# Patient Record
Sex: FEMALE | Race: WHITE | NOT HISPANIC OR LATINO | Employment: OTHER | URBAN - METROPOLITAN AREA
[De-identification: names, ages, dates, MRNs, and addresses within clinical notes are randomized per-mention and may not be internally consistent; named-entity substitution may affect disease eponyms.]

---

## 2018-01-15 ENCOUNTER — CONVERSION ENCOUNTER (OUTPATIENT)
Dept: FAMILY MEDICINE CLINIC | Facility: CLINIC | Age: 73
End: 2018-01-15

## 2018-01-15 ENCOUNTER — OFFICE VISIT CONVERTED (OUTPATIENT)
Dept: FAMILY MEDICINE CLINIC | Facility: CLINIC | Age: 73
End: 2018-01-15
Attending: FAMILY MEDICINE

## 2018-02-01 ENCOUNTER — CONVERSION ENCOUNTER (OUTPATIENT)
Dept: MAMMOGRAPHY | Facility: HOSPITAL | Age: 73
End: 2018-02-01

## 2018-02-05 ENCOUNTER — OFFICE VISIT CONVERTED (OUTPATIENT)
Dept: PULMONOLOGY | Facility: CLINIC | Age: 73
End: 2018-02-05
Attending: INTERNAL MEDICINE

## 2018-03-09 ENCOUNTER — OFFICE VISIT CONVERTED (OUTPATIENT)
Dept: FAMILY MEDICINE CLINIC | Facility: CLINIC | Age: 73
End: 2018-03-09
Attending: FAMILY MEDICINE

## 2018-04-27 ENCOUNTER — OFFICE VISIT CONVERTED (OUTPATIENT)
Dept: FAMILY MEDICINE CLINIC | Facility: CLINIC | Age: 73
End: 2018-04-27
Attending: FAMILY MEDICINE

## 2018-04-27 ENCOUNTER — CONVERSION ENCOUNTER (OUTPATIENT)
Dept: FAMILY MEDICINE CLINIC | Facility: CLINIC | Age: 73
End: 2018-04-27

## 2018-05-18 ENCOUNTER — OFFICE VISIT CONVERTED (OUTPATIENT)
Dept: FAMILY MEDICINE CLINIC | Facility: CLINIC | Age: 73
End: 2018-05-18
Attending: FAMILY MEDICINE

## 2018-06-19 ENCOUNTER — OFFICE VISIT CONVERTED (OUTPATIENT)
Dept: FAMILY MEDICINE CLINIC | Facility: CLINIC | Age: 73
End: 2018-06-19
Attending: FAMILY MEDICINE

## 2018-06-20 ENCOUNTER — OFFICE VISIT CONVERTED (OUTPATIENT)
Dept: PULMONOLOGY | Facility: CLINIC | Age: 73
End: 2018-06-20
Attending: INTERNAL MEDICINE

## 2018-06-22 ENCOUNTER — OFFICE VISIT CONVERTED (OUTPATIENT)
Dept: ORTHOPEDIC SURGERY | Facility: CLINIC | Age: 73
End: 2018-06-22
Attending: PHYSICIAN ASSISTANT

## 2018-07-11 ENCOUNTER — OFFICE VISIT CONVERTED (OUTPATIENT)
Dept: ORTHOPEDIC SURGERY | Facility: CLINIC | Age: 73
End: 2018-07-11
Attending: PHYSICIAN ASSISTANT

## 2018-07-17 ENCOUNTER — OFFICE VISIT CONVERTED (OUTPATIENT)
Dept: FAMILY MEDICINE CLINIC | Facility: CLINIC | Age: 73
End: 2018-07-17
Attending: FAMILY MEDICINE

## 2018-07-17 ENCOUNTER — CONVERSION ENCOUNTER (OUTPATIENT)
Dept: FAMILY MEDICINE CLINIC | Facility: CLINIC | Age: 73
End: 2018-07-17

## 2018-08-01 ENCOUNTER — CONVERSION ENCOUNTER (OUTPATIENT)
Dept: ORTHOPEDIC SURGERY | Facility: CLINIC | Age: 73
End: 2018-08-01

## 2018-08-01 ENCOUNTER — OFFICE VISIT CONVERTED (OUTPATIENT)
Dept: ORTHOPEDIC SURGERY | Facility: CLINIC | Age: 73
End: 2018-08-01
Attending: PHYSICIAN ASSISTANT

## 2018-08-31 ENCOUNTER — OFFICE VISIT CONVERTED (OUTPATIENT)
Dept: ORTHOPEDIC SURGERY | Facility: CLINIC | Age: 73
End: 2018-08-31
Attending: PHYSICIAN ASSISTANT

## 2018-09-06 ENCOUNTER — OFFICE VISIT CONVERTED (OUTPATIENT)
Dept: FAMILY MEDICINE CLINIC | Facility: CLINIC | Age: 73
End: 2018-09-06
Attending: FAMILY MEDICINE

## 2018-09-14 ENCOUNTER — CONVERSION ENCOUNTER (OUTPATIENT)
Dept: MAMMOGRAPHY | Facility: HOSPITAL | Age: 73
End: 2018-09-14

## 2018-10-23 ENCOUNTER — OFFICE VISIT CONVERTED (OUTPATIENT)
Dept: FAMILY MEDICINE CLINIC | Facility: CLINIC | Age: 73
End: 2018-10-23
Attending: FAMILY MEDICINE

## 2018-10-29 ENCOUNTER — OFFICE VISIT CONVERTED (OUTPATIENT)
Dept: FAMILY MEDICINE CLINIC | Facility: CLINIC | Age: 73
End: 2018-10-29
Attending: FAMILY MEDICINE

## 2018-10-29 ENCOUNTER — CONVERSION ENCOUNTER (OUTPATIENT)
Dept: FAMILY MEDICINE CLINIC | Facility: CLINIC | Age: 73
End: 2018-10-29

## 2018-12-12 ENCOUNTER — OFFICE VISIT CONVERTED (OUTPATIENT)
Dept: PULMONOLOGY | Facility: CLINIC | Age: 73
End: 2018-12-12
Attending: INTERNAL MEDICINE

## 2019-01-13 ENCOUNTER — HOSPITAL ENCOUNTER (OUTPATIENT)
Dept: URGENT CARE | Facility: CLINIC | Age: 74
Discharge: HOME | End: 2019-01-13

## 2019-01-17 ENCOUNTER — OFFICE VISIT CONVERTED (OUTPATIENT)
Dept: FAMILY MEDICINE CLINIC | Facility: CLINIC | Age: 74
End: 2019-01-17
Attending: FAMILY MEDICINE

## 2019-01-17 ENCOUNTER — CONVERSION ENCOUNTER (OUTPATIENT)
Dept: FAMILY MEDICINE CLINIC | Facility: CLINIC | Age: 74
End: 2019-01-17

## 2019-02-08 ENCOUNTER — HOSPITAL ENCOUNTER (OUTPATIENT)
Dept: GENERAL RADIOLOGY | Facility: HOSPITAL | Age: 74
Discharge: HOME | End: 2019-02-08
Attending: FAMILY MEDICINE

## 2019-02-08 ENCOUNTER — OFFICE VISIT CONVERTED (OUTPATIENT)
Dept: FAMILY MEDICINE CLINIC | Facility: CLINIC | Age: 74
End: 2019-02-08
Attending: FAMILY MEDICINE

## 2019-02-15 ENCOUNTER — OFFICE VISIT CONVERTED (OUTPATIENT)
Dept: FAMILY MEDICINE CLINIC | Facility: CLINIC | Age: 74
End: 2019-02-15
Attending: FAMILY MEDICINE

## 2019-04-11 ENCOUNTER — OFFICE VISIT CONVERTED (OUTPATIENT)
Dept: FAMILY MEDICINE CLINIC | Facility: CLINIC | Age: 74
End: 2019-04-11
Attending: NURSE PRACTITIONER

## 2019-04-26 ENCOUNTER — OFFICE VISIT CONVERTED (OUTPATIENT)
Dept: PULMONOLOGY | Facility: CLINIC | Age: 74
End: 2019-04-26
Attending: INTERNAL MEDICINE

## 2019-04-29 ENCOUNTER — OFFICE VISIT CONVERTED (OUTPATIENT)
Dept: FAMILY MEDICINE CLINIC | Facility: CLINIC | Age: 74
End: 2019-04-29
Attending: FAMILY MEDICINE

## 2019-04-29 ENCOUNTER — HOSPITAL ENCOUNTER (OUTPATIENT)
Dept: OTHER | Facility: HOSPITAL | Age: 74
Discharge: HOME | End: 2019-04-29
Attending: INTERNAL MEDICINE

## 2019-05-02 ENCOUNTER — HOSPITAL ENCOUNTER (OUTPATIENT)
Dept: GENERAL RADIOLOGY | Facility: HOSPITAL | Age: 74
Discharge: HOME | End: 2019-05-02
Attending: INTERNAL MEDICINE

## 2019-06-17 ENCOUNTER — OFFICE VISIT CONVERTED (OUTPATIENT)
Dept: PULMONOLOGY | Facility: CLINIC | Age: 74
End: 2019-06-17
Attending: PHYSICIAN ASSISTANT

## 2019-06-27 ENCOUNTER — HOSPITAL ENCOUNTER (OUTPATIENT)
Dept: FAMILY MEDICINE CLINIC | Facility: CLINIC | Age: 74
Discharge: HOME | End: 2019-06-27
Attending: FAMILY MEDICINE

## 2019-06-27 ENCOUNTER — HOSPITAL ENCOUNTER (OUTPATIENT)
Dept: GENERAL RADIOLOGY | Facility: HOSPITAL | Age: 74
Discharge: HOME | End: 2019-06-27
Attending: FAMILY MEDICINE

## 2019-06-27 ENCOUNTER — OFFICE VISIT CONVERTED (OUTPATIENT)
Dept: FAMILY MEDICINE CLINIC | Facility: CLINIC | Age: 74
End: 2019-06-27
Attending: FAMILY MEDICINE

## 2019-06-27 LAB — URATE SERPL-MCNC: 6.1 MG/DL (ref 2.5–7.5)

## 2019-07-03 LAB
BACTERIA SPEC AEROBE CULT: ABNORMAL
BACTERIA SPEC AEROBE CULT: ABNORMAL

## 2019-07-26 ENCOUNTER — HOSPITAL ENCOUNTER (OUTPATIENT)
Dept: URGENT CARE | Facility: CLINIC | Age: 74
Discharge: HOME | End: 2019-07-26

## 2019-08-13 ENCOUNTER — HOSPITAL ENCOUNTER (OUTPATIENT)
Dept: OTHER | Facility: HOSPITAL | Age: 74
Discharge: HOME | End: 2019-08-13
Attending: PHYSICIAN ASSISTANT

## 2019-08-13 LAB
ALBUMIN SERPL-MCNC: 3.9 G/DL (ref 3.5–5)
ALBUMIN/GLOB SERPL: 1.3 {RATIO} (ref 1.4–2.6)
ALP SERPL-CCNC: 99 U/L (ref 43–160)
ALT SERPL-CCNC: 12 U/L (ref 10–40)
ANION GAP SERPL CALC-SCNC: 16 MMOL/L (ref 8–19)
AST SERPL-CCNC: 20 U/L (ref 15–50)
BASOPHILS # BLD AUTO: 0.08 10*3/UL (ref 0–0.2)
BASOPHILS NFR BLD AUTO: 0.7 % (ref 0–3)
BILIRUB SERPL-MCNC: 0.38 MG/DL (ref 0.2–1.3)
BUN SERPL-MCNC: 25 MG/DL (ref 5–25)
BUN/CREAT SERPL: 26 {RATIO} (ref 6–20)
CALCIUM SERPL-MCNC: 9.3 MG/DL (ref 8.7–10.4)
CHLORIDE SERPL-SCNC: 107 MMOL/L (ref 99–111)
CONV ABS IMM GRAN: 0.06 10*3/UL (ref 0–0.2)
CONV CO2: 22 MMOL/L (ref 22–32)
CONV IMMATURE GRAN: 0.5 % (ref 0–1.8)
CONV TOTAL PROTEIN: 6.8 G/DL (ref 6.3–8.2)
CREAT UR-MCNC: 0.96 MG/DL (ref 0.5–0.9)
DEPRECATED RDW RBC AUTO: 48.4 FL (ref 36.4–46.3)
EOSINOPHIL # BLD AUTO: 0.19 10*3/UL (ref 0–0.7)
EOSINOPHIL # BLD AUTO: 1.6 % (ref 0–7)
ERYTHROCYTE [DISTWIDTH] IN BLOOD BY AUTOMATED COUNT: 14.6 % (ref 11.7–14.4)
GFR SERPLBLD BASED ON 1.73 SQ M-ARVRAT: 58 ML/MIN/{1.73_M2}
GLOBULIN UR ELPH-MCNC: 2.9 G/DL (ref 2–3.5)
GLUCOSE SERPL-MCNC: 86 MG/DL (ref 65–99)
HCT VFR BLD AUTO: 38.1 % (ref 37–47)
HGB BLD-MCNC: 11.9 G/DL (ref 12–16)
LYMPHOCYTES # BLD AUTO: 4.73 10*3/UL (ref 1–5)
LYMPHOCYTES NFR BLD AUTO: 39.2 % (ref 20–45)
MCH RBC QN AUTO: 28.1 PG (ref 27–31)
MCHC RBC AUTO-ENTMCNC: 31.2 G/DL (ref 33–37)
MCV RBC AUTO: 89.9 FL (ref 81–99)
MONOCYTES # BLD AUTO: 0.98 10*3/UL (ref 0.2–1.2)
MONOCYTES NFR BLD AUTO: 8.1 % (ref 3–10)
NEUTROPHILS # BLD AUTO: 6.03 10*3/UL (ref 2–8)
NEUTROPHILS NFR BLD AUTO: 49.9 % (ref 30–85)
NRBC CBCN: 0 % (ref 0–0.7)
OSMOLALITY SERPL CALC.SUM OF ELEC: 296 MOSM/KG (ref 273–304)
PLATELET # BLD AUTO: 216 10*3/UL (ref 130–400)
PMV BLD AUTO: 12.9 FL (ref 9.4–12.3)
POTASSIUM SERPL-SCNC: 4 MMOL/L (ref 3.5–5.3)
RBC # BLD AUTO: 4.24 10*6/UL (ref 4.2–5.4)
SODIUM SERPL-SCNC: 141 MMOL/L (ref 135–147)
WBC # BLD AUTO: 12.07 10*3/UL (ref 4.8–10.8)

## 2019-08-16 ENCOUNTER — OFFICE VISIT CONVERTED (OUTPATIENT)
Dept: PULMONOLOGY | Facility: CLINIC | Age: 74
End: 2019-08-16
Attending: INTERNAL MEDICINE

## 2019-08-19 ENCOUNTER — HOSPITAL ENCOUNTER (OUTPATIENT)
Dept: OTHER | Facility: HOSPITAL | Age: 74
Discharge: HOME | End: 2019-08-19
Attending: INTERNAL MEDICINE

## 2019-08-19 LAB — BNP SERPL-MCNC: 259 PG/ML (ref 0–900)

## 2019-08-21 LAB — BACTERIA SPEC AEROBE CULT: NORMAL

## 2019-08-23 ENCOUNTER — OFFICE VISIT CONVERTED (OUTPATIENT)
Dept: FAMILY MEDICINE CLINIC | Facility: CLINIC | Age: 74
End: 2019-08-23
Attending: FAMILY MEDICINE

## 2019-08-30 ENCOUNTER — HOSPITAL ENCOUNTER (OUTPATIENT)
Dept: FAMILY MEDICINE CLINIC | Facility: CLINIC | Age: 74
Discharge: HOME | End: 2019-08-30
Attending: FAMILY MEDICINE

## 2019-08-30 LAB
ANION GAP SERPL CALC-SCNC: 16 MMOL/L (ref 8–19)
BUN SERPL-MCNC: 25 MG/DL (ref 5–25)
BUN/CREAT SERPL: 21 {RATIO} (ref 6–20)
CALCIUM SERPL-MCNC: 8.9 MG/DL (ref 8.7–10.4)
CHLORIDE SERPL-SCNC: 105 MMOL/L (ref 99–111)
CONV CO2: 25 MMOL/L (ref 22–32)
CREAT UR-MCNC: 1.2 MG/DL (ref 0.5–0.9)
GFR SERPLBLD BASED ON 1.73 SQ M-ARVRAT: 44 ML/MIN/{1.73_M2}
GLUCOSE SERPL-MCNC: 120 MG/DL (ref 65–99)
OSMOLALITY SERPL CALC.SUM OF ELEC: 300 MOSM/KG (ref 273–304)
POTASSIUM SERPL-SCNC: 4.1 MMOL/L (ref 3.5–5.3)
SODIUM SERPL-SCNC: 142 MMOL/L (ref 135–147)

## 2019-09-13 ENCOUNTER — HOSPITAL ENCOUNTER (OUTPATIENT)
Dept: FAMILY MEDICINE CLINIC | Facility: CLINIC | Age: 74
Discharge: HOME | End: 2019-09-13
Attending: FAMILY MEDICINE

## 2019-09-13 LAB
ANION GAP SERPL CALC-SCNC: 16 MMOL/L (ref 8–19)
BUN SERPL-MCNC: 21 MG/DL (ref 5–25)
BUN/CREAT SERPL: 18 {RATIO} (ref 6–20)
CALCIUM SERPL-MCNC: 9.1 MG/DL (ref 8.7–10.4)
CHLORIDE SERPL-SCNC: 105 MMOL/L (ref 99–111)
CONV CO2: 26 MMOL/L (ref 22–32)
CREAT UR-MCNC: 1.16 MG/DL (ref 0.5–0.9)
GFR SERPLBLD BASED ON 1.73 SQ M-ARVRAT: 46 ML/MIN/{1.73_M2}
GLUCOSE SERPL-MCNC: 99 MG/DL (ref 65–99)
OSMOLALITY SERPL CALC.SUM OF ELEC: 299 MOSM/KG (ref 273–304)
POTASSIUM SERPL-SCNC: 4.4 MMOL/L (ref 3.5–5.3)
SODIUM SERPL-SCNC: 143 MMOL/L (ref 135–147)

## 2019-10-29 ENCOUNTER — HOSPITAL ENCOUNTER (OUTPATIENT)
Dept: FAMILY MEDICINE CLINIC | Facility: CLINIC | Age: 74
Discharge: HOME | End: 2019-10-29
Attending: FAMILY MEDICINE

## 2019-10-29 ENCOUNTER — OFFICE VISIT CONVERTED (OUTPATIENT)
Dept: FAMILY MEDICINE CLINIC | Facility: CLINIC | Age: 74
End: 2019-10-29
Attending: FAMILY MEDICINE

## 2019-10-29 LAB
25(OH)D3 SERPL-MCNC: 26.5 NG/ML (ref 30–100)
ALBUMIN SERPL-MCNC: 4.2 G/DL (ref 3.5–5)
ALBUMIN/GLOB SERPL: 1.3 {RATIO} (ref 1.4–2.6)
ALP SERPL-CCNC: 116 U/L (ref 43–160)
ALT SERPL-CCNC: 8 U/L (ref 10–40)
ANION GAP SERPL CALC-SCNC: 22 MMOL/L (ref 8–19)
AST SERPL-CCNC: 15 U/L (ref 15–50)
BASOPHILS # BLD AUTO: 0.08 10*3/UL (ref 0–0.2)
BASOPHILS NFR BLD AUTO: 0.6 % (ref 0–3)
BILIRUB SERPL-MCNC: 0.49 MG/DL (ref 0.2–1.3)
BUN SERPL-MCNC: 26 MG/DL (ref 5–25)
BUN/CREAT SERPL: 24 {RATIO} (ref 6–20)
CALCIUM SERPL-MCNC: 9.8 MG/DL (ref 8.7–10.4)
CHLORIDE SERPL-SCNC: 104 MMOL/L (ref 99–111)
CHOLEST SERPL-MCNC: 176 MG/DL (ref 107–200)
CHOLEST/HDLC SERPL: 3 {RATIO} (ref 3–6)
CONV ABS IMM GRAN: 0.08 10*3/UL (ref 0–0.2)
CONV CO2: 21 MMOL/L (ref 22–32)
CONV IMMATURE GRAN: 0.6 % (ref 0–1.8)
CONV TOTAL PROTEIN: 7.5 G/DL (ref 6.3–8.2)
CREAT UR-MCNC: 1.08 MG/DL (ref 0.5–0.9)
DEPRECATED RDW RBC AUTO: 50.2 FL (ref 36.4–46.3)
EOSINOPHIL # BLD AUTO: 0.19 10*3/UL (ref 0–0.7)
EOSINOPHIL # BLD AUTO: 1.4 % (ref 0–7)
ERYTHROCYTE [DISTWIDTH] IN BLOOD BY AUTOMATED COUNT: 15.2 % (ref 11.7–14.4)
GFR SERPLBLD BASED ON 1.73 SQ M-ARVRAT: 50 ML/MIN/{1.73_M2}
GLOBULIN UR ELPH-MCNC: 3.3 G/DL (ref 2–3.5)
GLUCOSE SERPL-MCNC: 86 MG/DL (ref 65–99)
HCT VFR BLD AUTO: 42.6 % (ref 37–47)
HDLC SERPL-MCNC: 58 MG/DL (ref 40–60)
HGB BLD-MCNC: 13.2 G/DL (ref 12–16)
LDLC SERPL CALC-MCNC: 94 MG/DL (ref 70–100)
LYMPHOCYTES # BLD AUTO: 4.07 10*3/UL (ref 1–5)
LYMPHOCYTES NFR BLD AUTO: 29.2 % (ref 20–45)
MCH RBC QN AUTO: 27.8 PG (ref 27–31)
MCHC RBC AUTO-ENTMCNC: 31 G/DL (ref 33–37)
MCV RBC AUTO: 89.9 FL (ref 81–99)
MONOCYTES # BLD AUTO: 0.98 10*3/UL (ref 0.2–1.2)
MONOCYTES NFR BLD AUTO: 7 % (ref 3–10)
NEUTROPHILS # BLD AUTO: 8.52 10*3/UL (ref 2–8)
NEUTROPHILS NFR BLD AUTO: 61.2 % (ref 30–85)
NRBC CBCN: 0 % (ref 0–0.7)
OSMOLALITY SERPL CALC.SUM OF ELEC: 300 MOSM/KG (ref 273–304)
PLATELET # BLD AUTO: 237 10*3/UL (ref 130–400)
PMV BLD AUTO: 13.2 FL (ref 9.4–12.3)
POTASSIUM SERPL-SCNC: 3.9 MMOL/L (ref 3.5–5.3)
RBC # BLD AUTO: 4.74 10*6/UL (ref 4.2–5.4)
SODIUM SERPL-SCNC: 143 MMOL/L (ref 135–147)
T4 FREE SERPL-MCNC: 1.2 NG/DL (ref 0.9–1.8)
TRIGL SERPL-MCNC: 118 MG/DL (ref 40–150)
TSH SERPL-ACNC: 2.3 M[IU]/L (ref 0.27–4.2)
VLDLC SERPL-MCNC: 24 MG/DL (ref 5–37)
WBC # BLD AUTO: 13.92 10*3/UL (ref 4.8–10.8)

## 2019-11-11 ENCOUNTER — OFFICE VISIT CONVERTED (OUTPATIENT)
Dept: PULMONOLOGY | Facility: CLINIC | Age: 74
End: 2019-11-11
Attending: PHYSICIAN ASSISTANT

## 2020-03-30 ENCOUNTER — TELEMEDICINE CONVERTED (OUTPATIENT)
Dept: FAMILY MEDICINE CLINIC | Facility: CLINIC | Age: 75
End: 2020-03-30
Attending: FAMILY MEDICINE

## 2020-06-18 ENCOUNTER — HOSPITAL ENCOUNTER (OUTPATIENT)
Dept: LAB | Facility: HOSPITAL | Age: 75
Discharge: HOME | End: 2020-06-18
Attending: INTERNAL MEDICINE

## 2020-06-18 ENCOUNTER — OFFICE VISIT CONVERTED (OUTPATIENT)
Dept: PULMONOLOGY | Facility: CLINIC | Age: 75
End: 2020-06-18
Attending: INTERNAL MEDICINE

## 2020-06-18 LAB
ALBUMIN SERPL-MCNC: 4.1 G/DL (ref 3.5–5)
ALBUMIN/GLOB SERPL: 1.5 {RATIO} (ref 1.4–2.6)
ALP SERPL-CCNC: 97 U/L (ref 43–160)
ALT SERPL-CCNC: 8 U/L (ref 10–40)
ANION GAP SERPL CALC-SCNC: 18 MMOL/L (ref 8–19)
AST SERPL-CCNC: 16 U/L (ref 15–50)
BASOPHILS # BLD AUTO: 0.07 10*3/UL (ref 0–0.2)
BASOPHILS NFR BLD AUTO: 0.6 % (ref 0–3)
BILIRUB SERPL-MCNC: 0.45 MG/DL (ref 0.2–1.3)
BNP SERPL-MCNC: 224 PG/ML (ref 0–1800)
BUN SERPL-MCNC: 15 MG/DL (ref 5–25)
BUN/CREAT SERPL: 15 {RATIO} (ref 6–20)
CALCIUM SERPL-MCNC: 9.2 MG/DL (ref 8.7–10.4)
CHLORIDE SERPL-SCNC: 104 MMOL/L (ref 99–111)
CONV ABS IMM GRAN: 0.05 10*3/UL (ref 0–0.2)
CONV CO2: 22 MMOL/L (ref 22–32)
CONV IMMATURE GRAN: 0.4 % (ref 0–1.8)
CONV TOTAL PROTEIN: 6.9 G/DL (ref 6.3–8.2)
CREAT UR-MCNC: 1 MG/DL (ref 0.5–0.9)
DEPRECATED RDW RBC AUTO: 51.2 FL (ref 36.4–46.3)
EOSINOPHIL # BLD AUTO: 0.26 10*3/UL (ref 0–0.7)
EOSINOPHIL # BLD AUTO: 2.2 % (ref 0–7)
ERYTHROCYTE [DISTWIDTH] IN BLOOD BY AUTOMATED COUNT: 15.6 % (ref 11.7–14.4)
GFR SERPLBLD BASED ON 1.73 SQ M-ARVRAT: 55 ML/MIN/{1.73_M2}
GLOBULIN UR ELPH-MCNC: 2.8 G/DL (ref 2–3.5)
GLUCOSE SERPL-MCNC: 98 MG/DL (ref 65–99)
HCT VFR BLD AUTO: 40.4 % (ref 37–47)
HGB BLD-MCNC: 12.4 G/DL (ref 12–16)
LYMPHOCYTES # BLD AUTO: 3.29 10*3/UL (ref 1–5)
LYMPHOCYTES NFR BLD AUTO: 28 % (ref 20–45)
MCH RBC QN AUTO: 27.3 PG (ref 27–31)
MCHC RBC AUTO-ENTMCNC: 30.7 G/DL (ref 33–37)
MCV RBC AUTO: 89 FL (ref 81–99)
MONOCYTES # BLD AUTO: 0.9 10*3/UL (ref 0.2–1.2)
MONOCYTES NFR BLD AUTO: 7.7 % (ref 3–10)
NEUTROPHILS # BLD AUTO: 7.17 10*3/UL (ref 2–8)
NEUTROPHILS NFR BLD AUTO: 61.1 % (ref 30–85)
NRBC CBCN: 0 % (ref 0–0.7)
OSMOLALITY SERPL CALC.SUM OF ELEC: 291 MOSM/KG (ref 273–304)
PLATELET # BLD AUTO: 255 10*3/UL (ref 130–400)
PMV BLD AUTO: 13 FL (ref 9.4–12.3)
POTASSIUM SERPL-SCNC: 3.8 MMOL/L (ref 3.5–5.3)
RBC # BLD AUTO: 4.54 10*6/UL (ref 4.2–5.4)
SODIUM SERPL-SCNC: 140 MMOL/L (ref 135–147)
WBC # BLD AUTO: 11.74 10*3/UL (ref 4.8–10.8)

## 2020-09-18 ENCOUNTER — HOSPITAL ENCOUNTER (OUTPATIENT)
Dept: GENERAL RADIOLOGY | Facility: HOSPITAL | Age: 75
Discharge: HOME | End: 2020-09-18
Attending: INTERNAL MEDICINE

## 2020-10-01 ENCOUNTER — OFFICE VISIT CONVERTED (OUTPATIENT)
Dept: FAMILY MEDICINE CLINIC | Facility: CLINIC | Age: 75
End: 2020-10-01
Attending: FAMILY MEDICINE

## 2020-10-03 ENCOUNTER — HOSPITAL ENCOUNTER (OUTPATIENT)
Dept: URGENT CARE | Facility: CLINIC | Age: 75
Discharge: HOME | End: 2020-10-03
Attending: FAMILY MEDICINE

## 2020-10-06 ENCOUNTER — OFFICE VISIT CONVERTED (OUTPATIENT)
Dept: PULMONOLOGY | Facility: CLINIC | Age: 75
End: 2020-10-06
Attending: NURSE PRACTITIONER

## 2020-10-06 ENCOUNTER — HOSPITAL ENCOUNTER (OUTPATIENT)
Dept: URGENT CARE | Facility: CLINIC | Age: 75
Discharge: HOME | End: 2020-10-06
Attending: NURSE PRACTITIONER

## 2020-10-13 LAB — SARS-COV-2 RNA SPEC QL NAA+PROBE: NOT DETECTED

## 2020-10-15 ENCOUNTER — HOSPITAL ENCOUNTER (OUTPATIENT)
Dept: LAB | Facility: HOSPITAL | Age: 75
Discharge: HOME | End: 2020-10-15
Attending: FAMILY MEDICINE

## 2020-10-15 LAB
ALBUMIN SERPL-MCNC: 3.8 G/DL (ref 3.5–5)
ALBUMIN/GLOB SERPL: 1.5 {RATIO} (ref 1.4–2.6)
ALP SERPL-CCNC: 99 U/L (ref 43–160)
ALT SERPL-CCNC: 7 U/L (ref 10–40)
ANION GAP SERPL CALC-SCNC: 16 MMOL/L (ref 8–19)
AST SERPL-CCNC: 14 U/L (ref 15–50)
BILIRUB SERPL-MCNC: 0.48 MG/DL (ref 0.2–1.3)
BUN SERPL-MCNC: 19 MG/DL (ref 5–25)
BUN/CREAT SERPL: 18 {RATIO} (ref 6–20)
CALCIUM SERPL-MCNC: 9.1 MG/DL (ref 8.7–10.4)
CHLORIDE SERPL-SCNC: 104 MMOL/L (ref 99–111)
CHOLEST SERPL-MCNC: 165 MG/DL (ref 107–200)
CHOLEST/HDLC SERPL: 2.9 {RATIO} (ref 3–6)
CONV CO2: 25 MMOL/L (ref 22–32)
CONV TOTAL PROTEIN: 6.3 G/DL (ref 6.3–8.2)
CREAT UR-MCNC: 1.05 MG/DL (ref 0.5–0.9)
GFR SERPLBLD BASED ON 1.73 SQ M-ARVRAT: 52 ML/MIN/{1.73_M2}
GLOBULIN UR ELPH-MCNC: 2.5 G/DL (ref 2–3.5)
GLUCOSE SERPL-MCNC: 82 MG/DL (ref 65–99)
HDLC SERPL-MCNC: 57 MG/DL (ref 40–60)
LDLC SERPL CALC-MCNC: 78 MG/DL (ref 70–100)
OSMOLALITY SERPL CALC.SUM OF ELEC: 293 MOSM/KG (ref 273–304)
POTASSIUM SERPL-SCNC: 4 MMOL/L (ref 3.5–5.3)
SODIUM SERPL-SCNC: 141 MMOL/L (ref 135–147)
TRIGL SERPL-MCNC: 150 MG/DL (ref 40–150)
VLDLC SERPL-MCNC: 30 MG/DL (ref 5–37)

## 2020-11-16 ENCOUNTER — OFFICE VISIT CONVERTED (OUTPATIENT)
Dept: CARDIOLOGY | Facility: CLINIC | Age: 75
End: 2020-11-16
Attending: INTERNAL MEDICINE

## 2020-11-16 ENCOUNTER — CONVERSION ENCOUNTER (OUTPATIENT)
Dept: CARDIOLOGY | Facility: CLINIC | Age: 75
End: 2020-11-16
Attending: INTERNAL MEDICINE

## 2020-12-14 ENCOUNTER — OFFICE VISIT CONVERTED (OUTPATIENT)
Dept: PULMONOLOGY | Facility: CLINIC | Age: 75
End: 2020-12-14
Attending: INTERNAL MEDICINE

## 2020-12-16 ENCOUNTER — LAB REQUISITION (OUTPATIENT)
Dept: LAB | Facility: HOSPITAL | Age: 75
End: 2020-12-16

## 2020-12-16 DIAGNOSIS — Z00.00 ENCOUNTER FOR GENERAL ADULT MEDICAL EXAMINATION WITHOUT ABNORMAL FINDINGS: ICD-10-CM

## 2020-12-16 PROCEDURE — U0004 COV-19 TEST NON-CDC HGH THRU: HCPCS | Performed by: OPHTHALMOLOGY

## 2020-12-17 LAB — SARS-COV-2 RNA RESP QL NAA+PROBE: NOT DETECTED

## 2021-01-08 ENCOUNTER — OFFICE VISIT CONVERTED (OUTPATIENT)
Dept: FAMILY MEDICINE CLINIC | Facility: CLINIC | Age: 76
End: 2021-01-08
Attending: FAMILY MEDICINE

## 2021-01-08 ENCOUNTER — HOSPITAL ENCOUNTER (OUTPATIENT)
Dept: GENERAL RADIOLOGY | Facility: HOSPITAL | Age: 76
Discharge: HOME | End: 2021-01-08
Attending: FAMILY MEDICINE

## 2021-01-08 ENCOUNTER — CONVERSION ENCOUNTER (OUTPATIENT)
Dept: FAMILY MEDICINE CLINIC | Facility: CLINIC | Age: 76
End: 2021-01-08

## 2021-01-28 ENCOUNTER — HOSPITAL ENCOUNTER (OUTPATIENT)
Dept: OTHER | Facility: HOSPITAL | Age: 76
Discharge: HOME | End: 2021-01-28
Attending: INTERNAL MEDICINE

## 2021-02-05 ENCOUNTER — CONVERSION ENCOUNTER (OUTPATIENT)
Dept: FAMILY MEDICINE CLINIC | Facility: CLINIC | Age: 76
End: 2021-02-05

## 2021-02-05 ENCOUNTER — OFFICE VISIT CONVERTED (OUTPATIENT)
Dept: FAMILY MEDICINE CLINIC | Facility: CLINIC | Age: 76
End: 2021-02-05
Attending: FAMILY MEDICINE

## 2021-02-23 ENCOUNTER — HOSPITAL ENCOUNTER (OUTPATIENT)
Dept: VACCINE CLINIC | Facility: HOSPITAL | Age: 76
Discharge: HOME | End: 2021-02-23
Attending: INTERNAL MEDICINE

## 2021-04-01 ENCOUNTER — OFFICE VISIT CONVERTED (OUTPATIENT)
Dept: FAMILY MEDICINE CLINIC | Facility: CLINIC | Age: 76
End: 2021-04-01
Attending: FAMILY MEDICINE

## 2021-04-26 ENCOUNTER — HOSPITAL ENCOUNTER (OUTPATIENT)
Dept: FAMILY MEDICINE CLINIC | Facility: CLINIC | Age: 76
Discharge: HOME | End: 2021-04-26
Attending: FAMILY MEDICINE

## 2021-04-26 LAB
ALBUMIN SERPL-MCNC: 4.3 G/DL (ref 3.5–5)
ALBUMIN/GLOB SERPL: 1.4 {RATIO} (ref 1.4–2.6)
ALP SERPL-CCNC: 88 U/L (ref 43–160)
ALT SERPL-CCNC: 10 U/L (ref 10–40)
ANION GAP SERPL CALC-SCNC: 15 MMOL/L (ref 8–19)
AST SERPL-CCNC: 19 U/L (ref 15–50)
BILIRUB SERPL-MCNC: 0.52 MG/DL (ref 0.2–1.3)
BUN SERPL-MCNC: 16 MG/DL (ref 5–25)
BUN/CREAT SERPL: 16 {RATIO} (ref 6–20)
CALCIUM SERPL-MCNC: 9.5 MG/DL (ref 8.7–10.4)
CHLORIDE SERPL-SCNC: 103 MMOL/L (ref 99–111)
CHOLEST SERPL-MCNC: 197 MG/DL (ref 107–200)
CHOLEST/HDLC SERPL: 3.9 {RATIO} (ref 3–6)
CONV CO2: 25 MMOL/L (ref 22–32)
CONV TOTAL PROTEIN: 7.4 G/DL (ref 6.3–8.2)
CREAT UR-MCNC: 0.98 MG/DL (ref 0.5–0.9)
GFR SERPLBLD BASED ON 1.73 SQ M-ARVRAT: 56 ML/MIN/{1.73_M2}
GLOBULIN UR ELPH-MCNC: 3.1 G/DL (ref 2–3.5)
GLUCOSE SERPL-MCNC: 106 MG/DL (ref 65–99)
HDLC SERPL-MCNC: 51 MG/DL (ref 40–60)
LDLC SERPL CALC-MCNC: 124 MG/DL (ref 70–100)
OSMOLALITY SERPL CALC.SUM OF ELEC: 290 MOSM/KG (ref 273–304)
POTASSIUM SERPL-SCNC: 3.8 MMOL/L (ref 3.5–5.3)
SODIUM SERPL-SCNC: 139 MMOL/L (ref 135–147)
TRIGL SERPL-MCNC: 112 MG/DL (ref 40–150)
VLDLC SERPL-MCNC: 22 MG/DL (ref 5–37)

## 2021-04-30 ENCOUNTER — OFFICE VISIT CONVERTED (OUTPATIENT)
Dept: FAMILY MEDICINE CLINIC | Facility: CLINIC | Age: 76
End: 2021-04-30
Attending: FAMILY MEDICINE

## 2021-04-30 ENCOUNTER — CONVERSION ENCOUNTER (OUTPATIENT)
Dept: FAMILY MEDICINE CLINIC | Facility: CLINIC | Age: 76
End: 2021-04-30

## 2021-05-10 NOTE — PROCEDURES
"   Procedure Note      Patient Name: Julia Rios   Patient ID: [id number]   Sex: Female   YOB: 1945    Primary Care Provider: Jocelin MANUEL   Referring Provider: Jocelin MANUEL    Visit Date: November 16, 2020    Provider: Russ Cloud MD   Location: [facility]   Location Address: [address] [place] [address], [address]   [place]  [id number]   Location Phone: [phone]          FINAL REPORT   TRANSTHORACIC ECHOCARDIOGRAM REPORT    Diagnosis: Shortness of breath   Height: 5'1\" Weight: 222 B/P: 138/70 BSA: 2.0   Tech: BNS   MEASUREMENTS:  RVID (Diastole) : RVID. (NORMAL: 0.7 to 2.4 cm max)   LVID (Systole): 2.8 cm (Diastole): 4.5 cm. (NORMAL: 3.7 - 5.4 cm)   Posterior Wall Thickness (Diastole): 1.2 cm. (NORMAL: 0.8 - 1.1 cm)   Septal Thickness (Diastole): 1.0 cm. (NORMAL: 0.7 - 1.2 cm)   LAID (Systole): 3.8 cm. (NORMAL: 1.9 - 3.8 cm)   Aortic Root Diameter (Diastole): 2.8 cm. (NORMAL: 2.0 - 3.7 cm)   DOPPLER: Continuous-wave, pulse-wave, and color-flow examination of the mitral, aortic, and tricuspid valves was performed. No significant stenosis or regurgitation was identified. Doppler flow velocities were normal. E/A ratio is 0.8. DT= 254 msec. IVRT is 85 msec. E/E' is 12.   COMMENTS:  The patient underwent 2-D, M-Mode, and Doppler examination, including pulse-wave, continuous-wave, and color-flow analysis; the study is technically adequate.   FINDINGS:  AORTIC VALVE: Appeared to be normal. Trileaflet with central closure point. No evidence of any obstruction. No regurgitation.   MITRAL VALVE: Appeared to be normal. No evidence of any obstruction. No regurgitation.   TRICUSPID VALVE: Appeared to be normal.   PULMONIC VALVE: Not well seen.   LEFT ATRIUM: Appeared to be normal. No intracavity masses or clots seen. LA volume index is 24 mL/m2.   AORTIC ROOT: Appeared to be normal in size.   LEFT VENTRICLE: Appeared to have an overall normal left ventricular systolic function with " a normal EF of 55% with some mild left ventricular hypertrophy.   RIGHT ATRIUM: Appeared to be normal; no obvious evidence of intracavity masses or clots.   RIGHT VENTRICLE: Normal size and function.   PERICARDIUM: Unremarkable. No evidence of effusion.   INFERIOR VENA CAVA: Diameter is 1.5 cm.   Fax: 11/17/2020      CONCLUSION:  1.  Normal ejection fraction of 55%.   2.  Mild left ventricular hypertrophy.  3.  No significant valvular heart issues.       MD GLENN Bolton/nicole    This note was transcribed by Mandy Medina.  nicole/glenn  The above service was transcribed by Mandy Medina, and I attest to the accuracy of the note.  GLENN                 Electronically Signed by: Payton Medina-, Other -Author on November 17, 2020 11:05:56 AM  Electronically Co-signed by: Russ Cloud MD -Reviewer on November 18, 2020 09:49:01 AM

## 2021-05-10 NOTE — H&P
History and Physical      Patient Name: Julia Rios   Patient ID: [id number]   Sex: Female   YOB: 1945    Primary Care Provider: Jocelin MANUEL   Referring Provider: Jocelin MANUEL    Visit Date: November 16, 2020    Provider: Russ Cloud MD   Location: [facility]   Location Address: [address] [place] [address], [address]   [place]  [id number]   Location Phone: [phone]          Chief Complaint  · Cardiomegaly      History Of Present Illness  Consult requested by: Jocelin MANUEL and Santino Baez MD   Julia Rios is a 75-year-old female with known history of underlying COPD and recurrent pneumonia infections who recently on a CT scan was noted to have cardiomegaly. The patient also does have shortness of breath intermittently, she states somewhat seasonally and cyclical, usually occurring in December. She reports PND, two-pillow orthopnea, mild lower extremity edema which also varies in fluctuance, nonproductive cough, no fever, no chills. No ongoing chest discomfort.   PAST MEDICAL HISTORY: Chronic obstructive pulmonary disease; history of obstructive sleep apnea for which she is on CPAP; history of recurrent bronchitis; pulmonary nodule.   FAMILY MEDICAL HISTORY: Significant for premature coronary atherosclerotic disease on her dad's side.   PSYCHOSOCIAL HISTORY: The patient is . No history of mood change or depression. Previously smoked but quit 20 years ago. Rare alcohol use. Has caffeine daily.   CURRENT MEDICATIONS: Symbicort inhaler; Ellipta; Allegra 180 mg daily; Singulair 10 mg daily; Gabapentin 300 mg in the morning and 600 mg at night; Cozaar 100 mg daily; Amlodipine 5 mg daily; vitamin D3; Azelastine daily; Albuterol inhaler.   ALLERGIES: Penicillin, sulfa drugs, cephalosporins, Motrin.       Review of Systems  · Constitutional  o Admits  o : good general health lately  o Denies  o : fatigue, recent weight changes   · Eyes  o Denies  o :  "double vision  · HENT  o Denies  o : hearing loss or ringing, chronic sinus problem, swollen glands in neck  · Cardiovascular  o Denies  o : chest pain, palpitations (fast, fluttering, or skipping beats), swelling (feet, ankles, hands), shortness of breath while walking or lying flat  · Respiratory  o Denies  o : asthma or wheezing, COPD  · Gastrointestinal  o Denies  o : ulcers, nausea or vomiting  · Neurologic  o Denies  o : lightheaded or dizzy, stroke, headaches  · Musculoskeletal  o Denies  o : joint pain, back pain  · Endocrine  o Denies  o : thyroid disease, diabetes, heat or cold intolerance, excessive thirst or urination  · Heme-Lymph  o Denies  o : bleeding or bruising tendency, anemia      Vitals  Date Time BP Position Site L\R Cuff Size HR RR TEMP (F) WT  HT  BMI kg/m2 BSA m2 O2 Sat FR L/min FiO2        11/16/2020 11:38 /70 Sitting    56 - R   222lbs 0oz 5'  1\" 41.95 2.08             Physical Examination  · Constitutional  o Appearance  o : Awake, alert, in no acute distress.   · Head and Face  o HEENT  o : PERRLA.  · Eyes  o Conjunctivae  o : Normal.  · Ears, Nose, Mouth and Throat  o Oral Cavity  o :   § Oral Mucosa  § : Normal.  · Neck  o Inspection/Palpation  o : No JVD.  · Respiratory  o Respiratory  o : Chest is symmetrical. Faint basilar crackles on the right side.   · Cardiovascular  o Heart  o :   § Auscultation of Heart  § : S1, S2 are normal. Regular rate and rhythm without murmurs, gallops, or rubs.  o Peripheral Vascular System  o :   § Extremities  § : Nails normal. No clubbing or cyanosis. Femoral pulses adequate. Pedal pulses adequate. Trace lower extremity bilaterally.   · Gastrointestinal  o Abdominal Examination  o : Abdomen soft. No masses. No guarding or rigidity. No hepatosplenomegaly. Bowel sounds normal.  · Musculoskeletal  o General  o :   § General Musculoskeletal  § : Muscle tone and strength were normal.  · Skin and Subcutaneous Tissue  o General Inspection  o : " Unremarkable.  · Imaging  o Imaging  o : Chest CT pulmonary angiogram showed no pulmonary emboli, but did show a small left pleural effusion and mild to moderate cardiomegaly with a trace pericardial effusion.  · EKG  o Results  o : Sinus bradycardia with borderline low voltage.      Echocardiogram today showed a normal ejection fraction of 55%.  No significant cardiac enlargement. Mild left ventricular hypertrophy seen.           Assessment     ASSESSMENT AND PLAN:  Cardiomegaly.  Patient with ongoing symptoms of shortness of breath, however, her proBNP level as well physical exam today are within normal limits from a volume standpoint. Her echocardiogram shows normal LV function and size, just some mild underlying LVH. No significant convincing evidence of diastolic congestive heart failure objectively such as left atrial enlargement or increase in estimated LV filling pressures or impaired relaxation.  Feel overall that the patient does not have significant underlying diastolic CHF issues that would warrant any diuretic treatment and her blood pressure is optimized.        MD GLENN Bolton/nicole    This note was transcribed by Mandy Medina.  nicole/glenn  The above service was transcribed by Mandy Medina, and I attest to the accuracy of the note.  GLENN             Electronically Signed by: Payton Medina-, Other -Author on November 17, 2020 11:04:44 AM  Electronically Co-signed by: Russ Cloud MD -Reviewer on November 18, 2020 09:41:02 AM

## 2021-05-13 NOTE — PROGRESS NOTES
Progress Note      Patient Name: Julia Rios   Patient ID: [id number]   Sex: Female   YOB: 1945    Primary Care Provider: Nick Patel DO   Referring Provider: Nick Patel DO    Visit Date: October 1, 2020    Provider: Nick Patel DO   Location: [facility]   Location Address: [address] [place] [address]  [id number]   Location Phone: [phone]          Chief Complaint  · 6 month follow up- COPD, HTN, Fatigue, CKD (stage 3)  · medication refills      History Of Present Illness  Julia Rios is a 75 year old /White female who presents for evaluation and treatment of: f/u HTN, CKD, and COPD. She states that she checks her BP occasionally and it has been good. Her breathing has been stable. She has an appointment with Pulmonary next week.       Past Medical History  Disease Name Date Onset Notes   Acute pain of right knee 11/21/2016 --    Acute right-sided low back pain with right-sided sciatica 01/15/2018 --    Allergic rhinitis --  --    Asthma --  --    Back Pain  --  2 bulging disc L2 L3   Chronic Obstructive Pulmonary Disease --  --    CKD (chronic kidney disease) stage 3, GFR 30-59 ml/min 10/29/2019 --    Closed nondisplaced fracture of metatarsal bone of left foot with routine healing, unspecified metatarsal, subsequent encounter 06/19/2018 --    Diverticulitis --  --    Hemoptysis --  non cancerous   Hypertension, Benign Essential --  --    Idiopathic chronic gout of multiple sites without tophus 05/20/2016 --    Primary osteoarthritis of right knee 01/16/2017 --    Vitamin D deficiency 01/13/2016 --          Past Surgical History  Procedure Name Date Notes   Colonoscopy 2014 --    EGD 2015 --    I have had no surgeries --  --          Medication List  Name Date Started Instructions   amlodipine 5 mg oral tablet 07/14/2019 TAKE 1 TABLET ONE TIME DAILY   Arnuity Ellipta 100 mcg/actuation inhalation blister with  device 2020 inhale 1 puff (100 mcg) by inhalation route once daily at the same time each day for 90 days   azelastine 137 mcg (0.1 %) nasal aerosol,spray  spray 1 spray (137 mcg) in each nostril by intranasal route 2 times per day   cholecalciferol (vitamin D3) 1,000 unit oral capsule 2019 TAKE 1 CAPSULE BY MOUTH EVERY DAY   cholecalciferol (vitamin D3) 25 mcg (1,000 unit) oral capsule 2020 TAKE 1 CAPSULE BY MOUTH EVERY DAY   gabapentin 300 mg oral capsule 2020 1 capsule every AM and 2 at bedtime   losartan 100 mg oral tablet 2020 TAKE 1 TABLET EVERY DAY   metoprolol succinate 100 mg oral tablet extended release 24 hr 2020 TAKE 1 TABLET EVERY DAY   Singulair 10 mg oral tablet 2019 TAKE 1 TABLET ONE TIME DAILY IN THE EVENING   Symbicort 160-4.5 mcg/actuation inhalation HFA aerosol inhaler 2018 INHALE 2 PUFF BY MOUTH EVERY MORNING AND EVERY EVENING   Ventolin HFA 90 mcg/actuation inhalation HFA aerosol inhaler  inhale 1 - 2 puffs (90 - 180 mcg) by inhalation route every 4-6 hours as needed   WAL-FEX ALLERGY 180MG TABS 150'S 2019 TAKE 1 TABLET BY MOUTH EVERY DAY         Allergy List  Allergen Name Date Reaction Notes   CEPHALOSPORINS --  --  --    ibuprofen --  --  --    PENICILLINS --  --  --    SULFA (SULFONAMIDES) --  --  --          Family Medical History  Disease Name Relative/Age Notes   Colon Neoplasm, Malignant Mother/61      Breast Neoplasm, Malignant Grandmother (maternal)/   --    Stroke Grandfather (paternal)/   --    Heart Disease Mother/   Mother   Cancer, Unspecified Mother/   Mother   Diabetes, unspecified type Brother/   Brother   Heart failure  --          Social History  Finding Status Start/Stop Quantity Notes   Alcohol Never --/-- --  2019 - 2019 - rarely   Alcohol Use --  --/-- --  10/23/2018 - does not drink   lives with spouse --  --/-- --  --    . --  --/-- --  --    Recreational Drug Use Never --/-- --  no  "  Retired. --  --/-- --  --    Tobacco Former 18/50 .5 ppd former smoker         Immunizations  NameDate Admin Mfg Trade Name Lot Number Route Inj VIS Given VIS Publication   Ieomhoyiu75/01/2020 [facility] Fluzone Quadrivalent [id number] IM LD 10/01/2020 08/15/2019   Comments: [id number]. Pt tolerated well.         Review of Systems  · Constitutional  o Denies  o : fatigue  · Eyes  o Denies  o : blurred vision, changes in vision  · HENT  o Denies  o : headaches  · Cardiovascular  o Admits  o : orthopnea, lower extremity edema  o Denies  o : chest pain, irregular heart beats, rapid heart rate  · Respiratory  o Admits  o : dyspnea on exertion  o Denies  o : shortness of breath, wheezing, cough  · Gastrointestinal  o Denies  o : diarrhea, constipation  · Endocrine  o Admits  o : central obesity  o Denies  o : polyuria, polydipsia  · Psychiatric  o Denies  o : anxiety, depression      Vitals  Date Time BP Position Site L\R Cuff Size HR RR TEMP (F) WT  HT  BMI kg/m2 BSA m2 O2 Sat FR L/min FiO2 HC       08/23/2019 01:49 /63 Sitting    53 - R   230lbs 0oz 5'  1\" 43.46 2.12 95 %      10/29/2019 09:45 /69 Sitting    50 - R   224lbs 6oz 5'  1\" 42.39 2.09 98 %  21%    10/01/2020 10:50 /57 Sitting    57 - R  98.5 220lbs 6oz 5'  1\" 41.64 2.07 95 %  21%          Physical Examination  · Constitutional  o Appearance  o : well-nourished, well developed, alert, in no acute distress, well-tended appearance  · Head and Face  o Head  o :   § Inspection  § : atraumatic, normocephalic  o Face  o :   § Inspection  § : no facial lesions  o HEENT  o : Unremarkable  · Eyes  o Conjunctivae  o : conjunctivae normal  o Sclerae  o : sclerae white  o Pupils and Irises  o : pupils equal and round, pupils reactive to light bilaterally  o Eyelids/Ocular Adnexae  o : eyelid appearance normal  · Ears, Nose, Mouth and Throat  o Ears  o :   § External Ears  § : appearance within normal limits, no lesions present  § Otoscopic " Examination  § : tympanic membrane appearance within normal limits bilaterally without perforations, mobility normal  o Nose  o :   § External Nose  § : appearance normal  o Oral Cavity  o :   § Oral Mucosa  § : oral mucosa normal  § Lips  § : lip appearance normal  § Teeth  § : normal dentition for age  § Gums  § : gums pink, non-swollen, no bleeding present  § Tongue  § : tongue appearance normal  § Palate  § : hard palate normal, soft palate appearance normal  o Throat  o :   § Oropharynx  § : no inflammation or lesions present, tonsils within normal limits  · Neck  o Inspection/Palpation  o : normal appearance, no masses or tenderness, trachea midline  o Thyroid  o : gland size normal, nontender, no nodules or masses present on palpation  · Respiratory  o Respiratory Effort  o : breathing unlabored  o Auscultation of Lungs  o : normal breath sounds  · Cardiovascular  o Heart  o :   § Auscultation of Heart  § : regular rate, normal rhythm, no murmurs present  o Peripheral Vascular System  o :   § Extremities  § : no edema  · Lymphatic  o Neck  o : no lymphadenopathy           Assessment  · Screening for depression     V79.0/Z13.89  · Need for influenza vaccination     V04.81/Z23  · COPD (chronic obstructive pulmonary disease)     496/J44.9  stable. F/U with Pulmonary  · CKD (chronic kidney disease) stage 3, GFR 30-59 ml/min     585.3/N18.3  will update her labs  · Hypertension, Benign Essential     401.1/I10  god control. [place] update her labs      Plan  · Orders  o ACO-18: Negative screen for clinical depression using a standardized tool () - V79.0/Z13.89 - 10/01/2020  o Immunization Admin Fee (Single) ([facility]) ([id number]) - V04.81/Z23 - 10/01/2020  o Fluzone Quadrivalent Vaccine, age 6 months + ([id number]) - V04.81/Z23 - 10/01/2020   Vaccine - Influenza; Dose: 0.5; Site: Left Deltoid; Route: Intramuscular; Date: 10/01/2020 10:10:00; Exp: 06/30/2021; Lot: [id number]; Mfg: PopJax pasteur; TradeName: Fluzone Quadrivalent;  Administered By: Marcy German MA; Comment: [id number]. Pt tolerated well.  o CMP [facility] ([id number]) - 585.3/N18.3, 401.1/I10 - 10/01/2020  o Lipid Panel [facility] ([id number]) - 401.1/I10 - 10/01/2020  o ACO-39: Current medications updated and reviewed (, 1159F) - - 10/01/2020  · Medications  o Medications have been Reconciled  o Transition of Care or Provider Policy  · Instructions  o Depression Screen completed and scanned into the EMR under the designated folder within the patient's documents.  o Today's PHQ-9 result is 0  o Patient is taking medications as prescribed and doing well.   o Take all medications as prescribed/directed.  o Patient instructed/educated on their diet and exercise program.  o Patient was educated/instructed on their diagnosis, treatment and medications prior to discharge from the clinic today.  o Patient instructed to seek medical attention urgently for new or worsening symptoms.  o Call the office with any concerns or questions.  o Bring all medicines with their bottles to each office visit.  · Disposition  o Call or Return if symptoms worsen or persist.  o Return Visit Request in/on 6 months +/- 2 days ([id number]).            Electronically Signed by: Marcy German MA -Author on October 2, 2020 10:41:44 AM  Electronically Co-signed by: Nick Patel DO -Reviewer on October 2, 2020 10:42:42 AM

## 2021-05-14 VITALS
WEIGHT: 218 LBS | BODY MASS INDEX: 41.16 KG/M2 | TEMPERATURE: 97.1 F | SYSTOLIC BLOOD PRESSURE: 148 MMHG | DIASTOLIC BLOOD PRESSURE: 56 MMHG | HEART RATE: 58 BPM | OXYGEN SATURATION: 98 % | HEIGHT: 61 IN

## 2021-05-14 VITALS
DIASTOLIC BLOOD PRESSURE: 66 MMHG | OXYGEN SATURATION: 97 % | TEMPERATURE: 97.5 F | HEART RATE: 62 BPM | HEIGHT: 61 IN | SYSTOLIC BLOOD PRESSURE: 139 MMHG

## 2021-05-14 VITALS
BODY MASS INDEX: 41.58 KG/M2 | OXYGEN SATURATION: 97 % | DIASTOLIC BLOOD PRESSURE: 57 MMHG | TEMPERATURE: 97.4 F | SYSTOLIC BLOOD PRESSURE: 147 MMHG | HEIGHT: 61 IN | WEIGHT: 220.25 LBS | HEART RATE: 57 BPM

## 2021-05-14 VITALS
SYSTOLIC BLOOD PRESSURE: 143 MMHG | HEIGHT: 61 IN | TEMPERATURE: 97.3 F | BODY MASS INDEX: 42.76 KG/M2 | HEART RATE: 57 BPM | OXYGEN SATURATION: 97 % | DIASTOLIC BLOOD PRESSURE: 69 MMHG | WEIGHT: 226.5 LBS

## 2021-05-14 VITALS
HEIGHT: 61 IN | DIASTOLIC BLOOD PRESSURE: 57 MMHG | WEIGHT: 220.37 LBS | TEMPERATURE: 98.5 F | HEART RATE: 57 BPM | BODY MASS INDEX: 41.61 KG/M2 | OXYGEN SATURATION: 95 % | SYSTOLIC BLOOD PRESSURE: 119 MMHG

## 2021-05-14 VITALS
HEIGHT: 61 IN | WEIGHT: 222 LBS | BODY MASS INDEX: 41.91 KG/M2 | DIASTOLIC BLOOD PRESSURE: 70 MMHG | SYSTOLIC BLOOD PRESSURE: 138 MMHG | HEART RATE: 56 BPM

## 2021-05-14 NOTE — PROGRESS NOTES
Progress Note      Patient Name: Julia Rios   Patient ID: [id number]   Sex: Female   YOB: 1945    Primary Care Provider: Nick Patel DO   Referring Provider: Jocelin MANUEL    Visit Date: January 8, 2021    Provider: Nick Patel DO   Location: [facility]   Location Address: [address] [place] [address]  [id number]   Location Phone: [phone]          Chief Complaint  · pt c/o possible left broken foot pt states she thinks she stepped out of the car wrong       History Of Present Illness  Julia Rios is a 75 year old /White female who presents for evaluation and treatment of: foot pain. She stepped out of the car 12/22/2020 and had left foot pain. It is worse with prolonged standing and walking. It has been swollen.       Past Medical History  Disease Name Date Onset Notes   Acute pain of right knee 11/21/2016 --    Acute right-sided low back pain with right-sided sciatica 01/15/2018 --    Allergic rhinitis --  --    Asthma --  --    Back Pain  --  2 bulging disc L2 L3   Chronic Obstructive Pulmonary Disease --  --    CKD (chronic kidney disease) stage 3, GFR 30-59 ml/min 10/29/2019 --    Closed nondisplaced fracture of metatarsal bone of left foot with routine healing, unspecified metatarsal, subsequent encounter 06/19/2018 --    Diverticulitis --  --    Hemoptysis --  non cancerous   Hypertension, Benign Essential --  --    Idiopathic chronic gout of multiple sites without tophus 05/20/2016 --    Primary osteoarthritis of right knee 01/16/2017 --    Vitamin D deficiency 01/13/2016 --          Past Surgical History  Procedure Name Date Notes   Colonoscopy 2014 --    EGD 2015 --    I have had no surgeries --  --          Medication List  Name Date Started Instructions   amlodipine 5 mg oral tablet 10/12/2020 TAKE 1 TABLET EVERY DAY   Arnuity Ellipta 100 mcg/actuation inhalation blister with device 03/30/2020 inhale 1  puff (100 mcg) by inhalation route once daily at the same time each day for 90 days   azelastine 137 mcg (0.1 %) nasal aerosol,spray  spray 1 spray (137 mcg) in each nostril by intranasal route 2 times per day   cholecalciferol (vitamin D3) 1,000 unit oral capsule 2019 TAKE 1 CAPSULE BY MOUTH EVERY DAY   cholecalciferol (vitamin D3) 25 mcg (1,000 unit) oral capsule 2020 TAKE 1 CAPSULE BY MOUTH EVERY DAY   gabapentin 300 mg oral capsule 2020 1 capsule every AM and 2 at bedtime   losartan 100 mg oral tablet 2020 TAKE 1 TABLET EVERY DAY   metoprolol succinate 100 mg oral tablet extended release 24 hr 2020 TAKE 1 TABLET EVERY DAY   Symbicort 160-4.5 mcg/actuation inhalation HFA aerosol inhaler 2018 INHALE 2 PUFF BY MOUTH EVERY MORNING AND EVERY EVENING   Ventolin HFA 90 mcg/actuation inhalation HFA aerosol inhaler  inhale 1 - 2 puffs (90 - 180 mcg) by inhalation route every 4-6 hours as needed   WAL-FEX ALLERGY 180MG TABS 150'S 2020 TAKE 1 TABLET BY MOUTH EVERY DAY         Allergy List  Allergen Name Date Reaction Notes   CEPHALOSPORINS --  --  --    ibuprofen --  --  --    PENICILLINS --  --  --    SULFA (SULFONAMIDES) --  --  --          Family Medical History  Disease Name Relative/Age Notes   Colon Neoplasm, Malignant Mother/61      Breast Neoplasm, Malignant Grandmother (maternal)/   --    Stroke Grandfather (paternal)/   --    Heart Disease Mother/   Mother   Cancer, Unspecified Mother/   Mother   Diabetes, unspecified type Brother/   Brother   Heart failure  --          Social History  Finding Status Start/Stop Quantity Notes   Alcohol Never --/-- --  2019 - 2019 - rarely   Alcohol Use --  --/-- --  10/23/2018 - does not drink   lives with spouse --  --/-- --  --    . --  --/-- --  --    Recreational Drug Use Never --/-- --  no   Retired. --  --/-- --  --    Tobacco Former 1850 .5 ppd former smoker         Immunizations  NameDate Admin [facility]  "Trade Name Lot Number Route Inj VIS Given VIS Publication   Weczytnjp83/01/2020 [facility] Fluzone Quadrivalent [id number] IM LD 10/01/2020 08/15/2019   Comments: [id number]. Pt tolerated well.         Review of Systems  · Constitutional  o Denies  o : fatigue  · Musculoskeletal  o Admits  o : foot pain      Vitals  Date Time BP Position Site L\R Cuff Size HR RR TEMP (F) WT  HT  BMI kg/m2 BSA m2 O2 Sat FR L/min FiO2 HC       10/29/2019 09:45 /69 Sitting    50 - R   224lbs 6oz 5'  1\" 42.39 2.09 98 %  21%    10/01/2020 10:50 /57 Sitting    57 - R  98.5 220lbs 6oz 5'  1\" 41.64 2.07 95 %  21%    11/16/2020 11:38 /70 Sitting    56 - R   222lbs 0oz 5'  1\" 41.95 2.08       01/08/2021 03:02 /66 Sitting    62 - R  97.5  5'  1\"   97 %  21%          Physical Examination  · Constitutional  o Appearance  o : well-nourished, well developed, no obvious deformities present  · Left Ankle/Foot  o Inspection  o : swelling   o Palpation  o : tender distal 3rd and 4th metatarsals  o Neurovascular  o : DP +2/4          Assessment  · Foot pain, left     729.5/M79.672  will x-ray. She needs a cast boot. IF she does NOT have her previous one at home she will call      Plan  · Orders  o ACO-14: Influenza immunization administered or previously received [facility] () - - 01/08/2021  o ACO-39: Current medications updated and reviewed (, 1159F) - - 01/08/2021  o X-ray of foot left [facility] ([id number]) - 729.5/M79.672 - 01/08/2021  · Medications  o Medications have been Reconciled  o Transition of Care or Provider Policy  · Instructions  o Patient is taking medications as prescribed and doing well.   o Take all medications as prescribed/directed.  o Patient instructed/educated on their diet and exercise program.  o Patient was educated/instructed on their diagnosis, treatment and medications prior to discharge from the clinic today.  o Patient instructed to seek medical attention urgently for new or worsening " symptoms.  o Call the office with any concerns or questions.  o Bring all medicines with their bottles to each office visit.  · Disposition  o Call or Return if symptoms worsen or persist.  o Return Visit Request in/on 4 weeks +/- 2 days ([id number]).            Electronically Signed by: Nick Patel, DO -Author on January 8, 2021 03:14:11 PM

## 2021-05-14 NOTE — PROGRESS NOTES
Progress Note      Patient Name: Julia Rios   Patient ID: [id number]   Sex: Female   YOB: 1945    Primary Care Provider: Nick Patel DO   Referring Provider: Jocelin MANUEL    Visit Date: February 5, 2021    Provider: Nick Patel DO   Location: [facility]   Location Address: [address] [place] [address]  [id number]   Location Phone: [phone]          Chief Complaint  · 4 week follow up - Left Foot pain       History Of Present Illness  Julia Rios is a 76 year old /White female who presents for evaluation and treatment of: foot pain. SHe states that her left foot pain is improved. SHe has been wearing her boot routinely. No swelling. Her x-rays showed degenerative changes.       Past Medical History  Disease Name Date Onset Notes   Acute pain of right knee 11/21/2016 --    Acute right-sided low back pain with right-sided sciatica 01/15/2018 --    Allergic rhinitis --  --    Asthma --  --    Back Pain  --  2 bulging disc L2 L3   Chronic Obstructive Pulmonary Disease --  --    CKD (chronic kidney disease) stage 3, GFR 30-59 ml/min 10/29/2019 --    Closed nondisplaced fracture of metatarsal bone of left foot with routine healing, unspecified metatarsal, subsequent encounter 06/19/2018 --    Diverticulitis --  --    Hemoptysis --  non cancerous   Hypertension, Benign Essential --  --    Idiopathic chronic gout of multiple sites without tophus 05/20/2016 --    Primary osteoarthritis of right knee 01/16/2017 --    Vitamin D deficiency 01/13/2016 --          Past Surgical History  Procedure Name Date Notes   Colonoscopy 2014 --    EGD 2015 --    I have had no surgeries --  --          Medication List  Name Date Started Instructions   amlodipine 5 mg oral tablet 10/12/2020 TAKE 1 TABLET EVERY DAY   Arnuity Ellipta 100 mcg/actuation inhalation blister with device 03/30/2020 inhale 1 puff (100 mcg) by inhalation route once  daily at the same time each day for 90 days   azelastine 137 mcg (0.1 %) nasal aerosol,spray  spray 1 spray (137 mcg) in each nostril by intranasal route 2 times per day   cholecalciferol (vitamin D3) 1,000 unit oral capsule 2019 TAKE 1 CAPSULE BY MOUTH EVERY DAY   cholecalciferol (vitamin D3) 25 mcg (1,000 unit) oral capsule 2020 TAKE 1 CAPSULE BY MOUTH EVERY DAY   gabapentin 300 mg oral capsule 2021 1 capsule every AM and 2 at bedtime   losartan 100 mg oral tablet 2020 TAKE 1 TABLET EVERY DAY   metoprolol succinate 100 mg oral tablet extended release 24 hr 2020 TAKE 1 TABLET EVERY DAY   Symbicort 160-4.5 mcg/actuation inhalation HFA aerosol inhaler 2018 INHALE 2 PUFF BY MOUTH EVERY MORNING AND EVERY EVENING   Ventolin HFA 90 mcg/actuation inhalation HFA aerosol inhaler  inhale 1 - 2 puffs (90 - 180 mcg) by inhalation route every 4-6 hours as needed   WAL-FEX ALLERGY 180MG TABS 150'S 2020 TAKE 1 TABLET BY MOUTH EVERY DAY         Allergy List  Allergen Name Date Reaction Notes   CEPHALOSPORINS --  --  --    ibuprofen --  --  --    PENICILLINS --  --  --    SULFA (SULFONAMIDES) --  --  --          Family Medical History  Disease Name Relative/Age Notes   Colon Neoplasm, Malignant Mother/61      Breast Neoplasm, Malignant Grandmother (maternal)/   --    Stroke Grandfather (paternal)/   --    Heart Disease Mother/   Mother   Cancer, Unspecified Mother/   Mother   Diabetes, unspecified type Brother/   Brother   Heart failure  --          Social History  Finding Status Start/Stop Quantity Notes   Alcohol Never --/-- --  2019 - 2019 - rarely   Alcohol Use --  --/-- --  10/23/2018 - does not drink   lives with spouse --  --/-- --  --    . --  --/-- --  --    Recreational Drug Use Never --/-- --  no   Retired. --  --/-- --  --    Tobacco Former  .5 ppd former smoker         Immunizations  NameDate Admin Mfg Trade Name Lot Number Route Inj VIS Given  "VIS Publication   Zdlfohttj54/01/2020 [facility] Fluzone Quadrivalent [id number] IM LD 10/01/2020 08/15/2019   Comments: [id number]. Pt tolerated well.         Review of Systems  · Constitutional  o Denies  o : fatigue  · Musculoskeletal  o Admits  o : foot pain  o Denies  o : joint swelling      Vitals  Date Time BP Position Site L\R Cuff Size HR RR TEMP (F) WT  HT  BMI kg/m2 BSA m2 O2 Sat FR L/min FiO2 HC       11/16/2020 11:38 /70 Sitting    56 - R   222lbs 0oz 5'  1\" 41.95 2.08       01/08/2021 03:02 /66 Sitting    62 - R  97.5  5'  1\"   97 %  21%    02/05/2021 12:01 /69 Sitting    57 - R  97.3 226lbs 8oz 5'  1\" 42.8 2.1 97 %  21%          Physical Examination  · Constitutional  o Appearance  o : well-nourished, well developed, no obvious deformities present  · Head and Face  o Head  o :   § Inspection  § : atraumatic, normocephalic  o Face  o :   § Inspection  § : no facial lesions  · Left Ankle/Foot  o Inspection  o : no swelling, hammertoe deformity 2nd and 3rd digits  o Palpation  o : tenderness 3-5th MT-P joints  o Neurovascular  o : DP+2/4          Assessment  · Foot pain, left     729.5/M79.672  will have her gradually switch to supportive shoes with inserts      Plan  · Orders  o ACO-39: Current medications updated and reviewed (1159F, ) - - 02/05/2021  · Medications  o Medications have been Reconciled  o Transition of Care or Provider Policy  · Instructions  o Patient is taking medications as prescribed and doing well.   o Take all medications as prescribed/directed.  o Patient instructed/educated on their diet and exercise program.  o Patient was educated/instructed on their diagnosis, treatment and medications prior to discharge from the clinic today.  o Patient instructed to seek medical attention urgently for new or worsening symptoms.  o Call the office with any concerns or questions.  o Bring all medicines with their bottles to each office visit.  · Disposition  o Call or " Return if symptoms worsen or persist.  o Return Visit Request in/on 6 weeks +/- 2 days ([id number]).            Electronically Signed by: Nick Patel DO -Author on February 5, 2021 12:49:15 PM

## 2021-05-14 NOTE — PROGRESS NOTES
Progress Note      Patient Name: Julia Rios   Patient ID: [id number]   Sex: Female   YOB: 1945    Primary Care Provider: Nick Patel DO   Referring Provider: Jocelin MANUEL    Visit Date: April 1, 2021    Provider: Nick Patel DO   Location: [facility]   Location Address: [address] [place] [address]  [id number]   Location Phone: [phone]          Chief Complaint  · 6 month follow up - HTN, COPD, CKD(stage 3)   · medicaiton refills      History Of Present Illness  Julia Rios is a 76 year old /White female who presents for evaluation and treatment of: HTN. She states that she has been checking her BP{ at home and it has elinor good.      COPD- she states that she has been stable. SHe has been vaccinated for COVID and good about wearing a mask in public.       Past Medical History  Disease Name Date Onset Notes   Acute pain of right knee 11/21/2016 --    Acute right-sided low back pain with right-sided sciatica 01/15/2018 --    Allergic rhinitis --  --    Asthma --  --    Back Pain  --  2 bulging disc L2 L3   Chronic Obstructive Pulmonary Disease --  --    CKD (chronic kidney disease) stage 3, GFR 30-59 ml/min 10/29/2019 --    Closed nondisplaced fracture of metatarsal bone of left foot with routine healing, unspecified metatarsal, subsequent encounter 06/19/2018 --    Diverticulitis --  --    Hemoptysis --  non cancerous   Hypertension, Benign Essential --  --    Idiopathic chronic gout of multiple sites without tophus 05/20/2016 --    Primary osteoarthritis of right knee 01/16/2017 --    Vitamin D deficiency 01/13/2016 --          Past Surgical History  Procedure Name Date Notes   Colonoscopy 2014 --    EGD 2015 --    I have had no surgeries --  --          Medication List  Name Date Started Instructions   amlodipine 5 mg oral tablet 10/12/2020 TAKE 1 TABLET EVERY DAY   Arnuity Ellipta 100 mcg/actuation inhalation  blister with device 2020 inhale 1 puff (100 mcg) by inhalation route once daily at the same time each day for 90 days   azelastine 137 mcg (0.1 %) nasal aerosol,spray  spray 1 spray (137 mcg) in each nostril by intranasal route 2 times per day   cholecalciferol (vitamin D3) 1,000 unit oral capsule 2019 TAKE 1 CAPSULE BY MOUTH EVERY DAY   cholecalciferol (vitamin D3) 25 mcg (1,000 unit) oral capsule 2020 TAKE 1 CAPSULE BY MOUTH EVERY DAY   gabapentin 300 mg oral capsule 2021 1 capsule every AM and 2 at bedtime   losartan 100 mg oral tablet 2020 TAKE 1 TABLET EVERY DAY   metoprolol succinate 100 mg oral tablet extended release 24 hr 2020 TAKE 1 TABLET EVERY DAY   Symbicort 160-4.5 mcg/actuation inhalation HFA aerosol inhaler 2018 INHALE 2 PUFF BY MOUTH EVERY MORNING AND EVERY EVENING   Ventolin HFA 90 mcg/actuation inhalation HFA aerosol inhaler  inhale 1 - 2 puffs (90 - 180 mcg) by inhalation route every 4-6 hours as needed   WAL-FEX ALLERGY 180MG TABS 150'S 2020 TAKE 1 TABLET BY MOUTH EVERY DAY         Allergy List  Allergen Name Date Reaction Notes   CEPHALOSPORINS --  --  --    ibuprofen --  --  --    PENICILLINS --  --  --    SULFA (SULFONAMIDES) --  --  --        Allergies Reconciled  Family Medical History  Disease Name Relative/Age Notes   Colon Neoplasm, Malignant Mother/61      Breast Neoplasm, Malignant Grandmother (maternal)/   --    Stroke Grandfather (paternal)/   --    Heart Disease Mother/   Mother   Cancer, Unspecified Mother/   Mother   Diabetes, unspecified type Brother/   Brother   Heart failure  --          Social History  Finding Status Start/Stop Quantity Notes   Alcohol Never --/-- --  2019 - 2019 - rarely   Alcohol Use --  --/-- --  10/23/2018 - does not drink   lives with spouse --  --/-- --  --    . --  --/-- --  --    Recreational Drug Use Never --/-- --  no   Retired. --  --/-- --  --    Tobacco Former  .5 ppd  "former smoker         Immunizations  NameDate Admin Mfg Trade Name Lot Number Route Inj VIS Given VIS Publication   COVID [id number]/23/2021 MOD Moderna COVID-19 Vaccine  NE NE 02/23/2021    Comments: [facility]   COVID [id number]/28/2021 MOD Moderna COVID-19 Vaccine  NE NE 01/28/2021    Comments: [facility]   Eejpycynw26/01/2020 [facility] Fluzone Quadrivalent [id number] IM LD 10/01/2020 08/15/2019   Comments: [id number]. Pt tolerated well.         Review of Systems  · Constitutional  o Denies  o : fatigue  · Eyes  o Denies  o : changes in vision  · HENT  o Denies  o : headaches  · Cardiovascular  o Denies  o : chest pain, irregular heart beats, rapid heart rate  · Respiratory  o Admits  o : wheezing, cough, dyspnea on exertion  o Denies  o : shortness of breath      Vitals  Date Time BP Position Site L\R Cuff Size HR RR TEMP (F) WT  HT  BMI kg/m2 BSA m2 O2 Sat FR L/min FiO2 HC       01/08/2021 03:02 /66 Sitting    62 - R  97.5  5'  1\"   97 %  21%    02/05/2021 12:01 /69 Sitting    57 - R  97.3 226lbs 8oz 5'  1\" 42.8 2.1 97 %  21%    04/01/2021 09:50 /57 Sitting    57 - R  97.4 220lbs 4oz 5'  1\" 41.62 2.07 97 %  21%          Physical Examination  · Constitutional  o Appearance  o : well-nourished, well developed, alert, in no acute distress, well-tended appearance  · Head and Face  o Head  o :   § Inspection  § : atraumatic, normocephalic  o Face  o :   § Inspection  § : no facial lesions  o HEENT  o : Unremarkable  · Eyes  o Conjunctivae  o : conjunctivae normal  o Sclerae  o : sclerae white  o Pupils and Irises  o : pupils equal and round, pupils reactive to light bilaterally  o Eyelids/Ocular Adnexae  o : eyelid appearance normal  · Ears, Nose, Mouth and Throat  o Ears  o :   § External Ears  § : appearance within normal limits, no lesions present  § Otoscopic Examination  § : tympanic membrane appearance within normal limits bilaterally without perforations, mobility normal  o Nose  o :   § External Nose  § : " appearance normal  o Oral Cavity  o :   § Oral Mucosa  § : oral mucosa normal  § Lips  § : lip appearance normal  § Teeth  § : normal dentition for age  § Gums  § : gums pink, non-swollen, no bleeding present  § Tongue  § : tongue appearance normal  § Palate  § : hard palate normal, soft palate appearance normal  o Throat  o :   § Oropharynx  § : no inflammation or lesions present, tonsils within normal limits  · Neck  o Inspection/Palpation  o : normal appearance, no masses or tenderness, trachea midline  o Thyroid  o : gland size normal, nontender, no nodules or masses present on palpation  · Respiratory  o Respiratory Effort  o : breathing unlabored  o Auscultation of Lungs  o : normal breath sounds  · Cardiovascular  o Heart  o :   § Auscultation of Heart  § : regular rate, normal rhythm, no murmurs present  · Lymphatic  o Neck  o : no lymphadenopathy           Assessment  · COPD (chronic obstructive pulmonary disease)     496/J44.9  stable. SHe has a Pulmonary appointment in June.   · CKD (chronic kidney disease) stage 3, GFR 30-59 ml/min     585.3/N18.30  will update   · Hypertension, Benign Essential     401.1/I10  her home BP readings have been good      Plan  · Orders  o CMP [facility] (81818) - 585.3/N18.30, 401.1/I10 - 04/01/2021  o Lipid Panel [facility] (02993) - 401.1/I10 - 04/01/2021  o ACO-39: Current medications updated and reviewed (, 1159F) - - 04/01/2021  · Medications  o Medications have been Reconciled  o Transition of Care or Provider Policy  · Instructions  o Patient is taking medications as prescribed and doing well.   o Take all medications as prescribed/directed.  o Patient instructed/educated on their diet and exercise program.  o Patient was educated/instructed on their diagnosis, treatment and medications prior to discharge from the clinic today.  o Patient instructed to seek medical attention urgently for new or worsening symptoms.  o Call the office with any concerns or questions.  o Bring all  medicines with their bottles to each office visit.  · Disposition  o Call or Return if symptoms worsen or persist.  o Return Visit Request in/on 6 months +/- 2 days ([id number]).            Electronically Signed by: Nick Patel DO - on April 1, 2021 10:24:33 AM

## 2021-05-14 NOTE — PROGRESS NOTES
Progress Note      Patient Name: Julia Rios   Patient ID: [id number]   Sex: Female   YOB: 1945    Primary Care Provider: Nick Patel DO   Referring Provider: Jocelin MANUEL    Visit Date: April 30, 2021    Provider: Nick Patel DO   Location: [facility]   Location Address: [address] [place] [address]  [id number]   Location Phone: [phone]          Chief Complaint  · Welcome to Medicare Visit      History Of Present Illness  The patient is a 76 year old /White female who has come to this office for her Welcome to Medicare Visit. Her Primary Care Provider is Nick Patel DO. Her comprehensive Care Team list, including suppliers, has been updated on the Facesheet. Her medical/surgical/family history, height, weight, BMI, and blood pressure have been reviewed and are in the chart.   Medications are listed in the medication list.   The active problem list includes: Acute pain of right knee, Acute right-sided low back pain with right-sided sciatica, Allergic rhinitis, Asthma, Back Pain , Chronic Obstructive Pulmonary Disease, CKD (chronic kidney disease) stage 3, GFR 30-59 ml/min, Diverticulitis, Hemoptysis, Hypertension, Benign Essential, Idiopathic chronic gout of multiple sites without tophus, Primary osteoarthritis of right knee, and Vitamin D deficiency   The patient does not have a history of substance use.   Patient reports her diet is adequate.   Patient reports her physical activity is adequate.   A hearing loss screen was completed today and the result is negative.   Patient does not have any risk factors for depression. Patient completed the PHQ-9 today and it has been scanned in the chart. The total score is 1-4.   The Timed-Up-and-Go screen was administered today and the result is negative.   The Avina Index of [place] in ADLs indicated full function (score of 6).   A Falls Risk Assessment has been  completed, including a review of home fall hazards and medication review.   Her level of safety is noted to be within normal limits. Her balance/gait is within normal limits. There have been no falls in the past year. Patient-specific home safety recommendations have been reviewed and a copy has been given to patient.   She denies issues with leaking urine.   There are no additional risk factors identified.   Living Will/Advanced Directive previously executed but not in chart.   Personalized health advice was given to the patient and a written health screening schedule was established; see Plan for details.       Past Medical History  Disease Name Date Onset Notes   Acute pain of right knee 11/21/2016 --    Acute right-sided low back pain with right-sided sciatica 01/15/2018 --    Allergic rhinitis --  --    Asthma --  --    Back Pain  --  2 bulging disc L2 L3   Chronic Obstructive Pulmonary Disease --  --    CKD (chronic kidney disease) stage 3, GFR 30-59 ml/min 10/29/2019 --    Closed nondisplaced fracture of metatarsal bone of left foot with routine healing, unspecified metatarsal, subsequent encounter 06/19/2018 --    Diverticulitis --  --    Hemoptysis --  non cancerous   Hypertension, Benign Essential --  --    Idiopathic chronic gout of multiple sites without tophus 05/20/2016 --    Primary osteoarthritis of right knee 01/16/2017 --    Vitamin D deficiency 01/13/2016 --          Past Surgical History  Procedure Name Date Notes   Colonoscopy 2014 --    EGD 2015 --    I have had no surgeries --  --          Medication List  Name Date Started Instructions   amlodipine 5 mg oral tablet 10/12/2020 TAKE 1 TABLET EVERY DAY   Arnuity Ellipta 100 mcg/actuation inhalation blister with device 03/30/2020 inhale 1 puff (100 mcg) by inhalation route once daily at the same time each day for 90 days   azelastine 137 mcg (0.1 %) nasal aerosol,spray  spray 1 spray (137 mcg) in each nostril by intranasal route 2 times per day    cholecalciferol (vitamin D3) 1,000 unit oral capsule 2019 TAKE 1 CAPSULE BY MOUTH EVERY DAY   cholecalciferol (vitamin D3) 25 mcg (1,000 unit) oral capsule 2020 TAKE 1 CAPSULE BY MOUTH EVERY DAY   gabapentin 300 mg oral capsule 2021 1 capsule every AM and 2 at bedtime   losartan 100 mg oral tablet 2021 TAKE 1 TABLET EVERY DAY   metoprolol succinate 100 mg oral tablet extended release 24 hr 2020 TAKE 1 TABLET EVERY DAY   Symbicort 160-4.5 mcg/actuation inhalation HFA aerosol inhaler 2018 INHALE 2 PUFF BY MOUTH EVERY MORNING AND EVERY EVENING   Ventolin HFA 90 mcg/actuation inhalation HFA aerosol inhaler  inhale 1 - 2 puffs (90 - 180 mcg) by inhalation route every 4-6 hours as needed   WAL-FEX ALLERGY 180MG TABS 150'S 2020 TAKE 1 TABLET BY MOUTH EVERY DAY         Allergy List  Allergen Name Date Reaction Notes   CEPHALOSPORINS --  --  --    ibuprofen --  --  --    PENICILLINS --  --  --    SULFA (SULFONAMIDES) --  --  --          Family Medical History  Disease Name Relative/Age Notes   Colon Neoplasm, Malignant Mother/61      Breast Neoplasm, Malignant Grandmother (maternal)/   --    Stroke Grandfather (paternal)/   --    Heart Disease Mother/   Mother   Cancer, Unspecified Mother/   Mother   Diabetes, unspecified type Brother/   Brother   Heart failure  --          Social History  Finding Status Start/Stop Quantity Notes   Alcohol Never --/-- --  2019 - 2019 - rarely   Alcohol Use --  --/-- --  10/23/2018 - does not drink   lives with spouse --  --/-- --  --    . --  --/-- --  --    Recreational Drug Use Never --/-- --  no   Retired. --  --/-- --  --    Tobacco Former  .5 ppd former smoker         Immunizations  NameDate Admin Mfg Trade Name Lot Number Route Inj VIS Given VIS Publication   COVID [id number] MOD Moderna COVID-19 Vaccine  NE NE 2021    Comments: [facility]   COVID [id number] MOD Moderna COVID-19 Vaccine  NE NE  "01/28/2021    Comments: [facility]   Upmbyfush08/01/2020 PMC Fluzone Quadrivalent [id number] IM LD 10/01/2020 08/15/2019   Comments: [id number]. Pt tolerated well.         Review of Systems  · Constitutional  o Denies  o : fatigue  · Eyes  o Admits  o : glasses  o Denies  o : changes in vision  · HENT  o Denies  o : headaches  · Cardiovascular  o Denies  o : chest pain, irregular heart beats, rapid heart rate  · Respiratory  o Admits  o : cough, dyspnea on exertion  o Denies  o : shortness of breath, wheezing  · Gastrointestinal  o Denies  o : diarrhea, constipation      Vitals  Date Time BP Position Site L\R Cuff Size HR RR TEMP (F) WT  HT  BMI kg/m2 BSA m2 O2 Sat FR L/min FiO2 HC       02/05/2021 12:01 /69 Sitting    57 - R  97.3 226lbs 8oz 5'  1\" 42.8 2.1 97 %  21%    04/01/2021 09:50 /57 Sitting    57 - R  97.4 220lbs 4oz 5'  1\" 41.62 2.07 97 %  21%    04/30/2021 03:22 /56 Sitting    58 - R  97.1 218lbs 0oz 5'  1\" 41.19 2.06 98 %  21%          Physical Examination  · Head and Face  o Head  o :   § Inspection  § : atraumatic, normocephalic  o Face  o :   § Inspection  § : no facial lesions  o HEENT  o : Unremarkable  · Eyes  o Conjunctivae  o : conjunctivae normal  o Sclerae  o : sclerae white  o Pupils and Irises  o : pupils equal and round, pupils reactive to light bilaterally  o Eyelids/Ocular Adnexae  o : eyelid appearance normal  · Ears, Nose, Mouth and Throat  o Ears  o :   § External Ears  § : appearance within normal limits, no lesions present  § Otoscopic Examination  § : tympanic membrane appearance within normal limits bilaterally without perforations, mobility normal  o Nose  o :   § External Nose  § : appearance normal  o Oral Cavity  o :   § Oral Mucosa  § : oral mucosa normal  § Lips  § : lip appearance normal  § Teeth  § : normal dentition for age  § Gums  § : gums pink, non-swollen, no bleeding present  § Tongue  § : tongue appearance normal  § Palate  § : hard palate normal, soft " palate appearance normal  o Throat  o :   § Oropharynx  § : no inflammation or lesions present, tonsils within normal limits  · Neck  o Inspection/Palpation  o : normal appearance, no masses or tenderness, trachea midline  o Thyroid  o : gland size normal, nontender, no nodules or masses present on palpation  · Respiratory  o Respiratory Effort  o : breathing unlabored  o Auscultation of Lungs  o : normal breath sounds  · Cardiovascular  o Heart  o :   § Auscultation of Heart  § : regular rate, normal rhythm, no murmurs present  o Peripheral Vascular System  o :   § Extremities  § : no edema  · Lymphatic  o Neck  o : no lymphadenopathy           Results  · In-Office Procedures  o Medical procedure  § IOP - Snellen Vision Test (63298)   § Wearing glasses or contacts?: Yes   § OS (Left): 20/25   § OD (Right): 20/30   § OU (Both): 20/25       Assessment  · Screening for alcoholism     V79.1/Z13.89  she is a none drinker  · Screening for depression     V79.0/Z13.89  her screener was negative  · Welcome to Medicare preventive visit     V70.0/Z00.00  she is doing well. She has a FHx of colon CA and thus needs a colonoscopy every 5 yrs. She is do.   · Screening for colorectal cancer       Encounter for screening for malignant neoplasm of colon     V76.51/Z12.11  Encounter for screening for malignant neoplasm of rectum     V76.51/Z12.12  she has a fhx of colon CA and needs colonoscopy every 5 yrs.       Plan  · Orders  o Negative alcohol screening () - V79.1/Z13.89 - 04/30/2021  o ACO-18: Negative screen for clinical depression using a standardized tool () - V79.0/Z13.89 - 04/30/2021  o Falls Risk Assessment Completed (3288F) - V70.0/Z00.00 - 04/30/2021  o Brief hearing screening (written) [facility] () - V70.0/Z00.00 - 04/30/2021  o Welcome to Medicare Exam () - V70.0/Z00.00 - 04/30/2021  o ACO-13: Fall Risk Screening with no falls in past year or only one fall without injury in the past year (1101F) -  V70.0/Z00.00 - 04/30/2021  o COLONOSCOPY REFERRAL (COLON) - V76.51/Z12.11, V76.51/Z12.12 - 04/30/2021  o ACO - Pt declines to or was not able to provide an Advance Care Plan or name a Surrogate Decision Maker (1124F) - - 04/30/2021   pt states she will bring to office   o ACO-39: Current medications updated and reviewed (1159F, ) - - 04/30/2021  o Medicare Initial - Annual Wellness Visit (AWV) inluding PPPS () - V70.0/Z00.00 - 04/30/2021  · Medications  o Medications have been Reconciled  o Transition of Care or Provider Policy  · Instructions  o Audit-C Questionnaire completed and scanned into the EMR under the designated folder within the patient's documents.  o Audit-C score of 0-4 - Negative Screen - Brief Discussion  o Depression Screen completed and scanned into the EMR under the designated folder within the patient's documents.  o Today's PHQ-9 result is 0  o Written health screening schedule for next 5-10 years was established with patient; information scanned in chart and given to patient.  o Fall prevention methods discussed and a copy of recommendations given to patient.  · Disposition  o Call or Return if symptoms worsen or persist.  o needs routine f/u appointment for chronic health issues            Electronically Signed by: Nick Patel, DO -Author on April 30, 2021 04:06:01 PM

## 2021-05-15 VITALS
WEIGHT: 224.37 LBS | HEIGHT: 61 IN | BODY MASS INDEX: 42.36 KG/M2 | DIASTOLIC BLOOD PRESSURE: 69 MMHG | OXYGEN SATURATION: 98 % | HEART RATE: 50 BPM | SYSTOLIC BLOOD PRESSURE: 114 MMHG

## 2021-05-15 VITALS
DIASTOLIC BLOOD PRESSURE: 58 MMHG | WEIGHT: 220 LBS | HEIGHT: 61 IN | SYSTOLIC BLOOD PRESSURE: 145 MMHG | BODY MASS INDEX: 41.54 KG/M2 | HEART RATE: 62 BPM | OXYGEN SATURATION: 93 %

## 2021-05-15 VITALS
BODY MASS INDEX: 41.91 KG/M2 | RESPIRATION RATE: 26 BRPM | OXYGEN SATURATION: 97 % | SYSTOLIC BLOOD PRESSURE: 158 MMHG | TEMPERATURE: 98.1 F | DIASTOLIC BLOOD PRESSURE: 80 MMHG | WEIGHT: 222 LBS | HEART RATE: 58 BPM | HEIGHT: 61 IN

## 2021-05-15 VITALS
DIASTOLIC BLOOD PRESSURE: 60 MMHG | HEART RATE: 60 BPM | WEIGHT: 220 LBS | SYSTOLIC BLOOD PRESSURE: 139 MMHG | BODY MASS INDEX: 41.54 KG/M2 | HEIGHT: 61 IN | TEMPERATURE: 98.5 F

## 2021-05-15 VITALS
BODY MASS INDEX: 43.43 KG/M2 | DIASTOLIC BLOOD PRESSURE: 63 MMHG | SYSTOLIC BLOOD PRESSURE: 135 MMHG | HEIGHT: 61 IN | HEART RATE: 53 BPM | WEIGHT: 230 LBS | OXYGEN SATURATION: 95 %

## 2021-05-16 VITALS
HEART RATE: 56 BPM | DIASTOLIC BLOOD PRESSURE: 70 MMHG | WEIGHT: 221 LBS | HEIGHT: 61 IN | OXYGEN SATURATION: 97 % | SYSTOLIC BLOOD PRESSURE: 127 MMHG | BODY MASS INDEX: 41.72 KG/M2

## 2021-05-16 VITALS
OXYGEN SATURATION: 96 % | TEMPERATURE: 98.4 F | BODY MASS INDEX: 43.43 KG/M2 | HEART RATE: 51 BPM | DIASTOLIC BLOOD PRESSURE: 53 MMHG | SYSTOLIC BLOOD PRESSURE: 130 MMHG | WEIGHT: 230 LBS | HEIGHT: 61 IN

## 2021-05-16 VITALS
BODY MASS INDEX: 41.91 KG/M2 | WEIGHT: 222 LBS | DIASTOLIC BLOOD PRESSURE: 64 MMHG | OXYGEN SATURATION: 98 % | SYSTOLIC BLOOD PRESSURE: 139 MMHG | HEART RATE: 56 BPM | HEIGHT: 61 IN

## 2021-05-16 VITALS
BODY MASS INDEX: 42.69 KG/M2 | RESPIRATION RATE: 12 BRPM | WEIGHT: 226.12 LBS | HEIGHT: 61 IN | OXYGEN SATURATION: 98 % | SYSTOLIC BLOOD PRESSURE: 129 MMHG | DIASTOLIC BLOOD PRESSURE: 61 MMHG | HEART RATE: 61 BPM | TEMPERATURE: 98.1 F

## 2021-05-16 VITALS
HEART RATE: 59 BPM | SYSTOLIC BLOOD PRESSURE: 138 MMHG | BODY MASS INDEX: 43.43 KG/M2 | HEIGHT: 61 IN | WEIGHT: 230 LBS | DIASTOLIC BLOOD PRESSURE: 63 MMHG

## 2021-05-16 VITALS
WEIGHT: 230.25 LBS | OXYGEN SATURATION: 97 % | DIASTOLIC BLOOD PRESSURE: 91 MMHG | DIASTOLIC BLOOD PRESSURE: 82 MMHG | BODY MASS INDEX: 43.47 KG/M2 | SYSTOLIC BLOOD PRESSURE: 140 MMHG | SYSTOLIC BLOOD PRESSURE: 151 MMHG | HEIGHT: 61 IN

## 2021-05-16 VITALS
SYSTOLIC BLOOD PRESSURE: 158 MMHG | HEIGHT: 61 IN | HEART RATE: 64 BPM | WEIGHT: 224 LBS | DIASTOLIC BLOOD PRESSURE: 68 MMHG | BODY MASS INDEX: 42.29 KG/M2 | OXYGEN SATURATION: 98 %

## 2021-05-16 VITALS
SYSTOLIC BLOOD PRESSURE: 140 MMHG | DIASTOLIC BLOOD PRESSURE: 58 MMHG | HEART RATE: 58 BPM | WEIGHT: 230 LBS | OXYGEN SATURATION: 95 % | BODY MASS INDEX: 43.43 KG/M2 | HEIGHT: 61 IN

## 2021-05-16 VITALS
BODY MASS INDEX: 42.69 KG/M2 | DIASTOLIC BLOOD PRESSURE: 69 MMHG | HEART RATE: 65 BPM | WEIGHT: 226.12 LBS | HEIGHT: 61 IN | TEMPERATURE: 97.8 F | SYSTOLIC BLOOD PRESSURE: 146 MMHG | OXYGEN SATURATION: 96 %

## 2021-05-16 VITALS — WEIGHT: 231.37 LBS | HEIGHT: 61 IN | HEART RATE: 62 BPM | BODY MASS INDEX: 43.68 KG/M2 | OXYGEN SATURATION: 92 %

## 2021-05-16 VITALS
DIASTOLIC BLOOD PRESSURE: 90 MMHG | SYSTOLIC BLOOD PRESSURE: 140 MMHG | HEART RATE: 57 BPM | WEIGHT: 232 LBS | DIASTOLIC BLOOD PRESSURE: 74 MMHG | SYSTOLIC BLOOD PRESSURE: 150 MMHG | BODY MASS INDEX: 43.8 KG/M2 | HEIGHT: 61 IN

## 2021-05-16 VITALS
SYSTOLIC BLOOD PRESSURE: 150 MMHG | WEIGHT: 224 LBS | DIASTOLIC BLOOD PRESSURE: 79 MMHG | BODY MASS INDEX: 42.29 KG/M2 | HEIGHT: 61 IN | OXYGEN SATURATION: 97 %

## 2021-05-16 VITALS — HEIGHT: 61 IN | BODY MASS INDEX: 43.33 KG/M2 | HEART RATE: 56 BPM | WEIGHT: 229.5 LBS | OXYGEN SATURATION: 93 %

## 2021-05-16 VITALS — HEIGHT: 61 IN | HEART RATE: 56 BPM | OXYGEN SATURATION: 94 % | WEIGHT: 230 LBS | BODY MASS INDEX: 43.43 KG/M2

## 2021-05-16 VITALS
OXYGEN SATURATION: 98 % | TEMPERATURE: 97.4 F | HEART RATE: 55 BPM | WEIGHT: 224.37 LBS | HEIGHT: 61 IN | DIASTOLIC BLOOD PRESSURE: 59 MMHG | SYSTOLIC BLOOD PRESSURE: 147 MMHG | BODY MASS INDEX: 42.36 KG/M2

## 2021-05-16 VITALS — BODY MASS INDEX: 43.43 KG/M2 | HEIGHT: 61 IN | WEIGHT: 230 LBS

## 2021-05-16 VITALS — SYSTOLIC BLOOD PRESSURE: 147 MMHG | DIASTOLIC BLOOD PRESSURE: 68 MMHG

## 2021-05-23 ENCOUNTER — TRANSCRIBE ORDERS (OUTPATIENT)
Dept: ADMINISTRATIVE | Facility: HOSPITAL | Age: 76
End: 2021-05-23

## 2021-05-23 DIAGNOSIS — R91.1 LUNG NODULE: Primary | ICD-10-CM

## 2021-05-23 DIAGNOSIS — Z78.0 MENOPAUSE: Primary | ICD-10-CM

## 2021-05-28 VITALS
OXYGEN SATURATION: 95 % | TEMPERATURE: 97.9 F | HEIGHT: 61 IN | DIASTOLIC BLOOD PRESSURE: 77 MMHG | RESPIRATION RATE: 15 BRPM | HEART RATE: 62 BPM | WEIGHT: 222 LBS | SYSTOLIC BLOOD PRESSURE: 139 MMHG | BODY MASS INDEX: 41.91 KG/M2

## 2021-05-28 VITALS
WEIGHT: 226.5 LBS | HEART RATE: 68 BPM | TEMPERATURE: 98.4 F | RESPIRATION RATE: 16 BRPM | TEMPERATURE: 98.1 F | BODY MASS INDEX: 42.73 KG/M2 | HEIGHT: 61 IN | TEMPERATURE: 98 F | SYSTOLIC BLOOD PRESSURE: 142 MMHG | BODY MASS INDEX: 43.25 KG/M2 | HEART RATE: 64 BPM | HEART RATE: 60 BPM | HEIGHT: 62 IN | DIASTOLIC BLOOD PRESSURE: 82 MMHG | OXYGEN SATURATION: 97 % | DIASTOLIC BLOOD PRESSURE: 60 MMHG | OXYGEN SATURATION: 64 % | BODY MASS INDEX: 42.29 KG/M2 | WEIGHT: 225.37 LBS | HEART RATE: 56 BPM | OXYGEN SATURATION: 97 % | SYSTOLIC BLOOD PRESSURE: 152 MMHG | TEMPERATURE: 98 F | SYSTOLIC BLOOD PRESSURE: 140 MMHG | HEIGHT: 61 IN | DIASTOLIC BLOOD PRESSURE: 84 MMHG | WEIGHT: 224 LBS | DIASTOLIC BLOOD PRESSURE: 64 MMHG | WEIGHT: 229.06 LBS | RESPIRATION RATE: 14 BRPM | RESPIRATION RATE: 16 BRPM | BODY MASS INDEX: 42.76 KG/M2 | DIASTOLIC BLOOD PRESSURE: 70 MMHG | DIASTOLIC BLOOD PRESSURE: 86 MMHG | HEART RATE: 57 BPM | OXYGEN SATURATION: 97 % | HEIGHT: 61 IN | WEIGHT: 226.31 LBS | SYSTOLIC BLOOD PRESSURE: 140 MMHG | RESPIRATION RATE: 16 BRPM | HEIGHT: 61 IN | RESPIRATION RATE: 16 BRPM | TEMPERATURE: 98 F | TEMPERATURE: 98.6 F | SYSTOLIC BLOOD PRESSURE: 148 MMHG | HEART RATE: 55 BPM | HEIGHT: 62 IN | RESPIRATION RATE: 16 BRPM | BODY MASS INDEX: 41.47 KG/M2 | OXYGEN SATURATION: 96 % | OXYGEN SATURATION: 95 % | BODY MASS INDEX: 41.41 KG/M2 | WEIGHT: 225 LBS | SYSTOLIC BLOOD PRESSURE: 132 MMHG

## 2021-05-28 VITALS
SYSTOLIC BLOOD PRESSURE: 143 MMHG | HEART RATE: 58 BPM | BODY MASS INDEX: 40.5 KG/M2 | RESPIRATION RATE: 12 BRPM | TEMPERATURE: 98.3 F | RESPIRATION RATE: 14 BRPM | DIASTOLIC BLOOD PRESSURE: 69 MMHG | OXYGEN SATURATION: 96 % | HEIGHT: 62 IN | HEART RATE: 60 BPM | HEIGHT: 61 IN | WEIGHT: 220.12 LBS | SYSTOLIC BLOOD PRESSURE: 125 MMHG | OXYGEN SATURATION: 97 % | BODY MASS INDEX: 49.47 KG/M2 | DIASTOLIC BLOOD PRESSURE: 60 MMHG | WEIGHT: 262 LBS | TEMPERATURE: 97.9 F

## 2021-05-28 VITALS
BODY MASS INDEX: 42.1 KG/M2 | RESPIRATION RATE: 16 BRPM | HEIGHT: 61 IN | WEIGHT: 223 LBS | TEMPERATURE: 96.7 F | DIASTOLIC BLOOD PRESSURE: 77 MMHG | SYSTOLIC BLOOD PRESSURE: 181 MMHG | HEART RATE: 61 BPM | OXYGEN SATURATION: 98 %

## 2021-05-28 NOTE — PROGRESS NOTES
Patient: PAULO DONATO     Acct: [id number]     Report: #[id number]  UNIT #: [id number]     : 1945    Encounter Date:2019  PRIMARY CARE: NANCY FORTUNE  ***Signed***  --------------------------------------------------------------------------------------------------------------------  Chief Complaint      Encounter Date      Aug 16, 2019            Primary Care Provider      NANCY FORTUNE            Referring Provider      NANCY FORTUNE            Patient Complaint      Patient is complaining of      3 month follow up/soa            VITALS      Height 5 ft 1.00 in / 154.94 cm      Weight 226 lbs 5.000 oz / 102.83492 kg      BSA 1.99 m2      BMI 42.8 kg/m2      Temperature 98.0 F / 36.67 C - Oral      Pulse 55      Respirations 16      Blood Pressure 140/60 Sitting, Right Arm      Pulse Oximetry 97%, room air      Initial Exhaled Nitrous Oxide      Exhaled Nitrous Oxide Results:  8            HPI      The patient is a 74 year old female with chronic obstructive pulmonary disease     with recurrent exacerbations, recurrent bronchitis, obstructive sleep apnea on     nightly CPAP, seasonal allergies, postnasal drip and 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 She is here for follow up today.             Since her last office visit she was seen by JAMES Hummel and was started on     itraconazole for her persistent symptoms of fungal infection. She is currently     taking itraconazole twice daily.  She feels like her breathing and congestion is    somewhat better but she had worsening symptoms again over the last 1-2 months.     She has no fever or chills, no nausea or vomiting, no chest pain or chest     tightness. She currently has significant postnasal drip and she was supposed to     be on dymista but the cost was very high and she has not used it. She uses     Symbicort 2 puffs twice daily, arnuity once a day and albuterol almost daily. O    verall she feels  like it might have helped some but not very much.            ROS      Constitutional:  Complains of: Fatigue; Denies: Fever, Weight gain, Weight loss,    Chills, Insomnia, Other      Respiratory/Breathing:  Complains of: Shortness of air, Cough; Denies: Wheezing,    Hemoptysis, Pleuritic pain, Other      Endocrine:  Denies: Polydipsia, Polyuria, Heat/cold intolerance, Abnorml     menstrual pattern, Diabetes, Other      Eyes:  Denies: Blurred vision, Vision Changes, Other      Ears, nose, mouth, throat:  Denies: Mouth lesions, Thrush, Throat pain,     Hoarseness, Allergies/Hay Fever, Post Nasal Drip, Headaches, Recent Head Injury,    Nose Bleeding, Neck Stiffness, Thyroid Mass, Hearing Loss, Ear Fullness, Dry     Mouth, Nasal or Sinus Pain, Dry Lips, Nasal discharge, Nasal congestion, Other      Cardiovascular:  Denies: Palpitations, Syncope, Claudication, Chest Pain, Wake     up Gasping for air, Leg Swelling, Irregular Heart Rate, Cyanosis, Dyspnea on     Exertion, Other      Gastrointestinal:  Denies: Nausea, Constipation, Diarrhea, Abdominal pain,     Vomiting, Difficulty Swallowing, Reflux/Heartburn, Dysphagia, Jaundice,     Bloating, Melena, Bloody stools, Other      Genitourinary:  Denies: Urinary frequency, Incontinence, Hematuria, Urgency,     Nocturia, Dysuria, Testicular problems, Other      Musculoskeletal:  Denies: Joint Pain, Joint Stiffness, Joint Swelling, Myalgias,    Other      Neurological:  Denies: Headache, Numbness, Weakness, Seizures, Other      Psychiatric:  Denies: Anxiety, Appropriate Effect, Depression, Other      Sleep:  No: Excessive daytime sleep, Morning Headache?, Snoring, Insomnia?, Stop    breathing at sleep?, Other      Integumentary:  Denies: Rash, Dry skin, Skin Warm to Touch, Other      Immunologic/Allergic:  Denies: Latex allergy, Seasonal allergies, Asthma,     Urticaria, Eczema, Other      Immunization status:  No: Up to date            FAMILY/SOCIAL/MEDICAL HX      Current  "History      She is  and has kids. She was a  in a physician's office.     [place]       She started smoking when she was 16, smoked over a pack a day for 8 years, quit     for 30 years, and started smoking again, around half a pack a day for 6 to 8     years. Now she does not smoke any more. Her parents both smoked heavily.      Surgical History:  No: AAA Repair, Abdominal Surgery, Angioplasty, Appendectomy,    Back Surgery, Bladder Surgery, Bowel Surgery, Breast Surgery, CABG, Carotid     Stenosis, Cholecystectomy, Ear Surgery, Eye Surgery, Head Surgery, Hernia     Surgery, Kidney Surgery, Nose Surgery, Oral Surgery, Orthopedic Surgery,     Prostatectomy, Rectal Surgery, Spinal Surgery, Testicular Surgery, Throat     Surgery, Valve Replacement, Vascular Surgery, Other Surgeries      Heart - Family Hx:  Father      Diabetes - Family Hx:  Brother      Cancer/Type - Family Hx:  Mother, Grandparent, Cousin      Other Family Medical History:  Father      Is Father Still Living?:  No      Is Mother Still Living?:  No       Family History:  Yes      Social History:  No Tobacco Use, No Alcohol Use, No Recreational Drug use      Smoking status:  Former smoker (1 ppd for 8 years quit 1999)      Occupation:  retired/ officer manager      Anticoagulation Therapy:  No      Antibiotic Prophylaxis:  No      Medical History:  Yes: Asthma, Chronic Bronchitis/COPD, Congestive Heart Failu     (\"FIVE YEARS AGO\"), Hemorrhoids/Rectal Prob (DIVERTICULITIS), Shortness Of     Breath; No: Alcoholism, Allergies, Anemia, Arthritis, Blood Disease, Broken     Bones, Cataracts, Chemical Dependency, Chemotherapy/Cancer, Emphysema, Chronic     Liver Disease, Colon Trouble, Colitis, Diverticulitis, Deafness or Ringing Ears,    Convulsions, Depression, Anxiety, Bipolar Disorder, Diabetes, Epilepsy,     Seizures, Forgetfullness, Glaucoma, Gall Stones, Gout, Head Injury, Heart     Attack, Heart Murmur, " Hepatitis, Hiatal Hernia, High Blood Pressure, High     Cholesterol, HIV (Do not ask - volu, Jaundice, Kidney or Bladder Disease, Kidney    Stones, Migrane Headaches, Mitral Valve Prolapse, Night sweats, Phlebitis,     Psychiatric Care, Reflux Disease, Rheumatic Fever, Sexually Transmitted Dis,     Sinus Trouble, Skin Disease/Psoriais/Ecz, Stroke, Thyroid Problem, Tuberculosis     or Pos TB Te, Miscellaneous Medical/oth      Psychiatric History      none            PREVENTION      Hx Influenza Vaccination:  Yes      Date Influenza Vaccine Given:  Nov 1, 2018      Influenza Vaccine Declined:  No      2 or More Falls Past Year?:  No      Fall Past Year with Injury?:  No      Hx Pneumococcal Vaccination:  Yes      Encouraged to follow-up with:  PCP regarding preventative exams.      Chart initiated by      ivelisse mendez ma            ALLERGIES/MEDICATIONS      Allergies:        Coded Allergies:             PENICILLINS (Verified  Allergy, Severe, CONVULSIONS, 8/16/19)           CEPHALOSPORINS (Verified  Allergy, Unknown, RASH, 8/16/19)           IBUPROFEN (Verified  Allergy, Unknown, HIVES, WHELPS, 8/16/19)           SULFA (SULFONAMIDE ANTIBIOTICS) (Verified  Allergy, Unknown, RASH, 8/16/19)      Medications    Last Reconciled on 8/16/19 09:02 by SANTINO BAEZ MD      Metoprolol Succinate (Metoprolol Succinate) 100 Mg Tab.er.24h      100 MG PO QDAY, #30 TAB         Reported         7/26/19       Itraconazole (Sporanox) 100 Mg Cap      200 MG PO BID for 30 Days, #120 CAP 5 Refills         Prov: Catalina Jules PA-C         6/17/19       Fluticasone Furoate (Arnuity Ellipta) 100 Mcg Blst.w.dev      1 PUFF INH RTQDAY, #3 INH 3 Refills         Prov: Santino Baez         3/1/19       Montelukast Sodium (Singulair*) 10 Mg Tablet      10 MG PO QDAY, #30 TAB 0 Refills         Reported         1/13/19       Fexofenadine Hcl (Fexofenadine Hcl) 180 Mg Tablet      180 MG PO QDAY, #30 TAB 0 Refills         Reported         2/5/18        Losartan Potassium (Cozaar) 100 Mg Tablet      100 MG PO QDAY, #30 TAB 0 Refills         Reported         2/5/18       amLODIPine (amLODIPine) 2.5 Mg Tablet      5 MG PO QDAY, #60 TAB 0 Refills         Reported         2/5/18       Budesonide/Formoterol Fumarate (Symbicort 160/4.5 Mcg) 10.2 Gm Inh      2 PUFF INH RTBID, #1 INH 6 Refills         Prov: NestorSantino         12/11/15       Loratadine/Pseudoephedrine 5/120 MG (CLARITIN-D 12 HOUR TABLET) 1 Tab Tab.sr.12h      1 TAB PO BID, #60 TAB 1 Refill         Prov: NestorSantino         9/28/15       Cholecalciferol (Vitamin D3) 5,000 Units Tablet      5000 UNITS PO QDAY, TAB         Reported         5/1/14       Gabapentin (Gabapentin) 300 Mg Capsule      300 MG PO TID, CAP         Reported         5/1/14       MDI-Albuterol (Proair HFA) 60 Puffs/Inh Puffs      1 PUFFS INH PRN, INH         Reported         5/1/14      Current Medications      Current Medications Reviewed 8/16/19            EXAM      CONSTITUTIONAL: Pleasant female in no acute distress, has mild conversational     dyspnea.        EYES : Pink conjunctive, no ptosis, PERRL.       ENMT : Nose and ears appear normal, normal dentition, mild posterior pharyngeal     wall erythema, no sinus tenderness. Mallampati classification       Neck: Nontender, no masses, no thyromegaly, no nodules.      Resp : Bilateral diminished breath sounds, scattered rhonchi.  No wheezing or     crackles.  Resonant to percussion bilaterally.      CVS  : No carotid bruits, s1s2 nl, RRR, no murmur, rubs or gallop, no peripheral    edema       Chest wall: Normal rise with inspiration, nontender on palpation.      GI   : Abdomen soft, with no masses, no hepatosplenomegaly, no hernias, BS+      MSK  : Normal gait and station, no digital cyanosis or clubbing       Skin : No rashes, ulcerations or lesions, normal turgor and temperature      Neuro: CN II - XII intact, no sensory deficits, DTRs intact and symmetrical, no     motor  weakness      Psych: Appropriate affect, A   Vtials      Vitals:             Height 5 ft 1.00 in / 154.94 cm           Weight 226 lbs 5.000 oz / 102.28313 kg           BSA 1.99 m2           BMI 42.8 kg/m2           Temperature 98.0 F / 36.67 C - Oral           Pulse 55           Respirations 16           Blood Pressure 140/60 Sitting, Right Arm           Pulse Oximetry 97%, room air            REVIEW      Results Reviewed      PCCS Results Reviewed?:  Yes Prev Lab Results, Yes Prev Radiology Results, Yes     Previous [facility] Records      Lab Results      The patient's blood work including CBC and CMP were reviewed and were all     normal. Her liver function test were normal.      Radiographic Results               [facility] Diagnostic Img                PACS RADIOLOGY REPORT            Patient: PAULO DONATO   Acct: #[id number]   Report: #[id number]            UNIT #: [id number]    DOS: 19 1430      INSURANCE:[employer]   ORDER #:[id number]      LOCATION:[facility]     : 1945            PROVIDERS      ADMITTING:     ATTENDING: Santino Baez      FAMILY:  NANCY FORTUNE   ORDERING:  Santino Baez         OTHER:    DICTATING:  Leonel Bhagat MD            REQ #:[id number]   EXAM:WO - CT CHEST without CONTRAST      REASON FOR EXAM:        REASON FOR VISIT:  COUGH            *******Signed******         PROCEDURE:   CT CHEST WITHOUT CONTRAST             COMPARISON:   [facility], CR, CHEST PA/AP   11:34.  [facility], CT, CHEST W/ CONTRAST, 2015, 12:13.             INDICATIONS:   CHRONIC PRODUCTIVE COUGH. COPD. PAST SMOKER.             TECHNIQUE:   CT images were created without the administration of contrast     material.               PROTOCOL:     Standard imaging protocol performed                RADIATION:     DLP: 405.3mGy*cm          Automated exposure control was utilized to minimize radiation dose.               FINDINGS:          Again is noted mild bibasilar areas of scarring and or chronic appearing sub    segmental atelectasis       slightly improved in the lung bases posteriorly.  There are no pulmonary     nodules.  There is no       mediastinal or axillary adenopathy.  There is cholelithiasis.  There are     probable bilateral renal       cysts incompletely evaluated.  There are degenerative changes of the thoracic     spine.             CONCLUSION:                1. Mild bibasilar areas of scarring and or subsegmental atelectasis slightly     improved from the       previous CT scan.  The lungs are otherwise clear.             2. Cholelithiasis.              CALEB GIBBS MD             Electronically Signed and Approved By: CALEB GIBBS MD on 2019 at 15:15                           Until signed, this is an unconfirmed preliminary report that may contain      errors and is subject to change.                                              SCHJ1:      D:19 1516      PFT Results      Patient: PAULO DONATO   Acct #: [id number]         Report #: [id number]   : 1945 Report #: [id number]      MR #: [id number]   Location[facility]                                ***Signed***            PFT      DATE: 3/6/15      PFT Results      Pulmonary function test interpretation:            Spirometry shows mild obstructive defect. ( FEV1/ FVC ratio- 65, FEV1- 1.45     lits, 72% of predicted; FVC- 2.25 lits, 85% of predicted)            There is borderline response to bronchodilator demonstrated. FVC increased drom     2.25 lits to 2.48 lits, 10.2% increase.            Lung volumes show air trapping. ( % of predicted, 4.94 liters; % of    predicted, 2.73 liters)            Diffusion capacity is mildly decreased. (71% of predicted)            Comparison: No previous tests to compare.            Conclusion:    Low FEV1/FVC with low FEV1, air trapping and decreased diffusion     capacity is  suggestive of mild obstructive airway disease, likely emphysema.            There is borderline reversible component of her airway disease.            Please correlate clinically.            Santino Baez MD                  Mar 6, 2015 14:51               <Electronically signed by Santino Baez MD> on 03/06/15 1451          on            Assessment      ECHEVERRIA (dyspnea on exertion) - R06.09            Chronic cough - R05            Notes      New Medications      * AZELASTINE HCL (Azelastine Nasal) 137 MCG/0.137 ML SPRAY.PUMP: 2 PUFFS NARE       EACH BID #1      * Jarad-Fluticasone (Fluticasone 50 mcg) 16 GM SPRAY.SUSP: 2 PUFFS NARE EACH QDAY       #1      Discontinued Medications      * AZELASTINE/FLUTICASONE (Dymista Nasal [place]) 23 GM SPRAY.PUMP: 1 SPRAYS NARE       EACH BID #1      New Diagnostics      * Chest 2 View, Week         Dx: ECHEVERRIA (dyspnea on exertion) - R06.09      * Probrain Natriuretic, Routine         Dx: ECHEVERRIA (dyspnea on exertion) - R06.09      * Sputum Culture W/Gram Stain, Week         Dx: Chronic cough - R05      PLAN:      The patient is a 74 year old female with chronic obstructive pulmonary disease,     recurrent bronchitis, obstructive sleep apnea currently noncompliant with CPAP,     seasonal allergies, postnasal drip and fungal infection with mold type fungus.             1. Chronic obstructive pulmonary disease.  Continue with Symbicort and arnuity     and albuterol as needed.       2. Postnasal drip. Continue Singulair. Dymista was not covered so I will send     for flonase and azelastine to be used for now.       3. Fungal infection of the lung with recurrent bronchitis. Continue with     itraconazole for the next 2-3 months. If she has worsening leg swelling or      shortness of breath, I would be worried about congestive heart failure     exacerbation. I will check BNP, chest x-ray and sputum culture now.       4. I will follow up with her in 3-4 months, earlier if needed. If any of her      testing is abnormal she may need an echocardiogram and stop the itraconazole.            Patient Education      Education resources provided:  Yes      Patient Education Provided:  Acute Bronchitis, COPD                 Disclaimer: Converted document may not contain table formatting or lab diagrams. Please see [employer] for the authenticated document.

## 2021-05-28 NOTE — PROGRESS NOTES
Patient: PAULO DONATO     Acct: [id number]     Report: #[id number]  UNIT #: [id number]     : 1945    Encounter Date:2019  PRIMARY CARE: NANCY FORTUNE  ***Signed***  --------------------------------------------------------------------------------------------------------------------  Chief Complaint      Encounter Date      2019            Primary Care Provider      NANCY FORTUNE            Referring Provider      NANCY FORTUNE            Patient Complaint      Patient is complaining of      4 month follow up/soa            VITALS      Height 5 ft 2 in / 157.48 cm      Weight 225 lbs 0 oz / 102.08567 kg      BSA 2.01 m2      BMI 41.2 kg/m2      Temperature 98.6 F / 37 C - Oral      Pulse 57      Respirations 16      Blood Pressure 148/86 Sitting, Right Arm      Pulse Oximetry 96%, room air      Initial Exhaled Nitrous Oxide      Exhaled Nitrous Oxide Results:  8            HPI      The patient is a 74 year old female with mild obstructive airway disease,     recurrent bronchitis and obstructive sleep apnea. She is here for follow up.      Since her last office visit, she has been having recurrent problems where she     has needed multiple rounds of prednisone.  She has been on antibiotics and thin    gs have not gone well.  She had her 50th wedding anniversary on 2019, was     doing okay, but then on 2019, she had to go to hospital ER for worsening     shortness of breath, cough and wheezing. At that time, she was diagnosed with     mild COPD/asthma overlap exacerbation.  She was given antibiotics by accident,     was given prednisone and was given albuterol.  She had flu checked at that time     and was negative, but even with the antibiotics and steroids she continued to     have problems.  Since then she has been on two more rounds of prednisone     already. She had chest x-ray which did not show significant pneumonia, but     things have not cleared up at all.  When  she is on prednisone it does help.  She    also takes Symbicort two puffs twice a day and Arnuity once daily. She is on     rescue inhaler as needed.  She has used nebulized medications as well.  The last    time she used a nebulizer was two weeks ago when she needed to use it multiple     times a day.            ROS      Constitutional:  Complains of: Fatigue; Denies: Fever, Weight gain, Weight loss,    Chills, Insomnia, Other      Respiratory/Breathing:  Complains of: Shortness of air, Wheezing, Cough; Denies:    Hemoptysis, Pleuritic pain, Other      Endocrine:  Denies: Polydipsia, Polyuria, Heat/cold intolerance, Abnorml     menstrual pattern, Diabetes, Other      Eyes:  Denies: Blurred vision, Vision Changes, Other      Ears, nose, mouth, throat:  Denies: Mouth lesions, Thrush, Throat pain, Hoarsene    ss, Allergies/Hay Fever, Post Nasal Drip, Headaches, Recent Head Injury, Nose     Bleeding, Neck Stiffness, Thyroid Mass, Hearing Loss, Ear Fullness, Dry Mouth,     Nasal or Sinus Pain, Dry Lips, Nasal discharge, Nasal congestion, Other      Cardiovascular:  Denies: Palpitations, Syncope, Claudication, Chest Pain, Wake     up Gasping for air, Leg Swelling, Irregular Heart Rate, Cyanosis, Dyspnea on     Exertion, Other      Gastrointestinal:  Denies: Nausea, Constipation, Diarrhea, Abdominal pain,     Vomiting, Difficulty Swallowing, Reflux/Heartburn, Dysphagia, Jaundice,     Bloating, Melena, Bloody stools, Other      Genitourinary:  Denies: Urinary frequency, Incontinence, Hematuria, Urgency,     Nocturia, Dysuria, Testicular problems, Other      Musculoskeletal:  Denies: Joint Pain, Joint Stiffness, Joint Swelling, Myalgias,    Other      Hematologic/lymphatic:  DENIES: Lymphadenopathy, Bruising, Bleeding tendencies,     Other      Neurological:  Denies: Headache, Numbness, Weakness, Seizures, Other      Psychiatric:  Denies: Anxiety, Appropriate Effect, Depression, Other      Sleep:  No: Excessive daytime  "sleep, Morning Headache?, Snoring, Insomnia?, Stop    breathing at sleep?, Other      Integumentary:  Denies: Rash, Dry skin, Skin Warm to Touch, Other      Immunologic/Allergic:  Denies: Latex allergy, Seasonal allergies, Asthma,     Urticaria, Eczema, Other      Immunization status:  No: Up to date            FAMILY/SOCIAL/MEDICAL HX      Current History      She is  and has kids. She was a  in a physician's office.     [place]       She started smoking when she was 16, smoked over a pack a day for 8 years, quit     for 30 years, and started smoking again, around half a pack a day for 6 to 8     years. Now she does not smoke any more. Her parents both smoked heavily.      Surgical History:  No: AAA Repair, Abdominal Surgery, Angioplasty, Appendectomy,    Back Surgery, Bladder Surgery, Bowel Surgery, Breast Surgery, CABG, Carotid     Stenosis, Cholecystectomy, Ear Surgery, Eye Surgery, Head Surgery, Hernia     Surgery, Kidney Surgery, Nose Surgery, Oral Surgery, Orthopedic Surgery,     Prostatectomy, Rectal Surgery, Spinal Surgery, Testicular Surgery, Throat     Surgery, Valve Replacement, Vascular Surgery, Other Surgeries      Heart - Family Hx:  Father      Diabetes - Family Hx:  Brother      Cancer/Type - Family Hx:  Mother (colon), Grandparent (breast), Cousin (breast)      Other Family Medical History:  Father (copd/ black lung)      Is Father Still Living?:  No      Is Mother Still Living?:  No       Family History:  Yes      Social History:  No Tobacco Use, No Alcohol Use, No Recreational Drug use      Smoking status:  Former smoker (5- 6 cigs day/ quit 15 yars ago)      Occupation:  retired/ officer manager      Anticoagulation Therapy:  No      Antibiotic Prophylaxis:  No      Medical History:  Yes: Asthma, Chronic Bronchitis/COPD, Congestive Heart Failu     (\"FIVE YEARS AGO\"), Hemorrhoids/Rectal Prob (DIVERTICULITIS), Shortness Of     Breath; No: Alcoholism, " Allergies, Anemia, Arthritis, Blood Disease, Broken B    ones, Cataracts, Chemical Dependency, Chemotherapy/Cancer, Emphysema, Chronic     Liver Disease, Colon Trouble, Colitis, Diverticulitis, Deafness or Ringing Ears,    Convulsions, Depression, Anxiety, Bipolar Disorder, Diabetes, Epilepsy,     Seizures, Forgetfullness, Glaucoma, Gall Stones, Gout, Head Injury, Heart     Attack, Heart Murmur, Hepatitis, Hiatal Hernia, High Blood Pressure, High Aditi    sterol, HIV (Do not ask - volu, Jaundice, Kidney or Bladder Disease, Kidney     Stones, Migrane Headaches, Mitral Valve Prolapse, Night sweats, Phlebitis,     Psychiatric Care, Reflux Disease, Rheumatic Fever, Sexually Transmitted Dis,     Sinus Trouble, Skin Disease/Psoriais/Ecz, Stroke, Thyroid Problem, Tuberculosis     or Pos TB Te, Miscellaneous Medical/oth      Psychiatric History      none            PREVENTION      Hx Influenza Vaccination:  Yes      Date Influenza Vaccine Given:  Nov 1, 2018      Influenza Vaccine Declined:  No      2 or More Falls Past Year?:  No      Fall Past Year with Injury?:  No      Hx Pneumococcal Vaccination:  Yes      Encouraged to follow-up with:  PCP regarding preventative exams.      Chart initiated by      ivelisse mendez ma            ALLERGIES/MEDICATIONS      Allergies:        Coded Allergies:             PENICILLINS (Verified  Allergy, Severe, CONVULSIONS, 4/26/19)           CEPHALOSPORINS (Verified  Allergy, Unknown, RASH, 4/26/19)           IBUPROFEN (Verified  Allergy, Unknown, HIVES, WHELPS, 4/26/19)           SULFA (SULFONAMIDE ANTIBIOTICS) (Verified  Allergy, Unknown, RASH, 4/26/19)      Medications    Last Reconciled on 4/26/19 08:56 by SANTINO BAEZ MD      predniSONE* (Deltasone*) 10 Mg Tablet      10 MG PO ASDIR, #45 TAB 0 Refills         Prov: Santino Baez         4/26/19       Fluticasone Furoate (Arnuity Ellipta) 100 Mcg Blst.w.dev      1 PUFF INH RTQDAY, #3 INH 3 Refills         Prov: Santino Baez          3/1/19       Montelukast Sodium (Singulair*) 10 Mg Tablet      10 MG PO QDAY, #30 TAB 0 Refills         Reported         1/13/19       Azithromycin (Azithromycin) 250 Mg Tablet      250 MG PO MOWEFR, #45 TAB 3 Refills         Prov: Santino Baez         6/20/18       Fexofenadine Hcl (Fexofenadine Hcl) 180 Mg Tablet      180 MG PO QDAY, #30 TAB 0 Refills         Reported         2/5/18       Losartan Potassium (Cozaar) 100 Mg Tablet      100 MG PO QDAY, #30 TAB 0 Refills         Reported         2/5/18       amLODIPine (amLODIPine) 2.5 Mg Tablet      5 MG PO QDAY, #60 TAB 0 Refills         Reported         2/5/18       Budesonide/Formoterol Fumarate (Symbicort 160/4.5 Mcg) 10.2 Gm Inh      2 PUFF INH RTBID, #1 INH 6 Refills         Prov: Santino Baez         12/11/15       Loratadine/Pseudoephedrine 5/120 MG (CLARITIN-D 12 HOUR TABLET) 1 Tab Tab.sr.12h      1 TAB PO BID, #60 TAB 1 Refill         Prov: NestorSantino         9/28/15       Jarad-Fluticasone (Fluticasone 50 mcg) 16 Gm Naspr      PUFFS NARE EACH QDAY, #1 BOTTLE 0 Refills         Reported         9/28/15       Metoprolol Succinate (Toprol XL*) 25 Mg Tab      25 MG PO QDAY, #30 TAB         Prov: Tanesha Jackson         1/19/15       Cholecalciferol (Vitamin D3) 5,000 Units Tablet      5000 UNITS PO QDAY, TAB         Reported         5/1/14       Gabapentin (Gabapentin) 300 Mg Capsule      300 MG PO QAM, CAP         Reported         5/1/14       MDI-Albuterol (Proair HFA) 60 Puffs/Inh Puffs      1 PUFFS INH PRN, INH         Reported         5/1/14      Current Medications      Current Medications Reviewed 4/26/19            EXAM      CONSTITUTIONAL: Pleasant female in no acute distress, has mild conversational     dyspnea.        EYES : Pink conjunctive, no ptosis, PERRL.       ENMT : Nose and ears appear normal, normal dentition, mild posterior pharyngeal     wall erythema, no sinus tenderness. Mallampati classification       Neck: Nontender, no masses, no  thyromegaly, no nodules.      Resp : Bilateral diminished breath sounds, scattered rhonchi.  No wheezing or     crackles.  Resonant to percussion bilaterally.      CVS  : No carotid bruits, s1s2 nl, RRR, no murmur, rubs or gallop, no peripheral    edema       Chest wall: Normal rise with inspiration, nontender on palpation.      GI   : Abdomen soft, with no masses, no hepatosplenomegaly, no hernias, BS+      MSK  : Normal gait and station, no digital cyanosis or clubbing       Skin : No rashes, ulcerations or lesions, normal turgor and temperature      Neuro: CN II - XII intact, no sensory deficits, DTRs intact and symmetrical, no     motor weakness      Psych: Appropriate affect, A   Vtials      Vitals:             Height 5 ft 2 in / 157.48 cm           Weight 225 lbs 0 oz / 102.81428 kg           BSA 2.01 m2           BMI 41.2 kg/m2           Temperature 98.6 F / 37 C - Oral           Pulse 57           Respirations 16           Blood Pressure 148/86 Sitting, Right Arm           Pulse Oximetry 96%, room air            REVIEW      Results Reviewed      PCCS Results Reviewed?:  Yes Prev Lab Results, Yes Prev Radiology Results, Yes     Previous Mecial Records      Lab Results      The patient's flu screen was negative.  The patient's CPAP usage data was     reviewed for the last 30 days. She has used it for 20 days.  On the days that     she used it, she used it for 3 hours and 29 minutes.  She says mainly because of    her persistent cough and bronchitis she has not been able to use it.  Her CPAP     pressure is 10 cm of water and apnea/hypopnea index on it is 1.1.      Radiographic Results               [facility]                PACS RADIOLOGY REPORT            Patient: PAULO DONATO   Acct: #[id number]   Report: #[id number]            UNIT #: [id number]    DOS: 02/08/19 1110      INSURANCE:[employer]   ORDER #:[id number]      LOCATION:[facility]      : 1945            PROVIDERS      ADMITTING:     ATTENDING: NANCY FORTUNE      FAMILY:  NANCY FORTUNE   ORDERING:  NANCY FORTUNE         OTHER:    DICTATING:  ERICK HAYNES MD            REQ #:[id number]   EXAM:CXR2 - CHEST 2V AP PA LAT      REASON FOR EXAM:        REASON FOR VISIT:  J20.9            *******Signed******         PROCEDURE:   CHEST AP/PA AND LATERAL             COMPARISON:   [facility], CR, CHEST PA/AP   2018, 9:06.  [facility], CR, CHEST AP/PA 1 VIEW, 10/21/2018, 15:49.  [facility], CR, CHEST       AP/PA 1 VIEW, 2019, 11:39.             INDICATIONS:   productive cough, congestion, weakness, history of COPD, asthma             FINDINGS:         The cardiomediastinal silhouette is within normal limits. The lungs are clear.     There is no focal       consolidation, pneumothorax or large pleural effusion.  Stable mild scarring at     the right middle       lobe.  Multilevel thoracic disc disease.             CONCLUSION:         No acute process.              ERICK HAYNES MD             Electronically Signed and Approved By: ERICK HAYNES MD on 2019 at 12:22                        Until signed, this is an unconfirmed preliminary report that may contain      errors and is subject to change.                                              ERICKM:      D:19 1222      PFT Results      Patient: PAULO DONATO   Acct #: [id number]         Report #: [id number]   : 1945 Report #: [id number]      MR #: [id number]   Location: [facility]                                ***Signed***            PFT      DATE: 3/6/15      PFT Results      Pulmonary function test interpretation:            Spirometry shows mild obstructive defect. ( FEV1/ FVC ratio- 65, FEV1- 1.45     lits, 72% of predicted; FVC- 2.25 lits, 85% of predicted)            There is borderline response to bronchodilator demonstrated. FVC increased drom     2.25 lits  to 2.48 lits, 10.2% increase.            Lung volumes show air trapping. ( % of predicted, 4.94 liters; % of    predicted, 2.73 liters)            Diffusion capacity is mildly decreased. (71% of predicted)            Comparison: No previous tests to compare.            Conclusion:    Low FEV1/FVC with low FEV1, air trapping and decreased diffusion     capacity is suggestive of mild obstructive airway disease, likely emphysema.            There is borderline reversible component of her airway disease.            Please correlate clinically.            Santino Baez MD                  Mar 6, 2015 14:51               <Electronically signed by Santino Baez MD> on 03/06/15 1451          on            Assessment      Cough - R05            Notes      New Medications      * predniSONE* (Deltasone*) 10 MG TABLET: 10 MG PO ASDIR #45         Instructions: 73phx8o,99pfq7h,61vqr4i,12vzz6w,47iqc0m      * AZELASTINE/FLUTICASONE (Dymista Nasal Spray) 23 GM SPRAY.PUMP          Sample - Qty 1         Instructions: 1 spray each nostril BID         Dx: Asthma - J45.909      Discontinued Medications      * Levofloxacin (Levaquin*) 750 MG TABLET: 750 MG PO QDAY #5      New Diagnostics      * Chest W/O Cont CT, 1 DAY         Dx: Cough - R05      * Sputum Culture W/Gram Stain, Week         Dx: Cough - R05      PLAN:        The patient is a 74 year old female with mild obstructive airway disease, r    ecurrent bronchitis and obstructive sleep apnea.              1. Mild obstructive airway disease with recurrent exacerbations of bronchitis.      I will check CT scan of the chest now.  I will check a sputum culture now.      Continue with Symbicort two puffs twice a day, Arnuity once daily and continue     with Azithromycin Monday, Wednesday and Friday.  If her symptoms are worse, she     might need antibiotics. I have given her a prednisone taper to use if her     breathing and cough continues to get worse.      2.  Continue with  Singulair.        3.  Continue with Zantac.        4. I will start her on Mucinex DM for now. Continue with Claritin. She has     posterior pharyngeal wall erythema with postnasal drip.  I will start her on     Dymista nasal spray.      5. Obstructive sleep apnea. Continue CPAP at current settings. Because of her     cough and recurrent bronchitis, she is not able to use this. I advised her once     this improves, continue to use CPAP, but clean the machine and tubings as much     as she can.      6.  Follow up in three months, earlier if needed.            Patient Education      Education resources provided:  Yes      Patient Education Provided:  Acute Bronchitis                 Disclaimer: Converted document may not contain table formatting or lab diagrams. Please see [employer] for the authenticated document.

## 2021-05-28 NOTE — PROGRESS NOTES
Patient: PAULO DONATO     Acct: [id number]     Report: #[id number]  UNIT #: [id number]     : 1945    Encounter Date:2018  PRIMARY CARE: NANCY FORTUNE  ***Signed***  --------------------------------------------------------------------------------------------------------------------  Chief Complaint      Encounter Date      2018            Referring Provider      NANCY FORTUNE            Patient Complaint      Patient is complaining of      6 month follow up            VITALS      Height 5 ft 1 in / 154.94 cm      Weight 226 lbs 8 oz / 102.633415 kg      BSA 2.16 m2      BMI 42.8 kg/m2      Temperature 98.0 F / 36.67 C - Oral      Pulse 64      Respirations 16      Blood Pressure 132/84 Sitting, Right Arm      Pulse Oximetry 64%, room air      Exhaled Nitrous Oxide Testin            HPI      The patient is a 73 year old female with mild obstructive airway disease and     recurrent bronchitis. She is here for follow up.             Since her last office visit she has been taking Symbicort two puffs twice daily    , arnuity once daily and daily Azithromycin. She has needed 2 rounds of     antibiotics, one in July and one in 2017. She was treated with     steroids and antibiotics for recurrent bronchitis. It was through her primary     care provider. She has rarely needed her rescue inhaler over the last several     months. The last time she used it was 3 weeks ago. She uses Singulair once     daily and Allegra once daily. She uses Flonase irregularly. She continues to     use CPAP at 9-10 cm of water every night. Overall her symptoms are improved.     She had an EKG done which was reviewed with her today. She has no chest pain,     no nausea and vomiting, no cough. Her cough and shortness of breath is at     baseline. She feels like chronic macrolide therapy has helped and she has not     had any significant infections.            ROS      Constitutional:  Complains of:  Fatigue, Denies: Fever, Weight gain, Weight loss    , Chills, Insomnia, Other      Respiratory/Breathing:  Complains of: Shortness of air, Wheezing, Denies: Cough    , Hemoptysis, Pleuritic pain, Other      Endocrine:  Denies: Polydipsia, Polyuria, Heat/cold intolerance, Abnorml     menstrual pattern, Diabetes, Other      Eyes:  Denies: Blurred vision, Vision Changes, Other      Ears, nose, mouth, throat:  Denies: Mouth lesions, Thrush, Throat pain,     Hoarseness, Allergies/Hay Fever, Post Nasal Drip, Headaches, Recent Head Injury    , Nose Bleeding, Neck Stiffness, Thyroid Mass, Hearing Loss, Ear Fullness, Dry     Mouth, Nasal or Sinus Pain, Dry Lips, Nasal discharge, Nasal congestion, Other      Cardiovascular:  Denies: Palpitations, Syncope, Claudication, Chest Pain, Wake     up Gasping for air, Leg Swelling, Irregular Heart Rate, Cyanosis, Dyspnea on     Exertion, Other      Gastrointestinal:  Denies: Nausea, Constipation, Diarrhea, Abdominal pain,     Vomiting, Difficulty Swallowing, Reflux/Heartburn, Dysphagia, Jaundice, Bloating    , Melena, Bloody stools, Other      Genitourinary:  Denies: Urinary frequency, Incontinence, Hematuria, Urgency,     Nocturia, Dysuria, Testicular problems, Other      Musculoskeletal:  Denies: Joint Pain, Joint Stiffness, Joint Swelling, Myalgias    , Other      Hematologic/lymphatic:  DENIES: Lymphadenopathy, Bruising, Bleeding tendencies,     Other      Neurological:  Denies: Headache, Numbness, Weakness, Seizures, Other      Psychiatric:  Denies: Anxiety, Appropriate Effect, Depression, Other      Sleep:  No: Excessive daytime sleep, Morning Headache?, Snoring, Insomnia?,     Stop breathing at sleep?, Other      Integumentary:  Denies: Rash, Dry skin, Skin Warm to Touch, Other      Immunologic/Allergic:  Denies: Latex allergy, Seasonal allergies, Asthma,     Urticaria, Eczema, Other      Immunization status:  No: Up to date            FAMILY/SOCIAL/MEDICAL HX      Current  "History      She is  and has kids. She was a  in a physician's office.     [place]       She started smoking when she was 16, smoked over a pack a day for 8 years, quit     for 30 years, and started smoking again, around half a pack a day for 6 to 8     years. Now she does not smoke any more. Her parents both smoked heavily.      Surgical History:  No: AAA Repair, Abdominal Surgery, Angioplasty, Appendectomy    , Back Surgery, Bladder Surgery, Bowel Surgery, Breast Surgery, CABG, Carotid     Stenosis, Cholecystectomy, Ear Surgery, Eye Surgery, Head Surgery, Hernia     Surgery, Kidney Surgery, Nose Surgery, Oral Surgery, Orthopedic Surgery,     Prostatectomy, Rectal Surgery, Spinal Surgery, Testicular Surgery, Throat     Surgery, Valve Replacement, Vascular Surgery, Other Surgeries      Heart - Family Hx:  Father      Diabetes - Family Hx:  Brother      Cancer/Type - Family Hx:  Mother (colon), Grandparent (breast), Cousin (breast)      Other Family Medical History:  Father (copd/ black lung)      Is Father Still Living?:  No      Is Mother Still Living?:  No      Social History:  No Tobacco Use, No Alcohol Use, No Recreational Drug use      Smoking status:  Former smoker (5- 6 cigs day/ quit 15 yars ago)      Occupation:  retired/ officer manager      Anticoagulation Therapy:  No      Antibiotic Prophylaxis:  No      Medical History:  Yes: Asthma, Chronic Bronchitis/COPD, Congestive Heart Failu (    \"FIVE YEARS AGO\"), Hemorrhoids/Rectal Prob (DIVERTICULITIS), Shortness Of Breath    , No: Alcoholism, Allergies, Anemia, Arthritis, Blood Disease, Broken Bones,     Cataracts, Chemical Dependency, Chemotherapy/Cancer, Emphysema, Chronic Liver     Disease, Colon Trouble, Colitis, Diverticulitis, Deafness or Ringing Ears,     Convulsions, Depression, Anxiety, Bipolar Disorder, Diabetes, Epilepsy, Seizures    , Forgetfullness, Glaucoma, Gall Stones, Gout, Head Injury, Heart " Attack, Heart     Murmur, Hepatitis, Hiatal Hernia, High Blood Pressure, High Cholesterol, HIV (    Do not ask - volu, Jaundice, Kidney or Bladder Disease, Kidney Stones, Migrane     Headaches, Mitral Valve Prolapse, Night sweats, Phlebitis, Psychiatric Care,     Reflux Disease, Rheumatic Fever, Sexually Transmitted Dis, Sinus Trouble, Skin     Disease/Psoriais/Ecz, Stroke, Thyroid Problem, Tuberculosis or Pos TB Te,     Miscellaneous Medical/oth            Hx Influenza Vaccination:  Yes      Date Influenza Vaccine Given:  Nov 1, 2017      Influenza Vaccine Declined:  No      2 or More Falls Past Year?:  No      Fall Past Year with Injury?:  No      Hx Pneumococcal Vaccination:  Yes      Encouraged to follow-up with:  PCP regarding preventative exams.      Chart initiated by      ivelisse mendez ma            ALLERGIES/MEDICATIONS      Allergies:        Coded Allergies:             PENICILLINS (Verified  Allergy, Severe, CONVULSIONS, 2/5/18)           CEPHALOSPORINS (Verified  Allergy, Unknown, RASH, 2/5/18)           IBUPROFEN (Verified  Allergy, Unknown, HIVES, WHELPS, 2/5/18)           SULFA (SULFONAMIDE ANTIBIOTICS) (Verified  Allergy, Unknown, RASH, 2/5/18)      Medications    Last Reconciled on 2/5/18 12:25 by SANTINO BAEZ MD      Fluticasone Furoate (Arnuity Ellipta) 100 Mcg Blst.w.dev      1 PUFF INH RTQDAY, #3 INH 3 Refills         Prov: Santino Baez         2/5/18       Ranitidine HCl (Zantac*) 150 Mg Tablet      150 MG PO QDAY for 30 Days, #90 TAB 3 Refills         Prov: Santino Baez         2/5/18       predniSONE* (predniSONE*) 20 Mg Tablet      40 MG PO QDAY, #10 TAB 2 Refills         Prov: Santino Baez         2/5/18       Fexofenadine Hcl (Fexofenadine Hcl) 180 Mg Tablet      180 MG PO QDAY, #30 TAB 0 Refills         Reported         2/5/18       Losartan Potassium (Cozaar) 100 Mg Tablet      100 MG PO QDAY, #30 TAB 0 Refills         Reported         2/5/18       amLODIPine (amLODIPine) 2.5 Mg Tablet       5 MG PO QDAY, #60 TAB 0 Refills         Reported         2/5/18       Fluticasone Furoate (Arnuity Ellipta) 100 Mcg Blst.w.dev      1 PUFF INH RTQDAY, #1 INH 3 Refills         Prov: Santino Baez         7/19/17       Azithromycin (Azithromycin) 250 Mg Tablet      250 MG PO Q24HR, #30 TAB 9 Refills         Prov: Santino Baez         7/14/17       Budesonide/Formoterol Fumarate (Symbicort 160/4.5 Mcg) 10.2 Gm Inh      2 PUFF INH RTBID, #1 INH 6 Refills         Prov: Santino Baez         12/11/15       Loratadine/Pseudoephedrine 5/120 MG (Claritin-D 12 Hour) 1 Tab Tab.sr.12h      1 TAB PO BID, #60 TAB 1 Refill         Prov: Santino Baez         9/28/15       Jarad-Fluticasone (Fluticasone 50 mcg) 16 Gm Naspr      PUFFS NARE EACH QDAY, #1 BOTTLE 0 Refills         Reported         9/28/15       Metoprolol Succinate (Toprol XL*) 25 Mg Tab      25 MG PO QDAY, #30 TAB         Prov: Tanesha Jackson         1/19/15       Cholecalciferol (Vitamin D3) 5,000 Units Tablet      5000 UNITS PO QDAY, TAB         Reported         5/1/14       Gabapentin (Gabapentin) 300 Mg Capsule      300 MG PO QAM, CAP         Reported         5/1/14       MDI-Albuterol (Proair HFA) 60 Puffs/Inh Puffs      1 PUFFS INH PRN, INH         Reported         5/1/14      Current Medications      Current Medications Reviewed 2/5/18            EXAM      CONSTITUTIONAL: Pleasant female   in no acute distress, normal conversant.       EYES : Pink conjunctive, no ptosis, PERRL.       ENMT :Mallampati classification III, mild posterior pharyngeal wall erythema,     fair oral dental hygiene.  Nose and ears appear normal.        Neck: Nontender, no masses, no thyromegaly, no nodules.      Resp : Normal vesicular breath sounds, resonant to percussion bilaterally, no     wheezing, crackles or rhonchi.      CVS  : No carotid bruits, s1s2 nl, RRR, no murmur, rubs or gallop, no     peripheral edema       Chest wall: Increased AP diameter.   Normal rise with inspiration,  nontender on     palpation      GI   : Abdomen soft, with no masses, no hepatosplenomegaly, no hernias, BS+      MSK  : Normal gait and station, no digital cyanosis or clubbing       Skin : No rashes, ulcerations or lesions, normal turgor and temperature      Neuro: CN II - XII intact, no sensory deficits, DTRs intact and symmetrical, no     motor weakness      Psych: Appropriate affect, A   Vtials      Vitals:             Height 5 ft 1 in / 154.94 cm           Weight 226 lbs 8 oz / 102.870452 kg           BSA 2.16 m2           BMI 42.8 kg/m2           Temperature 98.0 F / 36.67 C - Oral           Pulse 64           Respirations 16           Blood Pressure 132/84 Sitting, Right Arm           Pulse Oximetry 64%, room air            REVIEW      Results Reviewed      PCCS Results Reviewed?:  Yes Prev Lab Results, Yes Prev Radiology Results, Yes     Previous Mecial Records      Radiographic Results                     [facility] Diagnostic Img                PACS RADIOLOGY REPORT            Patient: PAULO DONATO   Acct: #[id number]   Report: #[id number]            UNIT #: [id number]    DOS: 10/24/16 1300      INSURANCE:[employer]   ORDER #:[id number]      LOCATION:[facility]     : 1945            PROVIDERS      ADMITTING:     FAMILY:  NANCY FORTUNE         ORDERING:  Santino Baez   OTHER:       DICTATING:  Fly Alexis MD            REQ #:[id number]    CPT CODE:72423   EXAM:CXR2 - CHEST 2V AP PA LAT      REASON FOR EXAM:        REASON FOR VISIT:  R93.8            *******Signed******               PROCEDURE:   CHEST AP/PA AND LATERAL             COMPARISON:   [facility], , CHEST PA/AP   49.             INDICATIONS:   ABNORMAL CT, COPD.             FINDINGS:         The heart is normal in size. The lungs are well expanded. Linear scarring at     the lung bases is       stable.             Moderate degenerative spurring is seen throughout  the thoracic spine.             CONCLUSION:         Mild chronic changes, stable.             No active disease is seen.              AMI MOYA MD             Electronically Signed and Approved By: AMI MOYA MD on 10/24/2016 at 13:36                   Until signed, this is an unconfirmed preliminary report that may contain      errors and is subject to change.                                              COUST:      D:10/24/16 1337      PFT Results      Patient: PAULO DONATO   Acct #: [id number]         Report #: [id number]   : 1945 Report #: [id number]      MR #: [id number]   Location: [facility]                                ***Signed***            PFT      DATE: 3/6/15      PFT Results      Pulmonary function test interpretation:            Spirometry shows mild obstructive defect. ( FEV1/ FVC ratio- 65, FEV1- 1.45 lits    , 72% of predicted; FVC- 2.25 lits, 85% of predicted)            There is borderline response to bronchodilator demonstrated. FVC increased drom     2.25 lits to 2.48 lits, 10.2% increase.            Lung volumes show air trapping. ( % of predicted, 4.94 liters; %     of predicted, 2.73 liters)            Diffusion capacity is mildly decreased. (71% of predicted)            Comparison: No previous tests to compare.            Conclusion:    Low FEV1/FVC with low FEV1, air trapping and decreased diffusion     capacity is suggestive of mild obstructive airway disease, likely emphysema.            There is borderline reversible component of her airway disease.            Please correlate clinically.            Santino Baez MD Mar 6, 2015 14:51               <Electronically signed by Santino Baez MD> on 03/06/15 1451          on            PLAN      Assessment      Notes      New Medications      * amLODIPine 2.5 MG TABLET: 5 MG PO QDAY #60      * Losartan Potassium (Cozaar) 100 MG TABLET: 100 MG PO QDAY #30      * Fexofenadine Hcl 180 MG TABLET: 180 MG PO QDAY #30       * predniSONE* 20 MG TABLET: 40 MG PO QDAY #10      * Ranitidine HCl (Zantac*) 150 MG TABLET: 150 MG PO QDAY 30 Days #90      * Fluticasone Furoate (Arnuity Ellipta) 100 MCG BLST.W.DEV: 1 PUFF INH RTQDAY #3      Renewed Medications      * Azithromycin 250 MG TABLET: 250 MG PO Q24HR #30       Instructions: 250 mg once daily orally to continue.      PLAN:       The patient is a 73 year old female with mild obstructive airway disease and     recurrent bronchitis.             1. Chronic Obstructive Pulmonary Disease with recurrent bronchitis. Continue     with Symbicort two puffs twice daily and arnuity once daily. Continue with     albuterol as needed. Continue with Singulair and Allegra. Continue with     Flonase. I advised her to use her Flonase regularly.             2. Gastrointestinal esophageal reflux disease. Continue with Zantac once daily.     I have written for a script for this. Her EKG shows normal QRS rhythm and QTc.             3. Continue with Azithromycin 250 mg once daily. I have given her 2 Prednisone     bursts doses if she starts having significant problems.  If her symptoms are     stable over the next 6-12 months, I will try to decrease her macrolide therapy     to Monday, Wednesday and Friday only.             4. Sleep hygiene was discussed in detail and weight loss counseling was done.     Continue to use CPAP at 9-10 cm of water.             5.  I will follow up with her in 6 months, earlier if needed.            Patient Education      Education resources provided:  Yes      Patient Education Provided:  Acute Bronchitis                 Disclaimer: Converted document may not contain table formatting or lab diagrams. Please see [employer] for the authenticated document.

## 2021-05-28 NOTE — PROGRESS NOTES
Patient: PAULO DONATO     Acct: [id number]     Report: #[id number]  UNIT #: [id number]     : 1945    Encounter Date:10/06/2020  PRIMARY CARE: NANCY FORTUNE  ***Signed***  --------------------------------------------------------------------------------------------------------------------  Chief Complaint      Encounter Date      Oct 6, 2020            Primary Care Provider      NANCY FORTUNE            Patient Complaint      Patient is complaining of      PT here today for F/U, COPD            VITALS      Height 5 ft 1 in / 154.94 cm      Weight 222 lbs  / 100.702954 kg      BSA 1.97 m2      BMI 41.9 kg/m2      Temperature 97.9 F / 36.61 C - Temporal      Pulse 62      Respirations 15      Blood Pressure 139/77 Sitting, Left Arm      Pulse Oximetry 95%, room air            HPI      The patient is a 75 year old female, patient of Dr. Baez's with a history of     COPD with recurrent bronchitis, obstructive sleep apnea, postnasal drip,     seasonal allergies and history of pneumonia secondary to mold type fungus and     completed six months of itraconazole in 2019. The patient states that she had a     ER visit back on 10/03/2020 due to having some chest pain with deep breathing     and some wheezing. The patient reported it started three days prior to ER visit.     It was gradual in onset and had been constant. It was described in the left     chest area as moderate in severity.  Worsening factors were with movement and     deep breaths. The patient denies any fever, chills, night sweats, hemoptysis,     purulent sputum production, chest pain, chest tightness, swollen glands in the     head and neck, nausea, vomiting, diarrhea, diaphoresis, syncope or palpitations.     The patient states she did have a chest x-ray and chest CT in the ER and was     told that she did not have any pulmonary embolus and did have a small amount of     fluid on her lung. The patient states that she was prescribed  Tylenol and     discharged home.  The patient also did have a chest CT scan that was ordered by     Dr. Baez on 09/18/2020 to reevaluate a pulmonary nodule and it showed no active    disease seen. There was just some mild scarring and atelectasis in the right     lower lobe not significantly changed.  The patient had a chest x-ray completed     on 10/04/2020 in the ER and it showed the lungs were clear, but did show some     cardiomegaly.  Chest CT in the ER on 10/04/2020 showed no pulmonary embolus, no     acute infiltrate. There was a very small left pleural effusion, minimal and some    subsegmental atelectasis in the lung bases with mild to moderate cardiomegaly     with a trace amount of pericardial fluid seen. The patient states that she use     to be under the care of Dr. Cloud for congestive heart failure, but unfortunately    she has not followed up with him for some time. The patient would like a     referral to go back to see him.  The patient had a BNP drawn while she was in     the hospital and it came back within normal range at 165. The patient's creatin    ine came back within  normal limits and troponin's came back negative.  The     patient states she is feeling better today, however does still have some pain     when she does take a deep breath in.  The patient states that the ER told her     that she had pleurisy.  The patient states that she would also like to be     referred for COVID19 testing.  The patient states she has not been around any     sick contacts that she knows of, but she was around someone who had recovered     from COVID19.  The patient states she is able to perform ADLs without     difficulty.  The patient states she is taking Arnuity and Symbicort everyday as     prescribed and also takes Singulair for her allergies and uses albuterol inhaler    as needed.              I have personally reviewed the review of systems, past family, social, surgical     and medical histories  and I agree with those as entered in the chart.            ROS      Constitutional:  Denies: Fatigue, Fever, Weight gain, Weight loss, Chills,     Insomnia, Other      Respiratory/Breathing:  Complains of: Shortness of air, Wheezing, Cough; Denies:    Hemoptysis, Pleuritic pain, Other      Endocrine:  Denies: Polydipsia, Polyuria, Heat/cold intolerance, Abnorml     menstrual pattern, Diabetes, Other      Eyes:  Denies: Blurred vision, Vision Changes, Other      Ears, nose, mouth, throat:  Denies: Congestion, Dysphagia, Hearing Changes, Nose    Bleeding, Nasal Discharge, Throat pain, Tinnitus, Other      Cardiovascular:  Denies: Chest Pain, Exertional dyspnea, Peripheral Edema,     Palpitations, Syncope, Wake up Gasping for air, Orthopnea, Tachycardia, Other      Gastrointestinal:  Denies: Abdominal pain/cramping, Bloody stools, Constipation,    Diarrhea, Melena, Nausea, Vomiting, Other      Genitourinary:  Denies: Dysuria, Urinary frequency, Incontinence, Hematuria,     Urgency, Other      Musculoskeletal:  Denies: Joint Pain, Joint Stiffness, Joint Swelling, Myalgias,    Other      Hematologic/lymphatic:  DENIES: Lymphadenopathy, Bruising, Bleeding tendencies,     Other      Neurologic:  Denies: Headache, Numbness, Weakness, Seizures, Other      Psychiatric:  Denies: Anxiety, Appropriate Effect, Depression, Other      Sleep:  No: Excessive daytime sleep, Morning Headache?, Snoring, Insomnia?, Stop    breathing at sleep?, Other      Integumentary:  Denies: Rash, Dry skin, Skin Warm to Touch, Other            FAMILY/SOCIAL/MEDICAL HX      Surgical History:  No: AAA Repair, Abdominal Surgery, Angioplasty, Appendectomy,    Back Surgery, Bladder Surgery, Bowel Surgery, Breast Surgery, CABG, Carotid     Stenosis, Cholecystectomy, Ear Surgery, Eye Surgery, Head Surgery, Hernia Jeison    spencer, Kidney Surgery, Nose Surgery, Oral Surgery, Orthopedic Surgery,     Prostatectomy, Rectal Surgery, Spinal Surgery, Testicular Surgery,  "Throat     Surgery, Valve Replacement, Vascular Surgery, Other Surgeries      Heart - Family Hx:  Father      Diabetes - Family Hx:  Brother      Cancer/Type - Family Hx:  Mother, Grandparent, Cousin      Other Family Medical History:  Father      Social History:  Tobacco Use      Smoking status:  Former smoker ( (1 ppd for 8 years quit 1999))      Anticoagulation Therapy:  No      Antibiotic Prophylaxis:  No      Medical History:  Yes: Asthma, Chronic Bronchitis/COPD, Congestive Heart Failu     (\"FIVE YEARS AGO\"), Hemorrhoids/Rectal Prob (DIVERTICULITIS), Shortness Of     Breath; No: Anemia, Arthritis, Blood Disease, Broken Bones, Cataracts, Chemical     Dependency, Chemotherapy/Cancer, Emphysema, Chronic Liver Disease, Colon     Trouble, Colitis, Diverticulitis, Deafness or Ringing Ears, Depression, Anxiety,    Bipolar Disorder, Diabetes, Epilepsy, Seizures, Glaucoma, Gall Stones, Gout,     Head Injury, Heart Attack, Heart Murmur, Hepatitis, Hiatal Hernia, High Blood     Pressure, HIV (Do not ask - volu, Kidney or Bladder Disease, Kidney Stones,     Migrane Headaches, Mitral Valve Prolapse, Psychiatric Care, Reflux Disease,     Rheumatic Fever, Sexually Transmitted Dis, Sinus Trouble, Skin     Disease/Psoriais/Ecz, Stroke, Thyroid Problem, Tuberculosis or Pos TB Te,     Miscellaneous Medical/oth      Psychiatric History      none            PREVENTION      Hx Influenza Vaccination:  Yes      Date Influenza Vaccine Given:  Oct 1, 2020      Influenza Vaccine Declined:  No      2 or More Falls in Past Year?:  No      Fall Past Year with Injury?:  No      Hx Pneumococcal Vaccination:  Yes      Encouraged to follow-up with:  PCP regarding preventative exams.      Chart initiated by      Misti Leong CMA            ALLERGIES/MEDICATIONS      Allergies:        Coded Allergies:             PENICILLINS (Verified  Allergy, Severe, CONVULSIONS, 6/18/20)           CEPHALOSPORINS (Verified  Allergy, Unknown, RASH, 6/18/20)    "        IBUPROFEN (Verified  Allergy, Unknown, HIVES, WHELPS, 6/18/20)           SULFA (SULFONAMIDE ANTIBIOTICS) (Verified  Allergy, Unknown, RASH, 6/18/20)      Medications    Last Reconciled on 10/6/20 11:14 by RAYMOND MORENO,       Famotidine (Famotidine) 20 Mg Tablet      20 MG PO HS for 30 Days, #30 TAB 3 Refills         Prov: RAYMOND MORENO P[facility]         10/6/20       predniSONE (predniSONE) 20 Mg Tablet      40 MG PO QDAY for 7 Days, #14 TAB 0 Refills         Prov: RAYMOND MORENO PCC[facility]         10/6/20       CPAP Compressor (CPAP) 1 Each Each      EACH XX ONCE, #1 0 Refills         Prov: Santino Baez         6/18/20       MDI-Albuterol (Ventolin HFA) 18 Gm Hfa.aer.ad      2 PUFFS INH RTTID for 30 Days, #1 MDI 3 Refills         Prov: Santino Baez         3/25/20       Fluticasone Furoate (Arnuity Ellipta) 100 Mcg Blst.w.dev      1 PUFF INH RTQDAY, #3 INH 11 Refills         Prov: Santino Baez         3/25/20       Budesonide/Formoterol Fumarate (Symbicort 160/4.5 Mcg) 10.2 Gm Inh      2 PUFF INH RTBID, #1 INH 11 Refills         Prov: Santino Baez         3/25/20       Azelastine Hcl (Azelastine Nasal) 137 Mcg/0.137 Ml Spray.pump      2 PUFFS NARE EACH BID, #1 BOTTLE 9 Refills         Prov: Santino Baez         3/25/20       Metoprolol Succinate (Metoprolol Succinate) 100 Mg Tab.er.24h      100 MG PO QDAY, #30 TAB         Reported         7/26/19       Montelukast Sodium (Singulair*) 10 Mg Tablet      10 MG PO QDAY, #30 TAB 0 Refills         Reported         1/13/19       Fexofenadine Hcl (Fexofenadine Hcl) 180 Mg Tablet      180 MG PO QDAY, #30 TAB 0 Refills         Reported         2/5/18       Losartan Potassium (Cozaar) 100 Mg Tablet      100 MG PO QDAY, #30 TAB 0 Refills         Reported         2/5/18       amLODIPine (amLODIPine) 2.5 Mg Tablet      5 MG PO QDAY, #60 TAB 0 Refills         Reported         2/5/18       Cholecalciferol (Vitamin D3) (Vitamin D3) 5,000 Units Tablet      5000 UNITS PO QDAY, TAB          Reported         5/1/14       Gabapentin (Gabapentin) 300 Mg Capsule      300 MG PO TID, CAP         Reported         5/1/14      Current Medications      Current Medications Reviewed 10/6/20            EXAM      CONSTITUTIONAL: Pleasant  normal conversant.       EYES : Pink conjunctive, no ptosis, PERRL.       ENMT : Nose and ears appear normal, normal dentition, posterior pharyngeal wall     without erythema, no sinus tenderness. Mallampati classification 2.        Neck: Nontender, no masses, no thyromegaly, no nodules.      Resp : Bilateral diminished breath sounds, no wheezes, rales or rhonchi     appreciated, normal work of breathing noted.  Patient is able to speak full     sentences without difficulty.        CVS  : No carotid bruits, s1s2 nl, RRR, no murmur, rubs or gallop, no peripheral    edema       Chest wall: Normal rise with inspiration, nontender on palpation.      GI   : Abdomen soft, with no masses, no hepatosplenomegaly, no hernias, BS+      MSK  : Normal gait and station, no digital cyanosis or clubbing       Skin : No rashes, ulcerations or lesions, normal turgor and temperature      Neuro: CN II - XII intact, no sensory deficits, DTRs intact and symmetrical, no     motor weakness      Psych: Appropriate affect, A   Vitals      Vitals:             Height 5 ft 1 in / 154.94 cm           Weight 222 lbs  / 100.569280 kg           BSA 1.97 m2           BMI 41.9 kg/m2           Temperature 97.9 F / 36.61 C - Temporal           Pulse 62           Respirations 15           Blood Pressure 139/77 Sitting, Left Arm           Pulse Oximetry 95%, room air            REVIEW      Results Reviewed      PCCS Results Reviewed?:  Yes Prev Lab Results, Yes Prev Radiology Results, Yes     [facility]      Lab Results      I reviewed patient's noncontrast chest CT dated 04/28/2020 and 10/04/2020 and     chest x-ray dated for 10/04/2020.  I reviewed Dr. Baez's last office visit     note.       Radiographic Results               [facility]                PACS RADIOLOGY REPORT            Patient: PAULO ORTEZ   Acct: #[id number]   Report: #[id number]            UNIT #: [id number]    DOS: 10/04/20 1533      INSURANCE:[employer]   ORDER #:[id number]      LOCATION:ER     : 1945            PROVIDERS      ADMITTING:     ATTENDING:       :  NANCY FORTUNE   ORDERING:  ALVIN FERRIS         OTHER:    DICTATING:  Carolyn Adame MD            REQ #:[id number]   EXAM:CXR1 - CHEST 1 View AP PA      REASON FOR EXAM:  Chest Pain      REASON FOR VISIT:  PLEURISY CONCERNS/SOA/CP/BACK PN            *******Signed******         PROCEDURE:   CHEST AP/PA SINGLE VIEW             COMPARISON:   [facility], CHEST PA/AP   19:33.             INDICATIONS:   CHEST PAIN, SHORTNESS OF BREATH AND BACK PAIN             FINDINGS:         The lungs are clear.  The heart is enlarged.  The pulmonary vasculature appears     normal.  The       osseous structures appear intact.             CONCLUSION:         1. Lungs are clear.      2. Cardiomegaly.              CAROLYN ADAME MD             Electronically Signed and Approved By: CAROLYN ADAME MD on 10/04/2020 at     16:50                               Until signed, this is an unconfirmed preliminary report that may contain      errors and is subject to change.                                              RODD1:      D:10/04/20 1651               [facility]                PACS RADIOLOGY REPORT            Patient: PAULO ORTEZ   Acct: #[id number]   Report: #[id number]            UNIT #: [id number]    DOS: 10/04/20 1723      INSURANCE:[employer]   ORDER #:[id number]      LOCATION:ER     : 1945            PROVIDERS      ADMITTING:     ATTENDING:       :  NANCY FORTUNE   ORDERING:   BART KRAFT         OTHER:    DICTATING:  Delvis Portillo MD, JR            REQ #:[id number]   EXAM:CTPA - CT PULMONARY ANGIOGRAM      REASON FOR EXAM:  Shortness of Breath      REASON FOR VISIT:  PLEURISY CONCERNS/SOA/CP/BACK PN            *******Signed******         PROCEDURE:   CT PULMONARY ANGIOGRAM             COMPARISONS:   [facility], CT, CHEST W/O CONTRAST,     9/18/2020, 10:17.  [facility], CR, CHEST AP/PA 1 VIEW, 10/04/2020, 16:45.  [facility], CT, CHEST       W/ CONTRAST, 4/17/2015, 12:13.             INDICATIONS:   SHORTNESS OF BREATH, LEFT CHEST PAIN.             TECHNIQUE:   After obtaining the patient's consent, 813 CT/CTA images were     obtained with non-ionic       intravenous contrast material.               PROTOCOL:     Pulmonary embolism imaging protocol performed                RADIATION:     DLP: 539.7 mGy*cm          Automated exposure control was utilized to minimize radiation dose.       CONTRAST:   100cc Isovue 370 I.V.             FINDINGS:   No pulmonary embolism is identified.  There is mild to moderate     cardiomegaly.  New or       increased bibasilar subsegmental atelectasis is present.  Acute infiltrate is     thought to be less       likely.  A trace amount of pericardial effusion is noted.  There may be minimal     fluid within the       superior pericardial recess, seen previously.  There is minimal left pleural     effusion.  Minimal, if       any, right pleural effusion is seen.  No thoracic aortic aneurysm or dissection     is suggested.  Mild       atherosclerotic change involves the thoracic aorta.  The descending aorta is     ectatic.  External       artifacts in the scan field of view obscure detail.  Degenerative changes     involve the imaged spine.        No definite acute fracture.  No aggressive osseous lesion is suggested.  The     imaged       tracheobronchial tree is well aerated without filling defect.   No suspicious     pulmonary nodules are       seen.  There are low lung volumes.  There are probably benign renal cysts,     measuring about 3 cm or       less in size, partially imaged.  No pneumothorax is seen.               CONCLUSION:         1. No pulmonary embolism is seen.      2. No acute infiltrate is suspected.      3. There is a very small left pleural effusion.  Minimal, if any, right pleural     effusion is seen.      4. Subsegmental atelectasis is present, especially in the lung bases.      5. There is mild to moderate cardiomegaly.  A trace amount of pericardial fluid     is seen.      6. Please see above comments for further detail.             CAPRI ABBASI JR, MD             Electronically Signed and Approved By: CAPRI ABBASI JR, MD on 10/04/2020 at     19:02                               Until signed, this is an unconfirmed preliminary report that may contain      errors and is subject to change.                                              MARNI:      D:10/04/20 1902            Assessment      Dyspnea - R06.00            CHF (congestive heart failure) - I50.9            Pulmonary nodule - R91.1            Notes      New Medications      * predniSONE 20 MG TABLET: 40 MG PO QDAY 7 Days #14      * Famotidine 20 MG TABLET: 20 MG PO HS 30 Days #30      New Diagnostics      * Echo Complete, SCHEDULED PROCEDURE         Dx: Dyspnea - R06.00      * Chest W/O Cont CT, Year         Dx: Pulmonary nodule - R91.1      New Referrals      * Cardiology, Routine         Dx: CHF (congestive heart failure) - I50.9      ASSESSMENT:       1. Pleurisy from recent ER visit.      2. COPD.      3. Recurrent bronchitis.      4. Obstructive sleep apnea on CPAP.      5.  Postnasal drip.      6. Seasonal allergies.      7. History of fungal pneumonia which is improved now, patient treated with six     months of itraconazole back in 2019.      8.  Pulmonary nodule, stable on recent chest CT scan.      9.  Reflux.               PLAN:      1.  The patient to continue Arnuity and Symbicort everyday as prescribed and     rinse his mouth out after each use.      2.  The patient to continue albuterol inhaler as needed.        3.  I will start patient on a prednisone burst.  Expectations and course of     treatment were discussed with patient.  How to take medications and possible     side effects of medication discussed with patient.  The patient verbalized     understanding and compliance.        4. I will order an echocardiogram and refer patient back to Dr. Cloud for     congestive heart failure.      5.  I will refer patient for COVID19 testing today. Patient is advised to     quarantine until test results come back. Patient is also advised to follow CDC     recommendations such as wearing a mask, social distancing, washing hands for at     least 20 seconds, etc.       6.  The patient states that she has reflux and was started on ranitidine,     however that was discontinued due to going off the market.  I will start patient    on Pepcid 20 mg at bedtime.  The patient is also advised to sleep with the head     of the bed elevated and not eat 3-4 hours prior to bedtime.      7.  The patient will be due for repeat chest CT scan in one year, order placed     today.      8. The patient reports she already received her flu shot and is up-to-date on     pneumonia vaccines.      9.  Patient is advised to call the office, 911 or go to the ER with any new or     worsening symptoms.      10.  The patient to continue CPAP at current settings and to clean mask and     tubing daily.       11.  Continue Singulair everyday as prescribed.      12.  Follow up with Dr. Baez in 4-6 weeks, sooner if needed.            Patient Education      Patient Education Provided:  COPD      Time Spent:  > 50% /Coord Care            Electronically signed by RAYMOND MORENO PCCS  10/07/2020 12:43       Disclaimer: Converted document may not contain table  formatting or lab diagrams. Please see [facility] for the authenticated document.

## 2021-05-28 NOTE — PROGRESS NOTES
Patient: PAULO DONATO     Acct: [id number]     Report: #[id number]  UNIT #: [id number]     : 1945    Encounter Date:2019  PRIMARY CARE: NANCY FORTUNE  ***Signed***  --------------------------------------------------------------------------------------------------------------------  Chief Complaint      Encounter Date      2019            Primary Care Provider      NANCY FORTUNE            Referring Provider      NANCY FORTUNE            Patient Complaint      Patient is complaining of      Pt here for 3 month follow up/COPD            VITALS      Height 61 in / 154.94 cm      Weight 262 lbs  / 118.310088 kg      BSA 2.12 m2      BMI 49.5 kg/m2      Temperature 97.9 F / 36.61 C - Oral      Pulse 60      Respirations 14      Blood Pressure 125/60 Sitting, Left Arm      Pulse Oximetry 97%, room air      Initial Exhaled Nitrous Oxide      Exhaled Nitrous Oxide Results:  8            HPI      The patient is a very pleasant 74 year old white female patient of Dr. Baez's     also known to me from a previous office visit. She has a history of chronic     obstructive pulmonary disease, recurrent bronchitis, obstructive sleep apnea on     nightly CPAP. She had a fungal infection with mold type fungus that never     speciated to anything. She also had postnasal drip and seasonal allergies and is    now on astelin and flonase. She says she has been doing fairly well since her     last office visit. The patient had some lower extremity ankle edema since her     last office visit and Dr. Baez ordered a NT-proBNP that was normal.  Her last     sputum culture done in August had no growth to date. Her primary care provider     started her on Lasix and potassium and she has been diuresising with this.  She     wanted to talk about discontinuing itraconazole or wondering how much longer she    needed to be on this. She has been on this for about 6 months. Of note the     patient reports that  recently her sister was diagnosed with pulmonary     amyloidosis. She had a pulmonary nodule and what sounds to be lobectomy done and    on biopsy was consistent with amyloidosis.             I reviewed her Review of Systems, medical, surgical and family history and agree    with those as entered.      Copies To:   Santino Baez      Constitutional:  Denies: Fatigue, Fever, Weight gain, Weight loss, Chills,     Insomnia, Other      Respiratory/Breathing:  Complains of: Shortness of air, Wheezing, Cough; Denies:    Hemoptysis, Pleuritic pain, Other      Endocrine:  Denies: Polydipsia, Polyuria, Heat/cold intolerance, Abnorml     menstrual pattern, Diabetes, Other      Eyes:  Denies: Blurred vision, Vision Changes, Other      Ears, nose, mouth, throat:  Denies: Congestion, Dysphagia, Hearing Changes, Nose    Bleeding, Nasal Discharge, Throat pain, Tinnitus, Other      Cardiovascular:  Denies: Chest Pain, Exertional dyspnea, Peripheral Edema,     Palpitations, Syncope, Wake up Gasping for air, Orthopnea, Tachycardia, Other      Gastrointestinal:  Denies: Abdominal pain/cramping, Bloody stools, Constipation,    Diarrhea, Melena, Nausea, Vomiting, Other      Genitourinary:  Denies: Dysuria, Urinary frequency, Incontinence, Hematuria,     Urgency, Other      Musculoskeletal:  Denies: Joint Pain, Joint Stiffness, Joint Swelling, Myalgias,    Other      Hematologic/lymphatic:  DENIES: Lymphadenopathy, Bruising, Bleeding tendencies,     Other      Neurologic:  Denies: Headache, Numbness, Weakness, Seizures, Other      Psychiatric:  Denies: Anxiety, Appropriate Effect, Depression, Other      Sleep:  No: Excessive daytime sleep, Morning Headache?, Snoring, Insomnia?, Stop    breathing at sleep?, Other      Integumentary:  Denies: Rash, Dry skin, Skin Warm to Touch, Other            FAMILY/SOCIAL/MEDICAL HX      Current History      She is  and has kids. She was a  in a physician's office.    "  [place].       She started smoking when she was 16, smoked over a pack a day for 8 years, quit     for 30 years, and started smoking again, around half a pack a day for 6 to 8     years. Now she does not smoke any more. Her parents both smoked heavily.      Surgical History:  No: AAA Repair, Abdominal Surgery, Angioplasty, Appendectomy,    Back Surgery, Bladder Surgery, Bowel Surgery, Breast Surgery, CABG, Carotid     Stenosis, Cholecystectomy, Ear Surgery, Eye Surgery, Head Surgery, Hernia Surg    priscilla, Kidney Surgery, Nose Surgery, Oral Surgery, Orthopedic Surgery,     Prostatectomy, Rectal Surgery, Spinal Surgery, Testicular Surgery, Throat     Surgery, Valve Replacement, Vascular Surgery, Other Surgeries      Heart - Family Hx:  Father      Diabetes - Family Hx:  Brother      Cancer/Type - Family Hx:  Mother, Grandparent, Cousin      Other Family Medical History:  Father      Is Father Still Living?:  No      Is Mother Still Living?:  No       Family History:  Yes      Social History:  Tobacco Use; No Alcohol Use, No Recreational Drug use      Smoking status:  Former smoker (1 ppd for 8 years quit 1999)      Occupation:  retired/ officer manager      Anticoagulation Therapy:  No      Antibiotic Prophylaxis:  No      Medical History:  Yes: Asthma, Chronic Bronchitis/COPD, Congestive Heart Failu     (\"FIVE YEARS AGO\"), Hemorrhoids/Rectal Prob (DIVERTICULITIS), Shortness Of     Breath; No: Alcoholism, Allergies, Anemia, Arthritis, Blood Disease, Broken     Bones, Cataracts, Chemical Dependency, Chemotherapy/Cancer, Emphysema, Chronic     Liver Disease, Colon Trouble, Colitis, Diverticulitis, Deafness or Ringing Ears,    Convulsions, Depression, Anxiety, Bipolar Disorder, Diabetes, Epilepsy,     Seizures, Forgetfullness, Glaucoma, Gall Stones, Gout, Head Injury, Heart     Attack, Heart Murmur, Hepatitis, Hiatal Hernia, High Blood Pressure, High     Cholesterol, HIV (Do not ask - volu, " Jaundice, Kidney or Bladder Disease, Kidney    Stones, Migrane Headaches, Mitral Valve Prolapse, Night sweats, Phlebitis,     Psychiatric Care, Reflux Disease, Rheumatic Fever, Sexually Transmitted Dis,     Sinus Trouble, Skin Disease/Psoriais/Ecz, Stroke, Thyroid Problem, Tuberculosis     or Pos TB Te, Miscellaneous Medical/oth      Psychiatric History      None            PREVENTION      Hx Influenza Vaccination:  Yes      Date Influenza Vaccine Given:  Nov 1, 2019      Influenza Vaccine Declined:  No      2 or More Falls Past Year?:  No      Fall Past Year with Injury?:  No      Hx Pneumococcal Vaccination:  Yes      Encouraged to follow-up with:  PCP regarding preventative exams.      Chart initiated by      Darlin June MA            ALLERGIES/MEDICATIONS      Allergies:        Coded Allergies:             PENICILLINS (Verified  Allergy, Severe, CONVULSIONS, 11/11/19)           CEPHALOSPORINS (Verified  Allergy, Unknown, RASH, 11/11/19)           IBUPROFEN (Verified  Allergy, Unknown, HIVES, WHELPS, 11/11/19)           SULFA (SULFONAMIDE ANTIBIOTICS) (Verified  Allergy, Unknown, RASH,     11/11/19)      Medications    Last Reconciled on 11/11/19 11:34 by JAMES HOFFMAN-Fluticasone (Fluticasone 50 mcg) 16 Gm Spray.susp      2 PUFFS NARE EACH QDAY, #1 BOTTLE 9 Refills         Prov: Santino Baez         8/16/19       Azelastine Hcl (Azelastine Nasal) 137 Mcg/0.137 Ml Spray.pump      2 PUFFS NARE EACH BID, #1 BOTTLE 9 Refills         Prov: Santino Baez         8/16/19       Metoprolol Succinate (Metoprolol Succinate) 100 Mg Tab.er.24h      100 MG PO QDAY, #30 TAB         Reported         7/26/19       Itraconazole (Sporanox) 100 Mg Cap      200 MG PO BID for 30 Days, #120 CAP 5 Refills         Prov: Catalina Jules PA-C         6/17/19       Fluticasone Furoate (Arnuity Ellipta) 100 Mcg Blst.w.dev      1 PUFF INH RTQDAY, #3 INH 3 Refills         Prov: Santino Baez         3/1/19       Montelukast Sodium  (Singulair*) 10 Mg Tablet      10 MG PO QDAY, #30 TAB 0 Refills         Reported         1/13/19       Fexofenadine Hcl (Fexofenadine Hcl) 180 Mg Tablet      180 MG PO QDAY, #30 TAB 0 Refills         Reported         2/5/18       Losartan Potassium (Cozaar) 100 Mg Tablet      100 MG PO QDAY, #30 TAB 0 Refills         Reported         2/5/18       amLODIPine (amLODIPine) 2.5 Mg Tablet      5 MG PO QDAY, #60 TAB 0 Refills         Reported         2/5/18       Budesonide/Formoterol Fumarate (Symbicort 160/4.5 Mcg) 10.2 Gm Inh      2 PUFF INH RTBID, #1 INH 6 Refills         Prov: Santino Baez         12/11/15       Loratadine/Pseudoephedrine 5/120 MG (CLARITIN-D 12 HOUR TABLET) 1 Tab Tab.sr.12h      1 TAB PO BID, #60 TAB 1 Refill         Prov: Santino Baez         9/28/15       Cholecalciferol (Vitamin D3) 5,000 Units Tablet      5000 UNITS PO QDAY, TAB         Reported         5/1/14       Gabapentin (Gabapentin) 300 Mg Capsule      300 MG PO TID, CAP         Reported         5/1/14       MDI-Albuterol (Proair HFA) 60 Puffs/Inh Puffs      1 PUFFS INH PRN, INH         Reported         5/1/14      Current Medications      Current Medications Reviewed 11/11/19            EXAM      CONSTITUTIONAL: Pleasant  normal conversant.       EYES : Pink conjunctive, no ptosis, PERRL.       ENMT : Nose and ears appear normal, normal dentition, mild posterior pharyngeal     wall erythema, no sinus tenderness. Mallampati classification       Neck: Nontender, no masses, no thyromegaly, no nodules.      Resp : Mildly decreased breath sounds throughout, no wheezes, rhonchi or     crackles, normal work of breathing noted.        CVS  : No carotid bruits, s1s2 nl, RRR, no murmur, rubs or gallop, no peripheral    edema       Chest wall: Normal rise with inspiration, nontender on palpation.      GI   : Abdomen soft, with no masses, no hepatosplenomegaly, no hernias, BS+      MSK  : Normal gait and station, no digital cyanosis or clubbing        Skin : No rashes, ulcerations or lesions, normal turgor and temperature      Neuro: CN II - XII intact, no sensory deficits, DTRs intact and symmetrical, no     motor weakness      Psych: Appropriate affect, A   Vitals      Vitals:             Height 61 in / 154.94 cm           Weight 262 lbs  / 118.726430 kg           BSA 2.12 m2           BMI 49.5 kg/m2           Temperature 97.9 F / 36.61 C - Oral           Pulse 60           Respirations 14           Blood Pressure 125/60 Sitting, Left Arm           Pulse Oximetry 97%, room air            REVIEW      Results Reviewed      P[facility]S Results Reviewed?:  Yes Prev Lab Results, Yes Prev Radiology Results, Yes     Previous M[facility] Records            Assessment      Fungal infection of lung - B49            Therapeutic drug monitoring - Z51.81            Notes      Renewed Medications      * Budesonide/Formoterol Fumarate (Symbicort 160/4.5 Mcg) 10.2 GM INH: 2 PUFF INH      RTBID #1      * Fluticasone Furoate (Arnuity Ellipta) 100 MCG BLST.W.DEV: 1 PUFF INH RTQDAY #3      Discontinued Medications      * Itraconazole (Sporanox) 100 MG CAP: 200 MG PO BID 30 Days #120      * Levofloxacin (Levaquin*) 500 MG TABLET: 500 MG PO QDAY 10 Days #10      New Diagnostics      * Comp Metabolic Panel, Month         Dx: Fungal infection of lung - B49      New Office Procedures      * Fluzone Vaccine High-Dose, Routine         Flu Vacc [id number](65Yr Up)/Pf (Fluzone High-Dose 2019-20 Syr) 180 MCG/0.5 ML       SYRINGE: 180 MICROGRAM INTRAMUSCULARLY Qty 1 SYRINGE      ASSESSMENT:       1. Chronic obstructive pulmonary disease without acute exacerbation.       2. History of recurrent bronchitis.       3. Obstructive sleep apnea noncompliant on CPAP.        4. Postnasal drip.       5. Seasonal allergies.       6. Fungal pneumonia secondary to mold type fungus now completed 6 month course     of itraconazole.             PLAN:      1. I will discontinue the patient's itraconazole after  completing a 6 month     course.       2.  She is having some symptoms of diastolic heart failure with lower extremity     edema and is now on Lasix for this. I will have the patient continue to follow     up with her primary care provider regarding this.       3. Continue current inhaler therapy with Symbicort and arnuity.       4. Continue flonase and astelin nasal sprays. I instructed her to increase those    to 1 spray in each nostril twice daily for her postnasal drip symptoms.       5. I discussed with the patient that labs done by her primary care provider were    normal. Her liver function test were normal and her renal function appeared to     be around her baseline with a creatinine of 1.08.  I will repeat a CMP in the     next 1-2 month for follow up of liver function test after completing     itraconazole.       6. I have given her flu vaccine today.       7. Follow up in 4 months with Dr. Baez sooner if needed.            Patient Education      Patient Education Provided:  COPD, How to use an Inhaler      Time Spent:  > 50% /[facility]            Electronically signed by TONY FRYE PA-C  11/21/2019 13:05       Disclaimer: Converted document may not contain table formatting or lab diagrams. Please see [employer] [facility] for the authenticated document.

## 2021-05-28 NOTE — PROGRESS NOTES
Patient: PAULO DONATO     Acct: [id number]     Report: #[id number]  UNIT #: [id number]     : 1945    Encounter Date:2018  PRIMARY CARE: NANCY FORTUNE  ***Signed***  --------------------------------------------------------------------------------------------------------------------  Chief Complaint      Encounter Date      Dec 12, 2018            Primary Care Provider      NANCY FORTUNE            Referring Provider      NANCY FORTUNE            Patient Complaint      Patient is complaining of      f/u copd            VITALS      Height 5 ft 2 in / 157.48 cm      Weight 225 lbs 6 oz / 102.892120 kg      BSA 2.01 m2      BMI 41.2 kg/m2      Temperature 98.1 F / 36.72 C - Oral      Pulse 60      Respirations 16      Blood Pressure 152/82 Sitting, Right Arm      Pulse Oximetry 97%, room air      Initial Exhaled Nitrous Oxide      Exhaled Nitrous Oxide Results:  8            HPI      The patient is a 73 year old female with mild obstructive airway disease,     recurrent bronchitis and obstructive sleep apnea. She is here for follow up.             Since her last office visit she has been on Symbicort two puffs twice daily,     Arnuity once daily and albuterol as needed. She uses albuterol 1-2 times a week.     She has no fevers or chills, no nausea and vomiting.  She continues to take     Singulair and Allegra. She was supposed to be on azelastine and Flonase but does    not feel like she needs any of those. She has Flonase which she uses only as     needed.  She continues to take Zantac.  She continues to use her CPAP machine     but has not received any supplies from [facility]. She has no fevers or chills, n    ausea and vomiting, weight loss or loss of appetite. Overall things are stable     and she has not needed any antibiotics of steroids. She had cough over the last     2 weeks but it is improved now.            ROS      Constitutional:  Complains of: Fatigue; Denies: Fever, Weight  gain, Weight loss,    Chills, Insomnia, Other      Respiratory/Breathing:  Complains of: Shortness of air, Wheezing, Cough; Denies:    Hemoptysis, Pleuritic pain, Other      Endocrine:  Denies: Polydipsia, Polyuria, Heat/cold intolerance, Abnorml     menstrual pattern, Diabetes, Other      Eyes:  Denies: Blurred vision, Vision Changes, Other      Ears, nose, mouth, throat:  Denies: Congestion, Dysphagia, Hearing Changes, Nose    Bleeding, Nasal Discharge, Throat pain, Tinnitus, Other      Cardiovascular:  Denies: Chest Pain, Exertional dyspnea, Peripheral Edema,     Palpitations, Syncope, Wake up Gasping for air, Orthopnea, Tachycardia, Other      Gastrointestinal:  Denies: Abdominal pain/cramping, Bloody stools, Constipation,    Diarrhea, Melena, Nausea, Vomiting, Other      Genitourinary:  Denies: Dysuria, Urinary frequency, Incontinence, Hematuria,     Urgency, Other      Musculoskeletal:  Denies: Joint Pain, Joint Stiffness, Joint Swelling, Myalgias,    Other      Hematologic/lymphatic:  DENIES: Lymphadenopathy, Bruising, Bleeding tendencies,     Other      Neurologic:  Denies: Headache, Numbness, Weakness, Seizures, Other      Psychiatric:  Denies: Anxiety, Appropriate Effect, Depression, Other      Sleep:  No: Excessive daytime sleep, Morning Headache?, Snoring, Insomnia?, Stop    breathing at sleep?, Other      Integumentary:  Denies: Rash, Dry skin, Skin Warm to Touch, Other            FAMILY/SOCIAL/MEDICAL HX      Current History      She is  and has kids. She was a  in a physician's office.     [place]       She started smoking when she was 16, smoked over a pack a day for 8 years, quit     for 30 years, and started smoking again, around half a pack a day for 6 to 8     years. Now she does not smoke any more. Her parents both smoked heavily.      Surgical History:  No: AAA Repair, Abdominal Surgery, Angioplasty, Appendectomy,    Back Surgery, Bladder Surgery,  "Bowel Surgery, Breast Surgery, CABG, Carotid     Stenosis, Cholecystectomy, Ear Surgery, Eye Surgery, Head Surgery, Hernia     Surgery, Kidney Surgery, Nose Surgery, Oral Surgery, Orthopedic Surgery,     Prostatectomy, Rectal Surgery, Spinal Surgery, Testicular Surgery, Throat     Surgery, Valve Replacement, Vascular Surgery, Other Surgeries      Heart - Family Hx:  Father      Diabetes - Family Hx:  Brother      Cancer/Type - Family Hx:  Mother (colon), Grandparent (breast), Cousin (breast)      Other Family Medical History:  Father (copd/ black lung)      Is Father Still Living?:  No      Is Mother Still Living?:  No       Family History:  Yes      Social History:  No Tobacco Use, No Alcohol Use, No Recreational Drug use      Smoking status:  Former smoker (5- 6 cigs day/ quit 15 yars ago)      Occupation:  retired/ officer manager      Anticoagulation Therapy:  No      Antibiotic Prophylaxis:  No      Medical History:  Yes: Asthma, Chronic Bronchitis/COPD, Congestive Heart Failu     (\"FIVE YEARS AGO\"), Hemorrhoids/Rectal Prob (DIVERTICULITIS), Shortness Of     Breath; No: Alcoholism, Allergies, Anemia, Arthritis, Blood Disease, Broken     Bones, Cataracts, Chemical Dependency, Chemotherapy/Cancer, Emphysema, Chronic     Liver Disease, Colon Trouble, Colitis, Diverticulitis, Deafness or Ringing Ears,    Convulsions, Depression, Anxiety, Bipolar Disorder, Diabetes, Epilepsy,     Seizures, Forgetfullness, Glaucoma, Gall Stones, Gout, Head Injury, Heart     Attack, Heart Murmur, Hepatitis, Hiatal Hernia, High Blood Pressure, High     Cholesterol, HIV (Do not ask - volu, Jaundice, Kidney or Bladder Disease, Kidney    Stones, Migrane Headaches, Mitral Valve Prolapse, Night sweats, Phlebitis,     Psychiatric Care, Reflux Disease, Rheumatic Fever, Sexually Transmitted Dis,     Sinus Trouble, Skin Disease/Psoriais/Ecz, Stroke, Thyroid Problem, Tuberculosis     or Pos TB Te, Miscellaneous Medical/oth      Psychiatric " History      none            PREVENTION      Hx Influenza Vaccination:  Yes      Date Influenza Vaccine Given:  Nov 1, 2018      Influenza Vaccine Declined:  No      2 or More Falls Past Year?:  No      Fall Past Year with Injury?:  No      Hx Pneumococcal Vaccination:  Yes      Encouraged to follow-up with:  PCP regarding preventative exams.      Chart initiated by      Fatemeh Alexis ma            ALLERGIES/MEDICATIONS      Allergies:        Coded Allergies:             PENICILLINS (Verified  Allergy, Severe, CONVULSIONS, 12/12/18)           CEPHALOSPORINS (Verified  Allergy, Unknown, RASH, 12/12/18)           IBUPROFEN (Verified  Allergy, Unknown, HIVES, WHELPS, 12/12/18)           SULFA (SULFONAMIDE ANTIBIOTICS) (Verified  Allergy, Unknown, RASH,     12/12/18)      Medications    Last Reconciled on 12/12/18 09:40 by SANTINO BAEZ MD      Azithromycin (Azithromycin) 250 Mg Tablet      250 MG PO MOWEFR, #45 TAB 3 Refills         Prov: Santino Baez         6/20/18       Azelastine Hcl (Azelastine Nasal) 137 Mcg/0.137 Ml Spray.pump      2 PUFFS NARE EACH BID, #1 BOTTLE 9 Refills         Prov: Santino Baez         6/20/18       Colchicine (Colcrys) 0.6 Mg Tab      0.6 MG PO QDAY, #3 TAB         Prov: Dorota Em cfe         4/24/18       Fluticasone Furoate (Arnuity Ellipta) 100 Mcg Blst.w.dev      1 PUFF INH RTQDAY, #3 INH 3 Refills         Prov: Santino Baez         2/5/18       Ranitidine HCl (Zantac*) 150 Mg Tablet      150 MG PO QDAY for 30 Days, #90 TAB 3 Refills         Prov: Santino Baez         2/5/18       Fexofenadine Hcl (Fexofenadine Hcl) 180 Mg Tablet      180 MG PO QDAY, #30 TAB 0 Refills         Reported         2/5/18       Losartan Potassium (Cozaar) 100 Mg Tablet      100 MG PO QDAY, #30 TAB 0 Refills         Reported         2/5/18       amLODIPine (amLODIPine) 2.5 Mg Tablet      5 MG PO QDAY, #60 TAB 0 Refills         Reported         2/5/18       Budesonide/Formoterol Fumarate (Symbicort 160/4.5  Mcg) 10.2 Gm Inh      2 PUFF INH RTBID, #1 INH 6 Refills         Prov: Santino Baez         12/11/15       Loratadine/Pseudoephedrine 5/120 MG (CLARITIN-D 12 HOUR TABLET) 1 Tab Tab.sr.12h      1 TAB PO BID, #60 TAB 1 Refill         Prov: Santino Baez         9/28/15       Jarad-Fluticasone (Fluticasone 50 mcg) 16 Gm Naspr      PUFFS NARE EACH QDAY, #1 BOTTLE 0 Refills         Reported         9/28/15       Metoprolol Succinate (Toprol XL*) 25 Mg Tab      25 MG PO QDAY, #30 TAB         Prov: Tanesha Jackson         1/19/15       Cholecalciferol (Vitamin D3) 5,000 Units Tablet      5000 UNITS PO QDAY, TAB         Reported         5/1/14       Gabapentin (Gabapentin) 300 Mg Capsule      300 MG PO QAM, CAP         Reported         5/1/14       MDI-Albuterol (Proair HFA) 60 Puffs/Inh Puffs      1 PUFFS INH PRN, INH         Reported         5/1/14      Current Medications      Current Medications Reviewed 12/12/18            EXAM      CONSTITUTIONAL: Pleasant female   in no acute distress, normal conversant.       EYES : Pink conjunctive, no ptosis, PERRL.       ENMT :Mallampati classification III, mild posterior pharyngeal wall erythema,     fair oral dental hygiene.  Nose and ears appear normal.        Neck: Nontender, no masses, no thyromegaly, no nodules.      Resp : Normal vesicular breath sounds, resonant to percussion bilaterally, no     wheezing, crackles or rhonchi.      CVS  : No carotid bruits, s1s2 nl, RRR, no murmur, rubs or gallop, no peripheral    edema       Chest wall: Increased AP diameter.   Normal rise with inspiration, nontender on     palpation      GI   : Abdomen soft, with no masses, no hepatosplenomegaly, no hernias, BS+      MSK  : Normal gait and station, no digital cyanosis or clubbing       Skin : No rashes, ulcerations or lesions, normal turgor and temperature      Neuro: CN II - XII intact, no sensory deficits, DTRs intact and symmetrical, no     motor weakness      Psych: Appropriate affect, A    Vitals      Vitals:             Height 5 ft 2 in / 157.48 cm           Weight 225 lbs 6 oz / 102.987767 kg           BSA 2.01 m2           BMI 41.2 kg/m2           Temperature 98.1 F / 36.72 C - Oral           Pulse 60           Respirations 16           Blood Pressure 152/82 Sitting, Right Arm           Pulse Oximetry 97%, room air            REVIEW      Results Reviewed      PCCS Results Reviewed?:  Yes Prev Lab Results, Yes Prev Radiology Results, Yes     Previous [facility] Records      Lab Results      The patient's CPAP usage data from  was reviewed. Her CPAP pressure is at 10    cm of water. Her apnea hypopnea index is normal at 1.9. She was using her CPAP     for 5 hours and 26 minutes on average.      Radiographic Results               [facility]                PACS RADIOLOGY REPORT            Patient: PAULO DONATO   Acct: #[id number]   Report: #[id number]            UNIT #: [id number]    DOS: 10/21/18 1517      INSURANCE:[employer]   ORDER #:[id number]      LOCATION:ER     : 1945            PROVIDERS      ADMITTING:     ATTENDING:       FAMILY:  NANCY FORTUNE   ORDERING:  ARLETH CISNEROS         OTHER:    DICTATING:  Shiva Jeffries MD            REQ #:[id number]   EXAM:CXR1 - CHEST 1 View AP PA      REASON FOR EXAM:  Shortness of Breath      REASON FOR VISIT:  TROUBLE BREATHING            *******Signed******         PROCEDURE:   CHEST AP/PA SINGLE VIEW             COMPARISON:   [facility], , CHEST PA/AP   2018, 9:06.             INDICATIONS:   SHORTNESS OF BREATH X 4 DAYS             FINDINGS:         Mild to moderate cardiomegaly is noted, slightly more prominent in the interval.     Some of this       apparent change could be secondary to patient rotation.  No infiltrate or     effusion is evident.             CONCLUSION:         1. Mild to moderate cardiomegaly, stable slightly worse in the  interval      2. No acute infiltrate              Shiva Jeffries M.D.             Electronically Signed and Approved By: Shiva Jeffries M.D. on 10/21/2018 at 16:25                           Until signed, this is an unconfirmed preliminary report that may contain      errors and is subject to change.                                              MILENARRO:      D:10/21/18 1625      PFT Results      Patient: PAULO DONATO   Acct #: [id number]         Report #: [id number]   : 1945 Report #: [id number]      MR #: [id number]   Location: [facility]                                ***Signed***            PFT      DATE: 3/6/15      PFT Results      Pulmonary function test interpretation:            Spirometry shows mild obstructive defect. ( FEV1/ FVC ratio- 65, FEV1- 1.45     lits, 72% of predicted; FVC- 2.25 lits, 85% of predicted)            There is borderline response to bronchodilator demonstrated. FVC increased drom     2.25 lits to 2.48 lits, 10.2% increase.            Lung volumes show air trapping. ( % of predicted, 4.94 liters; % of    predicted, 2.73 liters)            Diffusion capacity is mildly decreased. (71% of predicted)            Comparison: No previous tests to compare.            Conclusion:    Low FEV1/FVC with low FEV1, air trapping and decreased diffusion     capacity is suggestive of mild obstructive airway disease, likely emphysema.            There is borderline reversible component of her airway disease.            Please correlate clinically.            Santino Baez MD                  Mar 6, 2015 14:51               <Electronically signed by Santino Baez MD> on 03/06/15 1451          on            Assessment      Notes      Discontinued Medications      * Fluticasone Furoate (Arnuity Ellipta) 100 MCG BLST.W.DEV: 1 PUFF INH RTQDAY #1         Instructions: to replace qvar      PLAN:        The patient is a 73 year old female with mild obstructive airway disease,     recurrent  bronchitis and obstructive sleep apnea.              1. Mild obstructive airway disease. Continue with Symbicort and Arnuity.     Continue Azithromycin Monday, Wednesday and Friday.  She is doing better now.  I    will try to stop her chronic macrolide therapy at the next office visit. I will     also try to stop Arnuity if possible.       2. Continue Singulair and Allegra. Use Flonase as needed.       3.  Recurrent bronchitis. It is improved now.       4. Gastrointestinal esophageal reflux disease. Continue Zantac.       5. Obstructive sleep apnea. Continue CPAP at current settings. I advised her to     take her machine to [facility] and and get the data downloaded and sent to us. I     advised her to get new CPAP supplies as well, it has been longer than 6 months     since she changed her supplies.        6.  We will follow up with her in 4 months, earlier if needed.            Patient Education      Education resources provided:  Yes      Patient Education Provided:  Acute Bronchitis                 Disclaimer: Converted document may not contain table formatting or lab diagrams. Please see [employer] for the authenticated document.

## 2021-05-28 NOTE — PROGRESS NOTES
Patient: PAULO DONATO     Acct: [id number]     Report: #[id number]  UNIT #: [id number]     : 1945    Encounter Date:2020  PRIMARY CARE: NANCY FORTUNE  ***Signed***  --------------------------------------------------------------------------------------------------------------------  Chief Complaint      Encounter Date      2020            Primary Care Provider      NANCY FORTUNE            Referring Provider      NANCY FORTUNE            Patient Complaint      Patient is complaining of      Patient here today for F/U, COPD            VITALS      Height 61 in / 154.94 cm      Weight 224 lbs  / 101.223819 kg      BSA 1.98 m2      BMI 42.3 kg/m2      Temperature 98.0 F / 36.67 C - Temporal      Pulse 68      Respirations 14      Blood Pressure 140/70 Sitting, Left Arm      Pulse Oximetry 97%, room air      Initial Exhaled Nitrous Oxide      Exhaled Nitrous Oxide Results:  8            HPI      The patient is a 75 year old female with a history of COPD with recurrent     bronchitis, obstructive sleep apnea, postnasal drip, seasonal allergies and     fungal pneumonia secondary to mold type fungus he completed a six month course     of itraconazole in 2019. She had worsening leg swelling when she was on     itraconazole and it was attributed to diastolic heart failure. She has been on     Lasix. Her cough, shortness of breath and leg swelling has improved since she     has been off itraconazole. Her cough and shortness of breath actually were     improving while she was on itraconazole itself. She is currently taking     Symbicort two puffs twice a day, Arnuity once daily and albuterol 2-3 times a     week. She has no fever, chills, nausea, vomiting, chest pain or chest tightness.    Her breathing is better, she is not able to move around as much, but has not     been exercising. She has no changes in weight or appetite.            ROS      Constitutional:  Denies: Fatigue, Fever,  Weight gain, Weight loss, Chills,     Insomnia, Other      Respiratory/Breathing:  Complains of: Shortness of air, Wheezing, Cough; Denies:    Hemoptysis, Pleuritic pain, Other      Endocrine:  Denies: Polydipsia, Polyuria, Heat/cold intolerance, Abnorml     menstrual pattern, Diabetes, Other      Eyes:  Denies: Blurred vision, Vision Changes, Other      Ears, nose, mouth, throat:  Denies: Congestion, Dysphagia, Hearing Changes, Nose    Bleeding, Nasal Discharge, Throat pain, Tinnitus, Other      Cardiovascular:  Denies: Chest Pain, Exertional dyspnea, Peripheral Edema,     Palpitations, Syncope, Wake up Gasping for air, Orthopnea, Tachycardia, Other      Gastrointestinal:  Denies: Abdominal pain/cramping, Bloody stools, Constipation,    Diarrhea, Melena, Nausea, Vomiting, Other      Genitourinary:  Denies: Dysuria, Urinary frequency, Incontinence, Hematuria,     Urgency, Other      Musculoskeletal:  Denies: Joint Pain, Joint Stiffness, Joint Swelling, Myalgias,    Other      Hematologic/lymphatic:  DENIES: Lymphadenopathy, Bruising, Bleeding tendencies,     Other      Neurologic:  Denies: Headache, Numbness, Weakness, Seizures, Other      Psychiatric:  Denies: Anxiety, Appropriate Effect, Depression, Other      Sleep:  No: Excessive daytime sleep, Morning Headache?, Snoring, Insomnia?, Stop    breathing at sleep?, Other      Integumentary:  Denies: Rash, Dry skin, Skin Warm to Touch, Other            FAMILY/SOCIAL/MEDICAL HX      Current History      She is  and has kids. She was a  in a physician's office.     [place]       She started smoking when she was 16, smoked over a pack a day for 8 years, quit     for 30 years, and started smoking again, around half a pack a day for 6 to 8     years. Now she does not smoke any more. Her parents both smoked heavily.      Surgical History:  No: AAA Repair, Abdominal Surgery, Angioplasty, Appendectomy,    Back Surgery, Bladder  "Surgery, Bowel Surgery, Breast Surgery, CABG, Carotid     Stenosis, Cholecystectomy, Ear Surgery, Eye Surgery, Head Surgery, Hernia Surge    ry, Kidney Surgery, Nose Surgery, Oral Surgery, Orthopedic Surgery,     Prostatectomy, Rectal Surgery, Spinal Surgery, Testicular Surgery, Throat     Surgery, Valve Replacement, Vascular Surgery, Other Surgeries      Heart - Family Hx:  Father      Diabetes - Family Hx:  Brother      Cancer/Type - Family Hx:  Mother, Grandparent, Cousin      Other Family Medical History:  Father      Is Father Still Living?:  No      Is Mother Still Living?:  No       Family History:  Yes      Social History:  Tobacco Use; No Alcohol Use, No Recreational Drug use      Smoking status:  Former smoker (1 ppd for 8 years quit 1999)      Occupation:  retired/ officer manager      Anticoagulation Therapy:  No      Antibiotic Prophylaxis:  No      Medical History:  Yes: Asthma, Chronic Bronchitis/COPD, Congestive Heart Failu     (\"FIVE YEARS AGO\"), Hemorrhoids/Rectal Prob (DIVERTICULITIS), Shortness Of     Breath; No: Alcoholism, Allergies, Anemia, Arthritis, Blood Disease, Broken     Bones, Cataracts, Chemical Dependency, Chemotherapy/Cancer, Emphysema, Chronic     Liver Disease, Colon Trouble, Colitis, Diverticulitis, Deafness or Ringing Ears,    Convulsions, Depression, Anxiety, Bipolar Disorder, Diabetes, Epilepsy,     Seizures, Forgetfullness, Glaucoma, Gall Stones, Gout, Head Injury, Heart     Attack, Heart Murmur, Hepatitis, Hiatal Hernia, High Blood Pressure, High     Cholesterol, HIV (Do not ask - volu, Jaundice, Kidney or Bladder Disease, Kidney    Stones, Migrane Headaches, Mitral Valve Prolapse, Night sweats, Phlebitis,     Psychiatric Care, Reflux Disease, Rheumatic Fever, Sexually Transmitted Dis,     Sinus Trouble, Skin Disease/Psoriais/Ecz, Stroke, Thyroid Problem, Tuberculosis     or Pos TB Te, Miscellaneous Medical/oth      Psychiatric History      none            PREVENTION      Hx " Influenza Vaccination:  Yes      Date Influenza Vaccine Given:  Nov 1, 2019      Influenza Vaccine Declined:  No      2 or More Falls Past Year?:  No      Fall Past Year with Injury?:  No      Hx Pneumococcal Vaccination:  Yes      Encouraged to follow-up with:  PCP regarding preventative exams.      Chart initiated by      Misti Leong CMA            ALLERGIES/MEDICATIONS      Allergies:        Coded Allergies:             PENICILLINS (Verified  Allergy, Severe, CONVULSIONS, 6/18/20)           CEPHALOSPORINS (Verified  Allergy, Unknown, RASH, 6/18/20)           IBUPROFEN (Verified  Allergy, Unknown, HIVES, WHELPS, 6/18/20)           SULFA (SULFONAMIDE ANTIBIOTICS) (Verified  Allergy, Unknown, RASH, 6/18/20)      Medications    Last Reconciled on 6/18/20 12:03 by SANTINO BAEZ MD      CPAP Compressor (CPAP) 1 Each Each      EACH XX ONCE, #1 0 Refills         Prov: Santino Baez         6/18/20       MDI-Albuterol (Ventolin HFA) 18 Gm Hfa.aer.ad      2 PUFFS INH RTTID for 30 Days, #1 MDI 3 Refills         Prov: Santino Baez         3/25/20       Fluticasone Furoate (Arnuity Ellipta) 100 Mcg Blst.w.dev      1 PUFF INH RTQDAY, #3 INH 11 Refills         Prov: Santino Baez         3/25/20       Budesonide/Formoterol Fumarate (Symbicort 160/4.5 Mcg) 10.2 Gm Inh      2 PUFF INH RTBID, #1 INH 11 Refills         Prov: Santino Baez         3/25/20       Jarad-Fluticasone (Fluticasone 50 mcg) 16 Gm Spray.susp      2 PUFFS NARE EACH QDAY, #1 BOTTLE 9 Refills         Prov: Santino Baez         3/25/20       Azelastine Hcl (Azelastine Nasal) 137 Mcg/0.137 Ml Spray.pump      2 PUFFS NARE EACH BID, #1 BOTTLE 9 Refills         Prov: Santino Baez         3/25/20       Metoprolol Succinate (Metoprolol Succinate) 100 Mg Tab.er.24h      100 MG PO QDAY, #30 TAB         Reported         7/26/19       Montelukast Sodium (Singulair*) 10 Mg Tablet      10 MG PO QDAY, #30 TAB 0 Refills         Reported         1/13/19       Fexofenadine Hcl  (Fexofenadine Hcl) 180 Mg Tablet      180 MG PO QDAY, #30 TAB 0 Refills         Reported         2/5/18       Losartan Potassium (Cozaar) 100 Mg Tablet      100 MG PO QDAY, #30 TAB 0 Refills         Reported         2/5/18       amLODIPine (amLODIPine) 2.5 Mg Tablet      5 MG PO QDAY, #60 TAB 0 Refills         Reported         2/5/18       Cholecalciferol (Vitamin D3) (Vitamin D3) 5,000 Units Tablet      5000 UNITS PO QDAY, TAB         Reported         5/1/14       Gabapentin (Gabapentin) 300 Mg Capsule      300 MG PO TID, CAP         Reported         5/1/14       MDI-Albuterol (Proair HFA) 60 Puffs/Inh Puffs      1 PUFFS INH PRN, INH         Reported         5/1/14      Current Medications      Current Medications Reviewed 6/18/20            EXAM      CONSTITUTIONAL: Morbidly obese female in no acute distress, normal conversant.       EYES : Pink conjunctive, no ptosis, PERRL.       ENMT : Nose and ears appear normal, normal dentition, mild posterior pharyngeal     wall erythema, no sinus tenderness. Mallampati classification 2.        Neck: Nontender, no masses, no thyromegaly, no nodules.      Resp : Bilateral diminished breath sounds, no wheezing, crackles or rhonchi.      Resonant to percussion bilaterally.      CVS  : No carotid bruits, s1s2 nl, RRR, no murmur, rubs or gallop, trace pedal     edema left lower extremity, improved compared to past.        Chest wall: Normal rise with inspiration, nontender on palpation.      GI   : Abdomen soft, with no masses, no hepatosplenomegaly, no hernias, BS+      MSK  : Normal gait and station, no digital cyanosis or clubbing       Skin : No rashes, ulcerations or lesions, normal turgor and temperature      Neuro: CN II - XII intact, no sensory deficits, DTRs intact and symmetrical, no     motor weakness      Psych: Appropriate affect, A   Vitals      Vitals:             Height 61 in / 154.94 cm           Weight 224 lbs  / 101.165594 kg           BSA 1.98 m2            BMI 42.3 kg/m2           Temperature 98.0 F / 36.67 C - Temporal           Pulse 68           Respirations 14           Blood Pressure 140/70 Sitting, Left Arm           Pulse Oximetry 97%, room air            REVIEW      Results Reviewed      PCCS Results Reviewed?:  Yes Prev Lab Results, Yes Prev Radiology Results, Yes     Previous [facility] Records      Lab Results      The patient's sputum culture in 2019 grew mold type fungus which was treated     with itraconazole.      Radiographic Results               [facility]                PACS RADIOLOGY REPORT            Patient: PAULO DONATO   Acct: #[id number]   Report: #[id number]            UNIT #: [id number]    DOS: 19 0847      INSURANCE:[employer]   ORDER #:[id number]      LOCATION:OP     : 1945            PROVIDERS      ADMITTING:     ATTENDING: Santino Baez      FAMILY:  NANCY FORTUNE   ORDERING:  Santino Baez         OTHER:    DICTATING:  Sridhar Hansen MD            REQ #:[id number]   EXAM:CXR2 - CHEST 2V AP PA LAT      REASON FOR EXAM:        REASON FOR VISIT:  R05,R06.09            *******Signed******         This report includes an Addendum and supersedes previous reports for this exam.             PROCEDURE:   CHEST AP/PA AND LATERAL             COMPARISON:   [facility], , CHEST PA/AP   11:34.             INDICATIONS:   COUGH, CONGESTION, SOA FOR MONTHS. HISTORY OF COPD AND ASTHMA.             FINDINGS:         There is suggested airspace disease in the right middle lobe.  There also     probably is some       bibasilar atelectasis.  The heart is not definitely enlarged.  The upper lung     zones are clear.        There are degenerative changes involving the dorsal spine.             CONCLUSION:         1. There are findings suggestive of right middle lobe pneumonia and probably     bibasilar atelectasis.              SRIDHAR HANSEN MD              Electronically Signed and Approved By: DIANA DAVILA MD on 2019 at 9:10                ADDENDUM:              COMPARISON:   [facility], CT, CHEST W/O CONTRAST,     2019, 14:35.             A prior CT from 2019 reveals bibasilar atelectasis as well as atelectasis in    the right middle       lobe that could be accounting for the findings noted on the current exam.              DIANA DAVILA MD             Electronically Signed and Approved By: DIANA DAVILA MD on 2019 at 9:52                        Until signed, this is an unconfirmed preliminary report that may contain      errors and is subject to change.                                              KAMCR:      D:[id number]      PFT Results      Patient: PAULO DONATO   Acct #: [id number]         Report #: [id number]   : 1945 Report #: [id number]      MR #: [id number]   Location[facility]                                ***Signed***            PFT      DATE: 3/6/15      PFT Results      Pulmonary function test interpretation:            Spirometry shows mild obstructive defect. ( FEV1/ FVC ratio- 65, FEV1- 1.45     lits, 72% of predicted; FVC- 2.25 lits, 85% of predicted)            There is borderline response to bronchodilator demonstrated. FVC increased drom     2.25 lits to 2.48 lits, 10.2% increase.            Lung volumes show air trapping. ( % of predicted, 4.94 liters; % of    predicted, 2.73 liters)            Diffusion capacity is mildly decreased. (71% of predicted)            Comparison: No previous tests to compare.            Conclusion:    Low FEV1/FVC with low FEV1, air trapping and decreased diffusion     capacity is suggestive of mild obstructive airway disease, likely emphysema.            There is borderline reversible component of her airway disease.            Please correlate clinically.            Santino Baez MD                  Mar 6, 2015 14:51               <Electronically  signed by Santino Baez MD> on 03/06/15 1451          on            Assessment      Asthma         Moderate persistent asthma without complication - J45.40         Asthma severity: moderate         Asthma persistence: persistent         Asthma complication type: uncomplicated            Bronchitis - J40            Pneumonia         Aspiration pneumonia, unspecified aspiration pneumonia type, unspecified        laterality, unspecified part of lung - J69.0         Aspiration pneumonia type: unspecified         Laterality: unspecified laterality         Lung location: unspecified part of lung            Notes      New Medications      * OMEPRAZOLE (priLOSEC) 10 MG CAPSULE.DR: 10 MG PO QDAY 30 Days #30      * CPAP Compressor (CPAP) 1 EACH EACH: EACH XX ONCE #1      Renewed Medications      * AZELASTINE HCL (Azelastine Nasal) 137 MCG/0.137 ML SPRAY.PUMP: 2 PUFFS NARE       EACH BID #1      * Jarad-Fluticasone (Fluticasone 50 mcg) 16 GM SPRAY.SUSP: 2 PUFFS NARE EACH QDAY       #1      * Budesonide/Formoterol Fumarate (Symbicort 160/4.5 Mcg) 10.2 GM INH: 2 PUFF INH      RTBID #1      * Fluticasone Furoate (Arnuity Ellipta) 100 MCG BLST.W.DEV: 1 PUFF INH RTQDAY #3      * MDI-Albuterol (Ventolin HFA) 18 GM HFA.AER.AD: 2 PUFFS INH RTTID 30 Days #1      Discontinued Medications      * Loratadine/Pseudoephedrine 5/120 MG (CLARITIN-D 12 HOUR TABLET) 1 TAB       TAB.SR.12H: 1 TAB PO BID #60      New Diagnostics      * CBC, Month         Dx: Asthma - J45.909      * Comp Metabolic Panel, Month         Dx: Asthma - J45.909      * Chest W/O Cont CT, 3 Months         Dx: Bronchitis - J40      * Brain Natriuretic Pe, As Soon As Possible         Dx: Asthma - J45.909      PLAN:  The patient is a 75 year old female with a history of COPD, recurrent     bronchitis, obstructive sleep apnea on CPAP, postnasal drip, seasonal allergies     and history of fungal pneumonia which is improved now.      1.  Fungal pneumonia.  She completed a course of  itraconazole for six months in     2019.  I will repeat CT scan of the chest now.        2.  COPD. Continue with Symbicort and Arnuity.  Use albuterol as needed.        3.  Obstructive sleep apnea. She is currently using CPAP up to 5-6 hours a     night. I will get the CPAP usage data.  Given her CPAP is more than six years     old, I will try to order a new CPAP now. She goes to [employer] for her [employer]     company.      4. I will check CBC, CMP, and anti-pro-BNP now.      5. Follow up in 3-4 months, earlier if needed.            Patient Education      Education resources provided:  Yes      Patient Education Provided:  Acute Bronchitis            Patient Education:        Bronchitis (Adult)            Electronically signed by Santino Baez  07/08/2020 15:28       Disclaimer: Converted document may not contain table formatting or lab diagrams. Please see [employer] for the authenticated document.

## 2021-05-28 NOTE — PROGRESS NOTES
Patient: PAULO DONATO     Acct: [id number]     Report: #[id number]  UNIT #: [id number]     : 1945    Encounter Date:2020  PRIMARY CARE: NANCY FORTUNE  ***Signed***  --------------------------------------------------------------------------------------------------------------------  Chief Complaint      Encounter Date      Dec 14, 2020            Primary Care Provider      NANCY FORTUNE            Referring Provider      NANCY FORTUNE            Patient Complaint      Patient is complaining of      6 week f/u, results, COPD            VITALS      Height 5 ft 1 in / 154.94 cm      Weight 223 lbs  / 101.021703 kg      BSA 1.98 m2      BMI 42.1 kg/m2      Temperature 96.7 F / 35.94 C - Tympanic      Pulse 61      Respirations 16      Blood Pressure 181/77 Sitting, Right Arm      Pulse Oximetry 98%, room air            HPI      The patient is a 75 year old female with history of chronic obstructive     pulmonary disease, recurrent bronchitis, obstructive sleep apnea on CPAP,     postnasal drip, seasonal allergies, history of fungal pneumonia which is     improved, pulmonary nodule and acid reflux. She was seen by KALEB Vasquez at her last office visit and was put on prednisone given her pleurisy and     overall her symptoms are improved. She was also started on Pepcid and is doing     well. She was seen by Dr. Cloud for chest pain and was told that she has     cardiomegaly but there is nothing to do at this time. She is on Symbicort and     arnuity and is using them once a day. She is using albuterol for rescue 1-2     times a day. She does not want to use Symbicort twice daily. She was recently     received a new CPAP machine this summer and is currently using without     significant problems. Her [employer] is [email or web address]. She is on Pepcid 20 mg once d    aily and Singulair once daily. She needs refills on her medications. She has no     change in weight or appetite, no fever or  chills, no nausea or vomiting. She     does have some leg swelling.            ROS      Constitutional:  Denies: Fatigue, Fever, Weight gain, Weight loss, Chills,     Insomnia, Other      Respiratory/Breathing:  Complains of: Shortness of air; Denies: Wheezing, Cough,    Hemoptysis, Pleuritic pain, Other      Endocrine:  Denies: Polydipsia, Polyuria, Heat/cold intolerance, Abnorml     menstrual pattern, Diabetes, Other      Eyes:  Denies: Blurred vision, Vision Changes, Other      Ears, nose, mouth, throat:  Denies: Congestion, Dysphagia, Hearing Changes, Nose    Bleeding, Nasal Discharge, Throat pain, Tinnitus, Other      Cardiovascular:  Denies: Chest Pain, Exertional dyspnea, Peripheral Edema,     Palpitations, Syncope, Wake up Gasping for air, Orthopnea, Tachycardia, Other      Gastrointestinal:  Denies: Abdominal pain/cramping, Bloody stools, Constipation,    Diarrhea, Melena, Nausea, Vomiting, Other      Genitourinary:  Denies: Dysuria, Urinary frequency, Incontinence, Hematuria,     Urgency, Other      Musculoskeletal:  Denies: Joint Pain, Joint Stiffness, Joint Swelling, Myalgias,    Other      Hematologic/lymphatic:  DENIES: Lymphadenopathy, Bruising, Bleeding tendencies,     Other      Neurologic:  Denies: Headache, Numbness, Weakness, Seizures, Other      Psychiatric:  Denies: Anxiety, Appropriate Effect, Depression, Other      Sleep:  No: Excessive daytime sleep, Morning Headache?, Snoring, Insomnia?, Stop    breathing at sleep?, Other      Integumentary:  Denies: Rash, Dry skin, Skin Warm to Touch, Other            FAMILY/SOCIAL/MEDICAL HX      Surgical History:  No: AAA Repair, Abdominal Surgery, Angioplasty, Appendectomy,    Back Surgery, Bladder Surgery, Bowel Surgery, Breast Surgery, CABG, Carotid     Stenosis, Cholecystectomy, Ear Surgery, Eye Surgery, Head Surgery, Hernia     Surgery, Kidney Surgery, Nose Surgery, Oral Surgery, Orthopedic Surgery,     Prostatectomy, Rectal Surgery, Spinal Surgery,  "Testicular Surgery, Throat     Surgery, Valve Replacement, Vascular Surgery, Other Surgeries      Heart - Family Hx:  Father      Diabetes - Family Hx:  Brother      Cancer/Type - Family Hx:  Mother, Grandparent, Cousin      Other Family Medical History:  Father      Is Father Still Living?:  No      Is Mother Still Living?:  No       Family History:  Yes      Social History:  No Tobacco Use, No Alcohol Use, No Recreational Drug use      Smoking status:  Former smoker ( (1 ppd for 8 years quit 1999))      Anticoagulation Therapy:  No      Antibiotic Prophylaxis:  No      Medical History:  Yes: Asthma, Chronic Bronchitis/COPD, Congestive Heart Failu     (\"FIVE YEARS AGO\"), Hemorrhoids/Rectal Prob (DIVERTICULITIS), Shortness Of     Breath; No: Anemia, Arthritis, Blood Disease, Broken Bones, Cataracts, Chemical     Dependency, Chemotherapy/Cancer, Emphysema, Chronic Liver Disease, Colon     Trouble, Colitis, Diverticulitis, Deafness or Ringing Ears, Depression, Anxiety,    Bipolar Disorder, Diabetes, Epilepsy, Seizures, Glaucoma, Gall Stones, Gout,     Head Injury, Heart Attack, Heart Murmur, Hepatitis, Hiatal Hernia, High Blood     Pressure, HIV (Do not ask - volu, Kidney or Bladder Disease, Kidney Stones,     Migrane Headaches, Mitral Valve Prolapse, Psychiatric Care, Reflux Disease,     Rheumatic Fever, Sexually Transmitted Dis, Sinus Trouble, Skin     Disease/Psoriais/Ecz, Stroke, Thyroid Problem, Tuberculosis or Pos TB Te,     Miscellaneous Medical/oth      Psychiatric History      none            PREVENTION      Hx Influenza Vaccination:  Yes      Date Influenza Vaccine Given:  Oct 1, 2020      Influenza Vaccine Declined:  No      2 or More Falls in Past Year?:  No      Fall Past Year with Injury?:  No      Hx Pneumococcal Vaccination:  Yes      Encouraged to follow-up with:  PCP regarding preventative exams.      Chart initiated by      Cathy Garcia CMA            ALLERGIES/MEDICATIONS      Allergies:        Coded " Allergies:             PENICILLINS (Verified  Allergy, Severe, CONVULSIONS, 12/14/20)           CEPHALOSPORINS (Verified  Allergy, Unknown, RASH, 12/14/20)           IBUPROFEN (Verified  Allergy, Unknown, HIVES, WHELPS, 12/14/20)           SULFA (SULFONAMIDE ANTIBIOTICS) (Verified  Allergy, Unknown, RASH,     12/14/20)      Medications    Last Reconciled on 12/14/20 14:22 by SANTINO BAEZ MD      Famotidine (Famotidine) 20 Mg Tablet      20 MG PO HS for 30 Days, #30 TAB 3 Refills         Prov: Santino Baez         12/14/20       MDI-Albuterol (Ventolin HFA) 18 Gm Hfa.aer.ad      2 PUFFS INH RTTID for 30 Days, #1 MDI 3 Refills         Prov: Santino Baez         12/14/20       Fluticasone Furoate (Arnuity Ellipta) 100 Mcg Blst.w.dev      1 PUFF INH RTQDAY, #3 INH 11 Refills         Prov: Santino Baez         12/14/20       Budesonide/Formoterol Fumarate (Symbicort 160/4.5 Mcg) 10.2 Gm Inh      2 PUFF INH RTBID, #1 INH 11 Refills         Prov: Santino Baez         12/14/20       Azelastine Hcl (Azelastine Nasal) 137 Mcg/0.137 Ml Spray.pump      2 PUFFS NARE EACH BID, #1 BOTTLE 9 Refills         Prov: Santino Baez         12/14/20       CPAP Compressor (CPAP) 1 Each Each      EACH XX ONCE, #1 0 Refills         Prov: Santino Baez         6/18/20       Metoprolol Succinate (Metoprolol Succinate) 100 Mg Tab.er.24h      100 MG PO QDAY, #30 TAB         Reported         7/26/19       Montelukast Sodium (Singulair*) 10 Mg Tablet      10 MG PO QDAY, #30 TAB 0 Refills         Reported         1/13/19       Fexofenadine Hcl (Fexofenadine Hcl) 180 Mg Tablet      180 MG PO QDAY, #30 TAB 0 Refills         Reported         2/5/18       Losartan Potassium (Cozaar) 100 Mg Tablet      100 MG PO QDAY, #30 TAB 0 Refills         Reported         2/5/18       amLODIPine (amLODIPine) 2.5 Mg Tablet      5 MG PO QDAY, #60 TAB 0 Refills         Reported         2/5/18       Cholecalciferol (Vitamin D3) (Vitamin D3) 5,000 Units Tablet      5000 UNITS  PO QDAY, TAB         Reported         5/1/14       Gabapentin (Gabapentin) 300 Mg Capsule      300 MG PO TID, CAP         Reported         5/1/14      Current Medications      Current Medications Reviewed 12/14/20            EXAM      CONSTITUTIONAL: Pleasant  normal conversant.       EYES : Pink conjunctive, no ptosis, PERRL.       ENMT : Nose and ears appear normal, normal dentition, posterior pharyngeal wall     without erythema, no sinus tenderness. Mallampati classification 2.        Neck: Nontender, no masses, no thyromegaly, no nodules.      Resp : Bilateral diminished breath sounds, no wheezes, rales or rhonchi     appreciated, normal work of breathing noted.  Patient is able to speak full     sentences without difficulty.        CVS  : No carotid bruits, s1s2 nl, RRR, no murmur, rubs or gallop, no peripheral    edema       Chest wall: Normal rise with inspiration, nontender on palpation.      GI   : Abdomen soft, with no masses, no hepatosplenomegaly, no hernias, BS+      MSK  : Normal gait and station, no digital cyanosis or clubbing       Skin : No rashes, ulcerations or lesions, normal turgor and temperature      Neuro: CN II - XII intact, no sensory deficits, DTRs intact and symmetrical, no     motor weakness      Psych: Appropriate affect, A   Vitals      Vitals:             Height 5 ft 1 in / 154.94 cm           Weight 223 lbs  / 101.547840 kg           BSA 1.98 m2           BMI 42.1 kg/m2           Temperature 96.7 F / 35.94 C - Tympanic           Pulse 61           Respirations 16           Blood Pressure 181/77 Sitting, Right Arm           Pulse Oximetry 98%, room air            REVIEW      Results Reviewed      PCCS Results Reviewed?:  Yes Prev Lab Results, Yes Prev Radiology Results, Yes     Previous [facility] Records      Radiographic Results               [facility]                PACS RADIOLOGY REPORT            Patient: PAULO ORTEZ    Acct: #[id number]   Report: #[id number]            UNIT #: [id number]    DOS: 10/03/20 1927      INSURANCE:[employer]   ORDER #:[id number]      LOCATION:[facility]     : 1945            PROVIDERS      ADMITTING:     ATTENDING: Sangeetha Ruiz cfe      FAMILY:     ORDERING:  Sangeetha Ruiz cfe         OTHER:    DICTATING:  Capri Portillo MD, JR            REQ #:[id number]   EXAM:CXR2 - CHEST 2V AP PA LAT      REASON FOR EXAM:  PAIN UNDER L BREAST      REASON FOR VISIT:  PAIN UNDER L BREAST            *******Signed******         PROCEDURE:   CHEST AP/PA AND LATERAL             COMPARISON:   [facility], , CHEST PA/AP   8:41.             INDICATIONS:   PAIN UNDER LEFT BREAST.             FINDINGS:   Two views reveal mild cardiomegaly, thoracic aortic prominence,     atherosclerosis, ectasia       (as before), probably chronic blunting of the bilateral costophrenic sulci,     emphysematous contour       of the lungs, chronic calcified granulomatous disease of the chest, and no acute    infiltrate.  No       pneumothorax is seen.  Degenerative changes are present throughout the imaged     spine.             CONCLUSION:   No acute infiltrate is appreciated.             CAPRI PORTILLO JR, MD             Electronically Signed and Approved By: CAPRI PORTILLO JR, MD on 10/03/2020 at     19:46                               Until signed, this is an unconfirmed preliminary report that may contain      errors and is subject to change.                                              CARJI:      D:10/[birth year]/20 1946      PFT Results      Patient: PAULO DONATO   Acct #: [id number]         Report #: [id number]   : 1945 Report #: [id number]      MR #: [id number]   Location: [facility]                                ***Signed***            PFT      DATE: 3/6/15      PFT Results      Pulmonary function test interpretation:            Spirometry shows mild obstructive defect. ( FEV1/ FVC ratio- 65, FEV1-  1.45     lits, 72% of predicted; FVC- 2.25 lits, 85% of predicted)            There is borderline response to bronchodilator demonstrated. FVC increased drom     2.25 lits to 2.48 lits, 10.2% increase.            Lung volumes show air trapping. ( % of predicted, 4.94 liters; % of    predicted, 2.73 liters)            Diffusion capacity is mildly decreased. (71% of predicted)            Comparison: No previous tests to compare.            Conclusion:    Low FEV1/FVC with low FEV1, air trapping and decreased diffusion     capacity is suggestive of mild obstructive airway disease, likely emphysema.            There is borderline reversible component of her airway disease.            Please correlate clinically.            Santino Baez MD                  Mar 6, 2015 14:51               <Electronically signed by Santino Baez MD> on 03/06/15 1451          on            Assessment      Notes      Renewed Medications      * AZELASTINE HCL (Azelastine Nasal) 137 MCG/0.137 ML SPRAY.PUMP: 2 PUFFS NARE       EACH BID #1      * Budesonide/Formoterol Fumarate (Symbicort 160/4.5 Mcg) 10.2 GM INH: 2 PUFF INH      RTBID #1      * Fluticasone Furoate (Arnuity Ellipta) 100 MCG BLST.W.DEV: 1 PUFF INH RTQDAY #3      * MDI-Albuterol (Ventolin HFA) 18 GM HFA.AER.AD: 2 PUFFS INH RTTID 30 Days #1      * Famotidine 20 MG TABLET: 20 MG PO HS 30 Days #30      Discontinued Medications      * predniSONE 20 MG TABLET: 40 MG PO QDAY 7 Days #14      PLAN:      The patient is a 75 year old female with chronic obstructive pulmonary disease,     recurrent bronchitis, obstructive sleep apnea, postnasal drip, seasonal     allergies, history of fungal pneumonia in the past improved on itraconazole,     pulmonary nodule and gastroesophageal reflux disease.             1. Pleurisy is improved and she is currently off prednisone.       2. Chronic obstructive pulmonary disease. Continue with Symbicort, I advised her    to use Symbicort twice  daily and arnuity once daily. She is using Symbicort once    a day. Use albuterol as needed.       3. Gastroesophageal reflux disease. Continue with Pepcid.       4. Continue with Singulair.       5. She is up to date with her vaccinations.       6. Obstructive sleep apnea. Continue with CPAP. We will review her CPAP usage     data when it is available. She just got her CPAP machine this past summer.        7.  Follow up in 6 months earlier if needed.            Patient Education      Education resources provided:  Yes      Patient Education Provided:  Acute Bronchitis            Electronically signed by Santino Baez  01/05/2021 12:58       Disclaimer: Converted document may not contain table formatting or lab diagrams. Please see [facility] for the authenticated document.

## 2021-05-28 NOTE — PROGRESS NOTES
Patient: PAULO DONATO     Acct: [id number]     Report: #[id number]  UNIT #: [id number]     : 1945    Encounter Date:2018  PRIMARY CARE: NANCY FORTUNE  ***Signed***  --------------------------------------------------------------------------------------------------------------------  Chief Complaint      Encounter Date      2018            Primary Care Provider      NANCY FORTUNE            Referring Provider      NANCY FORTUNE            Patient Complaint      Patient is complaining of      Pt here for 4m f/u on copd            VITALS      Height 5 ft 1 in / 154.94 cm      Weight 229 lbs 1 oz / 103.629231 kg      BSA 2.18 m2      BMI 43.3 kg/m2      Temperature 98.4 F / 36.89 C - Oral      Pulse 56      Respirations 16      Blood Pressure 142/64 Sitting, Right Arm      Pulse Oximetry 95%, room air      Exhaled Nitrous Oxide Testin            HPI      The patient is a 73 year old female with mild obstructive airway disease and     recurrent bronchitis.  She is here for follow up.  Since her last office visit,     she has been doing well.  She is currently on daily azithromycin at 250 mg one     daily.  She is also taking Singulair and Allegra.  She takes Symbicort two     puffs twice a day, Arnuity once daily and albuterol for rescue which she uses 2-    3 times a week. She is also on Flonase, but does not use it regularly.  Over     the last several weeks, she has been having some sinus tenderness and sinus pain    , has an nasal twang to her voice, has some drainage behind her throat.  She     has no fever, chills, nausea or vomiting and no significant cough.  She has     been doing okay overall.  She is using her CPAP machine well and does not have     any issues with it.  She gets her CPAP supplies from [employer].  She says     sometimes she did not get the supplies on time.  Overall her symptoms have been     stable.            ROS      Constitutional:  Complains of:  Fatigue, Denies: Fever, Weight gain, Weight loss    , Chills, Insomnia, Other      Respiratory/Breathing:  Denies: Shortness of air, Wheezing, Cough, Hemoptysis,     Pleuritic pain, Other      Endocrine:  Denies: Polydipsia, Polyuria, Heat/cold intolerance, Abnorml     menstrual pattern, Diabetes, Other      Eyes:  Denies: Blurred vision, Vision Changes, Other      Ears, nose, mouth, throat:  Denies: Congestion, Dysphagia, Hearing Changes,     Nose Bleeding, Nasal Discharge, Throat pain, Tinnitus, Other      Cardiovascular:  Denies: Chest Pain, Exertional dyspnea, Peripheral Edema,     Palpitations, Syncope, Wake up Gasping for air, Orthopnea, Tachycardia, Other      Gastrointestinal:  Denies: Abdominal pain/cramping, Bloody stools, Constipation    , Diarrhea, Melena, Nausea, Vomiting, Other      Genitourinary:  Denies: Dysuria, Urinary frequency, Incontinence, Hematuria,     Urgency, Other      Musculoskeletal:  Denies: Joint Pain, Joint Stiffness, Joint Swelling, Myalgias    , Other      Hematologic/lymphatic:  DENIES: Lymphadenopathy, Bruising, Bleeding tendencies,     Other      Neurologic:  Denies: Headache, Numbness, Weakness, Seizures, Other      Psychiatric:  Denies: Anxiety, Appropriate Effect, Depression, Other      Sleep:  No: Excessive daytime sleep, Morning Headache?, Snoring, Insomnia?,     Stop breathing at sleep?, Other      Integumentary:  Denies: Rash, Dry skin, Skin Warm to Touch, Other            FAMILY/SOCIAL/MEDICAL HX      Current History      She is  and has kids. She was a  in a physician's office.     [place]       She started smoking when she was 16, smoked over a pack a day for 8 years, quit     for 30 years, and started smoking again, around half a pack a day for 6 to 8     years. Now she does not smoke any more. Her parents both smoked heavily.      Surgical History:  No: AAA Repair, Abdominal Surgery, Angioplasty, Appendectomy    , Back  "Surgery, Bladder Surgery, Bowel Surgery, Breast Surgery, CABG, Carotid     Stenosis, Cholecystectomy, Ear Surgery, Eye Surgery, Head Surgery, Hernia     Surgery, Kidney Surgery, Nose Surgery, Oral Surgery, Orthopedic Surgery,     Prostatectomy, Rectal Surgery, Spinal Surgery, Testicular Surgery, Throat     Surgery, Valve Replacement, Vascular Surgery, Other Surgeries      Heart - Family Hx:  Father      Diabetes - Family Hx:  Brother      Cancer/Type - Family Hx:  Mother (colon), Grandparent (breast), Cousin (breast)      Other Family Medical History:  Father (copd/ black lung)      Is Father Still Living?:  No      Is Mother Still Living?:  No       Family History:  Yes      Social History:  No Tobacco Use, No Alcohol Use, No Recreational Drug use      Smoking status:  Former smoker (5- 6 cigs day/ quit 15 yars ago)      Occupation:  retired/ officer manager      Anticoagulation Therapy:  No      Antibiotic Prophylaxis:  No      Medical History:  Yes: Asthma, Chronic Bronchitis/COPD, Congestive Heart Failu (    \"FIVE YEARS AGO\"), Hemorrhoids/Rectal Prob (DIVERTICULITIS), Shortness Of Breath    , No: Alcoholism, Allergies, Anemia, Arthritis, Blood Disease, Broken Bones,     Cataracts, Chemical Dependency, Chemotherapy/Cancer, Emphysema, Chronic Liver     Disease, Colon Trouble, Colitis, Diverticulitis, Deafness or Ringing Ears,     Convulsions, Depression, Anxiety, Bipolar Disorder, Diabetes, Epilepsy, Seizures    , Forgetfullness, Glaucoma, Gall Stones, Gout, Head Injury, Heart Attack, Heart     Murmur, Hepatitis, Hiatal Hernia, High Blood Pressure, High Cholesterol, HIV (    Do not ask - volu, Jaundice, Kidney or Bladder Disease, Kidney Stones, Migrane     Headaches, Mitral Valve Prolapse, Night sweats, Phlebitis, Psychiatric Care,     Reflux Disease, Rheumatic Fever, Sexually Transmitted Dis, Sinus Trouble, Skin     Disease/Psoriais/Ecz, Stroke, Thyroid Problem, Tuberculosis or Pos TB Te,     Miscellaneous " Medical/oth      Psychiatric History      None            PREVENTION      Hx Influenza Vaccination:  Yes      Date Influenza Vaccine Given:  Nov 1, 2017      Influenza Vaccine Declined:  No      2 or More Falls Past Year?:  No      Fall Past Year with Injury?:  No      Hx Pneumococcal Vaccination:  Yes      Encouraged to follow-up with:  PCP regarding preventative exams.      Chart initiated by      Christine Burch MA            ALLERGIES/MEDICATIONS      Allergies:        Coded Allergies:             PENICILLINS (Verified  Allergy, Severe, CONVULSIONS, 6/20/18)           CEPHALOSPORINS (Verified  Allergy, Unknown, RASH, 6/20/18)           IBUPROFEN (Verified  Allergy, Unknown, HIVES, WHELPS, 6/20/18)           SULFA (SULFONAMIDE ANTIBIOTICS) (Verified  Allergy, Unknown, RASH, 6/20/18)      Medications    Last Reconciled on 6/20/18 11:52 by JHON BAEZ MD      Azithromycin (Azithromycin) 250 Mg Tablet      250 MG PO MOWEFR, #45 TAB 3 Refills         Prov: Jhon Baez         6/20/18       Azelastine Hcl (Azelastine Nasal*) 137 Mcg/0.137 Ml Spray.pump      2 PUFFS NARE EACH BID, #1 BOTTLE 9 Refills         Prov: Jhon Baez         6/20/18       Colchicine (Colcrys) 0.6 Mg Tab      0.6 MG PO QDAY, #3 TAB         Prov: Dorota Em cfe         4/24/18       Fluticasone Furoate (Arnuity Ellipta) 100 Mcg Blst.w.dev      1 PUFF INH RTQDAY, #3 INH 3 Refills         Prov: Jhon Baez         2/5/18       Ranitidine HCl (Zantac*) 150 Mg Tablet      150 MG PO QDAY for 30 Days, #90 TAB 3 Refills         Prov: Jhon Baez         2/5/18       Fexofenadine Hcl (Fexofenadine Hcl) 180 Mg Tablet      180 MG PO QDAY, #30 TAB 0 Refills         Reported         2/5/18       Losartan Potassium (Cozaar) 100 Mg Tablet      100 MG PO QDAY, #30 TAB 0 Refills         Reported         2/5/18       amLODIPine (amLODIPine) 2.5 Mg Tablet      5 MG PO QDAY, #60 TAB 0 Refills         Reported         2/5/18       Fluticasone Furoate  (Arnuity Ellipta) 100 Mcg Blst.w.dev      1 PUFF INH RTQDAY, #1 INH 3 Refills         Prov: Santino Baez         7/19/17       Budesonide/Formoterol Fumarate (Symbicort 160/4.5 Mcg) 10.2 Gm Inh      2 PUFF INH RTBID, #1 INH 6 Refills         Prov: Santino Baez         12/11/15       Loratadine/Pseudoephedrine 5/120 MG (Claritin-D 12 Hour) 1 Tab Tab.sr.12h      1 TAB PO BID, #60 TAB 1 Refill         Prov: Santino Baez         9/28/15       Jarad-Fluticasone (Fluticasone 50 mcg) 16 Gm Naspr      PUFFS NARE EACH QDAY, #1 BOTTLE 0 Refills         Reported         9/28/15       Metoprolol Succinate (Toprol XL*) 25 Mg Tab      25 MG PO QDAY, #30 TAB         Prov: Tanesha Jackson         1/19/15       Cholecalciferol (Vitamin D3) 5,000 Units Tablet      5000 UNITS PO QDAY, TAB         Reported         5/1/14       Gabapentin (Gabapentin) 300 Mg Capsule      300 MG PO QAM, CAP         Reported         5/1/14       MDI-Albuterol (Proair HFA) 60 Puffs/Inh Puffs      1 PUFFS INH PRN, INH         Reported         5/1/14      Current Medications      Current Medications Reviewed 6/20/18            EXAM      CONSTITUTIONAL: Pleasant female   in no acute distress, normal conversant.       EYES : Pink conjunctive, no ptosis, PERRL.       ENMT :Mallampati classification III, mild posterior pharyngeal wall erythema,     fair oral dental hygiene.  Nose and ears appear normal.        Neck: Nontender, no masses, no thyromegaly, no nodules.      Resp : Normal vesicular breath sounds, resonant to percussion bilaterally, no     wheezing, crackles or rhonchi.      CVS  : No carotid bruits, s1s2 nl, RRR, no murmur, rubs or gallop, no     peripheral edema       Chest wall: Increased AP diameter.   Normal rise with inspiration, nontender on     palpation      GI   : Abdomen soft, with no masses, no hepatosplenomegaly, no hernias, BS+      MSK  : Normal gait and station, no digital cyanosis or clubbing       Skin : No rashes, ulcerations or lesions,  normal turgor and temperature      Neuro: CN II - XII intact, no sensory deficits, DTRs intact and symmetrical, no     motor weakness      Psych: Appropriate affect, A   Vitals      Vitals:             Height 5 ft 1 in / 154.94 cm           Weight 229 lbs 1 oz / 103.567577 kg           BSA 2.18 m2           BMI 43.3 kg/m2           Temperature 98.4 F / 36.89 C - Oral           Pulse 56           Respirations 16           Blood Pressure 142/64 Sitting, Right Arm           Pulse Oximetry 95%, room air            REVIEW      Results Reviewed      PCCS Results Reviewed?:  Yes Prev Lab Results, Yes Prev Radiology Results, Yes     Previous Mecial Records      Lab Results      Patient's renal profile from 2018 was normal.      Radiographic Results                     [facility]                PACS RADIOLOGY REPORT            Patient: PAULO DONATO   Acct: #[id number]   Report: #[id number]            UNIT #: [id number]    DOS: 10/24/16 1300      INSURANCE:[employer]   ORDER #:[id number]      LOCATION:[facility]     : 1945            PROVIDERS      ADMITTING:     FAMILY:  NANCY FORTUNE         ORDERING:  Santino Baez   OTHER:       DICTATING:  Ami Moya MD            REQ #:[id number]    CPT CODE:12714   EXAM:CXR2 - CHEST 2V AP PA LAT      REASON FOR EXAM:        REASON FOR VISIT:  R93.8            *******Signed******               PROCEDURE:   CHEST AP/PA AND LATERAL             COMPARISON:   [facility], , CHEST PA/AP   49.             INDICATIONS:   ABNORMAL CT, COPD.             FINDINGS:         The heart is normal in size. The lungs are well expanded. Linear scarring at     the lung bases is       stable.             Moderate degenerative spurring is seen throughout the thoracic spine.             CONCLUSION:         Mild chronic changes, stable.             No active disease is seen.              AMI MOYA MD              Electronically Signed and Approved By: AMI MOYA MD on 10/24/2016 at 13:36                   Until signed, this is an unconfirmed preliminary report that may contain      errors and is subject to change.                                              NELLA:      D:10/24/16 1337      PFT Results      Patient: PAULO DONATO   Acct #: [id number]         Report #: [id number]   : 1945 Report #: [id number]      MR #: [id number]   Location: [facility]                                ***Signed***            PFT      DATE: 3/6/15      PFT Results      Pulmonary function test interpretation:            Spirometry shows mild obstructive defect. ( FEV1/ FVC ratio- 65, FEV1- 1.45 lits    , 72% of predicted; FVC- 2.25 lits, 85% of predicted)            There is borderline response to bronchodilator demonstrated. FVC increased drom     2.25 lits to 2.48 lits, 10.2% increase.            Lung volumes show air trapping. ( % of predicted, 4.94 liters; %     of predicted, 2.73 liters)            Diffusion capacity is mildly decreased. (71% of predicted)            Comparison: No previous tests to compare.            Conclusion:    Low FEV1/FVC with low FEV1, air trapping and decreased diffusion     capacity is suggestive of mild obstructive airway disease, likely emphysema.            There is borderline reversible component of her airway disease.            Please correlate clinically.            Santino Baez MD Mar 6, 2015 14:51               <Electronically signed by Santino Baez MD> on 03/06/15 1451          on            Assessment      Notes      New Medications      * AZELASTINE HCL (Azelastine Nasal*) 137 MCG/0.137 ML SPRAY.PUMP: 2 PUFFS NARE     EACH BID #1      * Azithromycin 250 MG TABLET: 250 MG PO MOWEFR #45       Instructions: 250 mg every monday, wednesday and friday, to continue.       Discontinued Medications      * predniSONE* 20 MG TABLET: 40 MG PO QDAY #10      PLAN:  The patient is a  73 year old female with mild obstructive airway disease    , recurrent bronchitis and obstructive sleep apnea.              1. Chronic Obstructive Pulmonary Disease with recurrent bronchitis. Continue     with Symbicort two puffs twice daily, arnuity once daily and albuterol as     needed. Her PCP had tried to switch the inhalers to trilogy, but it did not     help her as much and she is currently back on Symbicort and arnuity. Continue     with Singulair and Allegra. Continue with Flonase. She is not using Flonase     regularly, so I will start her Azelastine nasal spray two puffs each nose twice     a day.              2.  Recurrent bronchitis.  She is doing well overall, so I will try to decrease     her daily macrolide therapy to Monday, Wednesday and Friday.  She is agreeable     on this.  Her previous EKG shows normal QRS and QTC.              3. Gastrointestinal esophageal reflux disease. Continue with Zantac.  Sleep     hygiene was discussed in detail.  Her CPAP machine has been working well. If     there is any problem with her getting supplies from [employer], she will call our     office.              4.  We will follow up with her in 5-6 months, earlier if needed.            Patient Education      Education resources provided:  Yes      Patient Education Provided:  Acute Bronchitis                 Disclaimer: Converted document may not contain table formatting or lab diagrams. Please see [employer] for the authenticated document.

## 2021-05-28 NOTE — PROGRESS NOTES
Patient: PAULO DONATO     Acct: [id number]     Report: #[id number]  UNIT #: [id number]     : 1945    Encounter Date:2019  PRIMARY CARE: NANCY FORTUNE  ***Signed***  --------------------------------------------------------------------------------------------------------------------  Chief Complaint      Encounter Date      2019            Primary Care Provider      NANCY FORTUNE            Referring Provider      NANCY FORTUNE            Patient Complaint      Patient is complaining of      Patient here today for 2 month follow up            VITALS      Height 62 in / 157.48 cm      Weight 220 lbs 2 oz / 99.541127 kg      BSA 1.99 m2      BMI 40.3 kg/m2      Temperature 98.3 F / 36.83 C - Oral      Pulse 58      Respirations 12      Blood Pressure 143/69 Sitting, Right Arm      Pulse Oximetry 96%, room air      Initial Exhaled Nitrous Oxide      Exhaled Nitrous Oxide Results:  8            HPI      The patient is a very pleasant 74 year old white female patient of Dr. Baez's     with a history of mild chronic obstructive pulmonary disease, recurrent     bronchitis and obstructive sleep apnea. He last saw her about 6 weeks ago at     which time she was having some increased dyspnea and coughing. He checked a     sputum culture which came back growing a mold type fungus. Reportedly this was     sent to reference lab for ID on 19 but no identification has resulted.     However the patient reports that her symptoms have not improved at all over the     past 6 weeks despite continuing Symbicort 2 puffs twice daily, arnuity and     Monday, Wednesday and Friday azithromycin. She was also started on dymista but     she is using this once daily rather than twice daily and she is also on     Singulair. The patient states she has not been using her CPAP recently because     she was afraid to do so given her possible mold type fungus. Our plan was to     start her on itraconazole if  she was still symptomatic. She denies fever or     chills or hemoptysis but is still having increased dyspnea on exertion with     minimal exertion relieved with rest. She is having increased cough, sometimes     dry, sometimes productive of white or clear sputum or light green sputum. She     admits to occasional wheezing as well.             I reviewed her Review of Systems, medical, surgical and family history and agree    with those as entered.      Copies To:   Santino Baez      Constitutional:  Denies: Fatigue, Fever, Weight gain, Weight loss, Chills,     Insomnia, Other      Respiratory/Breathing:  Complains of: Shortness of air, Wheezing, Cough; Denies:    Hemoptysis, Pleuritic pain, Other      Endocrine:  Denies: Polydipsia, Polyuria, Heat/cold intolerance, Abnorml     menstrual pattern, Diabetes, Other      Eyes:  Denies: Blurred vision, Vision Changes, Other      Ears, nose, mouth, throat:  Denies: Congestion, Dysphagia, Hearing Changes, Nose    Bleeding, Nasal Discharge, Throat pain, Tinnitus, Other      Cardiovascular:  Denies: Chest Pain, Exertional dyspnea, Peripheral Edema,     Palpitations, Syncope, Wake up Gasping for air, Orthopnea, Tachycardia, Other      Gastrointestinal:  Denies: Abdominal pain/cramping, Bloody stools, Constipation,    Diarrhea, Melena, Nausea, Vomiting, Other      Genitourinary:  Denies: Dysuria, Urinary frequency, Incontinence, Hematuria, Urg    ency, Other      Musculoskeletal:  Denies: Joint Pain, Joint Stiffness, Joint Swelling, Myalgias,    Other      Hematologic/lymphatic:  DENIES: Lymphadenopathy, Bruising, Bleeding tendencies,     Other      Neurologic:  Denies: Headache, Numbness, Weakness, Seizures, Other      Psychiatric:  Denies: Anxiety, Appropriate Effect, Depression, Other      Sleep:  No: Excessive daytime sleep, Morning Headache?, Snoring, Insomnia?, Stop    breathing at sleep?, Other      Integumentary:  Denies: Rash, Dry skin, Skin Warm to  "Touch, Other            FAMILY/SOCIAL/MEDICAL HX      Current History      She is  and has kids. She was a  in a physician's office.     [place] [employer] [place].       She started smoking when she was 16, smoked over a pack a day for 8 years, quit     for 30 years, and started smoking again, around half a pack a day for 6 to 8     years. Now she does not smoke any more. Her parents both smoked heavily.      Surgical History:  No: AAA Repair, Abdominal Surgery, Angioplasty, Appendectomy,    Back Surgery, Bladder Surgery, Bowel Surgery, Breast Surgery, CABG, Carotid     Stenosis, Cholecystectomy, Ear Surgery, Eye Surgery, Head Surgery, Hernia     Surgery, Kidney Surgery, Nose Surgery, Oral Surgery, Orthopedic Surgery,     Prostatectomy, Rectal Surgery, Spinal Surgery, Testicular Surgery, Throat     Surgery, Valve Replacement, Vascular Surgery, Other Surgeries      Heart - Family Hx:  Father      Diabetes - Family Hx:  Brother      Cancer/Type - Family Hx:  Mother, Grandparent, Cousin      Other Family Medical History:  Father      Is Father Still Living?:  No      Is Mother Still Living?:  No       Family History:  Yes      Social History:  No Tobacco Use, No Alcohol Use, No Recreational Drug use      Smoking status:  Former smoker      Occupation:  retired/ officer manager      Anticoagulation Therapy:  No      Antibiotic Prophylaxis:  No      Medical History:  Yes: Asthma, Chronic Bronchitis/COPD, Congestive Heart Failu     (\"FIVE YEARS AGO\"), Hemorrhoids/Rectal Prob (DIVERTICULITIS), Shortness Of     Breath; No: Alcoholism, Allergies, Anemia, Arthritis, Blood Disease, Broken     Bones, Cataracts, Chemical Dependency, Chemotherapy/Cancer, Emphysema, Chronic     Liver Disease, Colon Trouble, Colitis, Diverticulitis, Deafness or Ringing Ears,    Convulsions, Depression, Anxiety, Bipolar Disorder, Diabetes, Epilepsy,     Seizures, Forgetfullness, Glaucoma, Gall Stones, Gout, Head Injury, " Heart     Attack, Heart Murmur, Hepatitis, Hiatal Hernia, High Blood Pressure, High C    holesterol, HIV (Do not ask - volu, Jaundice, Kidney or Bladder Disease, Kidney     Stones, Migrane Headaches, Mitral Valve Prolapse, Night sweats, Phlebitis,     Psychiatric Care, Reflux Disease, Rheumatic Fever, Sexually Transmitted Dis,     Sinus Trouble, Skin Disease/Psoriais/Ecz, Stroke, Thyroid Problem, Tuberculosis     or Pos TB Te, Miscellaneous Medical/oth      Psychiatric History      none            PREVENTION      Hx Influenza Vaccination:  Yes      Date Influenza Vaccine Given:  Nov 1, 2018      Influenza Vaccine Declined:  No      2 or More Falls Past Year?:  No      Fall Past Year with Injury?:  No      Hx Pneumococcal Vaccination:  Yes      Encouraged to follow-up with:  PCP regarding preventative exams.      Chart initiated by      Misti Leong CMA            ALLERGIES/MEDICATIONS      Allergies:        Coded Allergies:             PENICILLINS (Verified  Allergy, Severe, CONVULSIONS, 6/17/19)           CEPHALOSPORINS (Verified  Allergy, Unknown, RASH, 6/17/19)           IBUPROFEN (Verified  Allergy, Unknown, HIVES, WHELPS, 6/17/19)           SULFA (SULFONAMIDE ANTIBIOTICS) (Verified  Allergy, Unknown, RASH, 6/17/19)      Medications    Last Reconciled on 6/17/19 09:57 by JAMES HOFFMAN      Azithromycin (Azithromycin) 250 Mg Tablet      250 MG PO MOWEFR, #45 TAB 3 Refills         Prov: Santino Baez         5/20/19       Azelastine/Fluticasone (Dymista Nasal Spray) 23 Gm Spray.pump               Prov: Santino Baez         4/26/19       predniSONE* (Deltasone*) 10 Mg Tablet      10 MG PO ASDIR, #45 TAB 0 Refills         Prov: Santino Baez         4/26/19       Fluticasone Furoate (Arnuity Ellipta) 100 Mcg Blst.w.dev      1 PUFF INH RTQDAY, #3 INH 3 Refills         Prov: Santino Baez         3/1/19       Montelukast Sodium (Singulair*) 10 Mg Tablet      10 MG PO QDAY, #30 TAB 0 Refills         Reported          1/13/19       Fexofenadine Hcl (Fexofenadine Hcl) 180 Mg Tablet      180 MG PO QDAY, #30 TAB 0 Refills         Reported         2/5/18       Losartan Potassium (Cozaar) 100 Mg Tablet      100 MG PO QDAY, #30 TAB 0 Refills         Reported         2/5/18       amLODIPine (amLODIPine) 2.5 Mg Tablet      5 MG PO QDAY, #60 TAB 0 Refills         Reported         2/5/18       Budesonide/Formoterol Fumarate (Symbicort 160/4.5 Mcg) 10.2 Gm Inh      2 PUFF INH RTBID, #1 INH 6 Refills         Prov: Santino Baez         12/11/15       Loratadine/Pseudoephedrine 5/120 MG (CLARITIN-D 12 HOUR TABLET) 1 Tab Tab.sr.12h      1 TAB PO BID, #60 TAB 1 Refill         Prov: Santino Baez         9/28/15       Jarad-Fluticasone (Fluticasone 50 mcg) 16 Gm Naspr      PUFFS NARE EACH QDAY, #1 BOTTLE 0 Refills         Reported         9/28/15       Metoprolol Succinate (Metoprolol Succinate) 25 Mg Tab      25 MG PO QDAY, #30 TAB         Prov: Tanesha Jackson         1/19/15       Cholecalciferol (Vitamin D3) 5,000 Units Tablet      5000 UNITS PO QDAY, TAB         Reported         5/1/14       Gabapentin (Gabapentin) 300 Mg Capsule      300 MG PO TID, CAP         Reported         5/1/14       MDI-Albuterol (Proair HFA) 60 Puffs/Inh Puffs      1 PUFFS INH PRN, INH         Reported         5/1/14      Current Medications      Current Medications Reviewed 6/17/19            EXAM      CONSTITUTIONAL: Pleasant  normal conversant.       EYES : Pink conjunctive, no ptosis, PERRL.       ENMT : Nose and ears appear normal, normal dentition, mild posterior pharyngeal     wall erythema, no sinus tenderness. Mallampati classification       Neck: Nontender, no masses, no thyromegaly, no nodules.      Resp : Mildly decreased breath sounds throughout, no wheezes, rhonchi or     crackles, normal work of breathing noted.        CVS  : No carotid bruits, s1s2 nl, RRR, no murmur, rubs or gallop, no peripheral    edema       Chest wall: Normal rise with inspiration,  nontender on palpation.      GI   : Abdomen soft, with no masses, no hepatosplenomegaly, no hernias, BS+      MSK  : Normal gait and station, no digital cyanosis or clubbing       Skin : No rashes, ulcerations or lesions, normal turgor and temperature      Neuro: CN II - XII intact, no sensory deficits, DTRs intact and symmetrical, no     motor weakness      Psych: Appropriate affect, A   Vitals      Vitals:             Height 62 in / 157.48 cm           Weight 220 lbs 2 oz / 99.853715 kg           BSA 1.99 m2           BMI 40.3 kg/m2           Temperature 98.3 F / 36.83 C - Oral           Pulse 58           Respirations 12           Blood Pressure 143/69 Sitting, Right Arm           Pulse Oximetry 96%, room air            REVIEW      Results Reviewed      P[facility]S Results Reviewed?:  Yes Prev Lab Results, Yes Prev Radiology Results, Yes     Previous [facility] Records            Assessment      Fungal infection of lung - B49            COPD (chronic obstructive pulmonary disease) - J44.9            Notes      New Medications      * AZELASTINE/FLUTICASONE (Dymista Nasal [place]) 23 GM SPRAY.PUMP: 1 SPRAYS NARE       EACH BID #1      * Itraconazole (Sporanox) 100 MG CAP: 200 MG PO BID 30 Days #120      Changed Medications      * Gabapentin 300 MG CAPSULE: 300 MG PO TID         Instructions: 1 Tab      New Diagnostics      * CBC, 1 DAY         Dx: Fungal infection of lung - B49      * Comp Metabolic Panel, 1 DAY         Dx: Fungal infection of lung - B49      * CBC, 2 Months         Dx: Fungal infection of lung - B49      * Comp Metabolic Panel, 2 Months         Dx: Fungal infection of lung - B49      ASSESSMENT:       1. Chronic obstructive pulmonary disease with recurrent exacerbations.       2.  Recurrent bronchitis.      3. Obstructive sleep apnea currently noncompliant with CPAP       4. Seasonal allergies.       5. Postnasal drip.       6.  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             PLAN:      1. I have discussed with the patient that since she is still having some     persistent symptoms, Dr. Baez and I recommend that we start her on antifungal     therapy with itraconazole. I will start her on 200 mg PO twice daily. I have     instructed the patient to call us if she has not heard an update on the status     of getting this approved in 2 weeks.       2. I have discussed with the patient as we have not received speciation of this     mold type fungus, we will let her know if we do receive those results. Otherwise    it is possible that this is a contaminant since apparently the lab has not been     able to get those results yet.       3.  I will continue her on Symbicort and arnuity and Monday, Wednesday and     Friday azithromycin.      4. Continue Singulair and increase dymista to twice daily for her postnasal drip    symptoms.       5. I will check a CBC and CMP at baseline before initiating itraconazole and I     will check those again in 2 months after being on the itraconazole.       6. I have discussed with her that she should resume nightly CPAP. I will see if     we can get her new CPAP tubing. I instructed her to clean her tubing in the     meantime. The patient verbalized understanding.        7. Keep her next follow up appointment in 2 months with Dr. Baez to see how she    is doing and she is to call sooner if needed.            Patient Education      Patient Education Provided:  COPD, How to use an Inhaler      Time Spent:  > 50% /[facility]            Patient Education:        Chronic Obstructive Pulmonary Disease                 Disclaimer: Converted document may not contain table formatting or lab diagrams. Please see [email or web address] for the authenticated document.

## 2021-06-02 ENCOUNTER — OFFICE VISIT CONVERTED (OUTPATIENT)
Dept: PULMONOLOGY | Facility: CLINIC | Age: 76
End: 2021-06-02
Attending: INTERNAL MEDICINE

## 2021-06-06 VITALS
HEART RATE: 49 BPM | HEIGHT: 62 IN | TEMPERATURE: 97.7 F | SYSTOLIC BLOOD PRESSURE: 147 MMHG | WEIGHT: 219.56 LBS | OXYGEN SATURATION: 97 % | RESPIRATION RATE: 16 BRPM | DIASTOLIC BLOOD PRESSURE: 93 MMHG | BODY MASS INDEX: 40.4 KG/M2

## 2021-06-06 NOTE — PROGRESS NOTES
Patient: PAULO DONATO     Acct: [id number]     Report: #[id number]  UNIT #: [id number]     : 1945    Encounter Date:2021  PRIMARY CARE: NANCY FORTUNE  ***Signed***  --------------------------------------------------------------------------------------------------------------------  Chief Complaint      Encounter Date      2021            Primary Care Provider      NANCY FORTUNE            Referring Provider      NANCY FORTUNE            Patient Complaint      Patient is complaining of      pt here for f/u. COPD            VITALS      Height 5 ft 2 in / 157.48 cm      Weight 219 lbs 9 oz / 99.920420 kg      BSA 1.99 m2      BMI 40.2 kg/m2      Temperature 97.7 F / 36.5 C - Oral      Pulse 49      Respirations 16      Blood Pressure 147/93 Sitting, Left Arm      Pulse Oximetry 97%, room air.            HPI      The patient is a 76 year old female with chronic obstructive pulmonary disease,     recurrent bronchitis, obstructive sleep apnea, postnasal drip, seasonal     allergies, history of fungal pneumonia in the past improved on itraconazole,     pulmonary nodule and gastroesophageal reflux disease. She is here for follow up     today.             She has been on Symbicort 2 puffs twice daily and Arnuity once daily and     albuterol for rescue which she uses once a week or so. She is on Pepcid and     Singulair. She continues to use CPAP, she received a new machine last year and     uses nasal pillows. She continues to take Singulair and azelastine. She has no     fever or chills, no nausea or vomiting, no significant changes in weight or     appetite, no coughing up any secretions. She has not needed an antibiotics or     steroids and no hospitalizations since her last office visit.            ROS      Constitutional:  Denies: Fatigue, Fever, Weight gain, Weight loss, Chills,     Insomnia, Other      Respiratory/Breathing:  Complains of: Shortness of air, Wheezing, Cough (dry);      Denies: Hemoptysis, Pleuritic pain, Other      Endocrine:  Denies: Polydipsia, Polyuria, Heat/cold intolerance, Abnorml     menstrual pattern, Diabetes, Other      Eyes:  Denies: Blurred vision, Vision Changes, Other      Ears, nose, mouth, throat:  Denies: Congestion, Dysphagia, Hearing Changes, Nose    Bleeding, Nasal Discharge, Throat pain, Tinnitus, Other      Cardiovascular:  Denies: Chest Pain, Exertional dyspnea, Peripheral Edema,     Palpitations, Syncope, Wake up Gasping for air, Orthopnea, Tachycardia, Other      Gastrointestinal:  Denies: Abdominal pain/cramping, Bloody stools, Constipation,    Diarrhea, Melena, Nausea, Vomiting, Other      Genitourinary:  Denies: Dysuria, Urinary frequency, Incontinence, Hematuria,     Urgency, Other      Musculoskeletal:  Denies: Joint Pain, Joint Stiffness, Joint Swelling, Myalgias,    Other      Hematologic/lymphatic:  DENIES: Lymphadenopathy, Bruising, Bleeding tendencies,     Other      Neurologic:  Denies: Headache, Numbness, Weakness, Seizures, Other      Psychiatric:  Denies: Anxiety, Appropriate Effect, Depression, Other      Sleep:  No: Excessive daytime sleep, Morning Headache?, Snoring, Insomnia?, Stop    breathing at sleep?, Other      Integumentary:  Denies: Rash, Dry skin, Skin Warm to Touch, Other            FAMILY/SOCIAL/MEDICAL HX      Surgical History:  No: AAA Repair, Abdominal Surgery, Angioplasty, Appendectomy,    Back Surgery, Bladder Surgery, Bowel Surgery, Breast Surgery, CABG, Carotid     Stenosis, Cholecystectomy, Ear Surgery, Eye Surgery, Head Surgery, Hernia     Surgery, Kidney Surgery, Nose Surgery, Oral Surgery, Orthopedic Surgery,     Prostatectomy, Rectal Surgery, Spinal Surgery, Testicular Surgery, Throat     Surgery, Valve Replacement, Vascular Surgery, Other Surgeries      Heart - Family Hx:  Father      Diabetes - Family Hx:  Brother      Cancer/Type - Family Hx:  Mother, Grandparent, Cousin      Other Family Medical History:   "Father      Is Father Still Living?:  No      Is Mother Still Living?:  No       Family History:  Yes      Social History:  No Tobacco Use, No Alcohol Use, No Recreational Drug use      Smoking status:  Former smoker ( (1 ppd for 8 years quit 1999))      Anticoagulation Therapy:  No      Antibiotic Prophylaxis:  No      Medical History:  Yes: Asthma, Chronic Bronchitis/COPD, Congestive Heart Failu     (\"FIVE YEARS AGO\"), Hemorrhoids/Rectal Prob (DIVERTICULITIS), Shortness Of     Breath; No: Anemia, Arthritis, Blood Disease, Broken Bones, Cataracts, Chemical     Dependency, Chemotherapy/Cancer, Emphysema, Chronic Liver Disease, Colon     Trouble, Colitis, Diverticulitis, Deafness or Ringing Ears, Depression, Anxiety,    Bipolar Disorder, Diabetes, Epilepsy, Seizures, Glaucoma, Gall Stones, Gout,     Head Injury, Heart Attack, Heart Murmur, Hepatitis, Hiatal Hernia, High Blood     Pressure, HIV (Do not ask - volu, Kidney or Bladder Disease, Kidney Stones,     Migrane Headaches, Mitral Valve Prolapse, Psychiatric Care, Reflux Disease,     Rheumatic Fever, Sexually Transmitted Dis, Sinus Trouble, Skin     Disease/Psoriais/Ecz, Stroke, Thyroid Problem, Tuberculosis or Pos TB Te,     Miscellaneous Medical/oth      Psychiatric History      none            PREVENTION      Hx Influenza Vaccination:  Yes      Date Influenza Vaccine Given:  Oct 1, 2020      Influenza Vaccine Declined:  No      2 or More Falls in Past Year?:  No      Fall Past Year with Injury?:  No      Hx Pneumococcal Vaccination:  Yes      Encouraged to follow-up with:  PCP regarding preventative exams.      Chart initiated by      Phil Rosenthal MA            ALLERGIES/MEDICATIONS      Allergies:        Coded Allergies:             PENICILLINS (Verified  Allergy, Severe, CONVULSIONS, 6/2/21)           CEPHALOSPORINS (Verified  Allergy, Unknown, RASH, 6/2/21)           IBUPROFEN (Verified  Allergy, Unknown, HIVES, WHELPS, 6/2/21)           SULFA (SULFONAMIDE " ANTIBIOTICS) (Verified  Allergy, Unknown, RASH, 6/2/21)      Medications    Last Reconciled on 6/2/21 14:38 by SANTINO BAEZ MD      Famotidine (Famotidine) 20 Mg Tablet      20 MG PO HS for 30 Days, #30 TAB 3 Refills         Prov: RAYMOND MORENO [facility]         2/8/21       MDI-Albuterol (Ventolin HFA) 18 Gm Hfa.aer.ad      2 PUFFS INH RTTID for 30 Days, #1 MDI 3 Refills         Prov: Santino Baez         12/14/20       Fluticasone Furoate (Arnuity Ellipta) 100 Mcg Blst.w.dev      1 PUFF INH RTQDAY, #3 INH 11 Refills         Prov: Santino Baez         12/14/20       Budesonide/Formoterol Fumarate (Symbicort 160/4.5 Mcg) 10.2 Gm Inh      2 PUFF INH RTBID, #1 INH 11 Refills         Prov: Santino Baez         12/14/20       Azelastine Hcl (Azelastine Nasal) 137 Mcg/0.137 Ml Spray.pump      2 PUFFS NARE EACH BID, #1 BOTTLE 9 Refills         Prov: Santino Baez         12/14/20       CPAP Compressor (CPAP) 1 Each Each      EACH XX ONCE, #1 0 Refills         Prov: Santino Baez         6/18/20       Metoprolol Succinate (Metoprolol Succinate) 100 Mg Tab.er.24h      100 MG PO QDAY, #30 TAB         Reported         7/26/19       Montelukast Sodium (Singulair*) 10 Mg Tablet      10 MG PO QDAY, #30 TAB 0 Refills         Reported         1/13/19       Fexofenadine Hcl (Fexofenadine Hcl) 180 Mg Tablet      180 MG PO QDAY, #30 TAB 0 Refills         Reported         2/5/18       Losartan Potassium (Cozaar) 100 Mg Tablet      100 MG PO QDAY, #30 TAB 0 Refills         Reported         2/5/18       amLODIPine (amLODIPine) 2.5 Mg Tablet      5 MG PO QDAY, #60 TAB 0 Refills         Reported         2/5/18       Cholecalciferol (Vitamin D3) (Vitamin D3) 5,000 Units Tablet      5000 UNITS PO QDAY, TAB         Reported         5/1/14       Gabapentin (Gabapentin) 300 Mg Capsule      300 MG PO TID, CAP         Reported         5/1/14      Current Medications      Current Medications Reviewed 6/2/21            EXAM      CONSTITUTIONAL: Pleasant   normal conversant.       EYES : Pink conjunctive, no ptosis, PERRL.       ENMT : Nose and ears appear normal, normal dentition, posterior pharyngeal wall     without erythema, no sinus tenderness. Mallampati classification 2.        Neck: Nontender, no masses, no thyromegaly, no nodules.      Resp : Bilateral diminished breath sounds, no wheezes, rales or rhonchi     appreciated, normal work of breathing noted.  Patient is able to speak full     sentences without difficulty.        CVS  : No carotid bruits, s1s2 nl, RRR, no murmur, rubs or gallop, no peripheral    edema       Chest wall: Normal rise with inspiration, nontender on palpation.      GI   : Abdomen soft, with no masses, no hepatosplenomegaly, no hernias, BS+      MSK  : Normal gait and station, no digital cyanosis or clubbing       Skin : No rashes, ulcerations or lesions, normal turgor and temperature      Neuro: CN II - XII intact, no sensory deficits, DTRs intact and symmetrical, no     motor weakness      Psych: Appropriate affect, A   Vitals      Vitals:             Height 5 ft 2 in / 157.48 cm           Weight 219 lbs 9 oz / 99.269436 kg           BSA 1.99 m2           BMI 40.2 kg/m2           Temperature 97.7 F / 36.5 C - Oral           Pulse 49           Respirations 16           Blood Pressure 147/93 Sitting, Left Arm           Pulse Oximetry 97%, room air.            REVIEW      Results Reviewed      PCCS Results Reviewed?:  Yes Prev Lab Results, Yes Prev Radiology Results, Yes     [facility]      [facility]adiographic Results               [facility]                PACS RADIOLOGY REPORT            Patient: PAULO ORTEZ   Acct: #[id number]   Report: #[id number]            UNIT #: [id number]    DOS: 10/04/20 1723      INSURANCE:[employer]   ORDER #:[id number]      LOCATION:ER     : 1945            PROVIDERS      ADMITTING:     ATTENDING:       FAMILY:   NANCY FORTUNE   ORDERING:  BART KRAFT         OTHER:    DICTATING:  Delvis Portillo MD, JR            REQ #:[id number]   EXAM:CTPA - CT PULMONARY ANGIOGRAM      REASON FOR EXAM:  Shortness of Breath      REASON FOR VISIT:  PLEURISY CONCERNS/SOA/CP/BACK PN            *******Signed******         PROCEDURE:   CT PULMONARY ANGIOGRAM             COMPARISONS:   [facility], CT, CHEST W/O CONTRAST,     9/18/2020, 10:17.  [facility], CR, CHEST AP/PA 1 VIEW, 10/04/2020, 16:45.  [facility], CT, CHEST       W/ CONTRAST, 4/17/2015, 12:13.             INDICATIONS:   SHORTNESS OF BREATH, LEFT CHEST PAIN.             TECHNIQUE:   After obtaining the patient's consent, 813 CT/CTA images were     obtained with non-ionic       intravenous contrast material.               PROTOCOL:     Pulmonary embolism imaging protocol performed                RADIATION:     DLP: 539.7 mGy*cm          Automated exposure control was utilized to minimize radiation dose.       CONTRAST:   100cc Isovue 370 I.V.             FINDINGS:   No pulmonary embolism is identified.  There is mild to moderate     cardiomegaly.  New or       increased bibasilar subsegmental atelectasis is present.  Acute infiltrate is     thought to be less       likely.  A trace amount of pericardial effusion is noted.  There may be minimal     fluid within the       superior pericardial recess, seen previously.  There is minimal left pleural     effusion.  Minimal, if       any, right pleural effusion is seen.  No thoracic aortic aneurysm or dissection     is suggested.  Mild       atherosclerotic change involves the thoracic aorta.  The descending aorta is     ectatic.  External       artifacts in the scan field of view obscure detail.  Degenerative changes     involve the imaged spine.        No definite acute fracture.  No aggressive osseous lesion is suggested.  The     imaged       tracheobronchial tree is well  aerated without filling defect.  No suspicious     pulmonary nodules are       seen.  There are low lung volumes.  There are probably benign renal cysts, me    asuring about 3 cm or       less in size, partially imaged.  No pneumothorax is seen.               CONCLUSION:         1. No pulmonary embolism is seen.      2. No acute infiltrate is suspected.      3. There is a very small left pleural effusion.  Minimal, if any, right pleural     effusion is seen.      4. Subsegmental atelectasis is present, especially in the lung bases.      5. There is mild to moderate cardiomegaly.  A trace amount of pericardial fluid     is seen.      6. Please see above comments for further detail.             CAPRI ABBASI JR, MD             Electronically Signed and Approved By: CAPRI ABBASI JR, MD on 10/04/2020 at     19:02                               Until signed, this is an unconfirmed preliminary report that may contain      errors and is subject to change.                                              MARNI:      D:10/04/20 1902            Assessment      Notes      Renewed Medications      * AZELASTINE HCL (Azelastine Nasal) 137 MCG/0.137 ML SPRAY.PUMP: 2 PUFFS NARE       EACH BID #1      * Budesonide/Formoterol Fumarate (Symbicort 160/4.5 Mcg) 10.2 GM INH: 2 PUFF INH      RTBID #1      * Fluticasone Furoate (Arnuity Ellipta) 100 MCG BLST.W.DEV: 1 PUFF INH RTQDAY #3      PLAN:      The patient is a 75 year old female with chronic obstructive pulmonary disease,     recurrent bronchitis, obstructive sleep apnea, postnasal drip, seasonal     allergies, history of fungal pneumonia in the past improved on itraconazole,     pulmonary nodule and gastroesophageal reflux disease.             1.  Chronic obstructive pulmonary disease. Continue with Symbicort, and arnuity     once daily. Use albuterol as needed.       2. Gastroesophageal reflux disease. Continue with Pepcid.       3. Allergic rhinitis.  Continue with Singulair.        4. She is up to date with her vaccinations.  She has had her COVID-19 vaccine.       5. Obstructive sleep apnea. Continue with new CPAP that she received a year ago.    She is using nasal pillows.         7.  Follow up in 6 months earlier if needed.            Patient Education      Education resources provided:  Yes      Patient Education Provided:  Acute Bronchitis            Electronically signed by Santino Baez  06/04/2021 11:57       Disclaimer: Converted document may not contain table formatting or lab diagrams. Please see [employer] for the authenticated document.

## 2021-06-09 ENCOUNTER — TELEPHONE (OUTPATIENT)
Dept: FAMILY MEDICINE CLINIC | Facility: CLINIC | Age: 76
End: 2021-06-09

## 2021-06-09 NOTE — TELEPHONE ENCOUNTER
Caller: Julia Rios    Relationship: Self    Best call back number: [phone]    What medication are you requesting: FAMOTIDINE 20 MG    What are your current symptoms: PATIENT WAS PRESCRIBED THIS MEDICATION TO SETTLE HER STOMACH FROM HER CURRENT MEDICATIONS.    KALEB BRITO AT HER PULMONOLOGY OFFICE PRESCRIBED THIS MEDICATION TO HER ABOUT A YEAR AGO.    How long have you been experiencing symptoms: ABOUT A YEAR AGO    Have you had these symptoms before:    [x] Yes  [] No    Have you been treated for these symptoms before:   [x] Yes  [] No    If a prescription is needed, what is your preferred pharmacy and phone number:      [id number] [place] [facility] [address] AT [facility] - [phone] [facility] [phone]   [phone]

## 2021-06-10 RX ORDER — FAMOTIDINE 20 MG/1
20 TABLET, FILM COATED ORAL
Status: CANCELLED | OUTPATIENT
Start: 2021-06-10

## 2021-06-10 NOTE — TELEPHONE ENCOUNTER
PATIENT CALLED AND LEFT VOICEMAIL TO ASK FOR A REFILL ON FAMOTIDINE 20MG. PATIENT USES [facility] IN [place]. [address] [facility].

## 2021-06-14 ENCOUNTER — CLINICAL SUPPORT (OUTPATIENT)
Dept: GASTROENTEROLOGY | Facility: CLINIC | Age: 76
End: 2021-06-14

## 2021-06-14 ENCOUNTER — TELEPHONE (OUTPATIENT)
Dept: GASTROENTEROLOGY | Facility: CLINIC | Age: 76
End: 2021-06-14

## 2021-06-14 ENCOUNTER — PREP FOR SURGERY (OUTPATIENT)
Dept: OTHER | Facility: HOSPITAL | Age: 76
End: 2021-06-14

## 2021-06-14 DIAGNOSIS — Z12.11 SCREENING FOR COLON CANCER: Primary | ICD-10-CM

## 2021-06-14 DIAGNOSIS — Z12.11 ENCOUNTER FOR SCREENING FOR MALIGNANT NEOPLASM OF COLON: Primary | ICD-10-CM

## 2021-06-14 DIAGNOSIS — Z80.0 FAMILY HISTORY OF COLON CANCER: Primary | ICD-10-CM

## 2021-06-14 RX ORDER — RANITIDINE 150 MG/1
CAPSULE ORAL
COMMUNITY
End: 2021-06-14

## 2021-06-14 RX ORDER — MONTELUKAST SODIUM 10 MG/1
10 TABLET ORAL DAILY
COMMUNITY
Start: 2021-04-10 | End: 2022-01-24

## 2021-06-14 RX ORDER — ALBUTEROL SULFATE 90 UG/1
2 AEROSOL, METERED RESPIRATORY (INHALATION) EVERY 4 HOURS PRN
COMMUNITY
End: 2022-12-28 | Stop reason: SDUPTHER

## 2021-06-14 RX ORDER — ALBUTEROL SULFATE 1.25 MG/3ML
3 SOLUTION RESPIRATORY (INHALATION) AS NEEDED
COMMUNITY
End: 2022-11-28 | Stop reason: SDUPTHER

## 2021-06-14 RX ORDER — SODIUM, POTASSIUM,MAG SULFATES 17.5-3.13G
1 SOLUTION, RECONSTITUTED, ORAL ORAL ONCE
Qty: 177 ML | Refills: 0 | Status: CANCELLED | OUTPATIENT
Start: 2021-06-14 | End: 2021-06-14

## 2021-06-14 RX ORDER — METOPROLOL SUCCINATE 100 MG/1
100 TABLET, EXTENDED RELEASE ORAL DAILY
COMMUNITY
Start: 2021-05-12 | End: 2022-08-08

## 2021-06-14 RX ORDER — AZELASTINE 1 MG/ML
2 SPRAY, METERED NASAL 2 TIMES DAILY
COMMUNITY
Start: 2021-06-02 | End: 2022-04-11

## 2021-06-14 RX ORDER — ITRACONAZOLE 100 MG/1
CAPSULE ORAL
COMMUNITY
End: 2021-06-14

## 2021-06-14 RX ORDER — FLUTICASONE FUROATE 100 UG/1
POWDER RESPIRATORY (INHALATION)
COMMUNITY
End: 2021-06-14

## 2021-06-14 RX ORDER — GABAPENTIN 300 MG/1
CAPSULE ORAL
COMMUNITY
Start: 2021-05-11 | End: 2021-08-09

## 2021-06-14 RX ORDER — SODIUM, POTASSIUM,MAG SULFATES 17.5-3.13G
SOLUTION, RECONSTITUTED, ORAL ORAL
Qty: 177 ML | Refills: 0 | Status: SHIPPED | OUTPATIENT
Start: 2021-06-14 | End: 2021-12-02

## 2021-06-14 RX ORDER — LORATADINE AND PSEUDOEPHEDRINE SULFATE 10; 240 MG/1; MG/1
1 TABLET, EXTENDED RELEASE ORAL DAILY
COMMUNITY
End: 2021-11-08

## 2021-06-14 RX ORDER — TRIAMTERENE AND HYDROCHLOROTHIAZIDE 75; 50 MG/1; MG/1
TABLET ORAL
COMMUNITY
End: 2022-09-06

## 2021-06-14 RX ORDER — AMLODIPINE BESYLATE 5 MG/1
5 TABLET ORAL DAILY
COMMUNITY
Start: 2021-04-10 | End: 2022-01-17

## 2021-06-14 RX ORDER — COVID-19 ANTIGEN TEST
180 KIT MISCELLANEOUS DAILY
COMMUNITY
Start: 2021-05-02 | End: 2021-06-14

## 2021-06-14 RX ORDER — TOBRAMYCIN AND DEXAMETHASONE 3; 1 MG/ML; MG/ML
SUSPENSION/ DROPS OPHTHALMIC
COMMUNITY
Start: 2021-05-20 | End: 2021-08-26

## 2021-06-14 RX ORDER — BUDESONIDE AND FORMOTEROL FUMARATE DIHYDRATE 160; 4.5 UG/1; UG/1
2 AEROSOL RESPIRATORY (INHALATION) 2 TIMES DAILY
COMMUNITY
Start: 2021-06-02 | End: 2022-06-02 | Stop reason: SDUPTHER

## 2021-06-14 RX ORDER — LOSARTAN POTASSIUM 100 MG/1
100 TABLET ORAL DAILY
COMMUNITY
Start: 2021-04-12 | End: 2022-04-14

## 2021-06-14 NOTE — PROGRESS NOTES
Called and spoke with Pt on getting scheduled for colonoscopy for the date of 8/19/2021. Updated chart went over meds history allergies. Order in for colon and prep sent

## 2021-06-15 RX ORDER — FAMOTIDINE 20 MG/1
TABLET, FILM COATED ORAL
Qty: 30 TABLET | Refills: 11 | Status: SHIPPED | OUTPATIENT
Start: 2021-06-15 | End: 2022-07-01 | Stop reason: SDUPTHER

## 2021-06-15 RX ORDER — FAMOTIDINE 20 MG/1
20 TABLET, FILM COATED ORAL ONCE
Qty: 1 TABLET | Refills: 0 | Status: SHIPPED | OUTPATIENT
Start: 2021-06-15 | End: 2021-06-15

## 2021-08-07 DIAGNOSIS — E11.40 TYPE 2 DIABETES MELLITUS WITH DIABETIC NEUROPATHY, WITHOUT LONG-TERM CURRENT USE OF INSULIN (HCC): Primary | ICD-10-CM

## 2021-08-09 RX ORDER — GABAPENTIN 300 MG/1
CAPSULE ORAL
Qty: 270 CAPSULE | Refills: 0 | Status: SHIPPED | OUTPATIENT
Start: 2021-08-09 | End: 2021-11-11 | Stop reason: SDUPTHER

## 2021-08-09 RX ORDER — UBIDECARENONE 100 MG
CAPSULE ORAL
Qty: 90 CAPSULE | Refills: 0 | Status: SHIPPED | OUTPATIENT
Start: 2021-08-09 | End: 2021-11-08

## 2021-08-26 ENCOUNTER — OFFICE VISIT (OUTPATIENT)
Dept: CARDIOLOGY | Facility: CLINIC | Age: 76
End: 2021-08-26

## 2021-08-26 VITALS
HEIGHT: 61 IN | HEART RATE: 49 BPM | WEIGHT: 215 LBS | DIASTOLIC BLOOD PRESSURE: 72 MMHG | SYSTOLIC BLOOD PRESSURE: 141 MMHG | BODY MASS INDEX: 40.59 KG/M2

## 2021-08-26 DIAGNOSIS — I10 ESSENTIAL HYPERTENSION: ICD-10-CM

## 2021-08-26 DIAGNOSIS — R06.09 DYSPNEA ON EXERTION: ICD-10-CM

## 2021-08-26 DIAGNOSIS — J44.1 COPD, FREQUENT EXACERBATIONS (HCC): ICD-10-CM

## 2021-08-26 DIAGNOSIS — I51.7 CARDIOMEGALY: Primary | ICD-10-CM

## 2021-08-26 PROCEDURE — 99214 OFFICE O/P EST MOD 30 MIN: CPT | Performed by: INTERNAL MEDICINE

## 2021-08-26 RX ORDER — SPIRONOLACTONE 25 MG/1
25 TABLET ORAL DAILY
Qty: 90 TABLET | Refills: 3 | Status: SHIPPED | OUTPATIENT
Start: 2021-08-26 | End: 2021-12-02 | Stop reason: ALTCHOICE

## 2021-08-26 RX ORDER — FLUTICASONE FUROATE 100 UG/1
1 POWDER RESPIRATORY (INHALATION) DAILY
COMMUNITY
Start: 2021-07-23 | End: 2022-01-24

## 2021-08-26 NOTE — ASSESSMENT & PLAN NOTE
Patient with some increasing lower extremity edema as well as dyspnea on exertion symptoms previously proBNP level have been within normal range we will start on Aldactone due to mild elevation of blood pressure this also may help with her edema issues and plan on repeating a BMP and BNP in 2 weeks

## 2021-08-26 NOTE — PROGRESS NOTES
Chief Complaint  cardiomegaly, Edema, and no refills needed      History of Present Illness  Julia Rios presents to [facility]  Patient has been having some increasing lower extremity edema since her last visit she does report persistent dyspnea on exertion symptoms as well.  She has not been have any problems with chest pain    Past Medical History:   Diagnosis Date   • Acute right-sided low back pain with right-sided sciatica 01/15/2018   • Allergic rhinitis    • Asthma    • Back pain     2 bulging disc L2 L3   • Cardiomegaly 8/26/2021 11/16/20 echocardiogram: 1.  Normal ejection fraction of 55%. 2.  Mild left ventricular hypertrophy. 3.  No significant valvular heart issues.    • CHF (congestive heart failure) (CMS/[facility])    • CKD stage 3 10/29/2019   • Closed nondisplaced fracture of metatarsal bone of left foot with routine healing 06/19/2018    unspecified metatarsal, subsequent encounter   • COPD, frequent exacerbations 8/26/2021   • Diverticulitis    • Essential hypertension    • Hemoptysis     non cancerous   • Hyperlipidemia    • Idiopathic chronic gout of multiple sites without tophus 05/20/2016   • Primary osteoarthritis of right knee 01/16/2017   • Sleep apnea    • Vitamin D deficiency 01/13/2016         Current Outpatient Medications:   •  albuterol (ACCUNEB) 1.25 MG/3ML nebulizer solution, 3 mL., Disp: , Rfl:   •  albuterol sulfate HFA (Ventolin HFA) 108 (90 Base) MCG/ACT inhaler, Ventolin HFA 90 mcg/actuation inhalation HFA aerosol inhaler inhale 1 - 2 puffs (90 - 180 mcg) by inhalation route every 4-6 hours as needed   Active, Disp: , Rfl:   •  amLODIPine (NORVASC) 5 MG tablet, Take 5 mg by mouth Daily., Disp: , Rfl:   •  Arnuity Ellipta 100 MCG/ACT aerosol powder , Inhale 1 puff Daily., Disp: , Rfl:   •  azelastine (ASTELIN) 0.1 % nasal spray, 2 sprays 2 (Two) Times a Day., Disp: , Rfl:   •  D3-1000 25 MCG (1000 UT) capsule, TAKE 1 CAPSULE BY MOUTH EVERY DAY,  Disp: 90 capsule, Rfl: 0  •  famotidine (PEPCID) 20 MG tablet, TAKE 1 TABLET BY MOUTH AT BEDTIME, Disp: 30 tablet, Rfl: 11  •  gabapentin (NEURONTIN) 300 MG capsule, TAKE 1 CAPSULE BY MOUTH EVERY MORNING AND 2 CAPSULES EVERY EVENING, Disp: 270 capsule, Rfl: 0  •  loratadine-pseudoephedrine (Claritin-D 24 Hour)  MG per 24 hr tablet, Claritin-D 24 Hour  mg oral tablet extended release 24 hr take 1 tablet by oral route once daily   Suspended, Disp: , Rfl:   •  losartan (COZAAR) 100 MG tablet, Take 100 mg by mouth Daily., Disp: , Rfl:   •  metoprolol succinate XL (TOPROL-XL) 100 MG 24 hr tablet, Take 100 mg by mouth Daily., Disp: , Rfl:   •  montelukast (SINGULAIR) 10 MG tablet, Take 10 mg by mouth Every Night., Disp: , Rfl:   •  Symbicort 160-4.5 MCG/ACT inhaler, Inhale 2 puffs 2 (Two) Times a Day., Disp: , Rfl:   •  sodium-potassium-magnesium sulfates (Suprep Bowel Prep Kit) 17.5-3.13-1.6 GM/177ML solution oral solution, Take as directed, Disp: 177 mL, Rfl: 0  •  spironolactone (ALDACTONE) 25 MG tablet, Take 1 tablet by mouth Daily., Disp: 90 tablet, Rfl: 3  •  triamterene-hydrochlorothiazide (Maxzide) 75-50 MG per tablet, Maxzide 75-50 mg oral tablet take 1 tablet by oral route once daily   Suspended, Disp: , Rfl:     Medications Discontinued During This Encounter   Medication Reason   • tobramycin-dexamethasone (TOBRADEX) 0.3-0.1 % ophthalmic suspension      Allergies   Allergen Reactions   • Ibuprofen Hives   • Penicillins Seizure   • Cephalosporins Rash   • Sulfa Antibiotics Rash        Social History     Tobacco Use   • Smoking status: Former Smoker     Packs/day: 0.50     Types: Cigarettes     Start date:      Quit date:      Years since quittin.6   • Smokeless tobacco: Never Used   Vaping Use   • Vaping Use: Never used   Substance Use Topics   • Alcohol use: Never   • Drug use: Never       Family History   Problem Relation Age of Onset   • Colon cancer Mother 61   • Cancer Mother    •  "Heart disease Mother    • Diabetes Brother    • Breast cancer Maternal Grandmother    • Stroke Paternal Grandfather    • Heart failure Father         Objective     /72   Pulse (!) 49   Ht 154.9 cm (61\")   Wt 97.5 kg (215 lb)   BMI 40.62 kg/m²       Physical Exam  Constitutional:       General: He is awake. He is not in acute distress.     Appearance: Normal appearance.   Neck:      Vascular: No carotid bruit, hepatojugular reflux or JVD.   Cardiovascular:      Rate and Rhythm: Normal rate and regular rhythm.      Chest Wall: PMI is not displaced.      Heart sounds: Normal heart sounds, S1 normal and S2 normal. No murmur heard.   No friction rub. No gallop. No S3 or S4 sounds.    Pulmonary:      Effort: Pulmonary effort is normal.      Breath sounds: Normal breath sounds. No wheezing, rhonchi or rales.   Ext.     +1 bilateral   skin:     General: Skin is warm and dry.      Coloration: Skin is not cyanotic.      Findings: No petechiae or rash.   Neurological:      Mental Status: He is alert.   Psychiatric:         Behavior: Behavior is cooperative.       Result Review :     No results found for: PROBNP  CMP    CMP 10/4/20 10/15/20 4/26/21   Glucose 102 (A) 82 106 (A)   BUN 12 19 16   Creatinine 0.81 1.05 (A) 0.98 (A)   Sodium 139 141 139   Potassium 4.1 4.0 3.8   Chloride 104 104 103   Calcium 9.0 9.1 9.5   Albumin 3.9 3.8 4.3   Total Bilirubin 0.39 0.48 0.52   Alkaline Phosphatase 89 99 88   AST (SGOT) 13 (A) 14 (A) 19   ALT (SGPT) <5 (A) 7 (A) 10   (A) Abnormal value       Comments are available for some flowsheets but are not being displayed.           CBC w/diff    CBC w/Diff 10/4/20   WBC 11.95 (A)   RBC 4.67   Hemoglobin 12.8   Hematocrit 42.0   MCV 89.9   MCH 27.4   MCHC 30.5 (A)   RDW 15.1 (A)   Platelets 257   Neutrophil Rel % 50.9   Lymphocyte Rel % 37.1   Monocyte Rel % 7.6   Eosinophil Rel % 3.2   Basophil Rel % 0.9   (A) Abnormal value             Lab Results   Component Value Date    TSH 2.300 " 10/29/2019      Lab Results   Component Value Date    FREET4 1.2 10/29/2019      No results found for: DDIMERQUANT  No results found for: MG   No results found for: DIGOXIN   Lab Results   Component Value Date    TROPONINT <0.01 10/04/2020      No results found for: POCTROP(       Lipid Panel    Lipid Panel 10/15/20 4/26/21   Total Cholesterol 165 197   Triglycerides 150 112   HDL Cholesterol 57 51   VLDL Cholesterol 30 22   LDL Cholesterol  78 124 (A)   (A) Abnormal value       Comments are available for some flowsheets but are not being displayed.                  No results found for this or any previous visit.                  Diagnoses and all orders for this visit:    1. Cardiomegaly (Primary)  Assessment & Plan:  Patient with some increasing lower extremity edema as well as dyspnea on exertion symptoms previously proBNP level have been within normal range we will start on Aldactone due to mild elevation of blood pressure this also may help with her edema issues and plan on repeating a BMP and BNP in 2 weeks    Orders:  -     Basic Metabolic Panel; Future  -     Basic Metabolic Panel; Future    2. Dyspnea on exertion  -     BNP; Future    3. Essential hypertension  Assessment & Plan:  Mild systolic elevation adding Aldactone therapy also counseled low-sodium diet      4. COPD, frequent exacerbations    Other orders  -     spironolactone (ALDACTONE) 25 MG tablet; Take 1 tablet by mouth Daily.  Dispense: 90 tablet; Refill: 3          Follow Up     Return in about 6 months (around 2/26/2022) for EKG with F/U.          Patient was given instructions and counseling regarding her condition or for health maintenance advice. Please see specific information pulled into the AVS if appropriate.

## 2021-09-01 ENCOUNTER — HOSPITAL ENCOUNTER (OUTPATIENT)
Dept: CT IMAGING | Facility: HOSPITAL | Age: 76
Discharge: HOME | End: 2021-09-01
Admitting: NURSE PRACTITIONER

## 2021-09-01 DIAGNOSIS — R91.1 LUNG NODULE: ICD-10-CM

## 2021-09-01 PROCEDURE — 71250 CT THORAX DX C-: CPT | Performed by: RADIOLOGY

## 2021-09-01 PROCEDURE — 71250 CT THORAX DX C-: CPT

## 2021-09-27 ENCOUNTER — TELEPHONE (OUTPATIENT)
Dept: CARDIOLOGY | Facility: CLINIC | Age: 76
End: 2021-09-27

## 2021-09-27 DIAGNOSIS — I51.7 LVH (LEFT VENTRICULAR HYPERTROPHY): Primary | ICD-10-CM

## 2021-09-27 NOTE — TELEPHONE ENCOUNTER
Returned call to patient.     Patient reports she only took spironolactone for 4 days and stopped it on her own.  Reports she began having muscle spasms/cramps in her arms and legs.    She stated in the past she has been on Lasix and potassium and did not have any issues.     Patient does still have BLE edema, but this is not daily.  I encouraged to try compression stocking.    Do you want to switch her medication?

## 2021-09-27 NOTE — TELEPHONE ENCOUNTER
Spoke with patient regarding overdue lab results.  She states she didn't realize she was supposed to have labs.  She will get them done soon.

## 2021-09-29 RX ORDER — POTASSIUM CHLORIDE 750 MG/1
10 TABLET, FILM COATED, EXTENDED RELEASE ORAL EVERY OTHER DAY
Qty: 45 TABLET | Refills: 3 | Status: SHIPPED | OUTPATIENT
Start: 2021-09-29 | End: 2022-09-06

## 2021-09-29 RX ORDER — FUROSEMIDE 20 MG/1
20 TABLET ORAL EVERY OTHER DAY
Qty: 45 TABLET | Refills: 3 | Status: SHIPPED | OUTPATIENT
Start: 2021-09-29 | End: 2023-01-11

## 2021-10-04 ENCOUNTER — PATIENT MESSAGE (OUTPATIENT)
Dept: CARDIOLOGY | Facility: CLINIC | Age: 76
End: 2021-10-04

## 2021-10-07 NOTE — PRE-PROCEDURE INSTRUCTIONS
Pt verbalized understanding as to where to come to and clear liq diet on Monday 10/11 lax po prep explained and arrival time noted at 0830 am on 10/12 meds to take in am :amlodipine,singulair,toprol,claritin and nasal spray.. bring albuterol inhaler with her

## 2021-10-11 ENCOUNTER — PATIENT MESSAGE (OUTPATIENT)
Dept: CARDIOLOGY | Facility: CLINIC | Age: 76
End: 2021-10-11

## 2021-10-12 ENCOUNTER — HOSPITAL ENCOUNTER (OUTPATIENT)
Facility: HOSPITAL | Age: 76
Setting detail: HOSPITAL OUTPATIENT SURGERY
Discharge: HOME | End: 2021-10-12
Attending: INTERNAL MEDICINE | Admitting: INTERNAL MEDICINE

## 2021-10-12 ENCOUNTER — ANESTHESIA (OUTPATIENT)
Dept: GASTROENTEROLOGY | Facility: HOSPITAL | Age: 76
End: 2021-10-12

## 2021-10-12 ENCOUNTER — ANESTHESIA EVENT (OUTPATIENT)
Dept: GASTROENTEROLOGY | Facility: HOSPITAL | Age: 76
End: 2021-10-12

## 2021-10-12 VITALS
HEART RATE: 51 BPM | RESPIRATION RATE: 18 BRPM | OXYGEN SATURATION: 95 % | DIASTOLIC BLOOD PRESSURE: 46 MMHG | TEMPERATURE: 97.7 F | BODY MASS INDEX: 39.6 KG/M2 | WEIGHT: 215.17 LBS | HEIGHT: 62 IN | SYSTOLIC BLOOD PRESSURE: 102 MMHG

## 2021-10-12 PROCEDURE — 25010000002 PROPOFOL 10 MG/ML EMULSION: Performed by: NURSE ANESTHETIST, CERTIFIED REGISTERED

## 2021-10-12 PROCEDURE — G0121 COLON CA SCRN NOT HI RSK IND: HCPCS | Performed by: INTERNAL MEDICINE

## 2021-10-12 RX ORDER — PROMETHAZINE HYDROCHLORIDE 25 MG/1
25 TABLET ORAL ONCE AS NEEDED
Status: DISCONTINUED | OUTPATIENT
Start: 2021-10-12 | End: 2021-10-12

## 2021-10-12 RX ORDER — PROMETHAZINE HYDROCHLORIDE 25 MG/1
25 SUPPOSITORY RECTAL ONCE AS NEEDED
Status: DISCONTINUED | OUTPATIENT
Start: 2021-10-12 | End: 2021-10-12 | Stop reason: HOSPADM

## 2021-10-12 RX ORDER — ONDANSETRON 2 MG/ML
4 INJECTION INTRAMUSCULAR; INTRAVENOUS ONCE AS NEEDED
Status: DISCONTINUED | OUTPATIENT
Start: 2021-10-12 | End: 2021-10-12 | Stop reason: HOSPADM

## 2021-10-12 RX ORDER — PROMETHAZINE HYDROCHLORIDE 25 MG/1
25 SUPPOSITORY RECTAL ONCE AS NEEDED
Status: DISCONTINUED | OUTPATIENT
Start: 2021-10-12 | End: 2021-10-12

## 2021-10-12 RX ORDER — PROMETHAZINE HYDROCHLORIDE 25 MG/1
25 TABLET ORAL ONCE AS NEEDED
Status: DISCONTINUED | OUTPATIENT
Start: 2021-10-12 | End: 2021-10-12 | Stop reason: HOSPADM

## 2021-10-12 RX ORDER — MEPERIDINE HYDROCHLORIDE 25 MG/ML
12.5 INJECTION INTRAMUSCULAR; INTRAVENOUS; SUBCUTANEOUS
Status: DISCONTINUED | OUTPATIENT
Start: 2021-10-12 | End: 2021-10-12

## 2021-10-12 RX ORDER — SODIUM CHLORIDE, SODIUM LACTATE, POTASSIUM CHLORIDE, CALCIUM CHLORIDE 600; 310; 30; 20 MG/100ML; MG/100ML; MG/100ML; MG/100ML
30 INJECTION, SOLUTION INTRAVENOUS CONTINUOUS
Status: DISCONTINUED | OUTPATIENT
Start: 2021-10-12 | End: 2021-10-12 | Stop reason: HOSPADM

## 2021-10-12 RX ORDER — LIDOCAINE HYDROCHLORIDE 20 MG/ML
INJECTION, SOLUTION INFILTRATION; PERINEURAL AS NEEDED
Status: DISCONTINUED | OUTPATIENT
Start: 2021-10-12 | End: 2021-10-12 | Stop reason: SURG

## 2021-10-12 RX ORDER — OXYCODONE HYDROCHLORIDE 5 MG/1
5 TABLET ORAL
Status: DISCONTINUED | OUTPATIENT
Start: 2021-10-12 | End: 2021-10-12

## 2021-10-12 RX ORDER — ONDANSETRON 2 MG/ML
4 INJECTION INTRAMUSCULAR; INTRAVENOUS ONCE AS NEEDED
Status: DISCONTINUED | OUTPATIENT
Start: 2021-10-12 | End: 2021-10-12

## 2021-10-12 RX ORDER — OXYCODONE HYDROCHLORIDE 5 MG/1
5 TABLET ORAL
Status: DISCONTINUED | OUTPATIENT
Start: 2021-10-12 | End: 2021-10-12 | Stop reason: HOSPADM

## 2021-10-12 RX ORDER — MEPERIDINE HYDROCHLORIDE 25 MG/ML
12.5 INJECTION INTRAMUSCULAR; INTRAVENOUS; SUBCUTANEOUS
Status: DISCONTINUED | OUTPATIENT
Start: 2021-10-12 | End: 2021-10-12 | Stop reason: HOSPADM

## 2021-10-12 RX ADMIN — PROPOFOL 150 MCG/KG/MIN: 10 INJECTION, EMULSION INTRAVENOUS at 10:17

## 2021-10-12 RX ADMIN — LIDOCAINE HYDROCHLORIDE 80 MG: 20 INJECTION, SOLUTION INFILTRATION; PERINEURAL at 10:17

## 2021-10-12 RX ADMIN — SODIUM CHLORIDE, POTASSIUM CHLORIDE, SODIUM LACTATE AND CALCIUM CHLORIDE 30 ML/HR: 600; 310; 30; 20 INJECTION, SOLUTION INTRAVENOUS at 10:07

## 2021-10-12 NOTE — DISCHARGE INSTRUCTIONS
Colonoscopy, Adult, Care After  This sheet gives you information about how to care for yourself after your procedure. Your doctor may also give you more specific instructions. If you have problems or questions, call your doctor.  What can I expect after the procedure?  After the procedure, it is common to have:  · A small amount of blood in your poop (stool) for 24 hours.  · Some gas.  · Mild cramping or bloating in your belly (abdomen).  Follow these instructions at home:  Eating and drinking    · Drink enough fluid to keep your pee (urine) pale yellow.  · Follow instructions from your doctor about what you cannot eat or drink.  · Return to your normal diet as told by your doctor. Avoid heavy or fried foods that are hard to digest.    Activity  · Rest as told by your doctor.  · Do not sit for a long time without moving. Get up to take short walks every 1-2 hours. This is important. Ask for help if you feel weak or unsteady.  · Return to your normal activities as told by your doctor. Ask your doctor what activities are safe for you.  To help cramping and bloating:    · Try walking around.  · Put heat on your belly as told by your doctor. Use the heat source that your doctor recommends, such as a moist heat pack or a heating pad.  ? Put a towel between your skin and the heat source.  ? Leave the heat on for 20-30 minutes.  ? Remove the heat if your skin turns bright red. This is very important if you are unable to feel pain, heat, or cold. You may have a greater risk of getting burned.    General instructions  · If you were given a medicine to help you relax (sedative) during your procedure, it can affect you for many hours. Do not drive or use machinery until your doctor says that it is safe.  · For the first 24 hours after the procedure:  ? Do not sign important documents.  ? Do not drink alcohol.  ? Do your daily activities more slowly than normal.  ? Eat foods that are soft and easy to digest.  · Take  over-the-counter or prescription medicines only as told by your doctor.  · Keep all follow-up visits as told by your doctor. This is important.  Contact a doctor if:  · You have blood in your poop 2-3 days after the procedure.  Get help right away if:  · You have more than a small amount of blood in your poop.  · You see large clumps of tissue (blood clots) in your poop.  · Your belly is swollen.  · You feel like you may vomit (nauseous).  · You vomit.  · You have a fever.  · You have belly pain that gets worse, and medicine does not help your pain.  Summary  · After the procedure, it is common to have a small amount of blood in your poop. You may also have mild cramping and bloating in your belly.  · If you were given a medicine to help you relax (sedative) during your procedure, it can affect you for many hours. Do not drive or use machinery until your doctor says that it is safe.  · Get help right away if you have a lot of blood in your poop, feel like you may vomit, have a fever, or have more belly pain.  This information is not intended to replace advice given to you by your health care provider. Make sure you discuss any questions you have with your health care provider.  Document Revised: 10/23/2020 Document Reviewed: 07/13/2020  [employer] Patient Education © 2021 [employer]    Diverticulosis    Diverticulosis is a condition that develops when small pouches (diverticula) form in the wall of the large intestine (colon). The colon is where water is absorbed and stool (feces) is formed. The pouches form when the inside layer of the colon pushes through weak spots in the outer layers of the colon. You may have a few pouches or many of them.  The pouches usually do not cause problems unless they become inflamed or infected. When this happens, the condition is called diverticulitis.  What are the causes?  The cause of this condition is not known.  What increases the risk?  The following factors may make you more  likely to develop this condition:  · Being older than age 60. Your risk for this condition increases with age. Diverticulosis is rare among people younger than age 30. By age 80, many people have it.  · Eating a low-fiber diet.  · Having frequent constipation.  · Being overweight.  · Not getting enough exercise.  · Smoking.  · Taking over-the-counter pain medicines, like aspirin and ibuprofen.  · Having a family history of diverticulosis.  What are the signs or symptoms?  In most people, there are no symptoms of this condition. If you do have symptoms, they may include:  · Bloating.  · Cramps in the abdomen.  · Constipation or diarrhea.  · Pain in the lower left side of the abdomen.  How is this diagnosed?  Because diverticulosis usually has no symptoms, it is most often diagnosed during an exam for other colon problems. The condition may be diagnosed by:  · Using a flexible scope to examine the colon (colonoscopy).  · Taking an X-ray of the colon after dye has been put into the colon (barium enema).  · Having a CT scan.  How is this treated?  You may not need treatment for this condition. Your health care provider may recommend treatment to prevent problems. You may need treatment if you have symptoms or if you previously had diverticulitis. Treatment may include:  · Eating a high-fiber diet.  · Taking a fiber supplement.  · Taking a live bacteria supplement (probiotic).  · Taking medicine to relax your colon.  Follow these instructions at home:  Medicines  · Take over-the-counter and prescription medicines only as told by your health care provider.  · If told by your health care provider, take a fiber supplement or probiotic.  Constipation prevention  Your condition may cause constipation. To prevent or treat constipation, you may need to:  · Drink enough fluid to keep your urine pale yellow.  · Take over-the-counter or prescription medicines.  · Eat foods that are high in fiber, such as beans, whole grains, and  fresh fruits and vegetables.  · Limit foods that are high in fat and processed sugars, such as fried or sweet foods.    General instructions  · Try not to strain when you have a bowel movement.  · Keep all follow-up visits as told by your health care provider. This is important.  Contact a health care provider if you:  · Have pain in your abdomen.  · Have bloating.  · Have cramps.  · Have not had a bowel movement in 3 days.  Get help right away if:  · Your pain gets worse.  · Your bloating becomes very bad.  · You have a fever or chills, and your symptoms suddenly get worse.  · You vomit.  · You have bowel movements that are bloody or black.  · You have bleeding from your rectum.  Summary  · Diverticulosis is a condition that develops when small pouches (diverticula) form in the wall of the large intestine (colon).  · You may have a few pouches or many of them.  · This condition is most often diagnosed during an exam for other colon problems.  · Treatment may include increasing the fiber in your diet, taking supplements, or taking medicines.  This information is not intended to replace advice given to you by your health care provider. Make sure you discuss any questions you have with your health care provider.  Document Revised: 07/16/2020 Document Reviewed: 07/16/2020  [employer] Patient Education © 2021 [employer]    High-Fiber Eating Plan  Fiber, also called dietary fiber, is a type of carbohydrate. It is found foods such as fruits, vegetables, whole grains, and beans. A high-fiber diet can have many health benefits. Your health care provider may recommend a high-fiber diet to help:  · Prevent constipation. Fiber can make your bowel movements more regular.  · Lower your cholesterol.  · Relieve the following conditions:  ? Inflammation of veins in the anus (hemorrhoids).  ? Inflammation of specific areas of the digestive tract (uncomplicated diverticulosis).  ? A problem of the large intestine, also called the  colon, that sometimes causes pain and diarrhea (irritable bowel syndrome, or IBS).  · Prevent overeating as part of a weight-loss plan.  · Prevent heart disease, type 2 diabetes, and certain cancers.  What are tips for following this plan?  Reading food labels    · Check the nutrition facts label on food products for the amount of dietary fiber. Choose foods that have 5 grams of fiber or more per serving.  · The goals for recommended daily fiber intake include:  ? Men (age 50 or younger): 34-38 g.  ? Men (over age 50): 28-34 g.  ? Women (age 50 or younger): 25-28 g.  ? Women (over age 50): 22-25 g.  Your daily fiber goal is _____________ g.  Shopping  · Choose whole fruits and vegetables instead of processed forms, such as apple juice or applesauce.  · Choose a wide variety of high-fiber foods such as avocados, lentils, oats, and kidney beans.  · Read the nutrition facts label of the foods you choose. Be aware of foods with added fiber. These foods often have high sugar and sodium amounts per serving.  Cooking  · Use whole-grain flour for baking and cooking.  · Cook with brown rice instead of white rice.  Meal planning  · Start the day with a breakfast that is high in fiber, such as a cereal that contains 5 g of fiber or more per serving.  · Eat breads and cereals that are made with whole-grain flour instead of refined flour or white flour.  · Eat brown rice, bulgur wheat, or millet instead of white rice.  · Use beans in place of meat in soups, salads, and pasta dishes.  · Be sure that half of the grains you eat each day are whole grains.  General information  · You can get the recommended daily intake of dietary fiber by:  ? Eating a variety of fruits, vegetables, grains, nuts, and beans.  ? Taking a fiber supplement if you are not able to take in enough fiber in your diet. It is better to get fiber through food than from a supplement.  · Gradually increase how much fiber you consume. If you increase your intake of  dietary fiber too quickly, you may have bloating, cramping, or gas.  · Drink plenty of water to help you digest fiber.  · Choose high-fiber snacks, such as berries, raw vegetables, nuts, and popcorn.  What foods should I eat?  Fruits  Berries. Pears. Apples. Oranges. Avocado. Prunes and raisins. Dried figs.  Vegetables  Sweet potatoes. Spinach. Kale. Artichokes. Cabbage. Broccoli. Cauliflower. Green peas. Carrots. Squash.  Grains  Whole-grain breads. Multigrain cereal. Oats and oatmeal. Brown rice. Barley. Bulgur wheat. Millet. Quinoa. Bran muffins. Popcorn. Rye wafer crackers.  Meats and other proteins  Navy beans, kidney beans, and omer beans. Soybeans. Split peas. Lentils. Nuts and seeds.  Dairy  Fiber-fortified yogurt.  Beverages  Fiber-fortified soy milk. Fiber-fortified orange juice.  Other foods  Fiber bars.  The items listed above may not be a complete list of recommended foods and beverages. Contact a dietitian for more information.  What foods should I avoid?  Fruits  Fruit juice. Cooked, strained fruit.  Vegetables  Fried potatoes. Canned vegetables. Well-cooked vegetables.  Grains  White bread. Pasta made with refined flour. White rice.  Meats and other proteins  Fatty cuts of meat. Fried chicken or fried fish.  Dairy  Milk. Yogurt. Cream cheese. Sour cream.  Fats and oils  [place].  Beverages  Soft drinks.  Other foods  Cakes and pastries.  The items listed above may not be a complete list of foods and beverages to avoid. Talk with your dietitian about what choices are best for you.  Summary  · Fiber is a type of carbohydrate. It is found in foods such as fruits, vegetables, whole grains, and beans.  · A high-fiber diet has many benefits. It can help to prevent constipation, lower blood cholesterol, aid weight loss, and reduce your risk of heart disease, diabetes, and certain cancers.  · Increase your intake of fiber gradually. Increasing fiber too quickly may cause cramping, bloating, and gas. Drink  plenty of water while you increase the amount of fiber you consume.  · The best sources of fiber include whole fruits and vegetables, whole grains, nuts, seeds, and beans.  This information is not intended to replace advice given to you by your health care provider. Make sure you discuss any questions you have with your health care provider.  Document Revised: 04/22/2021 Document Reviewed: 04/22/2021  [employer] Patient Education © 2021 [employer]

## 2021-10-12 NOTE — H&P
ScreeningPre Procedure History & Physical    Chief Complaint:   Screening     Subjective     HPI:   Screening     Past Medical History:   Past Medical History:   Diagnosis Date   • Acute right-sided low back pain with right-sided sciatica 01/15/2018   • Allergic rhinitis    • Asthma    • Back pain     2 bulging disc L2 L3   • Cardiomegaly 8/26/2021 11/16/20 echocardiogram: 1.  Normal ejection fraction of 55%. 2.  Mild left ventricular hypertrophy. 3.  No significant valvular heart issues.    • CHF (congestive heart failure) ([facility])    • CKD stage 3 10/29/2019   • Closed nondisplaced fracture of metatarsal bone of left foot with routine healing 06/19/2018    unspecified metatarsal, subsequent encounter   • COPD, frequent exacerbations 8/26/2021   • Diverticulitis    • Essential hypertension    • GERD (gastroesophageal reflux disease)    • Hemoptysis     non cancerous   • Hyperlipidemia    • Idiopathic chronic gout of multiple sites without tophus 05/20/2016   • Primary osteoarthritis of right knee 01/16/2017   • Sleep apnea    • Vitamin D deficiency 01/13/2016       Past Surgical History:  Past Surgical History:   Procedure Laterality Date   • CATARACT EXTRACTION Right    • COLONOSCOPY  2014   • ENDOSCOPY  2015   • EYE SURGERY      cataract right eye   • RETINAL DETACHMENT REPAIR Right     hole in retina repair       Family History:  Family History   Problem Relation Age of Onset   • Colon cancer Mother 61   • Cancer Mother    • Heart disease Mother    • Diabetes Brother    • Breast cancer Maternal Grandmother    • Stroke Paternal Grandfather    • Heart failure Father        Social History:   reports that she quit smoking about 26 years ago. Her smoking use included cigarettes. She started smoking about 58 years ago. She has a 5.00 pack-year smoking history. She has never used smokeless tobacco. She reports that she does not drink alcohol and does not use drugs.    Medications:   Medications Prior to Admission    Medication Sig Dispense Refill Last Dose   • albuterol (ACCUNEB) 1.25 MG/3ML nebulizer solution Take 3 mL by nebulization As Needed.   10/11/2021 at Unknown time   • albuterol sulfate HFA (Ventolin HFA) 108 (90 Base) MCG/ACT inhaler Inhale 2 puffs Every 4 (Four) Hours As Needed.   10/11/2021 at Unknown time   • amLODIPine (NORVASC) 5 MG tablet Take 5 mg by mouth Daily.   10/12/2021 at 0600   • Arnuity Ellipta 100 MCG/ACT aerosol powder  Inhale 1 puff Daily.   10/11/2021 at Unknown time   • azelastine (ASTELIN) 0.1 % nasal spray 2 sprays 2 (Two) Times a Day.   10/11/2021 at Unknown time   • D3-1000 25 MCG (1000 UT) capsule TAKE 1 CAPSULE BY MOUTH EVERY DAY (Patient taking differently: Take 1,000 Units by mouth Daily.) 90 capsule 0 10/11/2021 at Unknown time   • famotidine (PEPCID) 20 MG tablet TAKE 1 TABLET BY MOUTH AT BEDTIME (Patient taking differently: Take 20 mg by mouth At Night As Needed.) 30 tablet 11 10/11/2021 at Unknown time   • furosemide (LASIX) 20 MG tablet Take 1 tablet by mouth Every Other Day. 45 tablet 3 10/11/2021 at Unknown time   • gabapentin (NEURONTIN) 300 MG capsule TAKE 1 CAPSULE BY MOUTH EVERY MORNING AND 2 CAPSULES EVERY EVENING (Patient taking differently: Take 300 mg by mouth 2 (two) times a day. 1 tab in am and 2 at bedtime) 270 capsule 0 10/11/2021 at Unknown time   • loratadine-pseudoephedrine (Claritin-D 24 Hour)  MG per 24 hr tablet Take 1 tablet by mouth Daily.   10/12/2021 at 0600   • losartan (COZAAR) 100 MG tablet Take 100 mg by mouth Daily.   10/11/2021 at Unknown time   • metoprolol succinate XL (TOPROL-XL) 100 MG 24 hr tablet Take 100 mg by mouth Daily.   10/12/2021 at 0600   • montelukast (SINGULAIR) 10 MG tablet Take 10 mg by mouth Daily.   10/12/2021 at 0600   • potassium chloride 10 MEQ CR tablet Take 1 tablet by mouth Every Other Day. 45 tablet 3 10/11/2021 at Unknown time   • sodium-potassium-magnesium sulfates (Suprep Bowel Prep Kit) 17.5-3.13-1.6 GM/177ML  "solution oral solution Take as directed (Patient taking differently: 2 bottles. Take as directed) 177 mL 0 10/12/2021 at 0450   • Symbicort 160-4.5 MCG/ACT inhaler Inhale 2 puffs 2 (Two) Times a Day.   10/11/2021 at Unknown time   • spironolactone (ALDACTONE) 25 MG tablet Take 1 tablet by mouth Daily. (Patient not taking: Reported on 10/7/2021) 90 tablet 3 Not Taking at Unknown time   • triamterene-hydrochlorothiazide (Maxzide) 75-50 MG per tablet Maxzide 75-50 mg oral tablet take 1 tablet by oral route once daily   Suspended (Patient not taking: Reported on 10/7/2021)   Not Taking at Unknown time       Allergies:  Ibuprofen, Penicillins, Cephalosporins, and Sulfa antibiotics        Objective     Blood pressure 143/82, pulse 55, temperature 97.6 °F (36.4 °C), temperature source Temporal, resp. rate 20, height 157.2 cm (61.89\"), weight 97.6 kg (215 lb 2.7 oz), SpO2 96 %.    Physical Exam   Constitutional: Pt is oriented to person, place, and time and well-developed, well-nourished, and in no distress.   Mouth/Throat: Oropharynx is clear and moist.   Neck: Normal range of motion.   Cardiovascular: Normal rate, regular rhythm and normal heart sounds.    Pulmonary/Chest: Effort normal and breath sounds normal.   Abdominal: Soft. Nontender  Skin: Skin is warm and dry.   Psychiatric: Mood, memory, affect and judgment normal.     Assessment/Plan     Diagnosis:  Screening colonoscopy  Family h/o  Colon cancer     Anticipated Surgical Procedure:  colonoscopy    The risks, benefits, and alternatives of this procedure have been discussed with the patient or the responsible party- the patient understands and agrees to proceed.            "

## 2021-10-12 NOTE — ANESTHESIA PREPROCEDURE EVALUATION
Anesthesia Evaluation     Patient summary reviewed and Nursing notes reviewed   no history of anesthetic complications:  NPO Solid Status: > 8 hours  NPO Liquid Status: > 2 hours           Airway   Mallampati: I  TM distance: >3 FB  Neck ROM: full  No difficulty expected  Dental          Pulmonary - normal exam   (+) a smoker Former, COPD, asthma,sleep apnea on CPAP,   (-) shortness of breath  Cardiovascular - normal exam  Exercise tolerance: good (4-7 METS)    ECG reviewed    (+) hypertension, dysrhythmias Bradycardia, CHF , hyperlipidemia,     ROS comment: 11/16/20 echocardiogram: 1.  Normal ejection fraction of 55%. 2.  Mild left ventricular hypertrophy. 3.  No significant valvular heart issues.     Neuro/Psych  (+) numbness,     GI/Hepatic/Renal/Endo    (+) obesity, morbid obesity, GERD,  renal disease CRI,     ROS Comment: diverticulitis    Musculoskeletal     (+) back pain, radiculopathy Right lower extremity  Abdominal   (+) obese,    Substance History - negative use     OB/GYN negative ob/gyn ROS         Other   arthritis,                      Anesthesia Plan    ASA 3     general   (Total IV Anesthesia    Patient understands anesthesia not responsible for dental damage.  )  intravenous induction     Anesthetic plan, all risks, benefits, and alternatives have been provided, discussed and informed consent has been obtained with: patient.

## 2021-10-12 NOTE — ANESTHESIA POSTPROCEDURE EVALUATION
Patient: Julia Rios    Procedure Summary     Date: 10/12/21 Room / Location: [facility] ENDOSCOPY 3 / [facility] ENDOSCOPY    Anesthesia Start: 1013 Anesthesia Stop: 1034    Procedure: COLONOSCOPY (N/A ) Diagnosis:       Family history of colon cancer      (Family history of colon cancer [Z80.0])    Surgeons: Jorge Diane MD Provider: Graciela Fishman DO    Anesthesia Type: general ASA Status: 3          Anesthesia Type: general    Vitals  Vitals Value Taken Time   /46 10/12/21 1051   Temp 36.5 °C (97.7 °F) 10/12/21 1051   Pulse 51 10/12/21 1051   Resp 18 10/12/21 1051   SpO2 95 % 10/12/21 1051           Post Anesthesia Care and Evaluation    Patient location during evaluation: bedside  Patient participation: complete - patient participated  Level of consciousness: awake  Pain management: adequate  Airway patency: patent  Anesthetic complications: No anesthetic complications  PONV Status: none  Cardiovascular status: acceptable and stable  Respiratory status: acceptable  Hydration status: acceptable    Comments: An Anesthesiologist personally participated in the most demanding procedures (including induction and emergence if applicable) in the anesthesia plan, monitored the course of anesthesia administration at frequent intervals and remained physically present and available for immediate diagnosis and treatment of emergencies.

## 2021-11-03 ENCOUNTER — LAB (OUTPATIENT)
Dept: LAB | Facility: HOSPITAL | Age: 76
End: 2021-11-03

## 2021-11-03 DIAGNOSIS — R06.09 DYSPNEA ON EXERTION: ICD-10-CM

## 2021-11-03 DIAGNOSIS — I51.7 LVH (LEFT VENTRICULAR HYPERTROPHY): ICD-10-CM

## 2021-11-03 DIAGNOSIS — I51.7 CARDIOMEGALY: ICD-10-CM

## 2021-11-03 LAB
ANION GAP SERPL CALCULATED.3IONS-SCNC: 10.4 MMOL/L (ref 5–15)
BUN SERPL-MCNC: 27 MG/DL (ref 8–23)
BUN/CREAT SERPL: 25.7 (ref 7–25)
CALCIUM SPEC-SCNC: 9.3 MG/DL (ref 8.6–10.5)
CHLORIDE SERPL-SCNC: 110 MMOL/L (ref 98–107)
CO2 SERPL-SCNC: 25.6 MMOL/L (ref 22–29)
CREAT SERPL-MCNC: 1.05 MG/DL (ref 0.57–1)
GFR SERPL CREATININE-BSD FRML MDRD: 51 ML/MIN/1.73
GLUCOSE SERPL-MCNC: 108 MG/DL (ref 65–99)
NT-PROBNP SERPL-MCNC: 120.9 PG/ML (ref 0–1800)
POTASSIUM SERPL-SCNC: 4.2 MMOL/L (ref 3.5–5.2)
SODIUM SERPL-SCNC: 146 MMOL/L (ref 136–145)

## 2021-11-03 PROCEDURE — 83880 ASSAY OF NATRIURETIC PEPTIDE: CPT

## 2021-11-03 PROCEDURE — 80048 BASIC METABOLIC PNL TOTAL CA: CPT

## 2021-11-03 PROCEDURE — 36415 COLL VENOUS BLD VENIPUNCTURE: CPT

## 2021-11-05 ENCOUNTER — OFFICE VISIT (OUTPATIENT)
Dept: FAMILY MEDICINE CLINIC | Facility: CLINIC | Age: 76
End: 2021-11-05

## 2021-11-05 VITALS
HEIGHT: 68 IN | DIASTOLIC BLOOD PRESSURE: 67 MMHG | WEIGHT: 218.4 LBS | TEMPERATURE: 97.4 F | SYSTOLIC BLOOD PRESSURE: 140 MMHG | HEART RATE: 60 BPM | OXYGEN SATURATION: 98 % | BODY MASS INDEX: 33.1 KG/M2

## 2021-11-05 DIAGNOSIS — I10 ESSENTIAL HYPERTENSION: Primary | ICD-10-CM

## 2021-11-05 DIAGNOSIS — I50.22 CHRONIC SYSTOLIC CONGESTIVE HEART FAILURE (HCC): ICD-10-CM

## 2021-11-05 DIAGNOSIS — E78.5 HYPERLIPIDEMIA, UNSPECIFIED HYPERLIPIDEMIA TYPE: ICD-10-CM

## 2021-11-05 DIAGNOSIS — N18.31 STAGE 3A CHRONIC KIDNEY DISEASE (HCC): ICD-10-CM

## 2021-11-05 DIAGNOSIS — J43.8 OTHER EMPHYSEMA (HCC): ICD-10-CM

## 2021-11-05 DIAGNOSIS — M54.41 ACUTE RIGHT-SIDED LOW BACK PAIN WITH RIGHT-SIDED SCIATICA: ICD-10-CM

## 2021-11-05 PROBLEM — N18.30 CKD (CHRONIC KIDNEY DISEASE) STAGE 3, GFR 30-59 ML/MIN: Status: ACTIVE | Noted: 2019-10-29

## 2021-11-05 LAB
ALBUMIN SERPL-MCNC: 4.2 G/DL (ref 3.5–5.2)
ALP SERPL-CCNC: 110 U/L (ref 39–117)
ALT SERPL W P-5'-P-CCNC: 8 U/L (ref 1–33)
AST SERPL-CCNC: 18 U/L (ref 1–32)
BILIRUB CONJ SERPL-MCNC: <0.2 MG/DL (ref 0–0.3)
BILIRUB INDIRECT SERPL-MCNC: NORMAL MG/DL
BILIRUB SERPL-MCNC: 0.4 MG/DL (ref 0–1.2)
CHOLEST SERPL-MCNC: 186 MG/DL (ref 0–200)
HDLC SERPL-MCNC: 45 MG/DL (ref 40–60)
LDLC SERPL CALC-MCNC: 123 MG/DL (ref 0–100)
LDLC/HDLC SERPL: 2.7 {RATIO}
PROT SERPL-MCNC: 7 G/DL (ref 6–8.5)
TRIGL SERPL-MCNC: 98 MG/DL (ref 0–150)
VLDLC SERPL-MCNC: 18 MG/DL (ref 5–40)

## 2021-11-05 PROCEDURE — 80061 LIPID PANEL: CPT | Performed by: FAMILY MEDICINE

## 2021-11-05 PROCEDURE — 36415 COLL VENOUS BLD VENIPUNCTURE: CPT | Performed by: FAMILY MEDICINE

## 2021-11-05 PROCEDURE — 80076 HEPATIC FUNCTION PANEL: CPT | Performed by: FAMILY MEDICINE

## 2021-11-05 PROCEDURE — 99214 OFFICE O/P EST MOD 30 MIN: CPT | Performed by: FAMILY MEDICINE

## 2021-11-05 RX ORDER — METHYLPREDNISOLONE 4 MG/1
TABLET ORAL
Qty: 21 TABLET | Refills: 0 | Status: SHIPPED | OUTPATIENT
Start: 2021-11-05 | End: 2021-12-02

## 2021-11-05 NOTE — PROGRESS NOTES
Chief Complaint   Patient presents with   • Follow-up     4mo f/u        Subjective     Julia Rios  has a past medical history of Acute right-sided low back pain with right-sided sciatica (01/15/2018), Allergic rhinitis, Asthma, Back pain, Cardiomegaly (8/26/2021), CHF (congestive heart failure) ([facility]), CKD stage 3 (10/29/2019), Closed nondisplaced fracture of metatarsal bone of left foot with routine healing (06/19/2018), COPD, frequent exacerbations (8/26/2021), Diverticulitis, Essential hypertension, GERD (gastroesophageal reflux disease), Hemoptysis, Hyperlipidemia, Idiopathic chronic gout of multiple sites without tophus (05/20/2016), Primary osteoarthritis of right knee (01/16/2017), Sleep apnea, and Vitamin D deficiency (01/13/2016).    Hypertension-she does not check her blood pressure outside the office.  She does take her medication on a regular basis.    Hyperlipidemia-currently she is not on any cholesterol medication.    Chronic kidney disease-she does take a rare Aleve.    Congestive heart failure-she denies any shortness of breath with exertion orthopnea or edema.  No unexplained weight gain.  She had seen Dr. Aviles a couple months ago when he added some Aldactone at that time.      PHQ-2 Depression Screening  Little interest or pleasure in doing things?     Feeling down, depressed, or hopeless?     PHQ-2 Total Score     PHQ-9 Depression Screening  Little interest or pleasure in doing things?     Feeling down, depressed, or hopeless?     Trouble falling or staying asleep, or sleeping too much?     Feeling tired or having little energy?     Poor appetite or overeating?     Feeling bad about yourself - or that you are a failure or have let yourself or your family down?     Trouble concentrating on things, such as reading the newspaper or watching television?     Moving or speaking so slowly that other people could have noticed? Or the opposite - being so fidgety or restless that you have been  moving around a lot more than usual?     Thoughts that you would be better off dead, or of hurting yourself in some way?     PHQ-9 Total Score     If you checked off any problems, how difficult have these problems made it for you to do your work, take care of things at home, or get along with other people?       Allergies   Allergen Reactions   • Ibuprofen Hives   • Penicillins Seizure   • Cephalosporins Rash   • Sulfa Antibiotics Rash       Prior to Admission medications    Medication Sig Start Date End Date Taking? Authorizing Provider   albuterol (ACCUNEB) 1.25 MG/3ML nebulizer solution Take 3 mL by nebulization As Needed.   Yes Rene Robert MD   albuterol sulfate HFA (Ventolin HFA) 108 (90 Base) MCG/ACT inhaler Inhale 2 puffs Every 4 (Four) Hours As Needed.   Yes Rene Robert MD   amLODIPine (NORVASC) 5 MG tablet Take 5 mg by mouth Daily. 4/10/21  Yes Rene Robert MD   Arnuity Ellipta 100 MCG/ACT aerosol powder  Inhale 1 puff Daily. 7/23/21  Yes Rene Robert MD   D3-1000 25 MCG (1000 UT) capsule TAKE 1 CAPSULE BY MOUTH EVERY DAY  Patient taking differently: Take 1,000 Units by mouth Daily. 8/9/21  Yes Nick Patel, DO   famotidine (PEPCID) 20 MG tablet TAKE 1 TABLET BY MOUTH AT BEDTIME  Patient taking differently: Take 20 mg by mouth At Night As Needed. 6/15/21  Yes Santino Baez MD   furosemide (LASIX) 20 MG tablet Take 1 tablet by mouth Every Other Day. 9/29/21  Yes Russ Cloud MD   gabapentin (NEURONTIN) 300 MG capsule TAKE 1 CAPSULE BY MOUTH EVERY MORNING AND 2 CAPSULES EVERY EVENING  Patient taking differently: Take 300 mg by mouth 2 (two) times a day. 1 tab in am and 2 at bedtime 8/9/21  Yes Nick Patel, DO   loratadine-pseudoephedrine (Claritin-D 24 Hour)  MG per 24 hr tablet Take 1 tablet by mouth Daily.   Yes Rene Robert MD   losartan (COZAAR) 100 MG tablet Take 100 mg by mouth Daily. 4/12/21  Yes Rene Robert MD    metoprolol succinate XL (TOPROL-XL) 100 MG 24 hr tablet Take 100 mg by mouth Daily. 5/12/21  Yes Rene Robert MD   montelukast (SINGULAIR) 10 MG tablet Take 10 mg by mouth Daily. 4/10/21  Yes Rene Robert MD   potassium chloride 10 MEQ CR tablet Take 1 tablet by mouth Every Other Day. 9/29/21  Yes Russ Cloud MD   Symbicort 160-4.5 MCG/ACT inhaler Inhale 2 puffs 2 (Two) Times a Day. 6/2/21  Yes Rene Robert MD   azelastine (ASTELIN) 0.1 % nasal spray 2 sprays 2 (Two) Times a Day. 6/2/21   Rene Robert MD   sodium-potassium-magnesium sulfates (Suprep Bowel Prep Kit) 17.5-3.13-1.6 GM/177ML solution oral solution Take as directed  Patient taking differently: 2 bottles. Take as directed 6/14/21   Alma Del Castillo APRN   spironolactone (ALDACTONE) 25 MG tablet Take 1 tablet by mouth Daily.  Patient not taking: Reported on 10/7/2021 8/26/21   Russ Cloud MD   triamterene-hydrochlorothiazide (Maxzide) 75-50 MG per tablet Maxzide 75-50 mg oral tablet take 1 tablet by oral route once daily   Suspended  Patient not taking: Reported on 10/7/2021    Rene Robert MD        Patient Active Problem List   Diagnosis   • Family history of colon cancer   • Other emphysema ([facility])   • LVH (left ventricular hypertrophy)   • Essential hypertension   • Hyperlipidemia   • CKD (chronic kidney disease) stage 3, GFR 30-59 ml/min ([facility])   • CHF (congestive heart failure) ([facility])   • Acute right-sided low back pain with right-sided sciatica        Past Surgical History:   Procedure Laterality Date   • CATARACT EXTRACTION Right    • COLONOSCOPY  2014   • COLONOSCOPY N/A 10/12/2021    Procedure: COLONOSCOPY;  Surgeon: Jorge Diane MD;  Location: [facility] ENDOSCOPY;  Service: Gastroenterology;  Laterality: N/A;  DIVERTICULOSIS   • ENDOSCOPY  2015   • EYE SURGERY      cataract right eye   • RETINAL DETACHMENT REPAIR Right     hole in retina repair       Social History  "    Socioeconomic History   • Marital status:    Tobacco Use   • Smoking status: Former Smoker     Packs/day: 0.50     Years: 10.00     Pack years: 5.00     Types: Cigarettes     Start date:      Quit date:      Years since quittin.8   • Smokeless tobacco: Never Used   Vaping Use   • Vaping Use: Never used   Substance and Sexual Activity   • Alcohol use: Never   • Drug use: Never   • Sexual activity: Defer       Family History   Problem Relation Age of Onset   • Colon cancer Mother 61   • Cancer Mother    • Heart disease Mother    • Diabetes Brother    • Breast cancer Maternal Grandmother    • Stroke Paternal Grandfather    • Heart failure Father        Family history, surgical history, past medical history, Allergies and med's reviewed with patient today and updated in [facility] EMR.     ROS:  Review of Systems   Constitutional: Negative for fatigue.   HENT: Positive for rhinorrhea. Negative for congestion and postnasal drip.    Eyes: Negative for blurred vision and visual disturbance.   Respiratory: Positive for shortness of breath. Negative for cough, chest tightness and wheezing.    Cardiovascular: Negative for chest pain and palpitations.   Gastrointestinal: Negative for abdominal pain, constipation and diarrhea.   Neurological: Negative for headache.   Psychiatric/Behavioral: Positive for depressed mood. The patient is not nervous/anxious.        OBJECTIVE:  Vitals:    21 0811   BP: 140/67   BP Location: Left arm   Patient Position: Sitting   Cuff Size: Adult   Pulse: 60   Temp: 97.4 °F (36.3 °C)   TempSrc: Temporal   SpO2: 98%   Weight: 99.1 kg (218 lb 6.4 oz)   Height: 172.4 cm (67.89\")     No exam data present   Body mass index is 33.32 kg/m².  No LMP recorded.    Physical Exam  Constitutional:       General: She is not in acute distress.     Appearance: Normal appearance.   HENT:      Head: Normocephalic.      Right Ear: Tympanic membrane, ear canal and external ear normal.      Left " Ear: Tympanic membrane, ear canal and external ear normal.      Nose: Nose normal.      Mouth/Throat:      Mouth: Mucous membranes are moist.      Pharynx: Oropharynx is clear.   Eyes:      General: No scleral icterus.     Conjunctiva/sclera: Conjunctivae normal.      Pupils: Pupils are equal, round, and reactive to light.   Cardiovascular:      Rate and Rhythm: Normal rate and regular rhythm.      Pulses: Normal pulses.      Heart sounds: Normal heart sounds. No murmur heard.      Pulmonary:      Effort: Pulmonary effort is normal.      Breath sounds: Normal breath sounds. No wheezing, rhonchi or rales.   Musculoskeletal:      Cervical back: No rigidity or tenderness.   Lymphadenopathy:      Cervical: No cervical adenopathy.   Skin:     General: Skin is warm and dry.      Coloration: Skin is not jaundiced.      Findings: No rash.   Neurological:      General: No focal deficit present.      Mental Status: She is alert and oriented to person, place, and time.      Gait: Gait normal.   Psychiatric:         Mood and Affect: Mood normal.         Thought Content: Thought content normal.         Judgment: Judgment normal.         Procedures    Lab on 11/03/2021   Component Date Value Ref Range Status   • Glucose 11/03/2021 108* 65 - 99 mg/dL Final   • BUN 11/03/2021 27* 8 - 23 mg/dL Final   • Creatinine 11/03/2021 1.05* 0.57 - 1.00 mg/dL Final   • Sodium 11/03/2021 146* 136 - 145 mmol/L Final   • Potassium 11/03/2021 4.2  3.5 - 5.2 mmol/L Final   • Chloride 11/03/2021 110* 98 - 107 mmol/L Final   • CO2 11/03/2021 25.6  22.0 - 29.0 mmol/L Final   • Calcium 11/03/2021 9.3  8.6 - 10.5 mg/dL Final   • eGFR Non African Amer 11/03/2021 51* >60 mL/min/1.73 Final   • BUN/Creatinine Ratio 11/03/2021 25.7* 7.0 - 25.0 Final   • Anion Gap 11/03/2021 10.4  5.0 - 15.0 mmol/L Final   • proBNP 11/03/2021 120.9  0.0-1,800.0 pg/mL Final       ASSESSMENT/ PLAN:    Diagnoses and all orders for this visit:    1. Essential hypertension  (Primary)  Assessment & Plan:  Her blood pressure is fine here today.  She had some recent labs and her electrolytes and kidney function are stable.    Orders:  -     Hepatic Function Panel; Future  -     Lipid Panel    2. Hyperlipidemia, unspecified hyperlipidemia type  Assessment & Plan:  We will update her lipid profile today.    Orders:  -     Hepatic Function Panel; Future  -     Lipid Panel    3. Stage 3a chronic kidney disease (HCC)  Assessment & Plan:  Her recent kidney function showed a GFR of 51.  Reinforced again to avoid anti-inflammatories      4. Chronic systolic congestive heart failure (HCC)  Assessment & Plan:  Currently she is not having any symptoms concerning for heart failure.  Her recent BMP and BNP were fine except for elevated kidney function.      5. Acute right-sided low back pain with right-sided sciatica    6. Other emphysema (HCC)  Assessment & Plan:  She is using her inhalers as prescribed.  She gets short of breath with physical activity especially stairs.  Overall though she is remained stable.        Other orders  -     methylPREDNISolone (MEDROL) 4 MG dose pack; Take as directed on package instructions.  Dispense: 21 tablet; Refill: 0      Orders Placed Today:     New Medications Ordered This Visit   Medications   • methylPREDNISolone (MEDROL) 4 MG dose pack     Sig: Take as directed on package instructions.     Dispense:  21 tablet     Refill:  0        Management Plan:     An After Visit Summary was printed and given to the patient at discharge.    Follow-up: Return in about 6 months (around 5/5/2022) for Recheck.    Nick Patel DO 11/5/2021 08:38 EDT  This note was electronically signed.

## 2021-11-05 NOTE — ASSESSMENT & PLAN NOTE
Her blood pressure is fine here today.  She had some recent labs and her electrolytes and kidney function are stable.

## 2021-11-05 NOTE — ASSESSMENT & PLAN NOTE
She is using her inhalers as prescribed.  She gets short of breath with physical activity especially stairs.  Overall though she is remained stable.

## 2021-11-05 NOTE — ASSESSMENT & PLAN NOTE
Currently she is not having any symptoms concerning for heart failure.  Her recent BMP and BNP were fine except for elevated kidney function.

## 2021-11-08 ENCOUNTER — TELEPHONE (OUTPATIENT)
Dept: FAMILY MEDICINE CLINIC | Facility: CLINIC | Age: 76
End: 2021-11-08

## 2021-11-08 RX ORDER — COVID-19 ANTIGEN TEST
KIT MISCELLANEOUS
Qty: 90 TABLET | Refills: 3 | Status: SHIPPED | OUTPATIENT
Start: 2021-11-08 | End: 2022-07-11 | Stop reason: SDUPTHER

## 2021-11-11 DIAGNOSIS — E11.40 TYPE 2 DIABETES MELLITUS WITH DIABETIC NEUROPATHY, WITHOUT LONG-TERM CURRENT USE OF INSULIN (HCC): ICD-10-CM

## 2021-11-11 RX ORDER — GABAPENTIN 300 MG/1
CAPSULE ORAL
Qty: 270 CAPSULE | Refills: 1 | Status: SHIPPED | OUTPATIENT
Start: 2021-11-11 | End: 2022-05-09 | Stop reason: SDUPTHER

## 2021-11-11 NOTE — TELEPHONE ENCOUNTER
Caller: Julia Rios    Relationship: Self    Best call back number: [phone]     Requested Prescriptions:   Requested Prescriptions     Pending Prescriptions Disp Refills   • gabapentin (NEURONTIN) 300 MG capsule 270 capsule 0        Pharmacy where request should be sent: PATIENT STATES SHE WOULD LIKE TO  A WRITTEN PRESCRIPTION      Additional details provided by patient: PATIENT STATES SHE WOULD LIKE A CALL BACK WHEN SHE CAN  THE WRITTEN PRESCRIPTION     Does the patient have less than a 3 day supply:  [x] Yes  [] No    John Deluna Rep   11/11/21 08:22 EST

## 2021-12-02 ENCOUNTER — OFFICE VISIT (OUTPATIENT)
Dept: PULMONOLOGY | Facility: CLINIC | Age: 76
End: 2021-12-02

## 2021-12-02 VITALS
BODY MASS INDEX: 40.33 KG/M2 | RESPIRATION RATE: 14 BRPM | OXYGEN SATURATION: 97 % | WEIGHT: 213.6 LBS | TEMPERATURE: 98.6 F | HEIGHT: 61 IN

## 2021-12-02 DIAGNOSIS — Z91.81 STATUS POST FALL: ICD-10-CM

## 2021-12-02 DIAGNOSIS — R09.82 PND (POST-NASAL DRIP): ICD-10-CM

## 2021-12-02 DIAGNOSIS — Z87.01 HISTORY OF FUNGAL PNEUMONIA: ICD-10-CM

## 2021-12-02 DIAGNOSIS — J44.9 CHRONIC OBSTRUCTIVE PULMONARY DISEASE, UNSPECIFIED COPD TYPE (HCC): Primary | ICD-10-CM

## 2021-12-02 DIAGNOSIS — G47.33 OSA (OBSTRUCTIVE SLEEP APNEA): ICD-10-CM

## 2021-12-02 DIAGNOSIS — J30.2 SEASONAL ALLERGIES: ICD-10-CM

## 2021-12-02 DIAGNOSIS — K21.9 GASTROESOPHAGEAL REFLUX DISEASE, UNSPECIFIED WHETHER ESOPHAGITIS PRESENT: ICD-10-CM

## 2021-12-02 DIAGNOSIS — R91.1 PULMONARY NODULE: ICD-10-CM

## 2021-12-02 DIAGNOSIS — F17.201 TOBACCO ABUSE, IN REMISSION: ICD-10-CM

## 2021-12-02 DIAGNOSIS — J41.1 BRONCHITIS, MUCOPURULENT RECURRENT (HCC): ICD-10-CM

## 2021-12-02 PROCEDURE — 90662 IIV NO PRSV INCREASED AG IM: CPT | Performed by: NURSE PRACTITIONER

## 2021-12-02 PROCEDURE — G0008 ADMIN INFLUENZA VIRUS VAC: HCPCS | Performed by: NURSE PRACTITIONER

## 2021-12-02 PROCEDURE — 99214 OFFICE O/P EST MOD 30 MIN: CPT | Performed by: NURSE PRACTITIONER

## 2021-12-03 ENCOUNTER — HOSPITAL ENCOUNTER (OUTPATIENT)
Dept: GENERAL RADIOLOGY | Facility: HOSPITAL | Age: 76
Discharge: HOME | End: 2021-12-03
Admitting: NURSE PRACTITIONER

## 2021-12-03 DIAGNOSIS — Z91.81 STATUS POST FALL: ICD-10-CM

## 2021-12-03 PROCEDURE — 71111 X-RAY EXAM RIBS/CHEST4/> VWS: CPT

## 2021-12-29 ENCOUNTER — TELEPHONE (OUTPATIENT)
Dept: FAMILY MEDICINE CLINIC | Facility: CLINIC | Age: 76
End: 2021-12-29

## 2021-12-29 NOTE — TELEPHONE ENCOUNTER
Caller: Julia Rios    Relationship: Self    Best call back number: [phone]     What medication are you requesting: TUSSINEX COUGH SYRUP    What are your current symptoms: COUGH    How long have you been experiencing symptoms: 6 DAYS    Have you had these symptoms before:    [x] Yes  [] No    Have you been treated for these symptoms before:   [x] Yes  [] No    If a prescription is needed, what is your preferred pharmacy and phone number: [id number] [place] - [address] AT [facility] [phone] [facility] [phone]      Additional notes: PATIENT REPORTS GOING TO URGENT CARE ON Sunday 12/26 - AND IS BEING TREATED FOR SINUS INFECTION AND CHEST CONGESTION, FLU AND COVID TESTS WERE NEGATIVE.  NOTHING WAS GIVEN FOR COUGH.

## 2021-12-29 NOTE — TELEPHONE ENCOUNTER
I sent in the medication.  If her symptoms fail to improve or get worse she needs to be reevaluated.

## 2022-01-17 RX ORDER — AMLODIPINE BESYLATE 5 MG/1
TABLET ORAL
Qty: 90 TABLET | Refills: 3 | Status: SHIPPED | OUTPATIENT
Start: 2022-01-17 | End: 2022-11-14

## 2022-01-24 RX ORDER — MONTELUKAST SODIUM 10 MG/1
10 TABLET ORAL NIGHTLY
Qty: 90 TABLET | Refills: 3 | Status: SHIPPED | OUTPATIENT
Start: 2022-01-24 | End: 2022-02-07

## 2022-01-24 RX ORDER — FLUTICASONE FUROATE 100 UG/1
POWDER RESPIRATORY (INHALATION)
Qty: 90 EACH | Refills: 4 | Status: SHIPPED | OUTPATIENT
Start: 2022-01-24 | End: 2022-12-28 | Stop reason: SDUPTHER

## 2022-01-24 RX ORDER — MONTELUKAST SODIUM 10 MG/1
TABLET ORAL
Qty: 90 TABLET | Refills: 3 | Status: SHIPPED | OUTPATIENT
Start: 2022-01-24 | End: 2022-01-24 | Stop reason: SDUPTHER

## 2022-02-07 RX ORDER — MONTELUKAST SODIUM 10 MG/1
TABLET ORAL
Qty: 90 TABLET | Refills: 3 | Status: SHIPPED | OUTPATIENT
Start: 2022-02-07 | End: 2022-06-02 | Stop reason: SDUPTHER

## 2022-03-03 ENCOUNTER — OFFICE VISIT (OUTPATIENT)
Dept: CARDIOLOGY | Facility: CLINIC | Age: 77
End: 2022-03-03

## 2022-03-03 VITALS
BODY MASS INDEX: 40.22 KG/M2 | SYSTOLIC BLOOD PRESSURE: 139 MMHG | HEIGHT: 61 IN | DIASTOLIC BLOOD PRESSURE: 63 MMHG | HEART RATE: 56 BPM | WEIGHT: 213 LBS

## 2022-03-03 DIAGNOSIS — I50.32 CHRONIC HEART FAILURE WITH PRESERVED EJECTION FRACTION: Primary | ICD-10-CM

## 2022-03-03 DIAGNOSIS — I10 ESSENTIAL HYPERTENSION: ICD-10-CM

## 2022-03-03 DIAGNOSIS — I51.7 LVH (LEFT VENTRICULAR HYPERTROPHY): ICD-10-CM

## 2022-03-03 PROCEDURE — 93000 ELECTROCARDIOGRAM COMPLETE: CPT | Performed by: INTERNAL MEDICINE

## 2022-03-03 PROCEDURE — 99214 OFFICE O/P EST MOD 30 MIN: CPT | Performed by: INTERNAL MEDICINE

## 2022-03-03 NOTE — ASSESSMENT & PLAN NOTE
Stable from weight and symptom standpoint continue with Lasix 40 mg daily encourage patient to keep a home weight log and greater than 5 pound weight gain in a week to double up on Lasix for 3 days.

## 2022-03-03 NOTE — PROGRESS NOTES
Chief Complaint  Fatigue, Pains in arms, cardiomegaly, and Hypertension    Subjective    Patient is doing well denies any chest pain has stable shortness of breath issues.      Past Medical History:   Diagnosis Date   • Acute right-sided low back pain with right-sided sciatica 01/15/2018   • Allergic rhinitis    • Asthma    • Back pain     2 bulging disc L2 L3   • Cardiomegaly 8/26/2021 11/16/20 echocardiogram: 1.  Normal ejection fraction of 55%. 2.  Mild left ventricular hypertrophy. 3.  No significant valvular heart issues.    • CHF (congestive heart failure) (HCC)    • CKD stage 3 10/29/2019   • Closed nondisplaced fracture of metatarsal bone of left foot with routine healing 06/19/2018    unspecified metatarsal, subsequent encounter   • COPD, frequent exacerbations 8/26/2021   • Diverticulitis    • Essential hypertension    • GERD (gastroesophageal reflux disease)    • Hemoptysis     non cancerous   • Hyperlipidemia    • Idiopathic chronic gout of multiple sites without tophus 05/20/2016   • Primary osteoarthritis of right knee 01/16/2017   • Sleep apnea    • Vitamin D deficiency 01/13/2016         Current Outpatient Medications:   •  albuterol (ACCUNEB) 1.25 MG/3ML nebulizer solution, Take 3 mL by nebulization As Needed., Disp: , Rfl:   •  albuterol sulfate HFA (Ventolin HFA) 108 (90 Base) MCG/ACT inhaler, Inhale 2 puffs Every 4 (Four) Hours As Needed., Disp: , Rfl:   •  amLODIPine (NORVASC) 5 MG tablet, TAKE 1 TABLET EVERY DAY, Disp: 90 tablet, Rfl: 3  •  Arnuity Ellipta 100 MCG/ACT aerosol powder , INHALE 1 PUFF BY MOUTH DAILY, Disp: 90 each, Rfl: 4  •  azelastine (ASTELIN) 0.1 % nasal spray, 2 sprays 2 (Two) Times a Day., Disp: , Rfl:   •  Cholecalciferol (Vitamin D3) 25 MCG (1000 UT) capsule, TAKE 1 CAPSULE BY MOUTH EVERY DAY, Disp: 90 capsule, Rfl: 3  •  famotidine (PEPCID) 20 MG tablet, TAKE 1 TABLET BY MOUTH AT BEDTIME (Patient taking differently: Take 20 mg by mouth Every Night.), Disp: 30 tablet,  Rfl: 11  •  furosemide (LASIX) 20 MG tablet, Take 1 tablet by mouth Every Other Day., Disp: 45 tablet, Rfl: 3  •  gabapentin (NEURONTIN) 300 MG capsule, 1 tab in am and 2 at bedtime, Disp: 270 capsule, Rfl: 1  •  HYDROcod Polst-CPM Polst ER (Tussionex Pennkinetic ER) 10-8 MG/5ML ER suspension, Take 5 mL by mouth Every 12 (Twelve) Hours As Needed for Cough., Disp: 120 mL, Rfl: 0  •  losartan (COZAAR) 100 MG tablet, Take 100 mg by mouth Daily., Disp: , Rfl:   •  metoprolol succinate XL (TOPROL-XL) 100 MG 24 hr tablet, Take 100 mg by mouth Daily., Disp: , Rfl:   •  montelukast (SINGULAIR) 10 MG tablet, TAKE 1 TABLET ONE TIME DAILY IN THE EVENING, Disp: 90 tablet, Rfl: 3  •  potassium chloride 10 MEQ CR tablet, Take 1 tablet by mouth Every Other Day., Disp: 45 tablet, Rfl: 3  •  Symbicort 160-4.5 MCG/ACT inhaler, Inhale 2 puffs 2 (Two) Times a Day., Disp: , Rfl:   •  Wal-Fex Allergy 180 MG tablet, TAKE 1 TABLET BY MOUTH EVERY DAY, Disp: 90 tablet, Rfl: 3  •  triamterene-hydrochlorothiazide (Maxzide) 75-50 MG per tablet, Maxzide 75-50 mg oral tablet take 1 tablet by oral route once daily   Suspended, Disp: , Rfl:     Medications Discontinued During This Encounter   Medication Reason   • predniSONE (DELTASONE) 10 MG tablet *Therapy completed     Allergies   Allergen Reactions   • Ibuprofen Hives   • Penicillins Seizure   • Cephalosporins Rash   • Sulfa Antibiotics Rash         Social History     Tobacco Use   • Smoking status: Former Smoker     Packs/day: 0.50     Years: 5.00     Pack years: 2.50     Types: Cigarettes     Start date:      Quit date:      Years since quittin.1   • Smokeless tobacco: Never Used   Vaping Use   • Vaping Use: Never used   Substance Use Topics   • Alcohol use: Never   • Drug use: Never       Family History   Problem Relation Age of Onset   • Colon cancer Mother 61   • Cancer Mother    • Heart disease Mother    • Diabetes Brother    • Breast cancer Maternal Grandmother    • Stroke  "Paternal Grandfather    • Heart failure Father         Objective     /63   Pulse 56   Ht 154.9 cm (61\")   Wt 96.6 kg (213 lb)   BMI 40.25 kg/m²       Physical Exam    General Appearance:   · no acute distress  · Alert and oriented x3  HENT:   · lips not cyanotic  · Atraumatic  Neck:  · No jvd   · supple  Respiratory:  · no respiratory distress  · normal breath sounds  · no rales  Cardiovascular:  · Regular rate and rhythm  · no S3, no S4   · no murmur  · no rub  Extremities  · No cyanosis  · lower extremity edema: none    Skin:   · warm, dry  · No rashes      Result Review :     proBNP   Date Value Ref Range Status   11/03/2021 120.9 0.0-1,800.0 pg/mL Final     CMP    CMP 4/26/21 11/3/21 11/5/21   Glucose 106 (A) 108 (A)    BUN 16 27 (A)    Creatinine 0.98 (A) 1.05 (A)    eGFR Non African Am  51 (A)    Sodium 139 146 (A)    Potassium 3.8 4.2    Chloride 103 110 (A)    Calcium 9.5 9.3    Albumin 4.3  4.20   Total Bilirubin 0.52  0.4   Alkaline Phosphatase 88  110   AST (SGOT) 19  18   ALT (SGPT) 10  8   (A) Abnormal value               Lab Results   Component Value Date    TSH 2.300 10/29/2019      Lab Results   Component Value Date    FREET4 1.2 10/29/2019      No results found for: DDIMERQUANT  No results found for: MG   No results found for: DIGOXIN   Lab Results   Component Value Date    TROPONINT <0.01 10/04/2020           Lipid Panel    Lipid Panel 4/26/21 11/5/21   Total Cholesterol  186   Total Cholesterol 197    Triglycerides 112 98   HDL Cholesterol 51 45   VLDL Cholesterol 22 18   LDL Cholesterol  124 (A) 123 (A)   LDL/HDL Ratio  2.70   (A) Abnormal value       Comments are available for some flowsheets but are not being displayed.           No results found for: POCTROP         ECG 12 Lead    Date/Time: 3/3/2022 1:38 PM  Performed by: Russ Cloud MD  Authorized by: Russ Cloud MD   Comparison: compared with previous ECG   Similar to previous ECG  Rhythm: sinus rhythm  Comments: Borderline " left axis deviation  Abnormal R wave progression                   Diagnoses and all orders for this visit:    1. Chronic heart failure with preserved ejection fraction (HCC) (Primary)  Assessment & Plan:  Stable from weight and symptom standpoint continue with Lasix 40 mg daily encourage patient to keep a home weight log and greater than 5 pound weight gain in a week to double up on Lasix for 3 days.      2. LVH (left ventricular hypertrophy)    3. Essential hypertension  Assessment & Plan:  Well-controlled continue losartan milligrams once a day amlodipine 5 mg once a day      Other orders  -     ECG 12 Lead          Follow Up     Return in about 6 months (around 9/3/2022) for Follow with Marielos Dover.          Patient was given instructions and counseling regarding her condition or for health maintenance advice. Please see specific information pulled into the AVS if appropriate.

## 2022-04-11 RX ORDER — AZELASTINE 1 MG/ML
SPRAY, METERED NASAL
Qty: 30 ML | Refills: 11 | Status: SHIPPED | OUTPATIENT
Start: 2022-04-11 | End: 2022-06-02 | Stop reason: SDUPTHER

## 2022-04-14 RX ORDER — LOSARTAN POTASSIUM 100 MG/1
TABLET ORAL
Qty: 90 TABLET | Refills: 3 | Status: SHIPPED | OUTPATIENT
Start: 2022-04-14 | End: 2023-01-26

## 2022-05-09 DIAGNOSIS — E11.40 TYPE 2 DIABETES MELLITUS WITH DIABETIC NEUROPATHY, WITHOUT LONG-TERM CURRENT USE OF INSULIN: ICD-10-CM

## 2022-05-09 RX ORDER — GABAPENTIN 300 MG/1
CAPSULE ORAL
Qty: 270 CAPSULE | Refills: 1 | Status: SHIPPED | OUTPATIENT
Start: 2022-05-09 | End: 2022-08-06 | Stop reason: SDUPTHER

## 2022-05-09 NOTE — TELEPHONE ENCOUNTER
Caller: Julia Rios    Relationship: Self    Best call back number: [phone]    Requested Prescriptions:   Requested Prescriptions     Pending Prescriptions Disp Refills   • gabapentin (NEURONTIN) 300 MG capsule 270 capsule 1     Si tab in am and 2 at bedtime      Additional details provided by patient: PATIENT STATED THAT SHE NEEDS TO  PAPER COPY AND WILL BE OUT OF MEDICATION 22    Does the patient have less than a 3 day supply:  [x] Yes  [] No    John DIAZ Rep   22 09:45 EDT

## 2022-05-12 NOTE — TELEPHONE ENCOUNTER
Caller: Blanca Julia S    Relationship: Self    Best call back number: [phone]    What is the best time to reach you: ANYTIME     Who are you requesting to speak with (clinical staff, provider,  specific staff member): CLINICAL         What was the call regarding:PATIENT  STATES THAT THE PHARMACY [id number] - [place], [place] - [address] AT [facility] [phone] [facility] [phone] FX    STILL DOES NOT HAVE THE ORDER FOR HER gabapentin (NEURONTIN) 300 MG capsule  PATIENT WILL BE COMPLETELY OUT TOMORROW 05/13/2022    PATIENT IS AT THE PHARMACY NOW 05/12/2022.     Do you require a callback: YES

## 2022-05-26 ENCOUNTER — OFFICE VISIT (OUTPATIENT)
Dept: FAMILY MEDICINE CLINIC | Facility: CLINIC | Age: 77
End: 2022-05-26

## 2022-05-26 VITALS
SYSTOLIC BLOOD PRESSURE: 136 MMHG | DIASTOLIC BLOOD PRESSURE: 70 MMHG | WEIGHT: 219.6 LBS | OXYGEN SATURATION: 96 % | HEIGHT: 61 IN | HEART RATE: 58 BPM | BODY MASS INDEX: 41.46 KG/M2 | TEMPERATURE: 98.6 F

## 2022-05-26 DIAGNOSIS — E78.5 HYPERLIPIDEMIA, UNSPECIFIED HYPERLIPIDEMIA TYPE: ICD-10-CM

## 2022-05-26 DIAGNOSIS — J01.00 ACUTE NON-RECURRENT MAXILLARY SINUSITIS: ICD-10-CM

## 2022-05-26 DIAGNOSIS — I10 ESSENTIAL HYPERTENSION: Primary | ICD-10-CM

## 2022-05-26 DIAGNOSIS — J44.9 CHRONIC OBSTRUCTIVE PULMONARY DISEASE, UNSPECIFIED COPD TYPE: ICD-10-CM

## 2022-05-26 DIAGNOSIS — N18.31 STAGE 3A CHRONIC KIDNEY DISEASE: ICD-10-CM

## 2022-05-26 DIAGNOSIS — I50.32 CHRONIC HEART FAILURE WITH PRESERVED EJECTION FRACTION: ICD-10-CM

## 2022-05-26 PROBLEM — J30.9 ALLERGIC RHINITIS: Status: ACTIVE | Noted: 2022-05-26

## 2022-05-26 PROBLEM — M19.91 PRIMARY LOCALIZED OSTEOARTHRITIS: Status: ACTIVE | Noted: 2017-01-16

## 2022-05-26 PROBLEM — S92.309A CLOSED FRACTURE OF METATARSAL BONE: Status: ACTIVE | Noted: 2018-06-19

## 2022-05-26 PROBLEM — R04.2 HEMOPTYSIS: Status: ACTIVE | Noted: 2022-05-26

## 2022-05-26 PROBLEM — K57.92 DIVERTICULITIS: Status: ACTIVE | Noted: 2022-05-26

## 2022-05-26 PROBLEM — J45.909 ASTHMA: Status: ACTIVE | Noted: 2022-05-26

## 2022-05-26 LAB
ALBUMIN SERPL-MCNC: 4.4 G/DL (ref 3.5–5.2)
ALBUMIN/GLOB SERPL: 1.7 G/DL
ALP SERPL-CCNC: 98 U/L (ref 39–117)
ALT SERPL W P-5'-P-CCNC: 14 U/L (ref 1–33)
ANION GAP SERPL CALCULATED.3IONS-SCNC: 11.8 MMOL/L (ref 5–15)
AST SERPL-CCNC: 19 U/L (ref 1–32)
BILIRUB SERPL-MCNC: 0.3 MG/DL (ref 0–1.2)
BUN SERPL-MCNC: 17 MG/DL (ref 8–23)
BUN/CREAT SERPL: 16.5 (ref 7–25)
CALCIUM SPEC-SCNC: 9.2 MG/DL (ref 8.6–10.5)
CHLORIDE SERPL-SCNC: 104 MMOL/L (ref 98–107)
CHOLEST SERPL-MCNC: 180 MG/DL (ref 0–200)
CO2 SERPL-SCNC: 23.2 MMOL/L (ref 22–29)
CREAT SERPL-MCNC: 1.03 MG/DL (ref 0.57–1)
EGFRCR SERPLBLD CKD-EPI 2021: 56.1 ML/MIN/1.73
GLOBULIN UR ELPH-MCNC: 2.6 GM/DL
GLUCOSE SERPL-MCNC: 74 MG/DL (ref 65–99)
HDLC SERPL-MCNC: 50 MG/DL (ref 40–60)
LDLC SERPL CALC-MCNC: 109 MG/DL (ref 0–100)
LDLC/HDLC SERPL: 2.12 {RATIO}
NT-PROBNP SERPL-MCNC: 196 PG/ML (ref 0–1800)
POTASSIUM SERPL-SCNC: 4.6 MMOL/L (ref 3.5–5.2)
PROT SERPL-MCNC: 7 G/DL (ref 6–8.5)
SODIUM SERPL-SCNC: 139 MMOL/L (ref 136–145)
TRIGL SERPL-MCNC: 119 MG/DL (ref 0–150)
VLDLC SERPL-MCNC: 21 MG/DL (ref 5–40)

## 2022-05-26 PROCEDURE — 83880 ASSAY OF NATRIURETIC PEPTIDE: CPT | Performed by: FAMILY MEDICINE

## 2022-05-26 PROCEDURE — 99214 OFFICE O/P EST MOD 30 MIN: CPT | Performed by: FAMILY MEDICINE

## 2022-05-26 PROCEDURE — 80061 LIPID PANEL: CPT | Performed by: FAMILY MEDICINE

## 2022-05-26 PROCEDURE — 36415 COLL VENOUS BLD VENIPUNCTURE: CPT | Performed by: FAMILY MEDICINE

## 2022-05-26 PROCEDURE — 80053 COMPREHEN METABOLIC PANEL: CPT | Performed by: FAMILY MEDICINE

## 2022-05-26 RX ORDER — AZITHROMYCIN 250 MG/1
TABLET, FILM COATED ORAL
Qty: 6 TABLET | Refills: 0 | Status: SHIPPED | OUTPATIENT
Start: 2022-05-26 | End: 2022-06-02

## 2022-05-26 NOTE — ASSESSMENT & PLAN NOTE
Overall her breathing is fine at this time despite her acute sinusitis.  She is an upcoming appointment with pulmonary.

## 2022-05-26 NOTE — ASSESSMENT & PLAN NOTE
Her blood pressure is good here as well as at home.  We will continue her current meds and update her labs.

## 2022-05-26 NOTE — PROGRESS NOTES
Chief Complaint   Patient presents with   • Follow-up     6 month C/O sinus infection   • Hyperlipidemia   • Hypertension        Subjective     Julia Rios  has a past medical history of Acute right-sided low back pain with right-sided sciatica (01/15/2018), Allergic rhinitis, Asthma, Back pain, Cardiomegaly (8/26/2021), CHF (congestive heart failure) ([facility]), CKD stage 3 (10/29/2019), Closed nondisplaced fracture of metatarsal bone of left foot with routine healing (06/19/2018), Diverticulitis, Essential hypertension, Hemoptysis, Hyperlipidemia, Idiopathic chronic gout of multiple sites without tophus (05/20/2016), Primary osteoarthritis of right knee (01/16/2017), and Vitamin D deficiency (01/13/2016).    Hypertension- she does check her blood pressure at home on a regular basis.  She states the vast majority time they are within normal limits.    Hyperlipidemia- currently she is not on anything for her lipids.    COPD- she states her breathing is stable at this time.  She has an appointment coming up with pulmonary in the near future.    CHF with preserved ejection fraction- she states she saw Dr. Holter recently.  Her blood pressure was a little bit high and thus he added some Lasix and potassium on an every other day basis.  Since then her blood pressure has improved.    Sinus congestion- she states for the past week she has had increased sinus congestion postnasal drainage runny nose and a sore throat.  She does have a cough with some green productive sputum.  She does not have any worsening shortness of breath.  She denies any fever or chills.      PHQ-2 Depression Screening  Little interest or pleasure in doing things?     Feeling down, depressed, or hopeless?     PHQ-2 Total Score     PHQ-9 Depression Screening  Little interest or pleasure in doing things?     Feeling down, depressed, or hopeless?     Trouble falling or staying asleep, or sleeping too much?     Feeling tired or having little energy?      Poor appetite or overeating?     Feeling bad about yourself - or that you are a failure or have let yourself or your family down?     Trouble concentrating on things, such as reading the newspaper or watching television?     Moving or speaking so slowly that other people could have noticed? Or the opposite - being so fidgety or restless that you have been moving around a lot more than usual?     Thoughts that you would be better off dead, or of hurting yourself in some way?     PHQ-9 Total Score     If you checked off any problems, how difficult have these problems made it for you to do your work, take care of things at home, or get along with other people?       Allergies   Allergen Reactions   • Ibuprofen Hives   • Penicillins Seizure   • Cephalosporins Rash   • Sulfa Antibiotics Rash       Prior to Admission medications    Medication Sig Start Date End Date Taking? Authorizing Provider   albuterol (ACCUNEB) 1.25 MG/3ML nebulizer solution Take 3 mL by nebulization As Needed.   Yes Rene Robert MD   albuterol sulfate  (90 Base) MCG/ACT inhaler Inhale 2 puffs Every 4 (Four) Hours As Needed.   Yes Rene Robert MD   amLODIPine (NORVASC) 5 MG tablet TAKE 1 TABLET EVERY DAY 1/17/22  Yes Nick Patel DO   Arnuity Ellipta 100 MCG/ACT aerosol powder  INHALE 1 PUFF BY MOUTH DAILY 1/24/22  Yes Santino Baez MD   azelastine (ASTELIN) 0.1 % nasal spray USE 2 SPRAYS IN EACH NOSTRIL TWICE DAILY 4/11/22  Yes Santino Baez MD   Cholecalciferol (Vitamin D3) 25 MCG (1000 UT) capsule TAKE 1 CAPSULE BY MOUTH EVERY DAY 11/8/21  Yes Nick Patel,    famotidine (PEPCID) 20 MG tablet TAKE 1 TABLET BY MOUTH AT BEDTIME  Patient taking differently: Take 20 mg by mouth Every Night. 6/15/21  Yes Santino Baez MD   furosemide (LASIX) 20 MG tablet Take 1 tablet by mouth Every Other Day. 9/29/21  Yes Russ Cloud MD   gabapentin (NEURONTIN) 300 MG capsule 1 tab in am and 2 at bedtime  5/9/22  Yes Nick Patel DO   HYDROcod Polst-CPM Polst ER (Tussionex Pennkinetic ER) 10-8 MG/5ML ER suspension Take 5 mL by mouth Every 12 (Twelve) Hours As Needed for Cough. 12/29/21  Yes Nick Patel DO   losartan (COZAAR) 100 MG tablet TAKE 1 TABLET EVERY DAY 4/14/22  Yes Nick Patel DO   metoprolol succinate XL (TOPROL-XL) 100 MG 24 hr tablet Take 100 mg by mouth Daily. 5/12/21  Yes ProviderRene MD   montelukast (SINGULAIR) 10 MG tablet TAKE 1 TABLET ONE TIME DAILY IN THE EVENING 2/7/22  Yes Nick Patel DO   potassium chloride 10 MEQ CR tablet Take 1 tablet by mouth Every Other Day. 9/29/21  Yes Russ Cloud MD   Symbicort 160-4.5 MCG/ACT inhaler Inhale 2 puffs 2 (Two) Times a Day. 6/2/21  Yes ProviderRene MD   triamterene-hydrochlorothiazide (MAXZIDE) 75-50 MG per tablet Maxzide 75-50 mg oral tablet take 1 tablet by oral route once daily   Suspended   Yes Provider, MD Rene   Wal-Fex Allergy 180 MG tablet TAKE 1 TABLET BY MOUTH EVERY DAY 11/8/21  Yes Nick Patel DO        Patient Active Problem List   Diagnosis   • Family history of colon cancer   • Chronic obstructive pulmonary disease (HCC)   • LVH (left ventricular hypertrophy)   • Essential hypertension   • Hyperlipidemia   • CKD (chronic kidney disease) stage 3, GFR 30-59 ml/min ([facility])   • Chronic heart failure with preserved ejection fraction ([facility])   • Acute right-sided low back pain with right-sided sciatica   • CARMEN (obstructive sleep apnea)   • Seasonal allergies   • Gastroesophageal reflux disease   • History of fungal pneumonia   • Bronchitis, mucopurulent recurrent ([facility])   • Tobacco abuse, in remission   • Pulmonary nodule   • PND (post-nasal drip)   • Status post fall   • Acute pain of right knee   • Allergic rhinitis   • Asthma   • Closed fracture of metatarsal bone   • Diverticulitis   • Hemoptysis   • Idiopathic chronic gout of multiple sites without tophus   •  Primary localized osteoarthritis   • Vitamin D deficiency   • Acute non-recurrent maxillary sinusitis        Past Surgical History:   Procedure Laterality Date   • CATARACT EXTRACTION Right    • COLONOSCOPY     • COLONOSCOPY N/A 10/12/2021    Procedure: COLONOSCOPY;  Surgeon: Jorge Diane MD;  Location: [facility] ENDOSCOPY;  Service: Gastroenterology;  Laterality: N/A;  DIVERTICULOSIS   • ENDOSCOPY     • EYE SURGERY      cataract right eye   • RETINAL DETACHMENT REPAIR Right     hole in retina repair       Social History     Socioeconomic History   • Marital status:    Tobacco Use   • Smoking status: Former Smoker     Packs/day: 0.50     Years: 5.00     Pack years: 2.50     Types: Cigarettes     Start date:      Quit date:      Years since quittin.4   • Smokeless tobacco: Never Used   Vaping Use   • Vaping Use: Never used   Substance and Sexual Activity   • Alcohol use: Never   • Drug use: Never   • Sexual activity: Defer       Family History   Problem Relation Age of Onset   • Colon cancer Mother 61   • Cancer Mother    • Heart disease Mother    • Diabetes Brother    • Breast cancer Maternal Grandmother    • Stroke Paternal Grandfather    • Heart failure Father        Family history, surgical history, past medical history, Allergies and med's reviewed with patient today and updated in [facility] EMR.     ROS:  Review of Systems   Constitutional: Negative for chills, fatigue and fever.   HENT: Positive for congestion, postnasal drip, rhinorrhea and sinus pressure.    Eyes: Negative for blurred vision and visual disturbance.   Respiratory: Positive for cough, shortness of breath and wheezing. Negative for chest tightness.    Cardiovascular: Negative for chest pain and palpitations.   Allergic/Immunologic: Positive for environmental allergies.   Neurological: Negative for headache.   Psychiatric/Behavioral: Negative for depressed mood. The patient is not nervous/anxious.   "      OBJECTIVE:  Vitals:    05/26/22 1144   BP: 136/70   BP Location: Right arm   Patient Position: Sitting   Pulse: 58   Temp: 98.6 °F (37 °C)   SpO2: 96%   Weight: 99.6 kg (219 lb 9.6 oz)   Height: 154.9 cm (61\")     No exam data present   Body mass index is 41.49 kg/m².  No LMP recorded. Patient is postmenopausal.    Physical Exam  Vitals and nursing note reviewed.   Constitutional:       General: She is not in acute distress.     Appearance: Normal appearance. She is obese.   HENT:      Head: Normocephalic.      Right Ear: Tympanic membrane, ear canal and external ear normal.      Left Ear: Tympanic membrane, ear canal and external ear normal.      Nose:      Left Sinus: Maxillary sinus tenderness present.        Mouth/Throat:      Mouth: Mucous membranes are moist.      Pharynx: Oropharynx is clear.   Eyes:      General: No scleral icterus.     Conjunctiva/sclera: Conjunctivae normal.      Pupils: Pupils are equal, round, and reactive to light.   Cardiovascular:      Rate and Rhythm: Normal rate and regular rhythm.      Pulses: Normal pulses.      Heart sounds: Normal heart sounds. No murmur heard.  Pulmonary:      Effort: Pulmonary effort is normal.      Breath sounds: Normal breath sounds. No wheezing, rhonchi or rales.   Musculoskeletal:      Cervical back: Neck supple. No rigidity or tenderness.   Lymphadenopathy:      Cervical: No cervical adenopathy.   Skin:     General: Skin is warm and dry.      Coloration: Skin is not jaundiced.      Findings: No rash.   Neurological:      General: No focal deficit present.      Mental Status: She is alert and oriented to person, place, and time.   Psychiatric:         Mood and Affect: Mood normal.         Thought Content: Thought content normal.         Judgment: Judgment normal.         Procedures    No visits with results within 30 Day(s) from this visit.   Latest known visit with results is:   Admission on 12/26/2021, Discharged on 12/26/2021   Component Date " Value Ref Range Status   • SARS Antigen 12/26/2021 Not Detected  Not Detected Final   • Internal Control 12/26/2021 Passed  Passed Final   • Lot Number 12/26/2021 [id number]   Final   • Expiration Date 12/26/2021 3/20/23   Final   • Rapid Influenza A Ag 12/26/2021 Negative  Negative Final   • Rapid Influenza B Ag 12/26/2021 Negative  Negative Final   • Internal Control 12/26/2021 Passed  Passed Final   • Lot Number 12/26/2021 [id number]   Final   • Expiration Date 12/26/2021 1/19/23   Final       ASSESSMENT/ PLAN:    Diagnoses and all orders for this visit:    1. Essential hypertension (Primary)  Assessment & Plan:  Her blood pressure is good here as well as at home.  We will continue her current meds and update her labs.    Orders:  -     Comprehensive Metabolic Panel  -     Lipid Panel    2. Hyperlipidemia, unspecified hyperlipidemia type  Assessment & Plan:  We will update her lipid profile with her labs today.    Orders:  -     Comprehensive Metabolic Panel  -     Lipid Panel    3. Chronic heart failure with preserved ejection fraction (HCC)  Assessment & Plan:  Currently she is doing well and stable at this time.  Update her labs and her BNP.    Orders:  -     proBNP; Future    4. Stage 3a chronic kidney disease (HCC)  -     Comprehensive Metabolic Panel    5. Chronic obstructive pulmonary disease, unspecified COPD type (HCC)  Assessment & Plan:  Overall her breathing is fine at this time despite her acute sinusitis.  She is an upcoming appointment with pulmonary.        6. Acute non-recurrent maxillary sinusitis  Assessment & Plan:  With her symptoms and her underlying chronic lung disease we will go ahead and treat.      Other orders  -     azithromycin (Zithromax Z-Dragan) 250 MG tablet; Take 2 tablets by mouth on day 1, then 1 tablet daily on days 2-5  Dispense: 6 tablet; Refill: 0      Orders Placed Today:     New Medications Ordered This Visit   Medications   • azithromycin (Zithromax Z-Dragan) 250 MG tablet     Sig:  Take 2 tablets by mouth on day 1, then 1 tablet daily on days 2-5     Dispense:  6 tablet     Refill:  0        Management Plan:     An After Visit Summary was printed and given to the patient at discharge.    Follow-up: Return in about 6 months (around 11/26/2022) for Recheck.    Nick Patel DO 5/26/2022 12:20 EDT  This note was electronically signed.

## 2022-06-02 ENCOUNTER — OFFICE VISIT (OUTPATIENT)
Dept: PULMONOLOGY | Facility: CLINIC | Age: 77
End: 2022-06-02

## 2022-06-02 VITALS
SYSTOLIC BLOOD PRESSURE: 131 MMHG | RESPIRATION RATE: 18 BRPM | DIASTOLIC BLOOD PRESSURE: 64 MMHG | HEIGHT: 62 IN | TEMPERATURE: 97.7 F | BODY MASS INDEX: 41.04 KG/M2 | HEART RATE: 65 BPM | WEIGHT: 223 LBS | OXYGEN SATURATION: 97 %

## 2022-06-02 DIAGNOSIS — J30.2 SEASONAL ALLERGIES: Primary | ICD-10-CM

## 2022-06-02 DIAGNOSIS — F17.201 TOBACCO ABUSE, IN REMISSION: ICD-10-CM

## 2022-06-02 DIAGNOSIS — K21.00 GASTROESOPHAGEAL REFLUX DISEASE WITH ESOPHAGITIS WITHOUT HEMORRHAGE: ICD-10-CM

## 2022-06-02 DIAGNOSIS — J30.9 ALLERGIC RHINITIS, UNSPECIFIED SEASONALITY, UNSPECIFIED TRIGGER: ICD-10-CM

## 2022-06-02 DIAGNOSIS — R91.1 PULMONARY NODULE: ICD-10-CM

## 2022-06-02 DIAGNOSIS — J41.1 BRONCHITIS, MUCOPURULENT RECURRENT: ICD-10-CM

## 2022-06-02 DIAGNOSIS — R09.82 PND (POST-NASAL DRIP): ICD-10-CM

## 2022-06-02 DIAGNOSIS — J45.40 MODERATE PERSISTENT ASTHMA, UNSPECIFIED WHETHER COMPLICATED: ICD-10-CM

## 2022-06-02 DIAGNOSIS — G47.33 OSA (OBSTRUCTIVE SLEEP APNEA): ICD-10-CM

## 2022-06-02 DIAGNOSIS — J44.9 CHRONIC OBSTRUCTIVE PULMONARY DISEASE, UNSPECIFIED COPD TYPE: ICD-10-CM

## 2022-06-02 DIAGNOSIS — Z87.01 HISTORY OF FUNGAL PNEUMONIA: ICD-10-CM

## 2022-06-02 PROCEDURE — 99214 OFFICE O/P EST MOD 30 MIN: CPT | Performed by: INTERNAL MEDICINE

## 2022-06-02 RX ORDER — AZELASTINE 1 MG/ML
2 SPRAY, METERED NASAL 2 TIMES DAILY
Qty: 60 ML | Refills: 11 | Status: SHIPPED | OUTPATIENT
Start: 2022-06-02

## 2022-06-02 RX ORDER — MONTELUKAST SODIUM 10 MG/1
10 TABLET ORAL NIGHTLY
Qty: 90 TABLET | Refills: 3 | Status: SHIPPED | OUTPATIENT
Start: 2022-06-02 | End: 2022-12-28 | Stop reason: SDUPTHER

## 2022-06-02 RX ORDER — BUDESONIDE AND FORMOTEROL FUMARATE DIHYDRATE 160; 4.5 UG/1; UG/1
2 AEROSOL RESPIRATORY (INHALATION) 2 TIMES DAILY
Qty: 1 EACH | Refills: 11 | Status: SHIPPED | OUTPATIENT
Start: 2022-06-02 | End: 2022-12-28 | Stop reason: SDUPTHER

## 2022-06-02 NOTE — PROGRESS NOTES
Primary Care Provider  Nick Patel DO     Referring Provider  No ref. provider found     Chief Complaint  COPD, Sleep Apnea, Shortness of Breath, and Follow-up    Subjective          Julia Rios presents to [facility] PULMONARY & CRITICAL CARE MEDICINE  History of Present Illness  Julia Rios is a 77 y.o. female patient with history of COPD, recurrent bronchitis, obstructive sleep apnea, postnasal drip, seasonal allergies, history of fungal pneumonia in past improved on itraconazole, pulmonary nodule, gastroesophageal reflux disease and tobacco abuse of cigarettes in remission.  She is here for follow-up.  Since her last office visit she has been on Symbicort 2 puffs twice daily and Arnuity once daily.  She uses albuterol 2-3 times a week.  She continues to take Pepcid.  Tries to keep the head of bed up with sleep and not eat close to bedtime she is also on Singulair once daily.  She received a new CPAP machine a year ago and I reviewed the CPAP usage data with her today.  She had CT scan of the chest done in September 2021 which showed stable findings of chronic basal atelectasis and fibrosis.  She recently was treated with Z-Dragan through her primary care provider for sinusitis.  She has no cough.  She is up-to-date on COVID-vaccine including booster dose.  She already had Moderna vaccine.  She does have cardiology appointment coming up soon.  Has no fever or chills.  No nausea or vomiting.  No change in weight or appetite.    Review of Systems     General:  No Fatigue, No Fever No weight loss or loss of appetite  HEENT: No dysphagia, No Visual Changes, no rhinorrhea  Respiratory:  + cough,+Dyspnea, intermittent mucoid phlegm, No Pleuritic Pain, no wheezing, no hemoptysis.  Cardiovascular: Denies chest pain, denies palpitations,+ECHEVERRIA, No Chest Pressure  Gastrointestinal:  No Abdominal Pain, No Nausea, No Vomiting, No Diarrhea  Genitourinary:  No Dysuria, No Frequency, No  Hesitancy  Musculoskeletal: No muscle pain or swelling  Endocrine:  No Heat Intolerance, No Cold Intolerance  Hematologic:  No Bleeding, No Bruising  Psychiatric:  No Anxiety, No Depression  Neurologic:  No Confusion, no Dysarthria, No Headaches  Skin:  No Rash, No Open Wounds    Family History   Problem Relation Age of Onset   • Colon cancer Mother 61   • Cancer Mother    • Heart disease Mother    • Diabetes Brother    • Breast cancer Maternal Grandmother    • Stroke Paternal Grandfather    • Heart failure Father         Social History     Socioeconomic History   • Marital status:    Tobacco Use   • Smoking status: Former Smoker     Packs/day: 0.50     Years: 5.00     Pack years: 2.50     Types: Cigarettes     Start date:      Quit date:      Years since quittin.4   • Smokeless tobacco: Never Used   Vaping Use   • Vaping Use: Never used   Substance and Sexual Activity   • Alcohol use: Never   • Drug use: Never   • Sexual activity: Defer        Past Medical History:   Diagnosis Date   • Acute right-sided low back pain with right-sided sciatica 01/15/2018   • Allergic rhinitis    • Asthma    • Back pain     2 bulging disc L2 L3   • Cardiomegaly 20 echocardiogram: 1.  Normal ejection fraction of 55%. 2.  Mild left ventricular hypertrophy. 3.  No significant valvular heart issues.    • CHF (congestive heart failure) (HCC)    • CKD stage 3 10/29/2019   • Closed nondisplaced fracture of metatarsal bone of left foot with routine healing 2018    unspecified metatarsal, subsequent encounter   • Diverticulitis    • Essential hypertension    • Hemoptysis     non cancerous   • Hyperlipidemia    • Idiopathic chronic gout of multiple sites without tophus 2016   • Primary osteoarthritis of right knee 2017   • Vitamin D deficiency 2016        Immunization History   Administered Date(s) Administered   • COVID-19 (MODERNA) 1st, 2nd, 3rd Dose Only 2021, 2021,  02/23/2021, 02/23/2021   • Fluzone High-Dose 65+yrs 12/02/2021   • Fluzone Split Quad (Multi-dose) 10/01/2020         Allergies   Allergen Reactions   • Ibuprofen Hives   • Penicillins Seizure   • Cephalosporins Rash   • Sulfa Antibiotics Rash          Current Outpatient Medications:   •  albuterol (ACCUNEB) 1.25 MG/3ML nebulizer solution, Take 3 mL by nebulization As Needed., Disp: , Rfl:   •  albuterol sulfate  (90 Base) MCG/ACT inhaler, Inhale 2 puffs Every 4 (Four) Hours As Needed., Disp: , Rfl:   •  amLODIPine (NORVASC) 5 MG tablet, TAKE 1 TABLET EVERY DAY, Disp: 90 tablet, Rfl: 3  •  Arnuity Ellipta 100 MCG/ACT aerosol powder , INHALE 1 PUFF BY MOUTH DAILY, Disp: 90 each, Rfl: 4  •  azelastine (ASTELIN) 0.1 % nasal spray, 2 sprays into the nostril(s) as directed by provider 2 (Two) Times a Day. Use in each nostril as directed, Disp: 60 mL, Rfl: 11  •  Cholecalciferol (Vitamin D3) 25 MCG (1000 UT) capsule, TAKE 1 CAPSULE BY MOUTH EVERY DAY, Disp: 90 capsule, Rfl: 3  •  famotidine (PEPCID) 20 MG tablet, TAKE 1 TABLET BY MOUTH AT BEDTIME (Patient taking differently: Take 20 mg by mouth Every Night.), Disp: 30 tablet, Rfl: 11  •  furosemide (LASIX) 20 MG tablet, Take 1 tablet by mouth Every Other Day., Disp: 45 tablet, Rfl: 3  •  gabapentin (NEURONTIN) 300 MG capsule, 1 tab in am and 2 at bedtime, Disp: 270 capsule, Rfl: 1  •  HYDROcod Polst-CPM Polst ER (Tussionex Pennkinetic ER) 10-8 MG/5ML ER suspension, Take 5 mL by mouth Every 12 (Twelve) Hours As Needed for Cough., Disp: 120 mL, Rfl: 0  •  losartan (COZAAR) 100 MG tablet, TAKE 1 TABLET EVERY DAY, Disp: 90 tablet, Rfl: 3  •  metoprolol succinate XL (TOPROL-XL) 100 MG 24 hr tablet, Take 100 mg by mouth Daily., Disp: , Rfl:   •  montelukast (SINGULAIR) 10 MG tablet, Take 1 tablet by mouth Every Night., Disp: 90 tablet, Rfl: 3  •  potassium chloride 10 MEQ CR tablet, Take 1 tablet by mouth Every Other Day., Disp: 45 tablet, Rfl: 3  •  Symbicort 160-4.5  "MCG/ACT inhaler, Inhale 2 puffs 2 (Two) Times a Day., Disp: 1 each, Rfl: 11  •  triamterene-hydrochlorothiazide (MAXZIDE) 75-50 MG per tablet, Maxzide 75-50 mg oral tablet take 1 tablet by oral route once daily   Suspended, Disp: , Rfl:   •  Wal-Fex Allergy 180 MG tablet, TAKE 1 TABLET BY MOUTH EVERY DAY, Disp: 90 tablet, Rfl: 3     Objective   Vital Signs:   /64 (BP Location: Left arm, Patient Position: Sitting, Cuff Size: Large Adult)   Pulse 65   Temp 97.7 °F (36.5 °C) (Tympanic)   Resp 18   Ht 157.5 cm (62\")   Wt 101 kg (223 lb)   SpO2 97% Comment: room air  BMI 40.79 kg/m²     Mallampatti classification : 1  Physical Exam  Vital Signs Reviewed  Morbidly obese female, in no acute distress, normal conversational  HEENT:  PERRL, EOMI.  OP, nares clear, no sinus tenderness  Neck:  Supple, no JVD, no thyromegaly  Lymph: no axillary, cervical, supraclavicular lymphadenopathy noted bilaterally  Chest:  good aeration, bilateral diminished breath sounds, no wheezing, crackles or rhonchi, resonant to percussion b/l  CV: RRR, no MGR, pulses 2+, equal.  Abd:  Soft, NT, ND, + BS, no HSM  EXT:  no clubbing, no cyanosis, No BLE edema  Neuro:  A&Ox3, CN grossly intact, no focal deficits.  Skin: No rashes or lesions noted     Result Review :   The following data was reviewed by: Santino Baez MD on 06/02/2022:  Common labs    Common Labsle 11/3/21 11/5/21 11/5/21 5/26/22 5/26/22     0850 0850 1226 1226   Glucose 108 (A)    74   BUN 27 (A)    17   Creatinine 1.05 (A)    1.03 (A)   eGFR Non African Am 51 (A)       Sodium 146 (A)    139   Potassium 4.2    4.6   Chloride 110 (A)    104   Calcium 9.3    9.2   Albumin   4.20  4.40   Total Bilirubin   0.4  0.3   Alkaline Phosphatase   110  98   AST (SGOT)   18  19   ALT (SGPT)   8  14   Total Cholesterol  186  180    Triglycerides  98  119    HDL Cholesterol  45  50    LDL Cholesterol   123 (A)  109 (A)    (A) Abnormal value            CMP    CMP 11/3/21 11/5/21 5/26/22 "   Glucose 108 (A)  74   BUN 27 (A)  17   Creatinine 1.05 (A)  1.03 (A)   eGFR Non African Am 51 (A)     Sodium 146 (A)  139   Potassium 4.2  4.6   Chloride 110 (A)  104   Calcium 9.3  9.2   Albumin  4.20 4.40   Total Bilirubin  0.4 0.3   Alkaline Phosphatase  110 98   AST (SGOT)  18 19   ALT (SGPT)  8 14   (A) Abnormal value                  Data reviewed: Radiologic studies CT chest from September 2021 was reviewed.  Showed chronic scarring otherwise stable lung.            Assessment and Plan    Diagnoses and all orders for this visit:    1. Seasonal allergies (Primary)    2. Allergic rhinitis, unspecified seasonality, unspecified trigger  -     Symbicort 160-4.5 MCG/ACT inhaler; Inhale 2 puffs 2 (Two) Times a Day.  Dispense: 1 each; Refill: 11  -     montelukast (SINGULAIR) 10 MG tablet; Take 1 tablet by mouth Every Night.  Dispense: 90 tablet; Refill: 3  -     azelastine (ASTELIN) 0.1 % nasal spray; 2 sprays into the nostril(s) as directed by provider 2 (Two) Times a Day. Use in each nostril as directed  Dispense: 60 mL; Refill: 11    3. PND (post-nasal drip)  -     Symbicort 160-4.5 MCG/ACT inhaler; Inhale 2 puffs 2 (Two) Times a Day.  Dispense: 1 each; Refill: 11  -     montelukast (SINGULAIR) 10 MG tablet; Take 1 tablet by mouth Every Night.  Dispense: 90 tablet; Refill: 3  -     azelastine (ASTELIN) 0.1 % nasal spray; 2 sprays into the nostril(s) as directed by provider 2 (Two) Times a Day. Use in each nostril as directed  Dispense: 60 mL; Refill: 11    4. Gastroesophageal reflux disease with esophagitis without hemorrhage  -     Symbicort 160-4.5 MCG/ACT inhaler; Inhale 2 puffs 2 (Two) Times a Day.  Dispense: 1 each; Refill: 11  -     montelukast (SINGULAIR) 10 MG tablet; Take 1 tablet by mouth Every Night.  Dispense: 90 tablet; Refill: 3  -     azelastine (ASTELIN) 0.1 % nasal spray; 2 sprays into the nostril(s) as directed by provider 2 (Two) Times a Day. Use in each nostril as directed  Dispense: 60 mL;  Refill: 11    5. Chronic obstructive pulmonary disease, unspecified COPD type (HCC)  -     Symbicort 160-4.5 MCG/ACT inhaler; Inhale 2 puffs 2 (Two) Times a Day.  Dispense: 1 each; Refill: 11  -     montelukast (SINGULAIR) 10 MG tablet; Take 1 tablet by mouth Every Night.  Dispense: 90 tablet; Refill: 3  -     azelastine (ASTELIN) 0.1 % nasal spray; 2 sprays into the nostril(s) as directed by provider 2 (Two) Times a Day. Use in each nostril as directed  Dispense: 60 mL; Refill: 11    6. History of fungal pneumonia    7. Bronchitis, mucopurulent recurrent (HCC)  -     Symbicort 160-4.5 MCG/ACT inhaler; Inhale 2 puffs 2 (Two) Times a Day.  Dispense: 1 each; Refill: 11  -     montelukast (SINGULAIR) 10 MG tablet; Take 1 tablet by mouth Every Night.  Dispense: 90 tablet; Refill: 3  -     azelastine (ASTELIN) 0.1 % nasal spray; 2 sprays into the nostril(s) as directed by provider 2 (Two) Times a Day. Use in each nostril as directed  Dispense: 60 mL; Refill: 11    8. Pulmonary nodule    9. Moderate persistent asthma, unspecified whether complicated    10. CARMEN (obstructive sleep apnea)    11. Tobacco abuse, in remission      COPD with recurrent bronchitis: Continue with Symbicort 160/4.52 puffs twice daily.  Continue with Arnuity inhaler once daily.  She does not smoke anymore.  CT scan of the chest from September 2021 showed stable fibrotic changes at bases otherwise no new findings.  Continue with albuterol as needed.    Morbid obesity and obstructive sleep apnea: We reviewed CPAP usage data today.  She has been using it regularly.  She has not changed her nasal pillow which could cause her to have recurrent bronchitis I advised her to call [facility] to try to get new supplies every 3 to 6 months.  She feels much rested when she uses CPAP machine.  Her apnea-hypopnea index on CPAP at 10 cm of water is 1.4.  She has been using it on an average of 4 hours and 26 minutes.    She is up-to-date on COVID and flu and pneumonia  vaccine.  She already had COVID booster dose.    Gastroesophageal reflux disease: Continue with Pepcid.    Allergic rhinitis: Continue with Singulair.  We will repeat CT scan of chest after next office visit.      Follow Up   Return in about 6 months (around 12/2/2022).  Patient was given instructions and counseling regarding her condition or for health maintenance advice. Please see specific information pulled into the AVS if appropriate.       Electronically signed by Santino Baez MD, 6/2/2022, 11:37 EDT.

## 2022-07-01 RX ORDER — FAMOTIDINE 20 MG/1
20 TABLET, FILM COATED ORAL
Qty: 30 TABLET | Refills: 11 | Status: SHIPPED | OUTPATIENT
Start: 2022-07-01

## 2022-07-11 ENCOUNTER — OFFICE VISIT (OUTPATIENT)
Dept: FAMILY MEDICINE CLINIC | Facility: CLINIC | Age: 77
End: 2022-07-11

## 2022-07-11 VITALS
HEIGHT: 62 IN | HEART RATE: 54 BPM | BODY MASS INDEX: 41.41 KG/M2 | DIASTOLIC BLOOD PRESSURE: 59 MMHG | SYSTOLIC BLOOD PRESSURE: 121 MMHG | TEMPERATURE: 97.2 F | OXYGEN SATURATION: 95 % | WEIGHT: 225 LBS

## 2022-07-11 DIAGNOSIS — J30.2 SEASONAL ALLERGIES: ICD-10-CM

## 2022-07-11 DIAGNOSIS — Z00.00 ENCOUNTER FOR MEDICARE ANNUAL WELLNESS EXAM: Primary | ICD-10-CM

## 2022-07-11 DIAGNOSIS — K42.9 UMBILICAL HERNIA WITHOUT OBSTRUCTION AND WITHOUT GANGRENE: ICD-10-CM

## 2022-07-11 PROBLEM — E66.01 MORBID (SEVERE) OBESITY DUE TO EXCESS CALORIES: Status: ACTIVE | Noted: 2022-07-11

## 2022-07-11 PROCEDURE — 1170F FXNL STATUS ASSESSED: CPT | Performed by: NURSE PRACTITIONER

## 2022-07-11 PROCEDURE — 1159F MED LIST DOCD IN RCRD: CPT | Performed by: NURSE PRACTITIONER

## 2022-07-11 PROCEDURE — 96160 PT-FOCUSED HLTH RISK ASSMT: CPT | Performed by: NURSE PRACTITIONER

## 2022-07-11 PROCEDURE — G0439 PPPS, SUBSEQ VISIT: HCPCS | Performed by: NURSE PRACTITIONER

## 2022-07-11 RX ORDER — FEXOFENADINE HCL 180 MG/1
180 TABLET ORAL DAILY
Qty: 90 TABLET | Refills: 3 | Status: SHIPPED | OUTPATIENT
Start: 2022-07-11

## 2022-07-11 NOTE — PROGRESS NOTES
The ABCs of the Annual Wellness Visit  Subsequent Medicare Wellness Visit    Chief Complaint   Patient presents with   • Medicare Wellness-subsequent      Subjective    History of Present Illness:  Julia Rios is a 77 y.o. female who presents for a Subsequent Medicare Wellness Visit.    Patient is going to speak with pulmonologist about pneumonia vaccine.    Pt reports umbilical hernia, denies pain at this time, pt states this occurred when she was coughing from bronchitis       mammogram- 2/2018  dexa- 9/2018  Colonoscopy- 10/2021  Last labs- 5/2022    The following portions of the patient's history were reviewed and   updated as appropriate: allergies, current medications, past family history, past medical history, past social history, past surgical history and problem list.    Compared to one year ago, the patient feels her physical   health is the same.    Compared to one year ago, the patient feels her mental   health is the same.    Recent Hospitalizations:  She was not admitted to the hospital during the last year.       Current Medical Providers:  Patient Care Team:  Nick Patel,  as PCP - General (Family Medicine)    Outpatient Medications Prior to Visit   Medication Sig Dispense Refill   • albuterol (ACCUNEB) 1.25 MG/3ML nebulizer solution Take 3 mL by nebulization As Needed.     • albuterol sulfate  (90 Base) MCG/ACT inhaler Inhale 2 puffs Every 4 (Four) Hours As Needed.     • amLODIPine (NORVASC) 5 MG tablet TAKE 1 TABLET EVERY DAY 90 tablet 3   • Arnuity Ellipta 100 MCG/ACT aerosol powder  INHALE 1 PUFF BY MOUTH DAILY 90 each 4   • azelastine (ASTELIN) 0.1 % nasal spray 2 sprays into the nostril(s) as directed by provider 2 (Two) Times a Day. Use in each nostril as directed 60 mL 11   • Cholecalciferol (Vitamin D3) 25 MCG (1000 UT) capsule TAKE 1 CAPSULE BY MOUTH EVERY DAY 90 capsule 3   • famotidine (PEPCID) 20 MG tablet Take 1 tablet by mouth every night at bedtime. 30  tablet 11   • furosemide (LASIX) 20 MG tablet Take 1 tablet by mouth Every Other Day. 45 tablet 3   • gabapentin (NEURONTIN) 300 MG capsule 1 tab in am and 2 at bedtime 270 capsule 1   • losartan (COZAAR) 100 MG tablet TAKE 1 TABLET EVERY DAY 90 tablet 3   • metoprolol succinate XL (TOPROL-XL) 100 MG 24 hr tablet Take 100 mg by mouth Daily.     • montelukast (SINGULAIR) 10 MG tablet Take 1 tablet by mouth Every Night. 90 tablet 3   • potassium chloride 10 MEQ CR tablet Take 1 tablet by mouth Every Other Day. 45 tablet 3   • Symbicort 160-4.5 MCG/ACT inhaler Inhale 2 puffs 2 (Two) Times a Day. 1 each 11   • Wal-Fex Allergy 180 MG tablet TAKE 1 TABLET BY MOUTH EVERY DAY 90 tablet 3   • HYDROcod Polst-CPM Polst ER (Tussionex Pennkinetic ER) 10-8 MG/5ML ER suspension Take 5 mL by mouth Every 12 (Twelve) Hours As Needed for Cough. 120 mL 0   • triamterene-hydrochlorothiazide (MAXZIDE) 75-50 MG per tablet Maxzide 75-50 mg oral tablet take 1 tablet by oral route once daily   Suspended       No facility-administered medications prior to visit.       No opioid medication identified on active medication list. I have reviewed chart for other potential  high risk medication/s and harmful drug interactions in the elderly.          Aspirin is not on active medication list.  Aspirin use is not indicated based on review of current medical condition/s. Risk of harm outweighs potential benefits.  .    Patient Active Problem List   Diagnosis   • Family history of colon cancer   • Chronic obstructive pulmonary disease (HCC)   • LVH (left ventricular hypertrophy)   • Essential hypertension   • Hyperlipidemia   • CKD (chronic kidney disease) stage 3, GFR 30-59 ml/min ([facility])   • Chronic heart failure with preserved ejection fraction ([facility])   • Acute right-sided low back pain with right-sided sciatica   • CARMEN (obstructive sleep apnea)   • Seasonal allergies   • Gastroesophageal reflux disease   • History of fungal pneumonia   • Bronchitis,  "mucopurulent recurrent (HCC)   • Tobacco abuse, in remission   • Pulmonary nodule   • PND (post-nasal drip)   • Status post fall   • Acute pain of right knee   • Allergic rhinitis   • Asthma   • Closed fracture of metatarsal bone   • Diverticulitis   • Hemoptysis   • Idiopathic chronic gout of multiple sites without tophus   • Primary localized osteoarthritis   • Vitamin D deficiency   • Acute non-recurrent maxillary sinusitis   • Morbid (severe) obesity due to excess calories (HCC)   • Umbilical hernia without obstruction and without gangrene     Advance Care Planning  Advance Directive is on file.      Review of Systems   Constitutional: Negative for chills and fever.   HENT: Negative for ear pain and sore throat.    Respiratory: Negative for cough.    Cardiovascular: Negative for chest pain.   Gastrointestinal: Negative for diarrhea, nausea and vomiting.   Genitourinary: Negative for dysuria.   Musculoskeletal: Negative for myalgias.   Neurological: Negative for dizziness.        Objective    Vitals:    07/11/22 1532   BP: 121/59   Pulse: 54   Temp: 97.2 °F (36.2 °C)   SpO2: 95%   Weight: 102 kg (225 lb)   Height: 157.5 cm (62\")     Estimated body mass index is 41.15 kg/m² as calculated from the following:    Height as of this encounter: 157.5 cm (62\").    Weight as of this encounter: 102 kg (225 lb).            Does the patient have evidence of cognitive impairment? No    Physical Exam  HENT:      Right Ear: Tympanic membrane normal.      Left Ear: Tympanic membrane normal.      Nose: Nose normal.      Mouth/Throat:      Mouth: Mucous membranes are moist.   Eyes:      Conjunctiva/sclera: Conjunctivae normal.   Neck:      Vascular: No carotid bruit.   Cardiovascular:      Rate and Rhythm: Normal rate and regular rhythm.      Heart sounds: Normal heart sounds. No murmur heard.  Pulmonary:      Effort: Pulmonary effort is normal.      Breath sounds: Normal breath sounds.   Abdominal:      General: Bowel sounds are " normal.      Palpations: Abdomen is soft.      Hernia: A hernia is present.      Comments: Umbilical hernia, non tender, no erythema   Musculoskeletal:      Right lower leg: No edema.      Left lower leg: No edema.   Skin:     General: Skin is warm and dry.   Neurological:      Mental Status: She is alert and oriented to person, place, and time.   Psychiatric:         Mood and Affect: Mood normal.         Behavior: Behavior normal.       Lab Results   Component Value Date    TRIG 119 2022    HDL 50 2022     (H) 2022    VLDL 21 2022            HEALTH RISK ASSESSMENT    Smoking Status:  Social History     Tobacco Use   Smoking Status Former Smoker   • Packs/day: 0.50   • Years: 5.00   • Pack years: 2.50   • Types: Cigarettes   • Start date:    • Quit date:    • Years since quittin.5   Smokeless Tobacco Never Used     Alcohol Consumption:  Social History     Substance and Sexual Activity   Alcohol Use Never     Fall Risk Screen:    JALEN Fall Risk Assessment was completed, and patient is at HIGH risk for falls. Assessment completed on:2022    Depression Screening:  PHQ-2/PHQ-9 Depression Screening 2022   Little Interest or Pleasure in Doing Things 0-->not at all   Feeling Down, Depressed or Hopeless 0-->not at all   Trouble Falling or Staying Asleep, or Sleeping Too Much -   Feeling Tired or Having Little Energy -   Poor Appetite or Overeating -   Feeling Bad about Yourself - or that You are a Failure or Have Let Yourself or Your Family Down -   Trouble Concentrating on Things, Such as Reading the Newspaper or Watching Television -   Moving or Speaking So Slowly that Other People Could Have Noticed? Or the Opposite - Being So Fidgety -   Thoughts that You Would be Better Off Dead or of Hurting Yourself in Some Way -   PHQ-9: Brief Depression Severity Measure Score 0   If You Checked Off Any Problems, How Difficult Have These Problems Made It For You to Do Your  Work, Take Care of Things at Home, or Get Along with Other People? -       Health Habits and Functional and Cognitive Screening:  Functional & Cognitive Status 7/11/2022   Do you have difficulty preparing food and eating? No   Do you have difficulty bathing yourself, getting dressed or grooming yourself? No   Do you have difficulty using the toilet? No   Do you have difficulty moving around from place to place? No   Do you have trouble with steps or getting out of a bed or a chair? No   Current Diet Well Balanced Diet   Dental Exam Up to date        Dental Exam Comment scheduled 7/14/22   Eye Exam Up to date        Eye Exam Comment Scheduled this month   Exercise (times per week) 7 times per week   Current Exercises Include House Cleaning;Walking   Do you need help using the phone?  No   Are you deaf or do you have serious difficulty hearing?  No   Do you need help with transportation? No   Do you need help shopping? No   Do you need help preparing meals?  No   Do you need help with housework?  No   Do you need help with laundry? No   Do you need help taking your medications? No   Do you need help managing money? No   Do you ever drive or ride in a car without wearing a seat belt? No   Have you felt unusual stress, anger or loneliness in the last month? No   Who do you live with? Spouse   If you need help, do you have trouble finding someone available to you? No   Have you been bothered in the last four weeks by sexual problems? No   Do you have difficulty concentrating, remembering or making decisions? No       Age-appropriate Screening Schedule:  Refer to the list below for future screening recommendations based on patient's age, sex and/or medical conditions. Orders for these recommended tests are listed in the plan section. The patient has been provided with a written plan.    Health Maintenance   Topic Date Due   • TDAP/TD VACCINES (1 - Tdap) Never done   • DXA SCAN  07/11/2022 (Originally 9/14/2020)   • ZOSTER  VACCINE (1 of 2) 07/11/2022 (Originally 2/4/1995)   • DIABETIC EYE EXAM  08/01/2022 (Originally 6/9/2021)   • INFLUENZA VACCINE  10/01/2022   • LIPID PANEL  05/26/2023   • HEMOGLOBIN A1C  Discontinued   • URINE MICROALBUMIN  Discontinued              Assessment & Plan   CMS Preventative Services Quick Reference  Risk Factors Identified During Encounter  Immunizations Discussed/Encouraged (specific Immunizations; Prevnar 20 (Pneumococcal 20-valent conjugate)  The above risks/problems have been discussed with the patient.  Follow up actions/plans if indicated are seen below in the Assessment/Plan Section.  Pertinent information has been shared with the patient in the After Visit Summary.    Diagnoses and all orders for this visit:    1. Encounter for Medicare annual wellness exam (Primary)    2. Seasonal allergies    3. Umbilical hernia without obstruction and without gangrene    Other orders  -     fexofenadine (Wal-Fex Allergy) 180 MG tablet; Take 1 tablet by mouth Daily.  Dispense: 90 tablet; Refill: 3    Encounter for Medicare wellness exam screenings and immunizations reviewed with patient  Seasonal allergies request refill Allegra stable at this time  Umbilical hernia denies pain declines referral to general surgery    Follow Up:   Return in about 1 year (around 7/11/2023).     An After Visit Summary and PPPS were made available to the patient.

## 2022-08-06 DIAGNOSIS — E11.40 TYPE 2 DIABETES MELLITUS WITH DIABETIC NEUROPATHY, WITHOUT LONG-TERM CURRENT USE OF INSULIN: ICD-10-CM

## 2022-08-08 RX ORDER — GABAPENTIN 300 MG/1
CAPSULE ORAL
Qty: 270 CAPSULE | Refills: 1 | Status: SHIPPED | OUTPATIENT
Start: 2022-08-08 | End: 2022-11-08 | Stop reason: SDUPTHER

## 2022-08-08 RX ORDER — METOPROLOL SUCCINATE 100 MG/1
TABLET, EXTENDED RELEASE ORAL
Qty: 90 TABLET | Refills: 3 | Status: SHIPPED | OUTPATIENT
Start: 2022-08-08

## 2022-09-06 ENCOUNTER — OFFICE VISIT (OUTPATIENT)
Dept: CARDIOLOGY | Facility: CLINIC | Age: 77
End: 2022-09-06

## 2022-09-06 VITALS
DIASTOLIC BLOOD PRESSURE: 55 MMHG | BODY MASS INDEX: 41.77 KG/M2 | WEIGHT: 227 LBS | HEIGHT: 62 IN | HEART RATE: 51 BPM | SYSTOLIC BLOOD PRESSURE: 142 MMHG

## 2022-09-06 DIAGNOSIS — E78.5 HYPERLIPIDEMIA, UNSPECIFIED HYPERLIPIDEMIA TYPE: ICD-10-CM

## 2022-09-06 DIAGNOSIS — I10 ESSENTIAL HYPERTENSION: ICD-10-CM

## 2022-09-06 DIAGNOSIS — I50.32 CHRONIC HEART FAILURE WITH PRESERVED EJECTION FRACTION: ICD-10-CM

## 2022-09-06 DIAGNOSIS — I51.7 LVH (LEFT VENTRICULAR HYPERTROPHY): Primary | ICD-10-CM

## 2022-09-06 PROCEDURE — 99214 OFFICE O/P EST MOD 30 MIN: CPT | Performed by: INTERNAL MEDICINE

## 2022-09-06 RX ORDER — SPIRONOLACTONE 25 MG/1
25 TABLET ORAL DAILY
Qty: 90 TABLET | Refills: 3 | Status: SHIPPED | OUTPATIENT
Start: 2022-09-06

## 2022-09-06 NOTE — PROGRESS NOTES
Chief Complaint  Congestive Heart Failure, Cardiomegaly, Hypertension, and Hyperlipidemia    Subjective    Patient has had about a 14   pound weight gain since last visit she reports stable lower extremity edema the no worsening of any shortness of breath issues.  She does have cramping issues which has made it difficult to take her Lasix at times  Past Medical History:   Diagnosis Date   • Acute right-sided low back pain with right-sided sciatica 01/15/2018   • Allergic rhinitis    • Asthma    • Back pain     2 bulging disc L2 L3   • Cardiomegaly 8/26/2021 11/16/20 echocardiogram: 1.  Normal ejection fraction of 55%. 2.  Mild left ventricular hypertrophy. 3.  No significant valvular heart issues.    • CHF (congestive heart failure) ([facility])    • CKD stage 3 10/29/2019   • Closed nondisplaced fracture of metatarsal bone of left foot with routine healing 06/19/2018    unspecified metatarsal, subsequent encounter   • Diverticulitis    • Essential hypertension    • Hemoptysis     non cancerous   • Hyperlipidemia    • Idiopathic chronic gout of multiple sites without tophus 05/20/2016   • Primary osteoarthritis of right knee 01/16/2017   • Vitamin D deficiency 01/13/2016         Current Outpatient Medications:   •  albuterol (ACCUNEB) 1.25 MG/3ML nebulizer solution, Take 3 mL by nebulization As Needed., Disp: , Rfl:   •  albuterol sulfate  (90 Base) MCG/ACT inhaler, Inhale 2 puffs Every 4 (Four) Hours As Needed., Disp: , Rfl:   •  amLODIPine (NORVASC) 5 MG tablet, TAKE 1 TABLET EVERY DAY, Disp: 90 tablet, Rfl: 3  •  Arnuity Ellipta 100 MCG/ACT aerosol powder , INHALE 1 PUFF BY MOUTH DAILY, Disp: 90 each, Rfl: 4  •  azelastine (ASTELIN) 0.1 % nasal spray, 2 sprays into the nostril(s) as directed by provider 2 (Two) Times a Day. Use in each nostril as directed, Disp: 60 mL, Rfl: 11  •  Cholecalciferol (Vitamin D3) 25 MCG (1000 UT) capsule, TAKE 1 CAPSULE BY MOUTH EVERY DAY, Disp: 90 capsule, Rfl: 3  •  famotidine  (PEPCID) 20 MG tablet, Take 1 tablet by mouth every night at bedtime., Disp: 30 tablet, Rfl: 11  •  fexofenadine (Wal-Fex Allergy) 180 MG tablet, Take 1 tablet by mouth Daily., Disp: 90 tablet, Rfl: 3  •  furosemide (LASIX) 20 MG tablet, Take 1 tablet by mouth Every Other Day., Disp: 45 tablet, Rfl: 3  •  gabapentin (NEURONTIN) 300 MG capsule, 1 tab in am and 2 at bedtime, Disp: 270 capsule, Rfl: 1  •  losartan (COZAAR) 100 MG tablet, TAKE 1 TABLET EVERY DAY, Disp: 90 tablet, Rfl: 3  •  metoprolol succinate XL (TOPROL-XL) 100 MG 24 hr tablet, TAKE 1 TABLET EVERY DAY, Disp: 90 tablet, Rfl: 3  •  montelukast (SINGULAIR) 10 MG tablet, Take 1 tablet by mouth Every Night., Disp: 90 tablet, Rfl: 3  •  Symbicort 160-4.5 MCG/ACT inhaler, Inhale 2 puffs 2 (Two) Times a Day., Disp: 1 each, Rfl: 11  •  spironolactone (ALDACTONE) 25 MG tablet, Take 1 tablet by mouth Daily., Disp: 90 tablet, Rfl: 3    Medications Discontinued During This Encounter   Medication Reason   • HYDROcod Polst-CPM Polst ER (Tussionex Pennkinetic ER) 10-8 MG/5ML ER suspension *Therapy completed   • triamterene-hydrochlorothiazide (MAXZIDE) 75-50 MG per tablet *Therapy completed   • potassium chloride 10 MEQ CR tablet      Allergies   Allergen Reactions   • Ibuprofen Hives   • Penicillins Seizure   • Cephalosporins Rash   • Sulfa Antibiotics Rash        Social History     Tobacco Use   • Smoking status: Former Smoker     Packs/day: 0.50     Years: 5.00     Pack years: 2.50     Types: Cigarettes     Start date:      Quit date:      Years since quittin.6   • Smokeless tobacco: Never Used   Vaping Use   • Vaping Use: Never used   Substance Use Topics   • Alcohol use: Never   • Drug use: Never       Family History   Problem Relation Age of Onset   • Colon cancer Mother 61   • Cancer Mother    • Heart disease Mother    • Diabetes Brother    • Breast cancer Maternal Grandmother    • Stroke Paternal Grandfather    • Heart failure Father      "    Objective     /55   Pulse 51   Ht 157.5 cm (62\")   Wt 103 kg (227 lb)   BMI 41.52 kg/m²       Physical Exam    General Appearance:   · no acute distress  · Alert and oriented x3  HENT:   · lips not cyanotic  · Atraumatic  Neck:  · No jvd   · supple  Respiratory:  · no respiratory distress  · normal breath sounds  · no rales  Cardiovascular:  · Regular rate and rhythm  · no S3, no S4   · no murmur  · no rub  Extremities  · No cyanosis  lower extremity edema: +1 skin:   · warm, dry  · No rashes      Result Review :     proBNP   Date Value Ref Range Status   05/26/2022 196.0 0.0 - 1,800.0 pg/mL Final     CMP    CMP 11/3/21 11/5/21 5/26/22   Glucose 108 (A)  74   BUN 27 (A)  17   Creatinine 1.05 (A)  1.03 (A)   eGFR Non African Am 51 (A)     Sodium 146 (A)  139   Potassium 4.2  4.6   Chloride 110 (A)  104   Calcium 9.3  9.2   Albumin  4.20 4.40   Total Bilirubin  0.4 0.3   Alkaline Phosphatase  110 98   AST (SGOT)  18 19   ALT (SGPT)  8 14   (A) Abnormal value               Lab Results   Component Value Date    TSH 2.300 10/29/2019      Lab Results   Component Value Date    FREET4 1.2 10/29/2019      No results found for: DDIMERQUANT  No results found for: MG   No results found for: DIGOXIN   Lab Results   Component Value Date    TROPONINT <0.01 10/04/2020           Lipid Panel    Lipid Panel 11/5/21 5/26/22   Total Cholesterol 186 180   Triglycerides 98 119   HDL Cholesterol 45 50   VLDL Cholesterol 18 21   LDL Cholesterol  123 (A) 109 (A)   LDL/HDL Ratio 2.70 2.12   (A) Abnormal value            No results found for: POCTROP                   Diagnoses and all orders for this visit:    1. LVH (left ventricular hypertrophy) (Primary)    2. Chronic heart failure with preserved ejection fraction (HCC)  Assessment & Plan:  Patient with some increased weight gain difficulty taking Lasix due to cramping recommended the addition of Aldactone 25 mg once a day can hold potassium repeat BMP in 2 weeks counseled on " keeping a weight log and low-sodium diet    Orders:  -     Basic Metabolic Panel; Future    3. Essential hypertension  Assessment & Plan:  Blood pressure mild systolic elevation adding Aldactone 25 repeating a renal panel in 2 weeks    Orders:  -     Basic Metabolic Panel; Future    4. Hyperlipidemia, unspecified hyperlipidemia type    Other orders  -     spironolactone (ALDACTONE) 25 MG tablet; Take 1 tablet by mouth Daily.  Dispense: 90 tablet; Refill: 3          Follow Up     Return in about 6 months (around 3/6/2023) for Follow with Marielos Dover, EKG with F/U.          Patient was given instructions and counseling regarding her condition or for health maintenance advice. Please see specific information pulled into the AVS if appropriate.

## 2022-09-06 NOTE — ASSESSMENT & PLAN NOTE
Patient with some increased weight gain difficulty taking Lasix due to cramping recommended the addition of Aldactone 25 mg once a day can hold potassium repeat BMP in 2 weeks counseled on keeping a weight log and low-sodium diet

## 2022-10-20 RX ORDER — UBIDECARENONE 100 MG
CAPSULE ORAL
Qty: 90 CAPSULE | Refills: 3 | Status: SHIPPED | OUTPATIENT
Start: 2022-10-20

## 2022-10-31 ENCOUNTER — HOSPITAL ENCOUNTER (OUTPATIENT)
Dept: GENERAL RADIOLOGY | Facility: HOSPITAL | Age: 77
Discharge: HOME | End: 2022-10-31
Admitting: NURSE PRACTITIONER

## 2022-10-31 ENCOUNTER — OFFICE VISIT (OUTPATIENT)
Dept: FAMILY MEDICINE CLINIC | Facility: CLINIC | Age: 77
End: 2022-10-31

## 2022-10-31 VITALS
DIASTOLIC BLOOD PRESSURE: 77 MMHG | HEART RATE: 58 BPM | HEIGHT: 62 IN | OXYGEN SATURATION: 96 % | WEIGHT: 229 LBS | BODY MASS INDEX: 42.14 KG/M2 | SYSTOLIC BLOOD PRESSURE: 114 MMHG | TEMPERATURE: 97 F

## 2022-10-31 DIAGNOSIS — M25.561 CHRONIC PAIN OF RIGHT KNEE: Primary | ICD-10-CM

## 2022-10-31 DIAGNOSIS — M17.11 PRIMARY OSTEOARTHRITIS OF RIGHT KNEE: ICD-10-CM

## 2022-10-31 DIAGNOSIS — G89.29 CHRONIC PAIN OF RIGHT KNEE: Primary | ICD-10-CM

## 2022-10-31 DIAGNOSIS — G89.29 CHRONIC PAIN OF RIGHT KNEE: ICD-10-CM

## 2022-10-31 DIAGNOSIS — M25.561 CHRONIC PAIN OF RIGHT KNEE: ICD-10-CM

## 2022-10-31 PROCEDURE — 73562 X-RAY EXAM OF KNEE 3: CPT

## 2022-10-31 PROCEDURE — 99213 OFFICE O/P EST LOW 20 MIN: CPT | Performed by: NURSE PRACTITIONER

## 2022-10-31 RX ORDER — DULOXETIN HYDROCHLORIDE 30 MG/1
30 CAPSULE, DELAYED RELEASE ORAL DAILY
Qty: 30 CAPSULE | Refills: 1 | Status: SHIPPED | OUTPATIENT
Start: 2022-10-31 | End: 2022-11-28

## 2022-10-31 NOTE — PROGRESS NOTES
ACUTE VISIT     Patient Name: Julia Riso  : 1945   MRN: [id number]     Chief Complaint:    Chief Complaint   Patient presents with   • Knee Pain       History of Present Illness: Julia Rios is a 77 y.o. female who is here today for right knee pain.      Patient woke up Saturday and it was hurting with swelling  Pt reports she typically taking aleve daily but dermatologist instructed no NSAIDS until after surgery scheduled 11.10.2022  Pt reports she stopped taking aleve Friday  .    No imaging has been performed.    Tried tylenol, voltaren gel no relief   Taking gabapentin no relief         Subjective      Review of Systems:   Review of Systems   Constitutional: Negative for chills and fever.   Respiratory: Negative for shortness of breath.    Cardiovascular: Negative for chest pain and leg swelling.   Musculoskeletal: Positive for arthralgias and joint swelling.   Skin: Negative for rash.        Past Medical History:   Past Medical History:   Diagnosis Date   • Acute right-sided low back pain with right-sided sciatica 01/15/2018   • Allergic rhinitis    • Asthma    • Back pain     2 bulging disc L2 L3   • Cardiomegaly 20 echocardiogram: 1.  Normal ejection fraction of 55%. 2.  Mild left ventricular hypertrophy. 3.  No significant valvular heart issues.    • CHF (congestive heart failure) (HCC)    • CKD stage 3 10/29/2019   • Closed nondisplaced fracture of metatarsal bone of left foot with routine healing 2018    unspecified metatarsal, subsequent encounter   • Diverticulitis    • Essential hypertension    • Hemoptysis     non cancerous   • Hyperlipidemia    • Idiopathic chronic gout of multiple sites without tophus 2016   • Primary osteoarthritis of right knee 2017   • Vitamin D deficiency 2016       Past Surgical History:   Past Surgical History:   Procedure Laterality Date   • CATARACT EXTRACTION Right    • COLONOSCOPY     • COLONOSCOPY N/A  10/12/2021    Procedure: COLONOSCOPY;  Surgeon: Jorge Diane MD;  Location: [facility] ENDOSCOPY;  Service: Gastroenterology;  Laterality: N/A;  DIVERTICULOSIS   • ENDOSCOPY     • EYE SURGERY      cataract right eye   • RETINAL DETACHMENT REPAIR Right     hole in retina repair       Family History:   Family History   Problem Relation Age of Onset   • Colon cancer Mother 61   • Cancer Mother    • Heart disease Mother    • Diabetes Brother    • Breast cancer Maternal Grandmother    • Stroke Paternal Grandfather    • Heart failure Father        Social History:   Social History     Socioeconomic History   • Marital status:    Tobacco Use   • Smoking status: Former     Packs/day: 0.50     Years: 5.00     Pack years: 2.50     Types: Cigarettes     Start date:      Quit date:      Years since quittin.8   • Smokeless tobacco: Never   Vaping Use   • Vaping Use: Never used   Substance and Sexual Activity   • Alcohol use: Never   • Drug use: Never   • Sexual activity: Defer       Medications:     Current Outpatient Medications:   •  albuterol (ACCUNEB) 1.25 MG/3ML nebulizer solution, Take 3 mL by nebulization As Needed., Disp: , Rfl:   •  albuterol sulfate  (90 Base) MCG/ACT inhaler, Inhale 2 puffs Every 4 (Four) Hours As Needed., Disp: , Rfl:   •  amLODIPine (NORVASC) 5 MG tablet, TAKE 1 TABLET EVERY DAY, Disp: 90 tablet, Rfl: 3  •  Arnuity Ellipta 100 MCG/ACT aerosol powder , INHALE 1 PUFF BY MOUTH DAILY, Disp: 90 each, Rfl: 4  •  azelastine (ASTELIN) 0.1 % nasal spray, 2 sprays into the nostril(s) as directed by provider 2 (Two) Times a Day. Use in each nostril as directed, Disp: 60 mL, Rfl: 11  •  D3-1000 25 MCG (1000 UT) capsule, TAKE 1 CAPSULE BY MOUTH EVERY DAY, Disp: 90 capsule, Rfl: 3  •  famotidine (PEPCID) 20 MG tablet, Take 1 tablet by mouth every night at bedtime., Disp: 30 tablet, Rfl: 11  •  fexofenadine (Wal-Fex Allergy) 180 MG tablet, Take 1 tablet by mouth Daily., Disp: 90  "tablet, Rfl: 3  •  furosemide (LASIX) 20 MG tablet, Take 1 tablet by mouth Every Other Day., Disp: 45 tablet, Rfl: 3  •  gabapentin (NEURONTIN) 300 MG capsule, 1 tab in am and 2 at bedtime, Disp: 270 capsule, Rfl: 1  •  losartan (COZAAR) 100 MG tablet, TAKE 1 TABLET EVERY DAY, Disp: 90 tablet, Rfl: 3  •  metoprolol succinate XL (TOPROL-XL) 100 MG 24 hr tablet, TAKE 1 TABLET EVERY DAY, Disp: 90 tablet, Rfl: 3  •  montelukast (SINGULAIR) 10 MG tablet, Take 1 tablet by mouth Every Night., Disp: 90 tablet, Rfl: 3  •  spironolactone (ALDACTONE) 25 MG tablet, Take 1 tablet by mouth Daily., Disp: 90 tablet, Rfl: 3  •  Symbicort 160-4.5 MCG/ACT inhaler, Inhale 2 puffs 2 (Two) Times a Day., Disp: 1 each, Rfl: 11  •  DULoxetine (CYMBALTA) 30 MG capsule, Take 1 capsule by mouth Daily., Disp: 30 capsule, Rfl: 1    Allergies:   Allergies   Allergen Reactions   • Ibuprofen Hives   • Penicillins Seizure   • Cephalosporins Rash   • Sulfa Antibiotics Rash         Objective     Physical Exam:  Vital Signs:   Vitals:    10/31/22 1520   BP: 114/77   Pulse: 58   Temp: 97 °F (36.1 °C)   SpO2: 96%   Weight: 104 kg (229 lb)   Height: 157.5 cm (62\")     Body mass index is 41.88 kg/m².     Physical Exam  Cardiovascular:      Rate and Rhythm: Normal rate and regular rhythm.      Heart sounds: Normal heart sounds. No murmur heard.  Pulmonary:      Effort: Pulmonary effort is normal.      Breath sounds: Normal breath sounds.   Musculoskeletal:         General: Swelling and tenderness present.      Right lower leg: No edema.      Left lower leg: No edema.      Comments: Crepitus right knee   Skin:     General: Skin is warm and dry.   Neurological:      Mental Status: She is alert.             Assessment / Plan      Assessment/Plan:   Diagnoses and all orders for this visit:    1. Chronic pain of right knee (Primary)  -     XR Knee 3 View Right; Future    2. Primary osteoarthritis of right knee  -     XR Knee 3 View Right; Future    Other " orders  -     DULoxetine (CYMBALTA) 30 MG capsule; Take 1 capsule by mouth Daily.  Dispense: 30 capsule; Refill: 1       Pt declines joint injection, will try cymbalta for pain control, will call with xray results, recommend tylenol arthritis for pain and icing         Follow Up:   Return if symptoms worsen or fail to improve.    Bartolo Flaherty, KALEB      Please note that portions of this note were completed with a voice recognition program.

## 2022-11-01 DIAGNOSIS — M25.561 ACUTE PAIN OF RIGHT KNEE: Primary | ICD-10-CM

## 2022-11-08 DIAGNOSIS — E11.40 TYPE 2 DIABETES MELLITUS WITH DIABETIC NEUROPATHY, WITHOUT LONG-TERM CURRENT USE OF INSULIN: ICD-10-CM

## 2022-11-08 RX ORDER — GABAPENTIN 300 MG/1
CAPSULE ORAL
Qty: 270 CAPSULE | Refills: 1 | Status: SHIPPED | OUTPATIENT
Start: 2022-11-08 | End: 2023-02-02 | Stop reason: SDUPTHER

## 2022-11-14 RX ORDER — AMLODIPINE BESYLATE 5 MG/1
TABLET ORAL
Qty: 90 TABLET | Refills: 3 | Status: SHIPPED | OUTPATIENT
Start: 2022-11-14

## 2022-11-16 ENCOUNTER — OFFICE VISIT (OUTPATIENT)
Dept: ORTHOPEDIC SURGERY | Facility: CLINIC | Age: 77
End: 2022-11-16

## 2022-11-16 VITALS — HEIGHT: 61 IN | TEMPERATURE: 97.8 F | WEIGHT: 222.6 LBS | BODY MASS INDEX: 42.03 KG/M2

## 2022-11-16 DIAGNOSIS — M17.10 ARTHRITIS OF KNEE: Primary | ICD-10-CM

## 2022-11-16 PROCEDURE — 20610 DRAIN/INJ JOINT/BURSA W/O US: CPT | Performed by: ORTHOPAEDIC SURGERY

## 2022-11-16 PROCEDURE — 99203 OFFICE O/P NEW LOW 30 MIN: CPT | Performed by: ORTHOPAEDIC SURGERY

## 2022-11-16 RX ADMIN — METHYLPREDNISOLONE ACETATE 80 MG: 80 INJECTION, SUSPENSION INTRA-ARTICULAR; INTRALESIONAL; INTRAMUSCULAR; SOFT TISSUE at 14:50

## 2022-11-16 NOTE — PROGRESS NOTES
Patient: Julia Rios    YOB: 1945    Medical Record Number: [id number]    Chief Complaints:  Right knee pain    History of Present Illness:     77 y.o. female patient who presents for evaluation of right knee pain.  She reports that the symptoms first began years ago.  This has been a gradually worsening issue for her for quite some time.  She typically takes Aleve which does help.  She had to come off of the Aleve a few weeks ago due to an upcoming back surgery.  The knee really flared up when she came off the Aleve.  Current pain is described as moderate, constant and aching.  The pain is predominantly along the medial side of the knee.  Denies any clicking, popping or catching.  Symptoms are worse with activity.  Denies any shooting pain down the legs, weakness, numbness or paresthesias.    Allergies:   Allergies   Allergen Reactions   • Ibuprofen Hives   • Penicillins Seizure   • Cephalosporins Rash   • Sulfa Antibiotics Rash       Home Medications    Current Outpatient Medications:   •  albuterol (ACCUNEB) 1.25 MG/3ML nebulizer solution, Take 3 mL by nebulization As Needed., Disp: , Rfl:   •  albuterol sulfate  (90 Base) MCG/ACT inhaler, Inhale 2 puffs Every 4 (Four) Hours As Needed., Disp: , Rfl:   •  amLODIPine (NORVASC) 5 MG tablet, TAKE 1 TABLET EVERY DAY, Disp: 90 tablet, Rfl: 3  •  Arnuity Ellipta 100 MCG/ACT aerosol powder , INHALE 1 PUFF BY MOUTH DAILY, Disp: 90 each, Rfl: 4  •  azelastine (ASTELIN) 0.1 % nasal spray, 2 sprays into the nostril(s) as directed by provider 2 (Two) Times a Day. Use in each nostril as directed, Disp: 60 mL, Rfl: 11  •  D3-1000 25 MCG (1000 UT) capsule, TAKE 1 CAPSULE BY MOUTH EVERY DAY, Disp: 90 capsule, Rfl: 3  •  famotidine (PEPCID) 20 MG tablet, Take 1 tablet by mouth every night at bedtime., Disp: 30 tablet, Rfl: 11  •  fexofenadine (Wal-Fex Allergy) 180 MG tablet, Take 1 tablet by mouth Daily., Disp: 90 tablet, Rfl: 3  •  furosemide (LASIX) 20  MG tablet, Take 1 tablet by mouth Every Other Day., Disp: 45 tablet, Rfl: 3  •  gabapentin (NEURONTIN) 300 MG capsule, 1 tab in am and 2 at bedtime, Disp: 270 capsule, Rfl: 1  •  losartan (COZAAR) 100 MG tablet, TAKE 1 TABLET EVERY DAY, Disp: 90 tablet, Rfl: 3  •  metoprolol succinate XL (TOPROL-XL) 100 MG 24 hr tablet, TAKE 1 TABLET EVERY DAY, Disp: 90 tablet, Rfl: 3  •  montelukast (SINGULAIR) 10 MG tablet, Take 1 tablet by mouth Every Night., Disp: 90 tablet, Rfl: 3  •  spironolactone (ALDACTONE) 25 MG tablet, Take 1 tablet by mouth Daily., Disp: 90 tablet, Rfl: 3  •  Symbicort 160-4.5 MCG/ACT inhaler, Inhale 2 puffs 2 (Two) Times a Day., Disp: 1 each, Rfl: 11  •  DULoxetine (CYMBALTA) 30 MG capsule, Take 1 capsule by mouth Daily., Disp: 30 capsule, Rfl: 1    Past Medical History:   Diagnosis Date   • Acute right-sided low back pain with right-sided sciatica 01/15/2018   • Allergic rhinitis    • Asthma    • Back pain     2 bulging disc L2 L3   • Cardiomegaly 8/26/2021 11/16/20 echocardiogram: 1.  Normal ejection fraction of 55%. 2.  Mild left ventricular hypertrophy. 3.  No significant valvular heart issues.    • CHF (congestive heart failure) (HCC)    • CKD stage 3 10/29/2019   • Closed nondisplaced fracture of metatarsal bone of left foot with routine healing 06/19/2018    unspecified metatarsal, subsequent encounter   • Diverticulitis    • Essential hypertension    • Hemoptysis     non cancerous   • Hyperlipidemia    • Idiopathic chronic gout of multiple sites without tophus 05/20/2016   • Primary osteoarthritis of right knee 01/16/2017   • Vitamin D deficiency 01/13/2016       Past Surgical History:   Procedure Laterality Date   • CATARACT EXTRACTION Right    • COLONOSCOPY  2014   • COLONOSCOPY N/A 10/12/2021    Procedure: COLONOSCOPY;  Surgeon: Jorge Diane MD;  Location: [facility] ENDOSCOPY;  Service: Gastroenterology;  Laterality: N/A;  DIVERTICULOSIS   • ENDOSCOPY  2015   • EYE SURGERY       "cataract right eye   • RETINAL DETACHMENT REPAIR Right     hole in retina repair       Social History     Occupational History   • Occupation: Retired   Tobacco Use   • Smoking status: Former     Packs/day: 0.50     Years: 5.00     Pack years: 2.50     Types: Cigarettes     Start date:      Quit date:      Years since quittin.8   • Smokeless tobacco: Never   Vaping Use   • Vaping Use: Never used   Substance and Sexual Activity   • Alcohol use: Never   • Drug use: Never   • Sexual activity: Defer      Social History     Social History Narrative   • Not on file       Family History   Problem Relation Age of Onset   • Colon cancer Mother 61   • Cancer Mother    • Heart disease Mother    • Diabetes Brother    • Breast cancer Maternal Grandmother    • Stroke Paternal Grandfather    • Heart failure Father        Review of Systems:      Constitutional: Denies fever, shaking or chills   Eyes: Denies change in visual acuity   HEENT: Denies nasal congestion or sore throat   Respiratory: Denies cough or shortness of breath   Cardiovascular: Denies chest pain or edema  Endocrine: Denies tremors, palpitations, intolerance of heat or cold, polyuria, polydipsia.  GI: Denies abdominal pain, nausea, vomiting, bloody stools or diarrhea  : Denies frequency, urgency, incontinence, retention, or nocturia.  Musculoskeletal: Denies numbness, tingling or loss of motor function except as above  Integument: Denies rash, lesion or ulceration   Neurologic: Denies headache or focal weakness, deficits  Heme: Denies spontaneous or excessive bleeding, epistaxis, hematuria, melena, fatigue, enlarged or tender lymph nodes.      All other pertinent positives and negatives as noted above in HPI.    Physical Exam:   77 y.o. female  Vitals:    22 1446   Temp: 97.8 °F (36.6 °C)   Weight: 101 kg (222 lb 9.6 oz)   Height: 154.9 cm (61\")   BMI 42.06    General:  Patient is awake and alert.  Appears in no acute distress or " discomfort.    Psych:  Affect and demeanor are appropriate.    Eyes:  Conjunctiva and sclera appear grossly normal.  Eyes track well and EOM seem to be intact.    Ears:  No gross abnormalities.  Hearing adequate for the exam.    Cardiovascular:  Regular rate and rhythm.    Lungs:  Good chest expansion.  Breathing unlabored.    Spine:  Back appears grossly normal.  No palpable masses or adenopathy.  Good motion.  Straight leg raise and crossed straight leg raise maneuver are both negative for lower leg and/or knee pain.    Extremities:  Right knee is examined.  Skin is benign.  No obvious gross abnormalities.  No palpable masses or adenopathy.  Moderate tenderness noted over medial joint line.  Motion: 3 degrees shy of full extension to 105 degrees of flexion.  No instability.  Strength is well preserved including hip flexion, knee extension, ankle and toe plantarflexion, ankle inversion and eversion.  Good sensory function throughout the leg and foot.  Palpable pulses.  Brisk capillary refill.  Good skin turgor.         Radiology:   Outside x-rays including AP and lateral views of the right knee are reviewed on the PPS system along with the radiology report.  She appears to have advanced tricompartment arthritis.  She has bone-on-bone in the medial compartment and she has significant subluxation of the tibia.    Assessment/Plan:  Right knee end-stage osteoarthritis    We discussed treatment options in detail including the risks, benefits, and alternatives of conservative treatment versus surgical options.  Regarding conservative treatment, we discussed appropriate activity modifications, anti-inflammatories, injections (including both corticosteroids and visco supplementation), and physical therapy.  We also discussed the option of an arthroplasty and all that would entail.  I have recommended that we start with a conservative approach and she agrees.    She acknowledged understanding of the information and  elected for an injection.  The risk, benefits and alternatives were discussed.  She consented and the injection was performed as described below.  Going forward, she we will follow-up as needed.    Large Joint Arthrocentesis: R knee  Date/Time: 11/16/2022 2:50 PM  Consent given by: patient  Site marked: site marked  Timeout: Immediately prior to procedure a time out was called to verify the correct patient, procedure, equipment, support staff and site/side marked as required   Supporting Documentation  Indications: pain   Procedure Details  Location: knee - R knee  Preparation: Patient was prepped and draped in the usual sterile fashion  Needle gauge: 21 g.  Medications administered: 2 mL lidocaine (cardiac); 80 mg methylPREDNISolone acetate 80 MG/ML  Patient tolerance: patient tolerated the procedure well with no immediate complications            Celso Prakash MD    11/16/2022    CC to Nick Patel DO

## 2022-11-28 ENCOUNTER — OFFICE VISIT (OUTPATIENT)
Dept: FAMILY MEDICINE CLINIC | Facility: CLINIC | Age: 77
End: 2022-11-28

## 2022-11-28 VITALS
HEART RATE: 57 BPM | TEMPERATURE: 97.6 F | DIASTOLIC BLOOD PRESSURE: 68 MMHG | OXYGEN SATURATION: 91 % | BODY MASS INDEX: 41.91 KG/M2 | HEIGHT: 61 IN | WEIGHT: 222 LBS | SYSTOLIC BLOOD PRESSURE: 128 MMHG

## 2022-11-28 DIAGNOSIS — E78.5 HYPERLIPIDEMIA, UNSPECIFIED HYPERLIPIDEMIA TYPE: ICD-10-CM

## 2022-11-28 DIAGNOSIS — R05.9 COUGH, UNSPECIFIED TYPE: Primary | ICD-10-CM

## 2022-11-28 DIAGNOSIS — N18.31 STAGE 3A CHRONIC KIDNEY DISEASE: ICD-10-CM

## 2022-11-28 DIAGNOSIS — J02.9 SORE THROAT: ICD-10-CM

## 2022-11-28 DIAGNOSIS — E55.9 VITAMIN D DEFICIENCY: ICD-10-CM

## 2022-11-28 DIAGNOSIS — J41.1 BRONCHITIS, MUCOPURULENT RECURRENT: ICD-10-CM

## 2022-11-28 DIAGNOSIS — J44.9 CHRONIC OBSTRUCTIVE PULMONARY DISEASE, UNSPECIFIED COPD TYPE: ICD-10-CM

## 2022-11-28 DIAGNOSIS — I10 ESSENTIAL HYPERTENSION: ICD-10-CM

## 2022-11-28 LAB
25(OH)D3 SERPL-MCNC: 29.8 NG/ML (ref 30–100)
ALBUMIN SERPL-MCNC: 4.2 G/DL (ref 3.5–5.2)
ALBUMIN/GLOB SERPL: 1.3 G/DL
ALP SERPL-CCNC: 93 U/L (ref 39–117)
ALT SERPL W P-5'-P-CCNC: 8 U/L (ref 1–33)
ANION GAP SERPL CALCULATED.3IONS-SCNC: 10.5 MMOL/L (ref 5–15)
AST SERPL-CCNC: 12 U/L (ref 1–32)
BASOPHILS # BLD AUTO: 0.12 10*3/MM3 (ref 0–0.2)
BASOPHILS NFR BLD AUTO: 0.8 % (ref 0–1.5)
BILIRUB SERPL-MCNC: 0.4 MG/DL (ref 0–1.2)
BUN SERPL-MCNC: 29 MG/DL (ref 8–23)
BUN/CREAT SERPL: 23.6 (ref 7–25)
CALCIUM SPEC-SCNC: 9.4 MG/DL (ref 8.6–10.5)
CHLORIDE SERPL-SCNC: 105 MMOL/L (ref 98–107)
CHOLEST SERPL-MCNC: 185 MG/DL (ref 0–200)
CO2 SERPL-SCNC: 23.5 MMOL/L (ref 22–29)
CREAT SERPL-MCNC: 1.23 MG/DL (ref 0.57–1)
DEPRECATED RDW RBC AUTO: 45.9 FL (ref 37–54)
EGFRCR SERPLBLD CKD-EPI 2021: 45.4 ML/MIN/1.73
EOSINOPHIL # BLD AUTO: 0.31 10*3/MM3 (ref 0–0.4)
EOSINOPHIL NFR BLD AUTO: 2.1 % (ref 0.3–6.2)
ERYTHROCYTE [DISTWIDTH] IN BLOOD BY AUTOMATED COUNT: 14.1 % (ref 12.3–15.4)
EXPIRATION DATE: NORMAL
EXPIRATION DATE: NORMAL
FLUAV AG UPPER RESP QL IA.RAPID: NOT DETECTED
FLUBV AG UPPER RESP QL IA.RAPID: NOT DETECTED
GLOBULIN UR ELPH-MCNC: 3.3 GM/DL
GLUCOSE SERPL-MCNC: 92 MG/DL (ref 65–99)
HCT VFR BLD AUTO: 41.1 % (ref 34–46.6)
HDLC SERPL-MCNC: 56 MG/DL (ref 40–60)
HGB BLD-MCNC: 13.1 G/DL (ref 12–15.9)
IMM GRANULOCYTES # BLD AUTO: 0.08 10*3/MM3 (ref 0–0.05)
IMM GRANULOCYTES NFR BLD AUTO: 0.5 % (ref 0–0.5)
INTERNAL CONTROL: NORMAL
INTERNAL CONTROL: NORMAL
LDLC SERPL CALC-MCNC: 112 MG/DL (ref 0–100)
LDLC/HDLC SERPL: 1.97 {RATIO}
LYMPHOCYTES # BLD AUTO: 4.43 10*3/MM3 (ref 0.7–3.1)
LYMPHOCYTES NFR BLD AUTO: 29.9 % (ref 19.6–45.3)
Lab: NORMAL
Lab: NORMAL
MCH RBC QN AUTO: 28.5 PG (ref 26.6–33)
MCHC RBC AUTO-ENTMCNC: 31.9 G/DL (ref 31.5–35.7)
MCV RBC AUTO: 89.3 FL (ref 79–97)
MONOCYTES # BLD AUTO: 1.02 10*3/MM3 (ref 0.1–0.9)
MONOCYTES NFR BLD AUTO: 6.9 % (ref 5–12)
NEUTROPHILS NFR BLD AUTO: 59.8 % (ref 42.7–76)
NEUTROPHILS NFR BLD AUTO: 8.85 10*3/MM3 (ref 1.7–7)
NRBC BLD AUTO-RTO: 0 /100 WBC (ref 0–0.2)
PLATELET # BLD AUTO: 264 10*3/MM3 (ref 140–450)
PMV BLD AUTO: 12.1 FL (ref 6–12)
POTASSIUM SERPL-SCNC: 4.4 MMOL/L (ref 3.5–5.2)
PROT SERPL-MCNC: 7.5 G/DL (ref 6–8.5)
RBC # BLD AUTO: 4.6 10*6/MM3 (ref 3.77–5.28)
S PYO AG THROAT QL: NEGATIVE
SARS-COV-2 AG UPPER RESP QL IA.RAPID: NOT DETECTED
SODIUM SERPL-SCNC: 139 MMOL/L (ref 136–145)
TRIGL SERPL-MCNC: 93 MG/DL (ref 0–150)
VLDLC SERPL-MCNC: 17 MG/DL (ref 5–40)
WBC NRBC COR # BLD: 14.81 10*3/MM3 (ref 3.4–10.8)

## 2022-11-28 PROCEDURE — 80053 COMPREHEN METABOLIC PANEL: CPT | Performed by: FAMILY MEDICINE

## 2022-11-28 PROCEDURE — 36415 COLL VENOUS BLD VENIPUNCTURE: CPT | Performed by: FAMILY MEDICINE

## 2022-11-28 PROCEDURE — 80061 LIPID PANEL: CPT | Performed by: FAMILY MEDICINE

## 2022-11-28 PROCEDURE — 85025 COMPLETE CBC W/AUTO DIFF WBC: CPT | Performed by: FAMILY MEDICINE

## 2022-11-28 PROCEDURE — 87428 SARSCOV & INF VIR A&B AG IA: CPT | Performed by: FAMILY MEDICINE

## 2022-11-28 PROCEDURE — 82306 VITAMIN D 25 HYDROXY: CPT | Performed by: FAMILY MEDICINE

## 2022-11-28 PROCEDURE — 87880 STREP A ASSAY W/OPTIC: CPT | Performed by: FAMILY MEDICINE

## 2022-11-28 PROCEDURE — 99214 OFFICE O/P EST MOD 30 MIN: CPT | Performed by: FAMILY MEDICINE

## 2022-11-28 RX ORDER — AZITHROMYCIN 250 MG/1
TABLET, FILM COATED ORAL
Qty: 6 TABLET | Refills: 0 | Status: SHIPPED | OUTPATIENT
Start: 2022-11-28 | End: 2022-12-28

## 2022-11-28 RX ORDER — METHYLPREDNISOLONE 4 MG/1
TABLET ORAL
Qty: 21 TABLET | Refills: 0 | Status: SHIPPED | OUTPATIENT
Start: 2022-11-28 | End: 2022-12-28

## 2022-11-28 RX ORDER — ALBUTEROL SULFATE 1.25 MG/3ML
3 SOLUTION RESPIRATORY (INHALATION) EVERY 6 HOURS PRN
Qty: 25 EACH | Refills: 5 | Status: SHIPPED | OUTPATIENT
Start: 2022-11-28 | End: 2023-01-11 | Stop reason: SDUPTHER

## 2022-11-28 RX ORDER — FLUTICASONE FUROATE 100 UG/1
POWDER RESPIRATORY (INHALATION)
COMMUNITY
Start: 2022-11-14 | End: 2022-11-28 | Stop reason: SDUPTHER

## 2022-11-28 NOTE — PROGRESS NOTES
Chief Complaint   Patient presents with   • Follow-up     6 month   C/O sore throat , drainage , cough   • Hypertension   • Hyperlipidemia        Subjective     Julia Rios  has a past medical history of Acute right-sided low back pain with right-sided sciatica (01/15/2018), Allergic rhinitis, Asthma, Back pain, Cardiomegaly (8/26/2021), CHF (congestive heart failure) (HCC), CKD stage 3 (10/29/2019), Closed nondisplaced fracture of metatarsal bone of left foot with routine healing (06/19/2018), Diverticulitis, Essential hypertension, Hemoptysis, Hyperlipidemia, Idiopathic chronic gout of multiple sites without tophus (05/20/2016), Primary osteoarthritis of right knee (01/16/2017), and Vitamin D deficiency (01/13/2016).    URI- she states for the past 5 days she has had lots of nasal congestion postnasal drainage sore throat and a cough.  Her cough is caused a lot of chest and abdominal discomfort.  She denies any fever or chills.  She is not taking or done any other treatment or therapy other than her typical inhalers.    Hypertension  This is a chronic problem. The current episode started more than 1 year ago. The problem is unchanged. The problem is controlled. Associated symptoms include chest pain and shortness of breath. Pertinent negatives include no blurred vision, headaches or palpitations. Risk factors for coronary artery disease include dyslipidemia, obesity, sedentary lifestyle and post-menopausal state. Current antihypertension treatment includes angiotensin blockers, beta blockers, calcium channel blockers and diuretics. There are no compliance problems.  Hypertensive end-organ damage includes kidney disease and heart failure. Identifiable causes of hypertension include chronic renal disease.   Hyperlipidemia  This is a chronic problem. The current episode started more than 1 year ago. The problem is controlled. Recent lipid tests were reviewed and are normal. Exacerbating diseases include chronic  renal disease and obesity. Associated symptoms include chest pain and shortness of breath. She is currently on no antihyperlipidemic treatment. The current treatment provides no improvement of lipids. There are no compliance problems.  Risk factors for coronary artery disease include dyslipidemia, hypertension, post-menopausal, a sedentary lifestyle and obesity.       PHQ-2 Depression Screening  Little interest or pleasure in doing things?     Feeling down, depressed, or hopeless?     PHQ-2 Total Score     PHQ-9 Depression Screening  Little interest or pleasure in doing things?     Feeling down, depressed, or hopeless?     Trouble falling or staying asleep, or sleeping too much?     Feeling tired or having little energy?     Poor appetite or overeating?     Feeling bad about yourself - or that you are a failure or have let yourself or your family down?     Trouble concentrating on things, such as reading the newspaper or watching television?     Moving or speaking so slowly that other people could have noticed? Or the opposite - being so fidgety or restless that you have been moving around a lot more than usual?     Thoughts that you would be better off dead, or of hurting yourself in some way?     PHQ-9 Total Score     If you checked off any problems, how difficult have these problems made it for you to do your work, take care of things at home, or get along with other people?       Allergies   Allergen Reactions   • Ibuprofen Hives   • Penicillins Seizure   • Cephalosporins Rash   • Sulfa Antibiotics Rash       Prior to Admission medications    Medication Sig Start Date End Date Taking? Authorizing Provider   albuterol (ACCUNEB) 1.25 MG/3ML nebulizer solution Take 3 mL by nebulization As Needed.   Yes Rene Robert MD   albuterol sulfate  (90 Base) MCG/ACT inhaler Inhale 2 puffs Every 4 (Four) Hours As Needed.   Yes Rene Robert MD   amLODIPine (NORVASC) 5 MG tablet TAKE 1 TABLET EVERY DAY  11/14/22  Yes Nick Patel DO   Arnuity Ellipta 100 MCG/ACT aerosol powder  INHALE 1 PUFF BY MOUTH DAILY 1/24/22  Yes Santino Baez MD   azelastine (ASTELIN) 0.1 % nasal spray 2 sprays into the nostril(s) as directed by provider 2 (Two) Times a Day. Use in each nostril as directed 6/2/22  Yes Santino Baez MD   D3-1000 25 MCG (1000 UT) capsule TAKE 1 CAPSULE BY MOUTH EVERY DAY 10/20/22  Yes Nick Patel DO   famotidine (PEPCID) 20 MG tablet Take 1 tablet by mouth every night at bedtime. 7/1/22  Yes Shanell Murray APRN   fexofenadine (Wal-Fex Allergy) 180 MG tablet Take 1 tablet by mouth Daily. 7/11/22  Yes Justin Flaherty APRN   furosemide (LASIX) 20 MG tablet Take 1 tablet by mouth Every Other Day. 9/29/21  Yes Russ Cloud MD   gabapentin (NEURONTIN) 300 MG capsule 1 tab in am and 2 at bedtime 11/8/22  Yes Nick Patel DO   losartan (COZAAR) 100 MG tablet TAKE 1 TABLET EVERY DAY 4/14/22  Yes Nick Patel DO   metoprolol succinate XL (TOPROL-XL) 100 MG 24 hr tablet TAKE 1 TABLET EVERY DAY 8/8/22  Yes Nick Patel DO   montelukast (SINGULAIR) 10 MG tablet Take 1 tablet by mouth Every Night. 6/2/22  Yes Santino Baez MD   spironolactone (ALDACTONE) 25 MG tablet Take 1 tablet by mouth Daily. 9/6/22  Yes Russ Cloud MD   Symbicort 160-4.5 MCG/ACT inhaler Inhale 2 puffs 2 (Two) Times a Day. 6/2/22  Yes Santino Baez MD   Arnuity Ellipta 100 MCG/ACT aerosol powder   11/14/22 11/28/22  Provider, MD Rene   DULoxetine (CYMBALTA) 30 MG capsule Take 1 capsule by mouth Daily. 10/31/22 11/28/22  Colasanti, Chrysalis Katerina, APRN        Patient Active Problem List   Diagnosis   • Family history of colon cancer   • Chronic obstructive pulmonary disease (HCC)   • LVH (left ventricular hypertrophy)   • Essential hypertension   • Hyperlipidemia   • CKD (chronic kidney disease) stage 3, GFR 30-59 ml/min ([facility])   • Chronic heart failure with preserved  ejection fraction (HCC)   • Acute right-sided low back pain with right-sided sciatica   • CARMEN (obstructive sleep apnea)   • Seasonal allergies   • Gastroesophageal reflux disease   • History of fungal pneumonia   • Bronchitis, mucopurulent recurrent (HCC)   • Tobacco abuse, in remission   • Pulmonary nodule   • PND (post-nasal drip)   • Status post fall   • Acute pain of right knee   • Allergic rhinitis   • Asthma   • Closed fracture of metatarsal bone   • Diverticulitis   • Hemoptysis   • Idiopathic chronic gout of multiple sites without tophus   • Primary localized osteoarthritis   • Vitamin D deficiency   • Acute non-recurrent maxillary sinusitis   • Morbid (severe) obesity due to excess calories (HCC)   • Umbilical hernia without obstruction and without gangrene        Past Surgical History:   Procedure Laterality Date   • CATARACT EXTRACTION Right    • COLONOSCOPY     • COLONOSCOPY N/A 10/12/2021    Procedure: COLONOSCOPY;  Surgeon: Jorge Diane MD;  Location: [facility];  Service: Gastroenterology;  Laterality: N/A;  DIVERTICULOSIS   • ENDOSCOPY     • EYE SURGERY      cataract right eye   • RETINAL DETACHMENT REPAIR Right     hole in retina repair       Social History     Socioeconomic History   • Marital status:    Tobacco Use   • Smoking status: Former     Packs/day: 0.50     Years: 5.00     Pack years: 2.50     Types: Cigarettes     Start date:      Quit date:      Years since quittin.9   • Smokeless tobacco: Never   Vaping Use   • Vaping Use: Never used   Substance and Sexual Activity   • Alcohol use: Never   • Drug use: Never   • Sexual activity: Defer       Family History   Problem Relation Age of Onset   • Colon cancer Mother 61   • Cancer Mother    • Heart disease Mother    • Diabetes Brother    • Breast cancer Maternal Grandmother    • Stroke Paternal Grandfather    • Heart failure Father        Family history, surgical history, past medical history,  "Allergies and meds reviewed with patient today and updated in [facility] EMR.     ROS:  Review of Systems   Constitutional: Negative for chills, fatigue and fever.   HENT: Positive for congestion, postnasal drip and sinus pressure. Negative for rhinorrhea.    Eyes: Negative for blurred vision and visual disturbance.   Respiratory: Positive for cough, shortness of breath and wheezing. Negative for chest tightness.    Cardiovascular: Positive for chest pain. Negative for palpitations.   Gastrointestinal: Negative for constipation and diarrhea.   Allergic/Immunologic: Negative for environmental allergies.   Neurological: Negative for headache.   Psychiatric/Behavioral: Negative for depressed mood. The patient is not nervous/anxious.        OBJECTIVE:  Vitals:    11/28/22 1148   BP: 128/68   BP Location: Right arm   Patient Position: Sitting   Pulse: 57   Temp: 97.6 °F (36.4 °C)   SpO2: 91%   Weight: 101 kg (222 lb)   Height: 154.9 cm (61\")     No results found.   Body mass index is 41.95 kg/m².  No LMP recorded. Patient is postmenopausal.    Physical Exam  Vitals and nursing note reviewed.   Constitutional:       General: She is not in acute distress.     Appearance: Normal appearance. She is obese. She is not ill-appearing.   HENT:      Head: Normocephalic.      Right Ear: Tympanic membrane, ear canal and external ear normal.      Left Ear: Tympanic membrane, ear canal and external ear normal.      Nose: Nose normal.      Mouth/Throat:      Mouth: Mucous membranes are moist.      Pharynx: Oropharynx is clear.   Eyes:      General: No scleral icterus.     Conjunctiva/sclera: Conjunctivae normal.      Pupils: Pupils are equal, round, and reactive to light.   Cardiovascular:      Rate and Rhythm: Normal rate and regular rhythm.      Pulses: Normal pulses.      Heart sounds: Normal heart sounds. No murmur heard.  Pulmonary:      Effort: Pulmonary effort is normal.      Breath sounds: Wheezing and rhonchi present. No rales. "   Musculoskeletal:      Cervical back: Neck supple. No rigidity or tenderness.   Lymphadenopathy:      Cervical: No cervical adenopathy.   Skin:     General: Skin is warm and dry.      Coloration: Skin is not jaundiced.      Findings: No rash.   Neurological:      General: No focal deficit present.      Mental Status: She is alert and oriented to person, place, and time.   Psychiatric:         Mood and Affect: Mood normal.         Thought Content: Thought content normal.         Judgment: Judgment normal.         Procedures    Office Visit on 11/28/2022   Component Date Value Ref Range Status   • SARS Antigen 11/28/2022 Not Detected  Not Detected, Presumptive Negative Final   • Influenza A Antigen JULIA 11/28/2022 Not Detected  Not Detected Final   • Influenza B Antigen JULIA 11/28/2022 Not Detected  Not Detected Final   • Internal Control 11/28/2022 Passed  Passed Final   • Lot Number 11/28/2022 [id number]   Final   • Expiration Date 11/28/2022 08/24/2023   Final   • Rapid Strep A Screen 11/28/2022 Negative  Negative, VALID, INVALID, Not Performed Final   • Internal Control 11/28/2022 Passed  Passed Final   • Lot Number 11/28/2022 [id number]   Final   • Expiration Date 11/28/2022 12/09/2023   Final       ASSESSMENT/ PLAN:    Diagnoses and all orders for this visit:    1. Cough, unspecified type (Primary)  -     POCT SARS-CoV-2 Antigen JULIA + Flu    2. Sore throat  -     POCT rapid strep A    3. Essential hypertension  Assessment & Plan:  Her blood pressure is great here today.  We will continue her current meds and update her labs.    Orders:  -     Comprehensive Metabolic Panel  -     Lipid Panel    4. Hyperlipidemia, unspecified hyperlipidemia type  Assessment & Plan:  We will update her lipid profile.  We will address treatment after reviewing these.    Orders:  -     Comprehensive Metabolic Panel  -     Lipid Panel    5. Stage 3a chronic kidney disease (HCC)  -     Comprehensive Metabolic Panel  -     CBC Auto  Differential  -     Vitamin D,25-Hydroxy    6. Vitamin D deficiency  -     Vitamin D,25-Hydroxy    7. Chronic obstructive pulmonary disease, unspecified COPD type (HCC)  Assessment & Plan:  Currently she is having an acute exacerbation with her current respiratory illness.  We will get her some albuterol for her nebulizer to use for acute exacerbations and treat her acute illness.        8. Bronchitis, mucopurulent recurrent (HCC)    Other orders  -     albuterol (ACCUNEB) 1.25 MG/3ML nebulizer solution; Take 3 mL by nebulization Every 6 (Six) Hours As Needed for Shortness of Air.  Dispense: 25 each; Refill: 5  -     methylPREDNISolone (MEDROL) 4 MG dose pack; Take as directed on package instructions.  Dispense: 21 tablet; Refill: 0  -     azithromycin (Zithromax Z-Dragan) 250 MG tablet; Take 2 tablets by mouth on day 1, then 1 tablet daily on days 2-5  Dispense: 6 tablet; Refill: 0      Orders Placed Today:     New Medications Ordered This Visit   Medications   • albuterol (ACCUNEB) 1.25 MG/3ML nebulizer solution     Sig: Take 3 mL by nebulization Every 6 (Six) Hours As Needed for Shortness of Air.     Dispense:  25 each     Refill:  5   • methylPREDNISolone (MEDROL) 4 MG dose pack     Sig: Take as directed on package instructions.     Dispense:  21 tablet     Refill:  0   • azithromycin (Zithromax Z-Dragan) 250 MG tablet     Sig: Take 2 tablets by mouth on day 1, then 1 tablet daily on days 2-5     Dispense:  6 tablet     Refill:  0        Management Plan:     An After Visit Summary was printed and given to the patient at discharge.    Follow-up: Return in about 6 months (around 5/28/2023) for Recheck.    Nick Patel,  11/28/2022 12:33 EST  This note was electronically signed.

## 2022-11-28 NOTE — ASSESSMENT & PLAN NOTE
Currently she is having an acute exacerbation with her current respiratory illness.  We will get her some albuterol for her nebulizer to use for acute exacerbations and treat her acute illness.

## 2022-12-05 RX ORDER — METHYLPREDNISOLONE ACETATE 80 MG/ML
80 INJECTION, SUSPENSION INTRA-ARTICULAR; INTRALESIONAL; INTRAMUSCULAR; SOFT TISSUE
Status: COMPLETED | OUTPATIENT
Start: 2022-11-16 | End: 2022-11-16

## 2022-12-28 ENCOUNTER — OFFICE VISIT (OUTPATIENT)
Dept: PULMONOLOGY | Facility: CLINIC | Age: 77
End: 2022-12-28

## 2022-12-28 VITALS
DIASTOLIC BLOOD PRESSURE: 61 MMHG | SYSTOLIC BLOOD PRESSURE: 104 MMHG | TEMPERATURE: 97.1 F | WEIGHT: 220.3 LBS | RESPIRATION RATE: 18 BRPM | BODY MASS INDEX: 40.54 KG/M2 | HEART RATE: 63 BPM | HEIGHT: 62 IN | OXYGEN SATURATION: 97 %

## 2022-12-28 DIAGNOSIS — R09.82 PND (POST-NASAL DRIP): ICD-10-CM

## 2022-12-28 DIAGNOSIS — J30.2 SEASONAL ALLERGIES: Primary | ICD-10-CM

## 2022-12-28 DIAGNOSIS — F17.201 TOBACCO ABUSE, IN REMISSION: ICD-10-CM

## 2022-12-28 DIAGNOSIS — I50.32 CHRONIC HEART FAILURE WITH PRESERVED EJECTION FRACTION: ICD-10-CM

## 2022-12-28 DIAGNOSIS — J44.9 CHRONIC OBSTRUCTIVE PULMONARY DISEASE, UNSPECIFIED COPD TYPE: ICD-10-CM

## 2022-12-28 DIAGNOSIS — R04.2 HEMOPTYSIS: ICD-10-CM

## 2022-12-28 DIAGNOSIS — G47.33 OSA (OBSTRUCTIVE SLEEP APNEA): ICD-10-CM

## 2022-12-28 DIAGNOSIS — E66.01 MORBID (SEVERE) OBESITY DUE TO EXCESS CALORIES: ICD-10-CM

## 2022-12-28 DIAGNOSIS — J30.9 ALLERGIC RHINITIS, UNSPECIFIED SEASONALITY, UNSPECIFIED TRIGGER: ICD-10-CM

## 2022-12-28 DIAGNOSIS — B37.0 ORAL THRUSH: ICD-10-CM

## 2022-12-28 DIAGNOSIS — R91.1 PULMONARY NODULE: ICD-10-CM

## 2022-12-28 DIAGNOSIS — J45.40 MODERATE PERSISTENT ASTHMA, UNSPECIFIED WHETHER COMPLICATED: ICD-10-CM

## 2022-12-28 DIAGNOSIS — K21.00 GASTROESOPHAGEAL REFLUX DISEASE WITH ESOPHAGITIS WITHOUT HEMORRHAGE: ICD-10-CM

## 2022-12-28 DIAGNOSIS — J41.1 BRONCHITIS, MUCOPURULENT RECURRENT: ICD-10-CM

## 2022-12-28 PROCEDURE — 99214 OFFICE O/P EST MOD 30 MIN: CPT | Performed by: INTERNAL MEDICINE

## 2022-12-28 PROCEDURE — G0008 ADMIN INFLUENZA VIRUS VAC: HCPCS | Performed by: INTERNAL MEDICINE

## 2022-12-28 PROCEDURE — 90662 IIV NO PRSV INCREASED AG IM: CPT | Performed by: INTERNAL MEDICINE

## 2022-12-28 RX ORDER — MONTELUKAST SODIUM 10 MG/1
10 TABLET ORAL NIGHTLY
Qty: 90 TABLET | Refills: 3 | Status: SHIPPED | OUTPATIENT
Start: 2022-12-28 | End: 2023-01-26

## 2022-12-28 RX ORDER — FLUTICASONE FUROATE 100 UG/1
1 POWDER RESPIRATORY (INHALATION) DAILY
Qty: 90 EACH | Refills: 3 | Status: SHIPPED | OUTPATIENT
Start: 2022-12-28

## 2022-12-28 RX ORDER — BUDESONIDE AND FORMOTEROL FUMARATE DIHYDRATE 160; 4.5 UG/1; UG/1
2 AEROSOL RESPIRATORY (INHALATION) 2 TIMES DAILY
Qty: 1 EACH | Refills: 11 | Status: SHIPPED | OUTPATIENT
Start: 2022-12-28

## 2022-12-28 RX ORDER — ALBUTEROL SULFATE 90 UG/1
2 AEROSOL, METERED RESPIRATORY (INHALATION) EVERY 4 HOURS PRN
Qty: 1 G | Refills: 11 | Status: SHIPPED | OUTPATIENT
Start: 2022-12-28

## 2022-12-28 RX ORDER — FLUCONAZOLE 150 MG/1
150 TABLET ORAL DAILY
Qty: 7 TABLET | Refills: 0 | Status: SHIPPED | OUTPATIENT
Start: 2022-12-28 | End: 2023-01-11

## 2022-12-28 NOTE — PROGRESS NOTES
Primary Care Provider  Nick Patel DO     Referring Provider  No ref. provider found     Chief Complaint  COPD, Sleep Apnea, Asthma, Shortness of Breath, Wheezing, Cough, Follow-up (6 month follow up), and Bronchitis    Subjective          Julia Rios presents to [facility]MONARY [facility]ICINE  History of Present Illness  Julia Rios is a 77 y.o. female patient with history of COPD, recurrent bronchitis, obstructive sleep apnea, postnasal drip, seasonal allergies, history of fungal pneumonia in past, completed a course of itraconazole, pulmonary nodule, gastroesophageal reflux disease and tobacco abuse of cigarettes in remission.  She is here for follow-up.  Since her last office visit she has been on Symbicort 2 puffs twice daily and Arnuity once daily.  She ran out of Arnuity over the last 1 and half month and has not been using it. She uses albuterol once or twice a day.  Having coating of the tongue now and has some difficulty swallowing. She continues to take Pepcid.  Tries to keep the head of bed up with sleep and not eat close to bedtime she is also on Singulair once daily.  She had bronchitis a month ago and has not been using CPAP since.  She was given a course of antibiotics and steroids.  It has helped her with her symptoms.  However, she still has some shortness of breath, cough and intermittent wheezing.  CT chest from September 2021 showed stable findings of chronic basal atelectasis and fibrosis.  She is up-to-date on COVID-vaccine including booster dose.  She already had Moderna vaccine.  Has no fever or chills.  No nausea or vomiting.  No change in weight or appetite.  Leg swelling has significantly improved on diuretics.    Review of Systems   Respiratory: Positive for cough, shortness of breath and wheezing.         General:  Fatigue, No Fever No weight loss or loss of appetite  HEENT: No dysphagia, No Visual Changes, no rhinorrhea, coating  of the tongue  Respiratory:  + cough,+Dyspnea, intermittent mucoid phlegm, No Pleuritic Pain, no wheezing, no hemoptysis.  Cardiovascular: Denies chest pain, denies palpitations,+ECHEVERRIA, Chest Pressure  Gastrointestinal:  No Abdominal Pain, No Nausea, No Vomiting, No Diarrhea  Genitourinary:  No Dysuria, No Frequency, No Hesitancy  Musculoskeletal: No muscle pain or swelling  Endocrine:  No Heat Intolerance, No Cold Intolerance  Hematologic:  No Bleeding, No Bruising  Psychiatric:  No Anxiety, No Depression  Neurologic:  No Confusion, no Dysarthria, No Headaches  Skin:  No Rash, No Open Wounds    Family History   Problem Relation Age of Onset   • Colon cancer Mother 61   • Cancer Mother    • Heart disease Mother    • Diabetes Brother    • Breast cancer Maternal Grandmother    • Stroke Paternal Grandfather    • Heart failure Father         Social History     Socioeconomic History   • Marital status:    Tobacco Use   • Smoking status: Former     Packs/day: 0.50     Years: 5.00     Pack years: 2.50     Types: Cigarettes     Start date:      Quit date:      Years since quittin.0   • Smokeless tobacco: Never   Vaping Use   • Vaping Use: Never used   Substance and Sexual Activity   • Alcohol use: Never   • Drug use: Never   • Sexual activity: Defer        Past Medical History:   Diagnosis Date   • Acute right-sided low back pain with right-sided sciatica 01/15/2018   • Allergic rhinitis    • Asthma    • Back pain     2 bulging disc L2 L3   • Cardiomegaly 20 echocardiogram: 1.  Normal ejection fraction of 55%. 2.  Mild left ventricular hypertrophy. 3.  No significant valvular heart issues.    • CHF (congestive heart failure) (HCC)    • CKD stage 3 10/29/2019   • Closed nondisplaced fracture of metatarsal bone of left foot with routine healing 2018    unspecified metatarsal, subsequent encounter   • Diverticulitis    • Essential hypertension    • Hemoptysis     non cancerous   •  Hyperlipidemia    • Idiopathic chronic gout of multiple sites without tophus 05/20/2016   • Primary osteoarthritis of right knee 01/16/2017   • Vitamin D deficiency 01/13/2016        Immunization History   Administered Date(s) Administered   • COVID-19 (MODERNA) 1st, 2nd, 3rd Dose Only 01/28/2021, 01/28/2021, 02/23/2021, 02/23/2021   • Fluzone High-Dose 65+yrs 12/02/2021, 12/28/2022   • Fluzone Quad >6mos (Multi-dose) 10/01/2020         Allergies   Allergen Reactions   • Ibuprofen Hives   • Penicillins Seizure   • Cephalosporins Rash   • Sulfa Antibiotics Rash          Current Outpatient Medications:   •  albuterol (ACCUNEB) 1.25 MG/3ML nebulizer solution, Take 3 mL by nebulization Every 6 (Six) Hours As Needed for Shortness of Air., Disp: 25 each, Rfl: 5  •  albuterol sulfate  (90 Base) MCG/ACT inhaler, Inhale 2 puffs Every 4 (Four) Hours As Needed for Wheezing., Disp: 1 g, Rfl: 11  •  amLODIPine (NORVASC) 5 MG tablet, TAKE 1 TABLET EVERY DAY, Disp: 90 tablet, Rfl: 3  •  azelastine (ASTELIN) 0.1 % nasal spray, 2 sprays into the nostril(s) as directed by provider 2 (Two) Times a Day. Use in each nostril as directed, Disp: 60 mL, Rfl: 11  •  D3-1000 25 MCG (1000 UT) capsule, TAKE 1 CAPSULE BY MOUTH EVERY DAY, Disp: 90 capsule, Rfl: 3  •  famotidine (PEPCID) 20 MG tablet, Take 1 tablet by mouth every night at bedtime., Disp: 30 tablet, Rfl: 11  •  fexofenadine (Wal-Fex Allergy) 180 MG tablet, Take 1 tablet by mouth Daily., Disp: 90 tablet, Rfl: 3  •  Fluticasone Furoate (Arnuity Ellipta) 100 MCG/ACT aerosol powder , Inhale 1 puff Daily., Disp: 90 each, Rfl: 3  •  furosemide (LASIX) 20 MG tablet, Take 1 tablet by mouth Every Other Day., Disp: 45 tablet, Rfl: 3  •  gabapentin (NEURONTIN) 300 MG capsule, 1 tab in am and 2 at bedtime, Disp: 270 capsule, Rfl: 1  •  losartan (COZAAR) 100 MG tablet, TAKE 1 TABLET EVERY DAY, Disp: 90 tablet, Rfl: 3  •  metoprolol succinate XL (TOPROL-XL) 100 MG 24 hr tablet, TAKE 1  "TABLET EVERY DAY, Disp: 90 tablet, Rfl: 3  •  montelukast (SINGULAIR) 10 MG tablet, Take 1 tablet by mouth Every Night., Disp: 90 tablet, Rfl: 3  •  spironolactone (ALDACTONE) 25 MG tablet, Take 1 tablet by mouth Daily., Disp: 90 tablet, Rfl: 3  •  Symbicort 160-4.5 MCG/ACT inhaler, Inhale 2 puffs 2 (Two) Times a Day., Disp: 1 each, Rfl: 11  •  fluconazole (Diflucan) 150 MG tablet, Take 1 tablet by mouth Daily., Disp: 7 tablet, Rfl: 0  •  nystatin (MYCOSTATIN) 100,000 unit/mL suspension, Take 5 mL by mouth 4 (Four) Times a Day., Disp: 840 mL, Rfl: 0     Objective   Vital Signs:   /61 (BP Location: Left arm, Patient Position: Sitting, Cuff Size: Large Adult)   Pulse 63   Temp 97.1 °F (36.2 °C) (Tympanic)   Resp 18   Ht 157.5 cm (62\")   Wt 99.9 kg (220 lb 4.8 oz)   SpO2 97% Comment: room air  BMI 40.29 kg/m²     Mallampatti classification : 1  Physical Exam  Vital Signs Reviewed  Morbidly obese female, in no acute distress, normal conversational  HEENT:  PERRL, EOMI.  OP, has yellowish coating of the tongue  Neck:  Supple, no JVD, no thyromegaly  Lymph: no axillary, cervical, supraclavicular lymphadenopathy noted bilaterally  Chest:  good aeration, bilateral diminished breath sounds, no wheezing, crackles or rhonchi, resonant to percussion b/l  CV: RRR, no MGR, pulses 2+, equal.  Abd:  Soft, NT, ND, + BS, no HSM  EXT:  no clubbing, no cyanosis, No BLE edema  Neuro:  A&Ox3, CN grossly intact, no focal deficits.  Skin: No rashes or lesions noted     Result Review :   The following data was reviewed by: Santino Baez MD on 06/02/2022:  Common labs    Common Labs 5/26/22 5/26/22 11/28/22 11/28/22 11/28/22    1226 1226 1238 1238 1238   Glucose  74   92   BUN  17   29 (A)   Creatinine  1.03 (A)   1.23 (A)   Sodium  139   139   Potassium  4.6   4.4   Chloride  104   105   Calcium  9.2   9.4   Albumin  4.40   4.20   Total Bilirubin  0.3   0.4   Alkaline Phosphatase  98   93   AST (SGOT)  19   12   ALT (SGPT)  " 14   8   WBC   14.81 (A)     Hemoglobin   13.1     Hematocrit   41.1     Platelets   264     Total Cholesterol 180   185    Triglycerides 119   93    HDL Cholesterol 50   56    LDL Cholesterol  109 (A)   112 (A)    (A) Abnormal value            CMP    CMP 5/26/22 11/28/22   Glucose 74 92   BUN 17 29 (A)   Creatinine 1.03 (A) 1.23 (A)   eGFR 56.1 (A) 45.4 (A)   Sodium 139 139   Potassium 4.6 4.4   Chloride 104 105   Calcium 9.2 9.4   Total Protein 7.0 7.5   Albumin 4.40 4.20   Globulin 2.6 3.3   Total Bilirubin 0.3 0.4   Alkaline Phosphatase 98 93   AST (SGOT) 19 12   ALT (SGPT) 14 8   Albumin/Globulin Ratio 1.7 1.3   BUN/Creatinine Ratio 16.5 23.6   Anion Gap 11.8 10.5   (A) Abnormal value       Comments are available for some flowsheets but are not being displayed.           CBC    CBC 11/28/22   WBC 14.81 (A)   RBC 4.60   Hemoglobin 13.1   Hematocrit 41.1   MCV 89.3   MCH 28.5   MCHC 31.9   RDW 14.1   Platelets 264   (A) Abnormal value              Data reviewed: Radiologic studies CT chest from September 2021 was reviewed.  Showed chronic scarring otherwise stable lung.            Assessment and Plan    Diagnoses and all orders for this visit:    1. Seasonal allergies (Primary)    2. Allergic rhinitis, unspecified seasonality, unspecified trigger  -     Symbicort 160-4.5 MCG/ACT inhaler; Inhale 2 puffs 2 (Two) Times a Day.  Dispense: 1 each; Refill: 11  -     montelukast (SINGULAIR) 10 MG tablet; Take 1 tablet by mouth Every Night.  Dispense: 90 tablet; Refill: 3    3. Chronic heart failure with preserved ejection fraction (HCC)    4. Morbid (severe) obesity due to excess calories (HCC)    5. Chronic obstructive pulmonary disease, unspecified COPD type (HCC)  -     fluconazole (Diflucan) 150 MG tablet; Take 1 tablet by mouth Daily.  Dispense: 7 tablet; Refill: 0  -     nystatin (MYCOSTATIN) 100,000 unit/mL suspension; Take 5 mL by mouth 4 (Four) Times a Day.  Dispense: 840 mL; Refill: 0  -     CT Chest Without  Contrast Diagnostic; Future  -     Symbicort 160-4.5 MCG/ACT inhaler; Inhale 2 puffs 2 (Two) Times a Day.  Dispense: 1 each; Refill: 11  -     montelukast (SINGULAIR) 10 MG tablet; Take 1 tablet by mouth Every Night.  Dispense: 90 tablet; Refill: 3  -     Fluticasone Furoate (Arnuity Ellipta) 100 MCG/ACT aerosol powder ; Inhale 1 puff Daily.  Dispense: 90 each; Refill: 3  -     albuterol sulfate  (90 Base) MCG/ACT inhaler; Inhale 2 puffs Every 4 (Four) Hours As Needed for Wheezing.  Dispense: 1 g; Refill: 11    6. Bronchitis, mucopurulent recurrent (HCC)  -     fluconazole (Diflucan) 150 MG tablet; Take 1 tablet by mouth Daily.  Dispense: 7 tablet; Refill: 0  -     nystatin (MYCOSTATIN) 100,000 unit/mL suspension; Take 5 mL by mouth 4 (Four) Times a Day.  Dispense: 840 mL; Refill: 0  -     CT Chest Without Contrast Diagnostic; Future  -     Symbicort 160-4.5 MCG/ACT inhaler; Inhale 2 puffs 2 (Two) Times a Day.  Dispense: 1 each; Refill: 11  -     montelukast (SINGULAIR) 10 MG tablet; Take 1 tablet by mouth Every Night.  Dispense: 90 tablet; Refill: 3  -     Fluticasone Furoate (Arnuity Ellipta) 100 MCG/ACT aerosol powder ; Inhale 1 puff Daily.  Dispense: 90 each; Refill: 3  -     albuterol sulfate  (90 Base) MCG/ACT inhaler; Inhale 2 puffs Every 4 (Four) Hours As Needed for Wheezing.  Dispense: 1 g; Refill: 11    7. Pulmonary nodule  -     CT Chest Without Contrast Diagnostic; Future    8. Moderate persistent asthma, unspecified whether complicated  -     fluconazole (Diflucan) 150 MG tablet; Take 1 tablet by mouth Daily.  Dispense: 7 tablet; Refill: 0  -     nystatin (MYCOSTATIN) 100,000 unit/mL suspension; Take 5 mL by mouth 4 (Four) Times a Day.  Dispense: 840 mL; Refill: 0  -     CT Chest Without Contrast Diagnostic; Future  -     Symbicort 160-4.5 MCG/ACT inhaler; Inhale 2 puffs 2 (Two) Times a Day.  Dispense: 1 each; Refill: 11  -     montelukast (SINGULAIR) 10 MG tablet; Take 1 tablet by mouth  Every Night.  Dispense: 90 tablet; Refill: 3  -     Fluticasone Furoate (Arnuity Ellipta) 100 MCG/ACT aerosol powder ; Inhale 1 puff Daily.  Dispense: 90 each; Refill: 3  -     albuterol sulfate  (90 Base) MCG/ACT inhaler; Inhale 2 puffs Every 4 (Four) Hours As Needed for Wheezing.  Dispense: 1 g; Refill: 11    9. Hemoptysis    10. CARMEN (obstructive sleep apnea)    11. Tobacco abuse, in remission    12. Oral thrush  -     fluconazole (Diflucan) 150 MG tablet; Take 1 tablet by mouth Daily.  Dispense: 7 tablet; Refill: 0  -     nystatin (MYCOSTATIN) 100,000 unit/mL suspension; Take 5 mL by mouth 4 (Four) Times a Day.  Dispense: 840 mL; Refill: 0  -     CT Chest Without Contrast Diagnostic; Future    13. PND (post-nasal drip)  -     Symbicort 160-4.5 MCG/ACT inhaler; Inhale 2 puffs 2 (Two) Times a Day.  Dispense: 1 each; Refill: 11  -     montelukast (SINGULAIR) 10 MG tablet; Take 1 tablet by mouth Every Night.  Dispense: 90 tablet; Refill: 3    14. Gastroesophageal reflux disease with esophagitis without hemorrhage  -     Symbicort 160-4.5 MCG/ACT inhaler; Inhale 2 puffs 2 (Two) Times a Day.  Dispense: 1 each; Refill: 11  -     montelukast (SINGULAIR) 10 MG tablet; Take 1 tablet by mouth Every Night.  Dispense: 90 tablet; Refill: 3    Other orders  -     Fluzone High-Dose 65+yrs (3053-4597)      COPD with recurrent bronchitis: Continue with Symbicort 160/4.52 puffs twice daily.  Continue with Arnuity inhaler once daily.  I have refilled the medications.  She does not smoke anymore.  CT scan of the chest from September 2021 showed stable fibrotic changes at bases otherwise no new findings.  Repeat CT scan of the chest in 6 months.  Continue with albuterol as needed.    Morbid obesity and obstructive sleep apnea: She has not used her CPAP machine for the last month with bronchitis.  She received new supplies and is going to use CPAP machine in next week or so.    Oral thrush: Start Diflucan and nystatin swish and  swallow.    She is up-to-date on COVID and flu and pneumonia vaccine.  She already had COVID booster dose.  We will administer Fluzone vaccine today.    Gastroesophageal reflux disease: Continue with Pepcid.    Allergic rhinitis: Continue with Singulair.      Follow Up   Return in about 6 months (around 6/28/2023).  Patient was given instructions and counseling regarding her condition or for health maintenance advice. Please see specific information pulled into the AVS if appropriate.       Electronically signed by Santino Baez MD, 12/28/2022, 11:17 EST.

## 2023-01-26 DIAGNOSIS — J30.9 ALLERGIC RHINITIS, UNSPECIFIED SEASONALITY, UNSPECIFIED TRIGGER: ICD-10-CM

## 2023-01-26 DIAGNOSIS — J45.40 MODERATE PERSISTENT ASTHMA, UNSPECIFIED WHETHER COMPLICATED: ICD-10-CM

## 2023-01-26 DIAGNOSIS — K21.00 GASTROESOPHAGEAL REFLUX DISEASE WITH ESOPHAGITIS WITHOUT HEMORRHAGE: ICD-10-CM

## 2023-01-26 DIAGNOSIS — R09.82 PND (POST-NASAL DRIP): ICD-10-CM

## 2023-01-26 DIAGNOSIS — J41.1 BRONCHITIS, MUCOPURULENT RECURRENT: ICD-10-CM

## 2023-01-26 DIAGNOSIS — J44.9 CHRONIC OBSTRUCTIVE PULMONARY DISEASE, UNSPECIFIED COPD TYPE: ICD-10-CM

## 2023-01-26 RX ORDER — LOSARTAN POTASSIUM 100 MG/1
TABLET ORAL
Qty: 90 TABLET | Refills: 3 | Status: SHIPPED | OUTPATIENT
Start: 2023-01-26

## 2023-01-26 RX ORDER — MONTELUKAST SODIUM 10 MG/1
TABLET ORAL
Qty: 90 TABLET | Refills: 3 | Status: SHIPPED | OUTPATIENT
Start: 2023-01-26

## 2023-02-02 DIAGNOSIS — E11.40 TYPE 2 DIABETES MELLITUS WITH DIABETIC NEUROPATHY, WITHOUT LONG-TERM CURRENT USE OF INSULIN: ICD-10-CM

## 2023-02-02 RX ORDER — GABAPENTIN 300 MG/1
CAPSULE ORAL
Qty: 270 CAPSULE | Refills: 1 | Status: SHIPPED | OUTPATIENT
Start: 2023-02-02

## 2023-03-03 PROBLEM — J01.00 ACUTE NON-RECURRENT MAXILLARY SINUSITIS: Status: RESOLVED | Noted: 2022-05-26 | Resolved: 2023-03-03

## 2023-03-03 PROBLEM — M54.41 ACUTE RIGHT-SIDED LOW BACK PAIN WITH RIGHT-SIDED SCIATICA: Status: RESOLVED | Noted: 2018-01-15 | Resolved: 2023-03-03

## 2023-03-03 PROBLEM — I51.7 LVH (LEFT VENTRICULAR HYPERTROPHY): Status: RESOLVED | Noted: 2021-08-26 | Resolved: 2023-03-03

## 2023-03-06 ENCOUNTER — OFFICE VISIT (OUTPATIENT)
Dept: CARDIOLOGY | Facility: CLINIC | Age: 78
End: 2023-03-06
Payer: MEDICARE

## 2023-03-06 VITALS
DIASTOLIC BLOOD PRESSURE: 75 MMHG | BODY MASS INDEX: 41.22 KG/M2 | HEIGHT: 62 IN | SYSTOLIC BLOOD PRESSURE: 135 MMHG | WEIGHT: 224 LBS | HEART RATE: 55 BPM

## 2023-03-06 DIAGNOSIS — I10 ESSENTIAL HYPERTENSION: ICD-10-CM

## 2023-03-06 DIAGNOSIS — I50.32 CHRONIC HEART FAILURE WITH PRESERVED EJECTION FRACTION: Primary | ICD-10-CM

## 2023-03-06 DIAGNOSIS — E78.2 MIXED HYPERLIPIDEMIA: ICD-10-CM

## 2023-03-06 PROCEDURE — 93000 ELECTROCARDIOGRAM COMPLETE: CPT | Performed by: NURSE PRACTITIONER

## 2023-03-06 PROCEDURE — 99214 OFFICE O/P EST MOD 30 MIN: CPT | Performed by: NURSE PRACTITIONER

## 2023-03-06 NOTE — PROGRESS NOTES
Chief Complaint  Follow-up, Hypertension, and Hyperlipidemia    Subjective            History of Present Illness  Julia Rios is a 78-year-old white/ female patient who presents to the office today for follow-up.  She has chronic HFpEF, hypertension, and hyperlipidemia.  She reports compliance with her medications.  She reports swelling in her legs and feet that occurs on a monthly basis and will last for a few days at a time. She also will have some associated shortness of breath during these times. She will elevate her feet until swelling subsides. She denies any chest pain, lightheadedness/dizziness, or palpitations.    PMH  Past Medical History:   Diagnosis Date   • Acute right-sided low back pain with right-sided sciatica 01/15/2018   • Allergic rhinitis    • Asthma    • Back pain     2 bulging disc L2 L3   • Chronic heart failure with preserved ejection fraction     11/16/20 echocardiogram: 1.  Normal ejection fraction of 55%. 2.  Mild left ventricular hypertrophy. 3.  No significant valvular heart issues.    • CKD stage 3 10/29/2019   • Closed nondisplaced fracture of metatarsal bone of left foot with routine healing 06/19/2018    unspecified metatarsal, subsequent encounter   • Diverticulitis    • Essential hypertension    • Hemoptysis     non cancerous   • Hyperlipidemia    • Idiopathic chronic gout of multiple sites without tophus 05/20/2016   • Primary osteoarthritis of right knee 01/16/2017   • Vitamin D deficiency 01/13/2016         ALLERGY  Allergies   Allergen Reactions   • Ibuprofen Hives   • Penicillins Seizure   • Cephalosporins Rash   • Sulfa Antibiotics Rash          SURGICALHX  Past Surgical History:   Procedure Laterality Date   • CATARACT EXTRACTION Right    • COLONOSCOPY  2014   • COLONOSCOPY N/A 10/12/2021    Procedure: COLONOSCOPY;  Surgeon: Jorge Diane MD;  Location: [facility];  Service: Gastroenterology;  Laterality: N/A;  DIVERTICULOSIS   • ENDOSCOPY      • EYE SURGERY      cataract right eye   • OTHER SURGICAL HISTORY      Fatty tumor removed off back   • RETINAL DETACHMENT REPAIR Right     hole in retina repair          SOC  Social History     Socioeconomic History   • Marital status:    Tobacco Use   • Smoking status: Former     Packs/day: 0.50     Years: 5.00     Pack years: 2.50     Types: Cigarettes     Start date:      Quit date:      Years since quittin.1   • Smokeless tobacco: Never   Vaping Use   • Vaping Use: Never used   Substance and Sexual Activity   • Alcohol use: Never   • Drug use: Never   • Sexual activity: Defer         FAMHX  Family History   Problem Relation Age of Onset   • Colon cancer Mother 61   • Cancer Mother    • Heart disease Mother    • Diabetes Brother    • Breast cancer Maternal Grandmother    • Stroke Paternal Grandfather    • Heart failure Father           MEDSIGONLY  Current Outpatient Medications on File Prior to Visit   Medication Sig   • albuterol (ACCUNEB) 1.25 MG/3ML nebulizer solution Take 3 mL by nebulization Every 6 (Six) Hours As Needed for Shortness of Air for up to 50 doses.   • albuterol sulfate  (90 Base) MCG/ACT inhaler Inhale 2 puffs Every 4 (Four) Hours As Needed for Wheezing.   • amLODIPine (NORVASC) 5 MG tablet TAKE 1 TABLET EVERY DAY   • azelastine (ASTELIN) 0.1 % nasal spray 2 sprays into the nostril(s) as directed by provider 2 (Two) Times a Day. Use in each nostril as directed   • D3-1000 25 MCG (1000 UT) capsule TAKE 1 CAPSULE BY MOUTH EVERY DAY   • famotidine (PEPCID) 20 MG tablet Take 1 tablet by mouth every night at bedtime.   • fexofenadine (Wal-Fex Allergy) 180 MG tablet Take 1 tablet by mouth Daily.   • Fluticasone Furoate (Arnuity Ellipta) 100 MCG/ACT aerosol powder  Inhale 1 puff Daily.   • gabapentin (NEURONTIN) 300 MG capsule 1 tab in am and 2 at bedtime   • guaifenesin (ROBITUSSIN) 100 MG/5ML liquid Take 10 mL by mouth Every 4 (Four) Hours As Needed for Cough.   •  "losartan (COZAAR) 100 MG tablet TAKE 1 TABLET EVERY DAY   • metoprolol succinate XL (TOPROL-XL) 100 MG 24 hr tablet TAKE 1 TABLET EVERY DAY   • montelukast (SINGULAIR) 10 MG tablet TAKE 1 TABLET ONE TIME DAILY IN THE EVENING   • spironolactone (ALDACTONE) 25 MG tablet Take 1 tablet by mouth Daily.   • Symbicort 160-4.5 MCG/ACT inhaler Inhale 2 puffs 2 (Two) Times a Day.     No current facility-administered medications on file prior to visit.         Objective   /75   Pulse 55   Ht 157.5 cm (62\")   Wt 102 kg (224 lb)   BMI 40.97 kg/m²       Physical Exam  Constitutional:       Appearance: She is obese.   HENT:      Head: Normocephalic.   Neck:      Vascular: No carotid bruit.   Cardiovascular:      Rate and Rhythm: Regular rhythm. Bradycardia present.      Pulses: Normal pulses.      Heart sounds: Normal heart sounds. No murmur heard.  Pulmonary:      Effort: Pulmonary effort is normal.      Breath sounds: Normal breath sounds.   Musculoskeletal:      Cervical back: Neck supple.      Right lower le+ Pitting Edema present.      Left lower le+ Pitting Edema present.   Skin:     General: Skin is dry.      Capillary Refill: Capillary refill takes less than 2 seconds.   Neurological:      Mental Status: She is alert and oriented to person, place, and time.   Psychiatric:         Behavior: Behavior normal.       ECG 12 Lead    Date/Time: 3/6/2023 8:24 AM  Performed by: Marielos Dover APRN  Authorized by: Marielos Dover APRN   Comparison: compared with previous ECG from 3/3/2022  Similar to previous ECG  Rhythm: sinus rhythm  Rate: bradycardic  BPM: 54  Conduction: conduction normal  ST Segments: ST segments normal  T Waves: T waves normal  QRS axis: normal and left  Other findings: early transition    Clinical impression: abnormal EKG          Result Review :   The following data was reviewed by: KALEB Shah on 2023:  proBNP   Date Value Ref Range Status   2022 196.0 " 0.0 - 1,800.0 pg/mL Final     CMP    CMP 11/28/22   Glucose 92   BUN 29 (A)   Creatinine 1.23 (A)   eGFR 45.4 (A)   Sodium 139   Potassium 4.4   Chloride 105   Calcium 9.4   Total Protein 7.5   Albumin 4.20   Globulin 3.3   Total Bilirubin 0.4   Alkaline Phosphatase 93   AST (SGOT) 12   ALT (SGPT) 8   Albumin/Globulin Ratio 1.3   BUN/Creatinine Ratio 23.6   Anion Gap 10.5   (A) Abnormal value       Comments are available for some flowsheets but are not being displayed.           CBC w/diff    CBC w/Diff 11/28/22   WBC 14.81 (A)   RBC 4.60   Hemoglobin 13.1   Hematocrit 41.1   MCV 89.3   MCH 28.5   MCHC 31.9   RDW 14.1   Platelets 264   Neutrophil Rel % 59.8   Immature Granulocyte Rel % 0.5   Lymphocyte Rel % 29.9   Monocyte Rel % 6.9   Eosinophil Rel % 2.1   Basophil Rel % 0.8   (A) Abnormal value             Lab Results   Component Value Date    TSH 2.300 10/29/2019      Lab Results   Component Value Date    FREET4 1.2 10/29/2019      No results found for: DDIMERQUANT  No results found for: MG   No results found for: DIGOXIN   Lab Results   Component Value Date    TROPONINT <0.01 10/04/2020           Lipid Panel    Lipid Panel 11/28/22   Total Cholesterol 185   Triglycerides 93   HDL Cholesterol 56   VLDL Cholesterol 17   LDL Cholesterol  112 (A)   LDL/HDL Ratio 1.97   (A) Abnormal value       The 10-year ASCVD risk score (Karthikeyan REAL, et al., 2019) is: 51.3%    Values used to calculate the score:      Age: 78 years      Sex: Female      Is Non- : No      Diabetic: Yes      Tobacco smoker: No      Systolic Blood Pressure: 135 mmHg      Is BP treated: Yes      HDL Cholesterol: 56 mg/dL      Total Cholesterol: 185 mg/dL             Assessment and Plan    Diagnoses and all orders for this visit:    1. Chronic heart failure with preserved ejection fraction (Primary)  Currently symptomatically stable with only mild evidence of fluid overload on exam.  We talked about restricting fluid and sodium  intake.  We also discussed the importance of daily weight.  Continue spironolactone 25 mg daily.  She had prior adverse effects with Lasix in the form of muscle cramping.  Check BMP and proBNP in 2 weeks to assess renal function and CHF.  -     ECG 12 Lead  -     Basic Metabolic Panel; Future  -     proBNP; Future    2. Essential hypertension  Currently controlled and without adverse effects from medication, continue amlodipine 5 mg daily, losartan 100 mg daily, and metoprolol 100 mg daily.  Low-sodium diet discussed.    3. Mixed hyperlipidemia  Last lipid panel was 11/28/2022 with  which is only slightly above her goal range, she really should be on statin therapy.  She is going to work on adjusting her diet.  She will have her lipid panel rechecked with PCP in May, if still elevated would initiate statin therapy.          Follow Up   Return in about 9 months (around 12/6/2023) for Follow up with Dr Cloud.    Patient was given instructions and counseling regarding her condition or for health maintenance advice. Please see specific information pulled into the AVS if appropriate.     Julia Rios  reports that she quit smoking about 28 years ago. Her smoking use included cigarettes. She started smoking about 60 years ago. She has a 2.50 pack-year smoking history. She has never used smokeless tobacco.           Marielos Dover, KALEB  03/06/23  08:38 EST    Dictated Utilizing Dragon Dictation

## 2023-03-08 ENCOUNTER — CLINICAL SUPPORT (OUTPATIENT)
Dept: ORTHOPEDIC SURGERY | Facility: CLINIC | Age: 78
End: 2023-03-08
Payer: MEDICARE

## 2023-03-08 VITALS — BODY MASS INDEX: 41.41 KG/M2 | HEIGHT: 62 IN | WEIGHT: 225 LBS | TEMPERATURE: 97.8 F

## 2023-03-08 DIAGNOSIS — M17.10 ARTHRITIS OF KNEE: Primary | ICD-10-CM

## 2023-03-08 PROCEDURE — 20610 DRAIN/INJ JOINT/BURSA W/O US: CPT | Performed by: ORTHOPAEDIC SURGERY

## 2023-03-08 RX ORDER — LIDOCAINE HYDROCHLORIDE 10 MG/ML
2 INJECTION, SOLUTION EPIDURAL; INFILTRATION; INTRACAUDAL; PERINEURAL
Status: COMPLETED | OUTPATIENT
Start: 2023-03-08 | End: 2023-03-08

## 2023-03-08 RX ORDER — METHYLPREDNISOLONE ACETATE 80 MG/ML
80 INJECTION, SUSPENSION INTRA-ARTICULAR; INTRALESIONAL; INTRAMUSCULAR; SOFT TISSUE
Status: COMPLETED | OUTPATIENT
Start: 2023-03-08 | End: 2023-03-08

## 2023-03-08 RX ADMIN — LIDOCAINE HYDROCHLORIDE 2 ML: 10 INJECTION, SOLUTION EPIDURAL; INFILTRATION; INTRACAUDAL; PERINEURAL at 10:19

## 2023-03-08 RX ADMIN — METHYLPREDNISOLONE ACETATE 80 MG: 80 INJECTION, SUSPENSION INTRA-ARTICULAR; INTRALESIONAL; INTRAMUSCULAR; SOFT TISSUE at 10:19

## 2023-03-08 NOTE — PROGRESS NOTES
Ms. Rios comes in today for follow-up.  Injections have worked well in the past.  The patient would like to get a repeat injection today.  The risks, benefits and alternatives were discussed and the patient consented.  Going forward, the patient will follow-up as needed.    Celso Prakash MD    03/08/2023      Large Joint Arthrocentesis: R knee  Date/Time: 3/8/2023 10:19 AM  Consent given by: patient  Site marked: site marked  Timeout: Immediately prior to procedure a time out was called to verify the correct patient, procedure, equipment, support staff and site/side marked as required   Supporting Documentation  Indications: pain   Procedure Details  Location: knee - R knee  Preparation: Patient was prepped and draped in the usual sterile fashion  Needle gauge: 21 G.  Approach: anterolateral  Medications administered: 80 mg methylPREDNISolone acetate 80 MG/ML; 2 mL lidocaine PF 1% 1 %  Patient tolerance: patient tolerated the procedure well with no immediate complications

## 2023-05-10 ENCOUNTER — OFFICE VISIT (OUTPATIENT)
Dept: ORTHOPEDIC SURGERY | Facility: CLINIC | Age: 78
End: 2023-05-10
Payer: MEDICARE

## 2023-05-10 VITALS — HEIGHT: 61 IN | BODY MASS INDEX: 42.63 KG/M2 | TEMPERATURE: 98.2 F | WEIGHT: 225.8 LBS

## 2023-05-10 DIAGNOSIS — M17.10 ARTHRITIS OF KNEE: Primary | ICD-10-CM

## 2023-05-10 RX ORDER — METHYLPREDNISOLONE ACETATE 80 MG/ML
80 INJECTION, SUSPENSION INTRA-ARTICULAR; INTRALESIONAL; INTRAMUSCULAR; SOFT TISSUE
Status: COMPLETED | OUTPATIENT
Start: 2023-05-10 | End: 2023-05-10

## 2023-05-10 RX ORDER — LIDOCAINE HYDROCHLORIDE 10 MG/ML
2 INJECTION, SOLUTION EPIDURAL; INFILTRATION; INTRACAUDAL; PERINEURAL
Status: COMPLETED | OUTPATIENT
Start: 2023-05-10 | End: 2023-05-10

## 2023-05-10 RX ADMIN — METHYLPREDNISOLONE ACETATE 80 MG: 80 INJECTION, SUSPENSION INTRA-ARTICULAR; INTRALESIONAL; INTRAMUSCULAR; SOFT TISSUE at 12:17

## 2023-05-10 RX ADMIN — LIDOCAINE HYDROCHLORIDE 2 ML: 10 INJECTION, SOLUTION EPIDURAL; INFILTRATION; INTRACAUDAL; PERINEURAL at 12:17

## 2023-05-10 NOTE — PROGRESS NOTES
Chief Complaint:  New complaint left knee pain    HPI:  Ms. Gonzales is seen today for new complaint of left knee pain.  She does not recall any injury, precipitating event or factor.  She feels like the symptoms may have been the result of overuse but she is really not sure.  Pain is moderate and aching.  When asked to localize she gestures to the front and the back.  She says she also gets some medial sided pain and occasional lateral sided pain.  No clicking, popping or locking.  Fortunately her right knee is doing great since the last injection.  She says the left knee is now her biggest complaint.    Exam:  Left knee is examined.  Skin is benign.  No atrophy, swellings, or masses.  Moderate tenderness along the medial joint line, mild lateral joint line tenderness.  Trace effusion.  Knee motion:  0-110, limited mostly by her body habitus.  No instability.  Good strength with hip flexion, knee extension, ankle and great toe plantar flexion and dorsiflexion.  Sensation is intact distally.  Brisk capillary refill in the toes.  Palpable pedal pulses.  Good skin turgor.    Imaging:  Bilateral standing AP views, bilateral merchants views and a lateral view of the left knee are ordered by myself and reviewed to evaluate the patient's complaint.  These are compared to previous xrays.  The x-rays show significant tricompartment degenerative arthritis including joint space narrowing, osteophyte formation, tibial subluxation and subchondral sclerosis.      Assessment:  Left knee osteoarthritis    Plan: Unfortunately it looks like she is starting to have symptoms from her left knee arthritis.  I did explain that this knee is not quite as bad as the right but she clearly has significant arthritis.  We discussed options.  She cannot take anti-inflammatories due to allergy.  She did well with the injection for her right knee and would like to try the same for the left.  The risk, benefits and alternatives were discussed.   She consented and the injection was performed as described below.    Celso Prakash MD  05/10/2023  Large Joint Arthrocentesis: L knee  Date/Time: 5/10/2023 12:17 PM  Consent given by: patient  Site marked: site marked  Timeout: Immediately prior to procedure a time out was called to verify the correct patient, procedure, equipment, support staff and site/side marked as required   Supporting Documentation  Indications: pain   Procedure Details  Location: knee - L knee  Preparation: Patient was prepped and draped in the usual sterile fashion  Needle gauge: 21 G.  Approach: anterolateral  Medications administered: 80 mg methylPREDNISolone acetate 80 MG/ML; 2 mL lidocaine PF 1% 1 %  Patient tolerance: patient tolerated the procedure well with no immediate complications

## 2023-05-17 RX ORDER — FAMOTIDINE 20 MG/1
20 TABLET, FILM COATED ORAL
Qty: 30 TABLET | Refills: 11 | Status: SHIPPED | OUTPATIENT
Start: 2023-05-17

## 2023-05-30 ENCOUNTER — LAB (OUTPATIENT)
Dept: LAB | Facility: HOSPITAL | Age: 78
End: 2023-05-30

## 2023-05-30 ENCOUNTER — OFFICE VISIT (OUTPATIENT)
Dept: FAMILY MEDICINE CLINIC | Facility: CLINIC | Age: 78
End: 2023-05-30

## 2023-05-30 VITALS
BODY MASS INDEX: 42.1 KG/M2 | TEMPERATURE: 97.4 F | SYSTOLIC BLOOD PRESSURE: 116 MMHG | HEART RATE: 68 BPM | DIASTOLIC BLOOD PRESSURE: 75 MMHG | HEIGHT: 61 IN | WEIGHT: 223 LBS | OXYGEN SATURATION: 94 %

## 2023-05-30 DIAGNOSIS — I10 ESSENTIAL HYPERTENSION: Primary | ICD-10-CM

## 2023-05-30 DIAGNOSIS — E78.2 MIXED HYPERLIPIDEMIA: ICD-10-CM

## 2023-05-30 DIAGNOSIS — J44.9 CHRONIC OBSTRUCTIVE PULMONARY DISEASE, UNSPECIFIED COPD TYPE: ICD-10-CM

## 2023-05-30 DIAGNOSIS — N18.31 STAGE 3A CHRONIC KIDNEY DISEASE: ICD-10-CM

## 2023-05-30 LAB
ALBUMIN SERPL-MCNC: 4.3 G/DL (ref 3.5–5.2)
ALBUMIN/GLOB SERPL: 1.7 G/DL
ALP SERPL-CCNC: 77 U/L (ref 39–117)
ALT SERPL W P-5'-P-CCNC: 12 U/L (ref 1–33)
ANION GAP SERPL CALCULATED.3IONS-SCNC: 10.1 MMOL/L (ref 5–15)
AST SERPL-CCNC: 17 U/L (ref 1–32)
BILIRUB SERPL-MCNC: 0.5 MG/DL (ref 0–1.2)
BUN SERPL-MCNC: 17 MG/DL (ref 8–23)
BUN/CREAT SERPL: 14 (ref 7–25)
CALCIUM SPEC-SCNC: 9.9 MG/DL (ref 8.6–10.5)
CHLORIDE SERPL-SCNC: 106 MMOL/L (ref 98–107)
CHOLEST SERPL-MCNC: 216 MG/DL (ref 0–200)
CO2 SERPL-SCNC: 22.9 MMOL/L (ref 22–29)
CREAT SERPL-MCNC: 1.21 MG/DL (ref 0.57–1)
DEPRECATED RDW RBC AUTO: 43.1 FL (ref 37–54)
EGFRCR SERPLBLD CKD-EPI 2021: 46 ML/MIN/1.73
ERYTHROCYTE [DISTWIDTH] IN BLOOD BY AUTOMATED COUNT: 13.1 % (ref 12.3–15.4)
GLOBULIN UR ELPH-MCNC: 2.6 GM/DL
GLUCOSE SERPL-MCNC: 100 MG/DL (ref 65–99)
HCT VFR BLD AUTO: 40.5 % (ref 34–46.6)
HDLC SERPL-MCNC: 48 MG/DL (ref 40–60)
HGB BLD-MCNC: 13 G/DL (ref 12–15.9)
LDLC SERPL CALC-MCNC: 147 MG/DL (ref 0–100)
LDLC/HDLC SERPL: 3.01 {RATIO}
MCH RBC QN AUTO: 28.6 PG (ref 26.6–33)
MCHC RBC AUTO-ENTMCNC: 32.1 G/DL (ref 31.5–35.7)
MCV RBC AUTO: 89.2 FL (ref 79–97)
PLATELET # BLD AUTO: 247 10*3/MM3 (ref 140–450)
PMV BLD AUTO: 12.7 FL (ref 6–12)
POTASSIUM SERPL-SCNC: 4.4 MMOL/L (ref 3.5–5.2)
PROT SERPL-MCNC: 6.9 G/DL (ref 6–8.5)
RBC # BLD AUTO: 4.54 10*6/MM3 (ref 3.77–5.28)
SODIUM SERPL-SCNC: 139 MMOL/L (ref 136–145)
TRIGL SERPL-MCNC: 117 MG/DL (ref 0–150)
VLDLC SERPL-MCNC: 21 MG/DL (ref 5–40)
WBC NRBC COR # BLD: 11.53 10*3/MM3 (ref 3.4–10.8)

## 2023-05-30 PROCEDURE — 80061 LIPID PANEL: CPT | Performed by: FAMILY MEDICINE

## 2023-05-30 PROCEDURE — 85027 COMPLETE CBC AUTOMATED: CPT | Performed by: FAMILY MEDICINE

## 2023-05-30 PROCEDURE — 80053 COMPREHEN METABOLIC PANEL: CPT | Performed by: FAMILY MEDICINE

## 2023-05-30 NOTE — PROGRESS NOTES
Chief Complaint   Patient presents with   • Follow-up     6 month    • Hypertension   • Hyperlipidemia        Subjective     Julia Rios  has a past medical history of Acute right-sided low back pain with right-sided sciatica (01/15/2018), Allergic rhinitis, Asthma, Back pain, Chronic heart failure with preserved ejection fraction, CKD stage 3 (10/29/2019), Closed nondisplaced fracture of metatarsal bone of left foot with routine healing (06/19/2018), Diverticulitis, Essential hypertension, Hemoptysis, Hyperlipidemia, Idiopathic chronic gout of multiple sites without tophus (05/20/2016), Primary osteoarthritis of right knee (01/16/2017), and Vitamin D deficiency (01/13/2016).    Hypertension- she does check her blood pressure at home.  She states her home blood pressure readings have been good.  It is good here today at 116/75.    Hyperlipidemia- currently she is not on any statins.    COPD- she is doing her inhalers on a regular basis.  As of late she has not had hardly any acute exacerbations.      PHQ-2 Depression Screening  Little interest or pleasure in doing things?     Feeling down, depressed, or hopeless?     PHQ-2 Total Score     PHQ-9 Depression Screening  Little interest or pleasure in doing things?     Feeling down, depressed, or hopeless?     Trouble falling or staying asleep, or sleeping too much?     Feeling tired or having little energy?     Poor appetite or overeating?     Feeling bad about yourself - or that you are a failure or have let yourself or your family down?     Trouble concentrating on things, such as reading the newspaper or watching television?     Moving or speaking so slowly that other people could have noticed? Or the opposite - being so fidgety or restless that you have been moving around a lot more than usual?     Thoughts that you would be better off dead, or of hurting yourself in some way?     PHQ-9 Total Score     If you checked off any problems, how difficult have these  problems made it for you to do your work, take care of things at home, or get along with other people?       Allergies   Allergen Reactions   • Ibuprofen Hives   • Penicillins Seizure   • Cephalosporins Rash   • Sulfa Antibiotics Rash       Prior to Admission medications    Medication Sig Start Date End Date Taking? Authorizing Provider   albuterol (ACCUNEB) 1.25 MG/3ML nebulizer solution Take 3 mL by nebulization Every 6 (Six) Hours As Needed for Shortness of Air for up to 50 doses. 1/11/23  Yes Manisha Romano APRN   albuterol sulfate  (90 Base) MCG/ACT inhaler Inhale 2 puffs Every 4 (Four) Hours As Needed for Wheezing. 12/28/22  Yes Santino Baez MD   amLODIPine (NORVASC) 5 MG tablet TAKE 1 TABLET EVERY DAY 11/14/22  Yes Nick Patel DO   azelastine (ASTELIN) 0.1 % nasal spray 2 sprays into the nostril(s) as directed by provider 2 (Two) Times a Day. Use in each nostril as directed 6/2/22  Yes Santino Baez MD   D3-1000 25 MCG (1000 UT) capsule TAKE 1 CAPSULE BY MOUTH EVERY DAY 10/20/22  Yes Nick Patel DO   famotidine (PEPCID) 20 MG tablet TAKE 1 TABLET BY MOUTH EVERY NIGHT AT BEDTIME 5/17/23  Yes Shanell Murray APRN   fexofenadine ([employer]ex Allergy) 180 MG tablet Take 1 tablet by mouth Daily. 7/11/22  Yes Justin Flaherty APRN   Fluticasone Furoate (Arnuity Ellipta) 100 MCG/ACT aerosol powder  Inhale 1 puff Daily. 12/28/22  Yes Santino Baez MD   gabapentin (NEURONTIN) 300 MG capsule 1 tab in am and 2 at bedtime 2/2/23  Yes Nick Patel DO   guaifenesin (ROBITUSSIN) 100 MG/5ML liquid Take 10 mL by mouth Every 4 (Four) Hours As Needed for Cough. 1/11/23  Yes Manisha Romano APRN   losartan (COZAAR) 100 MG tablet TAKE 1 TABLET EVERY DAY 1/26/23  Yes Nick Patel DO   metoprolol succinate XL (TOPROL-XL) 100 MG 24 hr tablet TAKE 1 TABLET EVERY DAY 8/8/22  Yes Nick Patel DO   montelukast (SINGULAIR) 10 MG tablet TAKE 1 TABLET  ONE TIME DAILY IN THE EVENING 23  Yes Nick Patel DO   spironolactone (ALDACTONE) 25 MG tablet Take 1 tablet by mouth Daily. 22  Yes Russ Cloud MD   Symbicort 160-4.5 MCG/ACT inhaler Inhale 2 puffs 2 (Two) Times a Day. 22  Yes Santino Baez MD        Patient Active Problem List   Diagnosis   • Family history of colon cancer   • Chronic obstructive pulmonary disease (HCC)   • Essential hypertension   • Hyperlipidemia   • CKD (chronic kidney disease) stage 3, GFR 30-59 ml/min   • Chronic heart failure with preserved ejection fraction   • CARMEN (obstructive sleep apnea)   • Seasonal allergies   • Gastroesophageal reflux disease   • History of fungal pneumonia   • Bronchitis, mucopurulent recurrent   • Tobacco abuse, in remission   • Pulmonary nodule   • PND (post-nasal drip)   • Status post fall   • Allergic rhinitis   • Asthma   • Closed fracture of metatarsal bone   • Diverticulitis   • Hemoptysis   • Idiopathic chronic gout of multiple sites without tophus   • Primary localized osteoarthritis   • Vitamin D deficiency   • Morbid (severe) obesity due to excess calories   • Umbilical hernia without obstruction and without gangrene   • Oral thrush        Past Surgical History:   Procedure Laterality Date   • CATARACT EXTRACTION Right    • COLONOSCOPY     • COLONOSCOPY N/A 10/12/2021    Procedure: COLONOSCOPY;  Surgeon: Jorge Diane MD;  Location: [facility] ENDOSCOPY;  Service: Gastroenterology;  Laterality: N/A;  DIVERTICULOSIS   • ENDOSCOPY     • EYE SURGERY      cataract right eye   • OTHER SURGICAL HISTORY      Fatty tumor removed off back   • RETINAL DETACHMENT REPAIR Right     hole in retina repair       Social History     Socioeconomic History   • Marital status:    Tobacco Use   • Smoking status: Former     Packs/day: 0.50     Years: 5.00     Pack years: 2.50     Types: Cigarettes     Start date:      Quit date:      Years since quittin.4   •  "Smokeless tobacco: Never   Vaping Use   • Vaping Use: Never used   Substance and Sexual Activity   • Alcohol use: Never   • Drug use: Never   • Sexual activity: Defer       Family History   Problem Relation Age of Onset   • Colon cancer Mother 61   • Cancer Mother    • Heart disease Mother    • Diabetes Brother    • Breast cancer Maternal Grandmother    • Stroke Paternal Grandfather    • Heart failure Father        Family history, surgical history, past medical history, Allergies and meds reviewed with patient today and updated in [facility] EMR.     ROS:  Review of Systems   Constitutional: Negative for fatigue.   HENT: Negative for congestion, postnasal drip and rhinorrhea.    Eyes: Negative for blurred vision and visual disturbance.   Respiratory: Positive for cough, shortness of breath and wheezing. Negative for chest tightness.    Cardiovascular: Negative for chest pain and palpitations.   Allergic/Immunologic: Negative for environmental allergies.   Neurological: Negative for headache.   Psychiatric/Behavioral: Negative for depressed mood. The patient is not nervous/anxious.        OBJECTIVE:  Vitals:    05/30/23 1106   BP: 116/75   BP Location: Left arm   Patient Position: Sitting   Pulse: 68   Temp: 97.4 °F (36.3 °C)   SpO2: 94%   Weight: 101 kg (223 lb)   Height: 154.9 cm (61\")     No results found.   Body mass index is 42.14 kg/m².  No LMP recorded. Patient is postmenopausal.    Physical Exam  Vitals and nursing note reviewed.   Constitutional:       General: She is not in acute distress.     Appearance: Normal appearance. She is obese.   HENT:      Head: Normocephalic.      Right Ear: Tympanic membrane, ear canal and external ear normal.      Left Ear: Tympanic membrane, ear canal and external ear normal.      Nose: Nose normal.      Mouth/Throat:      Mouth: Mucous membranes are moist.      Pharynx: Oropharynx is clear.   Eyes:      General: No scleral icterus.     Conjunctiva/sclera: Conjunctivae normal.      " Pupils: Pupils are equal, round, and reactive to light.   Cardiovascular:      Rate and Rhythm: Normal rate and regular rhythm.      Pulses: Normal pulses.      Heart sounds: Normal heart sounds. No murmur heard.  Pulmonary:      Effort: Pulmonary effort is normal.      Breath sounds: Normal breath sounds. No wheezing, rhonchi or rales.   Musculoskeletal:      Cervical back: Neck supple. No rigidity or tenderness.   Lymphadenopathy:      Cervical: No cervical adenopathy.   Skin:     General: Skin is warm and dry.      Coloration: Skin is not jaundiced.      Findings: No rash.   Neurological:      General: No focal deficit present.      Mental Status: She is alert and oriented to person, place, and time.   Psychiatric:         Mood and Affect: Mood normal.         Thought Content: Thought content normal.         Judgment: Judgment normal.         Procedures    No visits with results within 30 Day(s) from this visit.   Latest known visit with results is:   Admission on 01/11/2023, Discharged on 01/11/2023   Component Date Value Ref Range Status   • SARS Antigen 01/11/2023 Detected (A)   Final   • Internal Control 01/11/2023 Passed   Final   • Lot Number 01/11/2023 [id number]   Final   • Expiration Date 01/11/2023 70,823   Final   • Rapid Influenza A Ag 01/11/2023 Negative   Final   • Rapid Influenza B Ag 01/11/2023 Negative   Final   • Internal Control 01/11/2023 Passed   Final   • Lot Number 01/11/2023 [id number]   Final   • Expiration Date 01/11/2023 12,024   Final       ASSESSMENT/ PLAN:    Diagnoses and all orders for this visit:    1. Essential hypertension (Primary)  Assessment & Plan:  Her blood pressure is great here as well as at home.  We will continue her current meds.    Orders:  -     Comprehensive Metabolic Panel  -     Lipid Panel  -     CBC (No Diff)    2. Mixed hyperlipidemia  Assessment & Plan:  We will update her lipid profile and then consider therapy.    Orders:  -     Comprehensive Metabolic Panel  -      Lipid Panel    3. Stage 3a chronic kidney disease  -     Comprehensive Metabolic Panel  -     CBC (No Diff)    4. Chronic obstructive pulmonary disease, unspecified COPD type  Assessment & Plan:  She is doing very well at this time with her chronic lung disease we will continue her current meds.          Orders Placed Today:     No orders of the defined types were placed in this encounter.       Management Plan:     An After Visit Summary was printed and given to the patient at discharge.    Follow-up: Return in about 6 months (around 11/30/2023) for Recheck.    Nick Patel,  5/30/2023 12:08 EDT  This note was electronically signed.  Answers for HPI/ROS submitted by the patient on 5/30/2023  What is the primary reason for your visit?: High Blood Pressure

## 2023-05-30 NOTE — ASSESSMENT & PLAN NOTE
She is doing very well at this time with her chronic lung disease we will continue her current meds.

## 2023-06-02 DIAGNOSIS — E78.00 ELEVATED LDL CHOLESTEROL LEVEL: Primary | ICD-10-CM

## 2023-06-05 DIAGNOSIS — I50.32 CHRONIC HEART FAILURE WITH PRESERVED EJECTION FRACTION: Primary | ICD-10-CM

## 2023-06-11 NOTE — PROGRESS NOTES
Ms. Gonzales follows up today for her right knee.  She would like to get the injection repeated.  We just recently injected her left.  The risk, benefits and alternatives to a right knee injection were discussed.  She consented and the procedure was performed as described below.    Celso Prakash MD      Large Joint Arthrocentesis: R knee  Date/Time: 6/12/2023 11:42 AM  Consent given by: patient  Site marked: site marked  Timeout: Immediately prior to procedure a time out was called to verify the correct patient, procedure, equipment, support staff and site/side marked as required   Supporting Documentation  Indications: pain   Procedure Details  Location: knee - R knee  Preparation: Patient was prepped and draped in the usual sterile fashion  Needle gauge: 21 G.  Approach: anterolateral  Medications administered: methylPREDNISolone acetate 80 MG/ML; lidocaine PF 1% 1 %  Patient tolerance: patient tolerated the procedure well with no immediate complications

## 2023-06-12 ENCOUNTER — CLINICAL SUPPORT (OUTPATIENT)
Dept: ORTHOPEDIC SURGERY | Facility: CLINIC | Age: 78
End: 2023-06-12
Payer: MEDICARE

## 2023-06-12 ENCOUNTER — LAB (OUTPATIENT)
Dept: LAB | Facility: HOSPITAL | Age: 78
End: 2023-06-12
Payer: MEDICARE

## 2023-06-12 VITALS — HEIGHT: 62 IN | BODY MASS INDEX: 40.89 KG/M2 | WEIGHT: 222.2 LBS | TEMPERATURE: 97.3 F

## 2023-06-12 DIAGNOSIS — M17.11 PRIMARY OSTEOARTHRITIS OF RIGHT KNEE: Primary | ICD-10-CM

## 2023-06-12 DIAGNOSIS — I50.32 CHRONIC HEART FAILURE WITH PRESERVED EJECTION FRACTION: ICD-10-CM

## 2023-06-12 LAB
ANION GAP SERPL CALCULATED.3IONS-SCNC: 11.2 MMOL/L (ref 5–15)
BUN SERPL-MCNC: 23 MG/DL (ref 8–23)
BUN/CREAT SERPL: 16.7 (ref 7–25)
CALCIUM SPEC-SCNC: 9.5 MG/DL (ref 8.6–10.5)
CHLORIDE SERPL-SCNC: 110 MMOL/L (ref 98–107)
CO2 SERPL-SCNC: 21.8 MMOL/L (ref 22–29)
CREAT SERPL-MCNC: 1.38 MG/DL (ref 0.57–1)
EGFRCR SERPLBLD CKD-EPI 2021: 39.3 ML/MIN/1.73
GLUCOSE SERPL-MCNC: 105 MG/DL (ref 65–99)
NT-PROBNP SERPL-MCNC: 111 PG/ML (ref 0–1800)
POTASSIUM SERPL-SCNC: 4.2 MMOL/L (ref 3.5–5.2)
SODIUM SERPL-SCNC: 143 MMOL/L (ref 136–145)

## 2023-06-12 PROCEDURE — 36415 COLL VENOUS BLD VENIPUNCTURE: CPT

## 2023-06-12 PROCEDURE — 80048 BASIC METABOLIC PNL TOTAL CA: CPT

## 2023-06-12 PROCEDURE — 83880 ASSAY OF NATRIURETIC PEPTIDE: CPT

## 2023-06-12 RX ORDER — METHYLPREDNISOLONE ACETATE 80 MG/ML
INJECTION, SUSPENSION INTRA-ARTICULAR; INTRALESIONAL; INTRAMUSCULAR; SOFT TISSUE
Status: COMPLETED | OUTPATIENT
Start: 2023-06-12 | End: 2023-06-12

## 2023-06-12 RX ORDER — LIDOCAINE HYDROCHLORIDE 10 MG/ML
INJECTION, SOLUTION EPIDURAL; INFILTRATION; INTRACAUDAL; PERINEURAL
Status: COMPLETED | OUTPATIENT
Start: 2023-06-12 | End: 2023-06-12

## 2023-06-12 RX ADMIN — LIDOCAINE HYDROCHLORIDE: 10 INJECTION, SOLUTION EPIDURAL; INFILTRATION; INTRACAUDAL; PERINEURAL at 11:42

## 2023-06-12 RX ADMIN — METHYLPREDNISOLONE ACETATE: 80 INJECTION, SUSPENSION INTRA-ARTICULAR; INTRALESIONAL; INTRAMUSCULAR; SOFT TISSUE at 11:42

## 2023-06-13 ENCOUNTER — TELEPHONE (OUTPATIENT)
Dept: CARDIOLOGY | Facility: CLINIC | Age: 78
End: 2023-06-13
Payer: MEDICARE

## 2023-06-13 NOTE — TELEPHONE ENCOUNTER
----- Message from KALEB Magallanes sent at 6/13/2023  9:37 AM EDT -----  BNP level is in normal range  Renal function is declined, would recommend spironolactone MWF and reduce fluid intake

## 2023-07-25 ENCOUNTER — TELEPHONE (OUTPATIENT)
Dept: FAMILY MEDICINE CLINIC | Facility: CLINIC | Age: 78
End: 2023-07-25

## 2023-07-25 PROCEDURE — 87081 CULTURE SCREEN ONLY: CPT | Performed by: STUDENT IN AN ORGANIZED HEALTH CARE EDUCATION/TRAINING PROGRAM

## 2023-07-25 NOTE — TELEPHONE ENCOUNTER
"Caller: Julia Rios \"RHETT\"    Relationship to patient: Self    Best call back number: [phone]     Chief complaint: SHORTNESS OF BREATH AT REST    Patient directed to call 911 or go to their nearest emergency room.     Patient verbalized understanding: [x] Yes  [] No  If no, why?        "

## 2023-07-27 ENCOUNTER — TELEPHONE (OUTPATIENT)
Dept: URGENT CARE | Facility: CLINIC | Age: 78
End: 2023-07-27
Payer: MEDICARE

## 2023-07-27 DIAGNOSIS — J44.9 CHRONIC OBSTRUCTIVE PULMONARY DISEASE, UNSPECIFIED COPD TYPE: ICD-10-CM

## 2023-07-27 DIAGNOSIS — R09.82 PND (POST-NASAL DRIP): ICD-10-CM

## 2023-07-27 DIAGNOSIS — J30.9 ALLERGIC RHINITIS, UNSPECIFIED SEASONALITY, UNSPECIFIED TRIGGER: ICD-10-CM

## 2023-07-27 DIAGNOSIS — J41.1 BRONCHITIS, MUCOPURULENT RECURRENT: ICD-10-CM

## 2023-07-27 DIAGNOSIS — K21.00 GASTROESOPHAGEAL REFLUX DISEASE WITH ESOPHAGITIS WITHOUT HEMORRHAGE: ICD-10-CM

## 2023-07-27 RX ORDER — AZELASTINE 1 MG/ML
SPRAY, METERED NASAL
Qty: 30 ML | Refills: 6 | Status: SHIPPED | OUTPATIENT
Start: 2023-07-27

## 2023-07-27 NOTE — TELEPHONE ENCOUNTER
"----- Message from John Calvin Cooksey, PA-C sent at 7/27/2023 10:01 AM EDT -----  Please call patient regarding their negative beta hemolytic strep throat culture. This means that they do not have strep throat.  She should continue all instructions as given by her provider at her last visit which included: \"Please call, and schedule a follow-up appointment with your pulmonology specialist, Dr. Santino Baez M.D., today, at [phone], for any persistent, or worsening symptoms.      Please go to the [facility] for any worsening symptoms including, but not limited to such as feeling lightheaded, any chest pains, feeling like you are going to pass out, passing out, or vomiting or coughing up any blood.\"    Thank you,     -John Cooksey, PA-C   "

## 2023-07-31 RX ORDER — FEXOFENADINE HYDROCHLORIDE 180 MG/1
TABLET, FILM COATED ORAL
Qty: 90 TABLET | Refills: 3 | OUTPATIENT
Start: 2023-07-31

## 2023-08-04 RX ORDER — FEXOFENADINE HYDROCHLORIDE 180 MG/1
TABLET, FILM COATED ORAL
Qty: 90 TABLET | Refills: 3 | Status: SHIPPED | OUTPATIENT
Start: 2023-08-04

## 2023-08-07 DIAGNOSIS — E11.40 TYPE 2 DIABETES MELLITUS WITH DIABETIC NEUROPATHY, WITHOUT LONG-TERM CURRENT USE OF INSULIN: ICD-10-CM

## 2023-08-07 RX ORDER — GABAPENTIN 300 MG/1
CAPSULE ORAL
Qty: 270 CAPSULE | Refills: 1 | Status: SHIPPED | OUTPATIENT
Start: 2023-08-07

## 2023-08-07 RX ORDER — GABAPENTIN 300 MG/1
CAPSULE ORAL
Qty: 270 CAPSULE | Refills: 1 | Status: SHIPPED | OUTPATIENT
Start: 2023-08-07 | End: 2023-08-07 | Stop reason: SDUPTHER

## 2023-08-09 RX ORDER — METOPROLOL SUCCINATE 100 MG/1
TABLET, EXTENDED RELEASE ORAL
Qty: 90 TABLET | Refills: 3 | Status: SHIPPED | OUTPATIENT
Start: 2023-08-09

## 2023-08-15 RX ORDER — HYDROCODONE POLISTIREX AND CHLORPHENIRAMINE POLISTIREX 10; 8 MG/5ML; MG/5ML
5 SUSPENSION, EXTENDED RELEASE ORAL EVERY 12 HOURS PRN
Qty: 120 ML | Refills: 0 | Status: SHIPPED | OUTPATIENT
Start: 2023-08-15

## 2023-08-31 ENCOUNTER — PROCEDURE VISIT (OUTPATIENT)
Dept: CARDIAC REHAB | Facility: HOSPITAL | Age: 78
End: 2023-08-31
Payer: MEDICARE

## 2023-08-31 ENCOUNTER — HOSPITAL ENCOUNTER (OUTPATIENT)
Dept: RESPIRATORY THERAPY | Facility: HOSPITAL | Age: 78
Discharge: HOME | End: 2023-08-31
Payer: MEDICARE

## 2023-08-31 ENCOUNTER — HOSPITAL ENCOUNTER (OUTPATIENT)
Dept: GENERAL RADIOLOGY | Facility: HOSPITAL | Age: 78
Discharge: HOME | End: 2023-08-31
Payer: MEDICARE

## 2023-08-31 DIAGNOSIS — J41.1 BRONCHITIS, MUCOPURULENT RECURRENT: ICD-10-CM

## 2023-08-31 DIAGNOSIS — R91.1 PULMONARY NODULE: ICD-10-CM

## 2023-08-31 DIAGNOSIS — R04.2 HEMOPTYSIS: ICD-10-CM

## 2023-08-31 DIAGNOSIS — B37.0 ORAL THRUSH: ICD-10-CM

## 2023-08-31 DIAGNOSIS — F17.201 TOBACCO ABUSE, IN REMISSION: ICD-10-CM

## 2023-08-31 DIAGNOSIS — J44.9 CHRONIC OBSTRUCTIVE PULMONARY DISEASE, UNSPECIFIED COPD TYPE: ICD-10-CM

## 2023-08-31 DIAGNOSIS — J30.9 ALLERGIC RHINITIS, UNSPECIFIED SEASONALITY, UNSPECIFIED TRIGGER: ICD-10-CM

## 2023-08-31 DIAGNOSIS — G47.33 OSA (OBSTRUCTIVE SLEEP APNEA): ICD-10-CM

## 2023-08-31 DIAGNOSIS — J45.40 MODERATE PERSISTENT ASTHMA, UNSPECIFIED WHETHER COMPLICATED: ICD-10-CM

## 2023-08-31 DIAGNOSIS — K21.00 GASTROESOPHAGEAL REFLUX DISEASE WITH ESOPHAGITIS WITHOUT HEMORRHAGE: ICD-10-CM

## 2023-08-31 DIAGNOSIS — J30.2 SEASONAL ALLERGIES: ICD-10-CM

## 2023-08-31 PROCEDURE — 94618 PULMONARY STRESS TESTING: CPT

## 2023-08-31 PROCEDURE — A9270 NON-COVERED ITEM OR SERVICE: HCPCS | Performed by: INTERNAL MEDICINE

## 2023-08-31 PROCEDURE — 63710000001 BARIUM SULFATE 40 % SUSPENSION: Performed by: INTERNAL MEDICINE

## 2023-08-31 PROCEDURE — 74230 X-RAY XM SWLNG FUNCJ C+: CPT

## 2023-08-31 PROCEDURE — 94010 BREATHING CAPACITY TEST: CPT

## 2023-08-31 PROCEDURE — 94726 PLETHYSMOGRAPHY LUNG VOLUMES: CPT

## 2023-08-31 PROCEDURE — 94729 DIFFUSING CAPACITY: CPT

## 2023-08-31 PROCEDURE — 63710000001 BARIUM SULFATE 40 % RECONSTITUTED SUSPENSION: Performed by: INTERNAL MEDICINE

## 2023-08-31 PROCEDURE — 92611 MOTION FLUOROSCOPY/SWALLOW: CPT

## 2023-08-31 PROCEDURE — 63710000001 BARIUM SULFATE 60 % CREAM: Performed by: INTERNAL MEDICINE

## 2023-08-31 RX ORDER — LEVALBUTEROL INHALATION SOLUTION 1.25 MG/3ML
1.25 SOLUTION RESPIRATORY (INHALATION) ONCE
Status: COMPLETED | OUTPATIENT
Start: 2023-08-31 | End: 2023-08-31

## 2023-08-31 RX ADMIN — BARIUM SULFATE 55 ML: 0.81 POWDER, FOR SUSPENSION ORAL at 11:29

## 2023-08-31 RX ADMIN — BARIUM SULFATE 1 TEASPOON(S): 0.6 CREAM ORAL at 11:30

## 2023-08-31 RX ADMIN — BARIUM SULFATE 50 ML: 400 SUSPENSION ORAL at 11:30

## 2023-08-31 RX ADMIN — LEVALBUTEROL HYDROCHLORIDE 1.25 MG: 1.25 SOLUTION RESPIRATORY (INHALATION) at 13:39

## 2023-08-31 NOTE — MBS/VFSS/FEES
oupatient  - Speech Language Pathology   Swallow  modified barium swallow study   Mandi     Patient Name: Julia Rios  : 1945  MRN: [id number]  Today's Date: 2023               Admit Date: 2023    Visit Dx:     ICD-10-CM ICD-9-CM   1. Seasonal allergies  J30.2 477.9   2. Allergic rhinitis, unspecified seasonality, unspecified trigger  J30.9 477.9   3. Gastroesophageal reflux disease with esophagitis without hemorrhage  K21.00 530.81     530.10   4. Chronic obstructive pulmonary disease, unspecified COPD type  J44.9 496   5. Bronchitis, mucopurulent recurrent  J41.1 491.1   6. Pulmonary nodule  R91.1 793.11   7. Moderate persistent asthma, unspecified whether complicated  J45.40 493.90   8. Hemoptysis  R04.2 786.30   9. CARMEN (obstructive sleep apnea)  G47.33 327.23   10. Tobacco abuse, in remission  F17.201 305.1   11. Oral thrush  B37.0 112.0     Patient Active Problem List   Diagnosis    Family history of colon cancer    Chronic obstructive pulmonary disease    Essential hypertension    Hyperlipidemia    CKD (chronic kidney disease) stage 3, GFR 30-59 ml/min    Chronic heart failure with preserved ejection fraction    CARMEN (obstructive sleep apnea)    Seasonal allergies    Gastroesophageal reflux disease    History of fungal pneumonia    Bronchitis, mucopurulent recurrent    Tobacco abuse, in remission    Pulmonary nodule    PND (post-nasal drip)    Status post fall    Allergic rhinitis    Asthma    Closed fracture of metatarsal bone    Diverticulitis    Hemoptysis    Idiopathic chronic gout of multiple sites without tophus    Primary localized osteoarthritis    Vitamin D deficiency    Morbid (severe) obesity due to excess calories    Umbilical hernia without obstruction and without gangrene    Oral thrush     Past Medical History:   Diagnosis Date    Acute right-sided low back pain with right-sided sciatica 01/15/2018    Allergic rhinitis     Asthma     Back pain     2 bulging disc L2 L3     Chronic heart failure with preserved ejection fraction     11/16/20 echocardiogram: 1.  Normal ejection fraction of 55%. 2.  Mild left ventricular hypertrophy. 3.  No significant valvular heart issues.     CKD stage 3 10/29/2019    Closed nondisplaced fracture of metatarsal bone of left foot with routine healing 06/19/2018    unspecified metatarsal, subsequent encounter    Diverticulitis     Essential hypertension     Hemoptysis     non cancerous    Hyperlipidemia     Idiopathic chronic gout of multiple sites without tophus 05/20/2016    Primary osteoarthritis of right knee 01/16/2017    Vitamin D deficiency 01/13/2016     Past Surgical History:   Procedure Laterality Date    CATARACT EXTRACTION Right     COLONOSCOPY  2014    COLONOSCOPY N/A 10/12/2021    Procedure: COLONOSCOPY;  Surgeon: Jorge Diane MD;  Location: [facility] ENDOSCOPY;  Service: Gastroenterology;  Laterality: N/A;  DIVERTICULOSIS    ENDOSCOPY  2015    EYE SURGERY      cataract right eye    OTHER SURGICAL HISTORY      Fatty tumor removed off back    RETINAL DETACHMENT REPAIR Right     hole in retina repair       SLP Recommendation and Plan               MODIFIED BARIUM SWALLOW STUDY: SPEECH PATHOLOGY REPORT        DATE OF SERVICE:  8/31/23    PERTINENT INFORMATION:  Ms. Rios is a 78 year old female with complaint of dysphagia.    She was referred for an MBSS by Dr. Baez to rule out aspiration as well as to determine appropriate treatment plan for this patient.      PROCEDURE:    Ms. Rios was alert and cooperative.  The patient was viewed in lateral plane.  The following Ba consistencies were administered:  thin barium, nectar thick barium, barium paste, barium mixed with applesauce, barium mixed with cracker.  The following compensatory swallowing strategies were performed: bolus modification, cyclic ingestion.      RESULTS:    1.  Nectar liquid by cup. Swallow completed.  2. Thin liquid by cup. Swallow completed with flash  laryngeal penetration, clears.   3. Pureed by spoon. Swallow completed. Residue backflow below cricopharyngeus.  4. Pudding by spoon. Swallow completed.  5. Nectar liquid by straw. Swallow completed.  6. Solid. Chewing with swallow completed.  7. Thin liquid by straw. Swallow completed with flash laryngeal penetration, clears.       IMPRESSIONS:    Ms. Rios demonstrated oral pharyngeal swallow appearing grossly within functional limits. Flash laryngeal penetration with thin liquids. No aspiration observed during this study.  Please refer to radiologist report for further details.     FUNCTIONAL DEFICIT: Patient scored level 6 of 7 on Functional Communication Measures for swallowing indicating a 1-19% limitation in function for current status, goal status, and discharge status.      RECOMMENDATIONS:   1.  Diet: Regular solids, thin liquids  2.  Compensatory strategies: small sips of liquids  3.  Positioning: fully upright for all po, 30 minutes following.         Yes, Patient/responsible party agrees with the plan of care and has been informed of all alternatives, risks and benefits.    Thank you for this referral.                                                                              EDUCATION  The patient has been educated in the following areas:   Dysphagia (Swallowing Impairment).              Time Calculation:       Therapy Charges for Today       Code Description Service Date Service Provider Modifiers Qty    [id number] HC ST MOTION FLUORO EVAL SWALLOW 6 8/31/2023 Rima Alba MS-CCC/SLP, [place] GN 1                 YESENIA Bobby/SLP, [place]  8/31/2023

## 2023-09-07 ENCOUNTER — OFFICE VISIT (OUTPATIENT)
Dept: FAMILY MEDICINE CLINIC | Facility: CLINIC | Age: 78
End: 2023-09-07
Payer: MEDICARE

## 2023-09-07 VITALS
TEMPERATURE: 97.7 F | BODY MASS INDEX: 39.65 KG/M2 | DIASTOLIC BLOOD PRESSURE: 76 MMHG | WEIGHT: 216.8 LBS | OXYGEN SATURATION: 95 % | SYSTOLIC BLOOD PRESSURE: 115 MMHG | HEART RATE: 70 BPM

## 2023-09-07 DIAGNOSIS — M72.2 PLANTAR FASCIITIS OF LEFT FOOT: Primary | ICD-10-CM

## 2023-09-07 PROCEDURE — 3078F DIAST BP <80 MM HG: CPT | Performed by: NURSE PRACTITIONER

## 2023-09-07 PROCEDURE — 3074F SYST BP LT 130 MM HG: CPT | Performed by: NURSE PRACTITIONER

## 2023-09-07 PROCEDURE — 99213 OFFICE O/P EST LOW 20 MIN: CPT | Performed by: NURSE PRACTITIONER

## 2023-09-07 NOTE — PROGRESS NOTES
Chief Complaint  Foot Injury (Left; Just started hurting x3 days.)    Subjective          Julia Rios is a 78 y.o. female who presents to [facility]    History of Present Illness    Left foot pain, unknown injury. Rest has not improved pain.            Review of Systems   Constitutional:  Negative for fever.   Musculoskeletal:  Positive for arthralgias (left foot pain).        Medical History: has a past medical history of Acute right-sided low back pain with right-sided sciatica (01/15/2018), Allergic rhinitis, Asthma, Back pain, Chronic heart failure with preserved ejection fraction, CKD stage 3 (10/29/2019), Closed nondisplaced fracture of metatarsal bone of left foot with routine healing (06/19/2018), Diverticulitis, Essential hypertension, Hemoptysis, Hyperlipidemia, Idiopathic chronic gout of multiple sites without tophus (05/20/2016), Primary osteoarthritis of right knee (01/16/2017), and Vitamin D deficiency (01/13/2016).     Surgical History: has a past surgical history that includes Colonoscopy (2014); Esophagogastroduodenoscopy (2015); Eye surgery; Cataract extraction (Right); Retinal Detachment Repair (Right); Colonoscopy (N/A, 10/12/2021); and Other surgical history.     Family History: family history includes Breast cancer in her maternal grandmother; Cancer in her mother; Colon cancer (age of onset: 61) in her mother; Diabetes in her brother; Heart disease in her mother; Heart failure in her father; Stroke in her paternal grandfather.     Social History: reports that she quit smoking about 28 years ago. Her smoking use included cigarettes. She started smoking about 60 years ago. She has a 2.50 pack-year smoking history. She has never been exposed to tobacco smoke. She has never used smokeless tobacco. She reports that she does not drink alcohol and does not use drugs.    Allergies: Ibuprofen, Penicillins, Cephalosporins, and Sulfa antibiotics      Health  Maintenance Due   Topic Date Due    TDAP/TD VACCINES (1 - Tdap) Never done    ZOSTER VACCINE (1 of 2) Never done    DXA SCAN  09/14/2020    COVID-19 Vaccine (5 - Moderna series) 04/20/2021    HEPATITIS C SCREENING  Never done    ANNUAL WELLNESS VISIT  07/11/2023            Current Outpatient Medications:     albuterol (ACCUNEB) 1.25 MG/3ML nebulizer solution, Take 3 mL by nebulization Every 6 (Six) Hours As Needed for Shortness of Air for up to 50 doses., Disp: 25 each, Rfl: 0    albuterol sulfate  (90 Base) MCG/ACT inhaler, Inhale 2 puffs Every 4 (Four) Hours As Needed for Wheezing., Disp: 1 g, Rfl: 11    Allergy Relief 180 MG tablet, TAKE 1 TABLET BY MOUTH DAILY, Disp: 90 tablet, Rfl: 3    amLODIPine (NORVASC) 5 MG tablet, TAKE 1 TABLET EVERY DAY, Disp: 90 tablet, Rfl: 3    azelastine (ASTELIN) 0.1 % nasal spray, USE 2 SPRAYS IN EACH NOSTRIL TWICE DAILY, Disp: 30 mL, Rfl: 6    D3-1000 25 MCG (1000 UT) capsule, TAKE 1 CAPSULE BY MOUTH EVERY DAY, Disp: 90 capsule, Rfl: 3    famotidine (PEPCID) 20 MG tablet, TAKE 1 TABLET BY MOUTH EVERY NIGHT AT BEDTIME, Disp: 30 tablet, Rfl: 11    Fluticasone Furoate (Arnuity Ellipta) 100 MCG/ACT aerosol powder , Inhale 1 puff Daily., Disp: 90 each, Rfl: 3    gabapentin (NEURONTIN) 300 MG capsule, 1 tab in am and 2 at bedtime, Disp: 270 capsule, Rfl: 1    losartan (COZAAR) 100 MG tablet, TAKE 1 TABLET EVERY DAY, Disp: 90 tablet, Rfl: 3    metoprolol succinate XL (TOPROL-XL) 100 MG 24 hr tablet, TAKE 1 TABLET EVERY DAY, Disp: 90 tablet, Rfl: 3    montelukast (SINGULAIR) 10 MG tablet, TAKE 1 TABLET ONE TIME DAILY IN THE EVENING, Disp: 90 tablet, Rfl: 3    spironolactone (ALDACTONE) 25 MG tablet, Take 1 tablet by mouth Daily., Disp: 90 tablet, Rfl: 3    Symbicort 160-4.5 MCG/ACT inhaler, Inhale 2 puffs 2 (Two) Times a Day., Disp: 1 each, Rfl: 11    nystatin (MYCOSTATIN) 100,000 unit/mL suspension, Take 5 mL by mouth 4 (Four) Times a Day. (Patient not taking: Reported on  9/7/2023), Disp: 840 mL, Rfl: 0      Immunization History   Administered Date(s) Administered    COVID-19 (MODERNA) 1st,2nd,3rd Dose Monovalent 01/28/2021, 01/28/2021, 02/23/2021, 02/23/2021    Fluzone High-Dose 65+yrs 12/02/2021, 12/28/2022    Fluzone Quad >6mos (Multi-dose) 10/01/2020    Pneumococcal Conjugate 13-Valent (PCV13) 02/11/2015    Pneumococcal Conjugate 20-Valent (PCV20) 07/20/2023    Pneumococcal Polysaccharide (PPSV23) 05/01/2010         Objective       Vitals:    09/07/23 1041   BP: 115/76   Pulse: 70   Temp: 97.7 °F (36.5 °C)   TempSrc: Temporal   SpO2: 95%   Weight: 98.3 kg (216 lb 12.8 oz)      Body mass index is 39.65 kg/m².   Wt Readings from Last 3 Encounters:   09/07/23 98.3 kg (216 lb 12.8 oz)   08/20/23 98.4 kg (217 lb)   08/10/23 98.7 kg (217 lb 11.2 oz)      BP Readings from Last 3 Encounters:   09/07/23 115/76   08/20/23 108/65   08/10/23 106/64        Class 2 Severe Obesity (BMI >=35 and <=39.9). Obesity-related health conditions include the following: hypertension. Obesity is improving with treatment. BMI is is above average; BMI management plan is completed. We discussed portion control and increasing exercise.       Physical Exam  Vitals reviewed.   Constitutional:       Appearance: Normal appearance. She is well-developed.   HENT:      Head: Normocephalic and atraumatic.   Eyes:      Conjunctiva/sclera: Conjunctivae normal.      Pupils: Pupils are equal, round, and reactive to light.   Cardiovascular:      Rate and Rhythm: Normal rate and regular rhythm.      Heart sounds: Normal heart sounds. No murmur heard.  Pulmonary:      Effort: Pulmonary effort is normal.      Breath sounds: Normal breath sounds. No wheezing or rhonchi.   Abdominal:      General: Bowel sounds are normal. There is no distension.      Palpations: Abdomen is soft.      Tenderness: There is no abdominal tenderness.   Musculoskeletal:        Feet:    Feet:      Comments: Tenderness to palpation.   Skin:      General: Skin is warm and dry.   Neurological:      Mental Status: She is alert and oriented to person, place, and time.   Psychiatric:         Mood and Affect: Mood and affect normal.         Behavior: Behavior normal.         Thought Content: Thought content normal.         Judgment: Judgment normal.           Result Review :       Common labs          11/28/2022    12:38 5/30/2023    13:55 6/12/2023    08:16   Common Labs   Glucose 92  100  105    BUN 29  17  23    Creatinine 1.23  1.21  1.38    Sodium 139  139  143    Potassium 4.4  4.4  4.2    Chloride 105  106  110    Calcium 9.4  9.9  9.5    Albumin 4.20  4.3     Total Bilirubin 0.4  0.5     Alkaline Phosphatase 93  77     AST (SGOT) 12  17     ALT (SGPT) 8  12     WBC 14.81  11.53     Hemoglobin 13.1  13.0     Hematocrit 41.1  40.5     Platelets 264  247     Total Cholesterol 185  216     Triglycerides 93  117     HDL Cholesterol 56  48     LDL Cholesterol  112  147                        Assessment and Plan        Diagnoses and all orders for this visit:    1. Plantar fasciitis of left foot (Primary)  Comments:  voltaren gel, plantar fascititis sleeve, stretches and ice.      Declines steroids d/t just finished for lung condition.   Pt has been compensating from right knee pain.   Good supportive shoes. If not improving may need to use walking boot.     The patient is asked to make an attempt to improve diet and exercise patterns to aid in medical management of these diagnoses.    Follow Up     Return if symptoms worsen or fail to improve.    Patient was given instructions and counseling regarding her condition or for health maintenance advice. Please see specific information pulled into the AVS if appropriate.     KALEB Dennis

## 2023-09-07 NOTE — PROGRESS NOTES
Primary Care Provider  Nick Patel DO   Referring Provider  No ref. provider found    Patient Complaint  Follow-up, Cough, and Shortness of Breath      SUBJECTIVE    History of Presenting Illness  Julia Rios is a pleasant 78 y.o. female patient of Dr. Baez's who presents to [facility] for follow-up appointment.  Patient last saw Dr. Baez 2023.  Patient has a history of of COPD, recurrent bronchitis, obstructive sleep apnea, postnasal drip, seasonal allergies, history of fungal pneumonia in past, completed a course of itraconazole, pulmonary nodule, gastroesophageal reflux disease and tobacco abuse of cigarettes in remission.  She continues to be on Symbicort and Arnuity.  She is also under the care of Dr. Cloud for her heart failure.  And her video swallow showed laryngeal penetration with thin liquid barium.  Prominent cricopharyngeus muscle with intermittent retrograde flow ingested contents.  No tracheal aspiration appreciated on exam.  We will make referral to speech pathology at this time for further details regarding exam and dietary recommendations.    At last visit Dr. Baez had ordered a pulmonary function test, 6-minute walk and a video swallow test.  PFT shows low FEV1 FVC with normal FEV1 FVC with normal lung volumes and low diffusion capacity.  Suggest mild obstructive airway disease likely emphysema.  She did pass her 6-minute walk with oxygen saturation no lower than 94% on room air.    Patient presents today for follow-up.  Patient states she did get her Prevnar 20 at her last visit and the day after she immediately was sick.  Patient states she had cough, fatigue, tremors in her hands, weakness.  She states she did go to urgent care a few times since her last visit for her symptoms but they never did check her for COVID.  Patient also states she had gone to a family  in [place] since her last visit.  Patient states  she is slowly feeling better but still feeling weak.  Patient states she did take her temperature but she never had a fever.  Patient also states her  never got sick.  She is not sure if she had had COVID or not as she was never tested.  Patient feels she had a reaction to the pneumonia vaccine.  Patient states she [facility] but was only able to take 1 dose as she got very sick from.  At present time she does have shortness of air with exertional activities and occasional dry cough.  She is not having any wheezing, headaches, chest pain, weight loss or hemoptysis. Denies fevers, chills and night sweats. Julia Rios is able to perform ADLs without difficulties and denies any swollen glands/lymph nodes in the head or neck.  Patient's blood pressure is 97/58 today.  She currently takes metoprolol, losartan, amlodipine, and spironolactone which is only on .  Patient states her normal blood pressures usually they are in the 120s to 130s over 70s.    I have personally reviewed the review of systems, past family, social, medical and surgical histories; and agree with their findings.    Review of Systems  Constitutional symptoms:  Denied complaints   Ear, nose, throat: Denied complaints  Cardiovascular:  Denied complaints  Respiratory: Shortness of air with exertion, cough  Gastrointestinal: Denied complaints  Musculoskeletal: Denied complaints    Family History   Problem Relation Age of Onset    Colon cancer Mother 61    Cancer Mother     Heart disease Mother     Diabetes Brother     Breast cancer Maternal Grandmother     Stroke Paternal Grandfather     Heart failure Father         Social History     Socioeconomic History    Marital status:    Tobacco Use    Smoking status: Former     Packs/day: 0.50     Years: 5.00     Pack years: 2.50     Types: Cigarettes     Start date:      Quit date:      Years since quittin.7      Passive exposure: Never    Smokeless tobacco: Never   Vaping Use    Vaping Use: Never used   Substance and Sexual Activity    Alcohol use: Never    Drug use: Never    Sexual activity: Defer        Past Medical History:   Diagnosis Date    Acute right-sided low back pain with right-sided sciatica 01/15/2018    Allergic rhinitis     Asthma     Back pain     2 bulging disc L2 L3    Chronic heart failure with preserved ejection fraction     11/16/20 echocardiogram: 1.  Normal ejection fraction of 55%. 2.  Mild left ventricular hypertrophy. 3.  No significant valvular heart issues.     CKD stage 3 10/29/2019    Closed nondisplaced fracture of metatarsal bone of left foot with routine healing 06/19/2018    unspecified metatarsal, subsequent encounter    Diverticulitis     Essential hypertension     Hemoptysis     non cancerous    Hyperlipidemia     Idiopathic chronic gout of multiple sites without tophus 05/20/2016    Primary osteoarthritis of right knee 01/16/2017    Vitamin D deficiency 01/13/2016        Immunization History   Administered Date(s) Administered    COVID-19 (MODERNA) 1st,2nd,3rd Dose Monovalent 01/28/2021, 01/28/2021, 02/23/2021, 02/23/2021    Fluzone High-Dose 65+yrs 12/02/2021, 12/28/2022    Fluzone Quad >6mos (Multi-dose) 10/01/2020    Pneumococcal Conjugate 13-Valent (PCV13) 02/11/2015    Pneumococcal Conjugate 20-Valent (PCV20) 07/20/2023    Pneumococcal Polysaccharide (PPSV23) 05/01/2010       Allergies   Allergen Reactions    Ibuprofen Hives    Penicillins Seizure    Cephalosporins Rash    Sulfa Antibiotics Rash          Current Outpatient Medications:     albuterol (ACCUNEB) 1.25 MG/3ML nebulizer solution, Take 3 mL by nebulization Every 6 (Six) Hours As Needed for Shortness of Air for up to 50 doses., Disp: 25 each, Rfl: 0    albuterol sulfate  (90 Base) MCG/ACT inhaler, Inhale 2 puffs Every 4 (Four) Hours As Needed for Wheezing., Disp: 1 g, Rfl: 11    Allergy Relief 180 MG tablet, TAKE 1  "TABLET BY MOUTH DAILY, Disp: 90 tablet, Rfl: 3    amLODIPine (NORVASC) 5 MG tablet, TAKE 1 TABLET EVERY DAY, Disp: 90 tablet, Rfl: 3    azelastine (ASTELIN) 0.1 % nasal spray, USE 2 SPRAYS IN EACH NOSTRIL TWICE DAILY, Disp: 30 mL, Rfl: 6    D3-1000 25 MCG (1000 UT) capsule, TAKE 1 CAPSULE BY MOUTH EVERY DAY, Disp: 90 capsule, Rfl: 3    famotidine (PEPCID) 20 MG tablet, TAKE 1 TABLET BY MOUTH EVERY NIGHT AT BEDTIME, Disp: 30 tablet, Rfl: 11    Fluticasone Furoate (Arnuity Ellipta) 100 MCG/ACT aerosol powder , Inhale 1 puff Daily., Disp: 90 each, Rfl: 3    gabapentin (NEURONTIN) 300 MG capsule, 1 tab in am and 2 at bedtime, Disp: 270 capsule, Rfl: 1    losartan (COZAAR) 100 MG tablet, TAKE 1 TABLET EVERY DAY, Disp: 90 tablet, Rfl: 3    metoprolol succinate XL (TOPROL-XL) 100 MG 24 hr tablet, TAKE 1 TABLET EVERY DAY, Disp: 90 tablet, Rfl: 3    montelukast (SINGULAIR) 10 MG tablet, TAKE 1 TABLET ONE TIME DAILY IN THE EVENING, Disp: 90 tablet, Rfl: 3    nystatin (MYCOSTATIN) 100,000 unit/mL suspension, Take 5 mL by mouth 4 (Four) Times a Day., Disp: 840 mL, Rfl: 0    spironolactone (ALDACTONE) 25 MG tablet, Take 1 tablet by mouth Daily., Disp: 90 tablet, Rfl: 3    Symbicort 160-4.5 MCG/ACT inhaler, Inhale 2 puffs 2 (Two) Times a Day., Disp: 1 each, Rfl: 11           Vital Signs   BP 97/58 (BP Location: Right arm, Patient Position: Sitting, Cuff Size: Adult)   Pulse 75   Temp 97.8 °F (36.6 °C) (Temporal)   Resp 18   Ht 157.5 cm (62\")   Wt 101 kg (222 lb 3.2 oz)   SpO2 95% Comment: room air  BMI 40.64 kg/m²       OBJECTIVE    Physical Exam  Vital Signs Reviewed   WDWN, Alert, NAD.    HEENT:  PERRL, EOMI.  OP, nares clear  Neck:  Supple, no JVD, no thyromegaly  Chest:  good aeration, clear to auscultation bilaterally, tympanic to percussion bilaterally, no work of breathing noted  CV: RRR, no MGR, pulses 2+, equal.  Abd:  Soft, NT, ND, + BS, no HSM  EXT:  no clubbing, no cyanosis, no edema  Neuro:  A&Ox3, CN grossly " intact, no focal deficits.  Skin: No rashes or lesions noted    Results Review  I have personally reviewed the prior office notes, hospital records, labs, and diagnostics.  CT Chest Without Contrast Diagnostic [[id number]] (Order [id number])  Order  Status: Final result     Appointment Information    PACS Images     Radiology Images  Study Result    Narrative & Impression   PROCEDURE:  CT CHEST WO CONTRAST DIAGNOSTIC     COMPARISON: [facility], CT, CT CHEST WO CONTRAST DIAGNOSTIC, 9/01/2021,   12:03.     INDICATIONS:  FOLLOW UP LUNG NODULE.     TECHNIQUE:    CT images were created without the administration of contrast material.       PROTOCOL:     Standard imaging protocol performed                 RADIATION:      DLP: 432.6mGy*cm               Automated exposure control was utilized to minimize radiation dose.      FINDINGS:          There is no pneumothorax, pleural effusion or focal pneumonia.  There is subpleural scarring in   both lung bases.  There is no new or enlarging lung nodule.  Airways are patent.     The thyroid, trachea and esophagus appear within normal limits.  Heart size is normal.  There is   mild coronary artery calcification.  No pericardial effusion or mediastinal lymphadenopathy.  There   is mild aortic atherosclerosis.     No acute findings in the upper abdomen.  There are bilateral renal cysts.  Cholelithiasis partially   visualized.  No acute findings in the superficial soft tissues.  Grossly stable breast densities.    No acute osseous abnormality or destructive bone lesion.  There are mild thoracic degenerative   changes.     IMPRESSION:                 1. No acute cardiopulmonary abnormality.  No suspicious lung nodules.  2. Stable scarring in the lung bases.  3. Mild aortic and coronary artery atherosclerosis.  4. Cholelithiasis.            SUBHASH PRUITT MD         Electronically Signed and Approved By: SUBHASH PRUITT MD on 6/26/2023 at 9:29   Results  Complete PFT -  Pre & Post Bronchodilator (Order [id number])  Order-Level Documents:    Scan on 8/31/2023 by Santino Baez MD: PULMONARY FUNCTION TEST, [facility], 08/31/2023         Author: -- Service: -- Author Type: --   Filed: Date of Service: Creation Time:   Status: (Other)   PFT interpretation     Spirometry shows mild obstructive defect.  FEV1/FVC is 65.   FEV1 is 1.57 liters, 85 percent of predicted.  FVC is 2.40 liters, 100 percent of predicted.     There is a borderline significant response to bronchodilator administration.  FVC increased from 2.40 L to 2.63 L, 10% change.  FEV1 increased from 1.57 L to 1.67 L, 6% change.     Lung volumes:  Lung volumes are normal.   Total lung capacity is 4.30 liters, 95 percent of predicted.  Residual volume is 1.84 liters, 92 percent of predicted.        Diffusion capacity is 10.53 mL/min/mmHg, 60 percent of predicted.      Flow volume loop is compatible with mild obstructive process.     Comparision:  No previous study for comparison.     Conclusion:  Low FEV1/FVC with normal FEV1 and FVC with normal lung volumes and low diffusion capacity.  Suggest mild obstructive airway disease, likely emphysema.  Please correlate clinically.               File Link    Scan on 8/31/2023 by Santino Baez MD: PULMONARY FUNCTION TEST, [facility], 08/31/2023        Key Information    Document ID File Type Document Type Description   [id number] Image PULMONARY FUNCTION TEST - SCAN PULMONARY FUNCTION TEST, [facility], 08/31/2023     Import Information    Attached At Date Time User Dept   Order Level 8/31/2023  Santino Baez MD      Order    Pulmonary Function Test [[id number]]     Encounter    Hospital Encounter on 8/31/23 with [facility] PUL LAB ROOM 2          File Link    Scan on 8/31/2023 1448 by Norma Horn MA: Six Minute Walk Assessment        Key Information    Document ID File Type Document Type Description   [id number] Image PULMONARY RESULTS - SCAN Six Minute Walk Assessment     Import  Information    Attached At Date Time User Dept   Order Level 8/31/2023  2:48 PM Norma Horn MA [facility] Card Rehab     Order    Six Minute Walk Test [[id number]]     Encounter    Procedure visit on 8/31/23 with [facility] CARD REHAB, WALK TEST     FL Video Swallow With Speech Single Contrast [[id number]] (Order [id number])  Order  Status: Final result     Appointment Information    PACS Images     Radiology Images  Study Result    Narrative & Impression   PROCEDURE:  FL VIDEO SWALLOW W SPEECH SINGLE-CONTRAST     COMPARISON: [facility], CT, CT CHEST WO CONTRAST DIAGNOSTIC, 6/26/2023, 8:38.     INDICATIONS:  ECHEVERRIA, GERD, 1.5 MINUTES FLUORO TIME, 4.2 mGy, 10 IMAGES     TECHNIQUE:    Examination was performed in conjunction with the speech pathologist. The patient was   given both thin and nectar thick liquid barium, applesauce with barium, contrast and henry cracker   with Esophotrast. The patient's medication list was reviewed and documented in the medical record.     FINDINGS:          Fluoroscopic examination was performed in conjunction with speech pathology department.  Various   consistencies of barium were given to assess the swallow mechanism.  Across all consistencies, no   tracheal aspiration was observed on exam.  Patient experienced laryngeal penetration with thin   liquid barium.  Prominent cricopharyngeus muscle appreciated on exam with noted, intermittent   retrograde flow of ingested contents.  The exam was discussed in real-time by myself and the speech   pathologist. Please consult speech pathology report for further details regarding exam.     IMPRESSION:                    1. Laryngeal penetration with thin liquid barium  2. Prominent cricopharyngeus muscle with intermittent retrograde flow ingested contents  3. No tracheal aspiration appreciated on exam     Please consult speech pathology report for further details regarding exam and dietary   recommendations         PAULO RAMIREZ          Electronically Signed and Approved By: Shiva Jeffries M.D. on 8/31/2023 at 13:56        ASSESSMENT         Patient Active Problem List   Diagnosis    Family history of colon cancer    Chronic obstructive pulmonary disease    Essential hypertension    Hyperlipidemia    CKD (chronic kidney disease) stage 3, GFR 30-59 ml/min    Chronic heart failure with preserved ejection fraction    CARMEN (obstructive sleep apnea)    Seasonal allergies    Gastroesophageal reflux disease    History of fungal pneumonia    Bronchitis, mucopurulent recurrent    Tobacco abuse, in remission    Pulmonary nodule    PND (post-nasal drip)    Status post fall    Allergic rhinitis    Asthma    Closed fracture of metatarsal bone    Diverticulitis    Hemoptysis    Idiopathic chronic 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    Chronic obstructive pulmonary disease, unspecified COPD type Yes    Allergic rhinitis, unspecified seasonality, unspecified trigger     Chronic heart failure with preserved ejection fraction     Pulmonary nodule     Abnormal swallowing     Muscle cramps       PLAN  -Discussed with patient her blood pressure today.  I recommended that patient hold her amlodipine for 2 days, cut her losartan in half for 2 days, continue with regular dose of metoprolol for her blood pressure due to her symptoms today and low blood pressure.  -I recommend patient to call her PCP on Monday to schedule a follow-up appointment regarding her blood pressure  -I recommend patient to keep a blood pressure log  -I encouraged patient to get some blood work today and will notify of those results when available  -Encouraged patient if her symptoms were to worsen to go to the hospital, patient verbalized understanding  -Encourage patient  to take Tessalon Perles which she has at home for her cough  -I encourage patient to hydrate, replenish with Gatorade which will provide electrolytes  -Referral to speech therapy at this time due to laryngeal penetration on swallow study  -Continue with Symbicort Arnuity  -Continue with Singulair, azelastine  -Continue with albuterol inhaler or nebulizer as needed for shortness of air or wheeze  -Return to office in 3 to 4 weeks or sooner if needed.  Diagnoses and all orders for this visit:    1. Chronic obstructive pulmonary disease, unspecified COPD type (Primary)  -     CBC & Differential; Future  -     Comprehensive Metabolic Panel; Future  -     Magnesium; Future    2. Allergic rhinitis, unspecified seasonality, unspecified trigger  -     CBC & Differential; Future  -     Comprehensive Metabolic Panel; Future  -     Magnesium; Future    3. Chronic heart failure with preserved ejection fraction  -     CBC & Differential; Future  -     Comprehensive Metabolic Panel; Future  -     Magnesium; Future    4. Pulmonary nodule  -     CBC & Differential; Future  -     Comprehensive Metabolic Panel; Future  -     Magnesium; Future    5. Abnormal swallowing  -     Ambulatory Referral to Speech Therapy  -     CBC & Differential; Future  -     Comprehensive Metabolic Panel; Future  -     Magnesium; Future    6. Muscle cramps  -     CBC & Differential; Future  -     Comprehensive Metabolic Panel; Future  -     Magnesium; Future    Smoking status: Former  Vaccination status: Up-to-date with COVID  Medications personally reviewed    Follow Up  Return in about 4 weeks (around 10/6/2023) for Dr. Baez or [place].    Patient was given instructions and counseling regarding her condition or for health maintenance advice. Please see specific information pulled into the AVS if appropriate.

## 2023-09-08 ENCOUNTER — OFFICE VISIT (OUTPATIENT)
Dept: PULMONOLOGY | Facility: CLINIC | Age: 78
End: 2023-09-08
Payer: MEDICARE

## 2023-09-08 ENCOUNTER — LAB (OUTPATIENT)
Dept: LAB | Facility: HOSPITAL | Age: 78
End: 2023-09-08
Payer: MEDICARE

## 2023-09-08 VITALS
HEIGHT: 62 IN | TEMPERATURE: 97.8 F | BODY MASS INDEX: 40.89 KG/M2 | DIASTOLIC BLOOD PRESSURE: 58 MMHG | OXYGEN SATURATION: 95 % | HEART RATE: 75 BPM | WEIGHT: 222.2 LBS | SYSTOLIC BLOOD PRESSURE: 97 MMHG | RESPIRATION RATE: 18 BRPM

## 2023-09-08 DIAGNOSIS — J30.9 ALLERGIC RHINITIS, UNSPECIFIED SEASONALITY, UNSPECIFIED TRIGGER: ICD-10-CM

## 2023-09-08 DIAGNOSIS — I50.32 CHRONIC HEART FAILURE WITH PRESERVED EJECTION FRACTION: ICD-10-CM

## 2023-09-08 DIAGNOSIS — R91.1 PULMONARY NODULE: ICD-10-CM

## 2023-09-08 DIAGNOSIS — R13.10 ABNORMAL SWALLOWING: ICD-10-CM

## 2023-09-08 DIAGNOSIS — J44.9 CHRONIC OBSTRUCTIVE PULMONARY DISEASE, UNSPECIFIED COPD TYPE: Primary | ICD-10-CM

## 2023-09-08 DIAGNOSIS — J44.9 CHRONIC OBSTRUCTIVE PULMONARY DISEASE, UNSPECIFIED COPD TYPE: ICD-10-CM

## 2023-09-08 DIAGNOSIS — R25.2 MUSCLE CRAMPS: ICD-10-CM

## 2023-09-08 DIAGNOSIS — E78.00 ELEVATED LDL CHOLESTEROL LEVEL: ICD-10-CM

## 2023-09-08 LAB
ALBUMIN SERPL-MCNC: 3.8 G/DL (ref 3.5–5.2)
ALBUMIN/GLOB SERPL: 1.4 G/DL
ALP SERPL-CCNC: 72 U/L (ref 39–117)
ALT SERPL W P-5'-P-CCNC: 10 U/L (ref 1–33)
ANION GAP SERPL CALCULATED.3IONS-SCNC: 12.6 MMOL/L (ref 5–15)
AST SERPL-CCNC: 11 U/L (ref 1–32)
BASOPHILS # BLD AUTO: 0.05 10*3/MM3 (ref 0–0.2)
BASOPHILS NFR BLD AUTO: 0.4 % (ref 0–1.5)
BILIRUB SERPL-MCNC: 0.4 MG/DL (ref 0–1.2)
BUN SERPL-MCNC: 21 MG/DL (ref 8–23)
BUN/CREAT SERPL: 14 (ref 7–25)
CALCIUM SPEC-SCNC: 9.1 MG/DL (ref 8.6–10.5)
CHLORIDE SERPL-SCNC: 108 MMOL/L (ref 98–107)
CHOLEST SERPL-MCNC: 243 MG/DL (ref 0–200)
CO2 SERPL-SCNC: 21.4 MMOL/L (ref 22–29)
CREAT SERPL-MCNC: 1.5 MG/DL (ref 0.57–1)
DEPRECATED RDW RBC AUTO: 45.5 FL (ref 37–54)
EGFRCR SERPLBLD CKD-EPI 2021: 35.5 ML/MIN/1.73
EOSINOPHIL # BLD AUTO: 0.17 10*3/MM3 (ref 0–0.4)
EOSINOPHIL NFR BLD AUTO: 1.3 % (ref 0.3–6.2)
ERYTHROCYTE [DISTWIDTH] IN BLOOD BY AUTOMATED COUNT: 14.1 % (ref 12.3–15.4)
GLOBULIN UR ELPH-MCNC: 2.8 GM/DL
GLUCOSE SERPL-MCNC: 102 MG/DL (ref 65–99)
HCT VFR BLD AUTO: 35.7 % (ref 34–46.6)
HDLC SERPL-MCNC: 45 MG/DL (ref 40–60)
HGB BLD-MCNC: 11.6 G/DL (ref 12–15.9)
IMM GRANULOCYTES # BLD AUTO: 0.06 10*3/MM3 (ref 0–0.05)
IMM GRANULOCYTES NFR BLD AUTO: 0.5 % (ref 0–0.5)
LDLC SERPL CALC-MCNC: 164 MG/DL (ref 0–100)
LDLC/HDLC SERPL: 3.57 {RATIO}
LYMPHOCYTES # BLD AUTO: 2.79 10*3/MM3 (ref 0.7–3.1)
LYMPHOCYTES NFR BLD AUTO: 22 % (ref 19.6–45.3)
MAGNESIUM SERPL-MCNC: 1.9 MG/DL (ref 1.6–2.4)
MCH RBC QN AUTO: 28.9 PG (ref 26.6–33)
MCHC RBC AUTO-ENTMCNC: 32.5 G/DL (ref 31.5–35.7)
MCV RBC AUTO: 89 FL (ref 79–97)
MONOCYTES # BLD AUTO: 1.17 10*3/MM3 (ref 0.1–0.9)
MONOCYTES NFR BLD AUTO: 9.2 % (ref 5–12)
NEUTROPHILS NFR BLD AUTO: 66.6 % (ref 42.7–76)
NEUTROPHILS NFR BLD AUTO: 8.47 10*3/MM3 (ref 1.7–7)
NRBC BLD AUTO-RTO: 0 /100 WBC (ref 0–0.2)
PLATELET # BLD AUTO: 170 10*3/MM3 (ref 140–450)
PMV BLD AUTO: 12.6 FL (ref 6–12)
POTASSIUM SERPL-SCNC: 4.2 MMOL/L (ref 3.5–5.2)
PROT SERPL-MCNC: 6.6 G/DL (ref 6–8.5)
RBC # BLD AUTO: 4.01 10*6/MM3 (ref 3.77–5.28)
SODIUM SERPL-SCNC: 142 MMOL/L (ref 136–145)
TRIGL SERPL-MCNC: 186 MG/DL (ref 0–150)
VLDLC SERPL-MCNC: 34 MG/DL (ref 5–40)
WBC NRBC COR # BLD: 12.71 10*3/MM3 (ref 3.4–10.8)

## 2023-09-08 PROCEDURE — 85025 COMPLETE CBC W/AUTO DIFF WBC: CPT

## 2023-09-08 PROCEDURE — 80061 LIPID PANEL: CPT

## 2023-09-08 PROCEDURE — 3074F SYST BP LT 130 MM HG: CPT | Performed by: NURSE PRACTITIONER

## 2023-09-08 PROCEDURE — 1159F MED LIST DOCD IN RCRD: CPT | Performed by: NURSE PRACTITIONER

## 2023-09-08 PROCEDURE — 1160F RVW MEDS BY RX/DR IN RCRD: CPT | Performed by: NURSE PRACTITIONER

## 2023-09-08 PROCEDURE — 83735 ASSAY OF MAGNESIUM: CPT

## 2023-09-08 PROCEDURE — 80053 COMPREHEN METABOLIC PANEL: CPT

## 2023-09-08 PROCEDURE — 3078F DIAST BP <80 MM HG: CPT | Performed by: NURSE PRACTITIONER

## 2023-09-08 PROCEDURE — 99214 OFFICE O/P EST MOD 30 MIN: CPT | Performed by: NURSE PRACTITIONER

## 2023-09-08 PROCEDURE — 36415 COLL VENOUS BLD VENIPUNCTURE: CPT

## 2023-09-13 ENCOUNTER — CLINICAL SUPPORT (OUTPATIENT)
Dept: ORTHOPEDIC SURGERY | Facility: CLINIC | Age: 78
End: 2023-09-13
Payer: MEDICARE

## 2023-09-13 VITALS — WEIGHT: 220.6 LBS | HEIGHT: 62 IN | BODY MASS INDEX: 40.59 KG/M2 | TEMPERATURE: 97.4 F

## 2023-09-13 DIAGNOSIS — M17.10 ARTHRITIS OF KNEE: Primary | ICD-10-CM

## 2023-09-13 RX ORDER — ACETAMINOPHEN 325 MG/1
1000 TABLET ORAL ONCE
OUTPATIENT
Start: 2023-09-13 | End: 2023-09-13

## 2023-09-13 RX ORDER — PREGABALIN 150 MG/1
150 CAPSULE ORAL ONCE
OUTPATIENT
Start: 2023-09-13 | End: 2023-09-13

## 2023-09-13 RX ORDER — MELOXICAM 7.5 MG/1
15 TABLET ORAL ONCE
OUTPATIENT
Start: 2023-09-13 | End: 2023-09-13

## 2023-09-13 RX ORDER — CHLORHEXIDINE GLUCONATE 500 MG/1
CLOTH TOPICAL TAKE AS DIRECTED
OUTPATIENT
Start: 2023-09-13

## 2023-09-13 NOTE — PROGRESS NOTES
Patient: Julia Rios    YOB: 1945    Medical Record Number: [id number]    Chief Complaints: Follow-up regarding right knee arthritis    History of Present Illness:     78 y.o. female patient who presents for follow-up of the right knee. She reports progressively worsening pain and dysfunction.  Conservative treatment has been ineffective thus far.  The last injection did not help nearly as much. She reports difficulty walking prolonged distances and has expressed interest in pursuing a potential total knee.    Allergies   Allergen Reactions    Ibuprofen Hives    Penicillins Seizure    Cephalosporins Rash    Sulfa Antibiotics Rash       Home Medications:    Current Outpatient Medications:     albuterol (ACCUNEB) 1.25 MG/3ML nebulizer solution, Take 3 mL by nebulization Every 6 (Six) Hours As Needed for Shortness of Air for up to 50 doses., Disp: 25 each, Rfl: 0    albuterol sulfate  (90 Base) MCG/ACT inhaler, Inhale 2 puffs Every 4 (Four) Hours As Needed for Wheezing., Disp: 1 g, Rfl: 11    Allergy Relief 180 MG tablet, TAKE 1 TABLET BY MOUTH DAILY, Disp: 90 tablet, Rfl: 3    amLODIPine (NORVASC) 5 MG tablet, TAKE 1 TABLET EVERY DAY, Disp: 90 tablet, Rfl: 3    azelastine (ASTELIN) 0.1 % nasal spray, USE 2 SPRAYS IN EACH NOSTRIL TWICE DAILY, Disp: 30 mL, Rfl: 6    D3-1000 25 MCG (1000 UT) capsule, TAKE 1 CAPSULE BY MOUTH EVERY DAY, Disp: 90 capsule, Rfl: 3    famotidine (PEPCID) 20 MG tablet, TAKE 1 TABLET BY MOUTH EVERY NIGHT AT BEDTIME, Disp: 30 tablet, Rfl: 11    Fluticasone Furoate (Arnuity Ellipta) 100 MCG/ACT aerosol powder , Inhale 1 puff Daily., Disp: 90 each, Rfl: 3    gabapentin (NEURONTIN) 300 MG capsule, 1 tab in am and 2 at bedtime, Disp: 270 capsule, Rfl: 1    losartan (COZAAR) 100 MG tablet, TAKE 1 TABLET EVERY DAY, Disp: 90 tablet, Rfl: 3    metoprolol succinate XL (TOPROL-XL) 100 MG 24 hr tablet, TAKE 1 TABLET EVERY DAY, Disp: 90 tablet, Rfl: 3    montelukast (SINGULAIR)  10 MG tablet, TAKE 1 TABLET ONE TIME DAILY IN THE EVENING, Disp: 90 tablet, Rfl: 3    nystatin (MYCOSTATIN) 100,000 unit/mL suspension, Take 5 mL by mouth 4 (Four) Times a Day., Disp: 840 mL, Rfl: 0    spironolactone (ALDACTONE) 25 MG tablet, Take 1 tablet by mouth Daily., Disp: 90 tablet, Rfl: 3    Symbicort 160-4.5 MCG/ACT inhaler, Inhale 2 puffs 2 (Two) Times a Day., Disp: 1 each, Rfl: 11    Past Medical History:   Diagnosis Date    Acute right-sided low back pain with right-sided sciatica 01/15/2018    Allergic rhinitis     Asthma     Back pain     2 bulging disc L2 L3    Chronic heart failure with preserved ejection fraction     20 echocardiogram: 1.  Normal ejection fraction of 55%. 2.  Mild left ventricular hypertrophy. 3.  No significant valvular heart issues.     CKD stage 3 10/29/2019    Closed nondisplaced fracture of metatarsal bone of left foot with routine healing 2018    unspecified metatarsal, subsequent encounter    Diverticulitis     Essential hypertension     Hemoptysis     non cancerous    Hyperlipidemia     Idiopathic chronic gout of multiple sites without tophus 2016    Primary osteoarthritis of right knee 2017    Vitamin D deficiency 2016       Past Surgical History:   Procedure Laterality Date    CATARACT EXTRACTION Right     COLONOSCOPY      COLONOSCOPY N/A 10/12/2021    Procedure: COLONOSCOPY;  Surgeon: Jorge Diane MD;  Location: [facility] ENDOSCOPY;  Service: Gastroenterology;  Laterality: N/A;  DIVERTICULOSIS    ENDOSCOPY      EYE SURGERY      cataract right eye    OTHER SURGICAL HISTORY      Fatty tumor removed off back    RETINAL DETACHMENT REPAIR Right     hole in retina repair       Social History     Occupational History    Occupation: Retired   Tobacco Use    Smoking status: Former     Packs/day: 0.50     Years: 5.00     Pack years: 2.50     Types: Cigarettes     Start date:      Quit date:      Years since quittin.7      "Passive exposure: Never    Smokeless tobacco: Never   Vaping Use    Vaping Use: Never used   Substance and Sexual Activity    Alcohol use: Never    Drug use: Never    Sexual activity: Defer      Social History     Social History Narrative    Not on file       Family History   Problem Relation Age of Onset    Colon cancer Mother 61    Cancer Mother     Heart disease Mother     Diabetes Brother     Breast cancer Maternal Grandmother     Stroke Paternal Grandfather     Heart failure Father        Review of Systems:      Constitutional: Denies fever, shaking or chills   Eyes: Denies change in visual acuity   HEENT: Denies nasal congestion or sore throat   Respiratory: Denies cough or shortness of breath   Cardiovascular: Denies chest pain or edema  Endocrine: Denies tremors, palpitations, intolerance of heat or cold, polyuria, polydipsia.  GI: Denies abdominal pain, nausea, vomiting, bloody stools or diarrhea  : Denies frequency, urgency, incontinence, retention, or nocturia.  Musculoskeletal: Denies numbness, tingling or loss of motor function except as above  Integument: Denies rash, lesion or ulceration   Neurologic: Denies headache or focal weakness, deficits  Heme: Denies spontaneous or excessive bleeding, epistaxis, hematuria, melena, fatigue, enlarged or tender lymph nodes.      All other pertinent positives and negatives as noted above in HPI.    Physical Exam:   78 y.o. female  Vitals:    09/13/23 1409   Temp: 97.4 °F (36.3 °C)   Weight: 100 kg (220 lb 9.6 oz)   Height: 157.5 cm (62\")     General:  Patient is awake and alert.  Appears in no acute distress or discomfort.    Psych:  Affect and demeanor are appropriate.    Cardiovascular:  Regular rate and rhythm.    Lungs:  Good chest expansion.  Breathing unlabored.    Extremities:  Right knee is examined.  Skin is benign.  Moderate medial compartment tenderness.  Motion: 3-105.  Normal motor and sensory function in the lower leg and foot.  Palpable pedal " pulses.  Brisk capillary refill.    Imaging: Previous x-rays of the right knee are reviewed.  She has end-stage tricompartment osteoarthritis with bone-on-bone in the medial compartment, osteophyte formation and subluxation of the tibia.    Assessment/Plan:  Right knee end-stage osteoarthritis    We discussed the natural history of this condition and all available treatment options, both surgical and nonsurgical.  The risk, benefits and alternatives to a total knee arthroplasty were carefully discussed all questions posed by the patient were answered in detail. She wants to move forward with a total knee arthroplasty.  We will look at getting this scheduled.  She will need both pulmonary and cardiology clearance prior to surgery.  She is going to set up appointments with both of those physicians to get working on that.  I will have her follow-up with either myself or Edie for a preoperative visit.    Celso Prakash MD    09/13/2023

## 2023-09-15 ENCOUNTER — TELEPHONE (OUTPATIENT)
Dept: PULMONOLOGY | Facility: CLINIC | Age: 78
End: 2023-09-15
Payer: MEDICARE

## 2023-09-15 ENCOUNTER — TELEPHONE (OUTPATIENT)
Dept: CARDIOLOGY | Facility: CLINIC | Age: 78
End: 2023-09-15
Payer: MEDICARE

## 2023-09-15 ENCOUNTER — OFFICE VISIT (OUTPATIENT)
Dept: FAMILY MEDICINE CLINIC | Facility: CLINIC | Age: 78
End: 2023-09-15
Payer: MEDICARE

## 2023-09-15 VITALS
BODY MASS INDEX: 40.67 KG/M2 | WEIGHT: 221 LBS | SYSTOLIC BLOOD PRESSURE: 105 MMHG | OXYGEN SATURATION: 92 % | TEMPERATURE: 96.8 F | DIASTOLIC BLOOD PRESSURE: 55 MMHG | HEART RATE: 62 BPM | HEIGHT: 62 IN

## 2023-09-15 DIAGNOSIS — N18.31 STAGE 3A CHRONIC KIDNEY DISEASE: ICD-10-CM

## 2023-09-15 DIAGNOSIS — J44.9 CHRONIC OBSTRUCTIVE PULMONARY DISEASE, UNSPECIFIED COPD TYPE: ICD-10-CM

## 2023-09-15 DIAGNOSIS — I10 ESSENTIAL HYPERTENSION: Primary | ICD-10-CM

## 2023-09-15 NOTE — ASSESSMENT & PLAN NOTE
Her blood pressure has been rather low.  We will go ahead and stop her amlodipine.  She will continue to monitor her blood pressure.

## 2023-09-15 NOTE — ASSESSMENT & PLAN NOTE
Her renal function has been somewhat elevated ever since she had her pneumococcal vaccine.  We will have her see nephrology for further evaluation as it is continued to climb over this period of time.

## 2023-09-15 NOTE — TELEPHONE ENCOUNTER
The [facility] received a fax that requires your attention. The document has been indexed to the patient’s chart for your review.      Reason for sending: EXTERNAL MEDICAL RECORD NOTIFICATION     Documents Description: [place] [place]9.14.23    [place] of [place] [facility]     Date Indexed: 9.14.23

## 2023-09-15 NOTE — PROGRESS NOTES
Chief Complaint   Patient presents with    Hypotension     Would like to discuss labs         Subjective     Julia Rios  has a past medical history of Acute right-sided low back pain with right-sided sciatica (01/15/2018), Allergic rhinitis, Asthma, Back pain, Chronic heart failure with preserved ejection fraction, CKD stage 3 (10/29/2019), Closed nondisplaced fracture of metatarsal bone of left foot with routine healing (06/19/2018), Diverticulitis, Essential hypertension, Hemoptysis, Hyperlipidemia, Idiopathic chronic gout of multiple sites without tophus (05/20/2016), Primary osteoarthritis of right knee (01/16/2017), and Vitamin D deficiency (01/13/2016).    Hypertension-she states her blood pressure has been low all summer, even before her pneumococcal vaccine.  She has been feeling lightheaded and lethargic during this period of time as well.  She has been taking all of her medications as prescribed.    COPD-she states ever since she had her pneumococcal vaccine which seem to precipitate an acute exacerbation of her COPD she has been more short of breath than her typical baseline.  Ever since then she has had several visits to the urgent care and more than a couple rounds of steroids.  She has improved with but has never gotten back to her baseline.      PHQ-2 Depression Screening  Little interest or pleasure in doing things?     Feeling down, depressed, or hopeless?     PHQ-2 Total Score     PHQ-9 Depression Screening  Little interest or pleasure in doing things?     Feeling down, depressed, or hopeless?     Trouble falling or staying asleep, or sleeping too much?     Feeling tired or having little energy?     Poor appetite or overeating?     Feeling bad about yourself - or that you are a failure or have let yourself or your family down?     Trouble concentrating on things, such as reading the newspaper or watching television?     Moving or speaking so slowly that other people could have noticed? Or  the opposite - being so fidgety or restless that you have been moving around a lot more than usual?     Thoughts that you would be better off dead, or of hurting yourself in some way?     PHQ-9 Total Score     If you checked off any problems, how difficult have these problems made it for you to do your work, take care of things at home, or get along with other people?       Allergies   Allergen Reactions    Ibuprofen Hives    Penicillins Seizure    Cephalosporins Rash    Sulfa Antibiotics Rash       Prior to Admission medications    Medication Sig Start Date End Date Taking? Authorizing Provider   albuterol (ACCUNEB) 1.25 MG/3ML nebulizer solution Take 3 mL by nebulization Every 6 (Six) Hours As Needed for Shortness of Air for up to 50 doses. 1/11/23  Yes Manisha Romano APRN   albuterol sulfate  (90 Base) MCG/ACT inhaler Inhale 2 puffs Every 4 (Four) Hours As Needed for Wheezing. 12/28/22  Yes Santino Baez MD   Allergy Relief 180 MG tablet TAKE 1 TABLET BY MOUTH DAILY 8/4/23  Yes Justin Flaherty APRN   amLODIPine (NORVASC) 5 MG tablet TAKE 1 TABLET EVERY DAY 11/14/22  Yes Nick Patel DO   azelastine (ASTELIN) 0.1 % nasal spray USE 2 SPRAYS IN EACH NOSTRIL TWICE DAILY 7/27/23  Yes Santino Baez MD   D3-1000 25 MCG (1000 UT) capsule TAKE 1 CAPSULE BY MOUTH EVERY DAY 10/20/22  Yes Nick Patel DO   famotidine (PEPCID) 20 MG tablet TAKE 1 TABLET BY MOUTH EVERY NIGHT AT BEDTIME 5/17/23  Yes Shanell Murray APRN   Fluticasone Furoate (Arnuity Ellipta) 100 MCG/ACT aerosol powder  Inhale 1 puff Daily. 12/28/22  Yes Santino Baez MD   gabapentin (NEURONTIN) 300 MG capsule 1 tab in am and 2 at bedtime 8/7/23  Yes Nick Patel DO   losartan (COZAAR) 100 MG tablet TAKE 1 TABLET EVERY DAY 1/26/23  Yes Nick Patel DO   metoprolol succinate XL (TOPROL-XL) 100 MG 24 hr tablet TAKE 1 TABLET EVERY DAY 8/9/23  Yes Nick Patel, DO connelly  (SINGULAIR) 10 MG tablet TAKE 1 TABLET ONE TIME DAILY IN THE EVENING 1/26/23  Yes Nick Patel,    nystatin (MYCOSTATIN) 100,000 unit/mL suspension Take 5 mL by mouth 4 (Four) Times a Day. 7/20/23  Yes Santino Baez MD   spironolactone (ALDACTONE) 25 MG tablet Take 1 tablet by mouth Daily. 9/6/22  Yes Russ Cloud MD   Symbicort 160-4.5 MCG/ACT inhaler Inhale 2 puffs 2 (Two) Times a Day. 12/28/22  Yes Santino Baez MD        Patient Active Problem List   Diagnosis    Family history of colon cancer    Chronic obstructive pulmonary disease    Essential hypertension    Hyperlipidemia    CKD (chronic kidney disease) stage 3, GFR 30-59 ml/min    Chronic heart failure with preserved ejection fraction    CARMEN (obstructive sleep apnea)    Seasonal allergies    Gastroesophageal reflux disease    History of fungal pneumonia    Bronchitis, mucopurulent recurrent    Tobacco abuse, in remission    Pulmonary nodule    PND (post-nasal drip)    Status post fall    Allergic rhinitis    Asthma    Closed fracture of metatarsal bone    Diverticulitis    Hemoptysis    Idiopathic chronic gout of multiple sites without tophus    Primary localized osteoarthritis    Vitamin D deficiency    Morbid (severe) obesity due to excess calories    Umbilical hernia without obstruction and without gangrene    Oral thrush    Arthritis of knee        Past Surgical History:   Procedure Laterality Date    CATARACT EXTRACTION Right     COLONOSCOPY  2014    COLONOSCOPY N/A 10/12/2021    Procedure: COLONOSCOPY;  Surgeon: Jorge Diane MD;  Location: [facility] ENDOSCOPY;  Service: Gastroenterology;  Laterality: N/A;  DIVERTICULOSIS    ENDOSCOPY  2015    EYE SURGERY      cataract right eye    OTHER SURGICAL HISTORY      Fatty tumor removed off back    RETINAL DETACHMENT REPAIR Right     hole in retina repair       Social History     Socioeconomic History    Marital status:    Tobacco Use    Smoking status: Former     Packs/day:  "0.50     Years: 5.00     Pack years: 2.50     Types: Cigarettes     Start date:      Quit date:      Years since quittin.7     Passive exposure: Never    Smokeless tobacco: Never   Vaping Use    Vaping Use: Never used   Substance and Sexual Activity    Alcohol use: Never    Drug use: Never    Sexual activity: Defer       Family History   Problem Relation Age of Onset    Colon cancer Mother 61    Cancer Mother     Heart disease Mother     Diabetes Brother     Breast cancer Maternal Grandmother     Stroke Paternal Grandfather     Heart failure Father        Family history, surgical history, past medical history, Allergies and meds reviewed with patient today and updated in [facility] EMR.     ROS:  Review of Systems   Constitutional:  Positive for fatigue.   HENT:  Positive for rhinorrhea. Negative for congestion and postnasal drip.    Eyes:  Negative for blurred vision and visual disturbance.   Respiratory:  Positive for cough and shortness of breath. Negative for chest tightness and wheezing.    Cardiovascular:  Negative for chest pain and palpitations.   Gastrointestinal:  Negative for constipation and diarrhea.   Allergic/Immunologic: Negative for environmental allergies.   Neurological:  Positive for light-headedness. Negative for headache.   Psychiatric/Behavioral:  Negative for depressed mood. The patient is not nervous/anxious.      OBJECTIVE:  Vitals:    09/15/23 0929   BP: 105/55   BP Location: Left arm   Patient Position: Sitting   Pulse: 62   Temp: 96.8 °F (36 °C)   SpO2: 92%   Weight: 100 kg (221 lb)   Height: 157.5 cm (62\")     No results found.   Body mass index is 40.42 kg/m².  No LMP recorded. Patient is postmenopausal.    Physical Exam  Vitals and nursing note reviewed.   Constitutional:       General: She is not in acute distress.     Appearance: Normal appearance. She is obese.   HENT:      Head: Normocephalic.      Right Ear: Tympanic membrane, ear canal and external ear normal.      Left " Ear: Tympanic membrane, ear canal and external ear normal.      Nose: Nose normal.      Mouth/Throat:      Mouth: Mucous membranes are moist.      Pharynx: Oropharynx is clear.   Eyes:      General: No scleral icterus.     Conjunctiva/sclera: Conjunctivae normal.      Pupils: Pupils are equal, round, and reactive to light.   Cardiovascular:      Rate and Rhythm: Normal rate and regular rhythm.      Pulses: Normal pulses.      Heart sounds: Normal heart sounds. No murmur heard.  Pulmonary:      Effort: Pulmonary effort is normal.      Breath sounds: Normal breath sounds. No wheezing, rhonchi or rales.   Musculoskeletal:      Cervical back: Neck supple. No rigidity or tenderness.   Lymphadenopathy:      Cervical: No cervical adenopathy.   Skin:     General: Skin is warm and dry.      Coloration: Skin is not jaundiced.      Findings: No rash.   Neurological:      General: No focal deficit present.      Mental Status: She is alert and oriented to person, place, and time.   Psychiatric:         Mood and Affect: Mood normal.         Thought Content: Thought content normal.         Judgment: Judgment normal.       Procedures    Lab on 09/08/2023   Component Date Value Ref Range Status    Total Cholesterol 09/08/2023 243 (H)  0 - 200 mg/dL Final    Triglycerides 09/08/2023 186 (H)  0 - 150 mg/dL Final    HDL Cholesterol 09/08/2023 45  40 - 60 mg/dL Final    LDL Cholesterol  09/08/2023 164 (H)  0 - 100 mg/dL Final    VLDL Cholesterol 09/08/2023 34  5 - 40 mg/dL Final    LDL/HDL Ratio 09/08/2023 3.57   Final    Glucose 09/08/2023 102 (H)  65 - 99 mg/dL Final    BUN 09/08/2023 21  8 - 23 mg/dL Final    Creatinine 09/08/2023 1.50 (H)  0.57 - 1.00 mg/dL Final    Sodium 09/08/2023 142  136 - 145 mmol/L Final    Potassium 09/08/2023 4.2  3.5 - 5.2 mmol/L Final    Chloride 09/08/2023 108 (H)  98 - 107 mmol/L Final    CO2 09/08/2023 21.4 (L)  22.0 - 29.0 mmol/L Final    Calcium 09/08/2023 9.1  8.6 - 10.5 mg/dL Final    Total Protein  09/08/2023 6.6  6.0 - 8.5 g/dL Final    Albumin 09/08/2023 3.8  3.5 - 5.2 g/dL Final    ALT (SGPT) 09/08/2023 10  1 - 33 U/L Final    AST (SGOT) 09/08/2023 11  1 - 32 U/L Final    Alkaline Phosphatase 09/08/2023 72  39 - 117 U/L Final    Total Bilirubin 09/08/2023 0.4  0.0 - 1.2 mg/dL Final    Globulin 09/08/2023 2.8  gm/dL Final    A/G Ratio 09/08/2023 1.4  g/dL Final    BUN/Creatinine Ratio 09/08/2023 14.0  7.0 - 25.0 Final    Anion Gap 09/08/2023 12.6  5.0 - 15.0 mmol/L Final    eGFR 09/08/2023 35.5 (L)  >60.0 mL/min/1.73 Final    Magnesium 09/08/2023 1.9  1.6 - 2.4 mg/dL Final    WBC 09/08/2023 12.71 (H)  3.40 - 10.80 10*3/mm3 Final    RBC 09/08/2023 4.01  3.77 - 5.28 10*6/mm3 Final    Hemoglobin 09/08/2023 11.6 (L)  12.0 - 15.9 g/dL Final    Hematocrit 09/08/2023 35.7  34.0 - 46.6 % Final    MCV 09/08/2023 89.0  79.0 - 97.0 fL Final    MCH 09/08/2023 28.9  26.6 - 33.0 pg Final    MCHC 09/08/2023 32.5  31.5 - 35.7 g/dL Final    RDW 09/08/2023 14.1  12.3 - 15.4 % Final    RDW-SD 09/08/2023 45.5  37.0 - 54.0 fl Final    MPV 09/08/2023 12.6 (H)  6.0 - 12.0 fL Final    Platelets 09/08/2023 170  140 - 450 10*3/mm3 Final    Neutrophil % 09/08/2023 66.6  42.7 - 76.0 % Final    Lymphocyte % 09/08/2023 22.0  19.6 - 45.3 % Final    Monocyte % 09/08/2023 9.2  5.0 - 12.0 % Final    Eosinophil % 09/08/2023 1.3  0.3 - 6.2 % Final    Basophil % 09/08/2023 0.4  0.0 - 1.5 % Final    Immature Grans % 09/08/2023 0.5  0.0 - 0.5 % Final    Neutrophils, Absolute 09/08/2023 8.47 (H)  1.70 - 7.00 10*3/mm3 Final    Lymphocytes, Absolute 09/08/2023 2.79  0.70 - 3.10 10*3/mm3 Final    Monocytes, Absolute 09/08/2023 1.17 (H)  0.10 - 0.90 10*3/mm3 Final    Eosinophils, Absolute 09/08/2023 0.17  0.00 - 0.40 10*3/mm3 Final    Basophils, Absolute 09/08/2023 0.05  0.00 - 0.20 10*3/mm3 Final    Immature Grans, Absolute 09/08/2023 0.06 (H)  0.00 - 0.05 10*3/mm3 Final    nRBC 09/08/2023 0.0  0.0 - 0.2 /100 WBC Final       ASSESSMENT/  PLAN:    Diagnoses and all orders for this visit:    1. Essential hypertension (Primary)  Assessment & Plan:  Her blood pressure has been rather low.  We will go ahead and stop her amlodipine.  She will continue to monitor her blood pressure.      2. Stage 3a chronic kidney disease  Assessment & Plan:  Her renal function has been somewhat elevated ever since she had her pneumococcal vaccine.  We will have her see nephrology for further evaluation as it is continued to climb over this period of time.    Orders:  -     Ambulatory Referral to Nephrology    3. Chronic obstructive pulmonary disease, unspecified COPD type  Assessment & Plan:  Her breathing is never back to her previous baseline.  She needs to follow-up with pulmonary to have her treatment to manage further.          Orders Placed Today:     No orders of the defined types were placed in this encounter.       Management Plan:     An After Visit Summary was printed and given to the patient at discharge.    Follow-up: Return in about 4 weeks (around 10/13/2023) for Recheck.    Nick Patel DO 9/15/2023 10:10 EDT  This note was electronically signed.Answers submitted by the patient for this visit:  Office Visit on 9/15/2023  9:30 AM with Nick Patel DO  Other (Submitted on 9/14/2023)  Please describe your symptoms.: bloodwork results, low blood pressure, pre-surgery questions  Have you had these symptoms before?: No  How long have you been having these symptoms?: Greater than 2 weeks

## 2023-09-15 NOTE — ASSESSMENT & PLAN NOTE
Her breathing is never back to her previous baseline.  She needs to follow-up with pulmonary to have her treatment to manage further.

## 2023-09-21 ENCOUNTER — TELEPHONE (OUTPATIENT)
Dept: CARDIOLOGY | Facility: CLINIC | Age: 78
End: 2023-09-21
Payer: MEDICARE

## 2023-09-21 NOTE — TELEPHONE ENCOUNTER
REQUEST FOR CARDIAC CLEARANCE    Name of person calling: ERICRHETT DONATO     Surgeon's name:ALVIN MAZARIEGOS     Type of planned surgery RIGHT KNEE REPLACEMENT     Date of planned surgery: 10.31.23     Have you been experiencing chest pain or shortness of breath? PATIENT HAS COPD BUT SEES A PULMONARY FOR THAT.          Where should we fax the clearance to? - [facility]

## 2023-09-25 ENCOUNTER — TRANSCRIBE ORDERS (OUTPATIENT)
Dept: ADMINISTRATIVE | Facility: HOSPITAL | Age: 78
End: 2023-09-25

## 2023-09-25 DIAGNOSIS — N18.30 STAGE 3 CHRONIC KIDNEY DISEASE, UNSPECIFIED WHETHER STAGE 3A OR 3B CKD: Primary | ICD-10-CM

## 2023-09-25 DIAGNOSIS — I10 ESSENTIAL HYPERTENSION: ICD-10-CM

## 2023-09-29 ENCOUNTER — LAB (OUTPATIENT)
Dept: LAB | Facility: HOSPITAL | Age: 78
End: 2023-09-29
Payer: MEDICARE

## 2023-09-29 ENCOUNTER — TRANSCRIBE ORDERS (OUTPATIENT)
Dept: LAB | Facility: HOSPITAL | Age: 78
End: 2023-09-29
Payer: MEDICARE

## 2023-09-29 DIAGNOSIS — I10 ESSENTIAL HYPERTENSION, MALIGNANT: ICD-10-CM

## 2023-09-29 DIAGNOSIS — E55.9 VITAMIN D DEFICIENCY: ICD-10-CM

## 2023-09-29 DIAGNOSIS — N18.30 STAGE 3 CHRONIC KIDNEY DISEASE, UNSPECIFIED WHETHER STAGE 3A OR 3B CKD: ICD-10-CM

## 2023-09-29 DIAGNOSIS — N18.30 STAGE 3 CHRONIC KIDNEY DISEASE, UNSPECIFIED WHETHER STAGE 3A OR 3B CKD: Primary | ICD-10-CM

## 2023-09-29 LAB
25(OH)D3 SERPL-MCNC: 40.2 NG/ML (ref 30–100)
ALBUMIN SERPL-MCNC: 4.2 G/DL (ref 3.5–5.2)
ALBUMIN UR-MCNC: <1.2 MG/DL
ALBUMIN/GLOB SERPL: 2.1 G/DL
ALP SERPL-CCNC: 76 U/L (ref 39–117)
ALT SERPL W P-5'-P-CCNC: 6 U/L (ref 1–33)
ANION GAP SERPL CALCULATED.3IONS-SCNC: 10 MMOL/L (ref 5–15)
AST SERPL-CCNC: 12 U/L (ref 1–32)
BACTERIA UR QL AUTO: ABNORMAL /HPF
BASOPHILS # BLD AUTO: 0.08 10*3/MM3 (ref 0–0.2)
BASOPHILS NFR BLD AUTO: 0.7 % (ref 0–1.5)
BILIRUB SERPL-MCNC: 0.4 MG/DL (ref 0–1.2)
BILIRUB UR QL STRIP: NEGATIVE
BUN SERPL-MCNC: 15 MG/DL (ref 8–23)
BUN/CREAT SERPL: 13.9 (ref 7–25)
CALCIUM SPEC-SCNC: 9.4 MG/DL (ref 8.6–10.5)
CHLORIDE SERPL-SCNC: 110 MMOL/L (ref 98–107)
CLARITY UR: ABNORMAL
CO2 SERPL-SCNC: 24 MMOL/L (ref 22–29)
COLOR UR: YELLOW
CREAT SERPL-MCNC: 1.08 MG/DL (ref 0.57–1)
CREAT UR-MCNC: 118.4 MG/DL
CREAT UR-MCNC: 134.9 MG/DL
DEPRECATED RDW RBC AUTO: 47.3 FL (ref 37–54)
EGFRCR SERPLBLD CKD-EPI 2021: 52.7 ML/MIN/1.73
EOSINOPHIL # BLD AUTO: 0.06 10*3/MM3 (ref 0–0.4)
EOSINOPHIL NFR BLD AUTO: 0.6 % (ref 0.3–6.2)
ERYTHROCYTE [DISTWIDTH] IN BLOOD BY AUTOMATED COUNT: 14.5 % (ref 12.3–15.4)
GLOBULIN UR ELPH-MCNC: 2 GM/DL
GLUCOSE SERPL-MCNC: 92 MG/DL (ref 65–99)
GLUCOSE UR STRIP-MCNC: NEGATIVE MG/DL
HBA1C MFR BLD: 6 % (ref 4.8–5.6)
HCT VFR BLD AUTO: 37.1 % (ref 34–46.6)
HGB BLD-MCNC: 12 G/DL (ref 12–15.9)
HGB UR QL STRIP.AUTO: NEGATIVE
HYALINE CASTS UR QL AUTO: ABNORMAL /LPF
IMM GRANULOCYTES # BLD AUTO: 0.1 10*3/MM3 (ref 0–0.05)
IMM GRANULOCYTES NFR BLD AUTO: 0.9 % (ref 0–0.5)
KETONES UR QL STRIP: NEGATIVE
LEUKOCYTE ESTERASE UR QL STRIP.AUTO: NEGATIVE
LYMPHOCYTES # BLD AUTO: 3.16 10*3/MM3 (ref 0.7–3.1)
LYMPHOCYTES NFR BLD AUTO: 29.3 % (ref 19.6–45.3)
MCH RBC QN AUTO: 28.8 PG (ref 26.6–33)
MCHC RBC AUTO-ENTMCNC: 32.3 G/DL (ref 31.5–35.7)
MCV RBC AUTO: 89.2 FL (ref 79–97)
MICROALBUMIN/CREAT UR: NORMAL MG/G{CREAT}
MONOCYTES # BLD AUTO: 0.88 10*3/MM3 (ref 0.1–0.9)
MONOCYTES NFR BLD AUTO: 8.2 % (ref 5–12)
NEUTROPHILS NFR BLD AUTO: 6.5 10*3/MM3 (ref 1.7–7)
NEUTROPHILS NFR BLD AUTO: 60.3 % (ref 42.7–76)
NITRITE UR QL STRIP: POSITIVE
NRBC BLD AUTO-RTO: 0 /100 WBC (ref 0–0.2)
PH UR STRIP.AUTO: 6 [PH] (ref 5–8)
PLATELET # BLD AUTO: 268 10*3/MM3 (ref 140–450)
PMV BLD AUTO: 12.2 FL (ref 6–12)
POTASSIUM SERPL-SCNC: 3.5 MMOL/L (ref 3.5–5.2)
PROT ?TM UR-MCNC: 11.7 MG/DL
PROT SERPL-MCNC: 6.2 G/DL (ref 6–8.5)
PROT UR QL STRIP: NEGATIVE
PROT/CREAT UR: 0.09 MG/G{CREAT}
RBC # BLD AUTO: 4.16 10*6/MM3 (ref 3.77–5.28)
RBC # UR STRIP: ABNORMAL /HPF
REF LAB TEST METHOD: ABNORMAL
SODIUM SERPL-SCNC: 144 MMOL/L (ref 136–145)
SP GR UR STRIP: 1.02 (ref 1–1.03)
SQUAMOUS #/AREA URNS HPF: ABNORMAL /HPF
UROBILINOGEN UR QL STRIP: ABNORMAL
WBC # UR STRIP: ABNORMAL /HPF
WBC NRBC COR # BLD: 10.78 10*3/MM3 (ref 3.4–10.8)

## 2023-09-29 PROCEDURE — 84156 ASSAY OF PROTEIN URINE: CPT

## 2023-09-29 PROCEDURE — 85025 COMPLETE CBC W/AUTO DIFF WBC: CPT

## 2023-09-29 PROCEDURE — 86334 IMMUNOFIX E-PHORESIS SERUM: CPT

## 2023-09-29 PROCEDURE — 36415 COLL VENOUS BLD VENIPUNCTURE: CPT

## 2023-09-29 PROCEDURE — 82784 ASSAY IGA/IGD/IGG/IGM EACH: CPT

## 2023-09-29 PROCEDURE — 82570 ASSAY OF URINE CREATININE: CPT

## 2023-09-29 PROCEDURE — 82306 VITAMIN D 25 HYDROXY: CPT

## 2023-09-29 PROCEDURE — 82043 UR ALBUMIN QUANTITATIVE: CPT

## 2023-09-29 PROCEDURE — 83036 HEMOGLOBIN GLYCOSYLATED A1C: CPT

## 2023-09-29 PROCEDURE — 83521 IG LIGHT CHAINS FREE EACH: CPT

## 2023-09-29 PROCEDURE — 81001 URINALYSIS AUTO W/SCOPE: CPT

## 2023-09-29 PROCEDURE — 80053 COMPREHEN METABOLIC PANEL: CPT

## 2023-10-02 LAB
IGA SERPL-MCNC: 217 MG/DL (ref 64–422)
IGG SERPL-MCNC: 564 MG/DL (ref 586–1602)
IGM SERPL-MCNC: 33 MG/DL (ref 26–217)
KAPPA LC FREE SER-MCNC: 24.4 MG/L (ref 3.3–19.4)
KAPPA LC FREE/LAMBDA FREE SER: 1.42 {RATIO} (ref 0.26–1.65)
LAMBDA LC FREE SERPL-MCNC: 17.2 MG/L (ref 5.7–26.3)
PROT PATTERN SERPL IFE-IMP: ABNORMAL

## 2023-10-02 NOTE — PROGRESS NOTES
Primary Care Provider  Nick Patel DO   Referring Provider  No ref. provider found    Patient Complaint  Follow-up      SUBJECTIVE    History of Presenting Illness  Julia Rios is a pleasant 78 y.o. female patient of Dr. Baez's who presents to [facility] for follow-up appointment.  I last saw the patient 9/8/2023. Patient has a history of of COPD, recurrent bronchitis, obstructive sleep apnea, postnasal drip, seasonal allergies, history of fungal pneumonia in past, completed a course of itraconazole, pulmonary nodule, gastroesophageal reflux disease and tobacco abuse of cigarettes in remission.  She continues to be on Symbicort and Arnuity by Dr. Baez.  She is also under the care of Dr. Cloud for her heart failure.  Patient is scheduled to have knee replacement the end of October.  She needs surgical clearance today.  Patient is doing well at this time.  Her breathing is at baseline.  She does have occasional shortness of air with exertional activities but improves with rest.  She denies having coughing, wheezing, headaches, chest pain, weight loss or hemoptysis. Denies fevers, chills and night sweats. Julia Rios is able to perform ADLs without difficulties and denies any swollen glands/lymph nodes in the head or neck.  Her pulmonary function test from 8/31/2023 shows mild obstructive airway disease likely emphysema.  And she did pass her 6-minute walk test as well.    I have personally reviewed the review of systems, past family, social, medical and surgical histories; and agree with their findings.    Review of Systems  Constitutional symptoms:  Denied complaints   Ear, nose, throat: Denied complaints  Cardiovascular:  Denied complaints  Respiratory: Shortness of air with exertional activities  Gastrointestinal: Denied complaints  Musculoskeletal: Denied complaints    Family History   Problem Relation Age of Onset    Colon cancer  Mother 61    Cancer Mother     Heart disease Mother     Diabetes Brother     Breast cancer Maternal Grandmother     Stroke Paternal Grandfather     Heart failure Father         Social History     Socioeconomic History    Marital status:    Tobacco Use    Smoking status: Former     Packs/day: 0.50     Years: 5.00     Pack years: 2.50     Types: Cigarettes     Start date:      Quit date:      Years since quittin.7     Passive exposure: Never    Smokeless tobacco: Never   Vaping Use    Vaping Use: Never used   Substance and Sexual Activity    Alcohol use: Never    Drug use: Never    Sexual activity: Defer        Past Medical History:   Diagnosis Date    Acute right-sided low back pain with right-sided sciatica 01/15/2018    Allergic rhinitis     Asthma     Back pain     2 bulging disc L2 L3    Chronic heart failure with preserved ejection fraction     20 echocardiogram: 1.  Normal ejection fraction of 55%. 2.  Mild left ventricular hypertrophy. 3.  No significant valvular heart issues.     CKD stage 3 10/29/2019    Closed nondisplaced fracture of metatarsal bone of left foot with routine healing 2018    unspecified metatarsal, subsequent encounter    Diverticulitis     Essential hypertension     Hemoptysis     non cancerous    Hyperlipidemia     Idiopathic chronic gout of multiple sites without tophus 2016    Primary osteoarthritis of right knee 2017    Vitamin D deficiency 2016        Immunization History   Administered Date(s) Administered    COVID-19 (MODERNA) 1st,2nd,3rd Dose Monovalent 2021, 2021, 2021, 2021    Fluzone High-Dose 65+yrs 2021, 2022    Fluzone Quad >6mos (Multi-dose) 10/01/2020    Pneumococcal Conjugate 13-Valent (PCV13) 2015    Pneumococcal Conjugate 20-Valent (PCV20) 2023    Pneumococcal Polysaccharide (PPSV23) 2010       Allergies   Allergen Reactions    Ibuprofen Hives    Penicillins Seizure     "Cephalosporins Rash    Sulfa Antibiotics Rash          Current Outpatient Medications:     albuterol (ACCUNEB) 1.25 MG/3ML nebulizer solution, Take 3 mL by nebulization Every 6 (Six) Hours As Needed for Shortness of Air for up to 50 doses., Disp: 25 each, Rfl: 0    albuterol sulfate  (90 Base) MCG/ACT inhaler, Inhale 2 puffs Every 4 (Four) Hours As Needed for Wheezing., Disp: 1 g, Rfl: 11    Allergy Relief 180 MG tablet, TAKE 1 TABLET BY MOUTH DAILY, Disp: 90 tablet, Rfl: 3    azelastine (ASTELIN) 0.1 % nasal spray, USE 2 SPRAYS IN EACH NOSTRIL TWICE DAILY, Disp: 30 mL, Rfl: 6    D3-1000 25 MCG (1000 UT) capsule, TAKE 1 CAPSULE BY MOUTH EVERY DAY, Disp: 90 capsule, Rfl: 3    famotidine (PEPCID) 20 MG tablet, TAKE 1 TABLET BY MOUTH EVERY NIGHT AT BEDTIME, Disp: 30 tablet, Rfl: 11    Fluticasone Furoate (Arnuity Ellipta) 100 MCG/ACT aerosol powder , Inhale 1 puff Daily., Disp: 90 each, Rfl: 3    gabapentin (NEURONTIN) 300 MG capsule, 1 tab in am and 2 at bedtime, Disp: 270 capsule, Rfl: 1    losartan (COZAAR) 100 MG tablet, TAKE 1 TABLET EVERY DAY, Disp: 90 tablet, Rfl: 3    metoprolol succinate XL (TOPROL-XL) 100 MG 24 hr tablet, TAKE 1 TABLET EVERY DAY, Disp: 90 tablet, Rfl: 3    montelukast (SINGULAIR) 10 MG tablet, TAKE 1 TABLET ONE TIME DAILY IN THE EVENING, Disp: 90 tablet, Rfl: 3    nystatin (MYCOSTATIN) 100,000 unit/mL suspension, Take 5 mL by mouth 4 (Four) Times a Day., Disp: 840 mL, Rfl: 0    spironolactone (ALDACTONE) 25 MG tablet, Take 1 tablet by mouth Daily., Disp: 90 tablet, Rfl: 3    Symbicort 160-4.5 MCG/ACT inhaler, Inhale 2 puffs 2 (Two) Times a Day., Disp: 1 each, Rfl: 11           Vital Signs   /92 (BP Location: Right arm, Patient Position: Sitting, Cuff Size: Adult)   Pulse 62   Temp 97.3 °F (36.3 °C) (Temporal)   Resp 18   Ht 157.5 cm (62\")   Wt 102 kg (225 lb 9.6 oz)   SpO2 97% Comment: room air  BMI 41.26 kg/m²       OBJECTIVE    Physical Exam  Vital Signs Reviewed   WDWN, " Alert, NAD.    HEENT:  PERRL, EOMI.  OP, nares clear  Neck:  Supple, no JVD, no thyromegaly  Chest:  good aeration, clear to auscultation bilaterally, tympanic to percussion bilaterally, no work of breathing noted  CV: RRR, no MGR, pulses 2+, equal.  Abd:  Soft, NT, ND, + BS, no HSM  EXT:  no clubbing, no cyanosis, no edema  Neuro:  A&Ox3, CN grossly intact, no focal deficits.  Skin: No rashes or lesions noted    Results Review  I have personally reviewed the prior office notes, hospital records, labs, and diagnostics.    ASSESSMENT         Patient Active Problem List   Diagnosis    Family history of colon cancer    Chronic obstructive pulmonary disease    Essential hypertension    Hyperlipidemia    CKD (chronic kidney disease) stage 3, GFR 30-59 ml/min    Chronic heart failure with preserved ejection fraction    CARMEN (obstructive sleep apnea)    Seasonal allergies    Gastroesophageal reflux disease    History of fungal pneumonia    Bronchitis, mucopurulent recurrent    Tobacco abuse, in remission    Pulmonary nodule    PND (post-nasal drip)    Status post fall    Allergic rhinitis    Asthma    Closed fracture of metatarsal bone    Diverticulitis    Hemoptysis    Idiopathic chronic gout of multiple sites without tophus    Primary localized osteoarthritis    Vitamin D deficiency    Morbid (severe) obesity due to excess calories    Umbilical hernia without obstruction and without gangrene    Oral thrush    Arthritis of knee       Encounter Diagnoses   Name Primary?    Chronic obstructive pulmonary disease, unspecified COPD type Yes    Chronic heart failure with preserved ejection fraction     Pulmonary nodule     Bronchitis, mucopurulent recurrent     Moderate persistent asthma, unspecified whether complicated     PND (post-nasal drip)     Allergic rhinitis, unspecified seasonality, unspecified trigger     Gastroesophageal reflux disease with esophagitis without hemorrhage       PLAN  -Continue with Symbicort  Arnuity  -Continue with Singulair, azelastine  -Continue with albuterol inhaler or nebulizer as needed for shortness of air or wheeze  -Sending in a incentive spirometer for patient to use pre and postsurgery  -Patient will follow-up at her next scheduled appointment or sooner if needed.  -Refill sent to her pharmacy     Diagnoses and all orders for this visit:    1. Chronic obstructive pulmonary disease, unspecified COPD type (Primary)  -     albuterol sulfate  (90 Base) MCG/ACT inhaler; Inhale 2 puffs Every 4 (Four) Hours As Needed for Wheezing.  Dispense: 1 g; Refill: 11  -     Fluticasone Furoate (Arnuity Ellipta) 100 MCG/ACT aerosol powder ; Inhale 1 puff Daily.  Dispense: 90 each; Refill: 3  -     Symbicort 160-4.5 MCG/ACT inhaler; Inhale 2 puffs 2 (Two) Times a Day.  Dispense: 1 each; Refill: 11  -     Oscillating Positive Expiratory Pressure (OPEP); Future    2. Chronic heart failure with preserved ejection fraction    3. Pulmonary nodule    4. Bronchitis, mucopurulent recurrent  -     albuterol sulfate  (90 Base) MCG/ACT inhaler; Inhale 2 puffs Every 4 (Four) Hours As Needed for Wheezing.  Dispense: 1 g; Refill: 11  -     Fluticasone Furoate (Arnuity Ellipta) 100 MCG/ACT aerosol powder ; Inhale 1 puff Daily.  Dispense: 90 each; Refill: 3  -     Symbicort 160-4.5 MCG/ACT inhaler; Inhale 2 puffs 2 (Two) Times a Day.  Dispense: 1 each; Refill: 11    5. Moderate persistent asthma, unspecified whether complicated  -     albuterol sulfate  (90 Base) MCG/ACT inhaler; Inhale 2 puffs Every 4 (Four) Hours As Needed for Wheezing.  Dispense: 1 g; Refill: 11  -     Fluticasone Furoate (Arnuity Ellipta) 100 MCG/ACT aerosol powder ; Inhale 1 puff Daily.  Dispense: 90 each; Refill: 3  -     Symbicort 160-4.5 MCG/ACT inhaler; Inhale 2 puffs 2 (Two) Times a Day.  Dispense: 1 each; Refill: 11    6. PND (post-nasal drip)  -     Symbicort 160-4.5 MCG/ACT inhaler; Inhale 2 puffs 2 (Two) Times a Day.   Dispense: 1 each; Refill: 11    7. Allergic rhinitis, unspecified seasonality, unspecified trigger  -     Symbicort 160-4.5 MCG/ACT inhaler; Inhale 2 puffs 2 (Two) Times a Day.  Dispense: 1 each; Refill: 11    8. Gastroesophageal reflux disease with esophagitis without hemorrhage  -     Symbicort 160-4.5 MCG/ACT inhaler; Inhale 2 puffs 2 (Two) Times a Day.  Dispense: 1 each; Refill: 11           Smoking status: Former  Vaccination status: Up to date with COVID flu pneumonia, will need annual flu vaccine this fall  Medications personally reviewed    Follow Up  No follow-ups on file.    Patient was given instructions and counseling regarding her condition or for health maintenance advice. Please see specific information pulled into the AVS if appropriate.     Julia Rios is cleared for surgery with moderate risk for perioperative complications.  Patient has COPD.  Preoperative bronchodilators via nebulizer, incentive spirometry, early ambulation and use of noninvasive positive pressure ventilation as soon as the patient is extubated would help recommend postoperative nebulizers incentive spirometer as well as flutter to help decrease risk of atelectasis and pneumonia.

## 2023-10-04 ENCOUNTER — HOSPITAL ENCOUNTER (OUTPATIENT)
Dept: ULTRASOUND IMAGING | Facility: HOSPITAL | Age: 78
Discharge: HOME | End: 2023-10-04
Admitting: INTERNAL MEDICINE
Payer: MEDICARE

## 2023-10-04 DIAGNOSIS — I10 ESSENTIAL HYPERTENSION: ICD-10-CM

## 2023-10-04 DIAGNOSIS — N18.30 STAGE 3 CHRONIC KIDNEY DISEASE, UNSPECIFIED WHETHER STAGE 3A OR 3B CKD: ICD-10-CM

## 2023-10-04 PROCEDURE — 76775 US EXAM ABDO BACK WALL LIM: CPT

## 2023-10-06 ENCOUNTER — OFFICE VISIT (OUTPATIENT)
Dept: PULMONOLOGY | Facility: CLINIC | Age: 78
End: 2023-10-06
Payer: MEDICARE

## 2023-10-06 VITALS
SYSTOLIC BLOOD PRESSURE: 142 MMHG | HEIGHT: 62 IN | WEIGHT: 225.6 LBS | DIASTOLIC BLOOD PRESSURE: 92 MMHG | RESPIRATION RATE: 18 BRPM | OXYGEN SATURATION: 97 % | HEART RATE: 62 BPM | TEMPERATURE: 97.3 F | BODY MASS INDEX: 41.51 KG/M2

## 2023-10-06 DIAGNOSIS — J30.9 ALLERGIC RHINITIS, UNSPECIFIED SEASONALITY, UNSPECIFIED TRIGGER: ICD-10-CM

## 2023-10-06 DIAGNOSIS — K21.00 GASTROESOPHAGEAL REFLUX DISEASE WITH ESOPHAGITIS WITHOUT HEMORRHAGE: ICD-10-CM

## 2023-10-06 DIAGNOSIS — J44.9 CHRONIC OBSTRUCTIVE PULMONARY DISEASE, UNSPECIFIED COPD TYPE: Primary | ICD-10-CM

## 2023-10-06 DIAGNOSIS — R91.1 PULMONARY NODULE: ICD-10-CM

## 2023-10-06 DIAGNOSIS — R09.82 PND (POST-NASAL DRIP): ICD-10-CM

## 2023-10-06 DIAGNOSIS — I50.32 CHRONIC HEART FAILURE WITH PRESERVED EJECTION FRACTION: ICD-10-CM

## 2023-10-06 DIAGNOSIS — J45.40 MODERATE PERSISTENT ASTHMA, UNSPECIFIED WHETHER COMPLICATED: ICD-10-CM

## 2023-10-06 DIAGNOSIS — J41.1 BRONCHITIS, MUCOPURULENT RECURRENT: ICD-10-CM

## 2023-10-06 RX ORDER — BUDESONIDE AND FORMOTEROL FUMARATE DIHYDRATE 160; 4.5 UG/1; UG/1
2 AEROSOL RESPIRATORY (INHALATION) 2 TIMES DAILY
Qty: 1 EACH | Refills: 11 | Status: SHIPPED | OUTPATIENT
Start: 2023-10-06

## 2023-10-06 RX ORDER — ALBUTEROL SULFATE 90 UG/1
2 AEROSOL, METERED RESPIRATORY (INHALATION) EVERY 4 HOURS PRN
Qty: 1 G | Refills: 11 | Status: SHIPPED | OUTPATIENT
Start: 2023-10-06

## 2023-10-06 RX ORDER — FLUTICASONE FUROATE 100 UG/1
1 POWDER RESPIRATORY (INHALATION) DAILY
Qty: 90 EACH | Refills: 3 | Status: SHIPPED | OUTPATIENT
Start: 2023-10-06

## 2023-10-11 ENCOUNTER — OFFICE VISIT (OUTPATIENT)
Dept: PHYSICAL THERAPY | Facility: CLINIC | Age: 78
End: 2023-10-11
Payer: MEDICARE

## 2023-10-11 DIAGNOSIS — R13.12 OROPHARYNGEAL DYSPHAGIA: Primary | ICD-10-CM

## 2023-10-11 NOTE — PROGRESS NOTES
Outpatient Speech Language Pathology   Adult Swallow Initial Evaluation  OP PT [place]: [address] [address]     Patient Name: Julia Rios  : 1945  MRN: [id number]  Today's Date: 10/11/2023         Visit Date: 10/11/2023         Outpatient Dysphagia Evaluation    History  Intake  Date of Service: 10/11/23  Physician: KALEB Cary  Next Physician Visit: 2023  Diagnosis:  Abnormal swallowing (R13.10 [ICD-10-CM] 787.20 [ICD-9-CM])   Treatment Diagnosis: oropharyngeal dysphagia  Hx of Hospitalization no  Previous Function : normal swallow  Previous Therapy Services: no  Current Home Health Admission: no  Visit number: 1  HPI  Onset Date: ongoing  Age: 78  Gender: female  History of diagnosis: Julia Rios is a delightful 78-year-old female seeking consultation for oral pharyngeal dysphagia.  Patient indicates she coughs intermittently on liquids and solids.  Patient indicates she has a chronic cough and suffers from COPD to include emphysema.  She indicates no history of pneumonia and received a recent MBSS that indicates a swallow that is rated a 6/7 on [employer]'s Functional Communication Measures with a 1 to 19% swallow limitation.  Recent MBSS indicates flash laryngeal penetration with thin liquids but swallow is within gross functional limits.  However, patient continues to cough on thin liquids and solids.    Precautions: no  Patient's Goals/Expectations: Determine safety of swallow    Current Diet Level: regular solids and thin liquids    Comments: Patient is retired and live in [place], [place].  Patient denies use of tobacco and rarely consumes alcohol.       Psychosocial History    Usual Living Arrangement: lives with her   Psychosocial History (depression/anxiety) no  Nutritional Problems: no    Past Medical History    Pertinent Past Medical History:   Past Medical History:   Diagnosis Date    Acute right-sided low back pain with right-sided sciatica  01/15/2018    Allergic rhinitis     Asthma     Back pain     2 bulging disc L2 L3    Chronic heart failure with preserved ejection fraction     11/16/20 echocardiogram: 1.  Normal ejection fraction of 55%. 2.  Mild left ventricular hypertrophy. 3.  No significant valvular heart issues.     CKD stage 3 10/29/2019    Closed nondisplaced fracture of metatarsal bone of left foot with routine healing 06/19/2018    unspecified metatarsal, subsequent encounter    Diverticulitis     Essential hypertension     Hemoptysis     non cancerous    Hyperlipidemia     Idiopathic chronic gout of multiple sites without tophus 05/20/2016    Primary osteoarthritis of right knee 01/16/2017    Vitamin D deficiency 01/13/2016            History of Seizures: no      Abuse    Patient being Hurt, Hit, Scared or Neglected?  no  Caregiver Neglect: no      Pain  Pain Intensity: 0  Pain Location: n/a  Pain Scale Used: Numerical  Pain Management Techniques: n/a    Orientation    Level of Consciousness: alert and oriented times 3        SLP Recommendation and Plan   MODIFIED BARIUM SWALLOW STUDY: SPEECH PATHOLOGY REPORT           DATE OF SERVICE:  8/31/23     PERTINENT INFORMATION:  Ms. Rios is a 78 year old female with complaint of dysphagia.     She was referred for an MBSS by Dr. Baez to rule out aspiration as well as to determine appropriate treatment plan for this patient.        PROCEDURE:     Ms. Rios was alert and cooperative.  The patient was viewed in lateral plane.  The following Ba consistencies were administered:  thin barium, nectar thick barium, barium paste, barium mixed with applesauce, barium mixed with cracker.  The following compensatory swallowing strategies were performed: bolus modification, cyclic ingestion.        RESULTS:     1.  Nectar liquid by cup. Swallow completed.  2. Thin liquid by cup. Swallow completed with flash laryngeal penetration, clears.   3. Pureed by spoon. Swallow completed. Residue backflow  below cricopharyngeus.  4. Pudding by spoon. Swallow completed.  5. Nectar liquid by straw. Swallow completed.  6. Solid. Chewing with swallow completed.  7. Thin liquid by straw. Swallow completed with flash laryngeal penetration, clears.         IMPRESSIONS:     Ms. Rios demonstrated oral pharyngeal swallow appearing grossly within functional limits. Flash laryngeal penetration with thin liquids. No aspiration observed during this study.  Please refer to radiologist report for further details.      FUNCTIONAL DEFICIT: Patient scored level 6 of 7 on Functional Communication Measures for swallowing indicating a 1-19% limitation in function for current status, goal status, and discharge status.        RECOMMENDATIONS:   1.  Diet: Regular solids, thin liquids  2.  Compensatory strategies: small sips of liquids  3.  Positioning: fully upright for all po, 30 minutes following.            Yes, Patient/responsible party agrees with the plan of care and has been informed of all alternatives, risks and benefits.     Thank you for this referral.           CLINICAL SWALLOW EVALUATION    Objective    Current Diet Level: regular diet of solids and thin liquids  Oral Motor Exam: WFL  Soft Palate: WFL  Laryngeal Function and Elevation: WFL  Vocal Quality:clear  Swallow Reflex: WFL  Positioning: upright, 90 degree hipflexion      P.O. Trials   Patient was clinically assessed for dysphagia with the following consistencies and results:   Patient assessed using thin liquid of water, nectar thickened liquid of apple juice, pur‚e of applesauce, soft solid of Fig Alba, and solid of [place] Cracker.    Nectar thick liquid by spoon: WFL  Nectar thick by cup: Good laryngeal elevation to palpation, timely initiation of swallow, delayed cough.  Thin Liquid by spoon: Good laryngeal elevation to palpation with what appears to be a timely initiation of swallow, cough after the swallow  Thin Liquid by cup: Good laryngeal elevation to  palpation with timely initiation of swallow, cough after the swallow  Thin liquid by straw: No trial secondary to safety concerns  Puree solid: WFL  Soft Solid: Good mastication skills with multiple swallows to strip the bolus from the oral cavity per patient report, minimal feeling of residual after the swallow with patient indicating multiple swallows to clear.  Throat clear.  Solid: Good mastication skills with multiple swallows and throat clear  Additional Trials: N/A  Oral Preparatory Phase: WFL  Oral Phase: Decreased tongue base strength to stripped the bolus in the oral cavity, propel the bolus and hold liquid bolus anterior prior to the swallow.  Pharyngeal Phase: WFL  Laryngeal Elevation/Coordination: WFL    Comments: Patient demonstrates overt clinical signs and symptoms of aspiration with thin liquids during the clinical assessment only.  MBSS indicates no clinical signs and symptoms of aspiration during assessment.  Patient may benefit from a brief trial of speech therapy to learn an HEP program, learn compensatory strategies to promote an increase safety of swallow and coordinate breath/swallow to reduce risk of aspiration.    Dysphagia Criteria    Patient Demonstrates: Risk of aspiration    Swallow Function: Patient demonstrated  overt, clinical signs and symptoms of aspiration.  Patient demonstrates oropharyngeal  dysphagia secondary to decrease tongue base and swallow coordination.  Patient will benefit from a swallow therapy to promote safety of swallow decreasing risk of aspiration that may lead to aspiration pneumonia and/or respiratory failure.      Recommendations    Diet:   Thin liquids per MBSS recommendation and regular solids  Position:   Upright, 90 degree hipflexion for all p.o. intake    Environment:  Quiet environment with ample time to feed.    Compensatory Techniques:  Small bites of solids and small sips of liquids, effort swallow with use of supraglottic swallow to protect the  lungs    Medical Intervention:  no    ST Functional Communication Testing    Patient is rated on the Functional Communication Measures (FCM) for swallow at a level 6/7 with a 1-19% limitation.  With swallow therapy, patient is expected to maintain  a Functional Communication Measure level of 6/7 with a 1-19% swallow limitation.      Goals    Problem:  1 Swallow limitation of 1-19% FCM 6/7  LTG 1: 8 weeks:  The patient will demonstrate 1-19% swallow limitation by maintaing a score of  6/7 on the Functional Communication Measures for swallowing to increase safety of swallow to highest levels of functioning using compensatory strategies to reduce risk of aspiration.    STATUS:  New   STG 1a: 8 weeks Patient will accept 80% trials of thin liquids  with min to no clinical signs and symptoms of aspiration to facilitate a safe swallow.    STATUS: New   STG 1b:  8 weeks: Patient will accept 80% trials of  solids   minimal clinical signs and symptoms of residual or aspiration after the swallow  to facilitate a safe swallow.    STATUS: New   STG 1c: 8 weeks: Patient will complete a snack and/or meal  using compensatory strategies with min assist and minimal clinical signs and symptoms of aspiration  to  facilitate a safe swallow.    STATUS: New   STG 1d: 8 weeks:  Patient will be educated on signs and symptoms of aspiration and diet with patient verbalizing understanding with moderate cueing.      STATUS: New    TREATMENT:  Swallowing Therapy to facilitate a safe swallow for all p.o. intake reducing risk of aspiration leading to pneumonia.            LTG 2: 8 weeks:  Patient will learn an HEP to increase swallow coordination and promote increased tongue base strength and coordination and swallow coordination to prevent laryngeal penetration or aspiration during a meal to highest levels of functioning   reducing the risk of aspiration that may lead to pneumonia.      STATUS:  New          STG 2a: 8 weeks:  Patient will  complete tongue base isometrics to increase tongue base strength and coordination with min assist.    STATUS: New   STG2b: 8weeks:Patient will complete coordination exercises with min assist to  reduce premature spillage/timely eppiglottic closure prior, during and/or after  the swallow decreasing the risk of aspiration.    STATUS: New   STG 2c: 8 weeks:  Patient will be given a home exercise program and demonstrate understanding of the program with min assist.    STATUS: New   TREATMENT: Swallow therapy to increase swallow coordination and strength  for safe p.o. intake reducing risk of aspiration leading to possible aspiration pneumonia.        PLAN:   Frequency (times/week): 1 time per week  Duration: 8 weeks    Thank you for the referral,   Michelle Leong MS, CCC-SLP    ST SIGNATURE: SENAIT Workman    Electronically signed 10/11/2023    [place] License:  [id number]    Medicare Initial Certification  Certification Period: 1/9/2024  I certify that the therapy services are furnished while this patient is under my care.  The services outlined above are required by this patient, and will be reviewed every 90 days.     PHYSICIAN: Shanell Murray APRN      NPI: [id number]  DATE:     Please sign and return via fax to [phone]. Thank you, [facility]

## 2023-10-16 ENCOUNTER — OFFICE VISIT (OUTPATIENT)
Dept: FAMILY MEDICINE CLINIC | Facility: CLINIC | Age: 78
End: 2023-10-16
Payer: MEDICARE

## 2023-10-16 VITALS
HEART RATE: 75 BPM | BODY MASS INDEX: 40.67 KG/M2 | HEIGHT: 62 IN | TEMPERATURE: 96.1 F | DIASTOLIC BLOOD PRESSURE: 78 MMHG | SYSTOLIC BLOOD PRESSURE: 135 MMHG | WEIGHT: 221 LBS | OXYGEN SATURATION: 92 %

## 2023-10-16 DIAGNOSIS — I10 ESSENTIAL HYPERTENSION: Primary | ICD-10-CM

## 2023-10-16 PROCEDURE — 99213 OFFICE O/P EST LOW 20 MIN: CPT | Performed by: FAMILY MEDICINE

## 2023-10-16 PROCEDURE — 3075F SYST BP GE 130 - 139MM HG: CPT | Performed by: FAMILY MEDICINE

## 2023-10-16 PROCEDURE — 3078F DIAST BP <80 MM HG: CPT | Performed by: FAMILY MEDICINE

## 2023-10-16 RX ORDER — BUDESONIDE AND FORMOTEROL FUMARATE DIHYDRATE 160; 4.5 UG/1; UG/1
2 AEROSOL RESPIRATORY (INHALATION) 2 TIMES DAILY
Qty: 1 EACH | Refills: 5 | Status: SHIPPED | OUTPATIENT
Start: 2023-10-16

## 2023-10-16 NOTE — PROGRESS NOTES
Chief Complaint   Patient presents with    Follow-up     4 week HTN        Subjective     Julia Rios  has a past medical history of Acute right-sided low back pain with right-sided sciatica (01/15/2018), Allergic rhinitis, Asthma, Back pain, Chronic heart failure with preserved ejection fraction, CKD stage 3 (10/29/2019), Closed nondisplaced fracture of metatarsal bone of left foot with routine healing (06/19/2018), Diverticulitis, Essential hypertension, Hemoptysis, Hyperlipidemia, Idiopathic chronic gout of multiple sites without tophus (05/20/2016), Primary osteoarthritis of right knee (01/16/2017), and Vitamin D deficiency (01/13/2016).    Hypertension-she has been checking her blood pressure at home.  She states it has been improving since stopping the amlodipine.  Her blood pressure is good here today at 135/78.        PHQ-2 Depression Screening  Little interest or pleasure in doing things?     Feeling down, depressed, or hopeless?     PHQ-2 Total Score     PHQ-9 Depression Screening  Little interest or pleasure in doing things?     Feeling down, depressed, or hopeless?     Trouble falling or staying asleep, or sleeping too much?     Feeling tired or having little energy?     Poor appetite or overeating?     Feeling bad about yourself - or that you are a failure or have let yourself or your family down?     Trouble concentrating on things, such as reading the newspaper or watching television?     Moving or speaking so slowly that other people could have noticed? Or the opposite - being so fidgety or restless that you have been moving around a lot more than usual?     Thoughts that you would be better off dead, or of hurting yourself in some way?     PHQ-9 Total Score     If you checked off any problems, how difficult have these problems made it for you to do your work, take care of things at home, or get along with other people?       Allergies   Allergen Reactions    Ibuprofen Hives    Penicillins  Seizure and Other (See Comments)    Cephalosporins Rash    Sulfa Antibiotics Rash       Prior to Admission medications    Medication Sig Start Date End Date Taking? Authorizing Provider   albuterol (ACCUNEB) 1.25 MG/3ML nebulizer solution Take 3 mL by nebulization Every 6 (Six) Hours As Needed for Shortness of Air for up to 50 doses. 1/11/23   Manisha Romano APRN   albuterol sulfate  (90 Base) MCG/ACT inhaler Inhale 2 puffs Every 4 (Four) Hours As Needed for Wheezing. 10/6/23   Shanell Murray APRN   Allergy Relief 180 MG tablet TAKE 1 TABLET BY MOUTH DAILY 8/4/23   Justin Flaherty APRN   azelastine (ASTELIN) 0.1 % nasal spray USE 2 SPRAYS IN EACH NOSTRIL TWICE DAILY 7/27/23   Santino Baez MD   budesonide-formoterol (SYMBICORT) 160-4.5 MCG/ACT inhaler Inhale 2 puffs 2 (Two) Times a Day. 10/16/23   Shanell Murray APRN   D3-1000 25 MCG (1000 UT) capsule TAKE 1 CAPSULE BY MOUTH EVERY DAY 10/20/22   Nick Patel DO   famotidine (PEPCID) 20 MG tablet TAKE 1 TABLET BY MOUTH EVERY NIGHT AT BEDTIME 5/17/23   Shanell Murray APRN   Fluticasone Furoate (Arnuity Ellipta) 100 MCG/ACT aerosol powder  Inhale 1 puff Daily. 10/6/23   Shanell Murray APRN   gabapentin (NEURONTIN) 300 MG capsule 1 tab in am and 2 at bedtime 8/7/23   Nick Patel DO   losartan (COZAAR) 100 MG tablet TAKE 1 TABLET EVERY DAY 1/26/23   Nick Patel DO   metoprolol succinate XL (TOPROL-XL) 100 MG 24 hr tablet TAKE 1 TABLET EVERY DAY 8/9/23   Nick Patel DO   montelukast (SINGULAIR) 10 MG tablet TAKE 1 TABLET ONE TIME DAILY IN THE EVENING 1/26/23   Nick Patel DO   nystatin (MYCOSTATIN) 100,000 unit/mL suspension Take 5 mL by mouth 4 (Four) Times a Day. 7/20/23   Santino Baez MD   spironolactone (ALDACTONE) 25 MG tablet Take 1 tablet by mouth Daily. 9/6/22   Russ Cloud MD   Symbicort 160-4.5 MCG/ACT inhaler Inhale 2 puffs 2 (Two) Times a Day. 10/6/23   Shanell Murray,  APRN        Patient Active Problem List   Diagnosis    Family history of colon cancer    Chronic obstructive pulmonary disease    Essential hypertension    Hyperlipidemia    CKD (chronic kidney disease) stage 3, GFR 30-59 ml/min    Chronic heart failure with preserved ejection fraction    CARMEN (obstructive sleep apnea)    Seasonal allergies    Gastroesophageal reflux disease    History of fungal pneumonia    Bronchitis, mucopurulent recurrent    Tobacco abuse, in remission    Pulmonary nodule    PND (post-nasal drip)    Status post fall    Allergic rhinitis    Asthma    Closed fracture of metatarsal bone    Diverticulitis    Hemoptysis    Idiopathic chronic gout of multiple sites without tophus    Primary localized osteoarthritis    Vitamin D deficiency    Morbid (severe) obesity due to excess calories    Umbilical hernia without obstruction and without gangrene    Oral thrush    Arthritis of knee        Past Surgical History:   Procedure Laterality Date    CATARACT EXTRACTION Right     COLONOSCOPY      COLONOSCOPY N/A 10/12/2021    Procedure: COLONOSCOPY;  Surgeon: Jorge Diane MD;  Location: [facility] ENDOSCOPY;  Service: Gastroenterology;  Laterality: N/A;  DIVERTICULOSIS    ENDOSCOPY      EYE SURGERY      cataract right eye    OTHER SURGICAL HISTORY      Fatty tumor removed off back    RETINAL DETACHMENT REPAIR Right     hole in retina repair       Social History     Socioeconomic History    Marital status:    Tobacco Use    Smoking status: Former     Packs/day: 0.50     Years: 5.00     Additional pack years: 0.00     Total pack years: 2.50     Types: Cigarettes     Start date:      Quit date:      Years since quittin.8     Passive exposure: Never    Smokeless tobacco: Never   Vaping Use    Vaping Use: Never used   Substance and Sexual Activity    Alcohol use: Never    Drug use: Never    Sexual activity: Defer       Family History   Problem Relation Age of Onset    Colon cancer  "Mother 61    Cancer Mother     Heart disease Mother     Diabetes Brother     Breast cancer Maternal Grandmother     Stroke Paternal Grandfather     Heart failure Father        Family history, surgical history, past medical history, Allergies and meds reviewed with patient today and updated in [employer] EMR.     ROS:  Review of Systems   Respiratory:  Positive for cough (chronic) and shortness of breath (chronic). Negative for chest tightness and wheezing.    Cardiovascular:  Negative for chest pain and palpitations.   Neurological:  Negative for headache.       OBJECTIVE:  Vitals:    10/16/23 1107   BP: 135/78   BP Location: Left arm   Patient Position: Sitting   Pulse: 75   Temp: 96.1 °F (35.6 °C)   SpO2: 92%   Weight: 100 kg (221 lb)   Height: 157.5 cm (62\")     No results found.   Body mass index is 40.42 kg/m².  No LMP recorded. Patient is postmenopausal.    Physical Exam  Vitals and nursing note reviewed.   Constitutional:       General: She is not in acute distress.     Appearance: Normal appearance. She is obese.   HENT:      Head: Normocephalic.   Cardiovascular:      Rate and Rhythm: Normal rate and regular rhythm.      Heart sounds: Normal heart sounds. No murmur heard.  Pulmonary:      Effort: Pulmonary effort is normal.      Breath sounds: Normal breath sounds. No wheezing or rhonchi.   Neurological:      Mental Status: She is alert and oriented to person, place, and time.   Psychiatric:         Mood and Affect: Mood normal.         Thought Content: Thought content normal.         Judgment: Judgment normal.         Procedures    Lab on 09/29/2023   Component Date Value Ref Range Status    Glucose 09/29/2023 92  65 - 99 mg/dL Final    BUN 09/29/2023 15  8 - 23 mg/dL Final    Creatinine 09/29/2023 1.08 (H)  0.57 - 1.00 mg/dL Final    Sodium 09/29/2023 144  136 - 145 mmol/L Final    Potassium 09/29/2023 3.5  3.5 - 5.2 mmol/L Final    Chloride 09/29/2023 110 (H)  98 - 107 mmol/L Final    CO2 09/29/2023 24.0  22.0 " - 29.0 mmol/L Final    Calcium 09/29/2023 9.4  8.6 - 10.5 mg/dL Final    Total Protein 09/29/2023 6.2  6.0 - 8.5 g/dL Final    Albumin 09/29/2023 4.2  3.5 - 5.2 g/dL Final    ALT (SGPT) 09/29/2023 6  1 - 33 U/L Final    AST (SGOT) 09/29/2023 12  1 - 32 U/L Final    Alkaline Phosphatase 09/29/2023 76  39 - 117 U/L Final    Total Bilirubin 09/29/2023 0.4  0.0 - 1.2 mg/dL Final    Globulin 09/29/2023 2.0  gm/dL Final    A/G Ratio 09/29/2023 2.1  g/dL Final    BUN/Creatinine Ratio 09/29/2023 13.9  7.0 - 25.0 Final    Anion Gap 09/29/2023 10.0  5.0 - 15.0 mmol/L Final    eGFR 09/29/2023 52.7 (L)  >60.0 mL/min/1.73 Final    Hemoglobin A1C 09/29/2023 6.00 (H)  4.80 - 5.60 % Final    25 Hydroxy, Vitamin D 09/29/2023 40.2  30.0 - 100.0 ng/ml Final    Immunofixation Result, Serum 09/29/2023 Comment   Final    No monoclonality detected.    IgG 09/29/2023 564 (L)  586 - 1602 mg/dL Final    IgA 09/29/2023 217  64 - 422 mg/dL Final    IgM 09/29/2023 33  26 - 217 mg/dL Final    Free Light Chain, Kappa 09/29/2023 24.4 (H)  3.3 - 19.4 mg/L Final    Free Lambda Light Chains 09/29/2023 17.2  5.7 - 26.3 mg/L Final    Kappa/Lambda Ratio 09/29/2023 1.42  0.26 - 1.65 Final    WBC 09/29/2023 10.78  3.40 - 10.80 10*3/mm3 Final    RBC 09/29/2023 4.16  3.77 - 5.28 10*6/mm3 Final    Hemoglobin 09/29/2023 12.0  12.0 - 15.9 g/dL Final    Hematocrit 09/29/2023 37.1  34.0 - 46.6 % Final    MCV 09/29/2023 89.2  79.0 - 97.0 fL Final    MCH 09/29/2023 28.8  26.6 - 33.0 pg Final    MCHC 09/29/2023 32.3  31.5 - 35.7 g/dL Final    RDW 09/29/2023 14.5  12.3 - 15.4 % Final    RDW-SD 09/29/2023 47.3  37.0 - 54.0 fl Final    MPV 09/29/2023 12.2 (H)  6.0 - 12.0 fL Final    Platelets 09/29/2023 268  140 - 450 10*3/mm3 Final    Neutrophil % 09/29/2023 60.3  42.7 - 76.0 % Final    Lymphocyte % 09/29/2023 29.3  19.6 - 45.3 % Final    Monocyte % 09/29/2023 8.2  5.0 - 12.0 % Final    Eosinophil % 09/29/2023 0.6  0.3 - 6.2 % Final    Basophil % 09/29/2023 0.7  0.0  - 1.5 % Final    Immature Grans % 09/29/2023 0.9 (H)  0.0 - 0.5 % Final    Neutrophils, Absolute 09/29/2023 6.50  1.70 - 7.00 10*3/mm3 Final    Lymphocytes, Absolute 09/29/2023 3.16 (H)  0.70 - 3.10 10*3/mm3 Final    Monocytes, Absolute 09/29/2023 0.88  0.10 - 0.90 10*3/mm3 Final    Eosinophils, Absolute 09/29/2023 0.06  0.00 - 0.40 10*3/mm3 Final    Basophils, Absolute 09/29/2023 0.08  0.00 - 0.20 10*3/mm3 Final    Immature Grans, Absolute 09/29/2023 0.10 (H)  0.00 - 0.05 10*3/mm3 Final    nRBC 09/29/2023 0.0  0.0 - 0.2 /100 WBC Final    Color, UA 09/29/2023 Yellow  Yellow, Straw Final    Appearance, UA 09/29/2023 Turbid (A)  Clear Final    pH, UA 09/29/2023 6.0  5.0 - 8.0 Final    Specific Gravity, UA 09/29/2023 1.017  1.005 - 1.030 Final    Glucose, UA 09/29/2023 Negative  Negative Final    Ketones, UA 09/29/2023 Negative  Negative Final    Bilirubin, UA 09/29/2023 Negative  Negative Final    Blood, UA 09/29/2023 Negative  Negative Final    Protein, UA 09/29/2023 Negative  Negative Final    Leuk Esterase, UA 09/29/2023 Negative  Negative Final    Nitrite, UA 09/29/2023 Positive (A)  Negative Final    Urobilinogen, UA 09/29/2023 0.2 E.U./dL  0.2 - 1.0 E.U./dL Final    Microalbumin/Creatinine Ratio 09/29/2023    Final    Unable to calculate    Creatinine, Urine 09/29/2023 118.4  mg/dL Final    Microalbumin, Urine 09/29/2023 <1.2  mg/dL Final    Creatinine, Urine 09/29/2023 134.9  mg/dL Final    Total Protein, Urine 09/29/2023 11.7  mg/dL Final    Protein/Creatinine Ratio, Urine 09/29/2023 0.09   Final    RBC, UA 09/29/2023 0-2  None Seen, 0-2 /HPF Final    WBC, UA 09/29/2023 3-5 (A)  None Seen, 0-2 /HPF Final    Bacteria, UA 09/29/2023 4+ (A)  None Seen /HPF Final    Squamous Epithelial Cells, UA 09/29/2023 7-12 (A)  None Seen, 0-2 /HPF Final    Hyaline Casts, UA 09/29/2023 3-6  None Seen /LPF Final    Methodology 09/29/2023 Automated Microscopy   Final       ASSESSMENT/ PLAN:    Diagnoses and all orders for this  visit:    1. Essential hypertension (Primary)  Assessment & Plan:  Her blood pressure is good here today.  We will continue her current meds as is.                 Orders Placed Today:     No orders of the defined types were placed in this encounter.       Management Plan:     An After Visit Summary was printed and given to the patient at discharge.    Follow-up: Return for Recheck, Next scheduled follow up.    Nick Patel DO 10/16/2023 11:49 EDT  This note was electronically signed.  Answers submitted by the patient for this visit:  Office Visit on 10/16/2023 11:15 AM with Nick Patel  Primary Reason for Visit (Submitted on 10/16/2023)  What is the primary reason for your visit?: Other

## 2023-10-17 ENCOUNTER — PRE-ADMISSION TESTING (OUTPATIENT)
Dept: PREADMISSION TESTING | Facility: HOSPITAL | Age: 78
End: 2023-10-17
Payer: MEDICARE

## 2023-10-17 VITALS
HEART RATE: 65 BPM | DIASTOLIC BLOOD PRESSURE: 78 MMHG | RESPIRATION RATE: 18 BRPM | SYSTOLIC BLOOD PRESSURE: 131 MMHG | BODY MASS INDEX: 43.04 KG/M2 | OXYGEN SATURATION: 94 % | HEIGHT: 60 IN | WEIGHT: 219.2 LBS | TEMPERATURE: 97.7 F

## 2023-10-17 LAB
ANION GAP SERPL CALCULATED.3IONS-SCNC: 9 MMOL/L (ref 5–15)
BUN SERPL-MCNC: 16 MG/DL (ref 8–23)
BUN/CREAT SERPL: 14.4 (ref 7–25)
CALCIUM SPEC-SCNC: 9.5 MG/DL (ref 8.6–10.5)
CHLORIDE SERPL-SCNC: 106 MMOL/L (ref 98–107)
CO2 SERPL-SCNC: 26 MMOL/L (ref 22–29)
CREAT SERPL-MCNC: 1.11 MG/DL (ref 0.57–1)
DEPRECATED RDW RBC AUTO: 46.4 FL (ref 37–54)
EGFRCR SERPLBLD CKD-EPI 2021: 51 ML/MIN/1.73
ERYTHROCYTE [DISTWIDTH] IN BLOOD BY AUTOMATED COUNT: 14.3 % (ref 12.3–15.4)
GLUCOSE SERPL-MCNC: 123 MG/DL (ref 65–99)
HCT VFR BLD AUTO: 35.7 % (ref 34–46.6)
HGB BLD-MCNC: 11.8 G/DL (ref 12–15.9)
MCH RBC QN AUTO: 29.4 PG (ref 26.6–33)
MCHC RBC AUTO-ENTMCNC: 33.1 G/DL (ref 31.5–35.7)
MCV RBC AUTO: 88.8 FL (ref 79–97)
PLATELET # BLD AUTO: 221 10*3/MM3 (ref 140–450)
PMV BLD AUTO: 12.4 FL (ref 6–12)
POTASSIUM SERPL-SCNC: 3.8 MMOL/L (ref 3.5–5.2)
QT INTERVAL: 441 MS
QTC INTERVAL: 414 MS
RBC # BLD AUTO: 4.02 10*6/MM3 (ref 3.77–5.28)
SODIUM SERPL-SCNC: 141 MMOL/L (ref 136–145)
WBC NRBC COR # BLD: 9.57 10*3/MM3 (ref 3.4–10.8)

## 2023-10-17 PROCEDURE — 80048 BASIC METABOLIC PNL TOTAL CA: CPT

## 2023-10-17 PROCEDURE — 36415 COLL VENOUS BLD VENIPUNCTURE: CPT

## 2023-10-17 PROCEDURE — 85027 COMPLETE CBC AUTOMATED: CPT

## 2023-10-17 PROCEDURE — 93005 ELECTROCARDIOGRAM TRACING: CPT

## 2023-10-17 NOTE — DISCHARGE INSTRUCTIONS
Take the following medications the morning of surgery:  GABAPENTIN AND METOPROLOL    USE YOUR SYMBICORT AND ARNUITY AND BRING WITH YOU    BRING RESCUE INHALER    YOU WILL BE NOTIFIED OF ARRIVAL TIME    If you are on prescription narcotic pain medication to control your pain you may also take that medication the morning of surgery.    General Instructions:  Do not eat solid food after midnight the night before surgery.  You may drink clear liquids day of surgery but must stop at least one hour before your hospital arrival time.  It is beneficial for you to have a clear drink that contains carbohydrates the day of surgery.  We suggest a 12 to 20 ounce bottle of Gatorade or Powerade for non-diabetic patients or a 12 to 20 ounce bottle of G2 or Powerade Zero for diabetic patients. (Pediatric patients, are not advised to drink a 12 to 20 ounce carbohydrate drink)    Clear liquids are liquids you can see through.  Nothing red in color.     Plain water                               Sports drinks  Sodas                                   Gelatin (Jell-O)  Fruit juices without pulp such as white grape juice and apple juice  Popsicles that contain no fruit or yogurt  Tea or coffee (no cream or milk added)  Gatorade / Powerade  G2 / Powerade Zero    Infants may have breast milk up to four hours before surgery.  Infants drinking formula may drink formula up to six hours before surgery.   Patients who avoid smoking, chewing tobacco and alcohol for 4 weeks prior to surgery have a reduced risk of post-operative complications.  Quit smoking as many days before surgery as you can.  Do not smoke, use chewing tobacco or drink alcohol the day of surgery.   If applicable bring your C-PAP/ BI-PAP machine in with you to preop day of surgery.  Bring any papers given to you in the doctor’s office.  Wear clean comfortable clothes.  Do not wear contact lenses, false eyelashes or make-up.  Bring a case for your glasses.   Bring crutches or  walker if applicable.  Remove all piercings.  Leave jewelry and any other valuables at home.  Hair extensions with metal clips must be removed prior to surgery.  The Pre-Admission Testing nurse will instruct you to bring medications if unable to obtain an accurate list in Pre-Admission Testing.        If you were given a blood bank ID arm band remember to bring it with you the day of surgery.    Preventing a Surgical Site Infection:  For 2 to 3 days before surgery, avoid shaving with a razor because the razor can irritate skin and make it easier to develop an infection.    Any areas of open skin can increase the risk of a post-operative wound infection by allowing bacteria to enter and travel throughout the body.  Notify your surgeon if you have any skin wounds / rashes even if it is not near the expected surgical site.  The area will need assessed to determine if surgery should be delayed until it is healed.  The night prior to surgery shower using a fresh bar of anti-bacterial soap (such as Dial) and clean washcloth.  Sleep in a clean bed with clean clothing.  Do not allow pets to sleep with you.  Shower on the morning of surgery using a fresh bar of anti-bacterial soap (such as Dial) and clean washcloth.  Dry with a clean towel and dress in clean clothing.  Ask your surgeon if you will be receiving antibiotics prior to surgery.  Make sure you, your family, and all healthcare providers clean their hands with soap and water or an alcohol based hand  before caring for you or your wound.    Day of surgery: 10/31/2023  Your arrival time is approximately two hours before your scheduled surgery time.  Upon arrival, a Pre-op nurse and Anesthesiologist will review your health history, obtain vital signs, and answer questions you may have.  The only belongings needed at this time will be a list of your home medications and if applicable your C-PAP/BI-PAP machine.  A Pre-op nurse will start an IV and you may  receive medication in preparation for surgery, including something to help you relax.     Please be aware that surgery does come with discomfort.  We want to make every effort to control your discomfort so please discuss any uncontrolled symptoms with your nurse.   Your doctor will most likely have prescribed pain medications.      If you are going home after surgery you will receive individualized written care instructions before being discharged.  A responsible adult must drive you to and from the hospital on the day of your surgery and stay with you for 24 hours.  Discharge prescriptions can be filled by the hospital pharmacy during regular pharmacy hours.  If you are having surgery late in the day/evening your prescription may be e-prescribed to your pharmacy.  Please verify your pharmacy hours or chose a 24 hour pharmacy to avoid not having access to your prescription because your pharmacy has closed for the day.    If you are staying overnight following surgery, you will be transported to your hospital room following the recovery period.  [facility] has all private rooms.    If you have any questions please call Pre-Admission Testing at [phone].  Deductibles and co-payments are collected on the day of service. Please be prepared to pay the required co-pay, deductible or deposit on the day of service as defined by your plan.    Call your surgeon immediately if you experience any of the following symptoms:  Sore Throat  Shortness of Breath or difficulty breathing  Cough  Chills  Body soreness or muscle pain  Headache  Fever  New loss of taste or smell  Do not arrive for your surgery ill.  Your procedure will need to be rescheduled to another time.  You will need to call your physician before the day of surgery to avoid any unnecessary exposure to hospital staff as well as other patients.      CHLORHEXIDINE CLOTH INSTRUCTIONS  The morning of surgery follow these instructions using the  Chlorhexidine cloths you've been given.  These steps reduce bacteria on the body.  Do not use the cloths near your eyes, ears mouth, genitalia or on open wounds.  Throw the cloths away after use but do not try to flush them down a toilet.      Open and remove one cloth at a time from the package.    Leave the cloth unfolded and begin the bathing.  Massage the skin with the cloths using gentle pressure to remove bacteria.  Do not scrub harshly.   Follow the steps below with one 2% CHG cloth per area (6 total cloths).  One cloth for neck, shoulders and chest.  One cloth for both arms, hands, fingers and underarms (do underarms last).  One cloth for the abdomen followed by groin.  One cloth for right leg and foot including between the toes.  One cloth for left leg and foot including between the toes.  The last cloth is to be used for the back of the neck, back and buttocks.    Allow the CHG to air dry 3 minutes on the skin which will give it time to work and decrease the chance of irritation.  The skin may feel sticky until it is dry.  Do not rinse with water or any other liquid or you will lose the beneficial effects of the CHG.  If mild skin irritation occurs, do rinse the skin to remove the CHG.  Report this to the nurse at time of admission.  Do not apply lotions, creams, ointments, deodorants or perfumes after using the clothes. Dress in clean clothes before coming to the hospital.

## 2023-10-20 ENCOUNTER — OFFICE VISIT (OUTPATIENT)
Dept: ORTHOPEDIC SURGERY | Facility: CLINIC | Age: 78
End: 2023-10-20
Payer: MEDICARE

## 2023-10-20 VITALS — WEIGHT: 217.1 LBS | TEMPERATURE: 97.1 F | HEIGHT: 60 IN | BODY MASS INDEX: 42.62 KG/M2

## 2023-10-20 DIAGNOSIS — Z01.818 PREOP EXAMINATION: Primary | ICD-10-CM

## 2023-10-20 PROCEDURE — 1160F RVW MEDS BY RX/DR IN RCRD: CPT | Performed by: NURSE PRACTITIONER

## 2023-10-20 PROCEDURE — 73562 X-RAY EXAM OF KNEE 3: CPT | Performed by: NURSE PRACTITIONER

## 2023-10-20 PROCEDURE — S0260 H&P FOR SURGERY: HCPCS | Performed by: NURSE PRACTITIONER

## 2023-10-20 PROCEDURE — 77077 JOINT SURVEY SINGLE VIEW: CPT | Performed by: ORTHOPAEDIC SURGERY

## 2023-10-20 PROCEDURE — 1159F MED LIST DOCD IN RCRD: CPT | Performed by: NURSE PRACTITIONER

## 2023-10-20 NOTE — H&P (VIEW-ONLY)
History & Physical       Patient: Julia Rios    YOB: 1945    Medical Record Number: [id number]    Chief Complaints: Right knee endstage osteoarthritis    History of Present Illness: 78 y.o. female presents today in anticipation of upcoming knee replacement surgery.  Patient has a long history of worsening symptoms.  Describes the pain as severe, constant, and typically dull and achy. She has tried and failed prolonged conservative treatment. She is struggling with routine daily activities.  This has become a significant issue for overall quality of life. She cannot walk any prolonged distances without having to rest.     Allergies:   Allergies   Allergen Reactions    Ibuprofen Hives    Penicillins Seizure and Other (See Comments)    Cephalosporins Rash    Sulfa Antibiotics Rash       Medications:   Home Medications:    Current Outpatient Medications:     albuterol (ACCUNEB) 1.25 MG/3ML nebulizer solution, Take 3 mL by nebulization Every 6 (Six) Hours As Needed for Shortness of Air for up to 50 doses., Disp: 25 each, Rfl: 0    albuterol sulfate  (90 Base) MCG/ACT inhaler, Inhale 2 puffs Every 4 (Four) Hours As Needed for Wheezing., Disp: 1 g, Rfl: 11    Allergy Relief 180 MG tablet, TAKE 1 TABLET BY MOUTH DAILY, Disp: 90 tablet, Rfl: 3    azelastine (ASTELIN) 0.1 % nasal spray, USE 2 SPRAYS IN EACH NOSTRIL TWICE DAILY, Disp: 30 mL, Rfl: 6    budesonide-formoterol (SYMBICORT) 160-4.5 MCG/ACT inhaler, Inhale 2 puffs 2 (Two) Times a Day., Disp: 1 each, Rfl: 5    D3-1000 25 MCG (1000 UT) capsule, TAKE 1 CAPSULE BY MOUTH EVERY DAY, Disp: 90 capsule, Rfl: 3    famotidine (PEPCID) 20 MG tablet, TAKE 1 TABLET BY MOUTH EVERY NIGHT AT BEDTIME, Disp: 30 tablet, Rfl: 11    Fluticasone Furoate (Arnuity Ellipta) 100 MCG/ACT aerosol powder , Inhale 1 puff Daily., Disp: 90 each, Rfl: 3    gabapentin (NEURONTIN) 300 MG capsule, 1 tab in am and 2 at bedtime, Disp: 270 capsule, Rfl: 1    losartan (COZAAR)  100 MG tablet, TAKE 1 TABLET EVERY DAY, Disp: 90 tablet, Rfl: 3    metoprolol succinate XL (TOPROL-XL) 100 MG 24 hr tablet, TAKE 1 TABLET EVERY DAY, Disp: 90 tablet, Rfl: 3    montelukast (SINGULAIR) 10 MG tablet, TAKE 1 TABLET ONE TIME DAILY IN THE EVENING, Disp: 90 tablet, Rfl: 3    nystatin (MYCOSTATIN) 100,000 unit/mL suspension, Take 5 mL by mouth 4 (Four) Times a Day. (Patient taking differently: Take 5 mL by mouth 4 (Four) Times a Day. PT IS NOT TAKING), Disp: 840 mL, Rfl: 0    spironolactone (ALDACTONE) 25 MG tablet, Take 1 tablet by mouth Daily. (Patient taking differently: Take 1 tablet by mouth. MON,WED AND FRI), Disp: 90 tablet, Rfl: 3    Symbicort 160-4.5 MCG/ACT inhaler, Inhale 2 puffs 2 (Two) Times a Day., Disp: 1 each, Rfl: 11    Past Medical History:   Diagnosis Date    Acute right-sided low back pain with right-sided sciatica 01/15/2018    Allergic rhinitis     Asthma     Back pain     2 bulging disc L2 L3    Chronic heart failure with preserved ejection fraction     11/16/20 echocardiogram: 1.  Normal ejection fraction of 55%. 2.  Mild left ventricular hypertrophy. 3.  No significant valvular heart issues.     CKD stage 3 10/29/2019    COPD (chronic obstructive pulmonary disease)     Diverticulitis     Essential hypertension     GERD (gastroesophageal reflux disease)     Hemoptysis     non cancerous    Hyperlipidemia     Idiopathic chronic gout of multiple sites without tophus 05/20/2016    Primary osteoarthritis of right knee 01/16/2017    Right knee pain     Sleep apnea     CPAP    Vitamin D deficiency 01/13/2016          Past Surgical History:   Procedure Laterality Date    CATARACT EXTRACTION Right     COLONOSCOPY  2014    COLONOSCOPY N/A 10/12/2021    Procedure: COLONOSCOPY;  Surgeon: Jorge Diane MD;  Location: [facility] ENDOSCOPY;  Service: Gastroenterology;  Laterality: N/A;  DIVERTICULOSIS    ENDOSCOPY  2015    EYE SURGERY      cataract right eye    OTHER SURGICAL HISTORY       "Fatty tumor removed off back    RETINAL DETACHMENT REPAIR Right     hole in retina repair          Social History     Occupational History    Occupation: Retired   Tobacco Use    Smoking status: Former     Packs/day: 0.50     Years: 5.00     Additional pack years: 0.00     Total pack years: 2.50     Types: Cigarettes     Start date:      Quit date:      Years since quittin.8     Passive exposure: Never    Smokeless tobacco: Never   Vaping Use    Vaping Use: Never used   Substance and Sexual Activity    Alcohol use: Never    Drug use: Never    Sexual activity: Defer      Social History     Social History Narrative    Not on file          Family History   Problem Relation Age of Onset    Colon cancer Mother 61    Cancer Mother     Heart disease Mother     Heart failure Father     Diabetes Brother     Breast cancer Maternal Grandmother     Stroke Paternal Grandfather     Malig Hyperthermia Neg Hx        Review of Systems:  A 14-point review of systems is reviewed with the patient.  Pertinent positives are listed above.  All others are negative.    Physical Exam: 78 y.o. female    Vitals:    10/20/23 1345   Temp: 97.1 °F (36.2 °C)   TempSrc: Temporal   Weight: 98.5 kg (217 lb 1.6 oz)   Height: 152.4 cm (60\")       General:  Patient is awake and alert.  Appears in no acute distress or discomfort.    Psych:  Affect and demeanor are appropriate.    Eyes:  Conjunctivae and sclerae; appear grossly normal.  Eyes track well and EOM seems to be intact.    Ears:  No gross abnormalities.  Hearing adequate for the exam.    Cardiovascular:  Regular rate and rhythm.    Lungs:  Good chest expansion.  Breathing unlabored.    Lymph:  No palpable adenopathy about neck or axillae.    Right lower extremity:  Skin benign and intact without evidence for swelling, masses or atrophy.  No palpable masses.  Focal tenderness noted over the medial and lateral joint lines.  ROM is from 3-105°.   Knee is stable on exam.  Good strength " "throughout the lower leg and foot.  Intact sensation throughout.  Palpable pedal pulses with brisk cap refill.    Diagnostic Tests:  Lab Results   Component Value Date    GLUCOSE 123 (H) 10/17/2023    CALCIUM 9.5 10/17/2023     10/17/2023    K 3.8 10/17/2023    CO2 26.0 10/17/2023     10/17/2023    BUN 16 10/17/2023    CREATININE 1.11 (H) 10/17/2023    EGFRIFNONA 51 (L) 11/03/2021    BCR 14.4 10/17/2023    ANIONGAP 9.0 10/17/2023     Lab Results   Component Value Date    WBC 9.57 10/17/2023    HGB 11.8 (L) 10/17/2023    HCT 35.7 10/17/2023    MCV 88.8 10/17/2023     10/17/2023     No results found for: \"INR\", \"PROTIME\"    Imaging:  AP, merchant and lateral views of the right knee are ordered and reviewed along with a full length alignment x-ray.  These x-rays were taken to evaluate her complaint and presurgical planning.  These are compared to previous x-rays.  The x-rays show end-stage tricompartment osteoarthritis with bone-on-bone, osteophyte formation, and subluxation of the tibia.  Majority of the degenerative changes appear to affect the medial compartment.  The leg length alignment x-ray shows varus malalignment with the weightbearing axis passing outside the edge of the medial compartment.    Assessment:  Right knee endstage osteoarthritis    Plan: We will plan on proceeding with a right total knee arthroplasty at the patient's request.  I reviewed details of procedure with patient today and discussed all the risks, benefits, alternatives, and limitations of the procedure in laymen's terms with the risks including but not limited to:  neurovascular damage resulting in permanent dysfunction or footdrop and potential need for further surgery, bleeding, infection, hematoma, chronic pain, worsening of pain, persistent symptoms potentially necessitating revision, prosthesis-related problems including loosening or allergy, swelling, loss of motion and arthrofibrosis, weakness, stiffness, " instability, DVT, pulmonary embolus, death, stroke, complex regional pain syndrome, and need for additional procedures.  Patient verbalized understanding, and was given the opportunity to ask and have all questions answered today.  No guarantees were given regarding results of surgery.     Patient is planning for hospital admission following surgery. Denies history off DVT or metal allergy.      Edie Cameron, APRN  10/20/23

## 2023-10-20 NOTE — PROGRESS NOTES
History & Physical       Patient: uJlia Rios    YOB: 1945    Medical Record Number: [id number]    Chief Complaints: Right knee endstage osteoarthritis    History of Present Illness: 78 y.o. female presents today in anticipation of upcoming knee replacement surgery.  Patient has a long history of worsening symptoms.  Describes the pain as severe, constant, and typically dull and achy. She has tried and failed prolonged conservative treatment. She is struggling with routine daily activities.  This has become a significant issue for overall quality of life. She cannot walk any prolonged distances without having to rest.     Allergies:   Allergies   Allergen Reactions    Ibuprofen Hives    Penicillins Seizure and Other (See Comments)    Cephalosporins Rash    Sulfa Antibiotics Rash       Medications:   Home Medications:    Current Outpatient Medications:     albuterol (ACCUNEB) 1.25 MG/3ML nebulizer solution, Take 3 mL by nebulization Every 6 (Six) Hours As Needed for Shortness of Air for up to 50 doses., Disp: 25 each, Rfl: 0    albuterol sulfate  (90 Base) MCG/ACT inhaler, Inhale 2 puffs Every 4 (Four) Hours As Needed for Wheezing., Disp: 1 g, Rfl: 11    Allergy Relief 180 MG tablet, TAKE 1 TABLET BY MOUTH DAILY, Disp: 90 tablet, Rfl: 3    azelastine (ASTELIN) 0.1 % nasal spray, USE 2 SPRAYS IN EACH NOSTRIL TWICE DAILY, Disp: 30 mL, Rfl: 6    budesonide-formoterol (SYMBICORT) 160-4.5 MCG/ACT inhaler, Inhale 2 puffs 2 (Two) Times a Day., Disp: 1 each, Rfl: 5    D3-1000 25 MCG (1000 UT) capsule, TAKE 1 CAPSULE BY MOUTH EVERY DAY, Disp: 90 capsule, Rfl: 3    famotidine (PEPCID) 20 MG tablet, TAKE 1 TABLET BY MOUTH EVERY NIGHT AT BEDTIME, Disp: 30 tablet, Rfl: 11    Fluticasone Furoate (Arnuity Ellipta) 100 MCG/ACT aerosol powder , Inhale 1 puff Daily., Disp: 90 each, Rfl: 3    gabapentin (NEURONTIN) 300 MG capsule, 1 tab in am and 2 at bedtime, Disp: 270 capsule, Rfl: 1    losartan (COZAAR)  100 MG tablet, TAKE 1 TABLET EVERY DAY, Disp: 90 tablet, Rfl: 3    metoprolol succinate XL (TOPROL-XL) 100 MG 24 hr tablet, TAKE 1 TABLET EVERY DAY, Disp: 90 tablet, Rfl: 3    montelukast (SINGULAIR) 10 MG tablet, TAKE 1 TABLET ONE TIME DAILY IN THE EVENING, Disp: 90 tablet, Rfl: 3    nystatin (MYCOSTATIN) 100,000 unit/mL suspension, Take 5 mL by mouth 4 (Four) Times a Day. (Patient taking differently: Take 5 mL by mouth 4 (Four) Times a Day. PT IS NOT TAKING), Disp: 840 mL, Rfl: 0    spironolactone (ALDACTONE) 25 MG tablet, Take 1 tablet by mouth Daily. (Patient taking differently: Take 1 tablet by mouth. MON,WED AND FRI), Disp: 90 tablet, Rfl: 3    Symbicort 160-4.5 MCG/ACT inhaler, Inhale 2 puffs 2 (Two) Times a Day., Disp: 1 each, Rfl: 11    Past Medical History:   Diagnosis Date    Acute right-sided low back pain with right-sided sciatica 01/15/2018    Allergic rhinitis     Asthma     Back pain     2 bulging disc L2 L3    Chronic heart failure with preserved ejection fraction     11/16/20 echocardiogram: 1.  Normal ejection fraction of 55%. 2.  Mild left ventricular hypertrophy. 3.  No significant valvular heart issues.     CKD stage 3 10/29/2019    COPD (chronic obstructive pulmonary disease)     Diverticulitis     Essential hypertension     GERD (gastroesophageal reflux disease)     Hemoptysis     non cancerous    Hyperlipidemia     Idiopathic chronic gout of multiple sites without tophus 05/20/2016    Primary osteoarthritis of right knee 01/16/2017    Right knee pain     Sleep apnea     CPAP    Vitamin D deficiency 01/13/2016          Past Surgical History:   Procedure Laterality Date    CATARACT EXTRACTION Right     COLONOSCOPY  2014    COLONOSCOPY N/A 10/12/2021    Procedure: COLONOSCOPY;  Surgeon: Jorge Diane MD;  Location: [facility] ENDOSCOPY;  Service: Gastroenterology;  Laterality: N/A;  DIVERTICULOSIS    ENDOSCOPY  2015    EYE SURGERY      cataract right eye    OTHER SURGICAL HISTORY       "Fatty tumor removed off back    RETINAL DETACHMENT REPAIR Right     hole in retina repair          Social History     Occupational History    Occupation: Retired   Tobacco Use    Smoking status: Former     Packs/day: 0.50     Years: 5.00     Additional pack years: 0.00     Total pack years: 2.50     Types: Cigarettes     Start date:      Quit date:      Years since quittin.8     Passive exposure: Never    Smokeless tobacco: Never   Vaping Use    Vaping Use: Never used   Substance and Sexual Activity    Alcohol use: Never    Drug use: Never    Sexual activity: Defer      Social History     Social History Narrative    Not on file          Family History   Problem Relation Age of Onset    Colon cancer Mother 61    Cancer Mother     Heart disease Mother     Heart failure Father     Diabetes Brother     Breast cancer Maternal Grandmother     Stroke Paternal Grandfather     Malig Hyperthermia Neg Hx        Review of Systems:  A 14-point review of systems is reviewed with the patient.  Pertinent positives are listed above.  All others are negative.    Physical Exam: 78 y.o. female    Vitals:    10/20/23 1345   Temp: 97.1 °F (36.2 °C)   TempSrc: Temporal   Weight: 98.5 kg (217 lb 1.6 oz)   Height: 152.4 cm (60\")       General:  Patient is awake and alert.  Appears in no acute distress or discomfort.    Psych:  Affect and demeanor are appropriate.    Eyes:  Conjunctivae and sclerae; appear grossly normal.  Eyes track well and EOM seems to be intact.    Ears:  No gross abnormalities.  Hearing adequate for the exam.    Cardiovascular:  Regular rate and rhythm.    Lungs:  Good chest expansion.  Breathing unlabored.    Lymph:  No palpable adenopathy about neck or axillae.    Right lower extremity:  Skin benign and intact without evidence for swelling, masses or atrophy.  No palpable masses.  Focal tenderness noted over the medial and lateral joint lines.  ROM is from 3-105°.   Knee is stable on exam.  Good strength " "throughout the lower leg and foot.  Intact sensation throughout.  Palpable pedal pulses with brisk cap refill.    Diagnostic Tests:  Lab Results   Component Value Date    GLUCOSE 123 (H) 10/17/2023    CALCIUM 9.5 10/17/2023     10/17/2023    K 3.8 10/17/2023    CO2 26.0 10/17/2023     10/17/2023    BUN 16 10/17/2023    CREATININE 1.11 (H) 10/17/2023    EGFRIFNONA 51 (L) 11/03/2021    BCR 14.4 10/17/2023    ANIONGAP 9.0 10/17/2023     Lab Results   Component Value Date    WBC 9.57 10/17/2023    HGB 11.8 (L) 10/17/2023    HCT 35.7 10/17/2023    MCV 88.8 10/17/2023     10/17/2023     No results found for: \"INR\", \"PROTIME\"    Imaging:  AP, merchant and lateral views of the right knee are ordered and reviewed along with a full length alignment x-ray.  These x-rays were taken to evaluate her complaint and presurgical planning.  These are compared to previous x-rays.  The x-rays show end-stage tricompartment osteoarthritis with bone-on-bone, osteophyte formation, and subluxation of the tibia.  Majority of the degenerative changes appear to affect the medial compartment.  The leg length alignment x-ray shows varus malalignment with the weightbearing axis passing outside the edge of the medial compartment.    Assessment:  Right knee endstage osteoarthritis    Plan: We will plan on proceeding with a right total knee arthroplasty at the patient's request.  I reviewed details of procedure with patient today and discussed all the risks, benefits, alternatives, and limitations of the procedure in laymen's terms with the risks including but not limited to:  neurovascular damage resulting in permanent dysfunction or footdrop and potential need for further surgery, bleeding, infection, hematoma, chronic pain, worsening of pain, persistent symptoms potentially necessitating revision, prosthesis-related problems including loosening or allergy, swelling, loss of motion and arthrofibrosis, weakness, stiffness, " instability, DVT, pulmonary embolus, death, stroke, complex regional pain syndrome, and need for additional procedures.  Patient verbalized understanding, and was given the opportunity to ask and have all questions answered today.  No guarantees were given regarding results of surgery.     Patient is planning for hospital admission following surgery. Denies history off DVT or metal allergy.      Edie Cameron, APRN  10/20/23

## 2023-10-23 ENCOUNTER — TELEPHONE (OUTPATIENT)
Dept: ORTHOPEDIC SURGERY | Facility: CLINIC | Age: 78
End: 2023-10-23

## 2023-10-23 NOTE — TELEPHONE ENCOUNTER
Provider: DELILAH    Caller: RHETT    Relationship to Patient: SELF    Pharmacy:     Phone Number: [phone]    Reason for Call: PT CALLING TO GIVE THE SX  HER AUTH NUMBER [id number] FROM HER INS - PLEASE CALL BACK

## 2023-10-31 ENCOUNTER — ANESTHESIA EVENT (OUTPATIENT)
Dept: PERIOP | Facility: HOSPITAL | Age: 78
End: 2023-10-31
Payer: MEDICARE

## 2023-10-31 ENCOUNTER — ANESTHESIA (OUTPATIENT)
Dept: PERIOP | Facility: HOSPITAL | Age: 78
End: 2023-10-31
Payer: MEDICARE

## 2023-10-31 ENCOUNTER — APPOINTMENT (OUTPATIENT)
Dept: GENERAL RADIOLOGY | Facility: HOSPITAL | Age: 78
End: 2023-10-31
Payer: MEDICARE

## 2023-10-31 ENCOUNTER — HOSPITAL ENCOUNTER (OUTPATIENT)
Facility: HOSPITAL | Age: 78
Discharge: HOME | End: 2023-11-01
Attending: ORTHOPAEDIC SURGERY | Admitting: ORTHOPAEDIC SURGERY
Payer: MEDICARE

## 2023-10-31 DIAGNOSIS — M17.10 ARTHRITIS OF KNEE: ICD-10-CM

## 2023-10-31 DIAGNOSIS — Z96.651 S/P TKR (TOTAL KNEE REPLACEMENT), RIGHT: Primary | ICD-10-CM

## 2023-10-31 PROCEDURE — 94799 UNLISTED PULMONARY SVC/PX: CPT

## 2023-10-31 PROCEDURE — 63710000001 HYDROCODONE-ACETAMINOPHEN 7.5-325 MG TABLET: Performed by: NURSE ANESTHETIST, CERTIFIED REGISTERED

## 2023-10-31 PROCEDURE — 25010000002 ONDANSETRON PER 1 MG: Performed by: NURSE ANESTHETIST, CERTIFIED REGISTERED

## 2023-10-31 PROCEDURE — 25010000002 FENTANYL CITRATE (PF) 50 MCG/ML SOLUTION: Performed by: STUDENT IN AN ORGANIZED HEALTH CARE EDUCATION/TRAINING PROGRAM

## 2023-10-31 PROCEDURE — 25010000002 ROPIVACAINE PER 1 MG: Performed by: STUDENT IN AN ORGANIZED HEALTH CARE EDUCATION/TRAINING PROGRAM

## 2023-10-31 PROCEDURE — C1713 ANCHOR/SCREW BN/BN,TIS/BN: HCPCS | Performed by: ORTHOPAEDIC SURGERY

## 2023-10-31 PROCEDURE — 25010000002 FENTANYL CITRATE (PF) 100 MCG/2ML SOLUTION: Performed by: NURSE ANESTHETIST, CERTIFIED REGISTERED

## 2023-10-31 PROCEDURE — C1776 JOINT DEVICE (IMPLANTABLE): HCPCS | Performed by: ORTHOPAEDIC SURGERY

## 2023-10-31 PROCEDURE — A9270 NON-COVERED ITEM OR SERVICE: HCPCS | Performed by: ORTHOPAEDIC SURGERY

## 2023-10-31 PROCEDURE — 63710000001 BUDESONIDE-FORMOTEROL 160-4.5 MCG/ACT AEROSOL 6 G INHALER: Performed by: ORTHOPAEDIC SURGERY

## 2023-10-31 PROCEDURE — 27447 TOTAL KNEE ARTHROPLASTY: CPT | Performed by: ORTHOPAEDIC SURGERY

## 2023-10-31 PROCEDURE — 25010000002 DEXAMETHASONE SODIUM PHOSPHATE 20 MG/5ML SOLUTION: Performed by: NURSE ANESTHETIST, CERTIFIED REGISTERED

## 2023-10-31 PROCEDURE — 25010000002 LIDOCAINE 1 % SOLUTION: Performed by: STUDENT IN AN ORGANIZED HEALTH CARE EDUCATION/TRAINING PROGRAM

## 2023-10-31 PROCEDURE — 63710000001 HYDROCODONE-ACETAMINOPHEN 7.5-325 MG TABLET: Performed by: ORTHOPAEDIC SURGERY

## 2023-10-31 PROCEDURE — 25010000002 CEFAZOLIN IN DEXTROSE 2000 MG/ 100 ML SOLUTION: Performed by: ORTHOPAEDIC SURGERY

## 2023-10-31 PROCEDURE — 25010000002 EPINEPHRINE 1 MG/ML SOLUTION 30 ML VIAL: Performed by: ORTHOPAEDIC SURGERY

## 2023-10-31 PROCEDURE — 97110 THERAPEUTIC EXERCISES: CPT

## 2023-10-31 PROCEDURE — 73560 X-RAY EXAM OF KNEE 1 OR 2: CPT

## 2023-10-31 PROCEDURE — 97161 PT EVAL LOW COMPLEX 20 MIN: CPT

## 2023-10-31 PROCEDURE — 25010000002 SUGAMMADEX 200 MG/2ML SOLUTION: Performed by: NURSE ANESTHETIST, CERTIFIED REGISTERED

## 2023-10-31 PROCEDURE — 25010000002 ROPIVACAINE PER 1 MG: Performed by: ORTHOPAEDIC SURGERY

## 2023-10-31 PROCEDURE — 63710000001 ACETAMINOPHEN EXTRA STRENGTH 500 MG TABLET: Performed by: ORTHOPAEDIC SURGERY

## 2023-10-31 PROCEDURE — 94761 N-INVAS EAR/PLS OXIMETRY MLT: CPT

## 2023-10-31 PROCEDURE — 63710000001 PREGABALIN 75 MG CAPSULE: Performed by: ORTHOPAEDIC SURGERY

## 2023-10-31 PROCEDURE — 25010000002 HYDROMORPHONE PER 4 MG: Performed by: NURSE ANESTHETIST, CERTIFIED REGISTERED

## 2023-10-31 PROCEDURE — 63710000001 GABAPENTIN 300 MG CAPSULE: Performed by: ORTHOPAEDIC SURGERY

## 2023-10-31 PROCEDURE — 94640 AIRWAY INHALATION TREATMENT: CPT

## 2023-10-31 PROCEDURE — 25010000002 MORPHINE PER 10 MG: Performed by: ORTHOPAEDIC SURGERY

## 2023-10-31 PROCEDURE — 25010000002 FENTANYL CITRATE (PF) 50 MCG/ML SOLUTION: Performed by: NURSE ANESTHETIST, CERTIFIED REGISTERED

## 2023-10-31 PROCEDURE — 25810000003 LACTATED RINGERS SOLUTION: Performed by: ORTHOPAEDIC SURGERY

## 2023-10-31 PROCEDURE — 25010000002 CEFAZOLIN IN DEXTROSE 2-4 GM/100ML-% SOLUTION: Performed by: ORTHOPAEDIC SURGERY

## 2023-10-31 PROCEDURE — 63710000001 DOCUSATE SODIUM 100 MG CAPSULE: Performed by: ORTHOPAEDIC SURGERY

## 2023-10-31 PROCEDURE — 25010000002 VANCOMYCIN 10 G RECONSTITUTED SOLUTION: Performed by: ORTHOPAEDIC SURGERY

## 2023-10-31 PROCEDURE — A9270 NON-COVERED ITEM OR SERVICE: HCPCS | Performed by: NURSE ANESTHETIST, CERTIFIED REGISTERED

## 2023-10-31 PROCEDURE — 63710000001 FAMOTIDINE 20 MG TABLET: Performed by: ORTHOPAEDIC SURGERY

## 2023-10-31 PROCEDURE — 25010000002 PROPOFOL 200 MG/20ML EMULSION: Performed by: NURSE ANESTHETIST, CERTIFIED REGISTERED

## 2023-10-31 PROCEDURE — 25810000003 SODIUM CHLORIDE 0.9 % SOLUTION: Performed by: ORTHOPAEDIC SURGERY

## 2023-10-31 PROCEDURE — 25010000002 PROPOFOL 10 MG/ML EMULSION: Performed by: NURSE ANESTHETIST, CERTIFIED REGISTERED

## 2023-10-31 PROCEDURE — 25810000003 LACTATED RINGERS PER 1000 ML: Performed by: STUDENT IN AN ORGANIZED HEALTH CARE EDUCATION/TRAINING PROGRAM

## 2023-10-31 PROCEDURE — 63710000001 SPIRONOLACTONE 25 MG TABLET: Performed by: ORTHOPAEDIC SURGERY

## 2023-10-31 PROCEDURE — 25810000003 LACTATED RINGERS PER 1000 ML: Performed by: ORTHOPAEDIC SURGERY

## 2023-10-31 DEVICE — GENESIS II NON-POROUS TIBIAL                                    BASEPLATE SIZE 3 RIGHT
Type: IMPLANTABLE DEVICE | Site: KNEE | Status: FUNCTIONAL
Brand: GENESIS II

## 2023-10-31 DEVICE — LEGION PS HIGH FLEX XLPE SZ 3-4 18MM
Type: IMPLANTABLE DEVICE | Site: KNEE | Status: FUNCTIONAL
Brand: LEGION

## 2023-10-31 DEVICE — VIOLET ANTIBACTERIAL POLYDIOXANONE, KNOTLESS TISSUE CONTROL DEVICE
Type: IMPLANTABLE DEVICE | Site: KNEE | Status: FUNCTIONAL
Brand: STRATAFIX

## 2023-10-31 DEVICE — KNOTLESS TISSUE CONTROL DEVICE, UNDYED UNIDIRECTIONAL (ANTIBACTERIAL) SYNTHETIC ABSORBABLE DEVICE
Type: IMPLANTABLE DEVICE | Site: KNEE | Status: FUNCTIONAL
Brand: STRATAFIX

## 2023-10-31 DEVICE — IMPLANTABLE DEVICE: Type: IMPLANTABLE DEVICE | Status: FUNCTIONAL

## 2023-10-31 DEVICE — LEGION POSTERIOR STABILIZED                                    OXINIUM FEMORAL SIZE 4 RIGHT
Type: IMPLANTABLE DEVICE | Site: KNEE | Status: FUNCTIONAL
Brand: LEGION

## 2023-10-31 DEVICE — PALACOS® R IS A FAST-CURING, RADIOPAQUE, POLY(METHYL METHACRYLATE)-BASED BONE CEMENT.PALACOS ® R CONTAINS THE X-RAY CONTRAST MEDIUM ZIRCONIUM DIOXIDE. TO IMPROVE VISIBILITY IN THE SURGICAL FIELD PALACOS ® R HAS BEEN COLOURED WITH CHLOROPHYLL (E141). THE BONE CEMENT IS PREPARED DIRECTLY BEFORE USE BY MIXING A POLYMER POWDER COMPONENT WITH A LIQUID MONOMER COMPONENT. A DUCTILE DOUGH FORMS WHICH CURES WITHIN A FEW MINUTES.
Type: IMPLANTABLE DEVICE | Site: KNEE | Status: FUNCTIONAL
Brand: PALACOS®

## 2023-10-31 DEVICE — GEN II 7.5MM RESUR PAT 29MM
Type: IMPLANTABLE DEVICE | Site: KNEE | Status: FUNCTIONAL
Brand: GENESIS II

## 2023-10-31 RX ORDER — HYDROCODONE BITARTRATE AND ACETAMINOPHEN 7.5; 325 MG/1; MG/1
1 TABLET ORAL EVERY 4 HOURS PRN
Status: DISCONTINUED | OUTPATIENT
Start: 2023-10-31 | End: 2023-10-31 | Stop reason: HOSPADM

## 2023-10-31 RX ORDER — SODIUM CHLORIDE 0.9 % (FLUSH) 0.9 %
3-10 SYRINGE (ML) INJECTION AS NEEDED
Status: DISCONTINUED | OUTPATIENT
Start: 2023-10-31 | End: 2023-10-31 | Stop reason: HOSPADM

## 2023-10-31 RX ORDER — ONDANSETRON 2 MG/ML
INJECTION INTRAMUSCULAR; INTRAVENOUS AS NEEDED
Status: DISCONTINUED | OUTPATIENT
Start: 2023-10-31 | End: 2023-10-31 | Stop reason: SURG

## 2023-10-31 RX ORDER — EPHEDRINE SULFATE 50 MG/ML
5 INJECTION, SOLUTION INTRAVENOUS ONCE AS NEEDED
Status: DISCONTINUED | OUTPATIENT
Start: 2023-10-31 | End: 2023-10-31 | Stop reason: HOSPADM

## 2023-10-31 RX ORDER — SODIUM CHLORIDE, SODIUM LACTATE, POTASSIUM CHLORIDE, CALCIUM CHLORIDE 600; 310; 30; 20 MG/100ML; MG/100ML; MG/100ML; MG/100ML
100 INJECTION, SOLUTION INTRAVENOUS CONTINUOUS
Status: DISCONTINUED | OUTPATIENT
Start: 2023-10-31 | End: 2023-11-01 | Stop reason: HOSPADM

## 2023-10-31 RX ORDER — SODIUM CHLORIDE 0.9 % (FLUSH) 0.9 %
3 SYRINGE (ML) INJECTION EVERY 12 HOURS SCHEDULED
Status: DISCONTINUED | OUTPATIENT
Start: 2023-10-31 | End: 2023-11-01 | Stop reason: HOSPADM

## 2023-10-31 RX ORDER — GABAPENTIN 300 MG/1
300 CAPSULE ORAL EVERY 12 HOURS SCHEDULED
Status: DISCONTINUED | OUTPATIENT
Start: 2023-10-31 | End: 2023-11-01 | Stop reason: HOSPADM

## 2023-10-31 RX ORDER — DEXAMETHASONE SODIUM PHOSPHATE 4 MG/ML
INJECTION, SOLUTION INTRA-ARTICULAR; INTRALESIONAL; INTRAMUSCULAR; INTRAVENOUS; SOFT TISSUE AS NEEDED
Status: DISCONTINUED | OUTPATIENT
Start: 2023-10-31 | End: 2023-10-31 | Stop reason: SURG

## 2023-10-31 RX ORDER — ROCURONIUM BROMIDE 10 MG/ML
INJECTION, SOLUTION INTRAVENOUS AS NEEDED
Status: DISCONTINUED | OUTPATIENT
Start: 2023-10-31 | End: 2023-10-31 | Stop reason: SURG

## 2023-10-31 RX ORDER — SODIUM CHLORIDE 0.9 % (FLUSH) 0.9 %
10 SYRINGE (ML) INJECTION AS NEEDED
Status: DISCONTINUED | OUTPATIENT
Start: 2023-10-31 | End: 2023-11-01 | Stop reason: HOSPADM

## 2023-10-31 RX ORDER — HYDRALAZINE HYDROCHLORIDE 20 MG/ML
5 INJECTION INTRAMUSCULAR; INTRAVENOUS
Status: DISCONTINUED | OUTPATIENT
Start: 2023-10-31 | End: 2023-10-31 | Stop reason: HOSPADM

## 2023-10-31 RX ORDER — DROPERIDOL 2.5 MG/ML
0.62 INJECTION, SOLUTION INTRAMUSCULAR; INTRAVENOUS
Status: DISCONTINUED | OUTPATIENT
Start: 2023-10-31 | End: 2023-10-31 | Stop reason: HOSPADM

## 2023-10-31 RX ORDER — BUDESONIDE AND FORMOTEROL FUMARATE DIHYDRATE 160; 4.5 UG/1; UG/1
2 AEROSOL RESPIRATORY (INHALATION) 2 TIMES DAILY
Status: DISCONTINUED | OUTPATIENT
Start: 2023-10-31 | End: 2023-11-01 | Stop reason: HOSPADM

## 2023-10-31 RX ORDER — IPRATROPIUM BROMIDE AND ALBUTEROL SULFATE 2.5; .5 MG/3ML; MG/3ML
3 SOLUTION RESPIRATORY (INHALATION) ONCE AS NEEDED
Status: COMPLETED | OUTPATIENT
Start: 2023-10-31 | End: 2023-10-31

## 2023-10-31 RX ORDER — ONDANSETRON 2 MG/ML
4 INJECTION INTRAMUSCULAR; INTRAVENOUS ONCE AS NEEDED
Status: DISCONTINUED | OUTPATIENT
Start: 2023-10-31 | End: 2023-10-31 | Stop reason: HOSPADM

## 2023-10-31 RX ORDER — ACETAMINOPHEN 500 MG
1000 TABLET ORAL ONCE
Status: COMPLETED | OUTPATIENT
Start: 2023-10-31 | End: 2023-10-31

## 2023-10-31 RX ORDER — DIPHENHYDRAMINE HYDROCHLORIDE 50 MG/ML
12.5 INJECTION INTRAMUSCULAR; INTRAVENOUS
Status: DISCONTINUED | OUTPATIENT
Start: 2023-10-31 | End: 2023-10-31 | Stop reason: HOSPADM

## 2023-10-31 RX ORDER — FENTANYL CITRATE 50 UG/ML
50 INJECTION, SOLUTION INTRAMUSCULAR; INTRAVENOUS ONCE AS NEEDED
Status: COMPLETED | OUTPATIENT
Start: 2023-10-31 | End: 2023-10-31

## 2023-10-31 RX ORDER — ROPIVACAINE HYDROCHLORIDE 5 MG/ML
INJECTION, SOLUTION EPIDURAL; INFILTRATION; PERINEURAL
Status: COMPLETED | OUTPATIENT
Start: 2023-10-31 | End: 2023-10-31

## 2023-10-31 RX ORDER — FENTANYL CITRATE 50 UG/ML
25 INJECTION, SOLUTION INTRAMUSCULAR; INTRAVENOUS
Status: DISCONTINUED | OUTPATIENT
Start: 2023-10-31 | End: 2023-10-31 | Stop reason: HOSPADM

## 2023-10-31 RX ORDER — LIDOCAINE HYDROCHLORIDE 20 MG/ML
INJECTION, SOLUTION INFILTRATION; PERINEURAL AS NEEDED
Status: DISCONTINUED | OUTPATIENT
Start: 2023-10-31 | End: 2023-10-31 | Stop reason: SURG

## 2023-10-31 RX ORDER — HYDROMORPHONE HYDROCHLORIDE 1 MG/ML
0.5 INJECTION, SOLUTION INTRAMUSCULAR; INTRAVENOUS; SUBCUTANEOUS
Status: DISCONTINUED | OUTPATIENT
Start: 2023-10-31 | End: 2023-11-01 | Stop reason: HOSPADM

## 2023-10-31 RX ORDER — FAMOTIDINE 20 MG/1
20 TABLET, FILM COATED ORAL DAILY
Status: DISCONTINUED | OUTPATIENT
Start: 2023-10-31 | End: 2023-11-01 | Stop reason: HOSPADM

## 2023-10-31 RX ORDER — BUDESONIDE 0.5 MG/2ML
0.5 INHALANT ORAL
Status: DISCONTINUED | OUTPATIENT
Start: 2023-10-31 | End: 2023-11-01

## 2023-10-31 RX ORDER — FLUMAZENIL 0.1 MG/ML
0.2 INJECTION INTRAVENOUS AS NEEDED
Status: DISCONTINUED | OUTPATIENT
Start: 2023-10-31 | End: 2023-10-31 | Stop reason: HOSPADM

## 2023-10-31 RX ORDER — HYDROCODONE BITARTRATE AND ACETAMINOPHEN 7.5; 325 MG/1; MG/1
1 TABLET ORAL EVERY 4 HOURS PRN
Status: DISCONTINUED | OUTPATIENT
Start: 2023-10-31 | End: 2023-11-01 | Stop reason: HOSPADM

## 2023-10-31 RX ORDER — ALBUTEROL SULFATE 90 UG/1
2 AEROSOL, METERED RESPIRATORY (INHALATION) EVERY 4 HOURS PRN
Status: DISCONTINUED | OUTPATIENT
Start: 2023-10-31 | End: 2023-11-01 | Stop reason: HOSPADM

## 2023-10-31 RX ORDER — PROPOFOL 10 MG/ML
INJECTION, EMULSION INTRAVENOUS AS NEEDED
Status: DISCONTINUED | OUTPATIENT
Start: 2023-10-31 | End: 2023-10-31 | Stop reason: SURG

## 2023-10-31 RX ORDER — VANCOMYCIN/0.9 % SOD CHLORIDE 1.5G/250ML
1500 PLASTIC BAG, INJECTION (ML) INTRAVENOUS ONCE
Status: COMPLETED | OUTPATIENT
Start: 2023-10-31 | End: 2023-10-31

## 2023-10-31 RX ORDER — HYDROCODONE BITARTRATE AND ACETAMINOPHEN 5; 325 MG/1; MG/1
1 TABLET ORAL ONCE AS NEEDED
Status: DISCONTINUED | OUTPATIENT
Start: 2023-10-31 | End: 2023-10-31 | Stop reason: HOSPADM

## 2023-10-31 RX ORDER — LABETALOL HYDROCHLORIDE 5 MG/ML
5 INJECTION, SOLUTION INTRAVENOUS
Status: DISCONTINUED | OUTPATIENT
Start: 2023-10-31 | End: 2023-10-31 | Stop reason: HOSPADM

## 2023-10-31 RX ORDER — MONTELUKAST SODIUM 10 MG/1
10 TABLET ORAL NIGHTLY
Status: DISCONTINUED | OUTPATIENT
Start: 2023-10-31 | End: 2023-11-01 | Stop reason: HOSPADM

## 2023-10-31 RX ORDER — HYDROCODONE BITARTRATE AND ACETAMINOPHEN 7.5; 325 MG/1; MG/1
2 TABLET ORAL EVERY 4 HOURS PRN
Status: DISCONTINUED | OUTPATIENT
Start: 2023-10-31 | End: 2023-11-01 | Stop reason: HOSPADM

## 2023-10-31 RX ORDER — ALBUTEROL SULFATE 1.25 MG/3ML
3 SOLUTION RESPIRATORY (INHALATION) EVERY 6 HOURS PRN
Status: DISCONTINUED | OUTPATIENT
Start: 2023-10-31 | End: 2023-11-01 | Stop reason: HOSPADM

## 2023-10-31 RX ORDER — CEFAZOLIN SODIUM 2 G/100ML
2000 INJECTION, SOLUTION INTRAVENOUS EVERY 8 HOURS
Qty: 200 ML | Refills: 0 | Status: COMPLETED | OUTPATIENT
Start: 2023-10-31 | End: 2023-11-01

## 2023-10-31 RX ORDER — BUDESONIDE AND FORMOTEROL FUMARATE DIHYDRATE 160; 4.5 UG/1; UG/1
2 AEROSOL RESPIRATORY (INHALATION) 2 TIMES DAILY
Status: DISCONTINUED | OUTPATIENT
Start: 2023-10-31 | End: 2023-10-31 | Stop reason: SDUPTHER

## 2023-10-31 RX ORDER — DOCUSATE SODIUM 100 MG/1
100 CAPSULE, LIQUID FILLED ORAL 2 TIMES DAILY
Status: DISCONTINUED | OUTPATIENT
Start: 2023-10-31 | End: 2023-11-01 | Stop reason: HOSPADM

## 2023-10-31 RX ORDER — MAGNESIUM HYDROXIDE 1200 MG/15ML
LIQUID ORAL AS NEEDED
Status: DISCONTINUED | OUTPATIENT
Start: 2023-10-31 | End: 2023-10-31 | Stop reason: HOSPADM

## 2023-10-31 RX ORDER — ONDANSETRON 2 MG/ML
4 INJECTION INTRAMUSCULAR; INTRAVENOUS EVERY 6 HOURS PRN
Status: DISCONTINUED | OUTPATIENT
Start: 2023-10-31 | End: 2023-11-01 | Stop reason: HOSPADM

## 2023-10-31 RX ORDER — LIDOCAINE HYDROCHLORIDE 10 MG/ML
0.5 INJECTION, SOLUTION INFILTRATION; PERINEURAL ONCE AS NEEDED
Status: COMPLETED | OUTPATIENT
Start: 2023-10-31 | End: 2023-10-31

## 2023-10-31 RX ORDER — NALOXONE HCL 0.4 MG/ML
0.2 VIAL (ML) INJECTION AS NEEDED
Status: DISCONTINUED | OUTPATIENT
Start: 2023-10-31 | End: 2023-10-31 | Stop reason: HOSPADM

## 2023-10-31 RX ORDER — ONDANSETRON 4 MG/1
4 TABLET, FILM COATED ORAL EVERY 6 HOURS PRN
Status: DISCONTINUED | OUTPATIENT
Start: 2023-10-31 | End: 2023-11-01 | Stop reason: HOSPADM

## 2023-10-31 RX ORDER — SPIRONOLACTONE 25 MG/1
25 TABLET ORAL DAILY
Status: DISCONTINUED | OUTPATIENT
Start: 2023-10-31 | End: 2023-11-01 | Stop reason: HOSPADM

## 2023-10-31 RX ORDER — PROMETHAZINE HYDROCHLORIDE 25 MG/1
25 SUPPOSITORY RECTAL ONCE AS NEEDED
Status: DISCONTINUED | OUTPATIENT
Start: 2023-10-31 | End: 2023-10-31 | Stop reason: HOSPADM

## 2023-10-31 RX ORDER — PROMETHAZINE HYDROCHLORIDE 25 MG/1
25 TABLET ORAL ONCE AS NEEDED
Status: DISCONTINUED | OUTPATIENT
Start: 2023-10-31 | End: 2023-10-31 | Stop reason: HOSPADM

## 2023-10-31 RX ORDER — FENTANYL CITRATE 50 UG/ML
INJECTION, SOLUTION INTRAMUSCULAR; INTRAVENOUS AS NEEDED
Status: DISCONTINUED | OUTPATIENT
Start: 2023-10-31 | End: 2023-10-31 | Stop reason: SURG

## 2023-10-31 RX ORDER — ACETAMINOPHEN 500 MG
500 TABLET ORAL 2 TIMES DAILY PRN
Status: DISCONTINUED | OUTPATIENT
Start: 2023-10-31 | End: 2023-11-01 | Stop reason: HOSPADM

## 2023-10-31 RX ORDER — HYDROMORPHONE HYDROCHLORIDE 1 MG/ML
0.25 INJECTION, SOLUTION INTRAMUSCULAR; INTRAVENOUS; SUBCUTANEOUS
Status: DISCONTINUED | OUTPATIENT
Start: 2023-10-31 | End: 2023-10-31 | Stop reason: HOSPADM

## 2023-10-31 RX ORDER — METOPROLOL SUCCINATE 100 MG/1
100 TABLET, EXTENDED RELEASE ORAL DAILY
Status: DISCONTINUED | OUTPATIENT
Start: 2023-11-01 | End: 2023-11-01

## 2023-10-31 RX ORDER — POLYETHYLENE GLYCOL 3350 17 G/17G
17 POWDER, FOR SOLUTION ORAL DAILY
Status: DISCONTINUED | OUTPATIENT
Start: 2023-11-01 | End: 2023-11-01 | Stop reason: HOSPADM

## 2023-10-31 RX ORDER — NALOXONE HCL 0.4 MG/ML
0.1 VIAL (ML) INJECTION
Status: DISCONTINUED | OUTPATIENT
Start: 2023-10-31 | End: 2023-11-01 | Stop reason: HOSPADM

## 2023-10-31 RX ORDER — LOSARTAN POTASSIUM 100 MG/1
100 TABLET ORAL DAILY
Status: DISCONTINUED | OUTPATIENT
Start: 2023-10-31 | End: 2023-11-01 | Stop reason: HOSPADM

## 2023-10-31 RX ORDER — SODIUM CHLORIDE 9 MG/ML
40 INJECTION, SOLUTION INTRAVENOUS AS NEEDED
Status: DISCONTINUED | OUTPATIENT
Start: 2023-10-31 | End: 2023-11-01 | Stop reason: HOSPADM

## 2023-10-31 RX ORDER — BISACODYL 10 MG
10 SUPPOSITORY, RECTAL RECTAL DAILY PRN
Status: DISCONTINUED | OUTPATIENT
Start: 2023-10-31 | End: 2023-11-01 | Stop reason: HOSPADM

## 2023-10-31 RX ORDER — CEFAZOLIN SODIUM 2 G/100ML
2 INJECTION, SOLUTION INTRAVENOUS ONCE
Status: COMPLETED | OUTPATIENT
Start: 2023-10-31 | End: 2023-10-31

## 2023-10-31 RX ORDER — PREGABALIN 75 MG/1
150 CAPSULE ORAL ONCE
Status: COMPLETED | OUTPATIENT
Start: 2023-10-31 | End: 2023-10-31

## 2023-10-31 RX ORDER — SODIUM CHLORIDE, SODIUM LACTATE, POTASSIUM CHLORIDE, CALCIUM CHLORIDE 600; 310; 30; 20 MG/100ML; MG/100ML; MG/100ML; MG/100ML
9 INJECTION, SOLUTION INTRAVENOUS CONTINUOUS
Status: DISCONTINUED | OUTPATIENT
Start: 2023-10-31 | End: 2023-10-31

## 2023-10-31 RX ORDER — SODIUM CHLORIDE 0.9 % (FLUSH) 0.9 %
3 SYRINGE (ML) INJECTION EVERY 12 HOURS SCHEDULED
Status: DISCONTINUED | OUTPATIENT
Start: 2023-10-31 | End: 2023-10-31 | Stop reason: HOSPADM

## 2023-10-31 RX ORDER — TRANEXAMIC ACID 100 MG/ML
INJECTION, SOLUTION INTRAVENOUS AS NEEDED
Status: DISCONTINUED | OUTPATIENT
Start: 2023-10-31 | End: 2023-10-31 | Stop reason: SURG

## 2023-10-31 RX ADMIN — ACETAMINOPHEN 1000 MG: 500 TABLET ORAL at 06:13

## 2023-10-31 RX ADMIN — ROCURONIUM BROMIDE 50 MG: 10 INJECTION, SOLUTION INTRAVENOUS at 07:13

## 2023-10-31 RX ADMIN — SODIUM CHLORIDE, POTASSIUM CHLORIDE, SODIUM LACTATE AND CALCIUM CHLORIDE 100 ML/HR: 600; 310; 30; 20 INJECTION, SOLUTION INTRAVENOUS at 12:27

## 2023-10-31 RX ADMIN — ONDANSETRON 4 MG: 2 INJECTION INTRAMUSCULAR; INTRAVENOUS at 08:34

## 2023-10-31 RX ADMIN — FENTANYL CITRATE 25 MCG: 50 INJECTION, SOLUTION INTRAMUSCULAR; INTRAVENOUS at 10:03

## 2023-10-31 RX ADMIN — HYDROMORPHONE HYDROCHLORIDE 0.25 MG: 1 INJECTION, SOLUTION INTRAMUSCULAR; INTRAVENOUS; SUBCUTANEOUS at 10:11

## 2023-10-31 RX ADMIN — TRANEXAMIC ACID 1000 MG: 100 INJECTION INTRAVENOUS at 08:29

## 2023-10-31 RX ADMIN — HYDROCODONE BITARTRATE AND ACETAMINOPHEN 1 TABLET: 7.5; 325 TABLET ORAL at 10:06

## 2023-10-31 RX ADMIN — LIDOCAINE HYDROCHLORIDE 0.5 ML: 10 INJECTION, SOLUTION INFILTRATION; PERINEURAL at 06:35

## 2023-10-31 RX ADMIN — Medication 3 ML: at 17:23

## 2023-10-31 RX ADMIN — SODIUM CHLORIDE, POTASSIUM CHLORIDE, SODIUM LACTATE AND CALCIUM CHLORIDE: 600; 310; 30; 20 INJECTION, SOLUTION INTRAVENOUS at 07:02

## 2023-10-31 RX ADMIN — VANCOMYCIN HYDROCHLORIDE 1500 MG: 10 INJECTION, POWDER, LYOPHILIZED, FOR SOLUTION INTRAVENOUS at 06:41

## 2023-10-31 RX ADMIN — PROPOFOL 130 MG: 10 INJECTION, EMULSION INTRAVENOUS at 07:12

## 2023-10-31 RX ADMIN — CEFAZOLIN SODIUM 2 G: 2 INJECTION, SOLUTION INTRAVENOUS at 07:17

## 2023-10-31 RX ADMIN — GABAPENTIN 300 MG: 300 CAPSULE ORAL at 21:16

## 2023-10-31 RX ADMIN — ROPIVACAINE HYDROCHLORIDE 15 ML: 5 INJECTION EPIDURAL; INFILTRATION; PERINEURAL at 06:50

## 2023-10-31 RX ADMIN — FENTANYL CITRATE 50 MCG: 50 INJECTION, SOLUTION INTRAMUSCULAR; INTRAVENOUS at 07:34

## 2023-10-31 RX ADMIN — HYDROMORPHONE HYDROCHLORIDE 0.25 MG: 1 INJECTION, SOLUTION INTRAMUSCULAR; INTRAVENOUS; SUBCUTANEOUS at 10:23

## 2023-10-31 RX ADMIN — PROPOFOL 25 MCG/KG/MIN: 10 INJECTION, EMULSION INTRAVENOUS at 07:21

## 2023-10-31 RX ADMIN — SODIUM CHLORIDE, POTASSIUM CHLORIDE, SODIUM LACTATE AND CALCIUM CHLORIDE 500 ML: 600; 310; 30; 20 INJECTION, SOLUTION INTRAVENOUS at 06:35

## 2023-10-31 RX ADMIN — Medication 3 ML: at 21:08

## 2023-10-31 RX ADMIN — FENTANYL CITRATE 50 MCG: 50 INJECTION, SOLUTION INTRAMUSCULAR; INTRAVENOUS at 06:51

## 2023-10-31 RX ADMIN — HYDROCODONE BITARTRATE AND ACETAMINOPHEN 1 TABLET: 7.5; 325 TABLET ORAL at 16:59

## 2023-10-31 RX ADMIN — FAMOTIDINE 20 MG: 20 TABLET, FILM COATED ORAL at 17:04

## 2023-10-31 RX ADMIN — IPRATROPIUM BROMIDE AND ALBUTEROL SULFATE 3 ML: .5; 3 SOLUTION RESPIRATORY (INHALATION) at 09:39

## 2023-10-31 RX ADMIN — PREGABALIN 150 MG: 75 CAPSULE ORAL at 06:13

## 2023-10-31 RX ADMIN — SPIRONOLACTONE 25 MG: 25 TABLET ORAL at 12:45

## 2023-10-31 RX ADMIN — CEFAZOLIN SODIUM 2000 MG: 2 INJECTION, SOLUTION INTRAVENOUS at 17:04

## 2023-10-31 RX ADMIN — DEXAMETHASONE SODIUM PHOSPHATE 8 MG: 4 INJECTION, SOLUTION INTRAMUSCULAR; INTRAVENOUS at 07:18

## 2023-10-31 RX ADMIN — HYDROCODONE BITARTRATE AND ACETAMINOPHEN 2 TABLET: 7.5; 325 TABLET ORAL at 21:16

## 2023-10-31 RX ADMIN — FENTANYL CITRATE 50 MCG: 50 INJECTION, SOLUTION INTRAMUSCULAR; INTRAVENOUS at 07:47

## 2023-10-31 RX ADMIN — SUGAMMADEX 200 MG: 100 INJECTION, SOLUTION INTRAVENOUS at 08:50

## 2023-10-31 RX ADMIN — FENTANYL CITRATE 25 MCG: 50 INJECTION, SOLUTION INTRAMUSCULAR; INTRAVENOUS at 09:52

## 2023-10-31 RX ADMIN — LIDOCAINE HYDROCHLORIDE 80 MG: 20 INJECTION, SOLUTION INFILTRATION; PERINEURAL at 07:12

## 2023-10-31 RX ADMIN — DOCUSATE SODIUM 100 MG: 100 CAPSULE, LIQUID FILLED ORAL at 17:04

## 2023-10-31 RX ADMIN — BUDESONIDE 0.5 MG: 0.5 INHALANT ORAL at 21:30

## 2023-10-31 NOTE — THERAPY EVALUATION
Patient Name: Julia Rios  : 1945    MRN: [id number]                              Today's Date: 10/31/2023       Admit Date: 10/31/2023    Visit Dx:     ICD-10-CM ICD-9-CM   1. Arthritis of knee  M17.10 716.96     Patient Active Problem List   Diagnosis    Family history of colon cancer    Chronic obstructive pulmonary disease    Essential hypertension    Hyperlipidemia    CKD (chronic kidney disease) stage 3, GFR 30-59 ml/min    Chronic heart failure with preserved ejection fraction    CARMEN (obstructive sleep apnea)    Seasonal allergies    Gastroesophageal reflux disease    History of fungal pneumonia    Bronchitis, mucopurulent recurrent    Tobacco abuse, in remission    Pulmonary nodule    PND (post-nasal drip)    Status post fall    Allergic rhinitis    Asthma    Closed fracture of metatarsal bone    Diverticulitis    Hemoptysis    Idiopathic chronic gout of multiple sites without tophus    Primary localized osteoarthritis    Vitamin D deficiency    Morbid (severe) obesity due to excess calories    Umbilical hernia without obstruction and without gangrene    Oral thrush    Arthritis of knee     Past Medical History:   Diagnosis Date    Acute right-sided low back pain with right-sided sciatica 01/15/2018    Allergic rhinitis     Asthma     Back pain     2 bulging disc L2 L3    Chronic heart failure with preserved ejection fraction     20 echocardiogram: 1.  Normal ejection fraction of 55%. 2.  Mild left ventricular hypertrophy. 3.  No significant valvular heart issues.     CKD stage 3 10/29/2019    COPD (chronic obstructive pulmonary disease)     Diverticulitis     Essential hypertension     GERD (gastroesophageal reflux disease)     Hemoptysis     non cancerous    Hyperlipidemia     Idiopathic chronic gout of multiple sites without tophus 2016    Primary osteoarthritis of right knee 2017    Right knee pain     Sleep apnea     CPAP    Vitamin D deficiency 2016     Past  Surgical History:   Procedure Laterality Date    CATARACT EXTRACTION Right     COLONOSCOPY  2014    COLONOSCOPY N/A 10/12/2021    Procedure: COLONOSCOPY;  Surgeon: Jorge Diane MD;  Location: [facility] ENDOSCOPY;  Service: Gastroenterology;  Laterality: N/A;  DIVERTICULOSIS    ENDOSCOPY  2015    EYE SURGERY      cataract right eye    OTHER SURGICAL HISTORY      Fatty tumor removed off back    RETINAL DETACHMENT REPAIR Right     hole in retina repair      General Information       Row Name 10/31/23 1350          Physical Therapy Time and Intention    Document Type evaluation  -MS     Mode of Treatment physical therapy  -MS       Row Name 10/31/23 1350          General Information    Patient Profile Reviewed yes  -MS     Prior Level of Function independent:;all household mobility  access to RW and rollator, no fall hx  -MS     Existing Precautions/Restrictions fall  -MS     Barriers to Rehab none identified  -MS       Row Name 10/31/23 1350          Living Environment    People in Home spouse  -MS       Row Name 10/31/23 1350          Home Main Entrance    Stair Railings, Main Entrance none  -MS       Row Name 10/31/23 1350          Stairs Within Home, Primary    Number of Stairs, Within Home, Primary none  -MS       Row Name 10/31/23 1350          Cognition    Orientation Status (Cognition) oriented x 4  -MS       Row Name 10/31/23 1350          Safety Issues, Functional Mobility    Safety Issues Affecting Function (Mobility) judgment;positioning of assistive device;sequencing abilities  -MS     Impairments Affecting Function (Mobility) strength;endurance/activity tolerance;range of motion (ROM);pain;postural/trunk control;balance;sensation/sensory awareness  -MS     Comment, Safety Issues/Impairments (Mobility) Gait belt and non skid socks donned.  -MS               User Key  (r) = Recorded By, (t) = Taken By, (c) = Cosigned By      Initials Name Provider Type    Allegar Cunha, PT Physical Therapist                    Mobility       Row Name 10/31/23 [facility]          Bed Mobility    Bed Mobility supine-sit  -MS     Supine-Sit [place] (Bed Mobility) standby assist;verbal cues  -MS     Assistive Device (Bed Mobility) bed rails;head of bed elevated  -MS     Comment, (Bed Mobility) SV for sitting balance, incr time required.  -MS       Row Name 10/31/23 1351          Transfers    Comment, (Transfers) Sequencing and hand placement  cues.  -MS       Row Name 10/31/23 [facility]          Sit-Stand Transfer    Sit-Stand [place] (Transfers) contact guard;minimum assist (75% patient effort);verbal cues  -MS     Assistive Device (Sit-Stand Transfers) walker, front-wheeled  -MS       Row Name 10/31/23 [facility]          Gait/Stairs (Locomotion)    [place] Level (Gait) contact guard;verbal cues  -MS     Assistive Device (Gait) walker, front-wheeled  -MS     Patient was able to Ambulate yes  -MS     Distance in Feet (Gait) 12  -MS     Deviations/Abnormal Patterns (Gait) denis decreased;gait speed decreased;antalgic  -MS     Right Sided Gait Deviations weight shift ability decreased  -MS     Comment, (Gait/Stairs) Pain limiting, sequencing cues, 3 standing rest breaks, slowed denis, no overt LOB.  -MS       Row Name 10/31/23 [facility]          Mobility    Extremity Weight-bearing Status right lower extremity  -MS     Right Lower Extremity (Weight-bearing Status) weight-bearing as tolerated (WBAT)  -MS               User Key  (r) = Recorded By, (t) = Taken By, (c) = Cosigned By      Initials Name Provider Type    MS Allegra Colbert, PT Physical Therapist                   Obj/Interventions       Row Name 10/31/23 [facility]          Range of Motion Comprehensive    Comment, General Range of Motion R LE limited 2/2 pain  -MS       Row Name 10/31/23 [facility]          Strength Comprehensive (MMT)    Comment, General Manual Muscle Testing (MMT) Assessment R LE post op weakness  -MS       Row Name 10/31/23 1352          Motor Skills     Therapeutic Exercise --  R TKA protocol x 10 reps, incr time needed  -MS       Row Name 10/31/23 1358          Balance    Balance Assessment sitting static balance;standing static balance  -MS     Static Sitting Balance standby assist  -MS     Position, Sitting Balance sitting edge of bed  -MS     Static Standing Balance contact guard  -MS     Position/Device Used, Standing Balance supported;walker, rolling  -MS       Row Name 10/31/23 1355          Sensory Assessment (Somatosensory)    Sensory Assessment (Somatosensory) other (see comments)  R LE numbness reported.  -MS               User Key  (r) = Recorded By, (t) = Taken By, (c) = Cosigned By      Initials Name Provider Type    MS SayraAllegra, PT Physical Therapist                   Goals/Plan       Row Name 10/31/23 8775          Bed Mobility Goal 1 (PT)    Activity/Assistive Device (Bed Mobility Goal 1, PT) bed mobility activities, all  -MS     [place] Level/Cues Needed (Bed Mobility Goal 1, PT) supervision required  -MS     Time Frame (Bed Mobility Goal 1, PT) 1 week  -MS       Row Name 10/31/23 8415          Transfer Goal 1 (PT)    Activity/Assistive Device (Transfer Goal 1, PT) transfers, all;walker, rolling  -MS     [place] Level/Cues Needed (Transfer Goal 1, PT) supervision required  -MS     Time Frame (Transfer Goal 1, PT) 1 week  -MS       Row Name 10/31/23 3891          Gait Training Goal 1 (PT)    Activity/Assistive Device (Gait Training Goal 1, PT) gait (walking locomotion);walker, rolling  -MS     [place] Level (Gait Training Goal 1, PT) supervision required  -MS     Distance (Gait Training Goal 1, PT) 100'  -MS     Time Frame (Gait Training Goal 1, PT) 1 week  -MS       Row Name 10/31/23 4612          Therapy Assessment/Plan (PT)    Planned Therapy Interventions (PT) bed mobility training;balance training;gait training;home exercise program;patient/family education;postural re-education;stair training;strengthening;ROM (range  of motion);transfer training  -MS               User Key  (r) = Recorded By, (t) = Taken By, (c) = Cosigned By      Initials Name Provider Type    Allegra Cunha, PT Physical Therapist                   Clinical Impression       Row Name 10/31/23 1353          Pain    Pretreatment Pain Rating 7/10  -MS     Posttreatment Pain Rating 7/10  -MS     Pain Location - Side/Orientation Right  -MS     Pain Location incisional  -MS     Pain Location - knee  -MS     Pain Intervention(s) Repositioned;Ambulation/increased activity;Rest;Cold applied  -MS       Row Name 10/31/23 1353          Therapy Assessment/Plan (PT)    Rehab Potential (PT) good, to achieve stated therapy goals  -MS     Criteria for Skilled Interventions Met (PT) meets criteria;yes  -MS     Therapy Frequency (PT) daily  -MS       Row Name 10/31/23 1353          Vital Signs    Pre SpO2 (%) 94  -MS     O2 Delivery Pre Treatment supplemental O2  -MS     Intra SpO2 (%) 94  -MS     O2 Delivery Intra Treatment room air  -MS     Post SpO2 (%) 94  -MS     O2 Delivery Post Treatment supplemental O2  -MS       Row Name 10/31/23 1356          Positioning and Restraints    Pre-Treatment Position in bed  -MS     Post Treatment Position chair  -MS     In Chair notified nsg;reclined;call light within reach;encouraged to call for assist;exit alarm on  -MS               User Key  (r) = Recorded By, (t) = Taken By, (c) = Cosigned By      Initials Name Provider Type    Allegra Cunha, PT Physical Therapist                   Outcome Measures       Row Name 10/31/23 7355 10/31/23 1155       How much help from another person do you currently need...    Turning from your back to your side while in flat bed without using bedrails? 3  -MS 3  -DD    Moving from lying on back to sitting on the side of a flat bed without bedrails? 3  -MS 3  -DD    Moving to and from a bed to a chair (including a wheelchair)? 3  -MS 3  -DD    Standing up from a chair using your arms (e.g.,  wheelchair, bedside chair)? 3  -MS 3  -DD    Climbing 3-5 steps with a railing? 2  -MS 2  -DD    To walk in hospital room? 3  -MS 3  -DD    AM-PAC 6 Clicks Score (PT) 17  -MS 17  -DD    Highest level of mobility 5 --> Static standing  -MS 5 --> Static standing  -DD              User Key  (r) = Recorded By, (t) = Taken By, (c) = Cosigned By      Initials Name Provider Type    MS Allegra Colbert, PT Physical Therapist    Amanda Bower, RN Registered Nurse                                 Physical Therapy Education       Title: PT OT SLP Therapies (In Progress)       Topic: Physical Therapy (In Progress)       Point: Mobility training (In Progress)       Learning Progress Summary             Patient Acceptance, E,TB, NR by MS at 10/31/2023 1355                         Point: Home exercise program (In Progress)       Learning Progress Summary             Patient Acceptance, E,TB, NR by MS at 10/31/2023 1355                         Point: Body mechanics (In Progress)       Learning Progress Summary             Patient Acceptance, E,TB, NR by MS at 10/31/2023 1355                         Point: Precautions (In Progress)       Learning Progress Summary             Patient Acceptance, E,TB, NR by MS at 10/31/2023 1355                                         User Key       Initials Effective Dates Name Provider Type Discipline    MS 06/16/21 -  Allegra Colbert, PT Physical Therapist PT                  PT Recommendation and Plan  Planned Therapy Interventions (PT): bed mobility training, balance training, gait training, home exercise program, patient/family education, postural re-education, stair training, strengthening, ROM (range of motion), transfer training        Time Calculation:         PT Charges       Row Name 10/31/23 1358             Time Calculation    Start Time 1333  -MS      Stop Time 1358  -MS      Time Calculation (min) 25 min  -MS      PT Received On 10/31/23  -MS      PT - Next Appointment  11/01/23  -MS      PT Goal Re-Cert Due Date 11/07/23  -MS                User Key  (r) = Recorded By, (t) = Taken By, (c) = Cosigned By      Initials Name Provider Type    Allegra Cunha, PT Physical Therapist                  Therapy Charges for Today       Code Description Service Date Service Provider Modifiers Qty    18396144717 HC PT EVAL LOW COMPLEXITY 2 10/31/2023 Allegra Colbert, PT GP 1    63681021788 HC PT THER PROC EA 15 MIN 10/31/2023 Allerga Colbert, PT GP 1            PT G-Codes  AM-PAC 6 Clicks Score (PT): 17  PT Discharge Summary  Anticipated Discharge Disposition (PT): home with home health, home with assist    Allegra Colbert, PT  10/31/2023

## 2023-10-31 NOTE — OP NOTE
Orthopaedic Operative Note    Facility:     Patient: Julia Rios    Medical Record Number: [id number]    YOB: 1945    Dictating Surgeon: Celso Prakash M.D.*    Primary Care Physician: Nick Patel DO    Date of Operation: 10/31/2023    Pre-Operative Diagnosis:  Right knee end-stage osteoarthritis    Post-Operative Diagnosis:  Right knee end-stage osteoarthritis    Procedure Performed:   Right total knee arthroplasty    Surgeon: Celso Prakash MD     Assistant: Alma Alexander whose assistance was critical for help with patient positioning, suctioning and irrigation, retraction, manipulation of the extremity for insertion of the implants, wound closure and application of the bandages.  Her assistance was critical to the success of this case.      Anesthesia: Regional followed by general.  Local administration of Ortho cocktail solution.    Complications: None.     Estimated Blood Loss: Less than 50 mL.     Implants:     1.  Smith & Nephew size 4 posterior stabilized Legion Oxinium femoral component  2.  Smith and Nephew size 3 tibial component with size 18 mm polyethylene liner  3.  Smith & Nephew size 29 x 7.5 mm patellar component    Specimens: * No orders in the log *    Brief Operative Indication:  Ms. Rios has a history of worsening right knee osteoarthritis which had been refractory to prolonged conservative treatment.  The risk, benefits and alternatives to a total knee arthroplasty were discussed with the patient in detail.  She acknowledged understanding of the information and consented to proceed.    Description of the procedure in detail: The patient and operative site were identified in the preoperative holding area.  The surgical site was marked.  Adequate regional anesthesia of the right lower extremity was administered. She was then taken to the operating room.  Adequate general anesthesia was administered. She was then repositioned  on the operating table in the supine position.  A timeout was taken and preoperative antibiotics administered.    The right lower extremity was prepped and draped in the standard, sterile fashion.  I began by cleaning the extremity with an alcohol solution.  A Hibiclens scrub was performed.  The extremity was then prepped with 2 ChloraPreps.  I allowed those to dry for 3 minutes before the draping procedure was carried out.  The leg was exsanguinated with an Esmarch bandage.  The tourniquet was inflated to 250 mmHg.  The leg was positioned at approximately 60 degrees of flexion across the knee in a DeMayo leg positioner.    I fashioned an approximately 8 cm incision anteriorly for a standard medial parapatellar approach.  Full-thickness medial and lateral skin flaps were developed.  The extensor mechanism was carefully exposed.  I performed a medial parapatellar arthrotomy, careful to maintain a cuff of tendinous tissue for later anatomic repair.  The joint was entered.  The infrapatellar fat pad was carefully removed.    Next, the anteromedial soft tissues were carefully elevated off of the anterior face of the tibia.  The MCL was kept protected at all times.  At this point, the joint was inspected.  There was marked arthrosis throughout the joint.  The periarticular osteophytes were carefully removed with a rongeur.    An opening was created in the distal femur.  The wound was irrigated out and then the distal femur alignment noe was inserted down the canal.  A 5 degree valgus distal femoral cutting guide was pinned into position and then the distal femoral cut carried out in the typical fashion.  I inspected and measured to make sure the cut was appropriate.  The cut portion of bone was removed followed by the guide.    Next, the knee was flexed up further to allow for insertion of the posterior referencing guide.  I measured the distal femur.  I determined the appropriate size as referenced off of the posterior  condyles.  The femur measured a size 4.  The guide was positioned, taking care to align this properly and then the anterior, posterior and chamfer cuts were carried out.  The cut portions of bone were then removed.      I then directed my attention to the tibia.  Retractors were positioned to keep the collateral ligaments, PCL and posterior neurovascular structures protected.  The extramedullary guide was positioned.  I took care to align the noe with the anterior face of the tibia.  I made sure that this was parallel and that the guide was centered at the knee and ankle.  I measured and carefully positioned the guide to allow for correction of the preoperative deformity.  I pinned the guide and then checked the alignment one more time with an arabella wing.  Once we had the guide in good position and secure, an oscillating saw was used to carry out the proximal tibia cut.  The cut portion of bone was removed and the PCL inspected.  The PCL was incompetent and I determined that placement of a cruciate substituting implant was necessary.  The menisci were removed and the posterior capsule was infiltrated with some of the Ortho cocktail solution.    Next, I measured the proximal tibia cut.  This measured a 3.  The trial implant was pinned into position, taking care to maintain appropriate rotation.  The proximal tibial preparations were then completed.  I then trialed with a size 4 femur and size 3 tibia.  The knee seemed to be well balanced and demonstrated excellent motion with a size 18 mm trial polyethylene liner.    I then examined the patella.  The patella demonstrated extensive arthrosis.  I determined that resurfacing was indicated.  The patellar preparations were carried out at this time and then I trialed with a size 29 patella.  With this implant in place, the patella tracked well.  A lateral release was deemed unnecessary in this case.    The final preparations were then completed.  The distal femur was  prepared and then the trial implants were removed.  The appropriate size implants were opened at this point.  My assistant mixed the bone cement on the back table using current generation cement mixing technique and a centrifuge.  Once the cement was prepared, cement was applied to the bony surfaces and implants.  The implants were carefully impacted into position.  I made sure that these were fully seated.  The excess, extruded cement was carefully removed with a Cerulean elevator.  The knee was taken out into full extension and the trial polyethylene liner inserted.  The patella was then clamped into position.  Again, the excess, extruded cement was removed.  The knee was left in extension with the patella clamped until the cement had fully cured.    While the cement was curing, the periarticular soft tissue structures were carefully infiltrated with the Ortho cocktail solution.  Once the cement had fully cured, I again checked the balancing of the knee.  Again, the knee demonstrated excellent motion and stability with the 18 mm trial liner.  The trial was removed.  The final implant was impacted into position.  I took care to make sure that the dovetails were fully interdigitated.  Again her knee was carried through range of motion.  The patella tracked well and the knee demonstrated excellent motion and stability.    The wound was irrigated with 500 cc of a Betadine containing saline solution.  This was left in place for 3 minutes.  I then irrigated with 3 L of sterile saline via pulsatile lavage.  The tourniquet was deflated.  A gram of transexamic acid was administered.  I made sure that we had good hemostasis.  The parapatellar arthrotomy was anatomically repaired using a PDS Stratafix suture and multiple #1 Vicryl sutures.  The subcutaneous tissues were repaired using 2-0 Vicryl.  A running Stratafix Monocryl suture was used to close the skin followed by a Zipline adhesive.  Sterile dressings were applied.  The  drapes were withdrawn. She was awakened and taken to the recovery room in good condition.    Celso Prakash MD  10/31/23

## 2023-10-31 NOTE — ANESTHESIA PREPROCEDURE EVALUATION
Anesthesia Evaluation     Patient summary reviewed and Nursing notes reviewed   NPO Solid Status: > 8 hours  NPO Liquid Status: > 2 hours           Airway   Mallampati: II  TM distance: >3 FB  Neck ROM: full  Dental - normal exam     Pulmonary - normal exam   (+) COPD mild, asthma,sleep apnea  Cardiovascular - normal exam    ECG reviewed    (+) hypertension, hyperlipidemia  (-) valvular problems/murmurs, past MI, dysrhythmias, angina, cardiac stents    ROS comment: Heart failure with preserved ejection fraction 11/16/20 echocardiogram: 1.  Normal ejection fraction of 55%. 2.  Mild left ventricular hypertrophy. 3.  No significant valvular heart issues.    Neuro/Psych  (+) numbness  GI/Hepatic/Renal/Endo    (+) obesity, morbid obesity, GERD well controlled, renal disease- CRI    Musculoskeletal     (+) back pain  Abdominal    Substance History - negative use     OB/GYN          Other   arthritis,                   Anesthesia Plan    ASA 3     general with block     intravenous induction     Anesthetic plan, risks, benefits, and alternatives have been provided, discussed and informed consent has been obtained with: patient.    CODE STATUS:

## 2023-10-31 NOTE — ANESTHESIA PROCEDURE NOTES
Airway  Urgency: elective    Date/Time: 10/31/2023 7:15 AM  Airway not difficult    General Information and Staff    Patient location during procedure: OR  Anesthesiologist: Delvis Gray MD  CRNA/CAA: Catalina Duran CRNA    Indications and Patient Condition  Indications for airway management: airway protection    Preoxygenated: yes  MILS not maintained throughout  Mask difficulty assessment: 2 - vent by mask + OA or adjuvant +/- NMBA    Final Airway Details  Final airway type: endotracheal airway      Successful airway: ETT  Cuffed: yes   Successful intubation technique: direct laryngoscopy  Facilitating devices/methods: intubating stylet  Endotracheal tube insertion site: oral  Blade: Levy  Blade size: 2  ETT size (mm): 6.5  Cormack-Lehane Classification: grade I - full view of glottis  Placement verified by: chest auscultation and capnometry   Cuff volume (mL): 5  Measured from: lips  ETT/EBT  to lips (cm): 20  Number of attempts at approach: 1  Assessment: lips, teeth, and gum same as pre-op and atraumatic intubation    Additional Comments  Airway exam prior to DL, teeth/lips inspected. Preoxygenated with 100% O2; sniffing position, IV induction, eyes taped. Easy mask ventilation. Atraumatic intubation. ETT connected to vent. Confirmed EBBS, +EtCO2. Lips and teeth inspected, intact, no damage.

## 2023-10-31 NOTE — PLAN OF CARE
Goal Outcome Evaluation:      Patient is POD #0 for a right total knee arthroplasty.  Patient is alert x4.  Jessica dressing is dry, and intact.  Jessiac drain is flashing green.  WBAT. Patient has been bradycardic with complaints of dizziness.  Doctor was notified.  Order received for a consult to [facility].  Patient was able to walk from the stretcher to the bed.  Voiding function is intact.  Dilaudid, and norco were ordered for pain management.  Will continue to monitor and update accordingly.

## 2023-10-31 NOTE — ANESTHESIA POSTPROCEDURE EVALUATION
Patient: Julia Rios    Procedure Summary       Date: 10/31/23 Room / Location:  [facility] [address] [place] [address] [facility]    Anesthesia Start: 0702 Anesthesia Stop: 0911    Procedure: TOTAL KNEE ARTHROPLASTY (Right: Knee) Diagnosis:       Arthritis of knee      (Arthritis of knee [M17.10])    Surgeons: Celso Prakash MD Provider: Delvis Gray MD    Anesthesia Type: general with block ASA Status: 3            Anesthesia Type: general with block    Vitals  Vitals Value Taken Time   /55 10/31/23 1030   Temp 36.4 °C (97.5 °F) 10/31/23 0935   Pulse 53 10/31/23 1037   Resp 15 10/31/23 1000   SpO2 96 % 10/31/23 1037   Vitals shown include unfiled device data.        Post Anesthesia Care and Evaluation    Patient location during evaluation: bedside  Patient participation: complete - patient participated  Level of consciousness: awake and alert  Pain management: adequate    Airway patency: patent  Anesthetic complications: No anesthetic complications  PONV Status: controlled  Cardiovascular status: blood pressure returned to baseline and acceptable  Respiratory status: acceptable  Hydration status: acceptable

## 2023-10-31 NOTE — PLAN OF CARE
Goal Outcome Evaluation:      Patient is a pleasant 78 y.o. female POD0 R TKA with expected post op weakness and impaired functional mobility. Patient is independent at baseline and lives at home with spouse- use of rollator at home but also has access to a RW. Today, patient performed bed mobility with SBA, required CGA-Alvino for transfers, and ambulated 12' CGA with a RW- pain limiting and several standing rest breaks required. Patient will benefit from skilled PT services acutely to address functional deficits as well as improve level of independence prior to discharge. Anticipate home with  PT upon DC.        Anticipated Discharge Disposition (PT): home with home health, home with assist

## 2023-10-31 NOTE — BRIEF OP NOTE
TOTAL KNEE ARTHROPLASTY  Progress Note    Julia Rios  10/31/2023    Pre-op Diagnosis:   Arthritis of knee [M17.10]       Post-Op Diagnosis Codes:     * Arthritis of knee [M17.10]    Procedure/CPT® Codes:        Procedure(s):  TOTAL KNEE ARTHROPLASTY    Surgical Approach: Knee Medial Parapatellar            Surgeon(s):  Celso Prakash MD    Anesthesia: General with Block    Staff:   Circulator: Allegra Belle RN  Scrub Person: Olegario Drew  Vendor Representative: Sammy Levy         Estimated Blood Loss: minimal    Urine Voided: * No values recorded between 10/31/2023 12:00 AM and 10/31/2023  6:53 AM *    Specimens:                None          Drains: * No LDAs found *    Findings: see dictation        Complications: none          Celso Prakash MD     Date: 10/31/2023  Time: 0848

## 2023-10-31 NOTE — ANESTHESIA PROCEDURE NOTES
Peripheral Block    Pre-sedation assessment completed: 10/31/2023 6:48 AM    Patient reassessed immediately prior to procedure    Patient location during procedure: holding area  Start time: 10/31/2023 6:50 AM  Reason for block: at surgeon's request and post-op pain management  Performed by  Anesthesiologist: Delvis Gray MD  Preanesthetic Checklist  Completed: patient identified, IV checked, site marked, risks and benefits discussed, surgical consent, monitors and equipment checked, pre-op evaluation and timeout performed  Prep:  Pt Position: supine  Sterile barriers:cap, washed/disinfected hands, gloves, mask and alcohol skin prep  Prep: ChloraPrep  Patient monitoring: blood pressure monitoring, continuous pulse oximetry and EKG  Procedure    Sedation: yes  Performed under: local infiltration  Guidance:ultrasound guided    ULTRASOUND INTERPRETATION.  Using ultrasound guidance a 21 G gauge needle was placed in close proximity to the nerve, at which point, under ultrasound guidance anesthetic was injected in the area of the nerve and spread of the anesthesia was seen on ultrasound in close proximity thereto.  There were no abnormalities seen on ultrasound; a digital image was taken; and the patient tolerated the procedure with no complications. Images:still images obtained, printed/placed on chart    Laterality:right  Block Type:adductor canal block  Injection Technique:single-shot  Needle Type:echogenic and Tuohy  Needle Gauge:21 G  Resistance on Injection: none    Medications Used: ropivacaine (NAROPIN) 0.5 % injection - Injection   15 mL - 10/31/2023 6:50:00 AM      Post Assessment  Injection Assessment: negative aspiration for heme, no paresthesia on injection and incremental injection  Patient Tolerance:comfortable throughout block  Complications:no

## 2023-11-01 VITALS
BODY MASS INDEX: 42.63 KG/M2 | HEIGHT: 60 IN | TEMPERATURE: 98.3 F | RESPIRATION RATE: 16 BRPM | HEART RATE: 55 BPM | DIASTOLIC BLOOD PRESSURE: 70 MMHG | WEIGHT: 217.15 LBS | OXYGEN SATURATION: 95 % | SYSTOLIC BLOOD PRESSURE: 121 MMHG

## 2023-11-01 LAB
HCT VFR BLD AUTO: 32.1 % (ref 34–46.6)
HGB BLD-MCNC: 10.6 G/DL (ref 12–15.9)

## 2023-11-01 PROCEDURE — 63710000001 APIXABAN 2.5 MG TABLET: Performed by: ORTHOPAEDIC SURGERY

## 2023-11-01 PROCEDURE — 63710000001 DOCUSATE SODIUM 100 MG CAPSULE: Performed by: ORTHOPAEDIC SURGERY

## 2023-11-01 PROCEDURE — 97110 THERAPEUTIC EXERCISES: CPT

## 2023-11-01 PROCEDURE — 94799 UNLISTED PULMONARY SVC/PX: CPT

## 2023-11-01 PROCEDURE — 85018 HEMOGLOBIN: CPT | Performed by: ORTHOPAEDIC SURGERY

## 2023-11-01 PROCEDURE — A9270 NON-COVERED ITEM OR SERVICE: HCPCS | Performed by: ORTHOPAEDIC SURGERY

## 2023-11-01 PROCEDURE — 63710000001 POLYETHYLENE GLYCOL 17 G PACK: Performed by: ORTHOPAEDIC SURGERY

## 2023-11-01 PROCEDURE — 63710000001 MONTELUKAST 10 MG TABLET: Performed by: ORTHOPAEDIC SURGERY

## 2023-11-01 PROCEDURE — 94761 N-INVAS EAR/PLS OXIMETRY MLT: CPT

## 2023-11-01 PROCEDURE — 94664 DEMO&/EVAL PT USE INHALER: CPT

## 2023-11-01 PROCEDURE — 63710000001 HYDROCODONE-ACETAMINOPHEN 7.5-325 MG TABLET: Performed by: ORTHOPAEDIC SURGERY

## 2023-11-01 PROCEDURE — 97530 THERAPEUTIC ACTIVITIES: CPT

## 2023-11-01 PROCEDURE — 25010000002 CEFAZOLIN IN DEXTROSE 2-4 GM/100ML-% SOLUTION: Performed by: ORTHOPAEDIC SURGERY

## 2023-11-01 PROCEDURE — 63710000001 GABAPENTIN 300 MG CAPSULE: Performed by: ORTHOPAEDIC SURGERY

## 2023-11-01 PROCEDURE — 63710000001 BUDESONIDE-FORMOTEROL 160-4.5 MCG/ACT AEROSOL 6 G INHALER: Performed by: ORTHOPAEDIC SURGERY

## 2023-11-01 PROCEDURE — 63710000001 SPIRONOLACTONE 25 MG TABLET: Performed by: ORTHOPAEDIC SURGERY

## 2023-11-01 PROCEDURE — 85014 HEMATOCRIT: CPT | Performed by: ORTHOPAEDIC SURGERY

## 2023-11-01 PROCEDURE — 63710000001 FAMOTIDINE 20 MG TABLET: Performed by: ORTHOPAEDIC SURGERY

## 2023-11-01 RX ORDER — SPIRONOLACTONE 25 MG/1
25 TABLET ORAL DAILY
Start: 2023-11-01

## 2023-11-01 RX ORDER — HYDROCODONE BITARTRATE AND ACETAMINOPHEN 7.5; 325 MG/1; MG/1
1 TABLET ORAL EVERY 4 HOURS PRN
Qty: 42 TABLET | Refills: 0 | Status: SHIPPED | OUTPATIENT
Start: 2023-11-01 | End: 2023-11-10

## 2023-11-01 RX ORDER — ONDANSETRON 4 MG/1
4 TABLET, FILM COATED ORAL EVERY 6 HOURS PRN
Qty: 12 TABLET | Refills: 0 | Status: SHIPPED | OUTPATIENT
Start: 2023-11-01

## 2023-11-01 RX ORDER — PSEUDOEPHEDRINE HCL 30 MG
100 TABLET ORAL 2 TIMES DAILY
Qty: 60 CAPSULE | Refills: 0 | Status: SHIPPED | OUTPATIENT
Start: 2023-11-01

## 2023-11-01 RX ORDER — METOPROLOL SUCCINATE 100 MG/1
100 TABLET, EXTENDED RELEASE ORAL DAILY
Start: 2023-11-01

## 2023-11-01 RX ADMIN — DOCUSATE SODIUM 100 MG: 100 CAPSULE, LIQUID FILLED ORAL at 08:33

## 2023-11-01 RX ADMIN — APIXABAN 2.5 MG: 2.5 TABLET, FILM COATED ORAL at 08:33

## 2023-11-01 RX ADMIN — FAMOTIDINE 20 MG: 20 TABLET, FILM COATED ORAL at 08:33

## 2023-11-01 RX ADMIN — BUDESONIDE AND FORMOTEROL FUMARATE DIHYDRATE 2 PUFF: 160; 4.5 AEROSOL RESPIRATORY (INHALATION) at 08:38

## 2023-11-01 RX ADMIN — BUDESONIDE 0.5 MG: 0.5 INHALANT ORAL at 08:38

## 2023-11-01 RX ADMIN — MONTELUKAST SODIUM 10 MG: 10 TABLET, FILM COATED ORAL at 08:34

## 2023-11-01 RX ADMIN — CEFAZOLIN SODIUM 2000 MG: 2 INJECTION, SOLUTION INTRAVENOUS at 01:08

## 2023-11-01 RX ADMIN — HYDROCODONE BITARTRATE AND ACETAMINOPHEN 2 TABLET: 7.5; 325 TABLET ORAL at 01:08

## 2023-11-01 RX ADMIN — HYDROCODONE BITARTRATE AND ACETAMINOPHEN 2 TABLET: 7.5; 325 TABLET ORAL at 05:16

## 2023-11-01 RX ADMIN — POLYETHYLENE GLYCOL 3350 17 G: 17 POWDER, FOR SOLUTION ORAL at 08:32

## 2023-11-01 RX ADMIN — GABAPENTIN 300 MG: 300 CAPSULE ORAL at 08:33

## 2023-11-01 RX ADMIN — SPIRONOLACTONE 25 MG: 25 TABLET ORAL at 08:34

## 2023-11-01 RX ADMIN — Medication 3 ML: at 08:34

## 2023-11-01 NOTE — PROGRESS NOTES
Orthopedic Progress Note      Patient: Julia Rios    YOB: 1945    Medical Record Number: [id number]    Attending Physician: Celso Prakash MD    Date of Admission: 10/31/2023  5:09 AM    Admitting Dx:  Arthritis of knee [M17.10]    Status Post: ME ARTHRP KNE CONDYLE&PLATU MEDIAL&LAT COMPARTMENTS [47103] (TOTAL KNEE ARTHROPLASTY)    Post Operative Day Number: 1    Current Problem List:   Arthritis of knee      Past Medical History:   Diagnosis Date    Acute right-sided low back pain with right-sided sciatica 01/15/2018    Allergic rhinitis     Asthma     Back pain     2 bulging disc L2 L3    Chronic heart failure with preserved ejection fraction     11/16/20 echocardiogram: 1.  Normal ejection fraction of 55%. 2.  Mild left ventricular hypertrophy. 3.  No significant valvular heart issues.     CKD stage 3 10/29/2019    COPD (chronic obstructive pulmonary disease)     Diverticulitis     Essential hypertension     GERD (gastroesophageal reflux disease)     Hemoptysis     non cancerous    Hyperlipidemia     Idiopathic chronic gout of multiple sites without tophus 05/20/2016    Primary osteoarthritis of right knee 01/16/2017    Right knee pain     Sleep apnea     CPAP    Vitamin D deficiency 01/13/2016       Current Medications:  Scheduled Meds:apixaban, 2.5 mg, Oral, Q12H  budesonide-formoterol, 2 puff, Inhalation, BID  docusate sodium, 100 mg, Oral, BID  famotidine, 20 mg, Oral, Daily  gabapentin, 300 mg, Oral, Q12H  losartan, 100 mg, Oral, Daily  montelukast, 10 mg, Oral, Nightly  polyethylene glycol, 17 g, Oral, Daily  sodium chloride, 3 mL, Intravenous, Q12H  spironolactone, 25 mg, Oral, Daily      PRN Meds:.  acetaminophen    albuterol    albuterol sulfate HFA    bisacodyl    HYDROcodone-acetaminophen    HYDROcodone-acetaminophen    HYDROmorphone **AND** naloxone    ondansetron **OR** ondansetron    sodium chloride    sodium chloride    SUBJECTIVE: 78 y.o.  female awake and alert.  Does  complain of some mild dizziness but it does not interfere with any of her activity.    OBJECTIVE:   Vitals:    11/01/23 0106 11/01/23 0513 11/01/23 0800 11/01/23 0838   BP: 146/72 121/70     BP Location: Right arm Right arm     Patient Position: Lying Lying     Pulse: 66 52  55   Resp: 16 16  16   Temp: 98.3 °F (36.8 °C) 98.3 °F (36.8 °C)     TempSrc: Oral Oral Oral    SpO2: 91% 95%  95%   Weight:       Height:         I/O last 3 completed shifts:  In: 1840 [P.O.:840; I.V.:900; IV Piggyback:100]  Out: 1200 [Urine:1100; Blood:100]    Diagnostic Tests:  Lab Results (last 72 hours)       Procedure Component Value Units Date/Time    Hemoglobin & Hematocrit, Blood [[id number]]  (Abnormal) Collected: 11/01/23 0507    Specimen: Blood Updated: 11/01/23 0532     Hemoglobin 10.6 g/dL      Hematocrit 32.1 %              PHYSICAL EXAM: Right knee ana maria dressing remains dry and intact.  Battery is functioning properly.  Calf is soft and nontender.  She has good motion sensation to her foot and ankle.  Pain is well controlled.  Voiding well.  Ambulating well with PT.  Patient did have episode of her heart rate dropping into the 40s yesterday initially after surgery.   Heart rate has remained in the 50's- 60's.  Blood pressure remains adequate.  She denies any chest pain or shortness of breath.  She was seen by medicine who recommended continue to hold her metoprolol until her heart rate is above 60.  Patient understands that if her dizziness does not improve over the next few days and her heart rate persistently stays below 50 that she is to get in touch with her cardiologist.  Hemoglobin is 10.6.     ASSESSMENT & PLAN:    DC home with home health.  Return to the office in 2 weeks to see Dr. Prakash for follow-up.      Date: 11/1/2023    Gabi Palacios RN

## 2023-11-01 NOTE — PROGRESS NOTES
Pt states she takes two corticosteroids normally however the current ordered meds are duplicates.   Pt will talk to  in the morning to discuss.

## 2023-11-01 NOTE — CONSULTS
CONSULT NOTE    INTERNAL MEDICINE   [facility]       Patient Identification:  Name: Julia Rios  Age: 78 y.o.  Sex: female  :  1945  MRN: [id number]             Date of Consultation:  10/31/23          Primary Care Physician: Nick Patel DO                               Requesting Physician: dr mascorro  Reason for Consultation:medical management    Chief Complaint:  78 year old female was admitted after surgery by dr mascorro; we are asked to see her regarding medical management; she has a history of ckd3, copd, hypertension, sleep apnea and had some dizziness earlier per staff; she is doing well postop; denies pain; no visitors are present    History of Present Illness:   As above      Past Medical History:  Past Medical History:   Diagnosis Date    Acute right-sided low back pain with right-sided sciatica 01/15/2018    Allergic rhinitis     Asthma     Back pain     2 bulging disc L2 L3    Chronic heart failure with preserved ejection fraction     20 echocardiogram: 1.  Normal ejection fraction of 55%. 2.  Mild left ventricular hypertrophy. 3.  No significant valvular heart issues.     CKD stage 3 10/29/2019    COPD (chronic obstructive pulmonary disease)     Diverticulitis     Essential hypertension     GERD (gastroesophageal reflux disease)     Hemoptysis     non cancerous    Hyperlipidemia     Idiopathic chronic gout of multiple sites without tophus 2016    Primary osteoarthritis of right knee 2017    Right knee pain     Sleep apnea     CPAP    Vitamin D deficiency 2016     Past Surgical History:  Past Surgical History:   Procedure Laterality Date    CATARACT EXTRACTION Right     COLONOSCOPY      COLONOSCOPY N/A 10/12/2021    Procedure: COLONOSCOPY;  Surgeon: Jorge Diane MD;  Location: [facility];  Service: Gastroenterology;  Laterality: N/A;  DIVERTICULOSIS    ENDOSCOPY  2015    EYE SURGERY      cataract right eye    OTHER  SURGICAL HISTORY      Fatty tumor removed off back    RETINAL DETACHMENT REPAIR Right     hole in retina repair      Home Meds:  Medications Prior to Admission   Medication Sig Dispense Refill Last Dose    albuterol (ACCUNEB) 1.25 MG/3ML nebulizer solution Take 3 mL by nebulization Every 6 (Six) Hours As Needed for Shortness of Air for up to 50 doses. 25 each 0 Past Week    albuterol sulfate  (90 Base) MCG/ACT inhaler Inhale 2 puffs Every 4 (Four) Hours As Needed for Wheezing. 1 g 11 Past Week    Allergy Relief 180 MG tablet TAKE 1 TABLET BY MOUTH DAILY 90 tablet 3 10/30/2023    azelastine (ASTELIN) 0.1 % nasal spray USE 2 SPRAYS IN EACH NOSTRIL TWICE DAILY 30 mL 6 10/30/2023    budesonide-formoterol (SYMBICORT) 160-4.5 MCG/ACT inhaler Inhale 2 puffs 2 (Two) Times a Day. 1 each 5 10/31/2023 at 0400    D3-1000 25 MCG (1000 UT) capsule TAKE 1 CAPSULE BY MOUTH EVERY DAY 90 capsule 3 10/30/2023    famotidine (PEPCID) 20 MG tablet TAKE 1 TABLET BY MOUTH EVERY NIGHT AT BEDTIME 30 tablet 11 10/30/2023    Fluticasone Furoate (Arnuity Ellipta) 100 MCG/ACT aerosol powder  Inhale 1 puff Daily. 90 each 3 10/31/2023 at 0400    gabapentin (NEURONTIN) 300 MG capsule 1 tab in am and 2 at bedtime 270 capsule 1 10/31/2023 at 0400    losartan (COZAAR) 100 MG tablet TAKE 1 TABLET EVERY DAY 90 tablet 3 10/30/2023    metoprolol succinate XL (TOPROL-XL) 100 MG 24 hr tablet TAKE 1 TABLET EVERY DAY 90 tablet 3 10/31/2023 at 0400    montelukast (SINGULAIR) 10 MG tablet TAKE 1 TABLET ONE TIME DAILY IN THE EVENING 90 tablet 3 10/30/2023    spironolactone (ALDACTONE) 25 MG tablet Take 1 tablet by mouth Daily. (Patient taking differently: Take 1 tablet by mouth. MON,WED AND FRI) 90 tablet 3 10/30/2023    Symbicort 160-4.5 MCG/ACT inhaler Inhale 2 puffs 2 (Two) Times a Day. 1 each 11 10/31/2023 at 0400    nystatin (MYCOSTATIN) 100,000 unit/mL suspension Take 5 mL by mouth 4 (Four) Times a Day. (Patient taking differently: Take 5 mL by  mouth 4 (Four) Times a Day. PT IS NOT TAKING) 840 mL 0 More than a month     Current Meds:     Current Facility-Administered Medications:     acetaminophen (TYLENOL) tablet 500 mg, 500 mg, Oral, BID PRN, Celso Prakash MD    albuterol (PROVENTIL) nebulizer solution 0.042% 1.25 mg/3mL, 3 mL, Nebulization, Q6H PRN, Celso Prakash MD    albuterol sulfate HFA (PROVENTIL HFA;VENTOLIN HFA;PROAIR HFA) inhaler 2 puff, 2 puff, Inhalation, Q4H PRN, Celso Prakash MD    [START ON 11/1/2023] apixaban (ELIQUIS) tablet 2.5 mg, 2.5 mg, Oral, Q12H, Celso Prakash MD    bisacodyl (DULCOLAX) suppository 10 mg, 10 mg, Rectal, Daily PRN, Celso Prakash MD    budesonide (PULMICORT) nebulizer solution 0.5 mg, 0.5 mg, Nebulization, BID - RT, Celso Prakash MD    budesonide-formoterol (SYMBICORT) 160-4.5 MCG/ACT inhaler 2 puff, 2 puff, Inhalation, BID, Celso Prakash MD    ceFAZolin in dextrose (ANCEF) IVPB solution 2,000 mg, 2,000 mg, Intravenous, Q8H, Celso Prakash MD, Stopped at 10/31/23 1941    docusate sodium (COLACE) capsule 100 mg, 100 mg, Oral, BID, Celso Prakash MD, 100 mg at 10/31/23 1704    famotidine (PEPCID) tablet 20 mg, 20 mg, Oral, Daily, Celso Prakash MD, 20 mg at 10/31/23 1704    gabapentin (NEURONTIN) capsule 300 mg, 300 mg, Oral, Q12H, Celso Prakash MD    HYDROcodone-acetaminophen (NORCO) 7.5-325 MG per tablet 1 tablet, 1 tablet, Oral, Q4H PRN, Celso Prakash MD, 1 tablet at 10/31/23 1659    HYDROcodone-acetaminophen (NORCO) 7.5-325 MG per tablet 2 tablet, 2 tablet, Oral, Q4H PRN, Celso Prakash MD    HYDROmorphone (DILAUDID) injection 0.5 mg, 0.5 mg, Intravenous, Q2H PRN **AND** naloxone (NARCAN) injection 0.1 mg, 0.1 mg, Intravenous, Q5 Min PRN, Celso Prakash MD    lactated ringers infusion, 100 mL/hr, Intravenous, Continuous, Celso Prakash MD, Last Rate: 100 mL/hr at 10/31/23 1941, 100 mL/hr at 10/31/23 1941    losartan (COZAAR) tablet 100 mg, 100 mg, Oral, Daily,  Celso Prakash MD    [START ON 2023] metoprolol succinate XL (TOPROL-XL) 24 hr tablet 100 mg, 100 mg, Oral, Daily, Celso Prakash MD    montelukast (SINGULAIR) tablet 10 mg, 10 mg, Oral, Nightly, Celso Prakash MD    ondansetron (ZOFRAN) tablet 4 mg, 4 mg, Oral, Q6H PRN **OR** ondansetron (ZOFRAN) injection 4 mg, 4 mg, Intravenous, Q6H PRN, Celso Prakash MD    [START ON 2023] polyethylene glycol (MIRALAX) packet 17 g, 17 g, Oral, Daily, Celso Prakash MD    sodium chloride 0.9 % flush 10 mL, 10 mL, Intravenous, PRN, Celso Prakash MD    sodium chloride 0.9 % flush 3 mL, 3 mL, Intravenous, Q12H, Celso Prakash MD, 3 mL at 10/31/23 1723    sodium chloride 0.9 % infusion 40 mL, 40 mL, Intravenous, PRN, Celso Prakash MD    spironolactone (ALDACTONE) tablet 25 mg, 25 mg, Oral, Daily, Celso Prakash MD, 25 mg at 10/31/23 1245  Allergies:  Allergies   Allergen Reactions    Ibuprofen Hives    Penicillins Seizure and Other (See Comments)    Nsaids Other (See Comments)     CKD    Cephalosporins Rash    Sulfa Antibiotics Rash     Social History:   Social History     Socioeconomic History    Marital status:    Tobacco Use    Smoking status: Former     Packs/day: 0.50     Years: 5.00     Additional pack years: 0.00     Total pack years: 2.50     Types: Cigarettes     Start date:      Quit date:      Years since quittin.8     Passive exposure: Never    Smokeless tobacco: Never   Vaping Use    Vaping Use: Never used   Substance and Sexual Activity    Alcohol use: Never    Drug use: Never    Sexual activity: Defer     Family History:  Family History   Problem Relation Age of Onset    Colon cancer Mother 61    Cancer Mother     Heart disease Mother     Heart failure Father     Diabetes Brother     Breast cancer Maternal Grandmother     Stroke Paternal Grandfather     Malig Hyperthermia Neg Hx           Review of Systems  See history of present illness and past medical  "history.  Patient denies headache, dizziness, syncope, falls, trauma, change in vision, change in hearing, change in taste, changes in weight, changes in appetite, focal weakness, numbness, or paresthesia.  Patient denies chest pain, palpitations, dyspnea, orthopnea, PND, cough, sinus pressure, rhinorrhea, epistaxis, hemoptysis, nausea, vomiting,hematemesis, diarrhea, constipation or hematochezia. Denies cold or heat intolerance, polydipsia, polyuria, polyphagia. Denies hematuria, pyuria, dysuria, hesitancy, frequency or urgency. Denies consumption of raw and under cooked meats foods or change in water source.  Denies fever, chills, sweats, night sweats.       Vitals:   /83 (BP Location: Left arm, Patient Position: Lying)   Pulse 57   Temp 98.1 °F (36.7 °C) (Oral)   Resp 18   Ht 152.4 cm (60\")   Wt 98.5 kg (217 lb 2.5 oz)   SpO2 95%   BMI 42.41 kg/m²   I/O:   Intake/Output Summary (Last 24 hours) at 10/31/2023 2030  Last data filed at 10/31/2023 1941  Gross per 24 hour   Intake 1600 ml   Output 700 ml   Net 900 ml     Exam:  General Appearance:    Alert, cooperative, no distress, appears stated age   Head:    Normocephalic, without obvious abnormality, atraumatic   Eyes:    PERRL, conjunctivae/corneas clear, EOM's intact, both eyes   Ears:    Normal external ear canals, both ears   Nose:   Nares normal, septum midline, mucosa normal, no drainage    or sinus tenderness   Throat:   Lips, tongue, gums normal; oral mucosa pink and moist   Neck:   Supple, symmetrical, trachea midline, no adenopathy;     thyroid:  no enlargement/tenderness/nodules; no carotid    bruit or JVD   Back:     Symmetric, no curvature, ROM normal, no CVA tenderness   Lungs:     Decreased breath sounds bilaterally, respirations unlabored   Chest Wall:    No tenderness or deformity    Heart:    Regular rate and rhythm, S1 and S2 normal, no murmur, rub   or gallop   Abdomen:     Soft, nontender, bowel sounds active all four quadrants, " "    no masses, no hepatomegaly, no splenomegaly   Extremities:   Extremities normal, atraumatic, no cyanosis or edema                Data Review:  Labs in chart were reviewed.  No results found for: \"WBC\", \"HGB\", \"HCT\", \"PLT\"  No results found for: \"NA\", \"K\", \"CL\", \"CO2\", \"BUN\", \"CREATININE\", \"GLUCOSE\"  No results found for: \"CALCIUM\", \"MG\", \"PHOS\"            Imaging Results (Last 7 Days)       Procedure Component Value Units Date/Time    XR Knee 1 or 2 View Right [[id number]] Collected: 10/31/23 0937     Updated: 10/31/23 0941    Narrative:      EXAM: XR KNEE 1 OR 2 VW RIGHT-     INDICATION: Postop     COMPARISON: None available          Impression:      Right total knee arthroplasty. Components are well-seated  without acute periprosthetic fracture.           This report was finalized on 10/31/2023 9:38 AM by Dr. Jose Engle M.D on Workstation: [id number]             Past Medical History:   Diagnosis Date    Acute right-sided low back pain with right-sided sciatica 01/15/2018    Allergic rhinitis     Asthma     Back pain     2 bulging disc L2 L3    Chronic heart failure with preserved ejection fraction     11/16/20 echocardiogram: 1.  Normal ejection fraction of 55%. 2.  Mild left ventricular hypertrophy. 3.  No significant valvular heart issues.     CKD stage 3 10/29/2019    COPD (chronic obstructive pulmonary disease)     Diverticulitis     Essential hypertension     GERD (gastroesophageal reflux disease)     Hemoptysis     non cancerous    Hyperlipidemia     Idiopathic chronic gout of multiple sites without tophus 05/20/2016    Primary osteoarthritis of right knee 01/16/2017    Right knee pain     Sleep apnea     CPAP    Vitamin D deficiency 01/13/2016       Assessment:  Active Hospital Problems    Diagnosis  POA    **Arthritis of knee [M17.10]  Unknown      Resolved Hospital Problems   No resolved problems to display.   Chronic diastolic chf  Hypertension  Copd  Dizziness  Sleep apnea    Plan:  Patient is " feeling better  Has had some mild bradycardia  She is on metoprolol  Will monitor  Dw patient   Notes and labs reviewed  Thanks for involving us in her care  Will follow with you    Lurdes Saab MD   10/31/2023  20:30 EDT

## 2023-11-01 NOTE — THERAPY TREATMENT NOTE
Patient Name: Julia Rios  : 1945    MRN: [id number]                              Today's Date: 2023       Admit Date: 10/31/2023    Visit Dx:     ICD-10-CM ICD-9-CM   1. Arthritis of knee  M17.10 716.96     Patient Active Problem List   Diagnosis    Family history of colon cancer    Chronic obstructive pulmonary disease    Essential hypertension    Hyperlipidemia    CKD (chronic kidney disease) stage 3, GFR 30-59 ml/min    Chronic heart failure with preserved ejection fraction    CARMEN (obstructive sleep apnea)    Seasonal allergies    Gastroesophageal reflux disease    History of fungal pneumonia    Bronchitis, mucopurulent recurrent    Tobacco abuse, in remission    Pulmonary nodule    PND (post-nasal drip)    Status post fall    Allergic rhinitis    Asthma    Closed fracture of metatarsal bone    Diverticulitis    Hemoptysis    Idiopathic chronic gout of multiple sites without tophus    Primary localized osteoarthritis    Vitamin D deficiency    Morbid (severe) obesity due to excess calories    Umbilical hernia without obstruction and without gangrene    Oral thrush    Arthritis of knee     Past Medical History:   Diagnosis Date    Acute right-sided low back pain with right-sided sciatica 01/15/2018    Allergic rhinitis     Asthma     Back pain     2 bulging disc L2 L3    Chronic heart failure with preserved ejection fraction     20 echocardiogram: 1.  Normal ejection fraction of 55%. 2.  Mild left ventricular hypertrophy. 3.  No significant valvular heart issues.     CKD stage 3 10/29/2019    COPD (chronic obstructive pulmonary disease)     Diverticulitis     Essential hypertension     GERD (gastroesophageal reflux disease)     Hemoptysis     non cancerous    Hyperlipidemia     Idiopathic chronic gout of multiple sites without tophus 2016    Primary osteoarthritis of right knee 2017    Right knee pain     Sleep apnea     CPAP    Vitamin D deficiency 2016     Past Surgical  History:   Procedure Laterality Date    CATARACT EXTRACTION Right     COLONOSCOPY  2014    COLONOSCOPY N/A 10/12/2021    Procedure: COLONOSCOPY;  Surgeon: Jorge Diane MD;  Location: [facility] ENDOSCOPY;  Service: Gastroenterology;  Laterality: N/A;  DIVERTICULOSIS    ENDOSCOPY  2015    EYE SURGERY      cataract right eye    OTHER SURGICAL HISTORY      Fatty tumor removed off back    RETINAL DETACHMENT REPAIR Right     hole in retina repair    TOTAL KNEE ARTHROPLASTY Right 10/31/2023    Procedure: TOTAL KNEE ARTHROPLASTY;  Surgeon: Celso Prakash MD;  Location:  [facility];  Service: Orthopedics;  Laterality: Right;      General Information       Row Name 11/01/23 1010          Physical Therapy Time and Intention    Document Type therapy note (daily note)  -MS     Mode of Treatment physical therapy  -MS       Row Name 11/01/23 1010          General Information    Existing Precautions/Restrictions fall  -MS       Row Name 11/01/23 1010          Cognition    Orientation Status (Cognition) oriented x 4  -MS       Row Name 11/01/23 1010          Safety Issues, Functional Mobility    Safety Issues Affecting Function (Mobility) judgment;positioning of assistive device;sequencing abilities  -MS     Impairments Affecting Function (Mobility) strength;endurance/activity tolerance;range of motion (ROM);pain;postural/trunk control;balance;sensation/sensory awareness  -MS     Comment, Safety Issues/Impairments (Mobility) Gait belt and non skid socks donned.  -MS               User Key  (r) = Recorded By, (t) = Taken By, (c) = Cosigned By      Initials Name Provider Type    MS Colbert Allegra HERNAN, PT Physical Therapist                   Mobility       Row Name 11/01/23 1010          Bed Mobility    Comment, (Bed Mobility) NT- UIC upon PT arrival.  -MS       Row Name 11/01/23 1010          Transfers    Comment, (Transfers) Sequencing cues.  -MS       Row Name 11/01/23 1010          Sit-Stand Transfer    Sit-Stand  [place] (Transfers) contact guard;verbal cues  -MS     Assistive Device (Sit-Stand Transfers) walker, front-wheeled  -MS       Row Name 11/01/23 1010          Gait/Stairs (Locomotion)    [place] Level (Gait) contact guard;verbal cues  -MS     Assistive Device (Gait) walker, front-wheeled  -MS     Distance in Feet (Gait) 40' total  -MS     Deviations/Abnormal Patterns (Gait) denis decreased;gait speed decreased;antalgic  -MS     Comment, (Gait/Stairs) Walker placement cues, multiple standing rest breaks but overall improved denis. No overt LOB.  -MS       Row Name 11/01/23 1010          Mobility    Extremity Weight-bearing Status right lower extremity  -MS     Right Lower Extremity (Weight-bearing Status) weight-bearing as tolerated (WBAT)  -MS               User Key  (r) = Recorded By, (t) = Taken By, (c) = Cosigned By      Initials Name Provider Type    Allegra Cunha, PT Physical Therapist                   Obj/Interventions       Row Name 11/01/23 1011          Motor Skills    Therapeutic Exercise --  R TKA protocol x 15 reps excluding SLR  -MS       Row Name 11/01/23 1011          Balance    Static Sitting Balance standby assist  -MS     Position, Sitting Balance sitting edge of bed  -MS     Static Standing Balance contact guard  -MS     Position/Device Used, Standing Balance supported;walker, rolling  -MS               User Key  (r) = Recorded By, (t) = Taken By, (c) = Cosigned By      Initials Name Provider Type    Allegra Cunha, PT Physical Therapist                   Goals/Plan    No documentation.                  Clinical Impression       Row Name 11/01/23 1011          Pain    Pretreatment Pain Rating 6/10  -MS     Posttreatment Pain Rating 6/10  -MS     Pain Location - Side/Orientation Right  -MS     Pain Location incisional  -MS     Pain Location - knee  -MS     Pain Intervention(s) Repositioned;Ambulation/increased activity;Rest  -MS       Row Name 11/01/23 1011           Plan of Care Review    Plan of Care Reviewed With patient  -MS     Progress improving  -MS     Outcome Evaluation Patient sitting UIC upon PT arrival. Improvement noted this date as patient required CGA for transfers and ambulated 40' total CGA with a RW. Patient demo'd improvement in overall denis and activity tolerance this date. Reviewed DC plans and plans for HH PT and assist from both spouse and son initially. No stairs to navigate at home. Will continue to follow if DC happens to be delayed from a medical standpoint.  -MS       Row Name 11/01/23 1011          Vital Signs    O2 Delivery Pre Treatment room air  -MS       Row Name 11/01/23 1011          Positioning and Restraints    Pre-Treatment Position sitting in chair/recliner  -MS     Post Treatment Position chair  -MS     In Chair notified nsg;reclined;call light within reach;encouraged to call for assist;exit alarm on  -MS               User Key  (r) = Recorded By, (t) = Taken By, (c) = Cosigned By      Initials Name Provider Type    Allegra Cunha, PT Physical Therapist                   Outcome Measures       Row Name 11/01/23 1014 11/01/23 0833       How much help from another person do you currently need...    Turning from your back to your side while in flat bed without using bedrails? 3  -MS 3  -DD    Moving from lying on back to sitting on the side of a flat bed without bedrails? 3  -MS 3  -DD    Moving to and from a bed to a chair (including a wheelchair)? 3  -MS 3  -DD    Standing up from a chair using your arms (e.g., wheelchair, bedside chair)? 3  -MS 3  -DD    Climbing 3-5 steps with a railing? 2  -MS 2  -DD    To walk in hospital room? 3  -MS 3  -DD    AM-PAC 6 Clicks Score (PT) 17  -MS 17  -DD    Highest level of mobility 5 --> Static standing  -MS 5 --> Static standing  -DD              User Key  (r) = Recorded By, (t) = Taken By, (c) = Cosigned By      Initials Name Provider Type    Allegra Cunha, PT Physical Therapist     Amanda Bower, RN Registered Nurse                                 Physical Therapy Education       Title: PT OT SLP Therapies (Resolved)       Topic: Physical Therapy (Resolved)       Point: Mobility training (Resolved)       Learning Progress Summary             Patient Acceptance, E,TB, VU by MS at 11/1/2023 1015    Acceptance, E,TB, NR by MS at 10/31/2023 1355                         Point: Home exercise program (Resolved)       Learning Progress Summary             Patient Acceptance, E,TB, VU by MS at 11/1/2023 1015    Acceptance, E,TB, NR by MS at 10/31/2023 1355                         Point: Body mechanics (Resolved)       Learning Progress Summary             Patient Acceptance, E,TB, VU by MS at 11/1/2023 1015    Acceptance, E,TB, NR by MS at 10/31/2023 1355                         Point: Precautions (Resolved)       Learning Progress Summary             Patient Acceptance, E,TB, VU by MS at 11/1/2023 1015    Acceptance, E,TB, NR by MS at 10/31/2023 1355                                         User Key       Initials Effective Dates Name Provider Type Discipline    MS 06/16/21 -  Allegra Colbert PT Physical Therapist PT                  PT Recommendation and Plan  Planned Therapy Interventions (PT): bed mobility training, balance training, gait training, home exercise program, patient/family education, postural re-education, stair training, strengthening, ROM (range of motion), transfer training  Plan of Care Reviewed With: patient  Progress: improving  Outcome Evaluation: Patient sitting UIC upon PT arrival. Improvement noted this date as patient required CGA for transfers and ambulated 40' total CGA with a RW. Patient demo'd improvement in overall denis and activity tolerance this date. Reviewed DC plans and plans for HH PT and assist from both spouse and son initially. No stairs to navigate at home. Will continue to follow if DC happens to be delayed from a medical standpoint.     Time  Calculation:         PT Charges       Row Name 11/01/23 1043             Time Calculation    Start Time 0955  -MS      Stop Time 1028  -MS      Time Calculation (min) 33 min  -MS      PT Received On 11/01/23  -MS      PT - Next Appointment 11/02/23  -MS                User Key  (r) = Recorded By, (t) = Taken By, (c) = Cosigned By      Initials Name Provider Type    Allegra Cunha, PT Physical Therapist                  Therapy Charges for Today       Code Description Service Date Service Provider Modifiers Qty    67549229494 HC PT EVAL LOW COMPLEXITY 2 10/31/2023 Allegra Colbert, PT GP 1    [id number]  PT THER PROC EA 15 MIN 10/31/2023 Allegra Colbert, PT GP 1    [id number]  PT THER PROC EA 15 MIN 11/1/2023 Allegra Colbert, PT GP 1    [id number]  PT THERAPEUTIC ACT EA 15 MIN 11/1/2023 Allegra Colbert, PT GP 1            PT G-Codes  AM-PAC 6 Clicks Score (PT): 17  PT Discharge Summary  Anticipated Discharge Disposition (PT): home with home health, home with assist    Allegra Colbert PT  11/1/2023

## 2023-11-01 NOTE — PROGRESS NOTES
" LOS: 0 days     Name: Julia Rios  Age: 78 y.o.  Sex: female  :  1945  MRN: [id number]         Primary Care Physician: Nick Patel,     Subjective   Subjective  Reports some very slight dizziness but states this is not limiting in any way.  Denies any chest pain or shortness of breath.    Objective   Vital Signs  Temp:  [97.1 °F (36.2 °C)-98.3 °F (36.8 °C)] 98.3 °F (36.8 °C)  Heart Rate:  [49-66] 55  Resp:  [14-18] 16  BP: (120-161)/() 121/70  Body mass index is 42.41 kg/m².    Objective:  General Appearance:  Comfortable and in no acute distress.    Vital signs: (most recent): Blood pressure 121/70, pulse 55, temperature 98.3 °F (36.8 °C), temperature source Oral, resp. rate 16, height 152.4 cm (60\"), weight 98.5 kg (217 lb 2.5 oz), SpO2 95%.    Lungs:  Normal effort and normal respiratory rate.    Heart: Normal rate.  Regular rhythm.    Abdomen: Abdomen is soft.  Bowel sounds are normal.   There is no abdominal tenderness.     Extremities: There is no dependent edema or local swelling.    Neurological: Patient is alert and oriented to person, place and time.    Skin:  Warm and dry.                Results Review:       I reviewed the patient's new clinical results.    Results from last 7 days   Lab Units 23  0507   HEMOGLOBIN g/dL 10.6*                     Scheduled Meds:   apixaban, 2.5 mg, Oral, Q12H  budesonide, 0.5 mg, Nebulization, BID - RT  budesonide-formoterol, 2 puff, Inhalation, BID  docusate sodium, 100 mg, Oral, BID  famotidine, 20 mg, Oral, Daily  gabapentin, 300 mg, Oral, Q12H  losartan, 100 mg, Oral, Daily  montelukast, 10 mg, Oral, Nightly  polyethylene glycol, 17 g, Oral, Daily  sodium chloride, 3 mL, Intravenous, Q12H  spironolactone, 25 mg, Oral, Daily      PRN Meds:     acetaminophen    albuterol    albuterol sulfate HFA    bisacodyl    HYDROcodone-acetaminophen    HYDROcodone-acetaminophen    HYDROmorphone **AND** naloxone    ondansetron **OR** " ondansetron    sodium chloride    sodium chloride  Continuous Infusions:  lactated ringers, 100 mL/hr, Last Rate: Stopped (10/31/23 2153)        Assessment & Plan   Active Hospital Problems    Diagnosis  POA    **Arthritis of knee [M17.10]  Unknown      Resolved Hospital Problems   No resolved problems to display.       Assessment & Plan    Hypertension  Bradycardia secondary to metoprolol  COPD  Obstructive sleep apnea  Morbid obesity    -Heart rate is running on the low side.  I have held her metoprolol today.  She states she has a pulse oximeter at home. If discharged today I instructed her to measure her heart rate each morning prior to taking metoprolol at home and if she stays below 60 then to continue to hold this and call her cardiologist.  -Blood pressure stable on current regimen  -Lungs are clear and she has no evidence of acute exacerbation of COPD.  Continue outpatient regimen of medications.  -On Eliquis for DVT prophylaxis      Expected discharge date/ time has not been documented.     Raheem Byrne MD  [facility]  11/01/23  08:58 EDT

## 2023-11-01 NOTE — PLAN OF CARE
Goal Outcome Evaluation:  Plan of Care Reviewed With: patient        Progress: improving  Outcome Evaluation: Pt remains stable. Ambulates with walker use and 1 assist. Bradycardia has improved some, LHA now following. Pain well controlled. KALEB is cdi. Voiding per BRP. 1L O2 required, but CPAP utilized through the night for sleep apnea management. Plans to dc home today.

## 2023-11-01 NOTE — PLAN OF CARE
Goal Outcome Evaluation:  Plan of Care Reviewed With: patient        Progress: improving  Outcome Evaluation: Patient sitting UIC upon PT arrival. Improvement noted this date as patient required CGA for transfers and ambulated 40' total CGA with a RW. Patient demo'd improvement in overall denis and activity tolerance this date. Reviewed DC plans and plans for HH PT and assist from both spouse and son initially. No stairs to navigate at home. Will continue to follow if DC happens to be delayed from a medical standpoint.      Anticipated Discharge Disposition (PT): home with home health, home with assist

## 2023-11-01 NOTE — DISCHARGE PLACEMENT REQUEST
"Paulo Donato \"RHETT\" (78 y.o. Female)       Date of Birth   1945    Social Security Number       Address   [address] [place] [id number]    Home Phone   [phone]    MRN   [id number]       Jain   Adventism    Marital Status                               Admission Date   10/31/23    Admission Type   Elective    Admitting Provider   Celso Prakash MD    Attending Provider   Celso Prakash MD    Department, Room/Bed   [address], P794/1       Discharge Date       Discharge Disposition   Home or Self Care    Discharge Destination                                 Attending Provider: Celso Prakash MD    Allergies: Ibuprofen, Penicillins, Nsaids, Cephalosporins, Sulfa Antibiotics    Isolation: None   Infection: None   Code Status: CPR    Ht: 152.4 cm (60\")   Wt: 98.5 kg (217 lb 2.5 oz)    Admission Cmt: None   Principal Problem: Arthritis of knee [M17.10]                   Active Insurance as of 10/31/2023       Primary Coverage       Payor Plan Insurance Group Employer/Plan Group    HUMANA MEDICARE REPLACEMENT HU[employer]NA MEDICARE REPLACEMENT [id number]       Payor Plan Address Payor Plan Phone Number Payor Plan Fax Number Effective Dates    [address] [address] [phone]  2/1/2020 - None Entered    [facility] [id number]         Subscriber Name Subscriber Birth Date Member ID       PAULO DONATO 1945 [id number]                     Emergency Contacts        (Rel.) Home Phone Work Phone Mobile Phone    DAVINA DONATO (Spouse) [phone] -- [phone]    DERRICK DONATO (Son) [phone] -- [phone]              "

## 2023-11-01 NOTE — PLAN OF CARE
Goal Outcome Evaluation:         Patient is POD #1 for a right total knee arthroplasty.  Patient is alert x4.  Jessica dressing is dry, and intact.  Jessica drain is flashing green.  WBAT. Voiding function is intact.  Dilaudid, and norco were ordered for pain management. Patient to discharge home with home health.

## 2023-11-07 ENCOUNTER — TELEPHONE (OUTPATIENT)
Dept: ORTHOPEDIC SURGERY | Facility: HOSPITAL | Age: 78
End: 2023-11-07
Payer: MEDICARE

## 2023-11-07 NOTE — TELEPHONE ENCOUNTER
Called and spoke with Ms. Rios to see how she is doing as she is 1 week SP TKA. She said she is doing fine. She is walking and doing her exercises. She did have a question regarding her HH PT. She said someone from [facility] came out to see her once and said they would call and let her know when they were coming back. She hasn't heard anything from them since. On hold for [facility] HH to see when they will be back out to see her.   She said she is having a little more pain today, but hasn't really had much pain. She is taking the pain medication as needed and it's tolerable. Dressing looks good. No issues. She said she has had one BM and stopped taking the stool softeners,. She hasn't had one since. She is back on the DS but nothing else. Other options given at this time. Ms. Rios doesn't have any other questions for me at this time. She has my contact information should she need anything.

## 2023-11-16 ENCOUNTER — TREATMENT (OUTPATIENT)
Dept: PHYSICAL THERAPY | Facility: CLINIC | Age: 78
End: 2023-11-16
Payer: MEDICARE

## 2023-11-16 DIAGNOSIS — Z96.651 STATUS POST TOTAL KNEE REPLACEMENT, RIGHT: Primary | ICD-10-CM

## 2023-11-16 DIAGNOSIS — R26.9 GAIT DISTURBANCE: ICD-10-CM

## 2023-11-16 DIAGNOSIS — M62.81 MUSCLE WEAKNESS OF LOWER EXTREMITY: ICD-10-CM

## 2023-11-16 DIAGNOSIS — M25.661 DECREASED ROM OF RIGHT KNEE: ICD-10-CM

## 2023-11-16 NOTE — PROGRESS NOTES
Physical Therapy Initial Evaluation and Plan of Care      [facility]: [address] [place] [address]      Patient: Julia Rios   : 1945  Diagnosis/ICD-10 Code:  Status post total knee replacement, right [Z96.651]  Referring practitioner: Celso Prakash MD  Date of Initial Visit: 2023  Today's Date: 2023  Patient seen for 1 sessions           Subjective Questionnaire: LEFS: 54/80      Subjective   Pt reports to physical therapy post R TKA performed on 10/31/2023. Pt reports their R knee has been great. Pt reports last night they didn't sleep well and they do have some increased pain today. This has been the highest pain since surgery. Pt reports they did follow their instructions for their bandage. Pt reports they had a wound vac after surgery. Pt reports their current pain level is a 3/10. Pt reports they have been to a 0/10 at times. Pt reports they are no longer taking their pain medication due to their low levels of pain.       Pt occupation: retired x2      Aggravating Factors: bending, straightening.   Easing Factors: ice, pain medication    Past Medical Hx: CKD stage 3, COPD, CHF preserved ejection fraction.       Objective          Observations     Additional Knee Observation Details  Pt has bandage in place over R knee incision. Pt has slight strike through present. Pt has some tenderness to their calf, but this is B. Pt also has increased tenderness to the posterior aspect of their R knee. Pt has localized redness and warmth, though this does not track from the site of healing or descend into their lower leg.      Active Range of Motion   Left Knee   Flexion: 120 degrees   Extension: 0 degrees     Right Knee   Flexion: 85 degrees with pain  Extension: 5 (lacking) degrees with pain    Passive Range of Motion     Right Knee   Flexion: 95 degrees with pain  Extension: 5 (lacking) degrees     Strength/Myotome Testing     Left Hip   Planes of Motion   Flexion:  4    Right Hip   Planes of Motion   Flexion: 4+    Left Knee   Flexion: 4  Extension: 4    Right Knee   Flexion: 5  Extension: 5    Ambulation     Comments   Pt uses a front wheeled walker for ambulation today. Pt does not typically use this. Pt has an antalgic gait favoring their R LE.       See Exercise, Manual, and Modality Logs for complete treatment.       Assessment & Plan       Assessment  Impairments: abnormal coordination, abnormal gait, abnormal muscle firing, abnormal or restricted ROM, activity intolerance, impaired balance, impaired physical strength, lacks appropriate home exercise program and pain with function   Functional limitations: lifting, sleeping, walking, pushing, moving in bed, standing, stooping and unable to perform repetitive tasks   Assessment details: Pt reports to physical therapy s/p R TKA performed on 10/31/2023. Pt presents w/ gait abnormalities, decreased ROM, decreased strength, and pain w/ performance of ADLs. Pt has increased perceived deficits described by LEFS. Pt does have some painful palpation today in the posterior aspect of their knee today. Pt does not have increased redness or warmth in their lower leg. Pt was encouraged to bring this up to ortho at their follow up tomorrow. Pt was educated on their HEP for improvement in their current impairments. Pt was also educated on using ice for swelling and pain. Pt will benefit from skilled physical therapy, to address their current impairments and limitations, in order to improve upon their functional mobility and gait for improvement in ADL performance and safely.       Prognosis: good    Goals  Plan Goals: KNEE PROBLEMS    1. The patient has limited ROM of the R knee.   LTG 1:12 weeks:  The patient will demonstrate 0 to 120 degrees of ROM for the R knee in order to allow patient to am.    STATUS:  New   STG 1a: 6 weeks:  The patient will demonstrate 5 to 105 degrees of ROM for the R knee.    STATUS:  New   TREATMENT: Manual  therapy, therapeutic exercise, home exercise instruction, and modalities as needed to include:  moist heat, electrical stimulation, ultrasound, and ice.    2. The patient has limited strength of the R knee.   LTG 2: 12 weeks: The patient will demonstrate 5 /5 strength for R knee flexion and extension in order to allow patient improved joint stability    STATUS:  New   STG 2a: 6 weeks: The patient will demonstrate 4+ /5 strength for R knee flexion and extension    STATUS:  New    TREATMENT: Manual therapy, therapeutic exercise, home exercise instruction, aquatic therapy, and modalities as needed to include:  moist heat, electrical stimulation, ultrasound, and ice.     3. The patient has gait dysfunction.   LTG 3: 12 weeks:  The patient will ambulate without assistive device, independently, for community distances with normal biomechanics of the R lower extremity in order to improve mobility and allow patient to perform activities such as grocery shopping with greater ease.    STATUS:  New   STG 3a: 4 weeks: The patient will ambulate with a single tipped cane with appropriate gait mechanics.    STATUS:  New   TREATMENT: Gait training, aquatic therapy, therapeutic exercise, and home exercise instruction.    4. Mobility: Walking/Moving Around Functional Limitation     LTG 4: 12 weeks:  The patient will demonstrate 18.75 % limitation by achieving a score of 65/80 on the LEFS.    STATUS:  New   STG 4 a: 6 weeks:  The patient will demonstrate 31.25 % limitation by achieving a score of 55/80 on the LEFS.      STATUS:  New   TREATMENT:  Manual therapy, therapeutic exercise, home exercise instruction, and modalities as needed to include: moist heat, electrical stimulation, and ultrasound.           PLAN:  Therapy options: will receive skilled therapy services  Planned modality interventions: Cryotherapy  Planned therapy interventions:balance/weight-bearing training, ADL retraining, soft tissue mobilization, strengthening,  stretching, therapeutic activities, manual therapy, joint mobilization, home exercise program/patient education, gait training, functional ROM exercises, flexibility, body mechanics training, postural training, and neuromuscular re-education  Frequency: 3x per week  Duration in weeks: 12  Treatment plan discussed with: patient      Visit Diagnoses:    ICD-10-CM ICD-9-CM   1. Status post total knee replacement, right  Z96.651 V43.65   2. Gait disturbance  R26.9 781.2   3. Decreased ROM of right knee  M25.661 719.56   4. Muscle weakness of lower extremity  M62.81 728.87       History # of Personal Factors and/or Comorbidities: HIGH (3+)  Examination of Body System(s): # of elements: LOW (1-2)  Clinical Presentation: STABLE   Clinical Decision Making: LOW       Timed:         Manual Therapy:    10     mins  97572;     Therapeutic Exercise:    15     mins  29402;     Neuromuscular Sheela:    0    mins  08335;    Therapeutic Activity:     0     mins  71194;     Gait Trainin     mins  71087;     Ultrasound:     0     mins  77777;    Ionto                               0    mins   02894  Self Care                       0     mins   04188  Canalith Repos    0     mins 82703      Un-Timed:  Electrical Stimulation:    0     mins  08191 ( );  Dry Needling     0     mins self-pay  Traction     0     mins 03744  Low Eval     20     Mins  19395  Mod Eval     0     Mins  27660  High Eval                       0     Mins  10216  Re-Eval                           0    mins  44212    Timed Treatment:   25   mins   Total Treatment:     45   mins    PT SIGNATURE: Lugii Green PT, DPT    Electronically signed 2023    [place] License: PT - [id number]    Initial Certification  Certification Period: 2023 thru 2024  I certify that the therapy services are furnished while this patient is under my care.  The services outlined above are required by this patient, and will be reviewed every 90 days.     PHYSICIAN: Dwain  Celso CHERRY MD  NPI: [id number]      DATE:     Please sign and return via fax to [phone]. Thank you, [facility].

## 2023-11-17 ENCOUNTER — OFFICE VISIT (OUTPATIENT)
Dept: ORTHOPEDIC SURGERY | Facility: CLINIC | Age: 78
End: 2023-11-17
Payer: MEDICARE

## 2023-11-17 VITALS — WEIGHT: 213.7 LBS | BODY MASS INDEX: 41.95 KG/M2 | TEMPERATURE: 98 F | HEIGHT: 60 IN

## 2023-11-17 DIAGNOSIS — R52 PAIN: Primary | ICD-10-CM

## 2023-11-17 DIAGNOSIS — Z09 SURGERY FOLLOW-UP: ICD-10-CM

## 2023-11-17 RX ORDER — HYDROCODONE BITARTRATE AND ACETAMINOPHEN 7.5; 325 MG/1; MG/1
1 TABLET ORAL EVERY 6 HOURS PRN
Qty: 30 TABLET | Refills: 0 | Status: SHIPPED | OUTPATIENT
Start: 2023-11-17

## 2023-11-17 NOTE — PROGRESS NOTES
Julia Rios     : 1945     MRN: [id number]     DATE: 2023    DIAGNOSIS: Follow-up 2 weeks status post total knee arthroplasty    SUBJECTIVE:  Patient returns today for 2 week follow up of recent knee replacement. Patient reports doing well with no unusual complaints.  Patient reports compliance with the anticoagulation.    OBJECTIVE:     Exam: The incision is healing appropriately.  No sign of infection.  Range of motion is progressing as expected.  The calf is soft and nontender with a negative Homans sign.    DIAGNOSTIC STUDIES     Xrays: AP and lateral views of the right knee were ordered and reviewed for evaluation of recent knee replacement. They demonstrate a well positioned, well aligned knee replacement without complicating factors noted. In comparison with previous films there has been interval implant placement.    ASSESSMENT: 2 week status post right knee replacement.    PLAN:   1) Continue PT   2) Continue to ice as needed.   3) Continue anticoagulation as discussed   4) Follow-up in 6 weeks     Celso rPakash MD    2023

## 2023-11-20 ENCOUNTER — TREATMENT (OUTPATIENT)
Dept: PHYSICAL THERAPY | Facility: CLINIC | Age: 78
End: 2023-11-20
Payer: MEDICARE

## 2023-11-20 DIAGNOSIS — Z96.651 STATUS POST TOTAL KNEE REPLACEMENT, RIGHT: Primary | ICD-10-CM

## 2023-11-20 DIAGNOSIS — R26.9 GAIT DISTURBANCE: ICD-10-CM

## 2023-11-20 DIAGNOSIS — M62.81 MUSCLE WEAKNESS OF LOWER EXTREMITY: ICD-10-CM

## 2023-11-20 DIAGNOSIS — M25.661 DECREASED ROM OF RIGHT KNEE: ICD-10-CM

## 2023-11-20 RX ORDER — SPIRONOLACTONE 25 MG/1
25 TABLET ORAL DAILY
Qty: 90 TABLET | Refills: 0 | Status: SHIPPED | OUTPATIENT
Start: 2023-11-20

## 2023-11-20 NOTE — PROGRESS NOTES
Physical Therapy Daily Treatment Note  [place] [facility]: [address] [facility] [place] [address]      Patient: Julia Rios   : 1945  Diagnosis/ICD-10 Code:  Status post total knee replacement, right [Z96.651]  Referring practitioner: No ref. provider found  Date of Initial Visit: Type: THERAPY  Noted: 2023  Today's Date: 2023  Patient seen for 2 sessions           Subjective   The patient reported their pain is a 3/10 today in their R knee. Pt reports they were walking more and they started to develop R heel pain.     Objective   See Exercise, Manual, and Modality Logs for complete treatment.     Assessment/Plan  Pt has improvement in their heel pain w/ taping. Plan on working towards cane for ambulation at next visit. Patient will continue to benefit from skilled physical therapy to further address their deficits in strength and ROM in order to improve upon their functional mobility and to meet their personal goals.          Timed:  Manual Therapy:    18     mins  74484;  Therapeutic Exercise:    10     mins  83293;     Neuromuscular Sheela:   0    mins  08433;    Therapeutic Activity:     0     mins  48262;     Gait Trainin     mins  49131;     Aquatics                         0      mins  83697    Un-timed:  Mechanical Traction      0     mins  91761  Electrical Stimulation:    0     mins  77762 ( );      Timed Treatment:   28   mins   Total Treatment:     28   mins    Luigi Green PT, DPT    Electronically signed 2023    [place] License: PT - [id number]

## 2023-11-22 ENCOUNTER — TREATMENT (OUTPATIENT)
Dept: PHYSICAL THERAPY | Facility: CLINIC | Age: 78
End: 2023-11-22
Payer: MEDICARE

## 2023-11-22 DIAGNOSIS — M25.661 DECREASED ROM OF RIGHT KNEE: ICD-10-CM

## 2023-11-22 DIAGNOSIS — Z96.651 STATUS POST TOTAL KNEE REPLACEMENT, RIGHT: Primary | ICD-10-CM

## 2023-11-22 DIAGNOSIS — R26.9 GAIT DISTURBANCE: ICD-10-CM

## 2023-11-22 NOTE — PROGRESS NOTES
Outpatient Physical Therapy  [address] [facility], [place] [address]                            Physical Therapy Daily Treatment Note    Patient: Julia Rios   : 1945  Diagnosis/ICD-10 Code:  Status post total knee replacement, right [Z96.651]  Referring practitioner: No ref. provider found  Date of Initial Visit: Type: THERAPY  Noted: 2023  Today's Date: 2023  Patient seen for 3 sessions           Subjective   Julia Rios reports: that the pain in her knee comes and goes, states that she does and doesn't ice the knee. Wants to get back to driving.      Objective   Increased discomfort with increased knee flexion.    See Exercise, Manual, and Modality Logs for complete treatment.     Assessment/Plan  Julia still experiencing increased R knee pain. Pt had some increased discomfort with majority of exercises. Pt would benefit from skilled PT to address Range of Motion  and Strength deficits, pain management and any concerns with ADLs.       Progress per Plan of Care         Timed:  Manual Therapy:         mins  13847;  Therapeutic Exercise:    20     mins  54262;     Neuromuscular Sheela:    10    mins  40535;    Therapeutic Activity:          mins  76142;     Gait Training:           mins  27807;    Aquatic Therapy:          mins  82093;       Untimed:  Electrical Stimulation:         mins  86018 ( );  Mechanical Traction:         mins  13314;       Timed Treatment:   30   mins   Total Treatment:     30   mins      Electronically signed:     Naila Sarmiento PTA  Physical Therapist Assistant  [facility] License #: [id number]

## 2023-11-27 ENCOUNTER — TREATMENT (OUTPATIENT)
Dept: PHYSICAL THERAPY | Facility: CLINIC | Age: 78
End: 2023-11-27
Payer: MEDICARE

## 2023-11-27 DIAGNOSIS — M25.661 DECREASED ROM OF RIGHT KNEE: ICD-10-CM

## 2023-11-27 DIAGNOSIS — R26.9 GAIT DISTURBANCE: ICD-10-CM

## 2023-11-27 DIAGNOSIS — M62.81 MUSCLE WEAKNESS OF LOWER EXTREMITY: ICD-10-CM

## 2023-11-27 DIAGNOSIS — Z96.651 STATUS POST TOTAL KNEE REPLACEMENT, RIGHT: Primary | ICD-10-CM

## 2023-11-27 PROCEDURE — 97140 MANUAL THERAPY 1/> REGIONS: CPT

## 2023-11-27 PROCEDURE — 97110 THERAPEUTIC EXERCISES: CPT

## 2023-11-27 NOTE — PROGRESS NOTES
Physical Therapy Daily Treatment Note  [place] [facility]: [address] [facility] [place] [address]      Patient: Julia Rios   : 1945  Diagnosis/ICD-10 Code:  Status post total knee replacement, right [Z96.651]  Referring practitioner: No ref. provider found  Date of Initial Visit: Type: THERAPY  Noted: 2023  Today's Date: 2023  Patient seen for 4 sessions           Subjective   The patient reported they have been having some discomfort at times w/ their hernia and therapy. Pt reports they were unable to ride a recumbent bike for several years, and they are happy on the progress of their R knee at this time.     Objective   See Exercise, Manual, and Modality Logs for complete treatment.     Assessment/Plan  Discussed w/ pt about monitoring hernia symptoms during therapy. Pt otherwise tolerates manual therapy well today. Patient will continue to benefit from skilled physical therapy to further address their deficits in strength and ROM in order to improve upon their functional mobility and to meet their personal goals.          Timed:  Manual Therapy:    9     mins  39791;  Therapeutic Exercise:    22     mins  29255;     Neuromuscular Sheela:   0    mins  28646;    Therapeutic Activity:     0     mins  32228;     Gait Trainin     mins  21990;     Aquatics                         0      mins  35005    Un-timed:  Mechanical Traction      0     mins  82618  Electrical Stimulation:    0     mins  97829 ( );      Timed Treatment:   31   mins   Total Treatment:     31   mins    Luigi Green PT, DPT    Electronically signed 2023    [place] License: PT - [id number]

## 2023-11-29 ENCOUNTER — TREATMENT (OUTPATIENT)
Dept: PHYSICAL THERAPY | Facility: CLINIC | Age: 78
End: 2023-11-29
Payer: MEDICARE

## 2023-11-29 DIAGNOSIS — Z96.651 STATUS POST TOTAL KNEE REPLACEMENT, RIGHT: Primary | ICD-10-CM

## 2023-11-29 DIAGNOSIS — R26.9 GAIT DISTURBANCE: ICD-10-CM

## 2023-11-29 DIAGNOSIS — M62.81 MUSCLE WEAKNESS OF LOWER EXTREMITY: ICD-10-CM

## 2023-11-29 DIAGNOSIS — M25.661 DECREASED ROM OF RIGHT KNEE: ICD-10-CM

## 2023-11-29 NOTE — PROGRESS NOTES
Outpatient Physical Therapy  [address] [facility], [place] [address]                            Physical Therapy Daily Treatment Note      Patient: Julia Rios   : 1945  Diagnosis/ICD-10 Code:  Status post total knee replacement, right [Z96.651]  Referring practitioner: No ref. provider found  Date of Initial Visit: Type: THERAPY  Noted: 2023  Today's Date: 2023  Patient seen for 5 sessions           Subjective   Julia Rios reports: that she didn't do the hamstring machine last PT session and didn't have pain in her abdomin near her hernia. Reports that her knee hurts the worse when she is just sitting, she is still sleeping in the recliner.    Objective   Knee Flexion: 106 deg c step stretch    See Exercise, Manual, and Modality Logs for complete treatment.     Assessment/Plan  Julia progressing as evident by decreased overall R knee pain. Pt able to achieve 106 deg of knee flexion. Pt would benefit from skilled PT to address Range of Motion  and Strength deficits, pain management and any concerns with ADLs.       Progress per Plan of Care         Timed:  Manual Therapy:         mins  67017;  Therapeutic Exercise:    20     mins  96299;     Neuromuscular Sheela:        mins  62700;    Therapeutic Activity:     10     mins  16146;     Gait Training:           mins  35886;       Untimed:  Electrical Stimulation:         mins  14262 ( );  Mechanical Traction:         mins  91964;       Timed Treatment:   30   mins   Total Treatment:     30   mins      Electronically signed:   Naila Sarmiento PTA  Physical Therapist Assistant  [facility] License #: [id number]

## 2023-12-04 ENCOUNTER — TREATMENT (OUTPATIENT)
Dept: PHYSICAL THERAPY | Facility: CLINIC | Age: 78
End: 2023-12-04
Payer: MEDICARE

## 2023-12-04 DIAGNOSIS — M25.661 DECREASED ROM OF RIGHT KNEE: ICD-10-CM

## 2023-12-04 DIAGNOSIS — Z96.651 STATUS POST TOTAL KNEE REPLACEMENT, RIGHT: Primary | ICD-10-CM

## 2023-12-04 DIAGNOSIS — R26.9 GAIT DISTURBANCE: ICD-10-CM

## 2023-12-04 DIAGNOSIS — M62.81 MUSCLE WEAKNESS OF LOWER EXTREMITY: ICD-10-CM

## 2023-12-04 PROCEDURE — 97110 THERAPEUTIC EXERCISES: CPT | Performed by: PHYSICAL THERAPIST

## 2023-12-04 PROCEDURE — 97140 MANUAL THERAPY 1/> REGIONS: CPT | Performed by: PHYSICAL THERAPIST

## 2023-12-04 NOTE — PROGRESS NOTES
Outpatient Physical Therapy                   Physical Therapy Daily Treatment Note    Patient: Julia Rios   : 1945  Diagnosis/ICD-10 Code:  Status post total knee replacement, right [Z96.651]  Referring practitioner: No ref. provider found  Date of Initial Visit: Type: THERAPY  Noted: 2023  Today's Date: 2023  Patient seen for 6 sessions             Subjective   Julia Rios reports: she is stiff today because she went out of town this past weekend and was really active.     Objective     See Exercise, Manual, and Modality Logs for complete treatment.     Assessment/Plan  Julia tolerated exercises well, but had increased pain with manual due to increased stiffness today. Pt would benefit from skilled PT to address Range of Motion  and Strength deficits, pain management and any concerns with ADLs.       Progress per Plan of Care      Timed:  Manual Therapy:    13     mins  77403;  Therapeutic Exercise:    14     mins  59997;     Neuromuscular Sheela:    0    mins  34956;    Therapeutic Activity:     0     mins  13148;     Gait Trainin     mins  10061;    Aquatic Therapy:     0     mins  26261;       Untimed:  Electrical Stimulation:    0     mins  08885 ( );  Mechanical Traction:    0     mins  70157;       Timed Treatment:   27   mins   Total Treatment:     27   mins      Electronically signed:   Bridgett Flores PTA  Physical Therapist Assistant  [facility] License #: [id number]

## 2023-12-05 ENCOUNTER — OFFICE VISIT (OUTPATIENT)
Dept: FAMILY MEDICINE CLINIC | Facility: CLINIC | Age: 78
End: 2023-12-05
Payer: MEDICARE

## 2023-12-05 VITALS
HEART RATE: 70 BPM | OXYGEN SATURATION: 96 % | TEMPERATURE: 97.4 F | BODY MASS INDEX: 41.82 KG/M2 | WEIGHT: 213 LBS | SYSTOLIC BLOOD PRESSURE: 128 MMHG | HEIGHT: 60 IN | DIASTOLIC BLOOD PRESSURE: 73 MMHG

## 2023-12-05 DIAGNOSIS — N18.31 STAGE 3A CHRONIC KIDNEY DISEASE: ICD-10-CM

## 2023-12-05 DIAGNOSIS — E78.2 MIXED HYPERLIPIDEMIA: Primary | ICD-10-CM

## 2023-12-05 DIAGNOSIS — I10 ESSENTIAL HYPERTENSION: ICD-10-CM

## 2023-12-05 DIAGNOSIS — R73.03 PRE-DIABETES: ICD-10-CM

## 2023-12-05 NOTE — ASSESSMENT & PLAN NOTE
Her preop A1c was 6.0.  She will work on following a low-carb diet increase physical activity and hopefully some weight loss.  Will hold off and repeat her A1c in another 4 months.

## 2023-12-05 NOTE — ASSESSMENT & PLAN NOTE
Her most recent LDL was up.  Also she is now has a diagnosis of prediabetes.  Thus it be nice to have her LDL at or less than 100.  She has tried statins in the past and has difficulty with tolerating them.  She will work on more lifestyle changes of diet exercise and weight loss now that she has had her knee replaced to see if she can have significant enough lifestyle changes to make these better by her next visit.

## 2023-12-05 NOTE — ASSESSMENT & PLAN NOTE
Her blood pressure remains good without the amlodipine.  Will continue the rest of her other meds.

## 2023-12-05 NOTE — PROGRESS NOTES
Chief Complaint   Patient presents with    Follow-up     6 month     Hypertension    Hyperlipidemia        Subjective     Julia Rios  has a past medical history of Acute right-sided low back pain with right-sided sciatica (01/15/2018), Allergic rhinitis, Asthma, Back pain, Chronic heart failure with preserved ejection fraction, CKD stage 3 (10/29/2019), COPD (chronic obstructive pulmonary disease), Diverticulitis, Essential hypertension, GERD (gastroesophageal reflux disease), Hemoptysis, Hyperlipidemia, Idiopathic chronic gout of multiple sites without tophus (05/20/2016), Primary osteoarthritis of right knee (01/16/2017), Right knee pain, Sleep apnea, and Vitamin D deficiency (01/13/2016).    Prediabetes-she had a right total knee replacement about 2 months ago.  Her preop labs showed an A1c is 6.0.  Thus this makes her prediabetic.  She denies any blurred vision, excessive thirst or excessive urination.    Hypertension  This is a recurrent problem. The current episode started more than 1 year ago. The problem has been gradually improving since onset. The problem is controlled. Pertinent negatives include no anxiety, blurred vision, chest pain, headaches, malaise/fatigue, orthopnea, palpitations, peripheral edema or shortness of breath. Risk factors for coronary artery disease include dyslipidemia, obesity and post-menopausal state. Current antihypertension treatment includes alpha 1 blockers, beta blockers and diuretics. There are no compliance problems.  Identifiable causes of hypertension include chronic renal disease.   Hyperlipidemia  This is a chronic problem. The current episode started more than 1 year ago. The problem is uncontrolled. Recent lipid tests were reviewed and are high. Exacerbating diseases include chronic renal disease and obesity. Pertinent negatives include no chest pain or shortness of breath. She is currently on no antihyperlipidemic treatment. The current treatment provides no  improvement of lipids. There are no compliance problems.  Risk factors for coronary artery disease include dyslipidemia, hypertension, a sedentary lifestyle and obesity.       PHQ-2 Depression Screening  Little interest or pleasure in doing things?     Feeling down, depressed, or hopeless?     PHQ-2 Total Score     PHQ-9 Depression Screening  Little interest or pleasure in doing things?     Feeling down, depressed, or hopeless?     Trouble falling or staying asleep, or sleeping too much?     Feeling tired or having little energy?     Poor appetite or overeating?     Feeling bad about yourself - or that you are a failure or have let yourself or your family down?     Trouble concentrating on things, such as reading the newspaper or watching television?     Moving or speaking so slowly that other people could have noticed? Or the opposite - being so fidgety or restless that you have been moving around a lot more than usual?     Thoughts that you would be better off dead, or of hurting yourself in some way?     PHQ-9 Total Score     If you checked off any problems, how difficult have these problems made it for you to do your work, take care of things at home, or get along with other people?       Allergies   Allergen Reactions    Ibuprofen Hives    Penicillins Seizure and Other (See Comments)    Nsaids Other (See Comments)     CKD    Cephalosporins Rash    Sulfa Antibiotics Rash       Prior to Admission medications    Medication Sig Start Date End Date Taking? Authorizing Provider   albuterol (ACCUNEB) 1.25 MG/3ML nebulizer solution Take 3 mL by nebulization Every 6 (Six) Hours As Needed for Shortness of Air for up to 50 doses. 1/11/23  Yes Manisha Romano APRN   albuterol sulfate  (90 Base) MCG/ACT inhaler Inhale 2 puffs Every 4 (Four) Hours As Needed for Wheezing. 10/6/23  Yes Shanell Murray APRN   Allergy Relief 180 MG tablet TAKE 1 TABLET BY MOUTH DAILY 8/4/23  Yes Justin Flaherty APRN   apixaban  (ELIQUIS) 2.5 MG tablet tablet Take 1 tablet by mouth Every 12 (Twelve) Hours. Indications: Prevention of Unwanted Clot in Veins 11/1/23  Yes Celso Prakash MD   azelastine (ASTELIN) 0.1 % nasal spray USE 2 SPRAYS IN EACH NOSTRIL TWICE DAILY 7/27/23  Yes Santino Baez MD   budesonide-formoterol (SYMBICORT) 160-4.5 MCG/ACT inhaler Inhale 2 puffs 2 (Two) Times a Day. 10/16/23  Yes Shanell Murray APRN   D3-1000 25 MCG (1000 UT) capsule TAKE 1 CAPSULE BY MOUTH EVERY DAY 10/20/22  Yes Nick Patel DO   docusate sodium 100 MG capsule Take 1 capsule by mouth 2 (Two) Times a Day. 11/1/23  Yes Edie Cameron APRN   famotidine (PEPCID) 20 MG tablet TAKE 1 TABLET BY MOUTH EVERY NIGHT AT BEDTIME 5/17/23  Yes Shanell Murray APRN   Fluticasone Furoate (Arnuity Ellipta) 100 MCG/ACT aerosol powder  Inhale 1 puff Daily. 10/6/23  Yes Shanell Murray APRN   gabapentin (NEURONTIN) 300 MG capsule 1 tab in am and 2 at bedtime 8/7/23  Yes Nick Patel DO   HYDROcodone-acetaminophen (NORCO) 7.5-325 MG per tablet Take 1 tablet by mouth Every 6 (Six) Hours As Needed for Moderate Pain. 11/17/23  Yes Celso Prakash MD   losartan (COZAAR) 100 MG tablet TAKE 1 TABLET EVERY DAY 1/26/23  Yes Nick Patel DO   metoprolol succinate XL (TOPROL-XL) 100 MG 24 hr tablet Take 1 tablet by mouth Daily. Hold if heartrate is <60. 11/1/23  Yes Celso Prakash MD   montelukast (SINGULAIR) 10 MG tablet TAKE 1 TABLET ONE TIME DAILY IN THE EVENING 1/26/23  Yes Nick Patel DO   ondansetron (ZOFRAN) 4 MG tablet Take 1 tablet by mouth Every 6 (Six) Hours As Needed for Nausea or Vomiting. 11/1/23  Yes Edie Cameron APRN   spironolactone (ALDACTONE) 25 MG tablet TAKE 1 TABLET EVERY DAY 11/20/23  Yes Marielos Dover APRN   Symbicort 160-4.5 MCG/ACT inhaler Inhale 2 puffs 2 (Two) Times a Day. 10/6/23  Yes Shanell Murray APRN        Patient Active Problem List   Diagnosis    Family history of colon  cancer    Chronic obstructive pulmonary disease    Essential hypertension    Hyperlipidemia    CKD (chronic kidney disease) stage 3, GFR 30-59 ml/min    Chronic heart failure with preserved ejection fraction    CARMEN (obstructive sleep apnea)    Seasonal allergies    Gastroesophageal reflux disease    History of fungal pneumonia    Bronchitis, mucopurulent recurrent    Tobacco abuse, in remission    Pulmonary nodule    PND (post-nasal drip)    Status post fall    Allergic rhinitis    Asthma    Closed fracture of metatarsal bone    Diverticulitis    Hemoptysis    Idiopathic chronic gout of multiple sites without tophus    Primary localized osteoarthritis    Vitamin D deficiency    Morbid (severe) obesity due to excess calories    Umbilical hernia without obstruction and without gangrene    Oral thrush    Arthritis of knee    Pre-diabetes        Past Surgical History:   Procedure Laterality Date    CATARACT EXTRACTION Right     COLONOSCOPY      COLONOSCOPY N/A 10/12/2021    Procedure: COLONOSCOPY;  Surgeon: Jorge Diane MD;  Location: [facility] ENDOSCOPY;  Service: Gastroenterology;  Laterality: N/A;  DIVERTICULOSIS    ENDOSCOPY      EYE SURGERY      cataract right eye    OTHER SURGICAL HISTORY      Fatty tumor removed off back    RETINAL DETACHMENT REPAIR Right     hole in retina repair    TOTAL KNEE ARTHROPLASTY Right 10/31/2023    Procedure: TOTAL KNEE ARTHROPLASTY;  Surgeon: Celso Prakash MD;  Location: [facility] OR [facility];  Service: Orthopedics;  Laterality: Right;       Social History     Socioeconomic History    Marital status:    Tobacco Use    Smoking status: Former     Packs/day: 0.50     Years: 5.00     Additional pack years: 0.00     Total pack years: 2.50     Types: Cigarettes     Start date:      Quit date:      Years since quittin.9     Passive exposure: Never    Smokeless tobacco: Never   Vaping Use    Vaping Use: Never used   Substance and Sexual Activity    Alcohol use:  "Never    Drug use: Never    Sexual activity: Defer       Family History   Problem Relation Age of Onset    Colon cancer Mother 61    Cancer Mother     Heart disease Mother     Heart failure Father     Diabetes Brother     Breast cancer Maternal Grandmother     Stroke Paternal Grandfather     Malig Hyperthermia Neg Hx        Family history, surgical history, past medical history, Allergies and meds reviewed with patient today and updated in [facility] EMR.     ROS:  Review of Systems   Constitutional:  Negative for fatigue and malaise/fatigue.   Eyes:  Negative for blurred vision and visual disturbance.   Respiratory:  Positive for cough. Negative for chest tightness, shortness of breath and wheezing.    Cardiovascular:  Negative for chest pain, palpitations and orthopnea.   Endocrine: Negative for polydipsia and polyuria.   Neurological:  Negative for headache.       OBJECTIVE:  Vitals:    12/05/23 1128   BP: 128/73   BP Location: Left arm   Patient Position: Sitting   Pulse: 70   Temp: 97.4 °F (36.3 °C)   SpO2: 96%   Weight: 96.6 kg (213 lb)   Height: 152.4 cm (60\")     No results found.   Body mass index is 41.6 kg/m².  No LMP recorded. Patient is postmenopausal.    Physical Exam    Procedures    No visits with results within 30 Day(s) from this visit.   Latest known visit with results is:   Admission on 10/31/2023, Discharged on 11/01/2023   Component Date Value Ref Range Status    Hemoglobin 11/01/2023 10.6 (L)  12.0 - 15.9 g/dL Final    Hematocrit 11/01/2023 32.1 (L)  34.0 - 46.6 % Final       ASSESSMENT/ PLAN:    Diagnoses and all orders for this visit:    1. Mixed hyperlipidemia (Primary)  Assessment & Plan:  Her most recent LDL was up.  Also she is now has a diagnosis of prediabetes.  Thus it be nice to have her LDL at or less than 100.  She has tried statins in the past and has difficulty with tolerating them.  She will work on more lifestyle changes of diet exercise and weight loss now that she has had her knee " replaced to see if she can have significant enough lifestyle changes to make these better by her next visit.    Orders:  -     Comprehensive Metabolic Panel; Future  -     Lipid Panel; Future    2. Essential hypertension  Assessment & Plan:  Her blood pressure remains good without the amlodipine.  Will continue the rest of her other meds.    Orders:  -     Comprehensive Metabolic Panel; Future  -     Lipid Panel; Future    3. Pre-diabetes  Assessment & Plan:  Her preop A1c was 6.0.  She will work on following a low-carb diet increase physical activity and hopefully some weight loss.  Will hold off and repeat her A1c in another 4 months.    Orders:  -     Hemoglobin A1c; Future    4. Stage 3a chronic kidney disease  Assessment & Plan:  She has recently seen nephrology and her renal function is stable.                 Orders Placed Today:     No orders of the defined types were placed in this encounter.       Management Plan:     An After Visit Summary was printed and given to the patient at discharge.    Follow-up: Return in about 4 months (around 4/5/2024) for Recheck.    Nick Patel, DO 12/5/2023 11:54 EST  This note was electronically signed.  Answers submitted by the patient for this visit:  Primary Reason for Visit (Submitted on 11/29/2023)  What is the primary reason for your visit?: High Blood Pressure

## 2023-12-06 ENCOUNTER — OFFICE VISIT (OUTPATIENT)
Dept: CARDIOLOGY | Facility: CLINIC | Age: 78
End: 2023-12-06
Payer: MEDICARE

## 2023-12-06 VITALS
BODY MASS INDEX: 41.62 KG/M2 | HEART RATE: 68 BPM | HEIGHT: 60 IN | WEIGHT: 212 LBS | DIASTOLIC BLOOD PRESSURE: 60 MMHG | SYSTOLIC BLOOD PRESSURE: 132 MMHG

## 2023-12-06 DIAGNOSIS — I10 ESSENTIAL HYPERTENSION: ICD-10-CM

## 2023-12-06 DIAGNOSIS — I50.32 CHRONIC HEART FAILURE WITH PRESERVED EJECTION FRACTION: Primary | ICD-10-CM

## 2023-12-06 NOTE — PROGRESS NOTES
Chief Complaint  Follow-up, Chronic heart failure with preserved ejection fraction, Hypertension, and Hyperlipidemia    Subjective    Patient stable symptomatically denies any change in breathing [place] no significant weight changes last visit.  Past Medical History:   Diagnosis Date    Acute right-sided low back pain with right-sided sciatica 01/15/2018    Allergic rhinitis     Asthma     Back pain     2 bulging disc L2 L3    Chronic heart failure with preserved ejection fraction     11/16/20 echocardiogram: 1.  Normal ejection fraction of 55%. 2.  Mild left ventricular hypertrophy. 3.  No significant valvular heart issues.     CKD stage 3 10/29/2019    COPD (chronic obstructive pulmonary disease)     Diverticulitis     Essential hypertension     GERD (gastroesophageal reflux disease)     Hemoptysis     non cancerous    Hyperlipidemia     Idiopathic chronic gout of multiple sites without tophus 05/20/2016    Primary osteoarthritis of right knee 01/16/2017    Right knee pain     Sleep apnea     CPAP    Vitamin D deficiency 01/13/2016         Current Outpatient Medications:     albuterol (ACCUNEB) 1.25 MG/3ML nebulizer solution, Take 3 mL by nebulization Every 6 (Six) Hours As Needed for Shortness of Air for up to 50 doses., Disp: 25 each, Rfl: 0    albuterol sulfate  (90 Base) MCG/ACT inhaler, Inhale 2 puffs Every 4 (Four) Hours As Needed for Wheezing., Disp: 1 g, Rfl: 11    Allergy Relief 180 MG tablet, TAKE 1 TABLET BY MOUTH DAILY, Disp: 90 tablet, Rfl: 3    apixaban (ELIQUIS) 2.5 MG tablet tablet, Take 1 tablet by mouth Every 12 (Twelve) Hours. Indications: Prevention of Unwanted Clot in Veins, Disp: 60 tablet, Rfl: 0    azelastine (ASTELIN) 0.1 % nasal spray, USE 2 SPRAYS IN EACH NOSTRIL TWICE DAILY, Disp: 30 mL, Rfl: 6    D3-1000 25 MCG (1000 UT) capsule, TAKE 1 CAPSULE BY MOUTH EVERY DAY, Disp: 90 capsule, Rfl: 3    docusate sodium 100 MG capsule, Take 1 capsule by mouth 2 (Two) Times a Day., Disp: 60  capsule, Rfl: 0    famotidine (PEPCID) 20 MG tablet, TAKE 1 TABLET BY MOUTH EVERY NIGHT AT BEDTIME, Disp: 30 tablet, Rfl: 11    Fluticasone Furoate (Arnuity Ellipta) 100 MCG/ACT aerosol powder , Inhale 1 puff Daily., Disp: 90 each, Rfl: 3    gabapentin (NEURONTIN) 300 MG capsule, 1 tab in am and 2 at bedtime, Disp: 270 capsule, Rfl: 1    HYDROcodone-acetaminophen (NORCO) 7.5-325 MG per tablet, Take 1 tablet by mouth Every 6 (Six) Hours As Needed for Moderate Pain., Disp: 30 tablet, Rfl: 0    losartan (COZAAR) 100 MG tablet, TAKE 1 TABLET EVERY DAY, Disp: 90 tablet, Rfl: 3    metoprolol succinate XL (TOPROL-XL) 100 MG 24 hr tablet, Take 1 tablet by mouth Daily. Hold if heartrate is <60., Disp: , Rfl:     montelukast (SINGULAIR) 10 MG tablet, TAKE 1 TABLET ONE TIME DAILY IN THE EVENING, Disp: 90 tablet, Rfl: 3    ondansetron (ZOFRAN) 4 MG tablet, Take 1 tablet by mouth Every 6 (Six) Hours As Needed for Nausea or Vomiting., Disp: 12 tablet, Rfl: 0    spironolactone (ALDACTONE) 25 MG tablet, TAKE 1 TABLET EVERY DAY, Disp: 90 tablet, Rfl: 0    Symbicort 160-4.5 MCG/ACT inhaler, Inhale 2 puffs 2 (Two) Times a Day., Disp: 1 each, Rfl: 11    Medications Discontinued During This Encounter   Medication Reason    budesonide-formoterol (SYMBICORT) 160-4.5 MCG/ACT inhaler *Re-Entry     Allergies   Allergen Reactions    Ibuprofen Hives    Penicillins Seizure and Other (See Comments)    Nsaids Other (See Comments)     CKD    Cephalosporins Rash    Sulfa Antibiotics Rash        Social History     Tobacco Use    Smoking status: Former     Packs/day: 0.50     Years: 5.00     Additional pack years: 0.00     Total pack years: 2.50     Types: Cigarettes     Start date: 1963     Quit date: 1995     Years since quittin.9     Passive exposure: Never    Smokeless tobacco: Never   Vaping Use    Vaping Use: Never used   Substance Use Topics    Alcohol use: Never    Drug use: Never       Family History   Problem Relation Age of  "Onset    Colon cancer Mother 61    Cancer Mother     Heart disease Mother     Heart failure Father     Asthma Father     Heart attack Father     Hyperlipidemia Father     Diabetes Brother     Breast cancer Maternal Grandmother     Stroke Paternal Grandfather     Malig Hyperthermia Neg Hx         Objective     /60   Pulse 68   Ht 152.4 cm (60\")   Wt 96.2 kg (212 lb)   BMI 41.40 kg/m²       Physical Exam    General Appearance:   no acute distress  Alert and oriented x3  HENT:   lips not cyanotic  Atraumatic  Neck:  No jvd   supple  Respiratory:  no respiratory distress  normal breath sounds  no rales  Cardiovascular:  Regular rate and rhythm  no S3, no S4   no murmur  no rub  Extremities  No cyanosis  lower extremity edema: none    Skin:   warm, dry  No rashes      Result Review :     proBNP   Date Value Ref Range Status   06/12/2023 111.0 0.0 - 1,800.0 pg/mL Final     CMP          9/8/2023    15:30 9/29/2023    09:15 10/17/2023    10:43   CMP   Glucose 102  92  123    BUN 21  15  16    Creatinine 1.50  1.08  1.11    EGFR 35.5  52.7  51.0    Sodium 142  144  141    Potassium 4.2  3.5  3.8    Chloride 108  110  106    Calcium 9.1  9.4  9.5    Total Protein 6.6  6.2     Albumin 3.8  4.2     Globulin 2.8  2.0     Total Bilirubin 0.4  0.4     Alkaline Phosphatase 72  76     AST (SGOT) 11  12     ALT (SGPT) 10  6     Albumin/Globulin Ratio 1.4  2.1     BUN/Creatinine Ratio 14.0  13.9  14.4    Anion Gap 12.6  10.0  9.0      CBC w/diff          9/29/2023    09:15 10/17/2023    10:43 11/1/2023    05:07   CBC w/Diff   WBC 10.78  9.57     RBC 4.16  4.02     Hemoglobin 12.0  11.8  10.6    Hematocrit 37.1  35.7  32.1    MCV 89.2  88.8     MCH 28.8  29.4     MCHC 32.3  33.1     RDW 14.5  14.3     Platelets 268  221     Neutrophil Rel % 60.3      Immature Granulocyte Rel % 0.9      Lymphocyte Rel % 29.3      Monocyte Rel % 8.2      Eosinophil Rel % 0.6      Basophil Rel % 0.7         Lab Results   Component Value Date    " "TSH 2.300 10/29/2019      Lab Results   Component Value Date    FREET4 1.2 10/29/2019      No results found for: \"DDIMERQUANT\"  Magnesium   Date Value Ref Range Status   09/08/2023 1.9 1.6 - 2.4 mg/dL Final      No results found for: \"DIGOXIN\"   Lab Results   Component Value Date    TROPONINT <0.01 10/04/2020           Lipid Panel          5/30/2023    13:55 9/8/2023    15:30   Lipid Panel   Total Cholesterol 216  243    Triglycerides 117  186    HDL Cholesterol 48  45    VLDL Cholesterol 21  34    LDL Cholesterol  147  164    LDL/HDL Ratio 3.01  3.57      No results found for: \"POCTROP\"                   Diagnoses and all orders for this visit:    1. Chronic heart failure with preserved ejection fraction (Primary)  Assessment & Plan:  Patient stable symptomatically well-controlled blood pressure continue with spironolactone 25 mg once a day      2. Essential hypertension  Assessment & Plan:  Blood pressure at goal range continue on losartan 100 mg once a day and Toprol 100 mg p.o. daily.  Patient has been taken off Norvasc blood pressures chuyita within the adequate goal range              Follow Up     Return in about 6 months (around 6/6/2024) for Follow with Marielos Dover.          Patient was given instructions and counseling regarding her condition or for health maintenance advice. Please see specific information pulled into the AVS if appropriate.       "

## 2023-12-06 NOTE — ASSESSMENT & PLAN NOTE
Patient stable symptomatically well-controlled blood pressure continue with spironolactone 25 mg once a day

## 2023-12-06 NOTE — ASSESSMENT & PLAN NOTE
Blood pressure at goal range continue on losartan 100 mg once a day and Toprol 100 mg p.o. daily.  Patient has been taken off Norvasc blood pressures chuyita within the adequate goal range

## 2023-12-07 ENCOUNTER — TREATMENT (OUTPATIENT)
Dept: PHYSICAL THERAPY | Facility: CLINIC | Age: 78
End: 2023-12-07
Payer: MEDICARE

## 2023-12-07 DIAGNOSIS — M25.661 DECREASED ROM OF RIGHT KNEE: ICD-10-CM

## 2023-12-07 DIAGNOSIS — R26.9 GAIT DISTURBANCE: ICD-10-CM

## 2023-12-07 DIAGNOSIS — M62.81 MUSCLE WEAKNESS OF LOWER EXTREMITY: ICD-10-CM

## 2023-12-07 DIAGNOSIS — Z96.651 STATUS POST TOTAL KNEE REPLACEMENT, RIGHT: Primary | ICD-10-CM

## 2023-12-07 NOTE — PROGRESS NOTES
Outpatient Physical Therapy                   Physical Therapy Daily Treatment Note    Patient: Julia Rios   : 1945  Diagnosis/ICD-10 Code:  Status post total knee replacement, right [Z96.651]  Referring practitioner: No ref. provider found  Date of Initial Visit: Type: THERAPY  Noted: 2023  Today's Date: 2023  Patient seen for 7 sessions             Subjective   Julia Rios reports: she has been at a  all day and her watch says she has almost walked a mile already.     Pain: 5/10 pain, at time of arrival.     Objective     See Exercise, Manual, and Modality Logs for complete treatment.     Assessment/Plan  Patient tolerated session well but fatigued quickly. Patient progressing with right knee ROM. Pt would benefit from skilled PT to address Range of Motion  and Strength deficits, pain management and any concerns with ADLs.     Progress per Plan of Care      Timed:  Manual Therapy:    8     mins  87272;  Therapeutic Exercise:    13     mins  46071;     Neuromuscular Sheela:    0    mins  20289;    Therapeutic Activity:     10     mins  00350;     Gait Trainin     mins  06940;    Aquatic Therapy:     0     mins  33517;       Untimed:  Electrical Stimulation:    0     mins  58005 ( );  Mechanical Traction:    0     mins  99448;       Timed Treatment:   31   mins   Total Treatment:     31   mins      Electronically signed:   Bridgett Flores PTA  Physical Therapist Assistant  [facility] License #: [id number]

## 2023-12-12 ENCOUNTER — TREATMENT (OUTPATIENT)
Dept: PHYSICAL THERAPY | Facility: CLINIC | Age: 78
End: 2023-12-12
Payer: MEDICARE

## 2023-12-12 DIAGNOSIS — M25.661 DECREASED ROM OF RIGHT KNEE: ICD-10-CM

## 2023-12-12 DIAGNOSIS — R26.9 GAIT DISTURBANCE: ICD-10-CM

## 2023-12-12 DIAGNOSIS — Z96.651 STATUS POST TOTAL KNEE REPLACEMENT, RIGHT: Primary | ICD-10-CM

## 2023-12-12 DIAGNOSIS — M62.81 MUSCLE WEAKNESS OF LOWER EXTREMITY: ICD-10-CM

## 2023-12-12 NOTE — PROGRESS NOTES
Outpatient Physical Therapy                   Physical Therapy Daily Treatment Note    Patient: Julia Rios   : 1945  Diagnosis/ICD-10 Code:  Status post total knee replacement, right [Z96.651]  Referring practitioner: No ref. provider found  Date of Initial Visit: Type: THERAPY  Noted: 2023  Today's Date: 2023  Patient seen for 8 sessions             Subjective   Julia Rios reports: she has noticed her right quad has gotten much stronger.     Pain: 0/10 pain, at time of arrival.     Objective     See Exercise, Manual, and Modality Logs for complete treatment.     Assessment/Plan  Julia progressing as evident by decreased overall knee pain. Pt tolerated exercises well, no complaints of increased pain or discomfort. Patient was fatigued by sit to stands. Pt would benefit from skilled PT to address Range of Motion  and Strength deficits, pain management and any concerns with ADLs.     Progress per Plan of Care      Timed:  Manual Therapy:    6     mins  58840;  Therapeutic Exercise:    15     mins  89776;     Neuromuscular Sheela:    0    mins  14594;    Therapeutic Activity:     10     mins  89634;     Gait Trainin     mins  18076;    Aquatic Therapy:     0     mins  30152;       Untimed:  Electrical Stimulation:    0     mins  17071 ( );  Mechanical Traction:    0     mins  61545;       Timed Treatment:   31   mins   Total Treatment:     31   mins      Electronically signed:   Bridgett Flores PTA  Physical Therapist Assistant  \[facility]\"" License #: [id number]

## 2023-12-14 ENCOUNTER — OFFICE VISIT (OUTPATIENT)
Age: 78
End: 2023-12-14
Payer: MEDICARE

## 2023-12-14 VITALS — WEIGHT: 212 LBS | HEIGHT: 60 IN | BODY MASS INDEX: 41.62 KG/M2 | TEMPERATURE: 97.8 F

## 2023-12-14 DIAGNOSIS — Z09 SURGERY FOLLOW-UP: Primary | ICD-10-CM

## 2023-12-14 RX ORDER — ASPIRIN 81 MG/1
81 TABLET ORAL DAILY
COMMUNITY

## 2023-12-14 NOTE — PROGRESS NOTES
Julia Rios : 1945 MRN: [id number] DATE: 2023    DIAGNOSIS: 6 week follow up right TKA      SUBJECTIVE:  Patient returns today for 6 week follow up of right total knee replacement.  She is very pleased with her outcome and says her pain is much better now than it was prior to surgery.  Patient reports doing well with no unusual complaints.     Vitals:    23 1048   Temp: 97.8 °F (36.6 °C)       OBJECTIVE:     Exam:  The incision is well healed. No sign of infection. Range of motion is measured at 0-115°.  The calf is soft and nontender with a negative Homans sign.  Gait is reciprocal heel-to-toe and only mildly antalgic.    DIAGNOSTIC STUDIES    Xrays: 3 views of the right knee (AP, lateral, and sunrise) were ordered and reviewed for evaluation of recent knee replacement. They demonstrate a well positioned, well aligned knee replacement without complicating factors noted. In comparison with previous films there has been no change.    ASSESSMENT:  6 week status post right knee replacement    PLAN:   1) Continue with PT exercises as prescribed.  2) We discussed the need for prophylactic antibiotics prior to dental appointments.  3) Follow up in 6 weeks with Dr. Prakash.    Edie Cameron, APRBRYANNA  2023    Answers submitted by the patient for this visit:  Other (Submitted on 2023)  Please describe your symptoms.: post op total knee replacement  Have you had these symptoms before?: No  How long have you been having these symptoms?: Greater than 2 weeks  Primary Reason for Visit (Submitted on 2023)  What is the primary reason for your visit?: Other

## 2023-12-18 ENCOUNTER — TREATMENT (OUTPATIENT)
Dept: PHYSICAL THERAPY | Facility: CLINIC | Age: 78
End: 2023-12-18
Payer: MEDICARE

## 2023-12-18 DIAGNOSIS — M62.81 MUSCLE WEAKNESS OF LOWER EXTREMITY: ICD-10-CM

## 2023-12-18 DIAGNOSIS — Z96.651 STATUS POST TOTAL KNEE REPLACEMENT, RIGHT: Primary | ICD-10-CM

## 2023-12-18 DIAGNOSIS — R26.9 GAIT DISTURBANCE: ICD-10-CM

## 2023-12-18 DIAGNOSIS — M25.661 DECREASED ROM OF RIGHT KNEE: ICD-10-CM

## 2023-12-18 NOTE — PROGRESS NOTES
Progress Note  Maryjane PT: [address] [place] [address]      Patient: Julia Rios   : 1945  Diagnosis/ICD-10 Code:  Status post total knee replacement, right [Z96.651]  Referring practitioner: No ref. provider found  Date of Initial Visit: Type: THERAPY  Noted: 2023  Today's Date: 2023  Patient seen for 9 sessions      Subjective:   Subjective Questionnaire: LEFS: 56/80  Clinical Progress: improved  Home Program Compliance: Yes  Treatment has included: balance/weight-bearing training, ADL retraining, soft tissue mobilization, strengthening, stretching, therapeutic activities, manual therapy, joint mobilization, home exercise program/patient education, gait training, functional ROM exercises, flexibility, body mechanics training, postural training, and neuromuscular re-education    Subjective   Pt reports their R knee has been doing better since starting therapy. Pt reports they just had to force themself to perform their HEP. Pt reports no pain today. Pt reports they do feel like they need to use their cane for longer distances.       Objective   Pt continues to ambulate w/ a straight cane at this time.     Active Range of Motion   Left Knee   Flexion: 120 degrees   Extension: 0 degrees      Right Knee   Flexion: 106 degrees  Extension: 0 degrees      Passive Range of Motion      Right Knee   Flexion: 116 degrees   Extension: 0 degrees      Strength/Myotome Testing      Left Hip   Planes of Motion   Flexion: 4+     Right Hip   Planes of Motion   Flexion: 4     Left Knee   Flexion: 5  Extension: 5     Right Knee   Flexion: 4+  Extension: 4+  See Exercise, Manual, and Modality Logs for complete treatment.     Assessment/Plan  Impairments: abnormal coordination, abnormal gait, abnormal muscle firing, abnormal or restricted ROM, activity intolerance, impaired balance, impaired physical strength, lacks appropriate home exercise program and pain with function   Functional  limitations: lifting, sleeping, walking, pushing, moving in bed, standing, stooping and unable to perform repetitive tasks     Pt reported to physical therapy s/p R TKA performed on 10/31/2023. Pt demonstrates an improvement in their ROM as well as their strength. Pt has improvement in their perceived deficits described by LEFS. Pt has poor control of their R knee w/ ascending and descending stairs. Will continue to emphasize stair training and progress pt in resistance training. Pt's goals will be addressed at this time to reflect their progress in therapy. Patient will continue to benefit from skilled physical therapy to further address their deficits in strength and ROM in order to improve upon their functional mobility and to meet their personal goals.         Goals  Plan Goals: KNEE PROBLEMS     1. The patient has limited ROM of the R knee.              LTG 1:12 weeks:  The patient will demonstrate 0 to 120 degrees of ROM for the R knee in order to allow patient to am.                          STATUS:  progressing              STG 1a: 6 weeks:  The patient will demonstrate 5 to 105 degrees of ROM for the R knee.                          STATUS:  met              TREATMENT: Manual therapy, therapeutic exercise, home exercise instruction, and modalities as needed to include:  moist heat, electrical stimulation, ultrasound, and ice.     2. The patient has limited strength of the R knee.              LTG 2: 12 weeks: The patient will demonstrate 5 /5 strength for R knee flexion and extension in order to allow patient improved joint stability                          STATUS:  met              STG 2a: 6 weeks: The patient will demonstrate 4+ /5 strength for R knee flexion and extension                          STATUS:  met              TREATMENT: Manual therapy, therapeutic exercise, home exercise instruction, aquatic therapy, and modalities as needed to include:  moist heat, electrical stimulation, ultrasound, and  ice.                3. The patient has gait dysfunction.              LTG 3: 12 weeks:  The patient will ambulate without assistive device, independently, for community distances with normal biomechanics of the R lower extremity in order to improve mobility and allow patient to perform activities such as grocery shopping with greater ease.                          STATUS:  progressing              STG 3a: 4 weeks: The patient will ambulate with a single tipped cane with appropriate gait mechanics.                          STATUS:  met              TREATMENT: Gait training, aquatic therapy, therapeutic exercise, and home exercise instruction.     4. Mobility: Walking/Moving Around Functional Limitation                               LTG 4: 12 weeks:  The patient will demonstrate 18.75 % limitation by achieving a score of 65/80 on the LEFS.                          STATUS:  progressing              STG 4 a: 6 weeks:  The patient will demonstrate 31.25 % limitation by achieving a score of 55/80 on the LEFS.                            STATUS: met              TREATMENT:  Manual therapy, therapeutic exercise, home exercise instruction, and modalities as needed to include: moist heat, electrical stimulation, and ultrasound.       Progress toward previous goals: Partially Met      Recommendations: Continue as planned  Timeframe: 2 a week, for 2 months   Prognosis to achieve goals: good    PT Signature: Luigi Green PT, DPT    Electronically signed 2023    [place] License: PT - [id number]         Timed:  Manual Therapy:    0     mins  97341;  Therapeutic Exercise:    30     mins  96069;     Neuromuscular Sheela:    0    mins  58190;    Therapeutic Activity:     8     mins  44418;     Gait Trainin     mins  20107;     Aquatics                         0      mins  86027    Un-timed:  Mechanical Traction      0     mins  31354  Dry Needling     0     mins self-pay  Electrical Stimulation:    0     mins  68973 (  );    Timed Treatment:   38   mins   Total Treatment:     38   mins

## 2023-12-20 ENCOUNTER — TREATMENT (OUTPATIENT)
Dept: PHYSICAL THERAPY | Facility: CLINIC | Age: 78
End: 2023-12-20
Payer: MEDICARE

## 2023-12-20 DIAGNOSIS — M62.81 MUSCLE WEAKNESS OF LOWER EXTREMITY: ICD-10-CM

## 2023-12-20 DIAGNOSIS — R26.9 GAIT DISTURBANCE: ICD-10-CM

## 2023-12-20 DIAGNOSIS — M25.661 DECREASED ROM OF RIGHT KNEE: ICD-10-CM

## 2023-12-20 DIAGNOSIS — Z96.651 STATUS POST TOTAL KNEE REPLACEMENT, RIGHT: Primary | ICD-10-CM

## 2023-12-20 NOTE — PROGRESS NOTES
Outpatient Physical Therapy  [address] [facility], [place] [address]                            Physical Therapy Daily Treatment Note      Patient: Julia Rios   : 1945  Diagnosis/ICD-10 Code:  Status post total knee replacement, right [Z96.651]  Referring practitioner: No ref. provider found  Date of Initial Visit: Type: THERAPY  Noted: 2023  Today's Date: 2023  Patient seen for 10 sessions           Subjective   Julia Rios reports: that overall the knee is doing better, she hasn't been using her cane around the house.     Objective   Knee Flexion: 115 deg c step stretch    See Exercise, Manual, and Modality Logs for complete treatment.     Assessment/Plan  Julia progressing as evident by decreased overall R knee pain. Pt able to achieve 115 deg of knee flexion. Pt would benefit from skilled PT to address Range of Motion  and Strength deficits, pain management and any concerns with ADLs.       Progress per Plan of Care         Timed:  Manual Therapy:         mins  77302;  Therapeutic Exercise:    20     mins  22769;     Neuromuscular Sheela:    10    mins  98514;    Therapeutic Activity:          mins  84952;     Gait Training:           mins  44853;       Untimed:  Electrical Stimulation:         mins  41694 ( );  Mechanical Traction:        mins  35306;       Timed Treatment:   30   mins   Total Treatment:     30   mins      Electronically signed:   Naila Sarmiento PTA  Physical Therapist Assistant  [facility] License #: [id number]

## 2023-12-26 ENCOUNTER — TREATMENT (OUTPATIENT)
Dept: PHYSICAL THERAPY | Facility: CLINIC | Age: 78
End: 2023-12-26
Payer: MEDICARE

## 2023-12-26 DIAGNOSIS — Z96.651 STATUS POST TOTAL KNEE REPLACEMENT, RIGHT: Primary | ICD-10-CM

## 2023-12-26 DIAGNOSIS — M62.81 MUSCLE WEAKNESS OF LOWER EXTREMITY: ICD-10-CM

## 2023-12-26 DIAGNOSIS — M25.661 DECREASED ROM OF RIGHT KNEE: ICD-10-CM

## 2023-12-26 DIAGNOSIS — R26.9 GAIT DISTURBANCE: ICD-10-CM

## 2023-12-26 NOTE — PROGRESS NOTES
Physical Therapy Daily Treatment Note  Maryjane PT: [address] [facility]   [place] [address]      Patient: Julia Rios   : 1945  Diagnosis/ICD-10 Code:  Status post total knee replacement, right [Z96.651]  Referring practitioner: No ref. provider found  Date of Initial Visit: Type: THERAPY  Noted: 2023  Today's Date: 2023  Patient seen for 11 sessions           Subjective   The patient reported they feel like they are doing much better. Pt reports they are experiencing 1/10 pain today in their R knee.     Objective   See Exercise, Manual, and Modality Logs for complete treatment.     Assessment/Plan  Pt continues to tolerate therapy well. Patient will continue to benefit from skilled physical therapy to further address their deficits in strength and ROM in order to improve upon their functional mobility and to meet their personal goals.          Timed:  Manual Therapy:    0     mins  18837;  Therapeutic Exercise:    15     mins  70471;     Neuromuscular Sheela:   0    mins  50299;    Therapeutic Activity:     10     mins  11694;     Gait Trainin     mins  97539;     Aquatics                         0      mins  84263    Un-timed:  Mechanical Traction      0     mins  96606  Electrical Stimulation:    0     mins  68716 ( );      Timed Treatment:   25   mins   Total Treatment:     25   mins    Luigi Green PT, DPT    Electronically signed 2023    [place] License: PT - [id number]

## 2023-12-28 ENCOUNTER — TREATMENT (OUTPATIENT)
Dept: PHYSICAL THERAPY | Facility: CLINIC | Age: 78
End: 2023-12-28
Payer: MEDICARE

## 2023-12-28 DIAGNOSIS — R26.9 GAIT DISTURBANCE: ICD-10-CM

## 2023-12-28 DIAGNOSIS — M62.81 MUSCLE WEAKNESS OF LOWER EXTREMITY: ICD-10-CM

## 2023-12-28 DIAGNOSIS — M25.661 DECREASED ROM OF RIGHT KNEE: ICD-10-CM

## 2023-12-28 DIAGNOSIS — Z96.651 STATUS POST TOTAL KNEE REPLACEMENT, RIGHT: Primary | ICD-10-CM

## 2023-12-28 NOTE — PROGRESS NOTES
Outpatient Physical Therapy  [address] [facility], [place] [address]                            Physical Therapy Daily Treatment Note      Patient: Julia Riso   : 1945  Diagnosis/ICD-10 Code:  Status post total knee replacement, right [Z96.651]  Referring practitioner: No ref. provider found  Date of Initial Visit: Type: THERAPY  Noted: 2023  Today's Date: 2023  Patient seen for 12 sessions           Subjective   Julia Rios reports: that she did quite a bit of standing today so her legs are tired.     Objective   Knee Flexion: 116 deg c step stretch    See Exercise, Manual, and Modality Logs for complete treatment.     Assessment/Plan  Julia progressing as evident by decreased overall R knee pain. Pt able to achieve 116 deg of knee flexion. Pt would benefit from skilled PT to address Range of Motion  and Strength deficits, pain management and any concerns with ADLs.       Progress per Plan of Care         Timed:  Manual Therapy:         mins  05428;  Therapeutic Exercise:    15     mins  02597;     Neuromuscular Sheela:        mins  56304;    Therapeutic Activity:     15     mins  07167;     Gait Training:           mins  55087;       Untimed:  Electrical Stimulation:         mins  82016 ( );  Mechanical Traction:        mins  21403;       Timed Treatment:   30   mins   Total Treatment:     30   mins      Electronically signed:   Naila Sarmiento PTA  Physical Therapist Assistant  [facility]OR[facility] License #: [id number]

## 2024-01-03 ENCOUNTER — TELEPHONE (OUTPATIENT)
Dept: PULMONOLOGY | Facility: CLINIC | Age: 79
End: 2024-01-03

## 2024-01-03 ENCOUNTER — OFFICE VISIT (OUTPATIENT)
Dept: PULMONOLOGY | Facility: CLINIC | Age: 79
End: 2024-01-03
Payer: MEDICARE

## 2024-01-03 VITALS
TEMPERATURE: 97.1 F | HEART RATE: 62 BPM | RESPIRATION RATE: 16 BRPM | DIASTOLIC BLOOD PRESSURE: 58 MMHG | HEIGHT: 60 IN | SYSTOLIC BLOOD PRESSURE: 119 MMHG | BODY MASS INDEX: 40.52 KG/M2 | WEIGHT: 206.4 LBS | OXYGEN SATURATION: 97 %

## 2024-01-03 DIAGNOSIS — J45.40 MODERATE PERSISTENT ASTHMA, UNSPECIFIED WHETHER COMPLICATED: ICD-10-CM

## 2024-01-03 DIAGNOSIS — R09.82 PND (POST-NASAL DRIP): ICD-10-CM

## 2024-01-03 DIAGNOSIS — J41.1 BRONCHITIS, MUCOPURULENT RECURRENT: ICD-10-CM

## 2024-01-03 DIAGNOSIS — J30.9 ALLERGIC RHINITIS, UNSPECIFIED SEASONALITY, UNSPECIFIED TRIGGER: ICD-10-CM

## 2024-01-03 DIAGNOSIS — J44.9 CHRONIC OBSTRUCTIVE PULMONARY DISEASE, UNSPECIFIED COPD TYPE: ICD-10-CM

## 2024-01-03 DIAGNOSIS — U07.1 COVID-19 VIRUS DETECTED: ICD-10-CM

## 2024-01-03 DIAGNOSIS — Z23 ENCOUNTER FOR IMMUNIZATION: ICD-10-CM

## 2024-01-03 DIAGNOSIS — K21.00 GASTROESOPHAGEAL REFLUX DISEASE WITH ESOPHAGITIS WITHOUT HEMORRHAGE: ICD-10-CM

## 2024-01-03 RX ORDER — FLUTICASONE FUROATE 100 UG/1
1 POWDER RESPIRATORY (INHALATION) DAILY
Qty: 90 EACH | Refills: 3 | Status: SHIPPED | OUTPATIENT
Start: 2024-01-03

## 2024-01-03 RX ORDER — BUDESONIDE AND FORMOTEROL FUMARATE DIHYDRATE 160; 4.5 UG/1; UG/1
2 AEROSOL RESPIRATORY (INHALATION) 2 TIMES DAILY
Qty: 1 EACH | Refills: 11 | Status: SHIPPED | OUTPATIENT
Start: 2024-01-03

## 2024-01-03 RX ORDER — AZELASTINE 1 MG/ML
2 SPRAY, METERED NASAL 2 TIMES DAILY
Qty: 30 ML | Refills: 6 | Status: SHIPPED | OUTPATIENT
Start: 2024-01-03

## 2024-01-03 RX ORDER — MONTELUKAST SODIUM 10 MG/1
10 TABLET ORAL NIGHTLY
Qty: 90 TABLET | Refills: 3 | Status: SHIPPED | OUTPATIENT
Start: 2024-01-03

## 2024-01-03 RX ORDER — ALBUTEROL SULFATE 1.25 MG/3ML
3 SOLUTION RESPIRATORY (INHALATION) EVERY 6 HOURS PRN
Qty: 25 EACH | Refills: 0 | Status: SHIPPED | OUTPATIENT
Start: 2024-01-03

## 2024-01-03 NOTE — PROGRESS NOTES
Primary Care Provider  Nick Patel DO     Referring Provider  No ref. provider found     Chief Complaint  COPD, Sleep Apnea, Cough, and Follow-up (Pt here for 6 month follow up)    Subjective          Julia Rios presents to [facility]MON[facility]INE  COPD  She complains of cough, shortness of breath and wheezing. Her past medical history is significant for asthma and bronchitis.   Sleep Apnea  Associated symptoms include coughing.   Asthma  She complains of cough, shortness of breath and wheezing. Her past medical history is significant for asthma and bronchitis.   Shortness of Breath  Associated symptoms include wheezing. Her past medical history is significant for asthma.   Wheezing   Associated symptoms include coughing and shortness of breath. Her past medical history is significant for asthma.   Cough  Associated symptoms include shortness of breath and wheezing. Her past medical history is significant for asthma and bronchitis.   Bronchitis  Associated symptoms include coughing.     Julia Rios is a 78 y.o. female patient with history of COPD, recurrent bronchitis, obstructive sleep apnea, postnasal drip, seasonal allergies, history of fungal pneumonia in past, completed a course of itraconazole, pulmonary nodule, gastroesophageal reflux disease and tobacco abuse of cigarettes in remission.  She is here for follow-up.  Since her last office visit, she had right knee replacement with Dr. Prakash in [place].  She is doing well after with the surgery.  However she has been having problems with her CPAP.  She has not worn her CPAP for the last 2 months.  She thinks her CPAP machine is more than 5 years old.  She had swallow eval after last office visit which was normal.  She also had repeat pulmonary function test which showed normal spirometry and lung volumes with mildly decreased diffusion capacity.  She tries to keep the head of bed up with  sleep and not eat close to bedtime.  She is also on Singulair once daily.  She is up-to-date on COVID-vaccine including booster dose.  She already had Moderna vaccine.  Has no fever or chills.  No nausea or vomiting.  No change in weight or appetite.  Leg swelling has significantly improved on diuretics.  She has not had flu shot and is willing to get it today.    Review of Systems   Respiratory:  Positive for cough, shortness of breath and wheezing.         General:  Fatigue, No Fever No weight loss or loss of appetite  HEENT: No dysphagia, No Visual Changes, no rhinorrhea, coating of the tongue  Respiratory:  + cough,+Dyspnea, intermittent mucoid phlegm, No Pleuritic Pain, no wheezing, no hemoptysis.  Cardiovascular: Denies chest pain, denies palpitations,+ECHEVERRIA, Chest Pressure  Gastrointestinal:  No Abdominal Pain, No Nausea, No Vomiting, No Diarrhea  Genitourinary:  No Dysuria, No Frequency, No Hesitancy  Musculoskeletal: No muscle pain or swelling  Endocrine:  No Heat Intolerance, No Cold Intolerance  Hematologic:  No Bleeding, No Bruising  Psychiatric:  No Anxiety, No Depression  Neurologic:  No Confusion, no Dysarthria, No Headaches  Skin:  No Rash, No Open Wounds, has leg swelling    Family History   Problem Relation Age of Onset    Colon cancer Mother 61    Cancer Mother     Heart disease Mother     Heart failure Father     Asthma Father     Heart attack Father     Hyperlipidemia Father     Diabetes Brother     Breast cancer Maternal Grandmother     Stroke Paternal Grandfather     Malig Hyperthermia Neg Hx         Social History     Socioeconomic History    Marital status:    Tobacco Use    Smoking status: Former     Packs/day: 0.50     Years: 5.00     Additional pack years: 0.00     Total pack years: 2.50     Types: Cigarettes     Start date: 1963     Quit date: 1995     Years since quittin.0     Passive exposure: Never    Smokeless tobacco: Never   Vaping Use    Vaping Use: Never used    Substance and Sexual Activity    Alcohol use: Never    Drug use: Never    Sexual activity: Defer        Past Medical History:   Diagnosis Date    Acute right-sided low back pain with right-sided sciatica 01/15/2018    Allergic rhinitis     Asthma     Back pain     2 bulging disc L2 L3    Chronic heart failure with preserved ejection fraction     11/16/20 echocardiogram: 1.  Normal ejection fraction of 55%. 2.  Mild left ventricular hypertrophy. 3.  No significant valvular heart issues.     CKD stage 3 10/29/2019    COPD (chronic obstructive pulmonary disease)     Diverticulitis     Essential hypertension     GERD (gastroesophageal reflux disease)     Hemoptysis     non cancerous    Hyperlipidemia     Idiopathic chronic gout of multiple sites without tophus 05/20/2016    Primary osteoarthritis of right knee 01/16/2017    Right knee pain     Sleep apnea     CPAP    Vitamin D deficiency 01/13/2016        Immunization History   Administered Date(s) Administered    COVID-19 (MODERNA) 1st,2nd,3rd Dose Monovalent 01/28/2021, 01/28/2021, 02/23/2021, 02/23/2021    Fluzone High-Dose 65+yrs 12/02/2021, 12/28/2022, 01/03/2024    Fluzone Quad >6mos (Multi-dose) 10/01/2020    Pneumococcal Conjugate 13-Valent (PCV13) 02/11/2015    Pneumococcal Conjugate 20-Valent (PCV20) 07/20/2023    Pneumococcal Polysaccharide (PPSV23) 05/01/2010         Allergies   Allergen Reactions    Ibuprofen Hives    Penicillins Seizure and Other (See Comments)    Nsaids Other (See Comments)     CKD    Cephalosporins Rash    Sulfa Antibiotics Rash          Current Outpatient Medications:     albuterol (ACCUNEB) 1.25 MG/3ML nebulizer solution, Take 3 mL by nebulization Every 6 (Six) Hours As Needed for Shortness of Air for up to 50 doses., Disp: 25 each, Rfl: 0    albuterol sulfate  (90 Base) MCG/ACT inhaler, Inhale 2 puffs Every 4 (Four) Hours As Needed for Wheezing., Disp: 1 g, Rfl: 11    Allergy Relief 180 MG tablet, TAKE 1 TABLET BY MOUTH  "DAILY, Disp: 90 tablet, Rfl: 3    aspirin 81 MG EC tablet, Take 1 tablet by mouth Daily., Disp: , Rfl:     azelastine (ASTELIN) 0.1 % nasal spray, 2 sprays into the nostril(s) as directed by provider 2 (Two) Times a Day. Use in each nostril as directed, Disp: 30 mL, Rfl: 6    D3-1000 25 MCG (1000 UT) capsule, TAKE 1 CAPSULE BY MOUTH EVERY DAY, Disp: 90 capsule, Rfl: 3    famotidine (PEPCID) 20 MG tablet, TAKE 1 TABLET BY MOUTH EVERY NIGHT AT BEDTIME, Disp: 30 tablet, Rfl: 11    Fluticasone Furoate (Arnuity Ellipta) 100 MCG/ACT aerosol powder , Inhale 1 puff Daily., Disp: 90 each, Rfl: 3    gabapentin (NEURONTIN) 300 MG capsule, 1 tab in am and 2 at bedtime, Disp: 270 capsule, Rfl: 1    losartan (COZAAR) 100 MG tablet, TAKE 1 TABLET EVERY DAY, Disp: 90 tablet, Rfl: 3    metoprolol succinate XL (TOPROL-XL) 100 MG 24 hr tablet, Take 1 tablet by mouth Daily. Hold if heartrate is <60., Disp: , Rfl:     montelukast (SINGULAIR) 10 MG tablet, Take 1 tablet by mouth Every Night., Disp: 90 tablet, Rfl: 3    spironolactone (ALDACTONE) 25 MG tablet, TAKE 1 TABLET EVERY DAY, Disp: 90 tablet, Rfl: 0    Symbicort 160-4.5 MCG/ACT inhaler, Inhale 2 puffs 2 (Two) Times a Day., Disp: 1 each, Rfl: 11    docusate sodium 100 MG capsule, Take 1 capsule by mouth 2 (Two) Times a Day. (Patient not taking: Reported on 1/3/2024), Disp: 60 capsule, Rfl: 0     Objective   Vital Signs:   /58 (BP Location: Left arm, Patient Position: Sitting, Cuff Size: Large Adult)   Pulse 62   Temp 97.1 °F (36.2 °C) (Temporal)   Resp 16   Ht 152.4 cm (60\")   Wt 93.6 kg (206 lb 6.4 oz)   SpO2 97% Comment: room air  BMI 40.31 kg/m²     Mallampatti classification : 1  Physical Exam  Vital Signs Reviewed  Morbidly obese female, in no acute distress, normal conversational  HEENT:  PERRL, EOMI.  OP, has yellowish coating of the tongue and palate  Neck:  Supple, no JVD, no thyromegaly  Lymph: no axillary, cervical, supraclavicular lymphadenopathy noted " bilaterally  Chest:  good aeration, bilateral diminished breath sounds, has scattered basilar crackles, no wheezing or rhonchi, resonant to percussion b/l  CV: RRR, no MGR, pulses 2+, equal.  Abd:  Soft, NT, ND, + BS, no HSM  EXT:  no clubbing, no cyanosis, No BLE edema  Neuro:  A&Ox3, CN grossly intact, no focal deficits.  Skin: No rashes or lesions noted     Result Review :   The following data was reviewed by: Santino Baez MD on 06/02/2022:  Common labs          9/29/2023    09:15 9/29/2023    09:25 10/17/2023    10:43 11/1/2023    05:07   Common Labs   Glucose 92   123     BUN 15   16     Creatinine 1.08   1.11     Sodium 144   141     Potassium 3.5   3.8     Chloride 110   106     Calcium 9.4   9.5     Albumin 4.2       Total Bilirubin 0.4       Alkaline Phosphatase 76       AST (SGOT) 12       ALT (SGPT) 6       WBC 10.78   9.57     Hemoglobin 12.0   11.8  10.6    Hematocrit 37.1   35.7  32.1    Platelets 268   221     Hemoglobin A1C 6.00       Microalbumin, Urine  <1.2        CMP          9/8/2023    15:30 9/29/2023    09:15 10/17/2023    10:43   CMP   Glucose 102  92  123    BUN 21  15  16    Creatinine 1.50  1.08  1.11    EGFR 35.5  52.7  51.0    Sodium 142  144  141    Potassium 4.2  3.5  3.8    Chloride 108  110  106    Calcium 9.1  9.4  9.5    Total Protein 6.6  6.2     Albumin 3.8  4.2     Globulin 2.8  2.0     Total Bilirubin 0.4  0.4     Alkaline Phosphatase 72  76     AST (SGOT) 11  12     ALT (SGPT) 10  6     Albumin/Globulin Ratio 1.4  2.1     BUN/Creatinine Ratio 14.0  13.9  14.4    Anion Gap 12.6  10.0  9.0      CBC          9/29/2023    09:15 10/17/2023    10:43 11/1/2023    05:07   CBC   WBC 10.78  9.57     RBC 4.16  4.02     Hemoglobin 12.0  11.8  10.6    Hematocrit 37.1  35.7  32.1    MCV 89.2  88.8     MCH 28.8  29.4     MCHC 32.3  33.1     RDW 14.5  14.3     Platelets 268  221       Data reviewed: Radiologic studies CT chest from September 2021 was reviewed.  Showed chronic scarring  otherwise stable lung.        Narrative & Impression   PROCEDURE:  CT CHEST WO CONTRAST DIAGNOSTIC     COMPARISON: [facility], CT, CT CHEST WO CONTRAST DIAGNOSTIC, 9/01/2021,   12:03.     INDICATIONS:  FOLLOW UP LUNG NODULE.     TECHNIQUE:    CT images were created without the administration of contrast material.       PROTOCOL:     Standard imaging protocol performed                 RADIATION:      DLP: 432.6mGy*cm               Automated exposure control was utilized to minimize radiation dose.      FINDINGS:          There is no pneumothorax, pleural effusion or focal pneumonia.  There is subpleural scarring in   both lung bases.  There is no new or enlarging lung nodule.  Airways are patent.     The thyroid, trachea and esophagus appear within normal limits.  Heart size is normal.  There is   mild coronary artery calcification.  No pericardial effusion or mediastinal lymphadenopathy.  There   is mild aortic atherosclerosis.     No acute findings in the upper abdomen.  There are bilateral renal cysts.  Cholelithiasis partially   visualized.  No acute findings in the superficial soft tissues.  Grossly stable breast densities.    No acute osseous abnormality or destructive bone lesion.  There are mild thoracic degenerative   changes.     IMPRESSION:                 1. No acute cardiopulmonary abnormality.  No suspicious lung nodules.  2. Stable scarring in the lung bases.  3. Mild aortic and coronary artery atherosclerosis.  4. Cholelithiasis.            SUBHASH PRUITT MD         Electronically Signed and Approved By: SUBHASH PRUITT MD on 6/26/2023 at 9:29                Assessment and Plan    Diagnoses and all orders for this visit:    1. COVID-19 virus detected    2. Allergic rhinitis, unspecified seasonality, unspecified trigger  -     Fluzone High-Dose 65+yrs (7392-6259)  -     albuterol (ACCUNEB) 1.25 MG/3ML nebulizer solution; Take 3 mL by nebulization Every 6 (Six) Hours As Needed for  Shortness of Air for up to 50 doses.  Dispense: 25 each; Refill: 0  -     azelastine (ASTELIN) 0.1 % nasal spray; 2 sprays into the nostril(s) as directed by provider 2 (Two) Times a Day. Use in each nostril as directed  Dispense: 30 mL; Refill: 6  -     Fluticasone Furoate (Arnuity Ellipta) 100 MCG/ACT aerosol powder ; Inhale 1 puff Daily.  Dispense: 90 each; Refill: 3  -     montelukast (SINGULAIR) 10 MG tablet; Take 1 tablet by mouth Every Night.  Dispense: 90 tablet; Refill: 3  -     Symbicort 160-4.5 MCG/ACT inhaler; Inhale 2 puffs 2 (Two) Times a Day.  Dispense: 1 each; Refill: 11    3. PND (post-nasal drip)  -     Fluzone High-Dose 65+yrs (2988-6445)  -     albuterol (ACCUNEB) 1.25 MG/3ML nebulizer solution; Take 3 mL by nebulization Every 6 (Six) Hours As Needed for Shortness of Air for up to 50 doses.  Dispense: 25 each; Refill: 0  -     azelastine (ASTELIN) 0.1 % nasal spray; 2 sprays into the nostril(s) as directed by provider 2 (Two) Times a Day. Use in each nostril as directed  Dispense: 30 mL; Refill: 6  -     Fluticasone Furoate (Arnuity Ellipta) 100 MCG/ACT aerosol powder ; Inhale 1 puff Daily.  Dispense: 90 each; Refill: 3  -     montelukast (SINGULAIR) 10 MG tablet; Take 1 tablet by mouth Every Night.  Dispense: 90 tablet; Refill: 3  -     Symbicort 160-4.5 MCG/ACT inhaler; Inhale 2 puffs 2 (Two) Times a Day.  Dispense: 1 each; Refill: 11    4. Gastroesophageal reflux disease with esophagitis without hemorrhage  -     Fluzone High-Dose 65+yrs (1483-4781)  -     albuterol (ACCUNEB) 1.25 MG/3ML nebulizer solution; Take 3 mL by nebulization Every 6 (Six) Hours As Needed for Shortness of Air for up to 50 doses.  Dispense: 25 each; Refill: 0  -     azelastine (ASTELIN) 0.1 % nasal spray; 2 sprays into the nostril(s) as directed by provider 2 (Two) Times a Day. Use in each nostril as directed  Dispense: 30 mL; Refill: 6  -     Fluticasone Furoate (Arnuity Ellipta) 100 MCG/ACT aerosol powder ; Inhale 1  puff Daily.  Dispense: 90 each; Refill: 3  -     montelukast (SINGULAIR) 10 MG tablet; Take 1 tablet by mouth Every Night.  Dispense: 90 tablet; Refill: 3  -     Symbicort 160-4.5 MCG/ACT inhaler; Inhale 2 puffs 2 (Two) Times a Day.  Dispense: 1 each; Refill: 11    5. Chronic obstructive pulmonary disease, unspecified COPD type  -     Fluzone High-Dose 65+yrs (8512-3852)  -     albuterol (ACCUNEB) 1.25 MG/3ML nebulizer solution; Take 3 mL by nebulization Every 6 (Six) Hours As Needed for Shortness of Air for up to 50 doses.  Dispense: 25 each; Refill: 0  -     azelastine (ASTELIN) 0.1 % nasal spray; 2 sprays into the nostril(s) as directed by provider 2 (Two) Times a Day. Use in each nostril as directed  Dispense: 30 mL; Refill: 6  -     Fluticasone Furoate (Arnuity Ellipta) 100 MCG/ACT aerosol powder ; Inhale 1 puff Daily.  Dispense: 90 each; Refill: 3  -     montelukast (SINGULAIR) 10 MG tablet; Take 1 tablet by mouth Every Night.  Dispense: 90 tablet; Refill: 3  -     Symbicort 160-4.5 MCG/ACT inhaler; Inhale 2 puffs 2 (Two) Times a Day.  Dispense: 1 each; Refill: 11    6. Bronchitis, mucopurulent recurrent  -     Fluzone High-Dose 65+yrs (2809-7595)  -     albuterol (ACCUNEB) 1.25 MG/3ML nebulizer solution; Take 3 mL by nebulization Every 6 (Six) Hours As Needed for Shortness of Air for up to 50 doses.  Dispense: 25 each; Refill: 0  -     azelastine (ASTELIN) 0.1 % nasal spray; 2 sprays into the nostril(s) as directed by provider 2 (Two) Times a Day. Use in each nostril as directed  Dispense: 30 mL; Refill: 6  -     Fluticasone Furoate (Arnuity Ellipta) 100 MCG/ACT aerosol powder ; Inhale 1 puff Daily.  Dispense: 90 each; Refill: 3  -     montelukast (SINGULAIR) 10 MG tablet; Take 1 tablet by mouth Every Night.  Dispense: 90 tablet; Refill: 3  -     Symbicort 160-4.5 MCG/ACT inhaler; Inhale 2 puffs 2 (Two) Times a Day.  Dispense: 1 each; Refill: 11    7. Moderate persistent asthma, unspecified whether  complicated  -     Fluzone High-Dose 65+yrs (3288-8348)  -     albuterol (ACCUNEB) 1.25 MG/3ML nebulizer solution; Take 3 mL by nebulization Every 6 (Six) Hours As Needed for Shortness of Air for up to 50 doses.  Dispense: 25 each; Refill: 0  -     azelastine (ASTELIN) 0.1 % nasal spray; 2 sprays into the nostril(s) as directed by provider 2 (Two) Times a Day. Use in each nostril as directed  Dispense: 30 mL; Refill: 6  -     Fluticasone Furoate (Arnuity Ellipta) 100 MCG/ACT aerosol powder ; Inhale 1 puff Daily.  Dispense: 90 each; Refill: 3  -     montelukast (SINGULAIR) 10 MG tablet; Take 1 tablet by mouth Every Night.  Dispense: 90 tablet; Refill: 3  -     Symbicort 160-4.5 MCG/ACT inhaler; Inhale 2 puffs 2 (Two) Times a Day.  Dispense: 1 each; Refill: 11    8. Encounter for immunization  -     Fluzone High-Dose 65+yrs (1110-7366)        COPD with recurrent bronchitis: Continue with Symbicort 160/4.52 puffs twice daily.  Continue with Arnuity inhaler once daily.  I have refilled the medications.  She does not smoke anymore.  CT scan of the chest from June 2023 shows stable findings.    Repeat pulmonary function test and 6-minute walk test shows stable findings.    Continue with albuterol as needed.    Morbid obesity and obstructive sleep apnea: She has not used her CPAP machine for the last month with bronchitis.  She received new supplies and is going to use CPAP machine in next week or so.  Her machine is not working and is blowing hard.  She thinks it is been more than 5 years since he received her machine.  We will try to order new machine if it has already been 5 years.  She uses CPAP with pressure of 10 cm of water with sleep.    Oral thrush: Resolved now.    Possible aspiration and issues with swallowing: Modified barium swallow was normal.  She is up-to-date on COVID and flu and pneumonia vaccine.  She already had COVID booster dose.  Had pneumococcal 20 vaccine last office visit.  We will administer flu  vaccine today.    Gastroesophageal reflux disease: Continue with Pepcid.    Allergic rhinitis: Continue with Singulair.      Follow Up   Return in about 6 months (around 7/3/2024).  Patient was given instructions and counseling regarding her condition or for health maintenance advice. Please see specific information pulled into the AVS if appropriate.       Electronically signed by Santino Baez MD, 1/3/2024, 12:50 EST.

## 2024-01-03 NOTE — TELEPHONE ENCOUNTER
Per Kayla and Tejinder Goldberg was called.  Spoke with Adele and Lainey. Lainey is calling pt and is getting the pt set up with an appt. To have her machine checked.  She will let us know if pt needs a new machine or if a pressure change will work.

## 2024-01-04 ENCOUNTER — TREATMENT (OUTPATIENT)
Dept: PHYSICAL THERAPY | Facility: CLINIC | Age: 79
End: 2024-01-04
Payer: MEDICARE

## 2024-01-04 DIAGNOSIS — R26.9 GAIT DISTURBANCE: ICD-10-CM

## 2024-01-04 DIAGNOSIS — M62.81 MUSCLE WEAKNESS OF LOWER EXTREMITY: ICD-10-CM

## 2024-01-04 DIAGNOSIS — Z96.651 STATUS POST TOTAL KNEE REPLACEMENT, RIGHT: Primary | ICD-10-CM

## 2024-01-04 DIAGNOSIS — M25.661 DECREASED ROM OF RIGHT KNEE: ICD-10-CM

## 2024-01-04 NOTE — PROGRESS NOTES
Outpatient Physical Therapy  [address] [facility], [place] [address]                            Physical Therapy Daily Treatment Note    Patient: Julia Rios   : 1945  Diagnosis/ICD-10 Code:  Status post total knee replacement, right [Z96.651]  Referring practitioner: No ref. provider found  Date of Initial Visit: Type: THERAPY  Noted: 2023  Today's Date: 2024  Patient seen for 13 sessions           Subjective   Julia Rios reports: overall the knee is doing better, still states that she can't go up the stairs one after the other.     Objective   No complaints of increased pain or discomfort.    See Exercise, Manual, and Modality Logs for complete treatment.     Assessment/Plan  Julia progressing as evident by decreased overall R knee pain, still having difficulty with stairs at home. Pt tolerated exercises well, no complaints of increased pain or discomfort. Pt would benefit from skilled PT to address Range of Motion  and Strength deficits, pain management and any concerns with ADLs.       Progress per Plan of Care         Timed:  Manual Therapy:         mins  84926;  Therapeutic Exercise:    20     mins  19726;     Neuromuscular Sheela:        mins  44004;    Therapeutic Activity:     10     mins  03308;     Gait Training:           mins  57291;        Untimed:  Electrical Stimulation:         mins  38600 ( );  Mechanical Traction:         mins  86804;       Timed Treatment:   30   mins   Total Treatment:     30   mins      Electronically signed:     Naila Sarmiento PTA  Physical Therapist Assistant  [place] [employer] License #: [id number]

## 2024-01-09 ENCOUNTER — TREATMENT (OUTPATIENT)
Dept: PHYSICAL THERAPY | Facility: CLINIC | Age: 79
End: 2024-01-09
Payer: MEDICARE

## 2024-01-09 DIAGNOSIS — R26.9 GAIT DISTURBANCE: ICD-10-CM

## 2024-01-09 DIAGNOSIS — Z96.651 STATUS POST TOTAL KNEE REPLACEMENT, RIGHT: Primary | ICD-10-CM

## 2024-01-09 DIAGNOSIS — M62.81 MUSCLE WEAKNESS OF LOWER EXTREMITY: ICD-10-CM

## 2024-01-09 DIAGNOSIS — M25.661 DECREASED ROM OF RIGHT KNEE: ICD-10-CM

## 2024-01-09 NOTE — PROGRESS NOTES
Physical Therapy Daily Treatment Note      Patient: Julia Rios   : 1945  Referring practitioner: Celso Prakash MD  Date of Initial Visit: Type: THERAPY  Noted: 2023  Today's Date: 2024  Patient seen for 14 sessions           Subjective Questionnaire:       Subjective Evaluation    History of Present Illness    Subjective comment: Pt reports her R knee       Objective          Passive Range of Motion     Right Knee   Flexion: 125 degrees       See Exercise, Manual, and Modality Logs for complete treatment.       Assessment & Plan       Assessment  Assessment details: Pt tolerated strengthening well to include step ups on 6 inch step.  Pt's R knee flexion is improving.  Pt will benefit from continued PT to improve R knee strength, ROM and functional activities.          Visit Diagnoses:    ICD-10-CM ICD-9-CM   1. Status post total knee replacement, right  Z96.651 V43.65   2. Gait disturbance  R26.9 781.2   3. Muscle weakness of lower extremity  M62.81 728.87   4. Decreased ROM of right knee  M25.661 719.56       Progress per Plan of Care and Progress strengthening /stabilization /functional activity           Timed:  Manual Therapy:    6     mins  92222;  Therapeutic Exercise:    16     mins  93620;     Neuromuscular Sheela:        mins  74350;    Therapeutic Activity:     8     mins  51958;     Gait Training:           mins  52717;     Ultrasound:          mins  82197;    Electrical Stimulation:         mins  57191 ( );  Aquatic Therapy          mins  85764    Untimed:  Electrical Stimulation:         mins  25017 ( );  Mechanical Traction:         mins  89491;     Timed Treatment:   30   mins   Total Treatment:     30   mins    Electronically signed    Billie Em PTA  Physical Therapist Assistant    [place] license: [id number]

## 2024-01-11 ENCOUNTER — TREATMENT (OUTPATIENT)
Dept: PHYSICAL THERAPY | Facility: CLINIC | Age: 79
End: 2024-01-11
Payer: MEDICARE

## 2024-01-11 DIAGNOSIS — Z96.651 STATUS POST TOTAL KNEE REPLACEMENT, RIGHT: Primary | ICD-10-CM

## 2024-01-11 DIAGNOSIS — R26.9 GAIT DISTURBANCE: ICD-10-CM

## 2024-01-11 DIAGNOSIS — M25.661 DECREASED ROM OF RIGHT KNEE: ICD-10-CM

## 2024-01-11 DIAGNOSIS — M62.81 MUSCLE WEAKNESS OF LOWER EXTREMITY: ICD-10-CM

## 2024-01-11 NOTE — PROGRESS NOTES
Progress Note   [facility]: [address] [place] [address]      Patient: Julia Rios   : 1945  Diagnosis/ICD-10 Code:  Status post total knee replacement, right [Z96.651]  Referring practitioner: Celso Prakash MD  Date of Initial Visit: Type: THERAPY  Noted: 2023  Today's Date: 2024  Patient seen for 15 sessions      Subjective:   Subjective Questionnaire: LEFS: 54/80  Clinical Progress: improved  Home Program Compliance: Yes  Treatment has included: balance/weight-bearing training, ADL retraining, soft tissue mobilization, strengthening, stretching, therapeutic activities, manual therapy, joint mobilization, home exercise program/patient education, gait training, functional ROM exercises, flexibility, body mechanics training, postural training, and neuromuscular re-education    Subjective   Pt reports they are having some stiffness, though no pain in their R knee. Pt reports they do continue to struggle w/ stairs, though they attribute some of this to their weight. Pt reports they feel the best they have in about 10 years.       Objective   Active Range of Motion   Left Knee   Flexion: 120 degrees   Extension: 0 degrees      Right Knee   Flexion: 115 degrees  Extension: 0 degrees      Passive Range of Motion      Right Knee   Flexion: 120 degrees   Extension: 0 degrees      Strength/Myotome Testing      Left Hip   Planes of Motion   Flexion: 5     Right Hip   Planes of Motion   Flexion: 4+     Left Knee   Flexion: 5  Extension: 5     Right Knee   Flexion: 5  Extension: 4+      See Exercise, Manual, and Modality Logs for complete treatment.     Assessment/Plan  Impairments: abnormal coordination, abnormal gait, abnormal muscle firing, abnormal or restricted ROM, activity intolerance, impaired balance, impaired physical strength, lacks appropriate home exercise program and pain with function   Functional limitations: lifting, sleeping, walking, pushing, moving in bed,  standing, stooping and unable to perform repetitive tasks      Pt reported to physical therapy s/p R TKA performed on 10/31/2023. Pt demonstrates improvement in their ROM as well as their strength. Pt has improvement in their perceived deficits described by LEFS. Pt does have some fear avoidance of stairs, though this improves after therapy session. Will continue to emphasize strength and stairs in therapy at this time. Pt's goals will be addressed to reflect their progress in therapy. Patient will continue to benefit from skilled physical therapy to further address their deficits in strength and balance in order to improve upon their functional mobility and to perform ADLs and daily tasks w/ improved confidence.       Goals  Plan Goals: KNEE PROBLEMS     1. The patient has limited ROM of the R knee.              LTG 1:12 weeks:  The patient will demonstrate 0 to 120 degrees of ROM for the R knee in order to allow patient to am.                          STATUS:  met              STG 1a: 6 weeks:  The patient will demonstrate 5 to 105 degrees of ROM for the R knee.                          STATUS:  met              TREATMENT: Manual therapy, therapeutic exercise, home exercise instruction, and modalities as needed to include:  moist heat, electrical stimulation, ultrasound, and ice.     2. The patient has limited strength of the R knee.              LTG 2: 12 weeks: The patient will demonstrate 5 /5 strength for R knee flexion and extension in order to allow patient improved joint stability                          STATUS:  progressing              STG 2a: 6 weeks: The patient will demonstrate 4+ /5 strength for R knee flexion and extension                          STATUS:  met              TREATMENT: Manual therapy, therapeutic exercise, home exercise instruction, aquatic therapy, and modalities as needed to include:  moist heat, electrical stimulation, ultrasound, and ice.                3. The patient has gait  dysfunction.              LTG 3: 12 weeks:  The patient will ambulate without assistive device, independently, for community distances with normal biomechanics of the R lower extremity in order to improve mobility and allow patient to perform activities such as grocery shopping with greater ease.                          STATUS: met              STG 3a: 4 weeks: The patient will ambulate with a single tipped cane with appropriate gait mechanics.                          STATUS:  met              TREATMENT: Gait training, aquatic therapy, therapeutic exercise, and home exercise instruction.     4. Mobility: Walking/Moving Around Functional Limitation                               LTG 4: 12 weeks:  The patient will demonstrate 18.75 % limitation by achieving a score of 65/80 on the LEFS.                          STATUS:  progressing              STG 4 a: 6 weeks:  The patient will demonstrate 31.25 % limitation by achieving a score of 55/80 on the LEFS.                            STATUS: met              TREATMENT:  Manual therapy, therapeutic exercise, home exercise instruction, and modalities as needed to include: moist heat, electrical stimulation, and ultrasound.         Progress toward previous goals: Partially Met      Recommendations: Continue as planned  Timeframe: 2 a week, for 1 months   Prognosis to achieve goals: good    PT Signature: Luigi Green PT, DPT    Electronically signed 2024    [place] License: PT - [id number]         Timed:  Manual Therapy:    0     mins  87996;  Therapeutic Exercise:    23     mins  76828;     Neuromuscular Sheela:    0    mins  37441;    Therapeutic Activity:     0     mins  33001;     Gait Trainin     mins  43073;     Aquatics                         0      mins  38713    Un-timed:  Mechanical Traction      0     mins  69414  Dry Needling     0     mins self-pay  Electrical Stimulation:    0     mins  90868 ( );    Timed Treatment:   23   mins   Total Treatment:      23   mins

## 2024-01-16 ENCOUNTER — TREATMENT (OUTPATIENT)
Dept: PHYSICAL THERAPY | Facility: CLINIC | Age: 79
End: 2024-01-16
Payer: MEDICARE

## 2024-01-16 DIAGNOSIS — R26.9 GAIT DISTURBANCE: ICD-10-CM

## 2024-01-16 DIAGNOSIS — M62.81 MUSCLE WEAKNESS OF LOWER EXTREMITY: ICD-10-CM

## 2024-01-16 DIAGNOSIS — Z96.651 STATUS POST TOTAL KNEE REPLACEMENT, RIGHT: Primary | ICD-10-CM

## 2024-01-16 DIAGNOSIS — M25.661 DECREASED ROM OF RIGHT KNEE: ICD-10-CM

## 2024-01-16 NOTE — PROGRESS NOTES
Physical Therapy Daily Treatment Note  [place] [facility]: [address] [place] [address]      Patient: Julia Rios   : 1945  Diagnosis/ICD-10 Code:  Status post total knee replacement, right [Z96.651]  Referring practitioner: Celso Prakash MD  Date of Initial Visit: Type: THERAPY  Noted: 2023  Today's Date: 2024  Patient seen for 16 sessions           Subjective   The patient reported stairs are still 'nothing to be proud of'. Pt reports no pain at this time.    Objective   See Exercise, Manual, and Modality Logs for complete treatment.     Assessment/Plan  Pt does have improvement in their stair navigation ability. Will continue to emphasize this w/n therapy sessions for better confidence. Patient will continue to benefit from skilled physical therapy to further address their deficits in strength and power in order to improve upon their functional mobility and to improve upon performance of stairs.          Timed:  Manual Therapy:    0     mins  35506;  Therapeutic Exercise:    10     mins  37207;     Neuromuscular Sheela:   0    mins  37882;    Therapeutic Activity:     15     mins  00918;     Gait Trainin     mins  43189;     Aquatics                         0      mins  24712    Un-timed:  Mechanical Traction      0     mins  83503  Electrical Stimulation:    0     mins  20136 ( );      Timed Treatment:   25   mins   Total Treatment:     25   mins    Luigi Green PT, DPT    Electronically signed 2024    [place] License: PT - [id number]

## 2024-01-18 ENCOUNTER — TREATMENT (OUTPATIENT)
Dept: PHYSICAL THERAPY | Facility: CLINIC | Age: 79
End: 2024-01-18
Payer: MEDICARE

## 2024-01-18 DIAGNOSIS — R26.9 GAIT DISTURBANCE: ICD-10-CM

## 2024-01-18 DIAGNOSIS — M25.661 DECREASED ROM OF RIGHT KNEE: ICD-10-CM

## 2024-01-18 DIAGNOSIS — M62.81 MUSCLE WEAKNESS OF LOWER EXTREMITY: ICD-10-CM

## 2024-01-18 DIAGNOSIS — Z96.651 STATUS POST TOTAL KNEE REPLACEMENT, RIGHT: Primary | ICD-10-CM

## 2024-01-18 PROCEDURE — 97530 THERAPEUTIC ACTIVITIES: CPT | Performed by: PHYSICAL THERAPIST

## 2024-01-18 PROCEDURE — 97110 THERAPEUTIC EXERCISES: CPT | Performed by: PHYSICAL THERAPIST

## 2024-01-18 PROCEDURE — 97112 NEUROMUSCULAR REEDUCATION: CPT | Performed by: PHYSICAL THERAPIST

## 2024-01-23 ENCOUNTER — TREATMENT (OUTPATIENT)
Dept: PHYSICAL THERAPY | Facility: CLINIC | Age: 79
End: 2024-01-23
Payer: MEDICARE

## 2024-01-23 DIAGNOSIS — M25.661 DECREASED ROM OF RIGHT KNEE: ICD-10-CM

## 2024-01-23 DIAGNOSIS — Z96.651 STATUS POST TOTAL KNEE REPLACEMENT, RIGHT: Primary | ICD-10-CM

## 2024-01-23 DIAGNOSIS — M62.81 MUSCLE WEAKNESS OF LOWER EXTREMITY: ICD-10-CM

## 2024-01-23 DIAGNOSIS — R26.9 GAIT DISTURBANCE: ICD-10-CM

## 2024-01-23 NOTE — PROGRESS NOTES
Physical Therapy Daily Treatment Note  [place] [facility] [address] [facility] [place] [address]      Patient: Julia Rios   : 1945  Diagnosis/ICD-10 Code:  Status post total knee replacement, right [Z96.651]  Referring practitioner: Celso Prakash MD  Date of Initial Visit: Type: THERAPY  Noted: 2023  Today's Date: 2024  Patient seen for 18 sessions           Subjective   The patient reported they were able to go to the food pantry and work some today. Pt reports they walked over a mile there.     Objective   See Exercise, Manual, and Modality Logs for complete treatment.     Assessment/Plan  Pt does require frequent rest breaks today, between performance of stairs. Pt otherwise does well. Patient will continue to benefit from skilled physical therapy to further address their deficits in strength and endurance in order to improve upon their functional mobility and to perform all daily tasks without restrictions..          Timed:  Manual Therapy:    0     mins  20110;  Therapeutic Exercise:    15     mins  67218;     Neuromuscular Sheela:   0    mins  53208;    Therapeutic Activity:     23     mins  35813;     Gait Trainin     mins  97923;     Aquatics                         0      mins  08427    Un-timed:  Mechanical Traction      0     mins  64839  Electrical Stimulation:    0     mins  97409 ( );      Timed Treatment:   38   mins   Total Treatment:     38   mins    Luigi Green PT, DPT    Electronically signed 2024    [place] License: PT - [id number]

## 2024-01-25 ENCOUNTER — TELEPHONE (OUTPATIENT)
Dept: PHYSICAL THERAPY | Facility: OTHER | Age: 79
End: 2024-01-25
Payer: MEDICARE

## 2024-01-25 NOTE — TELEPHONE ENCOUNTER
"  Caller: Julia Rios \"RHETT\"    Relationship: Self    What was the call regarding:     PATIENT CANCELLED TODAY DUE TO TRANSPORTATION ISSUE    "

## 2024-01-26 ENCOUNTER — OFFICE VISIT (OUTPATIENT)
Dept: ORTHOPEDIC SURGERY | Facility: CLINIC | Age: 79
End: 2024-01-26
Payer: MEDICARE

## 2024-01-26 VITALS — TEMPERATURE: 98.2 F | BODY MASS INDEX: 41.25 KG/M2 | HEIGHT: 60 IN | WEIGHT: 210.1 LBS

## 2024-01-26 DIAGNOSIS — Z09 SURGERY FOLLOW-UP: ICD-10-CM

## 2024-01-26 DIAGNOSIS — R52 PAIN: Primary | ICD-10-CM

## 2024-01-26 NOTE — PROGRESS NOTES
Julia Rios : 1945 MRN: [id number] DATE: 2024     DIAGNOSIS: 3 month follow up right TKA      SUBJECTIVE:  Patient returns today for 3 month follow up of right total knee replacement.  Patient reports doing well with no unusual complaints.     Vitals:    24 1320   Temp: 98.2 °F (36.8 °C)       OBJECTIVE:     Exam:  The incision is well healed. No sign of infection. Range of motion is measured at 0 to 120. The calf is soft and nontender with a negative Homans sign.  Gait is reciprocal heel-to-toe and nonantalgic.    DIAGNOSTIC STUDIES    Xrays: AP, lateral and merchant's views of the right knee are ordered and reviewed for evaluation of recent knee replacement. They demonstrate a well positioned, well aligned knee replacement without complicating factors noted.  In comparison with previous films there has been no change.    ASSESSMENT: 3 months status post right knee replacement.    PLAN: 1) Continue with PT exercises as prescribed   2) Follow up in 6 months   3) Antibiotic prophylaxis discussed    Celso Prakash MD    2024

## 2024-02-01 ENCOUNTER — PATIENT MESSAGE (OUTPATIENT)
Dept: FAMILY MEDICINE CLINIC | Facility: CLINIC | Age: 79
End: 2024-02-01
Payer: MEDICARE

## 2024-02-01 RX ORDER — LOSARTAN POTASSIUM 100 MG/1
100 TABLET ORAL DAILY
Qty: 90 TABLET | Refills: 3 | Status: SHIPPED | OUTPATIENT
Start: 2024-02-01 | End: 2024-02-02 | Stop reason: SDUPTHER

## 2024-02-02 DIAGNOSIS — J30.9 ALLERGIC RHINITIS, UNSPECIFIED SEASONALITY, UNSPECIFIED TRIGGER: ICD-10-CM

## 2024-02-02 DIAGNOSIS — J44.9 CHRONIC OBSTRUCTIVE PULMONARY DISEASE, UNSPECIFIED COPD TYPE: ICD-10-CM

## 2024-02-02 DIAGNOSIS — R09.82 PND (POST-NASAL DRIP): ICD-10-CM

## 2024-02-02 DIAGNOSIS — J45.40 MODERATE PERSISTENT ASTHMA, UNSPECIFIED WHETHER COMPLICATED: ICD-10-CM

## 2024-02-02 DIAGNOSIS — K21.00 GASTROESOPHAGEAL REFLUX DISEASE WITH ESOPHAGITIS WITHOUT HEMORRHAGE: ICD-10-CM

## 2024-02-02 DIAGNOSIS — J41.1 BRONCHITIS, MUCOPURULENT RECURRENT: ICD-10-CM

## 2024-02-02 RX ORDER — METOPROLOL SUCCINATE 100 MG/1
100 TABLET, EXTENDED RELEASE ORAL DAILY
Start: 2024-02-02

## 2024-02-02 RX ORDER — MONTELUKAST SODIUM 10 MG/1
10 TABLET ORAL NIGHTLY
Qty: 90 TABLET | Refills: 3 | Status: CANCELLED | OUTPATIENT
Start: 2024-02-02

## 2024-02-02 RX ORDER — LOSARTAN POTASSIUM 100 MG/1
100 TABLET ORAL DAILY
Qty: 90 TABLET | Refills: 3 | Status: SHIPPED | OUTPATIENT
Start: 2024-02-02

## 2024-02-05 ENCOUNTER — DOCUMENTATION (OUTPATIENT)
Dept: PHYSICAL THERAPY | Facility: CLINIC | Age: 79
End: 2024-02-05
Payer: MEDICARE

## 2024-02-05 NOTE — PROGRESS NOTES
Discharge Summary  Discharge Summary from Physical Therapy Report    [place] [facility] [address] [facility] [place] [address]      Date Range of  PT visits: 11/16/2023 - 1/23/2024  Number of Visits: 18     Comments: Contacted pt and they report they wish to continue strengthening independently w/ their HEP. Pt has progressed well in therapy to this date. Pt's goals will be addressed at this time to reflect their current progress in therapy. Pt will be discharged w/ their current HEP.    Goals  Plan Goals: KNEE PROBLEMS     1. The patient has limited ROM of the R knee.              LTG 1:12 weeks:  The patient will demonstrate 0 to 120 degrees of ROM for the R knee in order to allow patient to am.                          STATUS:  met              STG 1a: 6 weeks:  The patient will demonstrate 5 to 105 degrees of ROM for the R knee.                          STATUS:  met              TREATMENT: Manual therapy, therapeutic exercise, home exercise instruction, and modalities as needed to include:  moist heat, electrical stimulation, ultrasound, and ice.     2. The patient has limited strength of the R knee.              LTG 2: 12 weeks: The patient will demonstrate 5 /5 strength for R knee flexion and extension in order to allow patient improved joint stability                          STATUS: sufficiently met              STG 2a: 6 weeks: The patient will demonstrate 4+ /5 strength for R knee flexion and extension                          STATUS:  met              TREATMENT: Manual therapy, therapeutic exercise, home exercise instruction, aquatic therapy, and modalities as needed to include:  moist heat, electrical stimulation, ultrasound, and ice.                3. The patient has gait dysfunction.              LTG 3: 12 weeks:  The patient will ambulate without assistive device, independently, for community distances with normal biomechanics of the R lower extremity in order to improve mobility and allow patient to  perform activities such as grocery shopping with greater ease.                          STATUS: met              STG 3a: 4 weeks: The patient will ambulate with a single tipped cane with appropriate gait mechanics.                          STATUS:  met              TREATMENT: Gait training, aquatic therapy, therapeutic exercise, and home exercise instruction.     4. Mobility: Walking/Moving Around Functional Limitation                               LTG 4: 12 weeks:  The patient will demonstrate 18.75 % limitation by achieving a score of 65/80 on the LEFS.                          STATUS:  not met              STG 4 a: 6 weeks:  The patient will demonstrate 31.25 % limitation by achieving a score of 55/80 on the LEFS.                            STATUS: met              TREATMENT:  Manual therapy, therapeutic exercise, home exercise instruction, and modalities as needed to include: moist heat, electrical stimulation, and ultrasound.       Goals: Partially Met    Discharge Plan: Continue with current home exercise program as instructed    Date of Discharge 2/5/2024      Luigi Green PT  Physical Therapist    Electronically signed 2/5/2024    [place] License: PT - [id number]

## 2024-02-08 DIAGNOSIS — K21.00 GASTROESOPHAGEAL REFLUX DISEASE WITH ESOPHAGITIS WITHOUT HEMORRHAGE: ICD-10-CM

## 2024-02-08 DIAGNOSIS — J44.9 CHRONIC OBSTRUCTIVE PULMONARY DISEASE, UNSPECIFIED COPD TYPE: ICD-10-CM

## 2024-02-08 DIAGNOSIS — E11.40 TYPE 2 DIABETES MELLITUS WITH DIABETIC NEUROPATHY, WITHOUT LONG-TERM CURRENT USE OF INSULIN: ICD-10-CM

## 2024-02-08 DIAGNOSIS — J30.9 ALLERGIC RHINITIS, UNSPECIFIED SEASONALITY, UNSPECIFIED TRIGGER: ICD-10-CM

## 2024-02-08 DIAGNOSIS — J41.1 BRONCHITIS, MUCOPURULENT RECURRENT: ICD-10-CM

## 2024-02-08 DIAGNOSIS — J45.40 MODERATE PERSISTENT ASTHMA, UNSPECIFIED WHETHER COMPLICATED: ICD-10-CM

## 2024-02-08 DIAGNOSIS — R09.82 PND (POST-NASAL DRIP): ICD-10-CM

## 2024-02-08 RX ORDER — MONTELUKAST SODIUM 10 MG/1
10 TABLET ORAL NIGHTLY
Qty: 90 TABLET | Refills: 3 | Status: SHIPPED | OUTPATIENT
Start: 2024-02-08

## 2024-02-08 RX ORDER — METOPROLOL SUCCINATE 100 MG/1
100 TABLET, EXTENDED RELEASE ORAL DAILY
Qty: 90 TABLET | Refills: 3 | Status: SHIPPED | OUTPATIENT
Start: 2024-02-08

## 2024-02-08 RX ORDER — GABAPENTIN 300 MG/1
CAPSULE ORAL
Qty: 270 CAPSULE | Refills: 1 | Status: SHIPPED | OUTPATIENT
Start: 2024-02-08

## 2024-02-14 DIAGNOSIS — Z96.651 S/P TKR (TOTAL KNEE REPLACEMENT), RIGHT: Primary | ICD-10-CM

## 2024-02-14 RX ORDER — CLINDAMYCIN HYDROCHLORIDE 300 MG/1
CAPSULE ORAL
Qty: 2 CAPSULE | Refills: 0 | Status: SHIPPED | OUTPATIENT
Start: 2024-02-14

## 2024-04-01 ENCOUNTER — LAB (OUTPATIENT)
Dept: LAB | Facility: HOSPITAL | Age: 79
End: 2024-04-01
Payer: MEDICARE

## 2024-04-01 DIAGNOSIS — R73.03 PRE-DIABETES: ICD-10-CM

## 2024-04-01 DIAGNOSIS — I10 ESSENTIAL HYPERTENSION: ICD-10-CM

## 2024-04-01 DIAGNOSIS — E78.2 MIXED HYPERLIPIDEMIA: ICD-10-CM

## 2024-04-01 LAB
ALBUMIN SERPL-MCNC: 3.8 G/DL (ref 3.5–5.2)
ALBUMIN/GLOB SERPL: 1.4 G/DL
ALP SERPL-CCNC: 85 U/L (ref 39–117)
ALT SERPL W P-5'-P-CCNC: 8 U/L (ref 1–33)
ANION GAP SERPL CALCULATED.3IONS-SCNC: 11.9 MMOL/L (ref 5–15)
AST SERPL-CCNC: 16 U/L (ref 1–32)
BILIRUB SERPL-MCNC: 0.5 MG/DL (ref 0–1.2)
BUN SERPL-MCNC: 18 MG/DL (ref 8–23)
BUN/CREAT SERPL: 18.6 (ref 7–25)
CALCIUM SPEC-SCNC: 9.3 MG/DL (ref 8.6–10.5)
CHLORIDE SERPL-SCNC: 109 MMOL/L (ref 98–107)
CHOLEST SERPL-MCNC: 187 MG/DL (ref 0–200)
CO2 SERPL-SCNC: 19.1 MMOL/L (ref 22–29)
CREAT SERPL-MCNC: 0.97 MG/DL (ref 0.57–1)
EGFRCR SERPLBLD CKD-EPI 2021: 59.6 ML/MIN/1.73
GLOBULIN UR ELPH-MCNC: 2.8 GM/DL
GLUCOSE SERPL-MCNC: 79 MG/DL (ref 65–99)
HBA1C MFR BLD: 6.3 % (ref 4.8–5.6)
HDLC SERPL-MCNC: 49 MG/DL (ref 40–60)
LDLC SERPL CALC-MCNC: 122 MG/DL (ref 0–100)
LDLC/HDLC SERPL: 2.45 {RATIO}
POTASSIUM SERPL-SCNC: 4.2 MMOL/L (ref 3.5–5.2)
PROT SERPL-MCNC: 6.6 G/DL (ref 6–8.5)
SODIUM SERPL-SCNC: 140 MMOL/L (ref 136–145)
TRIGL SERPL-MCNC: 89 MG/DL (ref 0–150)
VLDLC SERPL-MCNC: 16 MG/DL (ref 5–40)

## 2024-04-01 PROCEDURE — 83036 HEMOGLOBIN GLYCOSYLATED A1C: CPT

## 2024-04-01 PROCEDURE — 80061 LIPID PANEL: CPT

## 2024-04-01 PROCEDURE — 80053 COMPREHEN METABOLIC PANEL: CPT

## 2024-04-02 RX ORDER — BEMPEDOIC ACID 180 MG/1
180 TABLET, FILM COATED ORAL DAILY
Qty: 90 TABLET | Refills: 1 | Status: SHIPPED | OUTPATIENT
Start: 2024-04-02

## 2024-04-06 RX ORDER — FAMOTIDINE 20 MG/1
20 TABLET, FILM COATED ORAL
Qty: 30 TABLET | Refills: 11 | Status: SHIPPED | OUTPATIENT
Start: 2024-04-06

## 2024-04-12 ENCOUNTER — LAB (OUTPATIENT)
Dept: LAB | Facility: HOSPITAL | Age: 79
End: 2024-04-12
Payer: MEDICARE

## 2024-04-12 ENCOUNTER — TRANSCRIBE ORDERS (OUTPATIENT)
Dept: ADMINISTRATIVE | Facility: HOSPITAL | Age: 79
End: 2024-04-12
Payer: MEDICARE

## 2024-04-12 DIAGNOSIS — N18.30 STAGE 3 CHRONIC KIDNEY DISEASE, UNSPECIFIED WHETHER STAGE 3A OR 3B CKD: ICD-10-CM

## 2024-04-12 DIAGNOSIS — I10 ESSENTIAL HYPERTENSION, MALIGNANT: ICD-10-CM

## 2024-04-12 DIAGNOSIS — N18.30 STAGE 3 CHRONIC KIDNEY DISEASE, UNSPECIFIED WHETHER STAGE 3A OR 3B CKD: Primary | ICD-10-CM

## 2024-04-12 DIAGNOSIS — E11.9 DIABETES MELLITUS WITHOUT COMPLICATION: ICD-10-CM

## 2024-04-12 LAB
ALBUMIN SERPL-MCNC: 4.1 G/DL (ref 3.5–5.2)
ALBUMIN/GLOB SERPL: 1.4 G/DL
ALP SERPL-CCNC: 92 U/L (ref 39–117)
ALT SERPL W P-5'-P-CCNC: 8 U/L (ref 1–33)
ANION GAP SERPL CALCULATED.3IONS-SCNC: 9.4 MMOL/L (ref 5–15)
AST SERPL-CCNC: 17 U/L (ref 1–32)
BASOPHILS # BLD AUTO: 0.11 10*3/MM3 (ref 0–0.2)
BASOPHILS NFR BLD AUTO: 1.2 % (ref 0–1.5)
BILIRUB SERPL-MCNC: 0.6 MG/DL (ref 0–1.2)
BUN SERPL-MCNC: 17 MG/DL (ref 8–23)
BUN/CREAT SERPL: 13.5 (ref 7–25)
CALCIUM SPEC-SCNC: 9.7 MG/DL (ref 8.6–10.5)
CHLORIDE SERPL-SCNC: 108 MMOL/L (ref 98–107)
CO2 SERPL-SCNC: 25.6 MMOL/L (ref 22–29)
CREAT SERPL-MCNC: 1.26 MG/DL (ref 0.57–1)
DEPRECATED RDW RBC AUTO: 45.6 FL (ref 37–54)
EGFRCR SERPLBLD CKD-EPI 2021: 43.5 ML/MIN/1.73
EOSINOPHIL # BLD AUTO: 0.25 10*3/MM3 (ref 0–0.4)
EOSINOPHIL NFR BLD AUTO: 2.8 % (ref 0.3–6.2)
ERYTHROCYTE [DISTWIDTH] IN BLOOD BY AUTOMATED COUNT: 14.8 % (ref 12.3–15.4)
GLOBULIN UR ELPH-MCNC: 2.9 GM/DL
GLUCOSE SERPL-MCNC: 96 MG/DL (ref 65–99)
HBA1C MFR BLD: 6 % (ref 4.8–5.6)
HCT VFR BLD AUTO: 39.5 % (ref 34–46.6)
HGB BLD-MCNC: 12.5 G/DL (ref 12–15.9)
IMM GRANULOCYTES # BLD AUTO: 0.02 10*3/MM3 (ref 0–0.05)
IMM GRANULOCYTES NFR BLD AUTO: 0.2 % (ref 0–0.5)
LYMPHOCYTES # BLD AUTO: 3.88 10*3/MM3 (ref 0.7–3.1)
LYMPHOCYTES NFR BLD AUTO: 42.8 % (ref 19.6–45.3)
MCH RBC QN AUTO: 27.1 PG (ref 26.6–33)
MCHC RBC AUTO-ENTMCNC: 31.6 G/DL (ref 31.5–35.7)
MCV RBC AUTO: 85.5 FL (ref 79–97)
MONOCYTES # BLD AUTO: 0.85 10*3/MM3 (ref 0.1–0.9)
MONOCYTES NFR BLD AUTO: 9.4 % (ref 5–12)
NEUTROPHILS NFR BLD AUTO: 3.95 10*3/MM3 (ref 1.7–7)
NEUTROPHILS NFR BLD AUTO: 43.6 % (ref 42.7–76)
NRBC BLD AUTO-RTO: 0 /100 WBC (ref 0–0.2)
PLATELET # BLD AUTO: 256 10*3/MM3 (ref 140–450)
PMV BLD AUTO: 12.5 FL (ref 6–12)
POTASSIUM SERPL-SCNC: 4.2 MMOL/L (ref 3.5–5.2)
PROT SERPL-MCNC: 7 G/DL (ref 6–8.5)
RBC # BLD AUTO: 4.62 10*6/MM3 (ref 3.77–5.28)
SODIUM SERPL-SCNC: 143 MMOL/L (ref 136–145)
WBC NRBC COR # BLD AUTO: 9.06 10*3/MM3 (ref 3.4–10.8)

## 2024-04-12 PROCEDURE — 36415 COLL VENOUS BLD VENIPUNCTURE: CPT

## 2024-04-12 PROCEDURE — 83036 HEMOGLOBIN GLYCOSYLATED A1C: CPT

## 2024-04-12 PROCEDURE — 80053 COMPREHEN METABOLIC PANEL: CPT

## 2024-04-12 PROCEDURE — 85025 COMPLETE CBC W/AUTO DIFF WBC: CPT

## 2024-04-26 ENCOUNTER — APPOINTMENT (OUTPATIENT)
Dept: CT IMAGING | Facility: HOSPITAL | Age: 79
End: 2024-04-26
Payer: MEDICARE

## 2024-04-26 ENCOUNTER — HOSPITAL ENCOUNTER (INPATIENT)
Facility: HOSPITAL | Age: 79
LOS: 4 days | Discharge: HOME | End: 2024-05-02
Attending: EMERGENCY MEDICINE | Admitting: HOSPITALIST
Payer: MEDICARE

## 2024-04-26 DIAGNOSIS — R13.10 DYSPHAGIA, UNSPECIFIED TYPE: ICD-10-CM

## 2024-04-26 DIAGNOSIS — Z78.9 DECREASED ACTIVITIES OF DAILY LIVING (ADL): ICD-10-CM

## 2024-04-26 DIAGNOSIS — R06.02 SHORTNESS OF BREATH: ICD-10-CM

## 2024-04-26 DIAGNOSIS — J44.1 COPD EXACERBATION: Primary | ICD-10-CM

## 2024-04-26 DIAGNOSIS — J44.1 CHRONIC OBSTRUCTIVE PULMONARY DISEASE WITH ACUTE EXACERBATION: ICD-10-CM

## 2024-04-26 DIAGNOSIS — R09.02 HYPOXIA: ICD-10-CM

## 2024-04-26 PROBLEM — J44.9 COPD (CHRONIC OBSTRUCTIVE PULMONARY DISEASE): Status: ACTIVE | Noted: 2024-04-26

## 2024-04-26 LAB
ALBUMIN SERPL-MCNC: 4.2 G/DL (ref 3.5–5.2)
ALBUMIN/GLOB SERPL: 1.4 G/DL
ALP SERPL-CCNC: 87 U/L (ref 39–117)
ALT SERPL W P-5'-P-CCNC: 8 U/L (ref 1–33)
ANION GAP SERPL CALCULATED.3IONS-SCNC: 13.6 MMOL/L (ref 5–15)
AST SERPL-CCNC: 14 U/L (ref 1–32)
BASOPHILS # BLD AUTO: 0.02 10*3/MM3 (ref 0–0.2)
BASOPHILS NFR BLD AUTO: 0.2 % (ref 0–1.5)
BILIRUB SERPL-MCNC: 0.4 MG/DL (ref 0–1.2)
BUN SERPL-MCNC: 17 MG/DL (ref 8–23)
BUN/CREAT SERPL: 16.8 (ref 7–25)
CALCIUM SPEC-SCNC: 9 MG/DL (ref 8.6–10.5)
CHLORIDE SERPL-SCNC: 104 MMOL/L (ref 98–107)
CO2 SERPL-SCNC: 22.4 MMOL/L (ref 22–29)
CREAT SERPL-MCNC: 1.01 MG/DL (ref 0.57–1)
D-LACTATE SERPL-SCNC: 2.2 MMOL/L (ref 0.5–2)
D-LACTATE SERPL-SCNC: 2.7 MMOL/L (ref 0.5–2)
D-LACTATE SERPL-SCNC: 2.8 MMOL/L (ref 0.5–2)
D-LACTATE SERPL-SCNC: 3.1 MMOL/L (ref 0.5–2)
D-LACTATE SERPL-SCNC: 3.7 MMOL/L (ref 0.5–2)
DEPRECATED RDW RBC AUTO: 49.2 FL (ref 37–54)
EGFRCR SERPLBLD CKD-EPI 2021: 56.7 ML/MIN/1.73
EOSINOPHIL # BLD AUTO: 0 10*3/MM3 (ref 0–0.4)
EOSINOPHIL NFR BLD AUTO: 0 % (ref 0.3–6.2)
ERYTHROCYTE [DISTWIDTH] IN BLOOD BY AUTOMATED COUNT: 15.6 % (ref 12.3–15.4)
FLUAV SUBTYP SPEC NAA+PROBE: NOT DETECTED
FLUBV RNA ISLT QL NAA+PROBE: NOT DETECTED
GLOBULIN UR ELPH-MCNC: 2.9 GM/DL
GLUCOSE SERPL-MCNC: 128 MG/DL (ref 65–99)
HCT VFR BLD AUTO: 40.5 % (ref 34–46.6)
HGB BLD-MCNC: 12.6 G/DL (ref 12–15.9)
HOLD SPECIMEN: NORMAL
HOLD SPECIMEN: NORMAL
IMM GRANULOCYTES # BLD AUTO: 0.07 10*3/MM3 (ref 0–0.05)
IMM GRANULOCYTES NFR BLD AUTO: 0.8 % (ref 0–0.5)
L PNEUMO1 AG UR QL IA: NEGATIVE
LYMPHOCYTES # BLD AUTO: 1.7 10*3/MM3 (ref 0.7–3.1)
LYMPHOCYTES NFR BLD AUTO: 20.4 % (ref 19.6–45.3)
MCH RBC QN AUTO: 26.9 PG (ref 26.6–33)
MCHC RBC AUTO-ENTMCNC: 31.1 G/DL (ref 31.5–35.7)
MCV RBC AUTO: 86.5 FL (ref 79–97)
MONOCYTES # BLD AUTO: 0.6 10*3/MM3 (ref 0.1–0.9)
MONOCYTES NFR BLD AUTO: 7.2 % (ref 5–12)
NEUTROPHILS NFR BLD AUTO: 5.94 10*3/MM3 (ref 1.7–7)
NEUTROPHILS NFR BLD AUTO: 71.4 % (ref 42.7–76)
NRBC BLD AUTO-RTO: 0 /100 WBC (ref 0–0.2)
NT-PROBNP SERPL-MCNC: 356.5 PG/ML (ref 0–1800)
PLATELET # BLD AUTO: 240 10*3/MM3 (ref 140–450)
PMV BLD AUTO: 12.8 FL (ref 6–12)
POTASSIUM SERPL-SCNC: 3.6 MMOL/L (ref 3.5–5.2)
PROT SERPL-MCNC: 7.1 G/DL (ref 6–8.5)
QT INTERVAL: 370 MS
QTC INTERVAL: 442 MS
RBC # BLD AUTO: 4.68 10*6/MM3 (ref 3.77–5.28)
RSV RNA NPH QL NAA+NON-PROBE: NOT DETECTED
S PNEUM AG SPEC QL LA: NEGATIVE
SARS-COV-2 RNA RESP QL NAA+PROBE: NOT DETECTED
SODIUM SERPL-SCNC: 140 MMOL/L (ref 136–145)
TROPONIN T SERPL HS-MCNC: 9 NG/L
WBC NRBC COR # BLD AUTO: 8.33 10*3/MM3 (ref 3.4–10.8)
WHOLE BLOOD HOLD COAG: NORMAL
WHOLE BLOOD HOLD SPECIMEN: NORMAL

## 2024-04-26 PROCEDURE — 92610 EVALUATE SWALLOWING FUNCTION: CPT

## 2024-04-26 PROCEDURE — 93010 ELECTROCARDIOGRAM REPORT: CPT | Performed by: INTERNAL MEDICINE

## 2024-04-26 PROCEDURE — 94640 AIRWAY INHALATION TREATMENT: CPT

## 2024-04-26 PROCEDURE — 87637 SARSCOV2&INF A&B&RSV AMP PRB: CPT

## 2024-04-26 PROCEDURE — 80053 COMPREHEN METABOLIC PANEL: CPT | Performed by: EMERGENCY MEDICINE

## 2024-04-26 PROCEDURE — 83605 ASSAY OF LACTIC ACID: CPT | Performed by: EMERGENCY MEDICINE

## 2024-04-26 PROCEDURE — 71260 CT THORAX DX C+: CPT

## 2024-04-26 PROCEDURE — 93005 ELECTROCARDIOGRAM TRACING: CPT | Performed by: EMERGENCY MEDICINE

## 2024-04-26 PROCEDURE — 99223 1ST HOSP IP/OBS HIGH 75: CPT | Performed by: INTERNAL MEDICINE

## 2024-04-26 PROCEDURE — 25010000002 METHYLPREDNISOLONE PER 40 MG: Performed by: HOSPITALIST

## 2024-04-26 PROCEDURE — 84484 ASSAY OF TROPONIN QUANT: CPT | Performed by: EMERGENCY MEDICINE

## 2024-04-26 PROCEDURE — G0378 HOSPITAL OBSERVATION PER HR: HCPCS

## 2024-04-26 PROCEDURE — 85025 COMPLETE CBC W/AUTO DIFF WBC: CPT | Performed by: EMERGENCY MEDICINE

## 2024-04-26 PROCEDURE — 94799 UNLISTED PULMONARY SVC/PX: CPT

## 2024-04-26 PROCEDURE — 83880 ASSAY OF NATRIURETIC PEPTIDE: CPT | Performed by: EMERGENCY MEDICINE

## 2024-04-26 PROCEDURE — 25010000002 AZITHROMYCIN PER 500 MG: Performed by: HOSPITALIST

## 2024-04-26 PROCEDURE — 87449 NOS EACH ORGANISM AG IA: CPT | Performed by: HOSPITALIST

## 2024-04-26 PROCEDURE — 25810000003 SODIUM CHLORIDE 0.9 % SOLUTION

## 2024-04-26 PROCEDURE — 87040 BLOOD CULTURE FOR BACTERIA: CPT | Performed by: EMERGENCY MEDICINE

## 2024-04-26 PROCEDURE — 25010000002 METHYLPREDNISOLONE PER 125 MG

## 2024-04-26 PROCEDURE — 25810000003 SODIUM CHLORIDE 0.9 % SOLUTION: Performed by: HOSPITALIST

## 2024-04-26 PROCEDURE — 99222 1ST HOSP IP/OBS MODERATE 55: CPT | Performed by: HOSPITALIST

## 2024-04-26 PROCEDURE — 99285 EMERGENCY DEPT VISIT HI MDM: CPT

## 2024-04-26 PROCEDURE — 87899 AGENT NOS ASSAY W/OPTIC: CPT | Performed by: HOSPITALIST

## 2024-04-26 PROCEDURE — 25510000001 IOPAMIDOL PER 1 ML: Performed by: EMERGENCY MEDICINE

## 2024-04-26 PROCEDURE — 25010000002 ENOXAPARIN PER 10 MG: Performed by: HOSPITALIST

## 2024-04-26 RX ORDER — ACETAMINOPHEN 650 MG/1
650 SUPPOSITORY RECTAL EVERY 4 HOURS PRN
Status: DISCONTINUED | OUTPATIENT
Start: 2024-04-26 | End: 2024-05-02 | Stop reason: HOSPADM

## 2024-04-26 RX ORDER — METHYLPREDNISOLONE SODIUM SUCCINATE 40 MG/ML
40 INJECTION, POWDER, LYOPHILIZED, FOR SOLUTION INTRAMUSCULAR; INTRAVENOUS EVERY 12 HOURS
Status: DISCONTINUED | OUTPATIENT
Start: 2024-04-26 | End: 2024-04-28

## 2024-04-26 RX ORDER — SODIUM CHLORIDE 0.9 % (FLUSH) 0.9 %
10 SYRINGE (ML) INJECTION AS NEEDED
Status: DISCONTINUED | OUTPATIENT
Start: 2024-04-26 | End: 2024-05-02 | Stop reason: HOSPADM

## 2024-04-26 RX ORDER — GUAIFENESIN 600 MG/1
1200 TABLET, EXTENDED RELEASE ORAL EVERY 12 HOURS SCHEDULED
Status: DISCONTINUED | OUTPATIENT
Start: 2024-04-26 | End: 2024-05-02 | Stop reason: HOSPADM

## 2024-04-26 RX ORDER — BUDESONIDE AND FORMOTEROL FUMARATE DIHYDRATE 160; 4.5 UG/1; UG/1
2 AEROSOL RESPIRATORY (INHALATION) DAILY
COMMUNITY
End: 2024-05-09

## 2024-04-26 RX ORDER — BUDESONIDE 0.5 MG/2ML
0.5 INHALANT ORAL
Status: DISCONTINUED | OUTPATIENT
Start: 2024-04-26 | End: 2024-05-02 | Stop reason: HOSPADM

## 2024-04-26 RX ORDER — CETIRIZINE HYDROCHLORIDE 10 MG/1
10 TABLET ORAL DAILY
Status: DISCONTINUED | OUTPATIENT
Start: 2024-04-26 | End: 2024-05-02 | Stop reason: HOSPADM

## 2024-04-26 RX ORDER — ONDANSETRON 2 MG/ML
4 INJECTION INTRAMUSCULAR; INTRAVENOUS EVERY 6 HOURS PRN
Status: DISCONTINUED | OUTPATIENT
Start: 2024-04-26 | End: 2024-05-02 | Stop reason: HOSPADM

## 2024-04-26 RX ORDER — AZELASTINE 1 MG/ML
2 SPRAY, METERED NASAL 2 TIMES DAILY
Status: DISCONTINUED | OUTPATIENT
Start: 2024-04-26 | End: 2024-05-02 | Stop reason: HOSPADM

## 2024-04-26 RX ORDER — AMOXICILLIN 250 MG
2 CAPSULE ORAL 2 TIMES DAILY
Status: DISCONTINUED | OUTPATIENT
Start: 2024-04-26 | End: 2024-05-02 | Stop reason: HOSPADM

## 2024-04-26 RX ORDER — SODIUM CHLORIDE 9 MG/ML
75 INJECTION, SOLUTION INTRAVENOUS CONTINUOUS
Status: DISCONTINUED | OUTPATIENT
Start: 2024-04-26 | End: 2024-04-27

## 2024-04-26 RX ORDER — ACETAMINOPHEN 325 MG/1
650 TABLET ORAL EVERY 4 HOURS PRN
Status: DISCONTINUED | OUTPATIENT
Start: 2024-04-26 | End: 2024-05-02 | Stop reason: HOSPADM

## 2024-04-26 RX ORDER — MONTELUKAST SODIUM 10 MG/1
10 TABLET ORAL NIGHTLY
Status: DISCONTINUED | OUTPATIENT
Start: 2024-04-26 | End: 2024-05-02 | Stop reason: HOSPADM

## 2024-04-26 RX ORDER — BISACODYL 5 MG/1
5 TABLET, DELAYED RELEASE ORAL DAILY PRN
Status: DISCONTINUED | OUTPATIENT
Start: 2024-04-26 | End: 2024-05-02 | Stop reason: HOSPADM

## 2024-04-26 RX ORDER — METOPROLOL SUCCINATE 50 MG/1
100 TABLET, EXTENDED RELEASE ORAL DAILY
Status: DISCONTINUED | OUTPATIENT
Start: 2024-04-26 | End: 2024-05-02 | Stop reason: HOSPADM

## 2024-04-26 RX ORDER — LOSARTAN POTASSIUM 50 MG/1
100 TABLET ORAL DAILY
Status: DISCONTINUED | OUTPATIENT
Start: 2024-04-26 | End: 2024-05-02 | Stop reason: HOSPADM

## 2024-04-26 RX ORDER — GABAPENTIN 300 MG/1
600 CAPSULE ORAL NIGHTLY
COMMUNITY

## 2024-04-26 RX ORDER — ONDANSETRON 4 MG/1
4 TABLET, ORALLY DISINTEGRATING ORAL EVERY 6 HOURS PRN
Status: DISCONTINUED | OUTPATIENT
Start: 2024-04-26 | End: 2024-05-02 | Stop reason: HOSPADM

## 2024-04-26 RX ORDER — POLYETHYLENE GLYCOL 3350 17 G/17G
17 POWDER, FOR SOLUTION ORAL DAILY PRN
Status: DISCONTINUED | OUTPATIENT
Start: 2024-04-26 | End: 2024-05-02 | Stop reason: HOSPADM

## 2024-04-26 RX ORDER — ARFORMOTEROL TARTRATE 15 UG/2ML
15 SOLUTION RESPIRATORY (INHALATION)
Status: DISCONTINUED | OUTPATIENT
Start: 2024-04-26 | End: 2024-05-02 | Stop reason: HOSPADM

## 2024-04-26 RX ORDER — METHYLPREDNISOLONE SODIUM SUCCINATE 125 MG/2ML
125 INJECTION, POWDER, LYOPHILIZED, FOR SOLUTION INTRAMUSCULAR; INTRAVENOUS ONCE
Status: COMPLETED | OUTPATIENT
Start: 2024-04-26 | End: 2024-04-26

## 2024-04-26 RX ORDER — SPIRONOLACTONE 25 MG/1
25 TABLET ORAL 3 TIMES WEEKLY
COMMUNITY

## 2024-04-26 RX ORDER — GABAPENTIN 300 MG/1
600 CAPSULE ORAL NIGHTLY
Status: DISCONTINUED | OUTPATIENT
Start: 2024-04-26 | End: 2024-05-02 | Stop reason: HOSPADM

## 2024-04-26 RX ORDER — FAMOTIDINE 20 MG/1
20 TABLET, FILM COATED ORAL NIGHTLY
Status: DISCONTINUED | OUTPATIENT
Start: 2024-04-26 | End: 2024-05-02 | Stop reason: HOSPADM

## 2024-04-26 RX ORDER — CHOLECALCIFEROL (VITAMIN D3) 125 MCG
5 CAPSULE ORAL NIGHTLY PRN
Status: DISCONTINUED | OUTPATIENT
Start: 2024-04-26 | End: 2024-05-02 | Stop reason: HOSPADM

## 2024-04-26 RX ORDER — SODIUM CHLORIDE FOR INHALATION 3 %
4 VIAL, NEBULIZER (ML) INHALATION
Status: DISCONTINUED | OUTPATIENT
Start: 2024-04-26 | End: 2024-05-02 | Stop reason: HOSPADM

## 2024-04-26 RX ORDER — SPIRONOLACTONE 25 MG/1
25 TABLET ORAL 3 TIMES WEEKLY
Status: DISCONTINUED | OUTPATIENT
Start: 2024-04-26 | End: 2024-05-02 | Stop reason: HOSPADM

## 2024-04-26 RX ORDER — IPRATROPIUM BROMIDE AND ALBUTEROL SULFATE 2.5; .5 MG/3ML; MG/3ML
3 SOLUTION RESPIRATORY (INHALATION) ONCE
Status: COMPLETED | OUTPATIENT
Start: 2024-04-26 | End: 2024-04-26

## 2024-04-26 RX ORDER — BISACODYL 10 MG
10 SUPPOSITORY, RECTAL RECTAL DAILY PRN
Status: DISCONTINUED | OUTPATIENT
Start: 2024-04-26 | End: 2024-05-02 | Stop reason: HOSPADM

## 2024-04-26 RX ORDER — GABAPENTIN 300 MG/1
300 CAPSULE ORAL EVERY MORNING
Status: DISCONTINUED | OUTPATIENT
Start: 2024-04-26 | End: 2024-05-02 | Stop reason: HOSPADM

## 2024-04-26 RX ORDER — IPRATROPIUM BROMIDE AND ALBUTEROL SULFATE 2.5; .5 MG/3ML; MG/3ML
3 SOLUTION RESPIRATORY (INHALATION)
Status: DISCONTINUED | OUTPATIENT
Start: 2024-04-26 | End: 2024-05-02 | Stop reason: HOSPADM

## 2024-04-26 RX ORDER — ACETAMINOPHEN 160 MG/5ML
650 SOLUTION ORAL EVERY 4 HOURS PRN
Status: DISCONTINUED | OUTPATIENT
Start: 2024-04-26 | End: 2024-05-02 | Stop reason: HOSPADM

## 2024-04-26 RX ORDER — GABAPENTIN 300 MG/1
300 CAPSULE ORAL EVERY MORNING
COMMUNITY

## 2024-04-26 RX ORDER — SODIUM CHLORIDE 0.9 % (FLUSH) 0.9 %
10 SYRINGE (ML) INJECTION EVERY 12 HOURS SCHEDULED
Status: DISCONTINUED | OUTPATIENT
Start: 2024-04-26 | End: 2024-05-02 | Stop reason: HOSPADM

## 2024-04-26 RX ORDER — ENOXAPARIN SODIUM 100 MG/ML
40 INJECTION SUBCUTANEOUS 2 TIMES DAILY
Status: DISCONTINUED | OUTPATIENT
Start: 2024-04-26 | End: 2024-05-02 | Stop reason: HOSPADM

## 2024-04-26 RX ORDER — SODIUM CHLORIDE 9 MG/ML
40 INJECTION, SOLUTION INTRAVENOUS AS NEEDED
Status: DISCONTINUED | OUTPATIENT
Start: 2024-04-26 | End: 2024-05-02 | Stop reason: HOSPADM

## 2024-04-26 RX ADMIN — Medication 10 ML: at 11:33

## 2024-04-26 RX ADMIN — IPRATROPIUM BROMIDE AND ALBUTEROL SULFATE 3 ML: .5; 3 SOLUTION RESPIRATORY (INHALATION) at 19:31

## 2024-04-26 RX ADMIN — GABAPENTIN 300 MG: 300 CAPSULE ORAL at 11:40

## 2024-04-26 RX ADMIN — BUDESONIDE 0.5 MG: 0.5 SUSPENSION RESPIRATORY (INHALATION) at 19:31

## 2024-04-26 RX ADMIN — IOPAMIDOL 100 ML: 755 INJECTION, SOLUTION INTRAVENOUS at 07:47

## 2024-04-26 RX ADMIN — FAMOTIDINE 20 MG: 20 TABLET ORAL at 20:20

## 2024-04-26 RX ADMIN — ENOXAPARIN SODIUM 40 MG: 100 INJECTION SUBCUTANEOUS at 11:32

## 2024-04-26 RX ADMIN — METOPROLOL SUCCINATE 100 MG: 50 TABLET, EXTENDED RELEASE ORAL at 11:40

## 2024-04-26 RX ADMIN — ARFORMOTEROL TARTRATE 15 MCG: 15 SOLUTION RESPIRATORY (INHALATION) at 11:16

## 2024-04-26 RX ADMIN — IPRATROPIUM BROMIDE AND ALBUTEROL SULFATE 3 ML: .5; 3 SOLUTION RESPIRATORY (INHALATION) at 08:59

## 2024-04-26 RX ADMIN — SPIRONOLACTONE 25 MG: 25 TABLET ORAL at 11:40

## 2024-04-26 RX ADMIN — GUAIFENESIN 1200 MG: 600 TABLET ORAL at 11:32

## 2024-04-26 RX ADMIN — METHYLPREDNISOLONE SODIUM SUCCINATE 40 MG: 40 INJECTION INTRAMUSCULAR; INTRAVENOUS at 22:27

## 2024-04-26 RX ADMIN — METHYLPREDNISOLONE SODIUM SUCCINATE 40 MG: 40 INJECTION INTRAMUSCULAR; INTRAVENOUS at 11:32

## 2024-04-26 RX ADMIN — GUAIFENESIN 1200 MG: 600 TABLET ORAL at 20:21

## 2024-04-26 RX ADMIN — MONTELUKAST 10 MG: 10 TABLET, FILM COATED ORAL at 20:20

## 2024-04-26 RX ADMIN — METHYLPREDNISOLONE SODIUM SUCCINATE 125 MG: 125 INJECTION INTRAMUSCULAR; INTRAVENOUS at 07:33

## 2024-04-26 RX ADMIN — GABAPENTIN 600 MG: 300 CAPSULE ORAL at 20:21

## 2024-04-26 RX ADMIN — SODIUM CHLORIDE 1000 ML: 9 INJECTION, SOLUTION INTRAVENOUS at 09:05

## 2024-04-26 RX ADMIN — ARFORMOTEROL TARTRATE 15 MCG: 15 SOLUTION RESPIRATORY (INHALATION) at 19:31

## 2024-04-26 RX ADMIN — SODIUM CHLORIDE 30 MG/ML INHALATION SOLUTION 4 ML: 30 SOLUTION INHALANT at 11:16

## 2024-04-26 RX ADMIN — AZITHROMYCIN MONOHYDRATE 500 MG: 500 INJECTION, POWDER, LYOPHILIZED, FOR SOLUTION INTRAVENOUS at 11:33

## 2024-04-26 RX ADMIN — Medication 10 ML: at 20:21

## 2024-04-26 RX ADMIN — SODIUM CHLORIDE 75 ML/HR: 9 INJECTION, SOLUTION INTRAVENOUS at 16:23

## 2024-04-26 RX ADMIN — BUDESONIDE 0.5 MG: 0.5 SUSPENSION RESPIRATORY (INHALATION) at 11:16

## 2024-04-26 RX ADMIN — CETIRIZINE HYDROCHLORIDE 10 MG: 10 TABLET, FILM COATED ORAL at 11:40

## 2024-04-26 RX ADMIN — IPRATROPIUM BROMIDE AND ALBUTEROL SULFATE 3 ML: .5; 3 SOLUTION RESPIRATORY (INHALATION) at 16:05

## 2024-04-26 RX ADMIN — AZELASTINE HYDROCHLORIDE 2 SPRAY: 137 SPRAY, METERED NASAL at 22:22

## 2024-04-26 RX ADMIN — ENOXAPARIN SODIUM 40 MG: 100 INJECTION SUBCUTANEOUS at 20:21

## 2024-04-26 RX ADMIN — LOSARTAN POTASSIUM 100 MG: 50 TABLET, FILM COATED ORAL at 11:40

## 2024-04-26 RX ADMIN — IPRATROPIUM BROMIDE AND ALBUTEROL SULFATE 3 ML: .5; 3 SOLUTION RESPIRATORY (INHALATION) at 11:16

## 2024-04-26 RX ADMIN — AZELASTINE HYDROCHLORIDE 2 SPRAY: 137 SPRAY, METERED NASAL at 12:44

## 2024-04-26 NOTE — CASE MANAGEMENT/SOCIAL WORK
Discharge Planning Assessment  [facility]AD[facility] Raymond     Patient Name: Julia Rios  MRN: [id number]  Today's Date: 4/26/2024    Admit Date: 4/26/2024        Discharge Needs Assessment       Row Name 04/26/24 1014       Living Environment    People in Home spouse    Current Living Arrangements home    Potentially Unsafe Housing Conditions none    In the past 12 months has the electric, gas, oil, or water company threatened to shut off services in your home? No    Primary Care Provided by self    Provides Primary Care For no one    Family Caregiver if Needed none       Resource/Environmental Concerns    Resource/Environmental Concerns none    Transportation Concerns none       Transportation Needs    In the past 12 months, has lack of transportation kept you from medical appointments or from getting medications? no    In the past 12 months, has lack of transportation kept you from meetings, work, or from getting things needed for daily living? No       Food Insecurity    Within the past 12 months, you worried that your food would run out before you got the money to buy more. Never true    Within the past 12 months, the food you bought just didn't last and you didn't have money to get more. Never true       Transition Planning    Patient/Family Anticipates Transition to home with family;home    Patient/Family Anticipated Services at Transition none    Transportation Anticipated family or friend will provide       Discharge Needs Assessment    Readmission Within the Last 30 Days no previous admission in last 30 days    Equipment Currently Used at Home cpap    Concerns to be Addressed no discharge needs identified    Anticipated Changes Related to Illness none    Equipment Needed After Discharge none                   Discharge Plan    No documentation.                 Continued Care and Services - Admitted Since 4/26/2024    No active coordination exists for this encounter.          Demographic Summary       Row Name 04/26/24  1013       General Information    Arrived From home    Referral Source emergency department    Preferred Language English                   Functional Status       Row Name 04/26/24 1013       Physical Activity    On average, how many days per week do you engage in moderate to strenuous exercise (like a brisk walk)? 7 days    On average, how many minutes do you engage in exercise at this level? 60 min    Number of minutes of exercise per week 420       Functional Status, IADL    Medications independent    Meal Preparation independent    Housekeeping independent    Laundry independent    Shopping independent       Mental Status    General Appearance WDL WDL       Mental Status Summary    Recent Changes in Mental Status/Cognitive Functioning no changes                   Psychosocial       Row Name 04/26/24 1014       Mental Health    Little Interest or Pleasure in Doing Things 0-->not at all    Feeling Down, Depressed or Hopeless 0-->not at all                   Abuse/Neglect       Row Name 04/26/24 1014       Personal Safety    Feels Unsafe at Home or Work/School no    Feels Threatened by Someone no    Does Anyone Try to Keep You From Having Contact with Others or Doing Things Outside Your Home? no    Physical Signs of Abuse Present no                   Legal       Row Name 04/26/24 1014       Financial Resource Strain    How hard is it for you to pay for the very basics like food, housing, medical care, and heating? Not hard                   Substance Abuse       Row Name 04/26/24 1014       Substance Use    Substance Use Status never used                   Patient Forms    No documentation.                 SW met with pt this date. She is accompanied by  here in the ER. She lives at home with her . She is active and walks 2 miles a day. She uses a CPAP machine at home. She does not identify any needs at discharge at this time.     AIDEN José

## 2024-04-26 NOTE — ED PROVIDER NOTES
Time: 7:29 AM EDT  Date of encounter:  4/26/2024  Independent Historian/Clinical History and Information was obtained by:   Patient    History is limited by: N/A    Chief Complaint: soa      History of Present Illness:  Patient is a 79 y.o. year old female who presents to the emergency department for evaluation of dry cough with shortness of air.  Patient has a history of COPD but is not on oxygen at home.  Patient admits to mild chest tightness that radiates to her back.  Patient admits to chills but denies fever.    HPI    Patient Care Team  Primary Care Provider: Nick Patel DO    Past Medical History:     Allergies   Allergen Reactions    Ibuprofen Hives    Penicillins Seizure and Other (See Comments)    Nsaids Other (See Comments)     CKD    Cephalosporins Rash    Sulfa Antibiotics Rash     Past Medical History:   Diagnosis Date    Acute right-sided low back pain with right-sided sciatica 01/15/2018    Allergic rhinitis     Asthma     Back pain     2 bulging disc L2 L3    Chronic heart failure with preserved ejection fraction     11/16/20 echocardiogram: 1.  Normal ejection fraction of 55%. 2.  Mild left ventricular hypertrophy. 3.  No significant valvular heart issues.     CKD stage 3 10/29/2019    COPD (chronic obstructive pulmonary disease)     Diverticulitis     Essential hypertension     GERD (gastroesophageal reflux disease)     Hemoptysis     non cancerous    Hyperlipidemia     Idiopathic chronic gout of multiple sites without tophus 05/20/2016    Primary osteoarthritis of right knee 01/16/2017    Right knee pain     Sleep apnea     CPAP    Vitamin D deficiency 01/13/2016     Past Surgical History:   Procedure Laterality Date    CATARACT EXTRACTION Right     COLONOSCOPY  2014    COLONOSCOPY N/A 10/12/2021    Procedure: COLONOSCOPY;  Surgeon: Jorge Diane MD;  Location: [facility] ENDOSCOPY;  Service: Gastroenterology;  Laterality: N/A;  DIVERTICULOSIS    ENDOSCOPY  2015    EYE SURGERY       cataract right eye    OTHER SURGICAL HISTORY      Fatty tumor removed off back    RETINAL DETACHMENT REPAIR Right     hole in retina repair    TOTAL KNEE ARTHROPLASTY Right 10/31/2023    Procedure: TOTAL KNEE ARTHROPLASTY;  Surgeon: Celso Prakash MD;  [place] [facility] [place] [facility] [place]  Service: Orthopedics;  Laterality: Right;     Family History   Problem Relation Age of Onset    Colon cancer Mother 61    Cancer Mother     Heart disease Mother     Heart failure Father     Asthma Father     Heart attack Father     Hyperlipidemia Father     Diabetes Brother     Breast cancer Maternal Grandmother     Stroke Paternal Grandfather     Malig Hyperthermia Neg Hx        Home Medications:  Prior to Admission medications    Medication Sig Start Date End Date Taking? Authorizing Provider   albuterol (ACCUNEB) 1.25 MG/3ML nebulizer solution Take 3 mL by nebulization Every 6 (Six) Hours As Needed for Shortness of Air for up to 50 doses. 1/3/24   Santino Baez MD   albuterol sulfate  (90 Base) MCG/ACT inhaler Inhale 2 puffs Every 4 (Four) Hours As Needed for Wheezing. 10/6/23   Shanell Murray APRN   azelastine (ASTELIN) 0.1 % nasal spray 2 sprays into the nostril(s) as directed by provider 2 (Two) Times a Day. Use in each nostril as directed 1/3/24   Santino Baez MD   Bempedoic Acid (Nexletol) 180 MG tablet Take 1 tablet by mouth Daily. 4/2/24   Nick Patel DO   benzonatate (TESSALON) 100 MG capsule Take 1 capsule by mouth 3 (Three) Times a Day As Needed for Cough. 4/25/24   Мария Townsend MD   clindamycin (Cleocin) 300 MG capsule TAKE BOTH CAPSULES BY MOUTH ONE HOUR PRIOR TO DENTAL APPT 2/14/24   Celso Prakash MD   D3-1000 25 MCG (1000 UT) capsule TAKE 1 CAPSULE BY MOUTH EVERY DAY 10/20/22   Nick Patel DO   famotidine (PEPCID) 20 MG tablet TAKE 1 TABLET BY MOUTH EVERY NIGHT AT BEDTIME 4/6/24   Shanell Murray APRN   fexofenadine (ALLEGRA) 180 MG tablet Take 1 tablet by mouth Daily.     Provider, MD Rene   Fluticasone Furoate (Arnuity Ellipta) 100 MCG/ACT aerosol powder  Inhale 1 puff Daily. 1/3/24   Santino Baez MD   gabapentin (NEURONTIN) 300 MG capsule 1 tab in am and 2 at bedtime 24   Nick Patel DO   losartan (COZAAR) 100 MG tablet Take 1 tablet by mouth Daily. 24   Nick Patel DO   metoprolol succinate XL (TOPROL-XL) 100 MG 24 hr tablet Take 1 tablet by mouth Daily. Hold if heartrate is <60. 24   Nick Patel DO   montelukast (SINGULAIR) 10 MG tablet Take 1 tablet by mouth Every Night. 24   Nick Patel DO   predniSONE (DELTASONE) 20 MG tablet Take 2 tablets by mouth Daily for 5 days. 24  Мария Townsend MD   spironolactone (ALDACTONE) 25 MG tablet TAKE 1 TABLET EVERY DAY 23   Marielos Dover APRN   Symbicort 160-4.5 MCG/ACT inhaler Inhale 2 puffs 2 (Two) Times a Day. 1/3/24   Santino Baez MD        Social History:   Social History     Tobacco Use    Smoking status: Former     Current packs/day: 0.00     Average packs/day: 0.5 packs/day for 32.0 years (16.0 ttl pk-yrs)     Types: Cigarettes     Start date: 1963     Quit date: 1995     Years since quittin.3     Passive exposure: Never    Smokeless tobacco: Never   Vaping Use    Vaping status: Never Used   Substance Use Topics    Alcohol use: Never    Drug use: Never         Review of Systems:  Review of Systems   Constitutional:  Positive for chills. Negative for fever.   HENT:  Negative for congestion, rhinorrhea and sore throat.    Eyes:  Negative for pain and visual disturbance.   Respiratory:  Positive for cough, chest tightness and shortness of breath. Negative for apnea.    Cardiovascular:  Negative for chest pain and palpitations.   Gastrointestinal:  Negative for abdominal pain, diarrhea, nausea and vomiting.   Genitourinary:  Negative for difficulty urinating and dysuria.   Musculoskeletal:  Negative for joint  "swelling and myalgias.   Skin:  Negative for color change.   Neurological:  Negative for seizures and headaches.   Psychiatric/Behavioral: Negative.     All other systems reviewed and are negative.       Physical Exam:  /71 (BP Location: Left arm, Patient Position: Sitting)   Pulse 82   Temp 99.2 °F (37.3 °C) (Oral)   Resp 20   Ht 152.4 cm (60\")   Wt 95.6 kg (210 lb 12.2 oz)   SpO2 98%   BMI 41.16 kg/m²     Physical Exam  Vitals and nursing note reviewed.   Constitutional:       General: She is not in acute distress.     Appearance: Normal appearance. She is not toxic-appearing.   HENT:      Head: Normocephalic and atraumatic.      Jaw: There is normal jaw occlusion.   Eyes:      General: Lids are normal.      Extraocular Movements: Extraocular movements intact.      Conjunctiva/sclera: Conjunctivae normal.      Pupils: Pupils are equal, round, and reactive to light.   Cardiovascular:      Rate and Rhythm: Normal rate and regular rhythm.      Pulses: Normal pulses.      Heart sounds: Normal heart sounds.   Pulmonary:      Effort: Tachypnea and respiratory distress present.      Breath sounds: Wheezing present. No rhonchi.   Abdominal:      General: Abdomen is flat.      Palpations: Abdomen is soft.      Tenderness: There is no abdominal tenderness. There is no guarding or rebound.   Musculoskeletal:         General: Normal range of motion.      Cervical back: Normal range of motion and neck supple.      Right lower leg: No edema.      Left lower leg: No edema.   Skin:     General: Skin is warm and dry.   Neurological:      Mental Status: She is alert and oriented to person, place, and time. Mental status is at baseline.   Psychiatric:         Mood and Affect: Mood normal.                  Procedures:  Procedures      Medical Decision Making:      Comorbidities that affect care:    Asthma, CKD, hyperlipidemia    External Notes reviewed:          The following orders were placed and all results were " independently analyzed by me:  Orders Placed This Encounter   Procedures    Blood Culture - Blood,    Blood Culture - Blood,    COVID PRE-OP / PRE-PROCEDURE SCREENING ORDER (NO ISOLATION) - Swab, Nasopharynx    COVID-19, FLU A/B, RSV PCR 1 HR TAT - Swab, Nasopharynx    CT Chest With Contrast Diagnostic    [place] Draw    Comprehensive Metabolic Panel    BNP    Single High Sensitivity Troponin T    Lactic Acid, Plasma    CBC Auto Differential    STAT Lactic Acid, Reflex    NPO Diet NPO Type: Strict NPO    Undress & Gown    Continuous Pulse Oximetry    Vital Signs    Inpatient Hospitalist Consult    Oxygen Therapy- Nasal Cannula; Titrate 1-6 LPM Per SpO2; 90 - 95%    ECG 12 Lead ED Triage Standing Order; SOA    Insert Peripheral IV    Inpatient Admission    CBC & Differential    Green Top (Gel)    Lavender Top    Gold Top - SST    Light Blue Top       Medications Given in the Emergency Department:  Medications   sodium chloride 0.9 % flush 10 mL (has no administration in time range)   sodium chloride 0.9 % bolus 1,000 mL (1,000 mL Intravenous New Bag 4/26/24 0905)   methylPREDNISolone sodium succinate (SOLU-Medrol) injection 125 mg (125 mg Intravenous Given 4/26/24 0733)   iopamidol (ISOVUE-370) 76 % injection 100 mL (100 mL Intravenous Given 4/26/24 0747)   ipratropium-albuterol (DUO-NEB) nebulizer solution 3 mL (3 mL Nebulization Given 4/26/24 0859)        ED Course:         Labs:    Lab Results (last 24 hours)       Procedure Component Value Units Date/Time    Covid-19 + Flu A&B AG, Veritor [[id number]]  (Normal) Collected: 04/25/24 1110    Specimen: Swab Updated: 04/25/24 1110     SARS Antigen Not Detected     Influenza A Antigen JULIA Not Detected     Influenza B Antigen JULIA Not Detected     Internal Control Passed     Lot Number [id number]     Expiration Date 1/16/25    CBC & Differential [[id number]]  (Abnormal) Collected: 04/26/24 0726    Specimen: Blood Updated: 04/26/24 0748    Narrative:      The following  orders were created for panel order CBC & Differential.  Procedure                               Abnormality         Status                     ---------                               -----------         ------                     CBC Auto Differential[[id number]]        Abnormal            Final result                 Please view results for these tests on the individual orders.    Comprehensive Metabolic Panel [[id number]]  (Abnormal) Collected: 04/26/24 0726    Specimen: Blood Updated: 04/26/24 0802     Glucose 128 mg/dL      BUN 17 mg/dL      Creatinine 1.01 mg/dL      Sodium 140 mmol/L      Potassium 3.6 mmol/L      Chloride 104 mmol/L      CO2 22.4 mmol/L      Calcium 9.0 mg/dL      Total Protein 7.1 g/dL      Albumin 4.2 g/dL      ALT (SGPT) 8 U/L      AST (SGOT) 14 U/L      Alkaline Phosphatase 87 U/L      Total Bilirubin 0.4 mg/dL      Globulin 2.9 gm/dL      A/G Ratio 1.4 g/dL      BUN/Creatinine Ratio 16.8     Anion Gap 13.6 mmol/L      eGFR 56.7 mL/min/1.73     Narrative:      GFR Normal >60  Chronic Kidney Disease <60  Kidney Failure <15    The GFR formula is only valid for adults with stable renal function between ages 18 and 70.    BNP [[id number]]  (Normal) Collected: 04/26/24 0726    Specimen: Blood Updated: 04/26/24 0758     proBNP 356.5 pg/mL     Narrative:      This assay is used as an aid in the diagnosis of individuals suspected of having heart failure. It can be used as an aid in the diagnosis of acute decompensated heart failure (ADHF) in patients presenting with signs and symptoms of ADHF to the emergency department (ED). In addition, NT-proBNP of <300 pg/mL indicates ADHF is not likely.    Age Range Result Interpretation  NT-proBNP Concentration (pg/mL:      <50             Positive            >450                   Gray                 300-450                    Negative             <300    50-75           Positive            >900                  Gray                300-900                   Negative            <300      >75             Positive            >1800                  Gray                300-1800                  Negative            <300    Single High Sensitivity Troponin T [[id number]]  (Normal) Collected: 04/26/24 0726    Specimen: Blood Updated: 04/26/24 0802     HS Troponin T 9 ng/L     Narrative:      High Sensitive Troponin T Reference Range:  <14.0 ng/L- Negative Female for AMI  <22.0 ng/L- Negative Male for AMI  >=14 - Abnormal Female indicating possible myocardial injury.  >=22 - Abnormal Male indicating possible myocardial injury.   Clinicians would have to utilize clinical acumen, EKG, Troponin, and serial changes to determine if it is an Acute Myocardial Infarction or myocardial injury due to an underlying chronic condition.         Blood Culture - Blood, Arm, Left [[id number]] Collected: 04/26/24 0726    Specimen: Blood from Arm, Left Updated: 04/26/24 0735    Blood Culture - Blood, Arm, Right [[id number]] Collected: 04/26/24 0726    Specimen: Blood from Arm, Right Updated: 04/26/24 0735    Lactic Acid, Plasma [[id number]]  (Abnormal) Collected: 04/26/24 0726    Specimen: Blood Updated: 04/26/24 0808     Lactate 2.8 mmol/L     CBC Auto Differential [[id number]]  (Abnormal) Collected: 04/26/24 0726    Specimen: Blood Updated: 04/26/24 0748     WBC 8.33 10*3/mm3      RBC 4.68 10*6/mm3      Hemoglobin 12.6 g/dL      Hematocrit 40.5 %      MCV 86.5 fL      MCH 26.9 pg      MCHC 31.1 g/dL      RDW 15.6 %      RDW-SD 49.2 fl      MPV 12.8 fL      Platelets 240 10*3/mm3      Neutrophil % 71.4 %      Lymphocyte % 20.4 %      Monocyte % 7.2 %      Eosinophil % 0.0 %      Basophil % 0.2 %      Immature Grans % 0.8 %      Neutrophils, Absolute 5.94 10*3/mm3      Lymphocytes, Absolute 1.70 10*3/mm3      Monocytes, Absolute 0.60 10*3/mm3      Eosinophils, Absolute 0.00 10*3/mm3      Basophils, Absolute 0.02 10*3/mm3      Immature Grans, Absolute 0.07 10*3/mm3      nRBC 0.0 /100 WBC     COVID  PRE-OP / PRE-PROCEDURE SCREENING ORDER (NO ISOLATION) - Swab, Nasopharynx [[id number]]  (Normal) Collected: 04/26/24 0727    Specimen: Swab from Nasopharynx Updated: 04/26/24 0814    Narrative:      The following orders were created for panel order COVID PRE-OP / PRE-PROCEDURE SCREENING ORDER (NO ISOLATION) - Swab, Nasopharynx.  Procedure                               Abnormality         Status                     ---------                               -----------         ------                     COVID-19, FLU A/B, RSV P...[[id number]]  Normal              Final result                 Please view results for these tests on the individual orders.    COVID-19, FLU A/B, RSV PCR 1 HR TAT - Swab, Nasopharynx [[id number]]  (Normal) Collected: 04/26/24 0727    Specimen: Swab from Nasopharynx Updated: 04/26/24 0814     COVID19 Not Detected     Influenza A PCR Not Detected     Influenza B PCR Not Detected     RSV, PCR Not Detected    Narrative:      Fact sheet for providers: https://www.fda.gov/media/386226/download    Fact sheet for patients: https://www.fda.gov/media/502008/download    Test performed by PCR.             Imaging:    CT Chest With Contrast Diagnostic    Result Date: 4/26/2024  CT CHEST W CONTRAST DIAGNOSTIC-  Date of Exam: 4/26/2024 7:39 AM  Indication: cp with soa, radiating to back.  Comparison: CT chest without contrast 6/26/2023  Technique: Axial CT images were obtained of the chest after the uneventful intravenous administration of 100 mL Isovue-370. Reconstructed coronal and sagittal images were also obtained. Automated exposure control and iterative construction methods were used.  Findings: Soft tissue of the lower neck are without acute abnormality. Heart size normal. Negative for pericardial effusion. Mild coronary artery calcification. No pulmonary embolus. Scattered mediastinal lymph nodes are below size criteria. Negative for aortic dissection. Aortic arch branch vasculature patent.  No  pneumothorax. There are tree-in-bud opacities involving the posterior right upper lobe consistent with endobronchial infection or inflammation with bronchial wall thickening. Consolidation in the right middle lobe likely atelectasis which may relate to segmental bronchial mucous plugging. No suspicious pulmonary nodule.  Visualized portions of the liver, spleen, adrenal glands, and pancreas are without acute abnormality. Low-attenuation right upper pole renal lesions partially imaged likely cyst. No free fluid in the upper abdomen. No aggressive osseous lesion or acute fracture.      Impression: 1. Negative for pulmonary embolus. 2. Right upper lobe tree-in-bud nodules consistent with endobronchial infection or inflammation. 3. Suspected mucous plugging in right middle lobe segmental bronchi with partial right middle lobe atelectasis. 4. Additional chronic findings above.    Electronically Signed By-Francisco Valenzuela MD On:4/26/2024 8:14 AM         Differential Diagnosis and Discussion:    Cough: Differential diagnosis includes but is not limited to pneumonia, acute bronchitis, upper respiratory infection, ACE inhibitor use, allergic reaction, epiglottitis, seasonal allergies, chemical irritants, exercise-induced asthma, viral syndrome.  Dyspnea: Differential diagnosis includes but is not limited to metabolic acidosis, neurological disorders, psychogenic, asthma, pneumothorax, upper airway obstruction, COPD, pneumonia, noncardiogenic pulmonary edema, interstitial lung disease, anemia, congestive heart failure, and pulmonary embolism    All labs were reviewed and interpreted by me.  EKG was interpreted by supervising attending.  CT scan radiology impression was interpreted by me.    MDM     Amount and/or Complexity of Data Reviewed  Clinical lab tests: reviewed  Tests in the radiology section of CPT®: reviewed  Tests in the medicine section of CPT®: reviewed         After receiving the DuoNeb and Solu-Medrol we ambulated  the patient around the emergency department her oxygen saturation dropped to 86% and she began to have a coughing fit.  Patient is not on oxygen at home.  I spoke with hospitalist Dr. Zavala who agrees to admit the patient for further evaluation.            Patient Care Considerations:          Consultants/Shared Management Plan:      I spoke with hospitalist Dr. Zavala who agrees to admit the patient for further evaluation.    Social Determinants of Health:          Disposition and Care Coordination:    Admit:   Through independent evaluation of the patient's history, physical, and imperical data, the patient meets criteria for inpatient admission to the hospital.        Final diagnoses:   COPD exacerbation   Hypoxia        ED Disposition       ED Disposition   Decision to Admit    Condition   --    Comment   Level of Care: Progressive Care [20]   Diagnosis: COPD (chronic obstructive pulmonary disease) [304836]   Admitting Physician: RONNIE ZAVALA [[id number]]   Attending Physician: RONNIE ZAVALA [[id number]]   Certification: I Certify That Inpatient Hospital Services Are Medically Necessary For Greater Than 2 Midnights                 This medical record created using voice recognition software.             Celso Newman PA-C  04/26/24 0908

## 2024-04-26 NOTE — PLAN OF CARE
Goal Outcome Evaluation:  Plan of Care Reviewed With: patient      DYSPHAGIA CRITERIA: Swallow appears grossly functional for nutritional needs.  No overt clinical signs or symptoms of aspiration noted at the bedside.  Concern for possible reflux/esophageal dysphagia.    FUNCTIONAL ASSESSMENT INSTRUMENT: Patient currently scored a level 7 of 7 on Functional Communication Measures for swallowing indicating a 0% limitation in function.    ASSESSMENT/ PLAN OF CARE:  No direct speech therapy is recommended at this time. Recommend rereferral should patient demonstrate change in status.    RECOMMENDATIONS:   1.   DIET: Regular solids cut small additional moisture, thin liquid.    2.  POSITION: Positioning fully upright for all p.o. intake and 30 minutes following.    3.  COMPENSATORY STRATEGIES: Alternate small bites and small sips of solids and liquids at a slow rate.  May wish to take medications whole in applesauce or ice cream.  Reflux precautions.              Anticipated Discharge Disposition (SLP): inpatient rehabilitation facility, home with assist

## 2024-04-26 NOTE — THERAPY EVALUATION
Acute Care - Speech Language Pathology   Swallow Initial Evaluation LADARIUS Raymond     Patient Name: Julia Rios  : 1945  MRN: [id number]  Today's Date: 2024               Admit Date: 2024    Visit Dx:     ICD-10-CM ICD-9-CM   1. COPD exacerbation  J44.1 491.21   2. Hypoxia  R09.02 799.02   3. Dysphagia, unspecified type  R13.10 787.20     Patient Active Problem List   Diagnosis    Family history of colon cancer    Chronic obstructive pulmonary disease    Essential hypertension    Hyperlipidemia    CKD (chronic kidney disease) stage 3, GFR 30-59 ml/min    Chronic heart failure with preserved ejection fraction    CARMEN (obstructive sleep apnea)    Seasonal allergies    Gastroesophageal reflux disease    History of fungal pneumonia    Bronchitis, mucopurulent recurrent    Tobacco abuse, in remission    Pulmonary nodule    PND (post-nasal drip)    Status post fall    Allergic rhinitis    Asthma    Closed fracture of metatarsal bone    Diverticulitis    Hemoptysis    Idiopathic chronic gout of multiple sites without tophus    Primary localized osteoarthritis    Vitamin D deficiency    Morbid (severe) obesity due to excess calories    Umbilical hernia without obstruction and without gangrene    Oral thrush    Arthritis of knee    Pre-diabetes    COPD (chronic obstructive pulmonary disease)     Past Medical History:   Diagnosis Date    Acute right-sided low back pain with right-sided sciatica 01/15/2018    Allergic rhinitis     Asthma     Back pain     2 bulging disc L2 L3    CHF (congestive heart failure)     Chronic heart failure with preserved ejection fraction     20 echocardiogram: 1.  Normal ejection fraction of 55%. 2.  Mild left ventricular hypertrophy. 3.  No significant valvular heart issues.     CKD stage 3 10/29/2019    COPD (chronic obstructive pulmonary disease)     Diverticulitis     Essential hypertension     GERD (gastroesophageal reflux disease)     Hemoptysis     non cancerous     Hyperlipidemia     Idiopathic chronic gout of multiple sites without tophus 05/20/2016    Primary osteoarthritis of right knee 01/16/2017    Right knee pain     Sleep apnea     CPAP    Vitamin D deficiency 01/13/2016     Past Surgical History:   Procedure Laterality Date    CATARACT EXTRACTION Right     COLONOSCOPY  2014    COLONOSCOPY N/A 10/12/2021    Procedure: COLONOSCOPY;  Surgeon: Jorge Diane MD;  Location: [facility] ENDOSCOPY;  Service: Gastroenterology;  Laterality: N/A;  DIVERTICULOSIS    ENDOSCOPY  2015    EYE SURGERY      cataract right eye    OTHER SURGICAL HISTORY      Fatty tumor removed off back    RETINAL DETACHMENT REPAIR Right     hole in retina repair    TOTAL KNEE ARTHROPLASTY Right 10/31/2023    Procedure: TOTAL KNEE ARTHROPLASTY;  Surgeon: Celso Prakash MD;  Location:  [facility] [place];  Service: Orthopedics;  Laterality: Right;             Inpatient Speech Pathology Dysphagia Evaluation        PAIN SCALE: None indicated.    PRECAUTIONS/CONTRAINDICATIONS: Standard    SUSPECTED ABUSE/NEGLECT/EXPLOITATION: None indicated.    SOCIAL/PSYCHOLOGICAL NEEDS/BARRIERS: None indicated.    PAST SOCIAL HISTORY: 79-year-old female lives at home with her     PRIOR FUNCTION: Independent    PATIENT GOALS/EXPECTATIONS: Return home, continue eating regular diet    HISTORY: 79-year-old female the above diagnosis referred for speech therapy evaluation to assess for swallowing.  No previous speech pathology services and with this incident.  Patient had undergone modified barium swallow study in August 2023.  Patient reports history of reflux.  Complain of some difficulty with taking pills.  States that cold foods will sometimes catch with her indicating upper chest to low throat.    CURRENT DIET LEVEL: Regular, thin    OBJECTIVE:    TEST ADMINISTERED: Clinical dysphagia evaluation    COGNITION/SAFETY AWARENESS: Patient follows directions in response to questions without difficulty    BEHAVIORAL  OBSERVATIONS: Alert and cooperative    ORAL MOTOR EXAM: Grossly within functional limits    VOICE QUALITY: Adequate    REFLEX EXAM: Deferred    POSTURE: Sitting up in bed    FEEDING/SWALLOWING FUNCTION: Assessed with  thin liquids, puréed solids, crunchy solid.    CLINICAL OBSERVATIONS:  Thin liquid by cup and by straw appeared timely with vocal quality remaining clear to cervical auscultation.  Purée solid with swallow completed with laryngeal elevation noted to palpation.  Crunchy solid dipped in applesauce with adequate chewing followed by swallow completed clearing the oral cavity.  Patient does state complaint of dry mouth.    DYSPHAGIA CRITERIA: Swallow appears grossly functional for nutritional needs.  No overt clinical signs or symptoms of aspiration noted at the bedside.  Concern for possible reflux/esophageal dysphagia.    FUNCTIONAL ASSESSMENT INSTRUMENT: Patient currently scored a level 7 of 7 on Functional Communication Measures for swallowing indicating a 0% limitation in function.    ASSESSMENT/ PLAN OF CARE:  No direct speech therapy is recommended at this time. Recommend rereferral should patient demonstrate change in status.    RECOMMENDATIONS:   1.   DIET: Regular solids cut small additional moisture, thin liquid.    2.  POSITION: Positioning fully upright for all p.o. intake and 30 minutes following.    3.  COMPENSATORY STRATEGIES: Alternate small bites and small sips of solids and liquids at a slow rate.  May wish to take medications whole in applesauce or ice cream.  Reflux precautions.    Pt/responsible party agrees with plan of care and has been informed of all alternatives, risks and benefits.                            Anticipated Discharge Disposition (SLP): inpatient rehabilitation facility, home with assist (04/26/24 1065)                                                               EDUCATION  The patient has been educated in the following areas:   Dysphagia (Swallowing Impairment)  Modified Diet Instruction.              Time Calculation:    Time Calculation- SLP       Row Name 04/26/24 1304             Time Calculation- SLP    SLP Start Time 1200  -TB      SLP Stop Time 1300  -TB      SLP Time Calculation (min) 60 min  -TB      SLP Received On 04/26/24  -TB         Untimed Charges    SLP Eval/Re-eval  ST Eval Oral Pharyng Swallow - 89757  -TB      21804-LJ Eval Oral Pharyng Swallow Minutes 60  -TB         Total Minutes    Untimed Charges Total Minutes 60  -TB       Total Minutes 60  -TB                User Key  (r) = Recorded By, (t) = Taken By, (c) = Cosigned By      Initials Name Provider Type    TB Kateryna Nails SLP Speech and Language Pathologist                    Therapy Charges for Today       Code Description Service Date Service Provider Modifiers Qty    [id number]  ST EVAL ORAL PHARYNG SWALLOW 4 4/26/2024 Kateryna Nails SLP GN 1                 SENAIT Olsen  4/26/2024

## 2024-04-26 NOTE — H&P
[facility]  Date: 2024   Patient Name: Julia Rios  : 1945  MRN: [id number]  Primary Care Physician:  Nick Patel DO  Date of admission: 2024    Subjective shortness of breath and cough  Subjective   Chief Complaint: shortness of breath and cough    HPI: 80-year-old female with a past medical history of CKD stage III, COPD not on home oxygen, hypertension, sleep apnea who presents with COPD exacerbation.    Patient producing some clear sputum.  She reports some subjective fevers.  She reports the chest tightness.  And dyspnea at rest and on exertion.  Patient's pulmonologist is Dr. Paula.     On arrival to the ED, patient is a temperature 99.2, pulse 104, respiratory rate of 28, show 152/71, and she saturating 96% on room air.      Patient's initial lactate is 2.8, second 1 is 2.2, creatinine is 1.01, glucose is 128.  White blood cell count is 8.33.    CT of her chest with contrast shows:1. Negative for pulmonary embolus.   2. Right upper lobe tree-in-bud nodules consistent with endobronchial   infection or inflammation.   3. Suspected mucous plugging in right middle lobe segmental bronchi with   partial right middle lobe atelectasis.   Personal History     Past Medical History:  Past Medical History:   Diagnosis Date    Acute right-sided low back pain with right-sided sciatica 01/15/2018    Allergic rhinitis     Asthma     Back pain     2 bulging disc L2 L3    Chronic heart failure with preserved ejection fraction     20 echocardiogram: 1.  Normal ejection fraction of 55%. 2.  Mild left ventricular hypertrophy. 3.  No significant valvular heart issues.     CKD stage 3 10/29/2019    COPD (chronic obstructive pulmonary disease)     Diverticulitis     Essential hypertension     GERD (gastroesophageal reflux disease)     Hemoptysis     non cancerous    Hyperlipidemia     Idiopathic chronic gout of multiple sites without tophus 2016     Primary osteoarthritis of right knee 2017    Right knee pain     Sleep apnea     CPAP    Vitamin D deficiency 2016         Past Surgical History:  Past Surgical History:   Procedure Laterality Date    CATARACT EXTRACTION Right     COLONOSCOPY      COLONOSCOPY N/A 10/12/2021    Procedure: COLONOSCOPY;  Surgeon: Jorge Diane MD;  Location: [facility] ENDOSCOPY;  Service: Gastroenterology;  Laterality: N/A;  DIVERTICULOSIS    ENDOSCOPY      EYE SURGERY      cataract right eye    OTHER SURGICAL HISTORY      Fatty tumor removed off back    RETINAL DETACHMENT REPAIR Right     hole in retina repair    TOTAL KNEE ARTHROPLASTY Right 10/31/2023    Procedure: TOTAL KNEE ARTHROPLASTY;  Surgeon: Celso Prakash MD;  [place]  [facility] [place]  Service: Orthopedics;  Laterality: Right;        Family History:   Breast Cancer-related family history includes Breast cancer in her maternal grandmother.      Social History:   Social History     Socioeconomic History    Marital status:    Tobacco Use    Smoking status: Former     Current packs/day: 0.00     Average packs/day: 0.5 packs/day for 32.0 years (16.0 ttl pk-yrs)     Types: Cigarettes     Start date: 1963     Quit date: 1995     Years since quittin.3     Passive exposure: Never    Smokeless tobacco: Never   Vaping Use    Vaping status: Never Used   Substance and Sexual Activity    Alcohol use: Never    Drug use: Never    Sexual activity: Defer         Home Medications:  Cholecalciferol, Fluticasone Furoate, albuterol, albuterol sulfate HFA, azelastine, benzonatate, budesonide-formoterol, famotidine, fexofenadine, gabapentin, losartan, metoprolol succinate XL, montelukast, predniSONE, and spironolactone    Allergies:  Allergies   Allergen Reactions    Ibuprofen Hives    Penicillins Seizure and Other (See Comments)    Nsaids Other (See Comments)     CKD    Cephalosporins Rash    Sulfa Antibiotics Rash       Review of Systems   All  systems were reviewed and negative except for: Shortness of breath    Objective   Objective     Vitals:   Temp:  [99.2 °F (37.3 °C)] 99.2 °F (37.3 °C)  Heart Rate:  [] 83  Resp:  [20-28] 20  BP: ()/(49-86) 127/61    Physical Exam   Physical Exam  Constitutional:       Appearance: Normal appearance. She is obese.   HENT:      Nose: Nose normal.   Eyes:      Pupils: Pupils are equal, round, and reactive to light.   Cardiovascular:      Rate and Rhythm: Tachycardia present.   Pulmonary:      Breath sounds: Wheezing present.   Abdominal:      General: Abdomen is flat. Bowel sounds are normal.      Palpations: Abdomen is soft.   Musculoskeletal:         General: Normal range of motion.   Skin:     General: Skin is warm and dry.   Neurological:      General: No focal deficit present.      Mental Status: She is alert.   Psychiatric:         Mood and Affect: Mood normal.          Result Review      Result Review:  I have personally reviewed the results from the time of this admission to 4/26/2024 11:06 EDT and agree with these findings:  [x]  Laboratory  [x]  Microbiology  [x]  Radiology  [x]  EKG/Telemetry   []  Cardiology/Vascular   [x]  Pathology  [x]  Old records  []  Other:    Lab Results (most recent)       Procedure Component Value Units Date/Time    STAT Lactic Acid, Reflex [[id number]] Collected: 04/26/24 1032    Specimen: Blood Updated: 04/26/24 1038    COVID PRE-OP / PRE-PROCEDURE SCREENING ORDER (NO ISOLATION) - Swab, Nasopharynx [[id number]]  (Normal) Collected: 04/26/24 0727    Specimen: Swab from Nasopharynx Updated: 04/26/24 0814    Narrative:      The following orders were created for panel order COVID PRE-OP / PRE-PROCEDURE SCREENING ORDER (NO ISOLATION) - Swab, Nasopharynx.  Procedure                               Abnormality         Status                     ---------                               -----------         ------                     COVID-19, FLU A/B, RSV P...[[id number]]  Normal               Final result                 Please view results for these tests on the individual orders.    COVID-19, FLU A/B, RSV PCR 1 HR TAT - Swab, Nasopharynx [[id number]]  (Normal) Collected: 04/26/24 0727    Specimen: Swab from Nasopharynx Updated: 04/26/24 0814     COVID19 Not Detected     Influenza A PCR Not Detected     Influenza B PCR Not Detected     RSV, PCR Not Detected    Narrative:      Fact sheet for providers: https://www.fda.gov/media/475917/download    Fact sheet for patients: https://www.fda.gov/media/126073/download    Test performed by PCR.    Lactic Acid, Plasma [[id number]]  (Abnormal) Collected: 04/26/24 0726    Specimen: Blood Updated: 04/26/24 0808     Lactate 2.8 mmol/L     Comprehensive Metabolic Panel [[id number]]  (Abnormal) Collected: 04/26/24 0726    Specimen: Blood Updated: 04/26/24 0802     Glucose 128 mg/dL      BUN 17 mg/dL      Creatinine 1.01 mg/dL      Sodium 140 mmol/L      Potassium 3.6 mmol/L      Chloride 104 mmol/L      CO2 22.4 mmol/L      Calcium 9.0 mg/dL      Total Protein 7.1 g/dL      Albumin 4.2 g/dL      ALT (SGPT) 8 U/L      AST (SGOT) 14 U/L      Alkaline Phosphatase 87 U/L      Total Bilirubin 0.4 mg/dL      Globulin 2.9 gm/dL      A/G Ratio 1.4 g/dL      BUN/Creatinine Ratio 16.8     Anion Gap 13.6 mmol/L      eGFR 56.7 mL/min/1.73     Narrative:      GFR Normal >60  Chronic Kidney Disease <60  Kidney Failure <15    The GFR formula is only valid for adults with stable renal function between ages 18 and 70.    Single High Sensitivity Troponin T [[id number]]  (Normal) Collected: 04/26/24 0726    Specimen: Blood Updated: 04/26/24 0802     HS Troponin T 9 ng/L     Narrative:      High Sensitive Troponin T Reference Range:  <14.0 ng/L- Negative Female for AMI  <22.0 ng/L- Negative Male for AMI  >=14 - Abnormal Female indicating possible myocardial injury.  >=22 - Abnormal Male indicating possible myocardial injury.   Clinicians would have to utilize clinical acumen, EKG,  Troponin, and serial changes to determine if it is an Acute Myocardial Infarction or myocardial injury due to an underlying chronic condition.         BNP [[id number]]  (Normal) Collected: 04/26/24 0726    Specimen: Blood Updated: 04/26/24 0758     proBNP 356.5 pg/mL     Narrative:      This assay is used as an aid in the diagnosis of individuals suspected of having heart failure. It can be used as an aid in the diagnosis of acute decompensated heart failure (ADHF) in patients presenting with signs and symptoms of ADHF to the emergency department (ED). In addition, NT-proBNP of <300 pg/mL indicates ADHF is not likely.    Age Range Result Interpretation  NT-proBNP Concentration (pg/mL:      <50             Positive            >450                   Gray                 300-450                    Negative             <300    50-75           Positive            >900                  Gray                300-900                  Negative            <300      >75             Positive            >1800                  Gray                300-1800                  Negative            <300    CBC & Differential [[id number]]  (Abnormal) Collected: 04/26/24 0726    Specimen: Blood Updated: 04/26/24 0748    Narrative:      The following orders were created for panel order CBC & Differential.  Procedure                               Abnormality         Status                     ---------                               -----------         ------                     CBC Auto Differential[[id number]]        Abnormal            Final result                 Please view results for these tests on the individual orders.    CBC Auto Differential [[id number]]  (Abnormal) Collected: 04/26/24 0726    Specimen: Blood Updated: 04/26/24 0748     WBC 8.33 10*3/mm3      RBC 4.68 10*6/mm3      Hemoglobin 12.6 g/dL      Hematocrit 40.5 %      MCV 86.5 fL      MCH 26.9 pg      MCHC 31.1 g/dL      RDW 15.6 %      RDW-SD 49.2 fl      MPV 12.8 fL       Platelets 240 10*3/mm3      Neutrophil % 71.4 %      Lymphocyte % 20.4 %      Monocyte % 7.2 %      Eosinophil % 0.0 %      Basophil % 0.2 %      Immature Grans % 0.8 %      Neutrophils, Absolute 5.94 10*3/mm3      Lymphocytes, Absolute 1.70 10*3/mm3      Monocytes, Absolute 0.60 10*3/mm3      Eosinophils, Absolute 0.00 10*3/mm3      Basophils, Absolute 0.02 10*3/mm3      Immature Grans, Absolute 0.07 10*3/mm3      nRBC 0.0 /100 WBC     [place] Draw [[id number]] Collected: 04/26/24 0726    Specimen: Blood Updated: 04/26/24 0745    Narrative:      The following orders were created for panel order [place] Draw.  Procedure                               Abnormality         Status                     ---------                               -----------         ------                     Green Top (Gel)[[id number]]                                  Final result               Lavender Top[[id number]]                                     Final result               Gold Top - SST[[id number]]                                   Final result               Light Blue Top[[id number]]                                   Final result                 Please view results for these tests on the individual orders.    Green Top (Gel) [[id number]] Collected: 04/26/24 0726    Specimen: Blood Updated: 04/26/24 0745     Extra Tube Hold for add-ons.     Comment: Auto resulted.       Lavender Top [[id number]] Collected: 04/26/24 0726    Specimen: Blood Updated: 04/26/24 0745     Extra Tube hold for add-on     Comment: Auto resulted       Gold Top - SST [[id number]] Collected: 04/26/24 0726    Specimen: Blood Updated: 04/26/24 0745     Extra Tube Hold for add-ons.     Comment: Auto resulted.       Light Blue Top [[id number]] Collected: 04/26/24 0726    Specimen: Blood Updated: 04/26/24 0745     Extra Tube Hold for add-ons.     Comment: Auto resulted       Blood Culture - Blood, Arm, Right [[id number]] Collected: 04/26/24 0726    Specimen: Blood from Arm,  Right Updated: 04/26/24 0735    Blood Culture - Blood, Arm, Left [[id number]] Collected: 04/26/24 0726    Specimen: Blood from Arm, Left Updated: 04/26/24 0735            CT Chest With Contrast Diagnostic    Result Date: 4/26/2024  Impression: 1. Negative for pulmonary embolus. 2. Right upper lobe tree-in-bud nodules consistent with endobronchial infection or inflammation. 3. Suspected mucous plugging in right middle lobe segmental bronchi with partial right middle lobe atelectasis. 4. Additional chronic findings above.    Electronically Signed By-Francisco Valenzuela MD On:4/26/2024 8:14 AM       Assessment & Plan   Assessment / Plan   #1 COPD exacerbation   -CT scan: Mucous plugging seen.  -On Home O2 or nebulizer: Not on home oxygen, uses an inhaler.  -Started on azithromycin because of worsening dyspnea, sputum  -DuoNeb, Brovana, Pulmicort, Mucinex, saline nebs.   -IV steroids.  Patient was on oral steroids when she was admitted.  -Pulmonary hygiene  -Pulmonary consulted   -Additionally complete respiratory panel ordered.  Pending    #2 hypertension-continue metoprolol, losartan, spironolactone    #3 allergic rhinitis-continue Astelin, montelukast    #4 EMIR-follow creatinine.        DVT prophylaxis:  Medical DVT prophylaxis orders are present.        CODE STATUS:    Level Of Support Discussed With: Patient  Code Status (Patient has no pulse and is not breathing): CPR (Attempt to Resuscitate)  Medical Interventions (Patient has pulse or is breathing): Full Support      Admission Status:  I believe this patient meets inpatient status.    Electronically signed by Rita Alejo DO, 04/26/24, 11:06 AM EDT.

## 2024-04-26 NOTE — PROGRESS NOTES
"Respiratory Therapist Broncho-Pulmonary Hygiene Progress Note      Patient Name:  Julia Rios  YOB: 1945    Julia Rios meets the qualification for Level 2 of the Bronco-Pulmonary Hygiene Protocol. This was based on my daily patient assessment and includes review of chest x-ray results, cough ability and quality, oxygenation, secretions or risk for secretion development and patient mobility.     Broncho-Pulmonary Hygiene Assessment:    Level of Movement: Actively changing positions without assistance  Alert/ oriented/ cooperative  1  Breath Sounds: Bilateral localized decreased air exchange and/or coarse rhonchi  3  Cough: Strong, effective and/or frequent  2  Chest X-Ray: No CXR available    Sputum Productions: Able to produce small to moderate amount, of moderately thick secretions  Small amount, \"a little yellow and a little thick\"    2    History and Physical: Chronic condition  Emphysema, asthma, COPD exacerbation  1/2  SpO2 to Oxygen Need: greater than 92% on room air or  less than 3L nasal canula  1  Current SpO2 is: 93 on room air    Based on this information, I have completed the following interventions: Aerobika with bronchodialtor medication or TID      Electronically signed by Chantale Valera RRT, 04/26/24, 11:21 AM EDT.     "

## 2024-04-26 NOTE — ED NOTES
Pt ambulated in hallway and walking room air pulse ox decreased to 86% O2 saturation. SAVANNAH [place] notified.

## 2024-04-26 NOTE — CONSULTS
Pulmonary / Critical Care Consult Note      Patient Name: Julia Rios  : 1945  MRN: [id number]  Primary Care Physician:  Nick Patel DO  Referring Physician: Rita Alejo DO  Date of admission: 2024    Subjective   Subjective     Reason for Consult/ Chief Complaint:   COPD exacerbation, mucous plugging    HPI:  Julia Rios is a 79 y.o. female with a past medical history of COPD not on any home O2, recurrent bronchitis, obstructive sleep apnea, seasonal allergies, history of fungal pneumonia s/p itraconazole course, pulmonary nodule, GERD, and tobacco abuse of cigarettes in remission presented to the ED for evaluation of dry cough with increasing shortness of breath.  In the ED, labs were unremarkable, but lactate was 2.8 most likely from breathing treatments.   chest CT demonstrated no pulmonary embolus.  Demonstrated right upper lobe tree-in-bud nodules.  Suspected mucous plug in the right middle lobe segmental bronchi with partial right middle lobe atelectasis.  For the above reasons, the service was consulted to assist in management treatment of the patient's acute issues.  Upon assessment.  Per her report, since her knee replacement surgery, she has been moving better.  She was not having recurrent bronchitis.  I reviewed the CT scan finding with her.  It shows bilateral mucous plugging and tree-in-bud opacities.    Review of Systems  Constitutional symptoms:  Denied complaints   Ear, nose, throat: Denied complaints  Cardiovascular:  Denied complaints  Respiratory: Denied complaints  Gastrointestinal: Denied complaints  Musculoskeletal: Denied complaints  Genitourinary: Denied complaints  Allergy / Immunology: Denied complaints  Hematologic: Denied complaints  Neurologic: Denied complaints  Skin: Denied complaints  Endocrine: Denied complaints  Psychiatric: Denied complaints      Personal History     Past Medical History:   Diagnosis Date    Acute right-sided low back  pain with right-sided sciatica 01/15/2018    Allergic rhinitis     Asthma     Back pain     2 bulging disc L2 L3    CHF (congestive heart failure)     Chronic heart failure with preserved ejection fraction     11/16/20 echocardiogram: 1.  Normal ejection fraction of 55%. 2.  Mild left ventricular hypertrophy. 3.  No significant valvular heart issues.     CKD stage 3 10/29/2019    COPD (chronic obstructive pulmonary disease)     Diverticulitis     Essential hypertension     GERD (gastroesophageal reflux disease)     Hemoptysis     non cancerous    Hyperlipidemia     Idiopathic chronic gout of multiple sites without tophus 05/20/2016    Primary osteoarthritis of right knee 01/16/2017    Right knee pain     Sleep apnea     CPAP    Vitamin D deficiency 01/13/2016       Past Surgical History:   Procedure Laterality Date    CATARACT EXTRACTION Right     COLONOSCOPY  2014    COLONOSCOPY N/A 10/12/2021    Procedure: COLONOSCOPY;  Surgeon: Jorge Diaen MD;  Location: [facility] ENDOSCOPY;  Service: Gastroenterology;  Laterality: N/A;  DIVERTICULOSIS    ENDOSCOPY  2015    EYE SURGERY      cataract right eye    OTHER SURGICAL HISTORY      Fatty tumor removed off back    RETINAL DETACHMENT REPAIR Right     hole in retina repair    TOTAL KNEE ARTHROPLASTY Right 10/31/2023    Procedure: TOTAL KNEE ARTHROPLASTY;  Surgeon: Celso Prakash MD;  [place]  [facility] [place];  Service: Orthopedics;  Laterality: Right;       Family History: family history includes Asthma in her father; Breast cancer in her maternal grandmother; Cancer in her mother; Colon cancer (age of onset: 61) in her mother; Diabetes in her brother; Heart attack in her father; Heart disease in her mother; Heart failure in her father; Hyperlipidemia in her father; Stroke in her paternal grandfather. Otherwise pertinent FHx was reviewed and not pertinent to current issue.    Social History:  reports that she quit smoking about 29 years ago. Her smoking use  included cigarettes. She started smoking about 61 years ago. She has a 16 pack-year smoking history. She has never been exposed to tobacco smoke. She has never used smokeless tobacco. She reports that she does not drink alcohol and does not use drugs.    Home Medications:  Cholecalciferol, Fluticasone Furoate, albuterol, albuterol sulfate HFA, azelastine, benzonatate, budesonide-formoterol, famotidine, fexofenadine, gabapentin, losartan, metoprolol succinate XL, montelukast, predniSONE, and spironolactone    Allergies:  Allergies   Allergen Reactions    Ibuprofen Hives    Penicillins Seizure and Other (See Comments)    Nsaids Other (See Comments)     CKD    Cephalosporins Rash    Sulfa Antibiotics Rash       Objective    Objective     Vitals:   Temp:  [98.1 °F (36.7 °C)-99.2 °F (37.3 °C)] 98.1 °F (36.7 °C)  Heart Rate:  [] 87  Resp:  [18-28] 18  BP: ()/(49-86) 128/72    Physical Exam:  Vital Signs Reviewed   General: Morbidly obese female, in no acute distress, has mild conversational dyspnea  HEENT:  PERRL, EOMI.  OP, nares clear, no sinus tenderness  Neck:  Supple, no JVD, no thyromegaly  Lymph: no axillary, cervical, supraclavicular lymphadenopathy noted bilaterally  Chest:  good aeration, scattered rhonchi, no wheezing or crackles, resonant to percussion bilaterally, mildly increased work of breathing  CV: RRR, no MGR, pulses 2+, equal.  Abd:  Soft, NT, ND, + BS, no HSM  EXT:  no clubbing, no cyanosis, minimal lower extremity edema, no joint tenderness  Neuro:  A&Ox3, CN grossly intact, no focal deficits.  Skin: No rashes or lesions noted      Result Review    Result Review:  I have personally reviewed the results from the time of this admission to 4/26/2024 12:41 EDT and agree with these findings:  [x]  Laboratory  [x]  Microbiology  [x]  Radiology  []  EKG/Telemetry   []  Cardiology/Vascular   []  Pathology  [x]  Old records  [x]  Other:  Most notable findings include:         Lab 04/26/24  0763    WBC 8.33   HEMOGLOBIN 12.6   HEMATOCRIT 40.5   PLATELETS 240   SODIUM 140   POTASSIUM 3.6   CHLORIDE 104   CO2 22.4   BUN 17   CREATININE 1.01*   GLUCOSE 128*   CALCIUM 9.0   TOTAL PROTEIN 7.1   ALBUMIN 4.2   GLOBULIN 2.9     CT Chest With Contrast Diagnostic    Result Date: 4/26/2024  CT CHEST W CONTRAST DIAGNOSTIC-  Date of Exam: 4/26/2024 7:39 AM  Indication: cp with soa, radiating to back.  Comparison: CT chest without contrast 6/26/2023  Technique: Axial CT images were obtained of the chest after the uneventful intravenous administration of 100 mL Isovue-370. Reconstructed coronal and sagittal images were also obtained. Automated exposure control and iterative construction methods were used.  Findings: Soft tissue of the lower neck are without acute abnormality. Heart size normal. Negative for pericardial effusion. Mild coronary artery calcification. No pulmonary embolus. Scattered mediastinal lymph nodes are below size criteria. Negative for aortic dissection. Aortic arch branch vasculature patent.  No pneumothorax. There are tree-in-bud opacities involving the posterior right upper lobe consistent with endobronchial infection or inflammation with bronchial wall thickening. Consolidation in the right middle lobe likely atelectasis which may relate to segmental bronchial mucous plugging. No suspicious pulmonary nodule.  Visualized portions of the liver, spleen, adrenal glands, and pancreas are without acute abnormality. Low-attenuation right upper pole renal lesions partially imaged likely cyst. No free fluid in the upper abdomen. No aggressive osseous lesion or acute fracture.      Impression: Impression: 1. Negative for pulmonary embolus. 2. Right upper lobe tree-in-bud nodules consistent with endobronchial infection or inflammation. 3. Suspected mucous plugging in right middle lobe segmental bronchi with partial right middle lobe atelectasis. 4. Additional chronic findings above.    Electronically Signed  By-Francisco Valenzuela MD On:4/26/2024 8:14 AM         Assessment & Plan   Assessment / Plan     Active Hospital Problems:  Active Hospital Problems    Diagnosis     **COPD (chronic obstructive pulmonary disease)      Impression:  Mucous plugging, difficulty with airway clearance   Bibasilar atelectasis   Acute COPD aspiration, not on any home O2   Recurrent rhinitis   CARMEN    History of fungal pneumonia s/p itraconazole course   History of pulmonary nodules   Tobacco abuse of cigarettes in remission        Plan:  -Remain on room air.  Maintain SpO2 greater than 90%   -4/26 chest CT demonstrated no pulmonary embolus, right upper lobe tree-in-bud nodules.  Suspected mucous plugging in the right middle lobe segmental bronchi with partial right middle lobe atelectasis.   -Check a sputum culture.  -Continue Brovana, Pulmicort, DuoNebs   -Continue Solu-Medrol 40 mg IV twice daily   -Continue azithromycin for COPD exacerbation suppression   -Continue Aldactone 25 mg oral 3 times weekly   -Continue to monitor renal panel and electrolytes.  Replace electrolytes as necessary   -Continue bronchopulmonary hygiene.  Encourage I-S and flutter   -Given her CT scan finding and mucous plugging, if her airway clearance is not helping, may need bronchoscopy.  Tree-in-bud opacities with bilateral mucous plugging will likely need bronchoscopy early next week.  -Can use home CPAP if she is able to bring.  Otherwise, we will start CPAP machine here itself.  -PT/OT/SLP on board.  Appreciate assistance        DVT prophylaxis:  Medical DVT prophylaxis orders are present.    Code Status and Medical Interventions:   Ordered at: 04/26/24 1025     Level Of Support Discussed With:    Patient     Code Status (Patient has no pulse and is not breathing):    CPR (Attempt to Resuscitate)     Medical Interventions (Patient has pulse or is breathing):    Full Support      Labs, imaging, notes and medications personally reviewed.  Discussed with primary and  patient    Thank you for involving me in the care of this patient.    Electronically signed by KALEB Rinaldi, 04/26/24, 12:51 PM EDT.      This visit was performed by BOTH a physician and an APC. I personally evaluated and examined the patient. I performed all aspects of MDM as documented. , I have reviewed and confirmed the accuracy of the patient's history as documented in this note., and I have reexamined the patient and the results are consistent with the previously documented exam. I have updated the documentation as necessary.     Electronically signed by Santino Baez MD, 04/26/24, 5:23 PM EDT.

## 2024-04-26 NOTE — PLAN OF CARE
Goal Outcome Evaluation:   Patient was ER admit this afternoon. Alert and oriented on room air. No complaints of pain this shift. Patient complains of SOB with exertion. Pulmonology consulted per MD. Patient has trending critical lactate, MD aware. Started on maintenance fluids, see MAR. Will continue with POC.

## 2024-04-26 NOTE — ED TRIAGE NOTES
Patient presents to ED from home with SOA, COPD exacerbation after waking up to use the restroom.     Patient states she used her last neb treatment @0630.     Upon arrival Pt in tripod position, coughing, wheezing.   O2 sat 96%.

## 2024-04-27 LAB
ANION GAP SERPL CALCULATED.3IONS-SCNC: 12.8 MMOL/L (ref 5–15)
BASOPHILS # BLD AUTO: 0.02 10*3/MM3 (ref 0–0.2)
BASOPHILS NFR BLD AUTO: 0.1 % (ref 0–1.5)
BUN SERPL-MCNC: 20 MG/DL (ref 8–23)
BUN/CREAT SERPL: 21.3 (ref 7–25)
CALCIUM SPEC-SCNC: 8.8 MG/DL (ref 8.6–10.5)
CHLORIDE SERPL-SCNC: 107 MMOL/L (ref 98–107)
CO2 SERPL-SCNC: 19.2 MMOL/L (ref 22–29)
CREAT SERPL-MCNC: 0.94 MG/DL (ref 0.57–1)
D-LACTATE SERPL-SCNC: 1.5 MMOL/L (ref 0.5–2)
DEPRECATED RDW RBC AUTO: 49.6 FL (ref 37–54)
EGFRCR SERPLBLD CKD-EPI 2021: 61.9 ML/MIN/1.73
EOSINOPHIL # BLD AUTO: 0 10*3/MM3 (ref 0–0.4)
EOSINOPHIL NFR BLD AUTO: 0 % (ref 0.3–6.2)
ERYTHROCYTE [DISTWIDTH] IN BLOOD BY AUTOMATED COUNT: 15.9 % (ref 12.3–15.4)
GLUCOSE SERPL-MCNC: 162 MG/DL (ref 65–99)
HCT VFR BLD AUTO: 34.7 % (ref 34–46.6)
HGB BLD-MCNC: 11.1 G/DL (ref 12–15.9)
IMM GRANULOCYTES # BLD AUTO: 0.06 10*3/MM3 (ref 0–0.05)
IMM GRANULOCYTES NFR BLD AUTO: 0.4 % (ref 0–0.5)
LYMPHOCYTES # BLD AUTO: 1.62 10*3/MM3 (ref 0.7–3.1)
LYMPHOCYTES NFR BLD AUTO: 10.7 % (ref 19.6–45.3)
MAGNESIUM SERPL-MCNC: 1.9 MG/DL (ref 1.6–2.4)
MCH RBC QN AUTO: 27.3 PG (ref 26.6–33)
MCHC RBC AUTO-ENTMCNC: 32 G/DL (ref 31.5–35.7)
MCV RBC AUTO: 85.3 FL (ref 79–97)
MONOCYTES # BLD AUTO: 0.5 10*3/MM3 (ref 0.1–0.9)
MONOCYTES NFR BLD AUTO: 3.3 % (ref 5–12)
NEUTROPHILS NFR BLD AUTO: 12.91 10*3/MM3 (ref 1.7–7)
NEUTROPHILS NFR BLD AUTO: 85.5 % (ref 42.7–76)
NRBC BLD AUTO-RTO: 0 /100 WBC (ref 0–0.2)
PHOSPHATE SERPL-MCNC: 3 MG/DL (ref 2.5–4.5)
PLATELET # BLD AUTO: 225 10*3/MM3 (ref 140–450)
PMV BLD AUTO: 12.7 FL (ref 6–12)
POTASSIUM SERPL-SCNC: 4.1 MMOL/L (ref 3.5–5.2)
PROCALCITONIN SERPL-MCNC: 0.1 NG/ML (ref 0–0.25)
RBC # BLD AUTO: 4.07 10*6/MM3 (ref 3.77–5.28)
SODIUM SERPL-SCNC: 139 MMOL/L (ref 136–145)
WBC NRBC COR # BLD AUTO: 15.11 10*3/MM3 (ref 3.4–10.8)

## 2024-04-27 PROCEDURE — 99233 SBSQ HOSP IP/OBS HIGH 50: CPT | Performed by: INTERNAL MEDICINE

## 2024-04-27 PROCEDURE — G0378 HOSPITAL OBSERVATION PER HR: HCPCS

## 2024-04-27 PROCEDURE — 83735 ASSAY OF MAGNESIUM: CPT | Performed by: HOSPITALIST

## 2024-04-27 PROCEDURE — 94799 UNLISTED PULMONARY SVC/PX: CPT

## 2024-04-27 PROCEDURE — 25010000002 AZITHROMYCIN PER 500 MG: Performed by: HOSPITALIST

## 2024-04-27 PROCEDURE — 99232 SBSQ HOSP IP/OBS MODERATE 35: CPT | Performed by: STUDENT IN AN ORGANIZED HEALTH CARE EDUCATION/TRAINING PROGRAM

## 2024-04-27 PROCEDURE — 97161 PT EVAL LOW COMPLEX 20 MIN: CPT

## 2024-04-27 PROCEDURE — 25010000002 MAGNESIUM SULFATE 2 GM/50ML SOLUTION: Performed by: INTERNAL MEDICINE

## 2024-04-27 PROCEDURE — 85025 COMPLETE CBC W/AUTO DIFF WBC: CPT | Performed by: HOSPITALIST

## 2024-04-27 PROCEDURE — 80048 BASIC METABOLIC PNL TOTAL CA: CPT | Performed by: HOSPITALIST

## 2024-04-27 PROCEDURE — 25810000003 SODIUM CHLORIDE 0.9 % SOLUTION: Performed by: HOSPITALIST

## 2024-04-27 PROCEDURE — 87070 CULTURE OTHR SPECIMN AEROBIC: CPT | Performed by: HOSPITALIST

## 2024-04-27 PROCEDURE — 94760 N-INVAS EAR/PLS OXIMETRY 1: CPT

## 2024-04-27 PROCEDURE — 87205 SMEAR GRAM STAIN: CPT | Performed by: HOSPITALIST

## 2024-04-27 PROCEDURE — 84145 PROCALCITONIN (PCT): CPT | Performed by: INTERNAL MEDICINE

## 2024-04-27 PROCEDURE — 94664 DEMO&/EVAL PT USE INHALER: CPT

## 2024-04-27 PROCEDURE — 84100 ASSAY OF PHOSPHORUS: CPT | Performed by: HOSPITALIST

## 2024-04-27 PROCEDURE — 25010000002 METHYLPREDNISOLONE PER 40 MG: Performed by: HOSPITALIST

## 2024-04-27 PROCEDURE — 83605 ASSAY OF LACTIC ACID: CPT | Performed by: EMERGENCY MEDICINE

## 2024-04-27 PROCEDURE — 25010000002 ENOXAPARIN PER 10 MG: Performed by: HOSPITALIST

## 2024-04-27 RX ORDER — MAGNESIUM SULFATE HEPTAHYDRATE 40 MG/ML
2 INJECTION, SOLUTION INTRAVENOUS ONCE
Status: COMPLETED | OUTPATIENT
Start: 2024-04-27 | End: 2024-04-27

## 2024-04-27 RX ORDER — ALBUTEROL SULFATE 2.5 MG/3ML
2.5 SOLUTION RESPIRATORY (INHALATION) EVERY 6 HOURS PRN
Status: DISCONTINUED | OUTPATIENT
Start: 2024-04-27 | End: 2024-05-02 | Stop reason: HOSPADM

## 2024-04-27 RX ORDER — IPRATROPIUM BROMIDE AND ALBUTEROL SULFATE 2.5; .5 MG/3ML; MG/3ML
3 SOLUTION RESPIRATORY (INHALATION) EVERY 6 HOURS PRN
Status: DISCONTINUED | OUTPATIENT
Start: 2024-04-27 | End: 2024-04-27

## 2024-04-27 RX ADMIN — IPRATROPIUM BROMIDE AND ALBUTEROL SULFATE 3 ML: .5; 3 SOLUTION RESPIRATORY (INHALATION) at 11:53

## 2024-04-27 RX ADMIN — AZITHROMYCIN MONOHYDRATE 500 MG: 500 INJECTION, POWDER, LYOPHILIZED, FOR SOLUTION INTRAVENOUS at 11:46

## 2024-04-27 RX ADMIN — AZELASTINE HYDROCHLORIDE 2 SPRAY: 137 SPRAY, METERED NASAL at 21:09

## 2024-04-27 RX ADMIN — GABAPENTIN 300 MG: 300 CAPSULE ORAL at 06:21

## 2024-04-27 RX ADMIN — Medication 10 ML: at 21:08

## 2024-04-27 RX ADMIN — Medication 10 ML: at 09:21

## 2024-04-27 RX ADMIN — METOPROLOL SUCCINATE 100 MG: 50 TABLET, EXTENDED RELEASE ORAL at 09:21

## 2024-04-27 RX ADMIN — IPRATROPIUM BROMIDE AND ALBUTEROL SULFATE 3 ML: .5; 3 SOLUTION RESPIRATORY (INHALATION) at 20:07

## 2024-04-27 RX ADMIN — GUAIFENESIN 1200 MG: 600 TABLET ORAL at 09:22

## 2024-04-27 RX ADMIN — BUDESONIDE 0.5 MG: 0.5 SUSPENSION RESPIRATORY (INHALATION) at 07:23

## 2024-04-27 RX ADMIN — ARFORMOTEROL TARTRATE 15 MCG: 15 SOLUTION RESPIRATORY (INHALATION) at 07:22

## 2024-04-27 RX ADMIN — METHYLPREDNISOLONE SODIUM SUCCINATE 40 MG: 40 INJECTION INTRAMUSCULAR; INTRAVENOUS at 11:46

## 2024-04-27 RX ADMIN — CETIRIZINE HYDROCHLORIDE 10 MG: 10 TABLET, FILM COATED ORAL at 09:22

## 2024-04-27 RX ADMIN — FAMOTIDINE 20 MG: 20 TABLET ORAL at 21:07

## 2024-04-27 RX ADMIN — AZELASTINE HYDROCHLORIDE 2 SPRAY: 137 SPRAY, METERED NASAL at 09:20

## 2024-04-27 RX ADMIN — GUAIFENESIN 1200 MG: 600 TABLET ORAL at 21:07

## 2024-04-27 RX ADMIN — LOSARTAN POTASSIUM 100 MG: 50 TABLET, FILM COATED ORAL at 09:21

## 2024-04-27 RX ADMIN — ENOXAPARIN SODIUM 40 MG: 100 INJECTION SUBCUTANEOUS at 21:08

## 2024-04-27 RX ADMIN — IPRATROPIUM BROMIDE AND ALBUTEROL SULFATE 3 ML: .5; 3 SOLUTION RESPIRATORY (INHALATION) at 07:20

## 2024-04-27 RX ADMIN — MAGNESIUM SULFATE HEPTAHYDRATE 2 G: 40 INJECTION, SOLUTION INTRAVENOUS at 09:21

## 2024-04-27 RX ADMIN — SENNOSIDES AND DOCUSATE SODIUM 2 TABLET: 50; 8.6 TABLET ORAL at 21:07

## 2024-04-27 RX ADMIN — ARFORMOTEROL TARTRATE 15 MCG: 15 SOLUTION RESPIRATORY (INHALATION) at 20:07

## 2024-04-27 RX ADMIN — BUDESONIDE 0.5 MG: 0.5 SUSPENSION RESPIRATORY (INHALATION) at 20:07

## 2024-04-27 RX ADMIN — MONTELUKAST 10 MG: 10 TABLET, FILM COATED ORAL at 21:07

## 2024-04-27 RX ADMIN — METHYLPREDNISOLONE SODIUM SUCCINATE 40 MG: 40 INJECTION INTRAMUSCULAR; INTRAVENOUS at 23:32

## 2024-04-27 RX ADMIN — SODIUM CHLORIDE 30 MG/ML INHALATION SOLUTION 4 ML: 30 SOLUTION INHALANT at 07:23

## 2024-04-27 RX ADMIN — ENOXAPARIN SODIUM 40 MG: 100 INJECTION SUBCUTANEOUS at 09:20

## 2024-04-27 RX ADMIN — SENNOSIDES AND DOCUSATE SODIUM 2 TABLET: 50; 8.6 TABLET ORAL at 09:22

## 2024-04-27 RX ADMIN — Medication 5 MG: at 23:32

## 2024-04-27 RX ADMIN — GABAPENTIN 600 MG: 300 CAPSULE ORAL at 21:07

## 2024-04-27 NOTE — PLAN OF CARE
Goal Outcome Evaluation:      Pt alert and oriented x4. Able to make her needs known. Had congested cough frequently throughout night. Has home cpap at bedside. Call light in reach. No complaints of pain this shift. Has been ambulatory to the bathroom.

## 2024-04-27 NOTE — PLAN OF CARE
Goal Outcome Evaluation:                   Patient has persistent cough and shortness of breath that has improved with breathing treatments. Patient gave self a bed bath. Vital signs stable. Naila Dwyer RN

## 2024-04-27 NOTE — PLAN OF CARE
Goal Outcome Evaluation:  Plan of Care Reviewed With: patient           Outcome Evaluation: Pt is at functional baseline and does not require skilled physical therapy services. Pt is safe to ambulate with nursing staff for remainder of stay.      Anticipated Discharge Disposition (PT): home with outpatient therapy services

## 2024-04-27 NOTE — THERAPY EVALUATION
Acute Care - Physical Therapy Initial Evaluation  [facility]     Patient Name: Julia Rios  : 1945  MRN: [id number]  Today's Date: 2024      Visit Dx:     ICD-10-CM ICD-9-CM   1. COPD exacerbation  J44.1 491.21   2. Hypoxia  R09.02 799.02   3. Dysphagia, unspecified type  R13.10 787.20     Patient Active Problem List   Diagnosis    Family history of colon cancer    Chronic obstructive pulmonary disease    Essential hypertension    Hyperlipidemia    CKD (chronic kidney disease) stage 3, GFR 30-59 ml/min    Chronic heart failure with preserved ejection fraction    CARMEN (obstructive sleep apnea)    Seasonal allergies    Gastroesophageal reflux disease    History of fungal pneumonia    Bronchitis, mucopurulent recurrent    Tobacco abuse, in remission    Pulmonary nodule    PND (post-nasal drip)    Status post fall    Allergic rhinitis    Asthma    Closed fracture of metatarsal bone    Diverticulitis    Hemoptysis    Idiopathic chronic gout of multiple sites without tophus    Primary localized osteoarthritis    Vitamin D deficiency    Morbid (severe) obesity due to excess calories    Umbilical hernia without obstruction and without gangrene    Oral thrush    Arthritis of knee    Pre-diabetes    COPD (chronic obstructive pulmonary disease)     Past Medical History:   Diagnosis Date    Acute right-sided low back pain with right-sided sciatica 01/15/2018    Allergic rhinitis     Asthma     Back pain     2 bulging disc L2 L3    CHF (congestive heart failure)     Chronic heart failure with preserved ejection fraction     20 echocardiogram: 1.  Normal ejection fraction of 55%. 2.  Mild left ventricular hypertrophy. 3.  No significant valvular heart issues.     CKD stage 3 10/29/2019    COPD (chronic obstructive pulmonary disease)     Diverticulitis     Essential hypertension     GERD (gastroesophageal reflux disease)     Hemoptysis     non cancerous    Hyperlipidemia     Idiopathic chronic gout of  multiple sites without tops 05/20/2016    Primary osteoarthritis of right knee 01/16/2017    Right knee pain     Sleep apnea     CPAP    Vitamin D deficiency 01/13/2016     Past Surgical History:   Procedure Laterality Date    CATARACT EXTRACTION Right     COLONOSCOPY  2014    COLONOSCOPY N/A 10/12/2021    Procedure: COLONOSCOPY;  Surgeon: Jorge Diane MD;  Location: [facility] ENDOSCOPY;  Service: Gastroenterology;  Laterality: N/A;  DIVERTICULOSIS    ENDOSCOPY  2015    EYE SURGERY      cataract right eye    OTHER SURGICAL HISTORY      Fatty tumor removed off back    RETINAL DETACHMENT REPAIR Right     hole in retina repair    TOTAL KNEE ARTHROPLASTY Right 10/31/2023    Procedure: TOTAL KNEE ARTHROPLASTY;  Surgeon: Celso Prakash MD;  [place]  [facility] [place];  Service: Orthopedics;  Laterality: Right;     PT Assessment (Last 12 Hours)       PT Evaluation and Treatment       Row Name 04/27/24 1326          Physical Therapy Time and Intention    Subjective Information no complaints  -     Document Type evaluation  -     Mode of Treatment individual therapy;physical therapy  -     Patient Effort good  -     Symptoms Noted During/After Treatment shortness of breath  -       Row Name 04/27/24 1326          General Information    Patient Profile Reviewed yes  -     Patient Observations cooperative;alert;agree to therapy  -     Prior Level of Function independent:;all household mobility;community mobility  -     Barriers to Rehab none identified  -       Row Name 04/27/24 1326          Living Environment    Current Living Arrangements home  -     People in Home spouse  -     Primary Care Provided by self  -       Row Name 04/27/24 1326          Home Use of Assistive/Adaptive Equipment    Equipment Currently Used at Home none  -       Row Name 04/27/24 1326          Range of Motion (ROM)    Range of Motion bilateral lower extremities;ROM is WFL  -       Row Name 04/27/24 1326           Strength (Manual Muscle Testing)    Strength (Manual Muscle Testing) bilateral lower extremities;strength is WFL  -JH       Row Name 04/27/24 1326          Bed Mobility    Bed Mobility bed mobility (all) activities  -     All Activities, [place] (Bed Mobility) independent  -JH       Row Name 04/27/24 1326          Transfers    Transfers sit-stand transfer;stand-sit transfer  -JH       Row Name 04/27/24 1326          Sit-Stand Transfer    Sit-Stand [place] (Transfers) standby assist  -[facility] 04/27/24 1326          Stand-Sit Transfer    Stand-Sit [place] (Transfers) standby assist  -[facility] 04/27/24 1326          Gait/Stairs (Locomotion)    Gait/Stairs Locomotion gait/ambulation assistive device  -     [place] Level (Gait) standby assist  -     Assistive Device (Gait) walker, front-wheeled  -     Patient was able to Ambulate yes  -     Distance in Feet (Gait) 200  -     Pattern (Gait) step-through  -JH       Row Name 04/27/24 1326          Balance    Balance Assessment standing dynamic balance  -     Dynamic Standing Balance standby assist  -     Position/Device Used, Standing Balance walker, front-wheeled  -JH       Row Name 04/27/24 1326          Plan of Care Review    Plan of Care Reviewed With patient  -     Outcome Evaluation Pt is at functional baseline and does not require skilled physical therapy services. Pt is safe to ambulate with nursing staff for remainder of stay.  -JH       Row Name 04/27/24 1326          Positioning and Restraints    Pre-Treatment Position in bed  -     Post Treatment Position bed  -     In Bed sitting EOB;call light within reach;encouraged to call for assist  -JH       Row Name 04/27/24 1326          Therapy Assessment/Plan (PT)    Criteria for Skilled Interventions Met (PT) no problems identified which require skilled intervention  -     Therapy Frequency (PT) evaluation only  -               User Key  (r) = Recorded  By, (t) = Taken By, (c) = Cosigned By      Initials Name Provider Type     Aries Rust, PT Physical Therapist                    Physical Therapy Education       Title: PT OT SLP Therapies (Done)       Topic: Physical Therapy (Done)       Point: Mobility training (Done)       Learning Progress Summary             Patient Acceptance, E, VU by  at 4/27/2024 1334                         Point: Home exercise program (Done)       Learning Progress Summary             Patient Acceptance, E, VU by  at 4/27/2024 1334                         Point: Body mechanics (Done)       Learning Progress Summary             Patient Acceptance, E, VU by  at 4/27/2024 1334                         Point: Precautions (Done)       Learning Progress Summary             Patient Acceptance, E, VU by  at 4/27/2024 1334                                         User Key       Initials Effective Dates Name Provider Type [facility] 05/08/23 -  Aries Rust PT Physical Therapist PT                  PT Recommendation and Plan  Anticipated Discharge Disposition (PT): home with outpatient therapy services  Therapy Frequency (PT): evaluation only  Plan of Care Reviewed With: patient  Outcome Evaluation: Pt is at functional baseline and does not require skilled physical therapy services. Pt is safe to ambulate with nursing staff for remainder of stay.   Outcome Measures       Row Name 04/27/24 1300             How much help from another person do you currently need...    Turning from your back to your side while in flat bed without using bedrails? 4  -JH      Moving from lying on back to sitting on the side of a flat bed without bedrails? 4  -JH      Moving to and from a bed to a chair (including a wheelchair)? 4  -JH      Standing up from a chair using your arms (e.g., wheelchair, bedside chair)? 4  -JH      Climbing 3-5 steps with a railing? 3  -JH      To walk in hospital room? 3  -JH      AM-PAC 6 Clicks Score (PT) 22  -JH       Highest Level of Mobility Goal 7 --> Walk 25 feet or more  -         Functional Assessment    Outcome Measure Options AM-PAC 6 Clicks Basic Mobility (PT)  -                User Key  (r) = Recorded By, (t) = Taken By, (c) = Cosigned By      Initials Name Provider Type    Aries Interiano, TONY Physical Therapist                     Time Calculation:    PT Charges       Row Name 04/27/24 1327             Time Calculation    PT Received On 04/27/24  -         Untimed Charges    PT Eval/Re-eval Minutes 32  -         Total Minutes    Untimed Charges Total Minutes 32  -       Total Minutes 32  -                User Key  (r) = Recorded By, (t) = Taken By, (c) = Cosigned By      Initials Name Provider Type    Aries Interiano PT Physical Therapist                  Therapy Charges for Today       Code Description Service Date Service Provider Modifiers Qty    [id number] HC PT EVAL LOW COMPLEXITY 3 4/27/2024 Aries Rust PT GP 1            PT G-Codes  Outcome Measure Options: AM-PAC 6 Clicks Basic Mobility (PT)  AM-PAC 6 Clicks Score (PT): 22    Aries Rust PT  4/27/2024

## 2024-04-27 NOTE — PROGRESS NOTES
[facility]   Hospitalist Progress Note  Date: 2024  Patient Name: Julia Rios  : 1945  MRN: [id number]  Date of admission: 2024  Room/Bed: [facility]/      Subjective   Subjective     Chief Complaint: shortness of breath and cough     Summary:Julia Rios is a 79 y.o. female with past medical history of CKD stage III, COPD not on home oxygen, hypertension, sleep apnea who presents with COPD exacerbation. Patient producing some clear sputum.  She reported some subjective fevers.  Patient's pulmonologist is Dr. Baez.  On presentation, patient was last vitals were stable and saturating well on room air.Patient's initial lactate is 2.8 with repeat lactic acid of 2.2 which was thought secondary to nebulizers she was receiving. CT of her chest with contrast showed right upper lobe tree-in-bud nodules consistent with endobronchial infection or inflammation; Suspected mucous plugging in right middle lobe segmental bronchi with   partial right middle lobe atelectasis.  Pulmonology was consulted and patient was admitted for further evaluation and management.    Interval Followup: Patient is lying in bed comfortably, not in acute distress.  Saturating well on room air.  Denies any fever, chills, rigors, chest pain, headache, lightheadedness.  Did mention having shortness of breath, mild at rest and significant with movements.  Vital stable overnight.    Review of Systems    All systems reviewed and negative except for what is outlined above.      Objective   Objective     Vitals:   Temp:  [98.1 °F (36.7 °C)-98.3 °F (36.8 °C)] 98.3 °F (36.8 °C)  Heart Rate:  [63-92] 66  Resp:  [18-20] 18  BP: ()/(49-84) 134/69    Physical Exam   General: Awake, alert, NAD  Cardiovascular: RRR, no murmurs   Pulmonary: CTA bilaterally; diffuse rhonchi present, no wheezes; no conversational dyspnea  Gastrointestinal: S/ND/NT, +BS  Neuro: Alert, awake, oriented x 3; speech clear; no tremor      Result Review     Result Review:  I have personally reviewed these results:  [x]  Laboratory      Lab 04/27/24  0359 04/26/24  2127 04/26/24  1534 04/26/24  1032 04/26/24  0726   WBC 15.11*  --   --   --  8.33   HEMOGLOBIN 11.1*  --   --   --  12.6   HEMATOCRIT 34.7  --   --   --  40.5   PLATELETS 225  --   --   --  240   NEUTROS ABS 12.91*  --   --   --  5.94   IMMATURE GRANS (ABS) 0.06*  --   --   --  0.07*   LYMPHS ABS 1.62  --   --   --  1.70   MONOS ABS 0.50  --   --   --  0.60   EOS ABS 0.00  --   --   --  0.00   MCV 85.3  --   --   --  86.5   LACTATE 1.5 3.1* 3.7*   < > 2.8*    < > = values in this interval not displayed.         Lab 04/27/24  0359 04/26/24  0726   SODIUM 139 140   POTASSIUM 4.1 3.6   CHLORIDE 107 104   CO2 19.2* 22.4   ANION GAP 12.8 13.6   BUN 20 17   CREATININE 0.94 1.01*   EGFR 61.9 56.7*   GLUCOSE 162* 128*   CALCIUM 8.8 9.0   MAGNESIUM 1.9  --    PHOSPHORUS 3.0  --          Lab 04/26/24  0726   TOTAL PROTEIN 7.1   ALBUMIN 4.2   GLOBULIN 2.9   ALT (SGPT) 8   AST (SGOT) 14   BILIRUBIN 0.4   ALK PHOS 87         Lab 04/26/24  0726   PROBNP 356.5   HSTROP T 9                 Brief Urine Lab Results  (Last result in the past 365 days)        Color   Clarity   Blood   Leuk Est   Nitrite   Protein   CREAT   Urine HCG        09/29/23 0925 Yellow   Turbid   Negative   Negative   Positive   Negative           09/29/23 0925             118.4         09/29/23 0925             134.9               [x]  Microbiology   Microbiology Results (last 10 days)       Procedure Component Value - Date/Time    Respiratory Culture - Sputum, Throat [[id number]] Collected: 04/27/24 0130    Lab Status: Preliminary result Specimen: Sputum from Throat Updated: 04/27/24 0406     Gram Stain Moderate (3+) Gram positive cocci in pairs      Few (2+) Gram positive bacilli      Few (2+) Gram negative bacilli      Few (2+) Yeast      Less than 10 Epithelial cells per low power field      Less than 20 WBCs per low power field    S. Pneumo Ag  Urine or CSF - Urine, Urine, Clean Catch [[id number]]  (Normal) Collected: 04/26/24 1403    Lab Status: Final result Specimen: Urine, Clean Catch Updated: 04/26/24 1422     Strep Pneumo Ag Negative    Legionella Antigen, Urine - Urine, Urine, Clean Catch [[id number]]  (Normal) Collected: 04/26/24 1403    Lab Status: Final result Specimen: Urine, Clean Catch Updated: 04/26/24 1422     LEGIONELLA ANTIGEN, URINE Negative    COVID PRE-OP / PRE-PROCEDURE SCREENING ORDER (NO ISOLATION) - Swab, Nasopharynx [[id number]]  (Normal) Collected: 04/26/24 0727    Lab Status: Final result Specimen: Swab from Nasopharynx Updated: 04/26/24 0814    Narrative:      The following orders were created for panel order COVID PRE-OP / PRE-PROCEDURE SCREENING ORDER (NO ISOLATION) - Swab, Nasopharynx.  Procedure                               Abnormality         Status                     ---------                               -----------         ------                     COVID-19, FLU A/B, RSV P...[[id number]]  Normal              Final result                 Please view results for these tests on the individual orders.    COVID-19, FLU A/B, RSV PCR 1 HR TAT - Swab, Nasopharynx [[id number]]  (Normal) Collected: 04/26/24 0727    Lab Status: Final result Specimen: Swab from Nasopharynx Updated: 04/26/24 0814     COVID19 Not Detected     Influenza A PCR Not Detected     Influenza B PCR Not Detected     RSV, PCR Not Detected    Narrative:      Fact sheet for providers: https://www.fda.gov/media/374057/download    Fact sheet for patients: https://www.fda.gov/media/049699/download    Test performed by PCR.    Blood Culture - Blood, Arm, Left [[id number]]  (Normal) Collected: 04/26/24 0726    Lab Status: Preliminary result Specimen: Blood from Arm, Left Updated: 04/27/24 0746     Blood Culture No growth at 24 hours    Blood Culture - Blood, Arm, Right [[id number]]  (Normal) Collected: 04/26/24 0726    Lab Status: Preliminary result Specimen: Blood from  Arm, Right Updated: 04/27/24 0746     Blood Culture No growth at 24 hours          [x]  Radiology  CT Chest With Contrast Diagnostic    Result Date: 4/26/2024  Impression: 1. Negative for pulmonary embolus. 2. Right upper lobe tree-in-bud nodules consistent with endobronchial infection or inflammation. 3. Suspected mucous plugging in right middle lobe segmental bronchi with partial right middle lobe atelectasis. 4. Additional chronic findings above.    Electronically Signed By-Francisco Valenzuela MD On:4/26/2024 8:14 AM     []  EKG/Telemetry   []  Cardiology/Vascular   []  Pathology  []  Old records  []  Other:    Assessment & Plan   Assessment / Plan     Assessment:  Acute COPD exacerbation   mucous plugging  Bibasilar atelectasis  Leukocytosis, likely reactive due to steroids  Lactic acidosis, likely due to nebulizer, resolved  History of CARMEN  History of hypertension  History of fungal pneumonia s/p itraconazole course  History of pulmonary nodules  History of recurrent rhinitis  Tobacco abuse, in remission    Plan:  Patient currently being managed in medicine service.  Currently saturating well on room air.  CT chest showed no PE but did show right upper lobe tree-in-bud nodules, suspected mucous plugging in the right middle lobe, segmental bronchi with partial right middle lobe atelectasis.  Currently on azithromycin due to its anti-inflammatory benefit for worsening dyspnea.  On Brovana, Pulmicort and DuoNeb.  Also Mucinex and saline nebulization.  Continue.  On IV Solu-Medrol 40 mg every 12 hours.  Continue.  Blood culture showing no growth at 24 hours.  COVID, influenza and RSV negative.  Strep pneumo antigen and Legionella antigen negative.  Respiratory culture pending.  Plan for bronchoscopy early next week if patient does not improve clinically by pulmonology.  Appreciate further input by pulmonologist.  Continue home dose of metoprolol, losartan and spironolactone.  Continue Astelin and  montelukast.  PT/OT.  Continues with current management.     Discussed with RN.    DVT prophylaxis:  Medical DVT prophylaxis orders are present.        CODE STATUS:   Level Of Support Discussed With: Patient  Code Status (Patient has no pulse and is not breathing): CPR (Attempt to Resuscitate)  Medical Interventions (Patient has pulse or is breathing): Full Support      Electronically signed by Justine Nicholson MD, 04/27/24, 8:27 AM EDT.

## 2024-04-27 NOTE — PROGRESS NOTES
Pulmonary / Critical Care Progress Note      Patient Name: Julia Rios  : 1945  MRN: [id number]  Attending:  Justine Nicholson MD  Date of admission: 2024    Subjective   Subjective   Follow-up for COPD exacerbation, mucous plugging    Over past 24 hours: Remains on azithromycin course.  Remains on Brovana, Pulmicort, DuoNeb.  Remains on Solu-Medrol 40 mg IV twice daily    No acute events overnight    This morning,  Remains on room air  Home CPAP at bedside  Lying in bed  Still feels like secretions are stuck in her chest  Denies any chest pain but reports some chest tightness  No fever or chills  No nausea or vomiting      Objective   Objective     Vitals:   Temp:  [98.1 °F (36.7 °C)-98.3 °F (36.8 °C)] 98.3 °F (36.8 °C)  Heart Rate:  [63-92] 66  Resp:  [18-20] 18  BP: ()/(49-84) 134/69    Physical Exam   Vital Signs Reviewed   General:  WDWN, Alert, NAD.    HEENT:  PERRL, EOMI.  OP, nares clear  Chest:  good aeration, clear to auscultation bilaterally, tympanic to percussion bilaterally, no work of breathing noted  CV: RRR, no MGR, pulses 2+, equal.  Abd:  Soft, NT, ND, + BS, no HSM  EXT:  no clubbing, no cyanosis, no edema  Neuro:  A&Ox3, CN grossly intact, no focal deficits.  Skin: No rashes or lesions noted      Result Review    Result Review:  I have personally reviewed the results from the time of this admission to 2024 08:10 EDT and agree with these findings:  [x]  Laboratory  [x]  Microbiology  [x]  Radiology  []  EKG/Telemetry   []  Cardiology/Vascular   []  Pathology  []  Old records  []  Other:  Most notable findings include:         Lab 24  0359 24  0726   WBC 15.11* 8.33   HEMOGLOBIN 11.1* 12.6   HEMATOCRIT 34.7 40.5   PLATELETS 225 240   SODIUM 139 140   POTASSIUM 4.1 3.6   CHLORIDE 107 104   CO2 19.2* 22.4   BUN 20 17   CREATININE 0.94 1.01*   GLUCOSE 162* 128*   CALCIUM 8.8 9.0   PHOSPHORUS 3.0  --    TOTAL PROTEIN  --  7.1   ALBUMIN  --  4.2   GLOBULIN  --   2.9     CT Chest With Contrast Diagnostic    Result Date: 4/26/2024  CT CHEST W CONTRAST DIAGNOSTIC-  Date of Exam: 4/26/2024 7:39 AM  Indication: cp with soa, radiating to back.  Comparison: CT chest without contrast 6/26/2023  Technique: Axial CT images were obtained of the chest after the uneventful intravenous administration of 100 mL Isovue-370. Reconstructed coronal and sagittal images were also obtained. Automated exposure control and iterative construction methods were used.  Findings: Soft tissue of the lower neck are without acute abnormality. Heart size normal. Negative for pericardial effusion. Mild coronary artery calcification. No pulmonary embolus. Scattered mediastinal lymph nodes are below size criteria. Negative for aortic dissection. Aortic arch branch vasculature patent.  No pneumothorax. There are tree-in-bud opacities involving the posterior right upper lobe consistent with endobronchial infection or inflammation with bronchial wall thickening. Consolidation in the right middle lobe likely atelectasis which may relate to segmental bronchial mucous plugging. No suspicious pulmonary nodule.  Visualized portions of the liver, spleen, adrenal glands, and pancreas are without acute abnormality. Low-attenuation right upper pole renal lesions partially imaged likely cyst. No free fluid in the upper abdomen. No aggressive osseous lesion or acute fracture.      Impression: Impression: 1. Negative for pulmonary embolus. 2. Right upper lobe tree-in-bud nodules consistent with endobronchial infection or inflammation. 3. Suspected mucous plugging in right middle lobe segmental bronchi with partial right middle lobe atelectasis. 4. Additional chronic findings above.    Electronically Signed By-Francisco Valenzuela MD On:4/26/2024 8:14 AM         Assessment & Plan   Assessment / Plan     Active Hospital Problems:  Active Hospital Problems    Diagnosis     **COPD (chronic obstructive pulmonary disease)       Impression:  Mucous plugging, difficulty with airway clearance   Bibasilar atelectasis   Acute COPD aspiration, not on any home O2   Recurrent rhinitis   CARMEN    History of fungal pneumonia s/p itraconazole course   History of pulmonary nodules   Tobacco abuse of cigarettes in remission      Plan:  -Remain on room air.  Maintain SpO2 greater than 90%   -4/26 chest CT demonstrated no pulmonary embolus, right upper lobe tree-in-bud nodules.  Suspected mucous plugging in the right middle lobe segmental bronchi with partial right middle lobe atelectasis.   -Sputum culture negative.  Check procalcitonin, 0.10  -Continue Brovana, Pulmicort, DuoNebs   -Continue Solu-Medrol 40 mg IV twice daily   -Continue azithromycin for COPD exacerbation suppression   -Continue Aldactone 25 mg oral 3 times weekly   -Continue to monitor renal panel and electrolytes.  Replace mag intravenously  -Continue bronchopulmonary hygiene.  Encourage I-S and flutter   -Okay to use home CPAP  -PT/OT/SLP on board.  Appreciate assistance   -If patient does not continue to improve clinically, this service will consider bronchoscopy early next week     DVT prophylaxis:  Medical DVT prophylaxis orders are present.    CODE STATUS:   Level Of Support Discussed With: Patient  Code Status (Patient has no pulse and is not breathing): CPR (Attempt to Resuscitate)  Medical Interventions (Patient has pulse or is breathing): Full Support      Labs, imaging, microbiology, notes and medications personally reviewed  Discussed with primary    I, Dr. Carlitos Lanza, have spent more than 50% of the total time managing the patient in this encounter today.  This included personally reviewing all pertinent labs, imaging, microbiology and documentation. Also discussing the case with the patient and any available family, the admitting physician and any available ancillary staff.    Electronically signed by KALEB Rinaldi, 04/27/24, 10:34 AM  EDT.  Electronically signed by Carlitos Lanza MD, 04/27/24, 11:33 AM EDT.

## 2024-04-28 LAB
ANION GAP SERPL CALCULATED.3IONS-SCNC: 10 MMOL/L (ref 5–15)
BASOPHILS # BLD AUTO: 0.01 10*3/MM3 (ref 0–0.2)
BASOPHILS NFR BLD AUTO: 0.1 % (ref 0–1.5)
BUN SERPL-MCNC: 30 MG/DL (ref 8–23)
BUN/CREAT SERPL: 27.3 (ref 7–25)
CALCIUM SPEC-SCNC: 9.2 MG/DL (ref 8.6–10.5)
CHLORIDE SERPL-SCNC: 106 MMOL/L (ref 98–107)
CO2 SERPL-SCNC: 20 MMOL/L (ref 22–29)
CREAT SERPL-MCNC: 1.1 MG/DL (ref 0.57–1)
DEPRECATED RDW RBC AUTO: 49.9 FL (ref 37–54)
EGFRCR SERPLBLD CKD-EPI 2021: 51.2 ML/MIN/1.73
EOSINOPHIL # BLD AUTO: 0 10*3/MM3 (ref 0–0.4)
EOSINOPHIL NFR BLD AUTO: 0 % (ref 0.3–6.2)
ERYTHROCYTE [DISTWIDTH] IN BLOOD BY AUTOMATED COUNT: 16.2 % (ref 12.3–15.4)
GLUCOSE SERPL-MCNC: 148 MG/DL (ref 65–99)
HCT VFR BLD AUTO: 34.4 % (ref 34–46.6)
HGB BLD-MCNC: 11.1 G/DL (ref 12–15.9)
IMM GRANULOCYTES # BLD AUTO: 0.07 10*3/MM3 (ref 0–0.05)
IMM GRANULOCYTES NFR BLD AUTO: 0.5 % (ref 0–0.5)
LYMPHOCYTES # BLD AUTO: 1.31 10*3/MM3 (ref 0.7–3.1)
LYMPHOCYTES NFR BLD AUTO: 10.1 % (ref 19.6–45.3)
MAGNESIUM SERPL-MCNC: 2.1 MG/DL (ref 1.6–2.4)
MCH RBC QN AUTO: 27.3 PG (ref 26.6–33)
MCHC RBC AUTO-ENTMCNC: 32.3 G/DL (ref 31.5–35.7)
MCV RBC AUTO: 84.5 FL (ref 79–97)
MONOCYTES # BLD AUTO: 0.46 10*3/MM3 (ref 0.1–0.9)
MONOCYTES NFR BLD AUTO: 3.6 % (ref 5–12)
NEUTROPHILS NFR BLD AUTO: 11.09 10*3/MM3 (ref 1.7–7)
NEUTROPHILS NFR BLD AUTO: 85.7 % (ref 42.7–76)
NRBC BLD AUTO-RTO: 0 /100 WBC (ref 0–0.2)
PHOSPHATE SERPL-MCNC: 3.4 MG/DL (ref 2.5–4.5)
PLATELET # BLD AUTO: 228 10*3/MM3 (ref 140–450)
PMV BLD AUTO: 13 FL (ref 6–12)
POTASSIUM SERPL-SCNC: 4.3 MMOL/L (ref 3.5–5.2)
RBC # BLD AUTO: 4.07 10*6/MM3 (ref 3.77–5.28)
SODIUM SERPL-SCNC: 136 MMOL/L (ref 136–145)
WBC NRBC COR # BLD AUTO: 12.94 10*3/MM3 (ref 3.4–10.8)

## 2024-04-28 PROCEDURE — 25010000002 AZITHROMYCIN PER 500 MG: Performed by: HOSPITALIST

## 2024-04-28 PROCEDURE — 94799 UNLISTED PULMONARY SVC/PX: CPT

## 2024-04-28 PROCEDURE — 25810000003 SODIUM CHLORIDE 0.9 % SOLUTION: Performed by: HOSPITALIST

## 2024-04-28 PROCEDURE — G0378 HOSPITAL OBSERVATION PER HR: HCPCS

## 2024-04-28 PROCEDURE — 63710000001 PREDNISONE PER 1 MG: Performed by: INTERNAL MEDICINE

## 2024-04-28 PROCEDURE — 99233 SBSQ HOSP IP/OBS HIGH 50: CPT | Performed by: INTERNAL MEDICINE

## 2024-04-28 PROCEDURE — 25010000002 ENOXAPARIN PER 10 MG: Performed by: HOSPITALIST

## 2024-04-28 PROCEDURE — 80048 BASIC METABOLIC PNL TOTAL CA: CPT | Performed by: HOSPITALIST

## 2024-04-28 PROCEDURE — 85025 COMPLETE CBC W/AUTO DIFF WBC: CPT | Performed by: HOSPITALIST

## 2024-04-28 PROCEDURE — 94760 N-INVAS EAR/PLS OXIMETRY 1: CPT

## 2024-04-28 PROCEDURE — 99232 SBSQ HOSP IP/OBS MODERATE 35: CPT | Performed by: STUDENT IN AN ORGANIZED HEALTH CARE EDUCATION/TRAINING PROGRAM

## 2024-04-28 PROCEDURE — 94664 DEMO&/EVAL PT USE INHALER: CPT

## 2024-04-28 PROCEDURE — 84100 ASSAY OF PHOSPHORUS: CPT | Performed by: HOSPITALIST

## 2024-04-28 PROCEDURE — 83735 ASSAY OF MAGNESIUM: CPT | Performed by: HOSPITALIST

## 2024-04-28 RX ORDER — PREDNISONE 20 MG/1
40 TABLET ORAL
Status: DISCONTINUED | OUTPATIENT
Start: 2024-04-28 | End: 2024-05-02 | Stop reason: HOSPADM

## 2024-04-28 RX ADMIN — IPRATROPIUM BROMIDE AND ALBUTEROL SULFATE 3 ML: .5; 3 SOLUTION RESPIRATORY (INHALATION) at 11:36

## 2024-04-28 RX ADMIN — AZELASTINE HYDROCHLORIDE 2 SPRAY: 137 SPRAY, METERED NASAL at 20:13

## 2024-04-28 RX ADMIN — ENOXAPARIN SODIUM 40 MG: 100 INJECTION SUBCUTANEOUS at 20:13

## 2024-04-28 RX ADMIN — GUAIFENESIN 1200 MG: 600 TABLET ORAL at 08:54

## 2024-04-28 RX ADMIN — CETIRIZINE HYDROCHLORIDE 10 MG: 10 TABLET, FILM COATED ORAL at 08:55

## 2024-04-28 RX ADMIN — FAMOTIDINE 20 MG: 20 TABLET ORAL at 20:12

## 2024-04-28 RX ADMIN — PREDNISONE 40 MG: 20 TABLET ORAL at 08:55

## 2024-04-28 RX ADMIN — Medication 10 ML: at 08:53

## 2024-04-28 RX ADMIN — GABAPENTIN 600 MG: 300 CAPSULE ORAL at 20:12

## 2024-04-28 RX ADMIN — MONTELUKAST 10 MG: 10 TABLET, FILM COATED ORAL at 20:12

## 2024-04-28 RX ADMIN — IPRATROPIUM BROMIDE AND ALBUTEROL SULFATE 3 ML: .5; 3 SOLUTION RESPIRATORY (INHALATION) at 07:37

## 2024-04-28 RX ADMIN — ARFORMOTEROL TARTRATE 15 MCG: 15 SOLUTION RESPIRATORY (INHALATION) at 07:37

## 2024-04-28 RX ADMIN — BUDESONIDE 0.5 MG: 0.5 SUSPENSION RESPIRATORY (INHALATION) at 18:35

## 2024-04-28 RX ADMIN — AZELASTINE HYDROCHLORIDE 2 SPRAY: 137 SPRAY, METERED NASAL at 08:55

## 2024-04-28 RX ADMIN — IPRATROPIUM BROMIDE AND ALBUTEROL SULFATE 3 ML: .5; 3 SOLUTION RESPIRATORY (INHALATION) at 18:35

## 2024-04-28 RX ADMIN — Medication 10 ML: at 20:13

## 2024-04-28 RX ADMIN — ARFORMOTEROL TARTRATE 15 MCG: 15 SOLUTION RESPIRATORY (INHALATION) at 18:35

## 2024-04-28 RX ADMIN — GUAIFENESIN 1200 MG: 600 TABLET ORAL at 20:12

## 2024-04-28 RX ADMIN — ENOXAPARIN SODIUM 40 MG: 100 INJECTION SUBCUTANEOUS at 08:54

## 2024-04-28 RX ADMIN — LOSARTAN POTASSIUM 100 MG: 50 TABLET, FILM COATED ORAL at 08:54

## 2024-04-28 RX ADMIN — SENNOSIDES AND DOCUSATE SODIUM 2 TABLET: 50; 8.6 TABLET ORAL at 08:54

## 2024-04-28 RX ADMIN — BUDESONIDE 0.5 MG: 0.5 SUSPENSION RESPIRATORY (INHALATION) at 07:37

## 2024-04-28 RX ADMIN — SODIUM CHLORIDE 30 MG/ML INHALATION SOLUTION 4 ML: 30 SOLUTION INHALANT at 07:37

## 2024-04-28 RX ADMIN — METOPROLOL SUCCINATE 100 MG: 50 TABLET, EXTENDED RELEASE ORAL at 08:54

## 2024-04-28 RX ADMIN — IPRATROPIUM BROMIDE AND ALBUTEROL SULFATE 3 ML: .5; 3 SOLUTION RESPIRATORY (INHALATION) at 00:47

## 2024-04-28 RX ADMIN — GABAPENTIN 300 MG: 300 CAPSULE ORAL at 08:54

## 2024-04-28 RX ADMIN — AZITHROMYCIN MONOHYDRATE 500 MG: 500 INJECTION, POWDER, LYOPHILIZED, FOR SOLUTION INTRAVENOUS at 12:43

## 2024-04-28 NOTE — PROGRESS NOTES
Pulmonary / Critical Care Progress Note      Patient Name: Julia Rios  : 1945  MRN: [id number]  Attending:  Justine Nicholson MD  Date of admission: 2024    Subjective   Subjective   Follow-up for COPD exacerbation, mucous plugging    On room air  Using CPAP overnight  No chest pain  Wheezing and cough improved  Coughing up some thick secretions now  No chest pain or hemoptysis  No fever or chills  No nausea or vomiting      Objective   Objective     Vitals:   Temp:  [97.5 °F (36.4 °C)-98.2 °F (36.8 °C)] 97.5 °F (36.4 °C)  Heart Rate:  [56-72] 56  Resp:  [16-22] 18  BP: (123-159)/(66-81) 140/73    Physical Exam   Vital Signs Reviewed   General:  WDWN, Alert, NAD.    HEENT:  PERRL, EOMI.  OP, nares clear  Chest:  good aeration, rhonchi bilaterally, tympanic to percussion bilaterally, no work of breathing noted  CV: RRR, no MGR, pulses 2+, equal.  Abd:  Soft, NT, ND, + BS, no HSM  EXT:  no clubbing, no cyanosis, no edema  Neuro:  A&Ox3, CN grossly intact, no focal deficits.  Skin: No rashes or lesions noted      Result Review    Result Review:  I have personally reviewed the results from the time of this admission to 2024 13:28 EDT and agree with these findings:  [x]  Laboratory  [x]  Microbiology  [x]  Radiology  []  EKG/Telemetry   []  Cardiology/Vascular   []  Pathology  []  Old records  []  Other:  Most notable findings include:         Lab 24  0445 24  0359 24  0726   WBC 12.94* 15.11* 8.33   HEMOGLOBIN 11.1* 11.1* 12.6   HEMATOCRIT 34.4 34.7 40.5   PLATELETS 228 225 240   SODIUM 136 139 140   POTASSIUM 4.3 4.1 3.6   CHLORIDE 106 107 104   CO2 20.0* 19.2* 22.4   BUN 30* 20 17   CREATININE 1.10* 0.94 1.01*   GLUCOSE 148* 162* 128*   CALCIUM 9.2 8.8 9.0   PHOSPHORUS 3.4 3.0  --    TOTAL PROTEIN  --   --  7.1   ALBUMIN  --   --  4.2   GLOBULIN  --   --  2.9     CT Chest With Contrast Diagnostic    Result Date: 2024  CT CHEST W CONTRAST DIAGNOSTIC-  Date of Exam:  4/26/2024 7:39 AM  Indication: cp with soa, radiating to back.  Comparison: CT chest without contrast 6/26/2023  Technique: Axial CT images were obtained of the chest after the uneventful intravenous administration of 100 mL Isovue-370. Reconstructed coronal and sagittal images were also obtained. Automated exposure control and iterative construction methods were used.  Findings: Soft tissue of the lower neck are without acute abnormality. Heart size normal. Negative for pericardial effusion. Mild coronary artery calcification. No pulmonary embolus. Scattered mediastinal lymph nodes are below size criteria. Negative for aortic dissection. Aortic arch branch vasculature patent.  No pneumothorax. There are tree-in-bud opacities involving the posterior right upper lobe consistent with endobronchial infection or inflammation with bronchial wall thickening. Consolidation in the right middle lobe likely atelectasis which may relate to segmental bronchial mucous plugging. No suspicious pulmonary nodule.  Visualized portions of the liver, spleen, adrenal glands, and pancreas are without acute abnormality. Low-attenuation right upper pole renal lesions partially imaged likely cyst. No free fluid in the upper abdomen. No aggressive osseous lesion or acute fracture.      Impression: Impression: 1. Negative for pulmonary embolus. 2. Right upper lobe tree-in-bud nodules consistent with endobronchial infection or inflammation. 3. Suspected mucous plugging in right middle lobe segmental bronchi with partial right middle lobe atelectasis. 4. Additional chronic findings above.    Electronically Signed By-Francisco Valenzuela MD On:4/26/2024 8:14 AM         Assessment & Plan   Assessment / Plan     Active Hospital Problems:  Active Hospital Problems    Diagnosis     **COPD (chronic obstructive pulmonary disease)      Impression:  Mucous plugging, difficulty with airway clearance   Bibasilar atelectasis   Acute COPD aspiration, not on any  home O2   Recurrent rhinitis   CARMEN    History of fungal pneumonia s/p itraconazole course   History of pulmonary nodules   Tobacco abuse of cigarettes in remission      Plan:  -Remain on room air.  Maintain SpO2 greater than 90%   -4/26 chest CT demonstrated no pulmonary embolus, right upper lobe tree-in-bud nodules.  Suspected mucous plugging in the right middle lobe segmental bronchi with partial right middle lobe atelectasis.   -All cultures so far negative/pending   -Continue Brovana, Pulmicort, DuoNebs   -Change steroids to prednisone 40 mg daily to complete 7 days  -Continue azithromycin for COPD exacerbation suppression   -Continue Aldactone 25 mg oral 3 times weekly   -Continue to monitor renal panel and electrolytes.  Replace mag intravenously  -Continue bronchopulmonary hygiene.  Encourage I-S and flutter   -Okay to use home CPAP  -PT/OT/SLP on board.  Appreciate assistance   -Low threshold to perform bronchoscopy if there is no improvement in the patient's respiratory symptoms/airway clearance     DVT prophylaxis:  Medical DVT prophylaxis orders are present.    CODE STATUS:   Level Of Support Discussed With: Patient  Code Status (Patient has no pulse and is not breathing): CPR (Attempt to Resuscitate)  Medical Interventions (Patient has pulse or is breathing): Full Support      Labs, imaging, microbiology, notes and medications personally reviewed  Discussed with primary    Electronically signed by Carlitos Lanza MD, 04/28/24, 1:29 PM EDT.

## 2024-04-28 NOTE — PLAN OF CARE
Goal Outcome Evaluation:                        Patient is feeling less short of breath this shift. No acute concerns voiced by patient at this time. Naila Dwyer RN

## 2024-04-28 NOTE — PROGRESS NOTES
[facility]   Hospitalist Progress Note  Date: 2024  Patient Name: Julia Rios  : 1945  MRN: [id number]  Date of admission: 2024  Room/Bed: [facility]/1      Subjective   Subjective     Chief Complaint: shortness of breath and cough     Summary:Julia Rios is a 79 y.o. female with past medical history of CKD stage III, COPD not on home oxygen, hypertension, sleep apnea who presents with COPD exacerbation. Patient producing some clear sputum.  She reported some subjective fevers.  Patient's pulmonologist is Dr. Baez.  On presentation, patient was last vitals were stable and saturating well on room air.Patient's initial lactate is 2.8 with repeat lactic acid of 2.2 which was thought secondary to nebulizers she was receiving. CT of her chest with contrast showed right upper lobe tree-in-bud nodules consistent with endobronchial infection or inflammation; Suspected mucous plugging in right middle lobe segmental bronchi with   partial right middle lobe atelectasis.  Pulmonology was consulted and patient was admitted for further evaluation and management.    Interval Followup: Patient is lying in bed comfortably, not in acute distress.  Saturating well on room air.  Denies any fever, chills, rigors, chest pain, headache, lightheadedness.  Stated her shortness of breath is better addressed today.  Feels more comfortable.  Vital stable overnight.    Review of Systems    All systems reviewed and negative except for what is outlined above.      Objective   Objective     Vitals:   Temp:  [97.5 °F (36.4 °C)-98.2 °F (36.8 °C)] 98.2 °F (36.8 °C)  Heart Rate:  [56-72] 67  Resp:  [16-22] 18  BP: (123-159)/(62-81) 123/66    Physical Exam   General: Awake, alert, NAD  Cardiovascular: RRR, no murmurs   Pulmonary: CTA bilaterally; diffuse rhonchi present, no wheezes; no conversational dyspnea  Gastrointestinal: S/ND/NT, +BS  Neuro: Alert, awake, oriented x 3; speech clear; no tremor      Result Review     Result Review:  I have personally reviewed these results:  [x]  Laboratory      Lab 04/28/24 0445 04/27/24  0359 04/26/24  2127 04/26/24  1534 04/26/24  1032 04/26/24  0726   WBC 12.94* 15.11*  --   --   --  8.33   HEMOGLOBIN 11.1* 11.1*  --   --   --  12.6   HEMATOCRIT 34.4 34.7  --   --   --  40.5   PLATELETS 228 225  --   --   --  240   NEUTROS ABS 11.09* 12.91*  --   --   --  5.94   IMMATURE GRANS (ABS) 0.07* 0.06*  --   --   --  0.07*   LYMPHS ABS 1.31 1.62  --   --   --  1.70   MONOS ABS 0.46 0.50  --   --   --  0.60   EOS ABS 0.00 0.00  --   --   --  0.00   MCV 84.5 85.3  --   --   --  86.5   PROCALCITONIN  --  0.10  --   --   --   --    LACTATE  --  1.5 3.1* 3.7*   < > 2.8*    < > = values in this interval not displayed.         Lab 04/28/24 0445 04/27/24  0359 04/26/24  0726   SODIUM 136 139 140   POTASSIUM 4.3 4.1 3.6   CHLORIDE 106 107 104   CO2 20.0* 19.2* 22.4   ANION GAP 10.0 12.8 13.6   BUN 30* 20 17   CREATININE 1.10* 0.94 1.01*   EGFR 51.2* 61.9 56.7*   GLUCOSE 148* 162* 128*   CALCIUM 9.2 8.8 9.0   MAGNESIUM 2.1 1.9  --    PHOSPHORUS 3.4 3.0  --          Lab 04/26/24  0726   TOTAL PROTEIN 7.1   ALBUMIN 4.2   GLOBULIN 2.9   ALT (SGPT) 8   AST (SGOT) 14   BILIRUBIN 0.4   ALK PHOS 87         Lab 04/26/24  0726   PROBNP 356.5   HSTROP T 9                 Brief Urine Lab Results  (Last result in the past 365 days)        Color   Clarity   Blood   Leuk Est   Nitrite   Protein   CREAT   Urine HCG        09/29/23 0925 Yellow   Turbid   Negative   Negative   Positive   Negative           09/29/23 0925             118.4         09/29/23 0925             134.9               [x]  Microbiology   Microbiology Results (last 10 days)       Procedure Component Value - Date/Time    Respiratory Culture - Sputum, Throat [[id number]] Collected: 04/27/24 0130    Lab Status: Preliminary result Specimen: Sputum from Throat Updated: 04/27/24 0406     Gram Stain Moderate (3+) Gram positive cocci in pairs      Few (2+)  Gram positive bacilli      Few (2+) Gram negative bacilli      Few (2+) Yeast      Less than 10 Epithelial cells per low power field      Less than 20 WBCs per low power field    S. Pneumo Ag Urine or CSF - Urine, Urine, Clean Catch [[id number]]  (Normal) Collected: 04/26/24 1403    Lab Status: Final result Specimen: Urine, Clean Catch Updated: 04/26/24 1422     Strep Pneumo Ag Negative    Legionella Antigen, Urine - Urine, Urine, Clean Catch [[id number]]  (Normal) Collected: 04/26/24 1403    Lab Status: Final result Specimen: Urine, Clean Catch Updated: 04/26/24 1422     LEGIONELLA ANTIGEN, URINE Negative    COVID PRE-OP / PRE-PROCEDURE SCREENING ORDER (NO ISOLATION) - Swab, Nasopharynx [[id number]]  (Normal) Collected: 04/26/24 0727    Lab Status: Final result Specimen: Swab from Nasopharynx Updated: 04/26/24 0814    Narrative:      The following orders were created for panel order COVID PRE-OP / PRE-PROCEDURE SCREENING ORDER (NO ISOLATION) - Swab, Nasopharynx.  Procedure                               Abnormality         Status                     ---------                               -----------         ------                     COVID-19, FLU A/B, RSV P...[[id number]]  Normal              Final result                 Please view results for these tests on the individual orders.    COVID-19, FLU A/B, RSV PCR 1 HR TAT - Swab, Nasopharynx [[id number]]  (Normal) Collected: 04/26/24 0727    Lab Status: Final result Specimen: Swab from Nasopharynx Updated: 04/26/24 0814     COVID19 Not Detected     Influenza A PCR Not Detected     Influenza B PCR Not Detected     RSV, PCR Not Detected    Narrative:      Fact sheet for providers: https://www.fda.gov/media/800731/download    Fact sheet for patients: https://www.fda.gov/media/662768/download    Test performed by PCR.    Blood Culture - Blood, Arm, Left [[id number]]  (Normal) Collected: 04/26/24 0726    Lab Status: Preliminary result Specimen: Blood from Arm, Left Updated:  04/28/24 0745     Blood Culture No growth at 2 days    Blood Culture - Blood, Arm, Right [[id number]]  (Normal) Collected: 04/26/24 0726    Lab Status: Preliminary result Specimen: Blood from Arm, Right Updated: 04/28/24 0745     Blood Culture No growth at 2 days          [x]  Radiology  CT Chest With Contrast Diagnostic    Result Date: 4/26/2024  Impression: 1. Negative for pulmonary embolus. 2. Right upper lobe tree-in-bud nodules consistent with endobronchial infection or inflammation. 3. Suspected mucous plugging in right middle lobe segmental bronchi with partial right middle lobe atelectasis. 4. Additional chronic findings above.    Electronically Signed By-Francisco Valenzuela MD On:4/26/2024 8:14 AM     []  EKG/Telemetry   []  Cardiology/Vascular   []  Pathology  []  Old records  []  Other:    Assessment & Plan   Assessment / Plan     Assessment:  Acute COPD exacerbation   Mucous plugging  Bibasilar atelectasis  Leukocytosis, likely reactive due to steroids  Lactic acidosis, likely due to nebulizer, resolved  History of CARMEN  History of hypertension  History of fungal pneumonia s/p itraconazole course  History of pulmonary nodules  History of recurrent rhinitis  Tobacco abuse, in remission    Plan:  Patient currently being managed in medicine service.  Currently saturating well on room air.  CT chest showed no PE but did show right upper lobe tree-in-bud nodules, suspected mucous plugging in the right middle lobe, segmental bronchi with partial right middle lobe atelectasis.  Currently on azithromycin due to its anti-inflammatory benefit for worsening dyspnea.  On Brovana, Pulmicort and DuoNeb.  Also Mucinex and saline nebulization.  Continue.  S/p IV Solu-Medrol.  Started on prednisone 40 mg daily.    Blood culture showing no growth at 2 days.  COVID, influenza and RSV negative.  Strep pneumo antigen and Legionella antigen negative.  Respiratory culture pending; preliminary report showing moderate growth of  respiratory urbén with moderate gram-positive cocci in pairs.  Plan for bronchoscopy early next week if patient does not improve clinically by pulmonology.  Likely on Tuesday.  Appreciate further input by pulmonologist.  Continue home dose of metoprolol, losartan and spironolactone.  Continue Astelin and montelukast.  PT/OT.  Continues with current management.     Discussed with RN.    DVT prophylaxis:  Medical DVT prophylaxis orders are present.        CODE STATUS:   Level Of Support Discussed With: Patient  Code Status (Patient has no pulse and is not breathing): CPR (Attempt to Resuscitate)  Medical Interventions (Patient has pulse or is breathing): Full Support      Electronically signed by Justine Nicholson MD, 04/28/24, 8:27 AM EDT.

## 2024-04-28 NOTE — PLAN OF CARE
Goal Outcome Evaluation:               Patient alert and oriented x4, patient ambulates to bathroom adlib, patient compliant either wearing home CPAP, pain and cough controlled throughout the night. Lungs diminished with Right Inspiratory wheezing that's cleared with cough, patient denies chest pain.

## 2024-04-29 PROBLEM — R06.02 SHORTNESS OF BREATH: Status: ACTIVE | Noted: 2024-04-29

## 2024-04-29 LAB
ANION GAP SERPL CALCULATED.3IONS-SCNC: 13.3 MMOL/L (ref 5–15)
BACTERIA SPEC RESP CULT: NORMAL
BASOPHILS # BLD AUTO: 0.01 10*3/MM3 (ref 0–0.2)
BASOPHILS NFR BLD AUTO: 0.1 % (ref 0–1.5)
BUN SERPL-MCNC: 29 MG/DL (ref 8–23)
BUN/CREAT SERPL: 27.4 (ref 7–25)
CALCIUM SPEC-SCNC: 8.6 MG/DL (ref 8.6–10.5)
CHLORIDE SERPL-SCNC: 105 MMOL/L (ref 98–107)
CO2 SERPL-SCNC: 19.7 MMOL/L (ref 22–29)
CREAT SERPL-MCNC: 1.06 MG/DL (ref 0.57–1)
DEPRECATED RDW RBC AUTO: 51.2 FL (ref 37–54)
EGFRCR SERPLBLD CKD-EPI 2021: 53.5 ML/MIN/1.73
EOSINOPHIL # BLD AUTO: 0 10*3/MM3 (ref 0–0.4)
EOSINOPHIL NFR BLD AUTO: 0 % (ref 0.3–6.2)
ERYTHROCYTE [DISTWIDTH] IN BLOOD BY AUTOMATED COUNT: 16.2 % (ref 12.3–15.4)
GLUCOSE SERPL-MCNC: 111 MG/DL (ref 65–99)
GRAM STN SPEC: NORMAL
HCT VFR BLD AUTO: 35.8 % (ref 34–46.6)
HGB BLD-MCNC: 11.2 G/DL (ref 12–15.9)
IMM GRANULOCYTES # BLD AUTO: 0.09 10*3/MM3 (ref 0–0.05)
IMM GRANULOCYTES NFR BLD AUTO: 0.8 % (ref 0–0.5)
LYMPHOCYTES # BLD AUTO: 2.01 10*3/MM3 (ref 0.7–3.1)
LYMPHOCYTES NFR BLD AUTO: 18.6 % (ref 19.6–45.3)
MCH RBC QN AUTO: 27 PG (ref 26.6–33)
MCHC RBC AUTO-ENTMCNC: 31.3 G/DL (ref 31.5–35.7)
MCV RBC AUTO: 86.3 FL (ref 79–97)
MONOCYTES # BLD AUTO: 1.03 10*3/MM3 (ref 0.1–0.9)
MONOCYTES NFR BLD AUTO: 9.5 % (ref 5–12)
NEUTROPHILS NFR BLD AUTO: 7.69 10*3/MM3 (ref 1.7–7)
NEUTROPHILS NFR BLD AUTO: 71 % (ref 42.7–76)
NRBC BLD AUTO-RTO: 0 /100 WBC (ref 0–0.2)
PLATELET # BLD AUTO: 217 10*3/MM3 (ref 140–450)
PMV BLD AUTO: 13 FL (ref 6–12)
POTASSIUM SERPL-SCNC: 3.8 MMOL/L (ref 3.5–5.2)
RBC # BLD AUTO: 4.15 10*6/MM3 (ref 3.77–5.28)
SODIUM SERPL-SCNC: 138 MMOL/L (ref 136–145)
WBC NRBC COR # BLD AUTO: 10.83 10*3/MM3 (ref 3.4–10.8)

## 2024-04-29 PROCEDURE — 85025 COMPLETE CBC W/AUTO DIFF WBC: CPT | Performed by: HOSPITALIST

## 2024-04-29 PROCEDURE — 97165 OT EVAL LOW COMPLEX 30 MIN: CPT

## 2024-04-29 PROCEDURE — 94799 UNLISTED PULMONARY SVC/PX: CPT

## 2024-04-29 PROCEDURE — 94664 DEMO&/EVAL PT USE INHALER: CPT

## 2024-04-29 PROCEDURE — 99233 SBSQ HOSP IP/OBS HIGH 50: CPT | Performed by: INTERNAL MEDICINE

## 2024-04-29 PROCEDURE — 25010000002 ENOXAPARIN PER 10 MG: Performed by: HOSPITALIST

## 2024-04-29 PROCEDURE — 80048 BASIC METABOLIC PNL TOTAL CA: CPT | Performed by: HOSPITALIST

## 2024-04-29 PROCEDURE — 99232 SBSQ HOSP IP/OBS MODERATE 35: CPT | Performed by: STUDENT IN AN ORGANIZED HEALTH CARE EDUCATION/TRAINING PROGRAM

## 2024-04-29 PROCEDURE — 63710000001 PREDNISONE PER 1 MG: Performed by: INTERNAL MEDICINE

## 2024-04-29 RX ADMIN — MONTELUKAST 10 MG: 10 TABLET, FILM COATED ORAL at 20:00

## 2024-04-29 RX ADMIN — Medication 10 ML: at 20:02

## 2024-04-29 RX ADMIN — AZELASTINE HYDROCHLORIDE 2 SPRAY: 137 SPRAY, METERED NASAL at 20:01

## 2024-04-29 RX ADMIN — IPRATROPIUM BROMIDE AND ALBUTEROL SULFATE 3 ML: .5; 3 SOLUTION RESPIRATORY (INHALATION) at 08:42

## 2024-04-29 RX ADMIN — SENNOSIDES AND DOCUSATE SODIUM 2 TABLET: 50; 8.6 TABLET ORAL at 20:00

## 2024-04-29 RX ADMIN — GABAPENTIN 300 MG: 300 CAPSULE ORAL at 08:01

## 2024-04-29 RX ADMIN — AZELASTINE HYDROCHLORIDE 2 SPRAY: 137 SPRAY, METERED NASAL at 08:04

## 2024-04-29 RX ADMIN — IPRATROPIUM BROMIDE AND ALBUTEROL SULFATE 3 ML: .5; 3 SOLUTION RESPIRATORY (INHALATION) at 20:39

## 2024-04-29 RX ADMIN — ARFORMOTEROL TARTRATE 15 MCG: 15 SOLUTION RESPIRATORY (INHALATION) at 20:39

## 2024-04-29 RX ADMIN — CETIRIZINE HYDROCHLORIDE 10 MG: 10 TABLET, FILM COATED ORAL at 08:01

## 2024-04-29 RX ADMIN — ENOXAPARIN SODIUM 40 MG: 100 INJECTION SUBCUTANEOUS at 20:00

## 2024-04-29 RX ADMIN — LOSARTAN POTASSIUM 100 MG: 50 TABLET, FILM COATED ORAL at 08:01

## 2024-04-29 RX ADMIN — ENOXAPARIN SODIUM 40 MG: 100 INJECTION SUBCUTANEOUS at 08:03

## 2024-04-29 RX ADMIN — IPRATROPIUM BROMIDE AND ALBUTEROL SULFATE 3 ML: .5; 3 SOLUTION RESPIRATORY (INHALATION) at 12:12

## 2024-04-29 RX ADMIN — PREDNISONE 40 MG: 20 TABLET ORAL at 08:02

## 2024-04-29 RX ADMIN — FAMOTIDINE 20 MG: 20 TABLET ORAL at 19:04

## 2024-04-29 RX ADMIN — GUAIFENESIN 1200 MG: 600 TABLET ORAL at 20:00

## 2024-04-29 RX ADMIN — BUDESONIDE 0.5 MG: 0.5 SUSPENSION RESPIRATORY (INHALATION) at 20:39

## 2024-04-29 RX ADMIN — GUAIFENESIN 1200 MG: 600 TABLET ORAL at 08:01

## 2024-04-29 RX ADMIN — GABAPENTIN 600 MG: 300 CAPSULE ORAL at 20:00

## 2024-04-29 RX ADMIN — METOPROLOL SUCCINATE 100 MG: 50 TABLET, EXTENDED RELEASE ORAL at 08:02

## 2024-04-29 RX ADMIN — ARFORMOTEROL TARTRATE 15 MCG: 15 SOLUTION RESPIRATORY (INHALATION) at 08:42

## 2024-04-29 RX ADMIN — SODIUM CHLORIDE 30 MG/ML INHALATION SOLUTION 4 ML: 30 SOLUTION INHALANT at 08:42

## 2024-04-29 RX ADMIN — Medication 10 ML: at 08:03

## 2024-04-29 RX ADMIN — BUDESONIDE 0.5 MG: 0.5 SUSPENSION RESPIRATORY (INHALATION) at 08:42

## 2024-04-29 RX ADMIN — SPIRONOLACTONE 25 MG: 25 TABLET ORAL at 08:02

## 2024-04-29 RX ADMIN — IPRATROPIUM BROMIDE AND ALBUTEROL SULFATE 3 ML: .5; 3 SOLUTION RESPIRATORY (INHALATION) at 00:25

## 2024-04-29 NOTE — PROGRESS NOTES
Respiratory Therapist Broncho-Pulmonary Hygiene Progress Note      Patient Name:  Julia Rios  YOB: 1945    Julia Rios meets the qualification for Level 2 of the Bronco-Pulmonary Hygiene Protocol. This was based on my daily patient assessment and includes review of chest x-ray results, cough ability and quality, oxygenation, secretions or risk for secretion development and patient mobility.     Broncho-Pulmonary Hygiene Assessment:    Level of Movement: Actively changing positions without assistance  Alert/ oriented/ cooperative    Breath Sounds: Bilateral localized decreased air exchange and/or coarse rhonchi    Cough: Strong, effective    Chest X-Ray: No CXR available    Sputum Productions: None or small amount of thin or watery secretions with effective cough    History and Physical: Chronic condition    SpO2 to Oxygen Need: greater than 92% on room air or  less than 3L nasal canula    Current SpO2 is: 94 on Room air    Based on this information, I have completed the following interventions: PAP with Aerobika      Electronically signed by Marina Levy, MELVIN, 04/29/24, 10:16 AM EDT.

## 2024-04-29 NOTE — CASE MANAGEMENT/SOCIAL WORK
"COPD Education    Pulmonologist:  RICHARD  Last outpatient pulmonary visit:  1/3/24    Age: 79 y.o.  Sex: female  BMI:Body mass index is 41.16 kg/m².     Past Medical Hx:    Smoking Hx:  CKD stage III, COPD not on home oxygen, hypertension, sleep apnea     Social History     Substance and Sexual Activity   Drug Use Never      Home Equipment Used:  NEB CPAP DME: [employer]     Daily symptoms: SOA  w/exertion, nonproductive cough    Airway Clearance methods utilized: OPEP    Have you ever attended Pulmonary Rehab?  NO    Last PFT: 8/31/23  PARAMETER BEST % PREDICTED   FVC L 2.63 109   FEV1 L 1.67 91 (60% change)   FEV1/FVC % 63    RV 1.84 92   DLCO 10.53 60   Low FEV1/FVC with normal FEV1 and FVC with normal lung volumes and low diffusion capacity. Suggest mild obstructive airway disease, likely emphysema. Please correlate clinically.   Classification of Airflow Limitation Severity in COPD (Based on Post-Bronchodilator FEV1)  Gold 1: Mild FEV1 ? 80% predicted   Gold 2:  Moderate 50% ? FEV1 < 80% predicted   Gold 3: Severe 30% ? FEV1 < 50% predicted   Gold 4: Very Severe FEV1 < 30% predicted     Have you ever had a sleep study/PSG? Yes; on CPAP     Last Arterial Blood Gas: No results found for: \"PHART\", \"ODB7ATB\", \"PO2ART\", \"SRX5PUY\", \"BASEEXCESS\", \"J4SETUFT\"     Chest CT findings: Right upper lobe tree-in-bud nodules consistent with endobronchial  infection or inflammation. Suspected mucous plugging in right middle lobe segmental bronchi with partial right middle lobe atelectasis.    Home Medications:  Medications Prior to Admission   Medication Sig Dispense Refill Last Dose    albuterol (ACCUNEB) 1.25 MG/3ML nebulizer solution Take 3 mL by nebulization Every 6 (Six) Hours As Needed for Shortness of Air for up to 50 doses. 25 each 0 4/26/2024    albuterol sulfate  (90 Base) MCG/ACT inhaler Inhale 2 puffs Every 4 (Four) Hours As Needed for Wheezing. 1 g 11 4/26/2024    azelastine (ASTELIN) 0.1 % nasal spray 2 sprays " into the nostril(s) as directed by provider 2 (Two) Times a Day. Use in each nostril as directed 30 mL 6 4/25/2024    benzonatate (TESSALON) 100 MG capsule Take 1 capsule by mouth 3 (Three) Times a Day As Needed for Cough. 30 capsule 0 4/25/2024    budesonide-formoterol (SYMBICORT) 160-4.5 MCG/ACT inhaler Inhale 2 puffs Daily.   4/25/2024    D3-1000 25 MCG (1000 UT) capsule TAKE 1 CAPSULE BY MOUTH EVERY DAY 90 capsule 3 4/25/2024    famotidine (PEPCID) 20 MG tablet TAKE 1 TABLET BY MOUTH EVERY NIGHT AT BEDTIME 30 tablet 11 4/25/2024    fexofenadine (ALLEGRA) 180 MG tablet Take 1 tablet by mouth Daily.   4/25/2024    Fluticasone Furoate (Arnuity Ellipta) 100 MCG/ACT aerosol powder  Inhale 1 puff Daily. 90 each 3 4/25/2024    gabapentin (NEURONTIN) 300 MG capsule Take 1 capsule by mouth Every Morning.   4/25/2024    gabapentin (NEURONTIN) 300 MG capsule Take 2 capsules by mouth Every Night.   4/25/2024    losartan (COZAAR) 100 MG tablet Take 1 tablet by mouth Daily. 90 tablet 3 4/25/2024    metoprolol succinate XL (TOPROL-XL) 100 MG 24 hr tablet Take 1 tablet by mouth Daily. Hold if heartrate is <60. 90 tablet 3 4/25/2024    montelukast (SINGULAIR) 10 MG tablet Take 1 tablet by mouth Every Night. 90 tablet 3 4/25/2024    predniSONE (DELTASONE) 20 MG tablet Take 2 tablets by mouth Daily for 5 days. 10 tablet 0 4/25/2024    spironolactone (ALDACTONE) 25 MG tablet Take 1 tablet by mouth 3 (Three) Times a Week. Monday,Wednesday,Friday 4/24/2024     COPD disease process and management discussed with Ms. Ugalde. Patient states she takes Arnuity and Symbicort for maintenance and Albuterol as needed for rescue. Ms. Koehler states she has been using the Aerobika here and at home due to retained secretions.  She is planned for a bronchoscopy.  Ms. Borgerding states she has been having difficulty with her symbicort copay. She has not been on Trelegy or Breztri previously and may benefit from one copay vs two.  Patient  states she would prefer Breztri over Trelegy since she has issues with thrush from Arnuity.   Patient can also apply for financial assistance with Trelegy if pulmonary approves of recommendation.  I will follow up tomorrow.    Dorota Huynh, RRT   RT   04/29/24  15:54 EDT

## 2024-04-29 NOTE — CONSULTS
See scanned notes attached.Pulmonary Rehab Phase I - Occurrence #1    Education Topics:  Pulmonary Rehab yes     Topic Components:  Exercise yes     Teaching Aids:  Phase 2 Brochure yes     Teaching Method:  Written Instruction yes     Teaching Recipient:  Patient yes       Recovery/Discharge Instructions:  Pulmonary Rehab Phase 2 yes       Patient Qualification:  Pulmonary Rehab Phase 2 yes

## 2024-04-29 NOTE — PROGRESS NOTES
[facility]   Hospitalist Progress Note  Date: 2024  Patient Name: Julia Rios  : 1945  MRN: [id number]  Date of admission: 2024  Room/Bed: [facility]/1      Subjective   Subjective     Chief Complaint: shortness of breath and cough     Summary:Julia Rios is a 79 y.o. female with past medical history of CKD stage III, COPD not on home oxygen, hypertension, sleep apnea who presents with COPD exacerbation. Patient producing some clear sputum.  She reported some subjective fevers.  Patient's pulmonologist is Dr. Baez.  On presentation, patient was last vitals were stable and saturating well on room air.Patient's initial lactate is 2.8 with repeat lactic acid of 2.2 which was thought secondary to nebulizers she was receiving. CT of her chest with contrast showed right upper lobe tree-in-bud nodules consistent with endobronchial infection or inflammation; Suspected mucous plugging in right middle lobe segmental bronchi with   partial right middle lobe atelectasis.  Pulmonology was consulted and patient was admitted for further evaluation and management.    Interval Followup:   Patient is lying in bed comfortably, not in acute distress.  Saturating well on room air but still complaining of shortness of breath with ambulation.  Denies any fever, chills, rigors, chest pain, headache, lightheadedness.      Review of Systems    All systems reviewed and negative except for what is outlined above.      Objective   Objective     Vitals:   Temp:  [97.3 °F (36.3 °C)-98.2 °F (36.8 °C)] 98.2 °F (36.8 °C)  Heart Rate:  [56-65] 63  Resp:  [16-18] 18  BP: (132-150)/(64-73) 144/71    Physical Exam   General: Awake, alert, NAD  Cardiovascular: RRR, no murmurs   Pulmonary: CTA bilaterally; diffuse rhonchi present, no wheezes; no conversational dyspnea  Gastrointestinal: S/ND/NT, +BS  Neuro: Alert, awake, oriented x 3; speech clear; no tremor      Result Review    Result Review:  I have personally reviewed these  results:  [x]  Laboratory      Lab 04/29/24  0501 04/28/24  0445 04/27/24  0359 04/26/24 2127 04/26/24  1534   WBC 10.83* 12.94* 15.11*  --   --    HEMOGLOBIN 11.2* 11.1* 11.1*  --   --    HEMATOCRIT 35.8 34.4 34.7  --   --    PLATELETS 217 228 225  --   --    NEUTROS ABS 7.69* 11.09* 12.91*  --   --    IMMATURE GRANS (ABS) 0.09* 0.07* 0.06*  --   --    LYMPHS ABS 2.01 1.31 1.62  --   --    MONOS ABS 1.03* 0.46 0.50  --   --    EOS ABS 0.00 0.00 0.00  --   --    MCV 86.3 84.5 85.3  --   --    PROCALCITONIN  --   --  0.10  --   --    LACTATE  --   --  1.5 3.1* 3.7*         Lab 04/29/24  0501 04/28/24 0445 04/27/24 0359   SODIUM 138 136 139   POTASSIUM 3.8 4.3 4.1   CHLORIDE 105 106 107   CO2 19.7* 20.0* 19.2*   ANION GAP 13.3 10.0 12.8   BUN 29* 30* 20   CREATININE 1.06* 1.10* 0.94   EGFR 53.5* 51.2* 61.9   GLUCOSE 111* 148* 162*   CALCIUM 8.6 9.2 8.8   MAGNESIUM  --  2.1 1.9   PHOSPHORUS  --  3.4 3.0         Lab 04/26/24  0726   TOTAL PROTEIN 7.1   ALBUMIN 4.2   GLOBULIN 2.9   ALT (SGPT) 8   AST (SGOT) 14   BILIRUBIN 0.4   ALK PHOS 87         Lab 04/26/24  0726   PROBNP 356.5   HSTROP T 9                 Brief Urine Lab Results  (Last result in the past 365 days)        Color   Clarity   Blood   Leuk Est   Nitrite   Protein   CREAT   Urine HCG        09/29/23 0925 Yellow   Turbid   Negative   Negative   Positive   Negative           09/29/23 0925             118.4         09/29/23 0925             134.9               [x]  Microbiology   Microbiology Results (last 10 days)       Procedure Component Value - Date/Time    Respiratory Culture - Sputum, Throat [[id number]] Collected: 04/27/24 0130    Lab Status: Final result Specimen: Sputum from Throat Updated: 04/29/24 0953     Respiratory Culture Moderate growth (3+) Normal respiratory rubén. No S. aureus or Pseudomonas aeruginosa detected. Final report.     Gram Stain Moderate (3+) Gram positive cocci in pairs      Few (2+) Gram positive bacilli      Few (2+) Gram  negative bacilli      Few (2+) Yeast      Less than 10 Epithelial cells per low power field      Less than 20 WBCs per low power field    S. Pneumo Ag Urine or CSF - Urine, Urine, Clean Catch [[id number]]  (Normal) Collected: 04/26/24 1403    Lab Status: Final result Specimen: Urine, Clean Catch Updated: 04/26/24 1422     Strep Pneumo Ag Negative    Legionella Antigen, Urine - Urine, Urine, Clean Catch [[id number]]  (Normal) Collected: 04/26/24 1403    Lab Status: Final result Specimen: Urine, Clean Catch Updated: 04/26/24 1422     LEGIONELLA ANTIGEN, URINE Negative    COVID PRE-OP / PRE-PROCEDURE SCREENING ORDER (NO ISOLATION) - Swab, Nasopharynx [[id number]]  (Normal) Collected: 04/26/24 0727    Lab Status: Final result Specimen: Swab from Nasopharynx Updated: 04/26/24 0814    Narrative:      The following orders were created for panel order COVID PRE-OP / PRE-PROCEDURE SCREENING ORDER (NO ISOLATION) - Swab, Nasopharynx.  Procedure                               Abnormality         Status                     ---------                               -----------         ------                     COVID-19, FLU A/B, RSV P...[[id number]]  Normal              Final result                 Please view results for these tests on the individual orders.    COVID-19, FLU A/B, RSV PCR 1 HR TAT - Swab, Nasopharynx [[id number]]  (Normal) Collected: 04/26/24 0727    Lab Status: Final result Specimen: Swab from Nasopharynx Updated: 04/26/24 0814     COVID19 Not Detected     Influenza A PCR Not Detected     Influenza B PCR Not Detected     RSV, PCR Not Detected    Narrative:      Fact sheet for providers: https://www.fda.gov/media/441862/download    Fact sheet for patients: https://www.fda.gov/media/715266/download    Test performed by PCR.    Blood Culture - Blood, Arm, Left [[id number]]  (Normal) Collected: 04/26/24 0726    Lab Status: Preliminary result Specimen: Blood from Arm, Left Updated: 04/29/24 0745     Blood Culture No growth  at 3 days    Blood Culture - Blood, Arm, Right [[id number]]  (Normal) Collected: 04/26/24 0726    Lab Status: Preliminary result Specimen: Blood from Arm, Right Updated: 04/29/24 0745     Blood Culture No growth at 3 days          [x]  Radiology  CT Chest With Contrast Diagnostic    Result Date: 4/26/2024  Impression: 1. Negative for pulmonary embolus. 2. Right upper lobe tree-in-bud nodules consistent with endobronchial infection or inflammation. 3. Suspected mucous plugging in right middle lobe segmental bronchi with partial right middle lobe atelectasis. 4. Additional chronic findings above.    Electronically Signed By-Francisco Valenzuela MD On:4/26/2024 8:14 AM     []  EKG/Telemetry   []  Cardiology/Vascular   []  Pathology  []  Old records  []  Other:    Assessment & Plan   Assessment / Plan     Assessment:  Acute COPD exacerbation   Mucous plugging  Bibasilar atelectasis  Leukocytosis, likely reactive due to steroids  Lactic acidosis, likely due to nebulizer, resolved  History of CARMEN  History of hypertension  History of fungal pneumonia s/p itraconazole course  History of pulmonary nodules  History of recurrent rhinitis  Tobacco abuse, in remission    Plan:  Patient currently being managed in medicine service.  Currently saturating well on room air.  CT chest showed no PE but did show right upper lobe tree-in-bud nodules, suspected mucous plugging in the right middle lobe, segmental bronchi with partial right middle lobe atelectasis.  Currently on azithromycin due to its anti-inflammatory benefit for worsening dyspnea.  On Brovana, Pulmicort and DuoNeb.  Also Mucinex and saline nebulization.  Continue.  S/p IV Solu-Medrol.  On prednisone 40 mg daily.  Plan for course of 7 days.  Blood culture showing no growth at 3 days.  COVID, influenza and RSV negative.  Strep pneumo antigen and Legionella antigen negative.  Respiratory culture showed normal respiratory rubén.    Plan for bronchoscopy tomorrow.  Patient n.p.o.  after midnight for the procedure.  Okay with meds with sips of water.  Appreciate further input by pulmonologist.  Continue home dose of metoprolol, losartan and spironolactone.  Continue Astelin and montelukast.  PT/OT.  Continues with current management.     Discussed with RN.    DVT prophylaxis:  Medical DVT prophylaxis orders are present.        CODE STATUS:   Level Of Support Discussed With: Patient  Code Status (Patient has no pulse and is not breathing): CPR (Attempt to Resuscitate)  Medical Interventions (Patient has pulse or is breathing): Full Support      Electronically signed by Justine Nicholson MD, 04/29/24, 8:27 AM EDT.

## 2024-04-29 NOTE — THERAPY EVALUATION
Patient Name: Julia Rios  : 1945    MRN: [id number]                              Today's Date: 2024       Admit Date: 2024    Visit Dx:     ICD-10-CM ICD-9-CM   1. COPD exacerbation  J44.1 491.21   2. Hypoxia  R09.02 799.02   3. Dysphagia, unspecified type  R13.10 787.20   4. Decreased activities of daily living (ADL)  Z78.9 V49.89     Patient Active Problem List   Diagnosis    Family history of colon cancer    Chronic obstructive pulmonary disease    Essential hypertension    Hyperlipidemia    CKD (chronic kidney disease) stage 3, GFR 30-59 ml/min    Chronic heart failure with preserved ejection fraction    CARMEN (obstructive sleep apnea)    Seasonal allergies    Gastroesophageal reflux disease    History of fungal pneumonia    Bronchitis, mucopurulent recurrent    Tobacco abuse, in remission    Pulmonary nodule    PND (post-nasal drip)    Status post fall    Allergic rhinitis    Asthma    Closed fracture of metatarsal bone    Diverticulitis    Hemoptysis    Idiopathic chronic gout of multiple sites without tophus    Primary localized osteoarthritis    Vitamin D deficiency    Morbid (severe) obesity due to excess calories    Umbilical hernia without obstruction and without gangrene    Oral thrush    Arthritis of knee    Pre-diabetes    COPD (chronic obstructive pulmonary disease)    Shortness of breath     Past Medical History:   Diagnosis Date    Acute right-sided low back pain with right-sided sciatica 01/15/2018    Allergic rhinitis     Asthma     Back pain     2 bulging disc L2 L3    CHF (congestive heart failure)     Chronic heart failure with preserved ejection fraction     20 echocardiogram: 1.  Normal ejection fraction of 55%. 2.  Mild left ventricular hypertrophy. 3.  No significant valvular heart issues.     CKD stage 3 10/29/2019    COPD (chronic obstructive pulmonary disease)     Diverticulitis     Essential hypertension     GERD (gastroesophageal reflux disease)      Hemoptysis     non cancerous    Hyperlipidemia     Idiopathic chronic gout of multiple sites without tophus 05/20/2016    Primary osteoarthritis of right knee 01/16/2017    Right knee pain     Sleep apnea     CPAP    Vitamin D deficiency 01/13/2016     Past Surgical History:   Procedure Laterality Date    CATARACT EXTRACTION Right     COLONOSCOPY  2014    COLONOSCOPY N/A 10/12/2021    Procedure: COLONOSCOPY;  Surgeon: Jorge Diane MD;  Location: [facility] ENDOSCOPY;  Service: Gastroenterology;  Laterality: N/A;  DIVERTICULOSIS    ENDOSCOPY  2015    EYE SURGERY      cataract right eye    OTHER SURGICAL HISTORY      Fatty tumor removed off back    RETINAL DETACHMENT REPAIR Right     hole in retina repair    TOTAL KNEE ARTHROPLASTY Right 10/31/2023    Procedure: TOTAL KNEE ARTHROPLASTY;  Surgeon: Celso Prakash MD;  Location:  [facility] [place];  Service: Orthopedics;  Laterality: Right;      General Information       Row Name 04/29/24 1427          OT Time and Intention    Document Type evaluation  -LF     Mode of Treatment individual therapy;occupational therapy  -LF       Row Name 04/29/24 1427          General Information    Patient Profile Reviewed yes  -LF     Prior Level of Function --  Fairly (I) with ADLs, requires assist for LB dressing from , ambulated w/o a device, has a step over tub where she stands to shower with grab bars in place, elevated commode, stands to groom, drives, and wears a CPAP at night.  -LF     Existing Precautions/Restrictions no known precautions/restrictions  -LF     Barriers to Rehab none identified  -       Row Name 04/29/24 1427          Occupational Profile    Reason for Services/Referral (Occupational Profile) Patient is a 79 year old female [facility] of breath and cough on April 26th, 2024. Occupational therapy consulted due to recent decline in ADLs/functional transfers. No previous occupational therapy services for current  condition.  -[facility] Name 04/29/24 1427          Living Environment    People in Home spouse  -[facility] Name 04/29/24 1427          Cognition    Orientation Status (Cognition) oriented x 4  -[facility] Name 04/29/24 1427          Safety Issues, Functional Mobility    Impairments Affecting Function (Mobility) other (see comments)  N/A  -               User Key  (r) = Recorded By, (t) = Taken By, (c) = Cosigned By      Initials Name Provider Type     Padmini Sharp OT Occupational Therapist                     Mobility/ADL's       [facility] Name 04/29/24 1429          Bed Mobility    Comment, (Bed Mobility) Not tested d/t patient standing in room with nursing student upon OT's arrival. (I) with all bed mobility per PT evaluation.  -[facility] Name 04/29/24 1429          Transfers    Transfers sit-stand transfer;stand-sit transfer  -LF       Row Name 04/29/24 1429          Sit-Stand Transfer    Sit-Stand [place] (Transfers) independent  -LF       Row Name 04/29/24 1429          Stand-Sit Transfer    Stand-Sit [place] (Transfers) independent  -LF       Row Name 04/29/24 1429          Functional Mobility    Functional Mobility- Ind. Level independent  -LF       Row Name 04/29/24 1429          Activities of Daily Living    BADL Assessment/Intervention bathing;upper body dressing;lower body dressing;grooming;feeding;toileting  -LF       Row Name 04/29/24 1429          Bathing Assessment/Intervention    [place] Level (Bathing) bathing skills;upper body;independent;lower body;standby assist  -LF       Row Name 04/29/24 1429          Upper Body Dressing Assessment/Training    [place] Level (Upper Body Dressing) upper body dressing skills;independent  -LF       Row Name 04/29/24 1429          Lower Body Dressing Assessment/Training    [place] Level (Lower Body Dressing) lower body dressing skills;standby assist  -LF       Row Name 04/29/24 1429          Grooming Assessment/Training     [place] Level (Grooming) grooming skills;independent  -LF       Row Name 04/29/24 1429          Self-Feeding Assessment/Training    [place] Level (Feeding) feeding skills;independent  -LF       Row Name 04/29/24 1429          Toileting Assessment/Training    [place] Level (Toileting) toileting skills;independent  -               User Key  (r) = Recorded By, (t) = Taken By, (c) = Cosigned By      Initials Name Provider Type     Padmini Sharp OT Occupational Therapist                   Obj/Interventions       Row Name 04/29/24 1431          Sensory Assessment (Somatosensory)    Sensory Assessment (Somatosensory) UE sensation intact  -LF       Row Name 04/29/24 1431          Vision Assessment/Intervention    Visual Impairment/Limitations WFL;corrective lenses full-time  -LF       Row Name 04/29/24 1431          Range of Motion Comprehensive    General Range of Motion bilateral upper extremity ROM WFL  -LF       Row Name 04/29/24 1431          Strength Comprehensive (MMT)    Comment, General Manual Muscle Testing (MMT) Assessment 4+/5 BUEs  -LF       Row Name 04/29/24 1431          Motor Skills    Motor Skills coordination;functional endurance  -LF     Coordination bilateral;upper extremity;WFL  -LF     Functional Endurance Fair+/Good-  -LF       Row Name 04/29/24 1431          Balance    Balance Assessment sitting dynamic balance;standing dynamic balance  -LF     Dynamic Sitting Balance independent  -LF     Position, Sitting Balance unsupported;sitting in chair  -LF     Dynamic Standing Balance independent  -LF     Position/Device Used, Standing Balance unsupported  -LF               User Key  (r) = Recorded By, (t) = Taken By, (c) = Cosigned By      Initials Name Provider Type    LF Padmini Sharp OT Occupational Therapist                   Goals/Plan    No documentation.                  Clinical Impression       Row Name 04/29/24 1431          Pain Assessment    Additional Documentation  Pain Scale: FACES Pre/Post-Treatment (Group)  -       Row Name 04/29/24 1431          Pain Scale: FACES Pre/Post-Treatment    Pain: FACES Scale, Pretreatment 0-->no hurt  -     Posttreatment Pain Rating 0-->no hurt  -LF       Row Name 04/29/24 1431          Plan of Care Review    Plan of Care Reviewed With patient  -     Progress no change  -     Outcome Evaluation Patient does not present with any significant decline in function and does not require inpatient occupational therapy, therefore they will be discharged from services at this time. She is independent with all BADLs, aside from LB self-care which she reports her  assists with at baseline, and independent with functional transfers/mobility without a device. It is recommended they discharge home when medically able to do so. Patient is aware and agreeable with treatment plan at this time.  -       Row Name 04/29/24 1431          Therapy Assessment/Plan (OT)    Patient/Family Therapy Goal Statement (OT) None reported  -     Rehab Potential (OT) other (see comments)  N/A  -     Criteria for Skilled Therapeutic Interventions Met (OT) no problems identified which require skilled intervention  -     Therapy Frequency (OT) evaluation only  -       Row Name 04/29/24 1431          Therapy Plan Review/Discharge Plan (OT)    Anticipated Discharge Disposition (OT) home  -LF       Row Name 04/29/24 1431          Vital Signs    O2 Delivery Pre Treatment room air  -     O2 Delivery Intra Treatment room air  -     O2 Delivery Post Treatment room air  -LF       Row Name 04/29/24 1431          Positioning and Restraints    Pre-Treatment Position standing in room  -     Post Treatment Position chair  -     In Chair reclined;call light within reach;encouraged to call for assist;with other staff  noble student  -               User Key  (r) = Recorded By, (t) = Taken By, (c) = Cosigned By      Initials Name Provider Type    Padmini Macedo  OT Occupational Therapist                   Outcome Measures       Row Name 04/29/24 1433          How much help from another is currently needed...    Putting on and taking off regular lower body clothing? 3  -LF     Bathing (including washing, rinsing, and drying) 3  -LF     Toileting (which includes using toilet bed pan or urinal) 4  -LF     Putting on and taking off regular upper body clothing 4  -LF     Taking care of personal grooming (such as brushing teeth) 4  -LF     Eating meals 4  -LF     AM-PAC 6 Clicks Score (OT) 22  -LF       Row Name 04/29/24 0804          How much help from another person do you currently need...    Turning from your back to your side while in flat bed without using bedrails? 4  -CC     Moving from lying on back to sitting on the side of a flat bed without bedrails? 4  -CC     Moving to and from a bed to a chair (including a wheelchair)? 4  -CC     Standing up from a chair using your arms (e.g., wheelchair, bedside chair)? 4  -CC     Climbing 3-5 steps with a railing? 3  -CC     To walk in hospital room? 4  -CC     AM-PAC 6 Clicks Score (PT) 23  -CC     Highest Level of Mobility Goal 7 --> Walk 25 feet or more  -CC       Row Name 04/29/24 1433          Functional Assessment    Outcome Measure Options AM-PAC 6 Clicks Daily Activity (OT);Optimal Instrument  -LF       Row Name 04/29/24 1433          Optimal Instrument    Optimal Instrument Optimal - 3  -LF     Bending/Stooping 2  -LF     Standing 1  -LF     Reaching 1  -LF     From the list, choose the 3 activities you would most like to be able to do without any difficulty Bending/stooping;Standing;Reaching  -LF     Total Score Optimal - 3 4  -LF               User Key  (r) = Recorded By, (t) = Taken By, (c) = Cosigned By      Initials Name Provider Type    CC Cait Crowley RN Registered Nurse    Padmini Macedo OT Occupational Therapist                    Occupational Therapy Education       Title: PT OT SLP Therapies (Done)        Topic: Occupational Therapy (Done)       Point: ADL training (Done)       Description:   Instruct learner(s) on proper safety adaptation and remediation techniques during self care or transfers.   Instruct in proper use of assistive devices.                  Learning Progress Summary             Patient Acceptance, E,TB, VU by  at 4/29/2024 1433                         Point: Precautions (Done)       Description:   Instruct learner(s) on prescribed precautions during self-care and functional transfers.                  Learning Progress Summary             Patient Acceptance, E,TB, VU by  at 4/29/2024 1433                         Point: Body mechanics (Done)       Description:   Instruct learner(s) on proper positioning and spine alignment during self-care, functional mobility activities and/or exercises.                  Learning Progress Summary             Patient Acceptance, E,TB, VU by  at 4/29/2024 1433                                         User Key       Initials Effective Dates Name Provider Type Discipline     06/16/21 -  Padmini Sharp OT Occupational Therapist OT                  OT Recommendation and Plan  Therapy Frequency (OT): evaluation only  Plan of Care Review  Plan of Care Reviewed With: patient  Progress: no change  Outcome Evaluation: Patient does not present with any significant decline in function and does not require inpatient occupational therapy, therefore they will be discharged from services at this time. She is independent with all BADLs, aside from LB self-care which she reports her  assists with at baseline, and independent with functional transfers/mobility without a device. It is recommended they discharge home when medically able to do so. Patient is aware and agreeable with treatment plan at this time.     Time Calculation:   Evaluation Complexity (OT)  Review Occupational Profile/Medical/Therapy History Complexity: brief/low complexity  Assessment,  Occupational Performance/Identification of Deficit Complexity: 1-3 performance deficits  Clinical Decision Making Complexity (OT): problem focused assessment/low complexity  Overall Complexity of Evaluation (OT): low complexity     Time Calculation- OT       Row Name 04/29/24 1434             Time Calculation- OT    OT Received On 04/29/24  -LF         Untimed Charges    OT Eval/Re-eval Minutes 33  -LF         Total Minutes    Untimed Charges Total Minutes 33  -LF       Total Minutes 33  -LF                User Key  (r) = Recorded By, (t) = Taken By, (c) = Cosigned By      Initials Name Provider Type    LF Padmini Sharp OT Occupational Therapist                  Therapy Charges for Today       Code Description Service Date Service Provider Modifiers Qty    [id number] HC OT EVAL LOW COMPLEXITY 3 4/29/2024 Padmini Sharp OT GO 1                 Padmini Sharp OT  4/29/2024

## 2024-04-29 NOTE — PLAN OF CARE
Goal Outcome Evaluation:  Plan of Care Reviewed With: patient        Progress: no change  Outcome Evaluation: Patient does not present with any significant decline in function and does not require inpatient occupational therapy, therefore they will be discharged from services at this time. She is independent with all BADLs, aside from LB self-care which she reports her  assists with at baseline, and independent with functional transfers/mobility without a device. It is recommended they discharge home when medically able to do so. Patient is aware and agreeable with treatment plan at this time.      Anticipated Discharge Disposition (OT): home

## 2024-04-29 NOTE — PLAN OF CARE
Goal Outcome Evaluation:              Problem: Adult Inpatient Plan of Care  Goal: Patient-Specific Goal (Individualized)  Outcome: Ongoing, Progressing             Pt doing well today; remains on RA; mild SOA with activity; bronch scheduled for 4/30; NPO after MN; continue to monitor

## 2024-04-29 NOTE — PROGRESS NOTES
Pulmonary / Critical Care Progress Note      Patient Name: Julia Rios  : 1945  MRN: [id number]  Attending:  Justine Nicholson MD  Date of admission: 2024    Subjective   Subjective   Follow-up for COPD exacerbation, mucous plugging    Over the last 24 hours: [place] on Brovana, Pulmicort, and DuoNebs.  Remains on prednisone 40 mg oral daily.  Remains on Aldactone 25 mg oral 3 times weekly    No acute events overnight    This morning,  Remains on room air  Sitting in chair  Still feels like secretions are stuck  No chest pains or chest tightness  Wheezing and cough continues to improve slowly  No fever or chills  No nausea or vomiting    Objective   Objective     Vitals:   Temp:  [97.3 °F (36.3 °C)-98.2 °F (36.8 °C)] 97.3 °F (36.3 °C)  Heart Rate:  [56-67] 56  Resp:  [16-18] 16  BP: (123-149)/(64-73) 136/64    Physical Exam   Vital Signs Reviewed   General:  WDWN, Alert, NAD.  Pleasant elderly female, sitting in chair  HEENT:  PERRL, EOMI.  OP, nares clear  Chest:  good aeration, rhonchi bilaterally, equal rise and fall of chest, on room air  CV: RRR, no MGR, pulses 2+, equal.  Abd:  Soft, NT, ND, + BS, no HSM, obese  EXT:  no clubbing, no cyanosis, no edema  Neuro:  A&Ox3, CN grossly intact, no focal deficits.  Skin: No rashes or lesions noted    Result Review    Result Review:  I have personally reviewed the results from the time of this admission to 2024 07:38 EDT and agree with these findings:  [x]  Laboratory  [x]  Microbiology  [x]  Radiology  []  EKG/Telemetry   []  Cardiology/Vascular   []  Pathology  []  Old records  []  Other:  Most notable findings include:         Lab 24  0501 24  0445 24  0359 24  0726   WBC 10.83* 12.94* 15.11* 8.33   HEMOGLOBIN 11.2* 11.1* 11.1* 12.6   HEMATOCRIT 35.8 34.4 34.7 40.5   PLATELETS 217 228 225 240   SODIUM 138 136 139 140   POTASSIUM 3.8 4.3 4.1 3.6   CHLORIDE 105 106 107 104   CO2 19.7* 20.0* 19.2* 22.4   BUN 29* 30* 20 17    CREATININE 1.06* 1.10* 0.94 1.01*   GLUCOSE 111* 148* 162* 128*   CALCIUM 8.6 9.2 8.8 9.0   PHOSPHORUS  --  3.4 3.0  --    TOTAL PROTEIN  --   --   --  7.1   ALBUMIN  --   --   --  4.2   GLOBULIN  --   --   --  2.9     CT Chest With Contrast Diagnostic    Result Date: 4/26/2024  CT CHEST W CONTRAST DIAGNOSTIC-  Date of Exam: 4/26/2024 7:39 AM  Indication: cp with soa, radiating to back.  Comparison: CT chest without contrast 6/26/2023  Technique: Axial CT images were obtained of the chest after the uneventful intravenous administration of 100 mL Isovue-370. Reconstructed coronal and sagittal images were also obtained. Automated exposure control and iterative construction methods were used.  Findings: Soft tissue of the lower neck are without acute abnormality. Heart size normal. Negative for pericardial effusion. Mild coronary artery calcification. No pulmonary embolus. Scattered mediastinal lymph nodes are below size criteria. Negative for aortic dissection. Aortic arch branch vasculature patent.  No pneumothorax. There are tree-in-bud opacities involving the posterior right upper lobe consistent with endobronchial infection or inflammation with bronchial wall thickening. Consolidation in the right middle lobe likely atelectasis which may relate to segmental bronchial mucous plugging. No suspicious pulmonary nodule.  Visualized portions of the liver, spleen, adrenal glands, and pancreas are without acute abnormality. Low-attenuation right upper pole renal lesions partially imaged likely cyst. No free fluid in the upper abdomen. No aggressive osseous lesion or acute fracture.      Impression: Impression: 1. Negative for pulmonary embolus. 2. Right upper lobe tree-in-bud nodules consistent with endobronchial infection or inflammation. 3. Suspected mucous plugging in right middle lobe segmental bronchi with partial right middle lobe atelectasis. 4. Additional chronic findings above.    Electronically Signed By-Francisco  MD Nicolas On:4/26/2024 8:14 AM         Assessment & Plan   Assessment / Plan     Active Hospital Problems:  Active Hospital Problems    Diagnosis     **COPD (chronic obstructive pulmonary disease)      Impression:  Mucous plugging, difficulty with airway clearance   Bibasilar atelectasis   Acute COPD aspiration, not on any home O2   Recurrent rhinitis   CARMEN    History of fungal pneumonia s/p itraconazole course   History of pulmonary nodules   Tobacco abuse of cigarettes in remission      Plan:  -Maintain SpO2 greater than 90%    -4/26 chest CT demonstrated no pulmonary embolus, right upper lobe tree-in-bud nodules.  Suspected mucous plugging in the right middle lobe segmental bronchi with partial right middle lobe atelectasis.    -All cultures so far negative/pending    -Continue Brovana, Pulmicort, DuoNebs    -Change steroids to prednisone 40 mg daily to complete 7 days   -Continue azithromycin for COPD exacerbation suppression    -Continue Aldactone 25 mg oral 3 times weekly    -Continue to monitor renal panel and electrolytes.  Replace mag intravenously   -Continue bronchopulmonary hygiene.  Encourage I-S and flutter    -Okay to use home CPAP   -PT/OT/SLP on board.  Appreciate assistance    -Follow-up pulmonary clinic 1 to 2 weeks of discharge   Patient is not improving clinically with persistent shortness of breath, has mucous plugging on CT scan of the chest  -Will take for bronchoscopy with airway inspection, brushings, biopsies, bronchoalveolar lavage. I have discussed the risks of the procedure with the patient including pneumothorax, hemothorax, bleeding, hypoxia, required mechanical ventilation and death. The patient recognizes these findings, acknowledges these findings and is agreeable to the procedure.   -N.p.o. after midnight.  Sips with meds okay      DVT prophylaxis:  Medical DVT prophylaxis orders are present.    CODE STATUS:   Level Of Support Discussed With: Patient  Code Status (Patient has no  pulse and is not breathing): CPR (Attempt to Resuscitate)  Medical Interventions (Patient has pulse or is breathing): Full Support    I have reviewed labs, imaging, pertinent clinical data and provider notes.   I have discussed with bedside nurse and primary service.     Electronically signed by KALEB Rinaldi, 04/29/24, 1:05 PM EDT.  Electronically signed by Santino Baez MD, 04/29/24, 7:38 AM EDT.    This visit was performed by BOTH a physician and an APC. I personally evaluated and examined the patient. I performed all aspects of MDM as documented. , I have reviewed and confirmed the accuracy of the patient's history as documented in this note., and I have reexamined the patient and the results are consistent with the previously documented exam. I have updated the documentation as necessary.     Electronically signed by Santino Baez MD, 04/29/24, 5:12 PM EDT.

## 2024-04-29 NOTE — PLAN OF CARE
Goal Outcome Evaluation:                 Patient alert and oriented and can make needs known, patient compliant with home Cpap use but room air when awake, patient adlib to bathroom, patient denies pain, oral rehydration encouraged.

## 2024-04-30 ENCOUNTER — ANESTHESIA (OUTPATIENT)
Dept: GASTROENTEROLOGY | Facility: HOSPITAL | Age: 79
End: 2024-04-30
Payer: MEDICARE

## 2024-04-30 ENCOUNTER — ANESTHESIA EVENT (OUTPATIENT)
Dept: GASTROENTEROLOGY | Facility: HOSPITAL | Age: 79
End: 2024-04-30
Payer: MEDICARE

## 2024-04-30 LAB
ACB CMPLX DNA BAL NAA+NON-PRB-NCNCRNG: NOT DETECTED
ANION GAP SERPL CALCULATED.3IONS-SCNC: 12.9 MMOL/L (ref 5–15)
BASOPHILS # BLD AUTO: 0.04 10*3/MM3 (ref 0–0.2)
BASOPHILS NFR BLD AUTO: 0.3 % (ref 0–1.5)
BLACTX-M ISLT/SPM QL: ABNORMAL
BLAIMP ISLT/SPM QL: ABNORMAL
BLAKPC ISLT/SPM QL: ABNORMAL
BLAOXA-48-LIKE ISLT/SPM QL: ABNORMAL
BLAVIM ISLT/SPM QL: ABNORMAL
BUN SERPL-MCNC: 30 MG/DL (ref 8–23)
BUN/CREAT SERPL: 24.8 (ref 7–25)
C PNEUM DNA NPH QL NAA+NON-PROBE: NOT DETECTED
CALCIUM SPEC-SCNC: 9 MG/DL (ref 8.6–10.5)
CHLORIDE SERPL-SCNC: 106 MMOL/L (ref 98–107)
CO2 SERPL-SCNC: 21.1 MMOL/L (ref 22–29)
CREAT SERPL-MCNC: 1.21 MG/DL (ref 0.57–1)
DEPRECATED RDW RBC AUTO: 50.8 FL (ref 37–54)
E CLOAC COMP DNA BAL NAA+NON-PRB-NCNCRNG: NOT DETECTED
E COLI DNA BAL NAA+NON-PRB-NCNCRNG: NOT DETECTED
EGFRCR SERPLBLD CKD-EPI 2021: 45.7 ML/MIN/1.73
EOSINOPHIL # BLD AUTO: 0.01 10*3/MM3 (ref 0–0.4)
EOSINOPHIL NFR BLD AUTO: 0.1 % (ref 0.3–6.2)
EOSINOPHIL NFR FLD MANUAL: 1 %
ERYTHROCYTE [DISTWIDTH] IN BLOOD BY AUTOMATED COUNT: 16 % (ref 12.3–15.4)
FLUAV SUBTYP SPEC NAA+PROBE: NOT DETECTED
FLUBV RNA ISLT QL NAA+PROBE: NOT DETECTED
GLUCOSE SERPL-MCNC: 99 MG/DL (ref 65–99)
GP B STREP DNA BAL NAA+NON-PRB-NCNCRNG: NOT DETECTED
HADV DNA SPEC NAA+PROBE: NOT DETECTED
HAEM INFLU DNA BAL NAA+NON-PRB-NCNCRNG: NOT DETECTED
HCOV RNA LOWER RESP QL NAA+NON-PROBE: NOT DETECTED
HCT VFR BLD AUTO: 35.8 % (ref 34–46.6)
HGB BLD-MCNC: 11.3 G/DL (ref 12–15.9)
HMPV RNA NPH QL NAA+NON-PROBE: NOT DETECTED
HPIV RNA LOWER RESP QL NAA+NON-PROBE: DETECTED
IMM GRANULOCYTES # BLD AUTO: 0.16 10*3/MM3 (ref 0–0.05)
IMM GRANULOCYTES NFR BLD AUTO: 1.3 % (ref 0–0.5)
K AEROGENES DNA BAL NAA+NON-PRB-NCNCRNG: NOT DETECTED
K OXYTOCA DNA BAL NAA+NON-PRB-NCNCRNG: NOT DETECTED
K PNEU GRP DNA BAL NAA+NON-PRB-NCNCRNG: NOT DETECTED
L PNEUMO DNA LOWER RESP QL NAA+NON-PROBE: NOT DETECTED
LYMPHOCYTES # BLD AUTO: 3.39 10*3/MM3 (ref 0.7–3.1)
LYMPHOCYTES NFR BLD AUTO: 26.8 % (ref 19.6–45.3)
LYMPHOCYTES NFR FLD MANUAL: 8 %
M CATARRHALIS DNA BAL NAA+NON-PRB-NCNCRNG: NOT DETECTED
M PNEUMO IGG SER IA-ACNC: NOT DETECTED
MACROPHAGE FLUID: 7 %
MAGNESIUM SERPL-MCNC: 2.2 MG/DL (ref 1.6–2.4)
MCH RBC QN AUTO: 27.3 PG (ref 26.6–33)
MCHC RBC AUTO-ENTMCNC: 31.6 G/DL (ref 31.5–35.7)
MCV RBC AUTO: 86.5 FL (ref 79–97)
MECA+MECC ISLT/SPM QL: ABNORMAL
MONOCYTES # BLD AUTO: 1.4 10*3/MM3 (ref 0.1–0.9)
MONOCYTES NFR BLD AUTO: 11 % (ref 5–12)
NDM GENE: ABNORMAL
NEUTROPHILS NFR BLD AUTO: 60.5 % (ref 42.7–76)
NEUTROPHILS NFR BLD AUTO: 7.67 10*3/MM3 (ref 1.7–7)
NEUTROPHILS NFR FLD MANUAL: 84 %
NRBC BLD AUTO-RTO: 0 /100 WBC (ref 0–0.2)
P AERUGINOSA DNA BAL NAA+NON-PRB-NCNCRNG: NOT DETECTED
PHOSPHATE SERPL-MCNC: 3.3 MG/DL (ref 2.5–4.5)
PLATELET # BLD AUTO: 226 10*3/MM3 (ref 140–450)
PMV BLD AUTO: 12.8 FL (ref 6–12)
POTASSIUM SERPL-SCNC: 3.9 MMOL/L (ref 3.5–5.2)
PROTEUS SP DNA BAL NAA+NON-PRB-NCNCRNG: NOT DETECTED
RBC # BLD AUTO: 4.14 10*6/MM3 (ref 3.77–5.28)
RHINOVIRUS RNA SPEC NAA+PROBE: NOT DETECTED
RSV RNA NPH QL NAA+NON-PROBE: NOT DETECTED
S AUREUS DNA BAL NAA+NON-PRB-NCNCRNG: NOT DETECTED
S MARCESCENS DNA BAL NAA+NON-PRB-NCNCRNG: NOT DETECTED
S PNEUM DNA BAL NAA+NON-PRB-NCNCRNG: NOT DETECTED
S PYO DNA BAL NAA+NON-PRB-NCNCRNG: NOT DETECTED
SODIUM SERPL-SCNC: 140 MMOL/L (ref 136–145)
VISUAL PRESENCE OF BLOOD: NORMAL
WBC NRBC COR # BLD AUTO: 12.67 10*3/MM3 (ref 3.4–10.8)

## 2024-04-30 PROCEDURE — 99233 SBSQ HOSP IP/OBS HIGH 50: CPT | Performed by: INTERNAL MEDICINE

## 2024-04-30 PROCEDURE — 0B938ZX DRAINAGE OF RIGHT MAIN BRONCHUS, VIA NATURAL OR ARTIFICIAL OPENING ENDOSCOPIC, DIAGNOSTIC: ICD-10-PCS | Performed by: INTERNAL MEDICINE

## 2024-04-30 PROCEDURE — 87102 FUNGUS ISOLATION CULTURE: CPT | Performed by: INTERNAL MEDICINE

## 2024-04-30 PROCEDURE — 94799 UNLISTED PULMONARY SVC/PX: CPT

## 2024-04-30 PROCEDURE — 83735 ASSAY OF MAGNESIUM: CPT | Performed by: STUDENT IN AN ORGANIZED HEALTH CARE EDUCATION/TRAINING PROGRAM

## 2024-04-30 PROCEDURE — 31645 BRNCHSC W/THER ASPIR 1ST: CPT | Performed by: INTERNAL MEDICINE

## 2024-04-30 PROCEDURE — 80048 BASIC METABOLIC PNL TOTAL CA: CPT | Performed by: STUDENT IN AN ORGANIZED HEALTH CARE EDUCATION/TRAINING PROGRAM

## 2024-04-30 PROCEDURE — 87070 CULTURE OTHR SPECIMN AEROBIC: CPT | Performed by: INTERNAL MEDICINE

## 2024-04-30 PROCEDURE — 0BC48ZZ EXTIRPATION OF MATTER FROM RIGHT UPPER LOBE BRONCHUS, VIA NATURAL OR ARTIFICIAL OPENING ENDOSCOPIC: ICD-10-PCS | Performed by: INTERNAL MEDICINE

## 2024-04-30 PROCEDURE — 87071 CULTURE AEROBIC QUANT OTHER: CPT | Performed by: INTERNAL MEDICINE

## 2024-04-30 PROCEDURE — 87206 SMEAR FLUORESCENT/ACID STAI: CPT | Performed by: INTERNAL MEDICINE

## 2024-04-30 PROCEDURE — 25810000003 LACTATED RINGERS PER 1000 ML

## 2024-04-30 PROCEDURE — 87633 RESP VIRUS 12-25 TARGETS: CPT | Performed by: INTERNAL MEDICINE

## 2024-04-30 PROCEDURE — 94664 DEMO&/EVAL PT USE INHALER: CPT

## 2024-04-30 PROCEDURE — 89051 BODY FLUID CELL COUNT: CPT | Performed by: INTERNAL MEDICINE

## 2024-04-30 PROCEDURE — 25010000002 PROPOFOL 10 MG/ML EMULSION

## 2024-04-30 PROCEDURE — 85025 COMPLETE CBC W/AUTO DIFF WBC: CPT | Performed by: STUDENT IN AN ORGANIZED HEALTH CARE EDUCATION/TRAINING PROGRAM

## 2024-04-30 PROCEDURE — 84100 ASSAY OF PHOSPHORUS: CPT | Performed by: STUDENT IN AN ORGANIZED HEALTH CARE EDUCATION/TRAINING PROGRAM

## 2024-04-30 PROCEDURE — 63710000001 PREDNISONE PER 1 MG: Performed by: INTERNAL MEDICINE

## 2024-04-30 PROCEDURE — 87205 SMEAR GRAM STAIN: CPT | Performed by: INTERNAL MEDICINE

## 2024-04-30 PROCEDURE — 0B9D8ZX DRAINAGE OF RIGHT MIDDLE LUNG LOBE, VIA NATURAL OR ARTIFICIAL OPENING ENDOSCOPIC, DIAGNOSTIC: ICD-10-PCS | Performed by: INTERNAL MEDICINE

## 2024-04-30 PROCEDURE — 25010000002 ENOXAPARIN PER 10 MG: Performed by: INTERNAL MEDICINE

## 2024-04-30 PROCEDURE — 0BC58ZZ EXTIRPATION OF MATTER FROM RIGHT MIDDLE LOBE BRONCHUS, VIA NATURAL OR ARTIFICIAL OPENING ENDOSCOPIC: ICD-10-PCS | Performed by: INTERNAL MEDICINE

## 2024-04-30 PROCEDURE — 88108 CYTOPATH CONCENTRATE TECH: CPT | Performed by: INTERNAL MEDICINE

## 2024-04-30 PROCEDURE — 87116 MYCOBACTERIA CULTURE: CPT | Performed by: INTERNAL MEDICINE

## 2024-04-30 PROCEDURE — 31624 DX BRONCHOSCOPE/LAVAGE: CPT | Performed by: INTERNAL MEDICINE

## 2024-04-30 RX ORDER — IPRATROPIUM BROMIDE AND ALBUTEROL SULFATE 2.5; .5 MG/3ML; MG/3ML
3 SOLUTION RESPIRATORY (INHALATION) ONCE
Status: COMPLETED | OUTPATIENT
Start: 2024-04-30 | End: 2024-04-30

## 2024-04-30 RX ORDER — PROPOFOL 10 MG/ML
VIAL (ML) INTRAVENOUS AS NEEDED
Status: DISCONTINUED | OUTPATIENT
Start: 2024-04-30 | End: 2024-04-30 | Stop reason: SURG

## 2024-04-30 RX ORDER — SODIUM CHLORIDE, SODIUM LACTATE, POTASSIUM CHLORIDE, CALCIUM CHLORIDE 600; 310; 30; 20 MG/100ML; MG/100ML; MG/100ML; MG/100ML
30 INJECTION, SOLUTION INTRAVENOUS CONTINUOUS
Status: DISCONTINUED | OUTPATIENT
Start: 2024-04-30 | End: 2024-05-02 | Stop reason: HOSPADM

## 2024-04-30 RX ORDER — LIDOCAINE HYDROCHLORIDE 40 MG/ML
INJECTION, SOLUTION RETROBULBAR AS NEEDED
Status: DISCONTINUED | OUTPATIENT
Start: 2024-04-30 | End: 2024-04-30 | Stop reason: HOSPADM

## 2024-04-30 RX ORDER — MAGNESIUM HYDROXIDE 1200 MG/15ML
LIQUID ORAL AS NEEDED
Status: DISCONTINUED | OUTPATIENT
Start: 2024-04-30 | End: 2024-04-30 | Stop reason: HOSPADM

## 2024-04-30 RX ORDER — LIDOCAINE HYDROCHLORIDE 20 MG/ML
INJECTION, SOLUTION EPIDURAL; INFILTRATION; INTRACAUDAL; PERINEURAL AS NEEDED
Status: DISCONTINUED | OUTPATIENT
Start: 2024-04-30 | End: 2024-04-30 | Stop reason: SURG

## 2024-04-30 RX ADMIN — PROPOFOL 60 MG: 10 INJECTION, EMULSION INTRAVENOUS at 11:22

## 2024-04-30 RX ADMIN — AZELASTINE HYDROCHLORIDE 2 SPRAY: 137 SPRAY, METERED NASAL at 22:35

## 2024-04-30 RX ADMIN — LIDOCAINE HYDROCHLORIDE 40 MG: 20 INJECTION, SOLUTION EPIDURAL; INFILTRATION; INTRACAUDAL; PERINEURAL at 11:22

## 2024-04-30 RX ADMIN — SENNOSIDES AND DOCUSATE SODIUM 2 TABLET: 50; 8.6 TABLET ORAL at 22:34

## 2024-04-30 RX ADMIN — GUAIFENESIN 1200 MG: 600 TABLET ORAL at 09:28

## 2024-04-30 RX ADMIN — CETIRIZINE HYDROCHLORIDE 10 MG: 10 TABLET, FILM COATED ORAL at 09:28

## 2024-04-30 RX ADMIN — SENNOSIDES AND DOCUSATE SODIUM 2 TABLET: 50; 8.6 TABLET ORAL at 09:31

## 2024-04-30 RX ADMIN — ENOXAPARIN SODIUM 40 MG: 100 INJECTION SUBCUTANEOUS at 22:33

## 2024-04-30 RX ADMIN — ARFORMOTEROL TARTRATE 15 MCG: 15 SOLUTION RESPIRATORY (INHALATION) at 19:56

## 2024-04-30 RX ADMIN — Medication 10 ML: at 22:34

## 2024-04-30 RX ADMIN — MONTELUKAST 10 MG: 10 TABLET, FILM COATED ORAL at 22:34

## 2024-04-30 RX ADMIN — PROPOFOL 250 MCG/KG/MIN: 10 INJECTION, EMULSION INTRAVENOUS at 11:23

## 2024-04-30 RX ADMIN — Medication 10 ML: at 09:32

## 2024-04-30 RX ADMIN — IPRATROPIUM BROMIDE AND ALBUTEROL SULFATE 3 ML: .5; 3 SOLUTION RESPIRATORY (INHALATION) at 07:49

## 2024-04-30 RX ADMIN — IPRATROPIUM BROMIDE AND ALBUTEROL SULFATE 3 ML: .5; 3 SOLUTION RESPIRATORY (INHALATION) at 14:15

## 2024-04-30 RX ADMIN — GABAPENTIN 600 MG: 300 CAPSULE ORAL at 22:33

## 2024-04-30 RX ADMIN — GABAPENTIN 300 MG: 300 CAPSULE ORAL at 09:30

## 2024-04-30 RX ADMIN — BUDESONIDE 0.5 MG: 0.5 SUSPENSION RESPIRATORY (INHALATION) at 07:49

## 2024-04-30 RX ADMIN — IPRATROPIUM BROMIDE AND ALBUTEROL SULFATE 3 ML: .5; 3 SOLUTION RESPIRATORY (INHALATION) at 19:56

## 2024-04-30 RX ADMIN — SODIUM CHLORIDE, POTASSIUM CHLORIDE, SODIUM LACTATE AND CALCIUM CHLORIDE: 600; 310; 30; 20 INJECTION, SOLUTION INTRAVENOUS at 11:19

## 2024-04-30 RX ADMIN — GUAIFENESIN 1200 MG: 600 TABLET ORAL at 22:35

## 2024-04-30 RX ADMIN — LOSARTAN POTASSIUM 100 MG: 50 TABLET, FILM COATED ORAL at 09:30

## 2024-04-30 RX ADMIN — IPRATROPIUM BROMIDE AND ALBUTEROL SULFATE 3 ML: .5; 3 SOLUTION RESPIRATORY (INHALATION) at 00:24

## 2024-04-30 RX ADMIN — FAMOTIDINE 20 MG: 20 TABLET ORAL at 22:34

## 2024-04-30 RX ADMIN — SODIUM CHLORIDE 30 MG/ML INHALATION SOLUTION 4 ML: 30 SOLUTION INHALANT at 07:50

## 2024-04-30 RX ADMIN — IPRATROPIUM BROMIDE AND ALBUTEROL SULFATE 3 ML: .5; 3 SOLUTION RESPIRATORY (INHALATION) at 10:18

## 2024-04-30 RX ADMIN — METOPROLOL SUCCINATE 100 MG: 50 TABLET, EXTENDED RELEASE ORAL at 09:30

## 2024-04-30 RX ADMIN — BUDESONIDE 0.5 MG: 0.5 SUSPENSION RESPIRATORY (INHALATION) at 19:56

## 2024-04-30 RX ADMIN — PREDNISONE 40 MG: 20 TABLET ORAL at 09:27

## 2024-04-30 RX ADMIN — ARFORMOTEROL TARTRATE 15 MCG: 15 SOLUTION RESPIRATORY (INHALATION) at 07:50

## 2024-04-30 NOTE — OP NOTE
Bronchoscopy Procedure Note    Procedure:  Bronchoscopy with mucous plug removal  Bronchoscopy with bronchoalveolar lavage right middle lobe  Bronchoscopy with bronchial washings tracheobronchial tree  Airway inspection    Pre-Operative Diagnosis: Persistent bronchitis, mucous plugging  Post-Operative Diagnosis: Mucous plugging, Severely indurated airway, excessive dynamic collapse of the airway    Indication:  Persistent bronchitis, mucous plugging    Anesthesia: MAC anesthesia    Procedure Details: Patient was consented for the procedure with all risks and benefits of the procedure explained in detail. Patient was given the opportunity to ask questions and all concerns were answered.    The bronchoscope was inserted into the main airway via the mouth. An anatomical survey was done of the main airways and the subsegmental bronchus. The findings are reported below.  Excessive dynamic collapse of the airway was seen including trachea and bilateral bronchial tubes.  Mucous plugging was seen more so in right upper lobe and right middle lobe, suctioned out in several attempts.  Airway was severely indurated.  A bronchoalveolar lavage was performed using 60 mL aliquots of normal saline x 2 instilled into the airways then aspirated back from right middle lobe of lung with 40 mL serosanguineous fluid in return.  Bronchial washing was obtained from entire tracheobronchial tree..    Findings:  Excessive dynamic collapse of the airway was seen including trachea and bilateral bronchial tubes.    Mucous plugging was seen more so in right upper lobe and right middle lobe, suctioned out in several attempts.    Airway was severely indurated.   Friable mucosa, easy bleeding with suction  Scattered purulent secretions    Estimated Blood Loss: Insignificant    Specimens:  BAL right middle lobe  Bronchial washings tracheobronchial tree    Complications: None; patient tolerated the procedure well.    Disposition: To [facility], once  stable in recovery.    Patient tolerated the procedure well.      Electronically signed by Santino Baez MD, 4/30/2024, 11:34 EDT.

## 2024-04-30 NOTE — PROGRESS NOTES
[facility]   Hospitalist Progress Note  Date: 2024  Patient Name: Julia Rios  : 1945  MRN: [id number]  Date of admission: 2024  Room/Bed: [facility]/      Subjective   Subjective     Chief Complaint: shortness of breath and cough     Summary:Julia Rios is a 79 y.o. female with past medical history of CKD stage III, COPD not on home oxygen, hypertension, sleep apnea who presents with COPD exacerbation. Patient producing some clear sputum.  She reported some subjective fevers.  Patient's pulmonologist is Dr. Baez.  On presentation, patient was last vitals were stable and saturating well on room air.Patient's initial lactate is 2.8 with repeat lactic acid of 2.2 which was thought secondary to nebulizers she was receiving. CT of her chest with contrast showed right upper lobe tree-in-bud nodules consistent with endobronchial infection or inflammation; Suspected mucous plugging in right middle lobe segmental bronchi with   partial right middle lobe atelectasis.  Pulmonology was consulted and patient was admitted for further evaluation and management.    Interval Followup:   No acute events overnight, patient n.p.o. for bronchoscopy today, states her breathing is improving      Objective   Objective     Vitals:   Temp:  [96.9 °F (36.1 °C)-98.2 °F (36.8 °C)] 98.1 °F (36.7 °C)  Heart Rate:  [56-84] 61  Resp:  [14-20] 20  BP: (114-163)/() 155/85  Flow (L/min):  [4] 4    Physical Exam   GEN: No acute distress  HEENT: Moist mucous membranes  LUNGS: Equal chest rise bilaterally  CARDIAC: Regular rate and rhythm  NEURO: Moving all 4 extremities spontaneously  SKIN: No obvious breakdown      Result Review    Result Review:  I have personally reviewed these results:  [x]  Laboratory      Lab 24  0433 24  0501 24  0445 24  0359 247 24  1534   WBC 12.67* 10.83* 12.94* 15.11*  --   --    HEMOGLOBIN 11.3* 11.2* 11.1* 11.1*  --   --    HEMATOCRIT 35.8 35.8  34.4 34.7  --   --    PLATELETS 226 217 228 225  --   --    NEUTROS ABS 7.67* 7.69* 11.09* 12.91*  --   --    IMMATURE GRANS (ABS) 0.16* 0.09* 0.07* 0.06*  --   --    LYMPHS ABS 3.39* 2.01 1.31 1.62  --   --    MONOS ABS 1.40* 1.03* 0.46 0.50  --   --    EOS ABS 0.01 0.00 0.00 0.00  --   --    MCV 86.5 86.3 84.5 85.3  --   --    PROCALCITONIN  --   --   --  0.10  --   --    LACTATE  --   --   --  1.5 3.1* 3.7*         Lab 04/30/24  0433 04/29/24  0501 04/28/24  0445 04/27/24  0359   SODIUM 140 138 136 139   POTASSIUM 3.9 3.8 4.3 4.1   CHLORIDE 106 105 106 107   CO2 21.1* 19.7* 20.0* 19.2*   ANION GAP 12.9 13.3 10.0 12.8   BUN 30* 29* 30* 20   CREATININE 1.21* 1.06* 1.10* 0.94   EGFR 45.7* 53.5* 51.2* 61.9   GLUCOSE 99 111* 148* 162*   CALCIUM 9.0 8.6 9.2 8.8   MAGNESIUM 2.2  --  2.1 1.9   PHOSPHORUS 3.3  --  3.4 3.0         Lab 04/26/24  0726   TOTAL PROTEIN 7.1   ALBUMIN 4.2   GLOBULIN 2.9   ALT (SGPT) 8   AST (SGOT) 14   BILIRUBIN 0.4   ALK PHOS 87         Lab 04/26/24  0726   PROBNP 356.5   HSTROP T 9                 Brief Urine Lab Results  (Last result in the past 365 days)        Color   Clarity   Blood   Leuk Est   Nitrite   Protein   CREAT   Urine HCG        09/29/23 0925 Yellow   Turbid   Negative   Negative   Positive   Negative           09/29/23 0925             118.4         09/29/23 0925             134.9               [x]  Microbiology   Microbiology Results (last 10 days)       Procedure Component Value - Date/Time    Respiratory Culture - Wash, Bronchus [[id number]] Collected: 04/30/24 1131    Lab Status: Preliminary result Specimen: Wash from Bronchus Updated: 04/30/24 1326     Gram Stain Rare (1+) WBCs seen      Few (2+) Gram positive cocci in pairs and chains    Respiratory Culture - Sputum, Throat [[id number]] Collected: 04/27/24 0130    Lab Status: Final result Specimen: Sputum from Throat Updated: 04/29/24 0953     Respiratory Culture Moderate growth (3+) Normal respiratory rubén. No S. aureus  or Pseudomonas aeruginosa detected. Final report.     Gram Stain Moderate (3+) Gram positive cocci in pairs      Few (2+) Gram positive bacilli      Few (2+) Gram negative bacilli      Few (2+) Yeast      Less than 10 Epithelial cells per low power field      Less than 20 WBCs per low power field    S. Pneumo Ag Urine or CSF - Urine, Urine, Clean Catch [[id number]]  (Normal) Collected: 04/26/24 1403    Lab Status: Final result Specimen: Urine, Clean Catch Updated: 04/26/24 1422     Strep Pneumo Ag Negative    Legionella Antigen, Urine - Urine, Urine, Clean Catch [[id number]]  (Normal) Collected: 04/26/24 1403    Lab Status: Final result Specimen: Urine, Clean Catch Updated: 04/26/24 1422     LEGIONELLA ANTIGEN, URINE Negative    COVID PRE-OP / PRE-PROCEDURE SCREENING ORDER (NO ISOLATION) - Swab, Nasopharynx [[id number]]  (Normal) Collected: 04/26/24 0727    Lab Status: Final result Specimen: Swab from Nasopharynx Updated: 04/26/24 0814    Narrative:      The following orders were created for panel order COVID PRE-OP / PRE-PROCEDURE SCREENING ORDER (NO ISOLATION) - Swab, Nasopharynx.  Procedure                               Abnormality         Status                     ---------                               -----------         ------                     COVID-19, FLU A/B, RSV P...[[id number]]  Normal              Final result                 Please view results for these tests on the individual orders.    COVID-19, FLU A/B, RSV PCR 1 HR TAT - Swab, Nasopharynx [[id number]]  (Normal) Collected: 04/26/24 0727    Lab Status: Final result Specimen: Swab from Nasopharynx Updated: 04/26/24 0814     COVID19 Not Detected     Influenza A PCR Not Detected     Influenza B PCR Not Detected     RSV, PCR Not Detected    Narrative:      Fact sheet for providers: https://www.fda.gov/media/802528/download    Fact sheet for patients: https://www.fda.gov/media/232936/download    Test performed by PCR.    Blood Culture - Blood, Arm, Left  [[id number]]  (Normal) Collected: 04/26/24 0726    Lab Status: Preliminary result Specimen: Blood from Arm, Left Updated: 04/30/24 0745     Blood Culture No growth at 4 days    Blood Culture - Blood, Arm, Right [[id number]]  (Normal) Collected: 04/26/24 0726    Lab Status: Preliminary result Specimen: Blood from Arm, Right Updated: 04/30/24 0745     Blood Culture No growth at 4 days          [x]  Radiology  CT Chest With Contrast Diagnostic    Result Date: 4/26/2024  Impression: 1. Negative for pulmonary embolus. 2. Right upper lobe tree-in-bud nodules consistent with endobronchial infection or inflammation. 3. Suspected mucous plugging in right middle lobe segmental bronchi with partial right middle lobe atelectasis. 4. Additional chronic findings above.    Electronically Signed By-Francisco Valenzuela MD On:4/26/2024 8:14 AM     []  EKG/Telemetry   []  Cardiology/Vascular   []  Pathology  []  Old records  []  Other:    Assessment & Plan   Assessment / Plan     Assessment:  Acute COPD exacerbation   Mucous plugging  Bibasilar atelectasis  Leukocytosis, likely reactive due to steroids  Lactic acidosis, likely due to nebulizer, resolved  History of CARMEN  History of hypertension  History of fungal pneumonia s/p itraconazole course  History of pulmonary nodules  History of recurrent rhinitis  Tobacco abuse, in remission    Plan:  Patient currently being managed in medicine service.  Currently saturating well on room air.  CT chest showed no PE but did show right upper lobe tree-in-bud nodules, suspected mucous plugging in the right middle lobe, segmental bronchi with partial right middle lobe atelectasis.  Currently on azithromycin due to its anti-inflammatory benefit for worsening dyspnea.  Continue Brovana, Pulmicort and DuoNeb.  Also Mucinex and saline nebulization.  Continue.  S/p IV Solu-Medrol.  On prednisone 40 mg daily.  Plan for course of 7 days.  Blood culture showing no growth at 3 days.  COVID, influenza and RSV  negative.  Strep pneumo antigen and Legionella antigen negative.  Respiratory culture showed normal respiratory rubén.    N.p.o. for bronchoscopy today  Resume diet following procedure  Appreciate further input by pulmonologist.  Continue home dose of metoprolol, losartan and spironolactone.  Continue Astelin and montelukast.  PT/OT.  Continues with current management.     Discussed with RN.    DVT prophylaxis:  Medical DVT prophylaxis orders are present.        CODE STATUS:   Level Of Support Discussed With: Patient  Code Status (Patient has no pulse and is not breathing): CPR (Attempt to Resuscitate)  Medical Interventions (Patient has pulse or is breathing): Full Support    Time spent: Time spent involving patient care including face-to-face encounter 52 minutes    Electronically signed by Fernando Torres MD, 04/30/24, 1:27 PM EDT.

## 2024-04-30 NOTE — CASE MANAGEMENT/SOCIAL WORK
RT CM completed application for Breztri assistance with [place] [employer].  Patient decided she would like to discuss changing to Breztri with Shanell pa. Patient was given completed application to mail/fax if she decides to proceed.

## 2024-04-30 NOTE — PROGRESS NOTES
Pulmonary / Critical Care Progress Note      Patient Name: Julia Rios  : 1945  MRN: [id number]  Attending:  Fernando Torres MD  Date of admission: 2024    Subjective   Subjective   Follow-up for COPD exacerbation, mucous plugging    Over the last 24 hours: Continues to be on Brovana, Pulmicort, and DuoNebs.  Continued on prednisone 40 mg oral daily.  Also on Aldactone 25 mg oral 3 times weekly    No acute events overnight    This morning,  Remains on room air  Sitting in chair  Still feels like secretions are stuck  No chest pains or chest tightness  Wheezing and cough continues to improve slowly  No fever or chills  No nausea or vomiting    Objective   Objective     Vitals:   Temp:  [97.7 °F (36.5 °C)-98.2 °F (36.8 °C)] 97.7 °F (36.5 °C)  Heart Rate:  [57-72] 65  Resp:  [16-18] 18  BP: (124-163)/(67-85) 124/84    Physical Exam   Vital Signs Reviewed   General:  WDWN, Alert, in mild distress, has conversational dyspnea.  Pleasant elderly female, sitting in chair  HEENT:  PERRL, EOMI.  OP, nares clear  Chest: Poor aeration, rhonchi bilaterally, no wheezing or crackles, equal rise and fall of chest, on room air  CV: RRR, no MGR, pulses 2+, equal.  Abd:  Soft, NT, ND, + BS, no HSM, obese  EXT:  no clubbing, no cyanosis, no edema  Neuro:  A&Ox3, CN grossly intact, no focal deficits.  Skin: No rashes or lesions noted    Result Review    Result Review:  I have personally reviewed the results from the time of this admission to 2024 07:41 EDT and agree with these findings:  [x]  Laboratory  [x]  Microbiology  [x]  Radiology  []  EKG/Telemetry   []  Cardiology/Vascular   []  Pathology  []  Old records  []  Other:  Most notable findings include:         Lab 24  0433 24  0501 24  0445 24  0359 24  0726   WBC 12.67* 10.83* 12.94* 15.11* 8.33   HEMOGLOBIN 11.3* 11.2* 11.1* 11.1* 12.6   HEMATOCRIT 35.8 35.8 34.4 34.7 40.5   PLATELETS 226 217 228 225 240   SODIUM 140 138  136 139 140   POTASSIUM 3.9 3.8 4.3 4.1 3.6   CHLORIDE 106 105 106 107 104   CO2 21.1* 19.7* 20.0* 19.2* 22.4   BUN 30* 29* 30* 20 17   CREATININE 1.21* 1.06* 1.10* 0.94 1.01*   GLUCOSE 99 111* 148* 162* 128*   CALCIUM 9.0 8.6 9.2 8.8 9.0   PHOSPHORUS 3.3  --  3.4 3.0  --    TOTAL PROTEIN  --   --   --   --  7.1   ALBUMIN  --   --   --   --  4.2   GLOBULIN  --   --   --   --  2.9     CT Chest With Contrast Diagnostic    Result Date: 4/26/2024  CT CHEST W CONTRAST DIAGNOSTIC-  Date of Exam: 4/26/2024 7:39 AM  Indication: cp with soa, radiating to back.  Comparison: CT chest without contrast 6/26/2023  Technique: Axial CT images were obtained of the chest after the uneventful intravenous administration of 100 mL Isovue-370. Reconstructed coronal and sagittal images were also obtained. Automated exposure control and iterative construction methods were used.  Findings: Soft tissue of the lower neck are without acute abnormality. Heart size normal. Negative for pericardial effusion. Mild coronary artery calcification. No pulmonary embolus. Scattered mediastinal lymph nodes are below size criteria. Negative for aortic dissection. Aortic arch branch vasculature patent.  No pneumothorax. There are tree-in-bud opacities involving the posterior right upper lobe consistent with endobronchial infection or inflammation with bronchial wall thickening. Consolidation in the right middle lobe likely atelectasis which may relate to segmental bronchial mucous plugging. No suspicious pulmonary nodule.  Visualized portions of the liver, spleen, adrenal glands, and pancreas are without acute abnormality. Low-attenuation right upper pole renal lesions partially imaged likely cyst. No free fluid in the upper abdomen. No aggressive osseous lesion or acute fracture.      Impression: Impression: 1. Negative for pulmonary embolus. 2. Right upper lobe tree-in-bud nodules consistent with endobronchial infection or inflammation. 3. Suspected  mucous plugging in right middle lobe segmental bronchi with partial right middle lobe atelectasis. 4. Additional chronic findings above.    Electronically Signed By-Francisco Valenzuela MD On:4/26/2024 8:14 AM         Assessment & Plan   Assessment / Plan     Active Hospital Problems:  Active Hospital Problems    Diagnosis     **COPD (chronic obstructive pulmonary disease)     Shortness of breath      Impression:  Mucous plugging, difficulty with airway clearance   Bibasilar atelectasis   Acute COPD aspiration, not on any home O2   Recurrent rhinitis   CARMEN    History of fungal pneumonia s/p itraconazole course   History of pulmonary nodules   Tobacco abuse of cigarettes in remission      Plan:  -Maintain SpO2 greater than 90%    -4/26 chest CT demonstrated no pulmonary embolus, right upper lobe tree-in-bud nodules.  Suspected mucous plugging in the right middle lobe segmental bronchi with partial right middle lobe atelectasis.    -All cultures so far negative/pending    -Continue Brovana, Pulmicort, Maggi    -Change steroids to prednisone 40 mg daily to complete 7 days   -Continue azithromycin for COPD exacerbation suppression    -Continue Aldactone 25 mg oral 3 times weekly    -Continue to monitor renal panel and electrolytes.  Replace mag intravenously   -Continue bronchopulmonary hygiene.  Encourage I-S and flutter    -Okay to use home CPAP   -PT/OT/SLP on board.  Appreciate assistance    -Follow-up pulmonary clinic 1 to 2 weeks of discharge   Patient is not improving clinically with persistent shortness of breath, has mucous plugging on CT scan of the chest  -Will take for bronchoscopy with airway inspection, brushings, biopsies, bronchoalveolar lavage. I have discussed the risks of the procedure with the patient including pneumothorax, hemothorax, bleeding, hypoxia, required mechanical ventilation and death. The patient recognizes these findings, acknowledges these findings and is agreeable to the procedure.    -Proceed with bronchoscopy today.     DVT prophylaxis:  Medical DVT prophylaxis orders are present.    CODE STATUS:   Level Of Support Discussed With: Patient  Code Status (Patient has no pulse and is not breathing): CPR (Attempt to Resuscitate)  Medical Interventions (Patient has pulse or is breathing): Full Support    I have reviewed labs, imaging, pertinent clinical data and provider notes.   I have discussed with bedside nurse and primary service.     Electronically signed by Santino Baez MD, 04/30/24, 7:41 AM EDT.

## 2024-04-30 NOTE — ANESTHESIA PREPROCEDURE EVALUATION
Anesthesia Evaluation     Patient summary reviewed and Nursing notes reviewed   NPO Solid Status: > 8 hours  NPO Liquid Status: > 2 hours           Airway   Mallampati: I  TM distance: >3 FB  Neck ROM: full  No difficulty expected  Dental - normal exam     Pulmonary    (+) COPD (nebs daily) moderate, asthma,shortness of breath, sleep apnea on CPAP, rhonchi, decreased breath sounds  Cardiovascular   Exercise tolerance: poor (<4 METS)    ECG reviewed  PT is on anticoagulation therapy  Rhythm: regular  Rate: normal    (+) hypertension well controlled, CHF , hyperlipidemia      Neuro/Psych  (+) numbness  GI/Hepatic/Renal/Endo    (+) obesity, morbid obesity, GERD well controlled, renal disease- CRI    Musculoskeletal     (+) back pain  Abdominal    Substance History      OB/GYN          Other   arthritis,     ROS/Med Hx Other: Ct Chest 04/26/24:   Impression:  1. Negative for pulmonary embolus.  2. Right upper lobe tree-in-bud nodules consistent with endobronchial  infection or inflammation.  3. Suspected mucous plugging in right middle lobe segmental bronchi with  partial right middle lobe atelectasis.  4. Additional chronic findings above.    EKG 04/26/24: HR 86,   Sinus rhythm  Borderline left axis deviation  Low voltage, precordial leads  Abnormal R-wave progression, early transition  Anterolateral infarct, age indeterminate      Labs:   04/30/24 04:33  WBC: 12.67 (H)  RBC: 4.14  Hemoglobin: 11.3 (L)  Hematocrit: 35.8  Platelets: 226  RDW: 16.0 (H)  MCV: 86.5  MCH: 27.3  MCHC: 31.6  MPV: 12.8 (H)  RDW-SD: 50.8  Neutrophil Rel %: 60.5  Lymphocyte Rel %: 26.8  Monocyte Rel %: 11.0  Eosinophil Rel %: 0.1 (L)  Basophil Rel %: 0.3  Immature Granulocyte Rel %: 1.3 (H)  Neutrophils Absolute: 7.67 (H)  Lymphocytes Absolute: 3.39 (H)  Monocytes Absolute: 1.40 (H)  Eosinophils Absolute: 0.01  Basophils Absolute: 0.04  Immature Grans, Absolute: 0.16 (H)  04/26/24 07:26  proBNP: 356.5    Lovenox SQ on 4/29      Phys Exam Other:  Duoneb in preop   92-95% on room air  Rhonchi still noted after duoneb                Anesthesia Plan    ASA 4     general   total IV anesthesia  (Total IV Anesthesia    Patient understands anesthesia not responsible for dental damage.  )  intravenous induction     Anesthetic plan, risks, benefits, and alternatives have been provided, discussed and informed consent has been obtained with: patient and spouse/significant other.  Pre-procedure education provided  Plan discussed with CRNA.      CODE STATUS:    Level Of Support Discussed With: Patient  Code Status (Patient has no pulse and is not breathing): CPR (Attempt to Resuscitate)  Medical Interventions (Patient has pulse or is breathing): Full Support

## 2024-04-30 NOTE — PLAN OF CARE
Goal Outcome Evaluation:         Pt a/ox4, pt on RA, pt awaiting scheduled Bronchoscopy today. Pt has remained NPO since midnight. Will continue plan of care.

## 2024-04-30 NOTE — PLAN OF CARE
Goal Outcome Evaluation:              Problem: Adult Inpatient Plan of Care  Goal: Patient-Specific Goal (Individualized)  4/30/2024 1636 by Cait Crowley RN  Outcome: Ongoing, Progressing                           Pt doing well today; remains on RA; Bronch with washings completed today; pt should d/c home tomorrow.

## 2024-04-30 NOTE — ANESTHESIA POSTPROCEDURE EVALUATION
Patient: Julia Rios    Procedure Summary       Date: 04/30/24 Room / Location: [facility] ENDOSCOPY 3 / [facility] ENDOSCOPY    Anesthesia Start: 1118 Anesthesia Stop: 1143    Procedure: BRONCHOSCOPY WITH BRONCHOALVEOLAR LAVAGE, WASHING, AIRWAY INSPECTION (Bronchus) Diagnosis:       Shortness of breath      Chronic obstructive pulmonary disease with acute exacerbation      (Shortness of breath [R06.02])      (Chronic obstructive pulmonary disease with acute exacerbation [J44.1])    Surgeons: Santino Baez MD Provider: Johana Tse CRNA    Anesthesia Type: general ASA Status: 4            Anesthesia Type: general    Vitals  Vitals Value Taken Time   /84 04/30/24 1201   Temp 36.2 °C (97.1 °F) 04/30/24 1158   Pulse 76 04/30/24 1205   Resp 20 04/30/24 1158   SpO2 92 % 04/30/24 1205   Vitals shown include unfiled device data.        Post Anesthesia Care and Evaluation    Post-procedure mental status: acceptable.  Pain management: satisfactory to patient    Airway patency: patent  Anesthetic complications: No anesthetic complications    Cardiovascular status: acceptable  Respiratory status: acceptable    Comments: Per chart review

## 2024-05-01 ENCOUNTER — APPOINTMENT (OUTPATIENT)
Dept: GENERAL RADIOLOGY | Facility: HOSPITAL | Age: 79
End: 2024-05-01
Payer: MEDICARE

## 2024-05-01 LAB
ALBUMIN SERPL-MCNC: 3.7 G/DL (ref 3.5–5.2)
ALBUMIN/GLOB SERPL: 1.6 G/DL
ALP SERPL-CCNC: 67 U/L (ref 39–117)
ALT SERPL W P-5'-P-CCNC: 13 U/L (ref 1–33)
ANION GAP SERPL CALCULATED.3IONS-SCNC: 11.8 MMOL/L (ref 5–15)
AST SERPL-CCNC: 18 U/L (ref 1–32)
BACTERIA SPEC AEROBE CULT: NORMAL
BACTERIA SPEC AEROBE CULT: NORMAL
BASOPHILS # BLD AUTO: 0.04 10*3/MM3 (ref 0–0.2)
BASOPHILS NFR BLD AUTO: 0.2 % (ref 0–1.5)
BILIRUB SERPL-MCNC: 0.6 MG/DL (ref 0–1.2)
BUN SERPL-MCNC: 23 MG/DL (ref 8–23)
BUN/CREAT SERPL: 22.1 (ref 7–25)
CALCIUM SPEC-SCNC: 8.8 MG/DL (ref 8.6–10.5)
CHLORIDE SERPL-SCNC: 103 MMOL/L (ref 98–107)
CO2 SERPL-SCNC: 23.2 MMOL/L (ref 22–29)
CREAT SERPL-MCNC: 1.04 MG/DL (ref 0.57–1)
DEPRECATED RDW RBC AUTO: 49.5 FL (ref 37–54)
EGFRCR SERPLBLD CKD-EPI 2021: 54.8 ML/MIN/1.73
EOSINOPHIL # BLD AUTO: 0.02 10*3/MM3 (ref 0–0.4)
EOSINOPHIL NFR BLD AUTO: 0.1 % (ref 0.3–6.2)
ERYTHROCYTE [DISTWIDTH] IN BLOOD BY AUTOMATED COUNT: 15.8 % (ref 12.3–15.4)
GLOBULIN UR ELPH-MCNC: 2.3 GM/DL
GLUCOSE SERPL-MCNC: 85 MG/DL (ref 65–99)
HCT VFR BLD AUTO: 37 % (ref 34–46.6)
HGB BLD-MCNC: 11.7 G/DL (ref 12–15.9)
IMM GRANULOCYTES # BLD AUTO: 0.2 10*3/MM3 (ref 0–0.05)
IMM GRANULOCYTES NFR BLD AUTO: 1.2 % (ref 0–0.5)
LYMPHOCYTES # BLD AUTO: 3.59 10*3/MM3 (ref 0.7–3.1)
LYMPHOCYTES NFR BLD AUTO: 21 % (ref 19.6–45.3)
MAGNESIUM SERPL-MCNC: 1.9 MG/DL (ref 1.6–2.4)
MCH RBC QN AUTO: 27.1 PG (ref 26.6–33)
MCHC RBC AUTO-ENTMCNC: 31.6 G/DL (ref 31.5–35.7)
MCV RBC AUTO: 85.8 FL (ref 79–97)
MONOCYTES # BLD AUTO: 1.56 10*3/MM3 (ref 0.1–0.9)
MONOCYTES NFR BLD AUTO: 9.1 % (ref 5–12)
NEUTROPHILS NFR BLD AUTO: 11.7 10*3/MM3 (ref 1.7–7)
NEUTROPHILS NFR BLD AUTO: 68.4 % (ref 42.7–76)
NRBC BLD AUTO-RTO: 0 /100 WBC (ref 0–0.2)
PHOSPHATE SERPL-MCNC: 3.5 MG/DL (ref 2.5–4.5)
PLATELET # BLD AUTO: 239 10*3/MM3 (ref 140–450)
PMV BLD AUTO: 12.7 FL (ref 6–12)
POTASSIUM SERPL-SCNC: 3.7 MMOL/L (ref 3.5–5.2)
PROT SERPL-MCNC: 6 G/DL (ref 6–8.5)
RBC # BLD AUTO: 4.31 10*6/MM3 (ref 3.77–5.28)
SODIUM SERPL-SCNC: 138 MMOL/L (ref 136–145)
WBC NRBC COR # BLD AUTO: 17.11 10*3/MM3 (ref 3.4–10.8)

## 2024-05-01 PROCEDURE — 94799 UNLISTED PULMONARY SVC/PX: CPT

## 2024-05-01 PROCEDURE — 71045 X-RAY EXAM CHEST 1 VIEW: CPT

## 2024-05-01 PROCEDURE — 80053 COMPREHEN METABOLIC PANEL: CPT | Performed by: INTERNAL MEDICINE

## 2024-05-01 PROCEDURE — 63710000001 PREDNISONE PER 1 MG: Performed by: INTERNAL MEDICINE

## 2024-05-01 PROCEDURE — 99232 SBSQ HOSP IP/OBS MODERATE 35: CPT | Performed by: INTERNAL MEDICINE

## 2024-05-01 PROCEDURE — 84100 ASSAY OF PHOSPHORUS: CPT | Performed by: INTERNAL MEDICINE

## 2024-05-01 PROCEDURE — 85025 COMPLETE CBC W/AUTO DIFF WBC: CPT | Performed by: INTERNAL MEDICINE

## 2024-05-01 PROCEDURE — 99233 SBSQ HOSP IP/OBS HIGH 50: CPT | Performed by: INTERNAL MEDICINE

## 2024-05-01 PROCEDURE — 25010000002 ENOXAPARIN PER 10 MG: Performed by: INTERNAL MEDICINE

## 2024-05-01 PROCEDURE — 94664 DEMO&/EVAL PT USE INHALER: CPT

## 2024-05-01 PROCEDURE — 83735 ASSAY OF MAGNESIUM: CPT | Performed by: INTERNAL MEDICINE

## 2024-05-01 RX ADMIN — ARFORMOTEROL TARTRATE 15 MCG: 15 SOLUTION RESPIRATORY (INHALATION) at 19:12

## 2024-05-01 RX ADMIN — PREDNISONE 40 MG: 20 TABLET ORAL at 08:13

## 2024-05-01 RX ADMIN — IPRATROPIUM BROMIDE AND ALBUTEROL SULFATE 3 ML: .5; 3 SOLUTION RESPIRATORY (INHALATION) at 12:04

## 2024-05-01 RX ADMIN — FAMOTIDINE 20 MG: 20 TABLET ORAL at 21:01

## 2024-05-01 RX ADMIN — ENOXAPARIN SODIUM 40 MG: 100 INJECTION SUBCUTANEOUS at 21:01

## 2024-05-01 RX ADMIN — Medication 10 ML: at 21:01

## 2024-05-01 RX ADMIN — BUDESONIDE 0.5 MG: 0.5 SUSPENSION RESPIRATORY (INHALATION) at 07:14

## 2024-05-01 RX ADMIN — GABAPENTIN 300 MG: 300 CAPSULE ORAL at 06:06

## 2024-05-01 RX ADMIN — IPRATROPIUM BROMIDE AND ALBUTEROL SULFATE 3 ML: .5; 3 SOLUTION RESPIRATORY (INHALATION) at 01:21

## 2024-05-01 RX ADMIN — GUAIFENESIN 1200 MG: 600 TABLET ORAL at 21:01

## 2024-05-01 RX ADMIN — Medication 10 ML: at 08:15

## 2024-05-01 RX ADMIN — GUAIFENESIN 1200 MG: 600 TABLET ORAL at 08:14

## 2024-05-01 RX ADMIN — SPIRONOLACTONE 25 MG: 25 TABLET ORAL at 08:13

## 2024-05-01 RX ADMIN — SODIUM CHLORIDE 30 MG/ML INHALATION SOLUTION 4 ML: 30 SOLUTION INHALANT at 07:13

## 2024-05-01 RX ADMIN — SENNOSIDES AND DOCUSATE SODIUM 2 TABLET: 50; 8.6 TABLET ORAL at 21:01

## 2024-05-01 RX ADMIN — IPRATROPIUM BROMIDE AND ALBUTEROL SULFATE 3 ML: .5; 3 SOLUTION RESPIRATORY (INHALATION) at 19:12

## 2024-05-01 RX ADMIN — ARFORMOTEROL TARTRATE 15 MCG: 15 SOLUTION RESPIRATORY (INHALATION) at 07:13

## 2024-05-01 RX ADMIN — LOSARTAN POTASSIUM 100 MG: 50 TABLET, FILM COATED ORAL at 08:13

## 2024-05-01 RX ADMIN — METOPROLOL SUCCINATE 100 MG: 50 TABLET, EXTENDED RELEASE ORAL at 08:13

## 2024-05-01 RX ADMIN — MONTELUKAST 10 MG: 10 TABLET, FILM COATED ORAL at 21:01

## 2024-05-01 RX ADMIN — IPRATROPIUM BROMIDE AND ALBUTEROL SULFATE 3 ML: .5; 3 SOLUTION RESPIRATORY (INHALATION) at 07:13

## 2024-05-01 RX ADMIN — GABAPENTIN 600 MG: 300 CAPSULE ORAL at 21:00

## 2024-05-01 RX ADMIN — AZELASTINE HYDROCHLORIDE 2 SPRAY: 137 SPRAY, METERED NASAL at 08:14

## 2024-05-01 RX ADMIN — CETIRIZINE HYDROCHLORIDE 10 MG: 10 TABLET, FILM COATED ORAL at 08:13

## 2024-05-01 RX ADMIN — ENOXAPARIN SODIUM 40 MG: 100 INJECTION SUBCUTANEOUS at 08:14

## 2024-05-01 RX ADMIN — BUDESONIDE 0.5 MG: 0.5 SUSPENSION RESPIRATORY (INHALATION) at 19:12

## 2024-05-01 RX ADMIN — AZELASTINE HYDROCHLORIDE 2 SPRAY: 137 SPRAY, METERED NASAL at 21:01

## 2024-05-01 NOTE — PLAN OF CARE
Goal Outcome Evaluation:              Problem: Adult Inpatient Plan of Care  Goal: Patient-Specific Goal (Individualized)  Outcome: Ongoing, Progressing             Pt not feeling well today; increased SOA; placed on 2L NC; congested cough; coarse wheezes; continue to monitor; encourage IS; d/c home tomorrow.

## 2024-05-01 NOTE — PROGRESS NOTES
Pulmonary / Critical Care Progress Note      Patient Name: Julia Rios  : 1945  MRN: [id number]  Attending:  Fernando Torres MD  Date of admission: 2024    Subjective   Subjective   Follow-up for COPD exacerbation, mucous plugging    Over the last 24 hours: Continues to be on Brovana, Pulmicort, and DuoNebs.  Continued on prednisone 40 mg oral daily.  Remains on Aldactone 25 mg oral 3 times weekly.  Underwent bronchoscopy.    No acute events overnight    This morning,  Lying in bed  Currently on 2 L nasal cannula  Underwent bronchoscopy yesterday  Pneumonia panel with parainfluenza virus  Feeling slightly worse today  Worsening dyspnea and cough  No hemoptysis  No fever or chills  No nausea or vomiting    Objective   Objective     Vitals:   Temp:  [96.9 °F (36.1 °C)-98.8 °F (37.1 °C)] 98.1 °F (36.7 °C)  Heart Rate:  [56-84] 63  Resp:  [14-20] 18  BP: (114-163)/() 142/87  Flow (L/min):  [4] 4    Physical Exam   Vital Signs Reviewed   General:  WDWN, Alert, NAD.  Pleasant elderly female, lying in bed  HEENT:  PERRL, EOMI.  OP, nares clear  Chest: Improved aeration, rhonchi on right side to auscultation, no wheezing or crackles, equal rise and fall of chest, no work of breathing noted on 2 L nasal cannula  CV: RRR, no MGR, pulses 2+, equal.  Abd:  Soft, NT, ND, + BS, no HSM, obese  EXT:  no clubbing, no cyanosis, no edema  Neuro:  A&Ox3, CN grossly intact, no focal deficits.  Skin: No rashes or lesions noted    Result Review    Result Review:  I have personally reviewed the results from the time of this admission to 2024 07:28 EDT and agree with these findings:  [x]  Laboratory  [x]  Microbiology  [x]  Radiology  []  EKG/Telemetry   []  Cardiology/Vascular   []  Pathology  []  Old records  []  Other:  Most notable findings include:         Lab 24  0449 24  0433 24  0501 24  0445 24  0359 24  0726   WBC 17.11* 12.67* 10.83* 12.94* 15.11* 8.33    HEMOGLOBIN 11.7* 11.3* 11.2* 11.1* 11.1* 12.6   HEMATOCRIT 37.0 35.8 35.8 34.4 34.7 40.5   PLATELETS 239 226 217 228 225 240   SODIUM 138 140 138 136 139 140   POTASSIUM 3.7 3.9 3.8 4.3 4.1 3.6   CHLORIDE 103 106 105 106 107 104   CO2 23.2 21.1* 19.7* 20.0* 19.2* 22.4   BUN 23 30* 29* 30* 20 17   CREATININE 1.04* 1.21* 1.06* 1.10* 0.94 1.01*   GLUCOSE 85 99 111* 148* 162* 128*   CALCIUM 8.8 9.0 8.6 9.2 8.8 9.0   PHOSPHORUS 3.5 3.3  --  3.4 3.0  --    TOTAL PROTEIN 6.0  --   --   --   --  7.1   ALBUMIN 3.7  --   --   --   --  4.2   GLOBULIN 2.3  --   --   --   --  2.9     CT Chest With Contrast Diagnostic    Result Date: 4/26/2024  CT CHEST W CONTRAST DIAGNOSTIC-  Date of Exam: 4/26/2024 7:39 AM  Indication: cp with soa, radiating to back.  Comparison: CT chest without contrast 6/26/2023  Technique: Axial CT images were obtained of the chest after the uneventful intravenous administration of 100 mL Isovue-370. Reconstructed coronal and sagittal images were also obtained. Automated exposure control and iterative construction methods were used.  Findings: Soft tissue of the lower neck are without acute abnormality. Heart size normal. Negative for pericardial effusion. Mild coronary artery calcification. No pulmonary embolus. Scattered mediastinal lymph nodes are below size criteria. Negative for aortic dissection. Aortic arch branch vasculature patent.  No pneumothorax. There are tree-in-bud opacities involving the posterior right upper lobe consistent with endobronchial infection or inflammation with bronchial wall thickening. Consolidation in the right middle lobe likely atelectasis which may relate to segmental bronchial mucous plugging. No suspicious pulmonary nodule.  Visualized portions of the liver, spleen, adrenal glands, and pancreas are without acute abnormality. Low-attenuation right upper pole renal lesions partially imaged likely cyst. No free fluid in the upper abdomen. No aggressive osseous lesion or  acute fracture.      Impression: Impression: 1. Negative for pulmonary embolus. 2. Right upper lobe tree-in-bud nodules consistent with endobronchial infection or inflammation. 3. Suspected mucous plugging in right middle lobe segmental bronchi with partial right middle lobe atelectasis. 4. Additional chronic findings above.    Electronically Signed By-Francisco Valenzuela MD On:4/26/2024 8:14 AM         Assessment & Plan   Assessment / Plan     Active Hospital Problems:  Active Hospital Problems    Diagnosis     **COPD (chronic obstructive pulmonary disease)     Shortness of breath      Impression:  Mucous plugging, difficulty with airway clearance   Bibasilar atelectasis   Acute COPD aspiration, not on any home O2   Recurrent rhinitis   CARMEN    History of fungal pneumonia s/p itraconazole course   History of pulmonary nodules   Tobacco abuse of cigarettes in remission      Plan:  -Continue to maintain SpO2 greater than 90%    -Okay to use home CPAP  -4/26 chest CT demonstrated no pulmonary embolus, right upper lobe tree-in-bud nodules.  Suspected mucous plugging in the right middle lobe segmental bronchi with partial right middle lobe atelectasis.    -Repeat CXR  -S/p bronchoscopy.  Pneumonia panel positive for parainfluenza virus.  Cultures and cytology pending.  -Continue Brovana, Pulmicort, DuoNebs    -Continue prednisone 40 mg daily to complete 7 days   -Completed azithromycin x 5 days  -Continue Aldactone 25 mg oral 3 times weekly    -Continue to monitor renal panel and electrolytes.  Place electrolytes as needed.  -Continue bronchopulmonary hygiene.  Encourage I-S and flutter     -PT/OT/SLP on board.  Appreciate assistance    -Follow-up pulmonary clinic 1 to 2 weeks of discharge       DVT prophylaxis:  Medical DVT prophylaxis orders are present.    CODE STATUS:   Level Of Support Discussed With: Patient  Code Status (Patient has no pulse and is not breathing): CPR (Attempt to Resuscitate)  Medical Interventions  (Patient has pulse or is breathing): Full Support    I have reviewed labs, imaging, pertinent clinical data and provider notes.   I have discussed with bedside nurse and primary service.     Electronically signed by Santino Baez MD, 05/01/24, 7:29 AM EDT.    Electronically signed by KALEB Aguilar, 05/01/24, 3:55 PM EDT.    This visit was performed by BOTH a physician and an APC. I personally evaluated and examined the patient. I performed all aspects of MDM as documented. , I have reviewed and confirmed the accuracy of the patient's history as documented in this note., and I have reexamined the patient and the results are consistent with the previously documented exam. I have updated the documentation as necessary.     Electronically signed by Santino Baez MD, 05/01/24, 5:57 PM EDT.

## 2024-05-01 NOTE — SIGNIFICANT NOTE
05/01/24 1040   Coping/Psychosocial   Observed Emotional State calm;cooperative   Verbalized Emotional State relief;hopefulness   Trust Relationship/Rapport empathic listening provided   Involvement in Care interacting with patient   Additional Documentation Spiritual Care (Group)   Spiritual Care   Use of Spiritual Resources non-Taoism use of spiritual care   Spiritual Care Source  initiative   Spiritual Care Follow-Up follow-up, none required as presently assessed   Response to Spiritual Care receptive of support   Spiritual Care Interventions supportive conversation provided   Spiritual Care Visit Type initial   Receptivity to Spiritual Care visit welcomed

## 2024-05-01 NOTE — PLAN OF CARE
Goal Outcome Evaluation:      Pt a/ox4, pt had no complaints of pain during shift, pt rested during night. Will continue plan of car.e

## 2024-05-01 NOTE — PROGRESS NOTES
[facility]   Hospitalist Progress Note  Date: 2024  Patient Name: Julia Rios  : 1945  MRN: [id number]  Date of admission: 2024  Room/Bed: [facility]/      Subjective   Subjective     Chief Complaint: shortness of breath and cough     Summary:Julia Rios is a 79 y.o. female with past medical history of CKD stage III, COPD not on home oxygen, hypertension, sleep apnea who presents with COPD exacerbation. Patient producing some clear sputum.  She reported some subjective fevers.  Patient's pulmonologist is Dr. Baez.  On presentation, patient was last vitals were stable and saturating well on room air.Patient's initial lactate is 2.8 with repeat lactic acid of 2.2 which was thought secondary to nebulizers she was receiving. CT of her chest with contrast showed right upper lobe tree-in-bud nodules consistent with endobronchial infection or inflammation; Suspected mucous plugging in right middle lobe segmental bronchi with   partial right middle lobe atelectasis.  Pulmonology was consulted and patient was admitted for further evaluation and management.    Interval Followup:   Over the last 24 hours patient underwent bronchoscopy, initially she was feeling better yesterday however today she is more weak and fatigued, requiring 2 L nasal cannula      Objective   Objective     Vitals:   Temp:  [98.1 °F (36.7 °C)-98.8 °F (37.1 °C)] 98.1 °F (36.7 °C)  Heart Rate:  [61-75] 67  Resp:  [18-20] 18  BP: (115-157)/(57-92) 132/57  Flow (L/min):  [2-4] 2    Physical Exam   GEN: No acute distress  HEENT: Moist mucous membranes  LUNGS: Equal chest rise bilaterally  CARDIAC: Regular rate and rhythm  NEURO: Moving all 4 extremities spontaneously  SKIN: No obvious breakdown      Result Review    Result Review:  I have personally reviewed these results:  [x]  Laboratory      Lab 24  0449 24  0433 24  0501 24  0445 24  0359 247 24  1534   WBC 17.11* 12.67* 10.83*   <  > 15.11*  --   --    HEMOGLOBIN 11.7* 11.3* 11.2*   < > 11.1*  --   --    HEMATOCRIT 37.0 35.8 35.8   < > 34.7  --   --    PLATELETS 239 226 217   < > 225  --   --    NEUTROS ABS 11.70* 7.67* 7.69*   < > 12.91*  --   --    IMMATURE GRANS (ABS) 0.20* 0.16* 0.09*   < > 0.06*  --   --    LYMPHS ABS 3.59* 3.39* 2.01   < > 1.62  --   --    MONOS ABS 1.56* 1.40* 1.03*   < > 0.50  --   --    EOS ABS 0.02 0.01 0.00   < > 0.00  --   --    MCV 85.8 86.5 86.3   < > 85.3  --   --    PROCALCITONIN  --   --   --   --  0.10  --   --    LACTATE  --   --   --   --  1.5 3.1* 3.7*    < > = values in this interval not displayed.         Lab 05/01/24 0449 04/30/24  0433 04/29/24  0501 04/28/24  0445   SODIUM 138 140 138 136   POTASSIUM 3.7 3.9 3.8 4.3   CHLORIDE 103 106 105 106   CO2 23.2 21.1* 19.7* 20.0*   ANION GAP 11.8 12.9 13.3 10.0   BUN 23 30* 29* 30*   CREATININE 1.04* 1.21* 1.06* 1.10*   EGFR 54.8* 45.7* 53.5* 51.2*   GLUCOSE 85 99 111* 148*   CALCIUM 8.8 9.0 8.6 9.2   MAGNESIUM 1.9 2.2  --  2.1   PHOSPHORUS 3.5 3.3  --  3.4         Lab 05/01/24 0449 04/26/24  0726   TOTAL PROTEIN 6.0 7.1   ALBUMIN 3.7 4.2   GLOBULIN 2.3 2.9   ALT (SGPT) 13 8   AST (SGOT) 18 14   BILIRUBIN 0.6 0.4   ALK PHOS 67 87         Lab 04/26/24  0726   PROBNP 356.5   HSTROP T 9                 Brief Urine Lab Results  (Last result in the past 365 days)        Color   Clarity   Blood   Leuk Est   Nitrite   Protein   CREAT   Urine HCG        09/29/23 0925 Yellow   Turbid   Negative   Negative   Positive   Negative           09/29/23 0925             118.4         09/29/23 0925             134.9               [x]  Microbiology   Microbiology Results (last 10 days)       Procedure Component Value - Date/Time    AFB Culture - Wash, Bronchus [[id number]] Collected: 04/30/24 1131    Lab Status: Preliminary result Specimen: Wash from Bronchus Updated: 04/30/24 1635     AFB Stain No acid fast bacilli seen on direct smear    Respiratory Culture - Wash, Bronchus  [[id number]] Collected: 04/30/24 1131    Lab Status: Preliminary result Specimen: Wash from Bronchus Updated: 05/01/24 1014     Respiratory Culture Moderate growth (3+) The culture consists of normal respiratory rubén. This is a preliminary report; final report to follow.     Gram Stain Rare (1+) WBCs seen      Few (2+) Gram positive cocci in pairs and chains    AFB Culture - Lavage, Lung, Right Middle Lobe [[id number]] Collected: 04/30/24 1129    Lab Status: Preliminary result Specimen: Lavage from Lung, Right Middle Lobe Updated: 04/30/24 1635     AFB Stain No acid fast bacilli seen on direct smear    BAL Culture, Quantitative - Lavage, Lung, Right Middle Lobe [[id number]] Collected: 04/30/24 1129    Lab Status: Preliminary result Specimen: Lavage from Lung, Right Middle Lobe Updated: 05/01/24 1014     BAL Culture >100,000 CFU/mL The culture consists of normal respiratory rubén. This is a preliminary report; final report to follow.     Gram Stain Few (2+) WBCs seen      Rare (1+) Gram positive cocci in pairs and chains      Rare (1+) Epithelial cells seen    Pneumonia Panel - Lavage, Lung, Right Middle Lobe [[id number]]  (Abnormal) Collected: 04/30/24 1129    Lab Status: Final result Specimen: Lavage from Lung, Right Middle Lobe Updated: 04/30/24 1439     Escherichia coli PCR Not Detected     Acinetobacter calcoaceticus-baumannii complex PCR Not Detected     Enterobacter cloacae PCR Not Detected     Klebsiella oxytoca PCR Not Detected     Klebsiella pneumoniae group PCR Not Detected     Klebsiella aerogenes PCR Not Detected     Moraxella catarrhalis PCR Not Detected     Proteus species PCR Not Detected     Pseudomonas aeroginosa PCR Not Detected     Serratia marcescens PCR Not Detected     Staphylococcus aureus PCR Not Detected     Streptococcus pyogenes PCR Not Detected     Haemophilus influenzae PCR Not Detected     Streptococcus agalactiae PCR Not Detected     Streptococcus pneumoniae PCR Not Detected      Chlamydophila pneumoniae PCR Not Detected     Legionella pneumophilia PCR Not Detected     Mycoplasma pneumo by PCR Not Detected     ADENOVIRUS, PCR Not Detected     CTX-M Gene N/A     IMP Gene N/A     KPC Gene N/A     mecA/C and MREJ Gene N/A     NDM Gene N/A     OXA-48-like Gene N/A     VIM Gene N/A     Coronavirus Not Detected     Human Metapneumovirus Not Detected     Human Rhinovirus/Enterovirus Not Detected     Influenza A PCR Not Detected     Influenza B PCR Not Detected     RSV, PCR Not Detected     Parainfluenza virus PCR Detected    Respiratory Culture - Sputum, Throat [[id number]] Collected: 04/27/24 0130    Lab Status: Final result Specimen: Sputum from Throat Updated: 04/29/24 0953     Respiratory Culture Moderate growth (3+) Normal respiratory rubén. No S. aureus or Pseudomonas aeruginosa detected. Final report.     Gram Stain Moderate (3+) Gram positive cocci in pairs      Few (2+) Gram positive bacilli      Few (2+) Gram negative bacilli      Few (2+) Yeast      Less than 10 Epithelial cells per low power field      Less than 20 WBCs per low power field    S. Pneumo Ag Urine or CSF - Urine, Urine, Clean Catch [[id number]]  (Normal) Collected: 04/26/24 1403    Lab Status: Final result Specimen: Urine, Clean Catch Updated: 04/26/24 1422     Strep Pneumo Ag Negative    Legionella Antigen, Urine - Urine, Urine, Clean Catch [[id number]]  (Normal) Collected: 04/26/24 1403    Lab Status: Final result Specimen: Urine, Clean Catch Updated: 04/26/24 1422     LEGIONELLA ANTIGEN, URINE Negative    COVID PRE-OP / PRE-PROCEDURE SCREENING ORDER (NO ISOLATION) - Swab, Nasopharynx [[id number]]  (Normal) Collected: 04/26/24 0727    Lab Status: Final result Specimen: Swab from Nasopharynx Updated: 04/26/24 0814    Narrative:      The following orders were created for panel order COVID PRE-OP / PRE-PROCEDURE SCREENING ORDER (NO ISOLATION) - Swab, Nasopharynx.  Procedure                               Abnormality          Status                     ---------                               -----------         ------                     COVID-19, FLU A/B, RSV P...[[id number]]  Normal              Final result                 Please view results for these tests on the individual orders.    COVID-19, FLU A/B, RSV PCR 1 HR TAT - Swab, Nasopharynx [[id number]]  (Normal) Collected: 04/26/24 0727    Lab Status: Final result Specimen: Swab from Nasopharynx Updated: 04/26/24 0814     COVID19 Not Detected     Influenza A PCR Not Detected     Influenza B PCR Not Detected     RSV, PCR Not Detected    Narrative:      Fact sheet for providers: https://www.fda.gov/media/199898/download    Fact sheet for patients: https://www.fda.gov/media/071817/download    Test performed by PCR.    Blood Culture - Blood, Arm, Left [[id number]]  (Normal) Collected: 04/26/24 0726    Lab Status: Final result Specimen: Blood from Arm, Left Updated: 05/01/24 0745     Blood Culture No growth at 5 days    Blood Culture - Blood, Arm, Right [[id number]]  (Normal) Collected: 04/26/24 0726    Lab Status: Final result Specimen: Blood from Arm, Right Updated: 05/01/24 0745     Blood Culture No growth at 5 days          [x]  Radiology  CT Chest With Contrast Diagnostic    Result Date: 4/26/2024  Impression: 1. Negative for pulmonary embolus. 2. Right upper lobe tree-in-bud nodules consistent with endobronchial infection or inflammation. 3. Suspected mucous plugging in right middle lobe segmental bronchi with partial right middle lobe atelectasis. 4. Additional chronic findings above.    Electronically Signed By-Francisco Valenzuela MD On:4/26/2024 8:14 AM     []  EKG/Telemetry   []  Cardiology/Vascular   []  Pathology  []  Old records  []  Other:    Assessment & Plan   Assessment / Plan     Assessment:  Acute COPD exacerbation   Mucous plugging  Bibasilar atelectasis  Leukocytosis, likely reactive due to steroids  Lactic acidosis, likely due to nebulizer, resolved  History of CARMEN  History of  hypertension  History of fungal pneumonia s/p itraconazole course  History of pulmonary nodules  History of recurrent rhinitis  Tobacco abuse, in remission    Plan:  Patient currently being managed in medicine service.  Currently saturating well on room air.  CT chest showed no PE but did show right upper lobe tree-in-bud nodules, suspected mucous plugging in the right middle lobe, segmental bronchi with partial right middle lobe atelectasis.  Currently on azithromycin due to its anti-inflammatory benefit for worsening dyspnea.  Continue Brovana, Pulmicort and DuoNeb.  Also Mucinex and saline nebulization.  Continue.  S/p IV Solu-Medrol.  On prednisone 40 mg daily.  Plan for course of 7 days.  Blood culture showing no growth at 3 days.  COVID, influenza and RSV negative.  Strep pneumo antigen and Legionella antigen negative.  Respiratory culture showed normal respiratory rubén.    Underwent bronchoscopy which was significant for purulent secretions, erythematous airways, friable, airway collapse  Appreciate further input by pulmonologist.  Discussed management plan regarding hypoxemia with Dr. Baez  Start airway clearance  Continue home dose of metoprolol, losartan and spironolactone.  Continue Astelin and montelukast.  PT/OT.  Continues with current management.     Discussed with RN.    DVT prophylaxis:  Medical DVT prophylaxis orders are present.        CODE STATUS:   Level Of Support Discussed With: Patient  Code Status (Patient has no pulse and is not breathing): CPR (Attempt to Resuscitate)  Medical Interventions (Patient has pulse or is breathing): Full Support    Time spent: Time spent involving patient care including face-to-face encounter 51 minutes      Electronically signed by Fernando Torres MD, 05/01/24, 1:06 PM EDT.

## 2024-05-02 ENCOUNTER — READMISSION MANAGEMENT (OUTPATIENT)
Dept: CALL CENTER | Facility: HOSPITAL | Age: 79
End: 2024-05-02
Payer: MEDICARE

## 2024-05-02 VITALS
RESPIRATION RATE: 18 BRPM | HEART RATE: 57 BPM | SYSTOLIC BLOOD PRESSURE: 150 MMHG | WEIGHT: 210.76 LBS | HEIGHT: 60 IN | OXYGEN SATURATION: 94 % | TEMPERATURE: 97.9 F | DIASTOLIC BLOOD PRESSURE: 97 MMHG | BODY MASS INDEX: 41.38 KG/M2

## 2024-05-02 LAB
ALBUMIN SERPL-MCNC: 3.4 G/DL (ref 3.5–5.2)
ALBUMIN/GLOB SERPL: 1.4 G/DL
ALP SERPL-CCNC: 65 U/L (ref 39–117)
ALT SERPL W P-5'-P-CCNC: 13 U/L (ref 1–33)
ANION GAP SERPL CALCULATED.3IONS-SCNC: 10.8 MMOL/L (ref 5–15)
AST SERPL-CCNC: 16 U/L (ref 1–32)
BACTERIA SPEC AEROBE CULT: NORMAL
BACTERIA SPEC RESP CULT: NORMAL
BASOPHILS # BLD AUTO: 0.07 10*3/MM3 (ref 0–0.2)
BASOPHILS NFR BLD AUTO: 0.5 % (ref 0–1.5)
BILIRUB SERPL-MCNC: 0.5 MG/DL (ref 0–1.2)
BUN SERPL-MCNC: 24 MG/DL (ref 8–23)
BUN/CREAT SERPL: 22.6 (ref 7–25)
CALCIUM SPEC-SCNC: 8.5 MG/DL (ref 8.6–10.5)
CHLORIDE SERPL-SCNC: 107 MMOL/L (ref 98–107)
CO2 SERPL-SCNC: 21.2 MMOL/L (ref 22–29)
CREAT SERPL-MCNC: 1.06 MG/DL (ref 0.57–1)
CYTO UR: NORMAL
DEPRECATED RDW RBC AUTO: 49 FL (ref 37–54)
EGFRCR SERPLBLD CKD-EPI 2021: 53.5 ML/MIN/1.73
EOSINOPHIL # BLD AUTO: 0.06 10*3/MM3 (ref 0–0.4)
EOSINOPHIL NFR BLD AUTO: 0.4 % (ref 0.3–6.2)
ERYTHROCYTE [DISTWIDTH] IN BLOOD BY AUTOMATED COUNT: 15.8 % (ref 12.3–15.4)
GLOBULIN UR ELPH-MCNC: 2.5 GM/DL
GLUCOSE SERPL-MCNC: 98 MG/DL (ref 65–99)
GRAM STN SPEC: NORMAL
HCT VFR BLD AUTO: 36.2 % (ref 34–46.6)
HGB BLD-MCNC: 11.6 G/DL (ref 12–15.9)
IMM GRANULOCYTES # BLD AUTO: 0.28 10*3/MM3 (ref 0–0.05)
IMM GRANULOCYTES NFR BLD AUTO: 1.9 % (ref 0–0.5)
LAB AP CASE REPORT: NORMAL
LAB AP CLINICAL INFORMATION: NORMAL
LYMPHOCYTES # BLD AUTO: 3.88 10*3/MM3 (ref 0.7–3.1)
LYMPHOCYTES NFR BLD AUTO: 25.7 % (ref 19.6–45.3)
MAGNESIUM SERPL-MCNC: 1.9 MG/DL (ref 1.6–2.4)
MCH RBC QN AUTO: 27.4 PG (ref 26.6–33)
MCHC RBC AUTO-ENTMCNC: 32 G/DL (ref 31.5–35.7)
MCV RBC AUTO: 85.4 FL (ref 79–97)
MONOCYTES # BLD AUTO: 1.34 10*3/MM3 (ref 0.1–0.9)
MONOCYTES NFR BLD AUTO: 8.9 % (ref 5–12)
NEUTROPHILS NFR BLD AUTO: 62.6 % (ref 42.7–76)
NEUTROPHILS NFR BLD AUTO: 9.44 10*3/MM3 (ref 1.7–7)
NRBC BLD AUTO-RTO: 0 /100 WBC (ref 0–0.2)
PATH REPORT.FINAL DX SPEC: NORMAL
PATH REPORT.GROSS SPEC: NORMAL
PHOSPHATE SERPL-MCNC: 2.9 MG/DL (ref 2.5–4.5)
PLATELET # BLD AUTO: 237 10*3/MM3 (ref 140–450)
PMV BLD AUTO: 12.4 FL (ref 6–12)
POTASSIUM SERPL-SCNC: 4 MMOL/L (ref 3.5–5.2)
PROT SERPL-MCNC: 5.9 G/DL (ref 6–8.5)
RBC # BLD AUTO: 4.24 10*6/MM3 (ref 3.77–5.28)
SODIUM SERPL-SCNC: 139 MMOL/L (ref 136–145)
WBC NRBC COR # BLD AUTO: 15.07 10*3/MM3 (ref 3.4–10.8)

## 2024-05-02 PROCEDURE — 94799 UNLISTED PULMONARY SVC/PX: CPT

## 2024-05-02 PROCEDURE — 63710000001 PREDNISONE PER 1 MG: Performed by: INTERNAL MEDICINE

## 2024-05-02 PROCEDURE — 99232 SBSQ HOSP IP/OBS MODERATE 35: CPT | Performed by: INTERNAL MEDICINE

## 2024-05-02 PROCEDURE — 83735 ASSAY OF MAGNESIUM: CPT | Performed by: INTERNAL MEDICINE

## 2024-05-02 PROCEDURE — 99239 HOSP IP/OBS DSCHRG MGMT >30: CPT | Performed by: INTERNAL MEDICINE

## 2024-05-02 PROCEDURE — 94664 DEMO&/EVAL PT USE INHALER: CPT

## 2024-05-02 PROCEDURE — 25010000002 ENOXAPARIN PER 10 MG: Performed by: INTERNAL MEDICINE

## 2024-05-02 PROCEDURE — 80053 COMPREHEN METABOLIC PANEL: CPT | Performed by: INTERNAL MEDICINE

## 2024-05-02 PROCEDURE — 84100 ASSAY OF PHOSPHORUS: CPT | Performed by: INTERNAL MEDICINE

## 2024-05-02 PROCEDURE — 85025 COMPLETE CBC W/AUTO DIFF WBC: CPT | Performed by: INTERNAL MEDICINE

## 2024-05-02 RX ORDER — GUAIFENESIN 600 MG/1
1200 TABLET, EXTENDED RELEASE ORAL EVERY 12 HOURS SCHEDULED
Qty: 16 TABLET | Refills: 0 | Status: SHIPPED | OUTPATIENT
Start: 2024-05-02 | End: 2024-05-06

## 2024-05-02 RX ORDER — PREDNISONE 20 MG/1
40 TABLET ORAL
Qty: 4 TABLET | Refills: 0 | Status: SHIPPED | OUTPATIENT
Start: 2024-05-03 | End: 2024-05-06

## 2024-05-02 RX ADMIN — AZELASTINE HYDROCHLORIDE 2 SPRAY: 137 SPRAY, METERED NASAL at 08:24

## 2024-05-02 RX ADMIN — METOPROLOL SUCCINATE 100 MG: 50 TABLET, EXTENDED RELEASE ORAL at 08:23

## 2024-05-02 RX ADMIN — IPRATROPIUM BROMIDE AND ALBUTEROL SULFATE 3 ML: .5; 3 SOLUTION RESPIRATORY (INHALATION) at 07:57

## 2024-05-02 RX ADMIN — BUDESONIDE 0.5 MG: 0.5 SUSPENSION RESPIRATORY (INHALATION) at 07:57

## 2024-05-02 RX ADMIN — GUAIFENESIN 1200 MG: 600 TABLET ORAL at 08:23

## 2024-05-02 RX ADMIN — ARFORMOTEROL TARTRATE 15 MCG: 15 SOLUTION RESPIRATORY (INHALATION) at 07:57

## 2024-05-02 RX ADMIN — IPRATROPIUM BROMIDE AND ALBUTEROL SULFATE 3 ML: .5; 3 SOLUTION RESPIRATORY (INHALATION) at 00:47

## 2024-05-02 RX ADMIN — LOSARTAN POTASSIUM 100 MG: 50 TABLET, FILM COATED ORAL at 08:23

## 2024-05-02 RX ADMIN — GABAPENTIN 300 MG: 300 CAPSULE ORAL at 06:09

## 2024-05-02 RX ADMIN — PREDNISONE 40 MG: 20 TABLET ORAL at 08:23

## 2024-05-02 RX ADMIN — CETIRIZINE HYDROCHLORIDE 10 MG: 10 TABLET, FILM COATED ORAL at 08:23

## 2024-05-02 RX ADMIN — ENOXAPARIN SODIUM 40 MG: 100 INJECTION SUBCUTANEOUS at 08:23

## 2024-05-02 RX ADMIN — Medication 10 ML: at 08:24

## 2024-05-02 NOTE — DISCHARGE SUMMARY
[facility]         HOSPITALIST  DISCHARGE SUMMARY    Patient Name: Julia Rios  : 1945  MRN: [id number]    Date of Admission: 2024  Date of Discharge:  2024  Primary Care Physician: Nick Patel,     Consults       Date and Time Order Name Status Description    2024 10:57 AM Inpatient Pulmonology Consult      2024  8:33 AM Inpatient Hospitalist Consult              Active and Resolved Hospital Problems:  Acute COPD exacerbation   Mucous plugging  Bibasilar atelectasis  Leukocytosis, likely reactive due to steroids  Lactic acidosis, likely due to nebulizer, resolved  History of CARMEN  History of hypertension  History of fungal pneumonia s/p itraconazole course  History of pulmonary nodules  History of recurrent rhinitis  Tobacco abuse, in remission    Hospital Course     Hospital Course:  Julia Rios is a 79 y.o. female with past medical history of CKD stage III, COPD not on home oxygen, hypertension, sleep apnea who presents with COPD exacerbation. Patient producing some clear sputum.  She reported some subjective fevers.  Patient's pulmonologist is Dr. Baez.  On presentation, patient was last vitals were stable and saturating well on room air.Patient's initial lactate is 2.8 with repeat lactic acid of 2.2 which was thought secondary to nebulizers she was receiving. CT of her chest with contrast showed right upper lobe tree-in-bud nodules consistent with endobronchial infection or inflammation; Suspected mucous plugging in right middle lobe segmental bronchi with partial right middle lobe atelectasis.  Pulmonology was consulted and patient was admitted for further evaluation and management.  Patient was treated with, bronchodilators, patient underwent bronchoscopy which was significant for parainfluenza virus.  Patient's respiratory status improved, she was evaluated by pulmonology on day of discharge and deemed safe to discharge home.  Patient is  discharged home today in stable condition.  Patient should follow-up with pulmonology in 1 to 2 weeks    Day of Discharge     Vital Signs:  Temp:  [97.7 °F (36.5 °C)-98.2 °F (36.8 °C)] 97.9 °F (36.6 °C)  Heart Rate:  [61-67] 65  Resp:  [18] 18  BP: (122-157)/(57-97) 150/97  Flow (L/min):  [2] 2    Physical Exam:   GEN: No acute distress  HEENT: Moist mucous membranes  LUNGS: Equal chest rise bilaterally  CARDIAC: Regular rate and rhythm  NEURO: Moving all 4 extremities spontaneously  SKIN: No obvious breakdown    Discharge Details        Discharge Medications        New Medications        Instructions Start Date   guaiFENesin 600 MG 12 hr tablet  Commonly known as: MUCINEX   1,200 mg, Oral, Every 12 Hours Scheduled      predniSONE 20 MG tablet  Commonly known as: DELTASONE   40 mg, Oral, Daily With Breakfast   Start Date: May 3, 2024            Continue These Medications        Instructions Start Date   albuterol sulfate  (90 Base) MCG/ACT inhaler  Commonly known as: PROVENTIL HFA;VENTOLIN HFA;PROAIR HFA   2 puffs, Inhalation, Every 4 Hours PRN      albuterol 1.25 MG/3ML nebulizer solution  Commonly known as: ACCUNEB   3 mL, Nebulization, Every 6 Hours PRN      Arnuity Ellipta 100 MCG/ACT aerosol powder   Generic drug: Fluticasone Furoate   1 puff, Inhalation, Daily      azelastine 0.1 % nasal spray  Commonly known as: ASTELIN   2 sprays, Nasal, 2 Times Daily, Use in each nostril as directed      benzonatate 100 MG capsule  Commonly known as: TESSALON   100 mg, Oral, 3 Times Daily PRN      budesonide-formoterol 160-4.5 MCG/ACT inhaler  Commonly known as: SYMBICORT   2 puffs, Inhalation, Daily      D3-1000 25 MCG (1000 UT) capsule  Generic drug: Cholecalciferol   TAKE 1 CAPSULE BY MOUTH EVERY DAY      famotidine 20 MG tablet  Commonly known as: PEPCID   20 mg, Oral, Every Night at Bedtime      fexofenadine 180 MG tablet  Commonly known as: ALLEGRA   180 mg, Oral, Daily      gabapentin 300 MG capsule  Commonly  known as: NEURONTIN   300 mg, Oral, Every Morning      gabapentin 300 MG capsule  Commonly known as: NEURONTIN   600 mg, Oral, Nightly      losartan 100 MG tablet  Commonly known as: COZAAR   100 mg, Oral, Daily      metoprolol succinate  MG 24 hr tablet  Commonly known as: TOPROL-XL   100 mg, Oral, Daily, Hold if heartrate is <60.      montelukast 10 MG tablet  Commonly known as: SINGULAIR   10 mg, Oral, Nightly      spironolactone 25 MG tablet  Commonly known as: ALDACTONE   25 mg, Oral, 3 Times Weekly, Monday,Wednesday,Friday             ASK your doctor about these medications        Instructions Start Date   predniSONE 20 MG tablet  Commonly known as: DELTASONE  Ask about: Should I take this medication?   40 mg, Oral, Daily               Allergies   Allergen Reactions    Ibuprofen Hives    Penicillins Seizure and Other (See Comments)    Nsaids Other (See Comments)     CKD    Cephalosporins Rash    Sulfa Antibiotics Rash       Discharge Disposition:  Home or Self Care    Diet:  Hospital:  Diet Order   Procedures    Diet: Regular/House; Fluid Consistency: Thin (IDDSI 0)       Discharge Activity:       CODE STATUS:  Code Status and Medical Interventions:   Ordered at: 04/26/24 1025     Level Of Support Discussed With:    Patient     Code Status (Patient has no pulse and is not breathing):    CPR (Attempt to Resuscitate)     Medical Interventions (Patient has pulse or is breathing):    Full Support       Future Appointments   Date Time Provider Department Center   5/9/2024 10:45 AM Shanell Murray APRN [facility] PCC [facility]   5/24/2024  9:45 AM Nick Patel DO [facility] PC HFC [facility]   6/17/2024 11:00 AM Marielos Dover APRN [facility] CD [facility]   7/26/2024 11:10 AM Celso Prakash MD [facility] L[facility]   9/5/2024  2:45 PM Santino Baez MD [facility]       Additional Instructions for the Follow-ups that You Need to Schedule       Discharge Follow-up with PCP   As directed       Currently Documented PCP:     Nick Patel DO    PCP Phone Number:    [phone]     Follow Up Details: 3 to 7 days        Discharge Follow-up with Specified Provider: pulm; 1 Week   As directed      To: pulm   Follow Up: 1 Week                Pertinent  and/or Most Recent Results     IMAGING:  XR Chest 1 View    Result Date: 5/1/2024  XR CHEST 1 VW-  Date of Exam: 5/1/2024 4:38 PM  Indication: Dyspnea, hypoxia; J44.1-Chronic obstructive pulmonary disease with (acute) exacerbation; R09.02-Hypoxemia; R13.10-Dysphagia, unspecified; Z78.9-Other specified health status; R06.02-Shortness of breath; J44.1-Chronic obstructive pulmonary disease with (acute) exacerbation.  Comparison Exams: CT chest from April 26, 2024  Technique: Single AP chest radiograph  FINDINGS: There are bibasilar opacities. The heart and mediastinal contours appear normal. The pulmonary vasculature appears normal. The osseous structures appear intact.      Bibasal opacities, which could reflect atelectasis or pneumonia.   Electronically Signed By-Danilo Adame MD On:5/1/2024 5:01 PM      CT Chest With Contrast Diagnostic    Result Date: 4/26/2024  CT CHEST W CONTRAST DIAGNOSTIC-  Date of Exam: 4/26/2024 7:39 AM  Indication: cp with soa, radiating to back.  Comparison: CT chest without contrast 6/26/2023  Technique: Axial CT images were obtained of the chest after the uneventful intravenous administration of 100 mL Isovue-370. Reconstructed coronal and sagittal images were also obtained. Automated exposure control and iterative construction methods were used.  Findings: Soft tissue of the lower neck are without acute abnormality. Heart size normal. Negative for pericardial effusion. Mild coronary artery calcification. No pulmonary embolus. Scattered mediastinal lymph nodes are below size criteria. Negative for aortic dissection. Aortic arch branch vasculature patent.  No pneumothorax. There are tree-in-bud opacities involving the posterior right upper lobe  consistent with endobronchial infection or inflammation with bronchial wall thickening. Consolidation in the right middle lobe likely atelectasis which may relate to segmental bronchial mucous plugging. No suspicious pulmonary nodule.  Visualized portions of the liver, spleen, adrenal glands, and pancreas are without acute abnormality. Low-attenuation right upper pole renal lesions partially imaged likely cyst. No free fluid in the upper abdomen. No aggressive osseous lesion or acute fracture.      Impression: 1. Negative for pulmonary embolus. 2. Right upper lobe tree-in-bud nodules consistent with endobronchial infection or inflammation. 3. Suspected mucous plugging in right middle lobe segmental bronchi with partial right middle lobe atelectasis. 4. Additional chronic findings above.    Electronically Signed By-Francisco Valenzuela MD On:4/26/2024 8:14 AM        LAB RESULTS:      Lab 05/02/24  0448 05/01/24  0449 04/30/24  0433 04/29/24  0501 04/28/24  0445 04/27/24  0359 04/26/24  2127 04/26/24  1534 04/26/24  1310 04/26/24  1032   WBC 15.07* 17.11* 12.67* 10.83* 12.94* 15.11*  --   --   --   --    HEMOGLOBIN 11.6* 11.7* 11.3* 11.2* 11.1* 11.1*  --   --   --   --    HEMATOCRIT 36.2 37.0 35.8 35.8 34.4 34.7  --   --   --   --    PLATELETS 237 239 226 217 228 225  --   --   --   --    NEUTROS ABS 9.44* 11.70* 7.67* 7.69* 11.09* 12.91*  --   --   --   --    IMMATURE GRANS (ABS) 0.28* 0.20* 0.16* 0.09* 0.07* 0.06*  --   --   --   --    LYMPHS ABS 3.88* 3.59* 3.39* 2.01 1.31 1.62  --   --   --   --    MONOS ABS 1.34* 1.56* 1.40* 1.03* 0.46 0.50  --   --   --   --    EOS ABS 0.06 0.02 0.01 0.00 0.00 0.00  --   --   --   --    MCV 85.4 85.8 86.5 86.3 84.5 85.3  --   --   --   --    PROCALCITONIN  --   --   --   --   --  0.10  --   --   --   --    LACTATE  --   --   --   --   --  1.5 3.1* 3.7* 2.7* 2.2*         Lab 05/02/24  0448 05/01/24  0449 04/30/24  0433 04/29/24  0501 04/28/24  0445 04/27/24  0359   SODIUM 139 138 140 138  136 139   POTASSIUM 4.0 3.7 3.9 3.8 4.3 4.1   CHLORIDE 107 103 106 105 106 107   CO2 21.2* 23.2 21.1* 19.7* 20.0* 19.2*   ANION GAP 10.8 11.8 12.9 13.3 10.0 12.8   BUN 24* 23 30* 29* 30* 20   CREATININE 1.06* 1.04* 1.21* 1.06* 1.10* 0.94   EGFR 53.5* 54.8* 45.7* 53.5* 51.2* 61.9   GLUCOSE 98 85 99 111* 148* 162*   CALCIUM 8.5* 8.8 9.0 8.6 9.2 8.8   MAGNESIUM 1.9 1.9 2.2  --  2.1 1.9   PHOSPHORUS 2.9 3.5 3.3  --  3.4 3.0         Lab 05/02/24  0448 05/01/24  0449 04/26/24  0726   TOTAL PROTEIN 5.9* 6.0 7.1   ALBUMIN 3.4* 3.7 4.2   GLOBULIN 2.5 2.3 2.9   ALT (SGPT) 13 13 8   AST (SGOT) 16 18 14   BILIRUBIN 0.5 0.6 0.4   ALK PHOS 65 67 87         Lab 04/26/24  0726   PROBNP 356.5   HSTROP T 9                 Brief Urine Lab Results  (Last result in the past 365 days)        Color   Clarity   Blood   Leuk Est   Nitrite   Protein   CREAT   Urine HCG        09/29/23 0925 Yellow   Turbid   Negative   Negative   Positive   Negative           09/29/23 0925             118.4         09/29/23 0925             134.9               Microbiology Results (last 10 days)       Procedure Component Value - Date/Time    AFB Culture - Wash, Bronchus [[id number]] Collected: 04/30/24 1131    Lab Status: Preliminary result Specimen: Wash from Bronchus Updated: 05/01/24 1438     AFB Stain No acid fast bacilli seen on direct smear      No acid fast bacilli seen on concentrated smear    Respiratory Culture - Wash, Bronchus [[id number]] Collected: 04/30/24 1131    Lab Status: Preliminary result Specimen: Wash from Bronchus Updated: 05/01/24 1014     Respiratory Culture Moderate growth (3+) The culture consists of normal respiratory rubén. This is a preliminary report; final report to follow.     Gram Stain Rare (1+) WBCs seen      Few (2+) Gram positive cocci in pairs and chains    AFB Culture - Lavage, Lung, Right Middle Lobe [[id number]] Collected: 04/30/24 1129    Lab Status: Preliminary result Specimen: Lavage from Lung, Right Middle Lobe  Updated: 05/01/24 1438     AFB Stain No acid fast bacilli seen on direct smear      No acid fast bacilli seen on concentrated smear    BAL Culture, Quantitative - Lavage, Lung, Right Middle Lobe [[id number]] Collected: 04/30/24 1129    Lab Status: Preliminary result Specimen: Lavage from Lung, Right Middle Lobe Updated: 05/01/24 1014     BAL Culture >100,000 CFU/mL The culture consists of normal respiratory rubén. This is a preliminary report; final report to follow.     Gram Stain Few (2+) WBCs seen      Rare (1+) Gram positive cocci in pairs and chains      Rare (1+) Epithelial cells seen    Pneumonia Panel - Lavage, Lung, Right Middle Lobe [[id number]]  (Abnormal) Collected: 04/30/24 1129    Lab Status: Final result Specimen: Lavage from Lung, Right Middle Lobe Updated: 04/30/24 1439     Escherichia coli PCR Not Detected     Acinetobacter calcoaceticus-baumannii complex PCR Not Detected     Enterobacter cloacae PCR Not Detected     Klebsiella oxytoca PCR Not Detected     Klebsiella pneumoniae group PCR Not Detected     Klebsiella aerogenes PCR Not Detected     Moraxella catarrhalis PCR Not Detected     Proteus species PCR Not Detected     Pseudomonas aeroginosa PCR Not Detected     Serratia marcescens PCR Not Detected     Staphylococcus aureus PCR Not Detected     Streptococcus pyogenes PCR Not Detected     Haemophilus influenzae PCR Not Detected     Streptococcus agalactiae PCR Not Detected     Streptococcus pneumoniae PCR Not Detected     Chlamydophila pneumoniae PCR Not Detected     Legionella pneumophilia PCR Not Detected     Mycoplasma pneumo by PCR Not Detected     ADENOVIRUS, PCR Not Detected     CTX-M Gene N/A     IMP Gene N/A     KPC Gene N/A     mecA/C and MREJ Gene N/A     NDM Gene N/A     OXA-48-like Gene N/A     VIM Gene N/A     Coronavirus Not Detected     Human Metapneumovirus Not Detected     Human Rhinovirus/Enterovirus Not Detected     Influenza A PCR Not Detected     Influenza B PCR Not Detected      RSV, PCR Not Detected     Parainfluenza virus PCR Detected    Respiratory Culture - Sputum, Throat [[id number]] Collected: 04/27/24 0130    Lab Status: Final result Specimen: Sputum from Throat Updated: 04/29/24 0953     Respiratory Culture Moderate growth (3+) Normal respiratory rubén. No S. aureus or Pseudomonas aeruginosa detected. Final report.     Gram Stain Moderate (3+) Gram positive cocci in pairs      Few (2+) Gram positive bacilli      Few (2+) Gram negative bacilli      Few (2+) Yeast      Less than 10 Epithelial cells per low power field      Less than 20 WBCs per low power field    S. Pneumo Ag Urine or CSF - Urine, Urine, Clean Catch [[id number]]  (Normal) Collected: 04/26/24 1403    Lab Status: Final result Specimen: Urine, Clean Catch Updated: 04/26/24 1422     Strep Pneumo Ag Negative    Legionella Antigen, Urine - Urine, Urine, Clean Catch [[id number]]  (Normal) Collected: 04/26/24 1403    Lab Status: Final result Specimen: Urine, Clean Catch Updated: 04/26/24 1422     LEGIONELLA ANTIGEN, URINE Negative    COVID PRE-OP / PRE-PROCEDURE SCREENING ORDER (NO ISOLATION) - Swab, Nasopharynx [[id number]]  (Normal) Collected: 04/26/24 0727    Lab Status: Final result Specimen: Swab from Nasopharynx Updated: 04/26/24 0814    Narrative:      The following orders were created for panel order COVID PRE-OP / PRE-PROCEDURE SCREENING ORDER (NO ISOLATION) - Swab, Nasopharynx.  Procedure                               Abnormality         Status                     ---------                               -----------         ------                     COVID-19, FLU A/B, RSV P...[[id number]]  Normal              Final result                 Please view results for these tests on the individual orders.    COVID-19, FLU A/B, RSV PCR 1 HR TAT - Swab, Nasopharynx [[id number]]  (Normal) Collected: 04/26/24 0727    Lab Status: Final result Specimen: Swab from Nasopharynx Updated: 04/26/24 0814     COVID19 Not Detected      Influenza A PCR Not Detected     Influenza B PCR Not Detected     RSV, PCR Not Detected    Narrative:      Fact sheet for providers: https://www.fda.gov/media/267158/download    Fact sheet for patients: https://www.fda.gov/media/926154/download    Test performed by PCR.    Blood Culture - Blood, Arm, Left [[id number]]  (Normal) Collected: 04/26/24 0726    Lab Status: Final result Specimen: Blood from Arm, Left Updated: 05/01/24 0745     Blood Culture No growth at 5 days    Blood Culture - Blood, Arm, Right [[id number]]  (Normal) Collected: 04/26/24 0726    Lab Status: Final result Specimen: Blood from Arm, Right Updated: 05/01/24 0745     Blood Culture No growth at 5 days                  Time spent on Discharge including face to face service: Greater than 30 minutes      Electronically signed by Fernando Torres MD, 05/02/24, 9:57 AM EDT.

## 2024-05-02 NOTE — PLAN OF CARE
Goal Outcome Evaluation:               Patient alert and oriented throughout shift. Patient on room air throughout shift with no signs of distress. Patient plan of care discussed at bedside. Patient to be discharged today. Will continue with patient plan of care.

## 2024-05-02 NOTE — OUTREACH NOTE
Prep Survey      Flowsheet Row Responses   [facility] patient discharged from? [facility]   Is LACE score < 7 ? No   Eligibility [facility]   Date of Admission 04/26/24   Date of Discharge 05/02/24   Discharge Disposition Home or Self Care   Discharge diagnosis COPD (chronic obstructive pulmonary disease)  [BRONCHOSCOPY WITH BRONCHOALVEOLAR LAVAGE, WASHING, AIRWAY INSPECTION]   Does the patient have one of the following disease processes/diagnoses(primary or secondary)? COPD   Does the patient have Home health ordered? No   Is there a DME ordered? No   Medication alerts for this patient see AVS   Prep survey completed? Yes            Nikki MORENO - Registered Nurse

## 2024-05-02 NOTE — PLAN OF CARE
Goal Outcome Evaluation:      A/Ox4, Pt on RA. Pt did have so expiratory wheezing, pt stated she does feel a lot better then the day before. She is ready to go home. Pt rested well throughout night with her CPAP on. No complaints throughout night. Will continue plan of care.

## 2024-05-02 NOTE — PROGRESS NOTES
Pulmonary / Critical Care Progress Note      Patient Name: Julia Rios  : 1945  MRN: [id number]  Attending:  Fernando Torres MD  Date of admission: 2024    Subjective   Subjective   Follow-up for COPD exacerbation, mucous plugging    Over the last 24 hours: Continues to be on Brovana, Pulmicort, and DuoNebs.  Continued on prednisone 40 mg oral daily.  Remains on Aldactone 25 mg oral 3 times weekly.  Work of breathing was worse yesterday.  Bronch culture grew parainfluenza.    No acute events overnight.    This morning,  Out of bed to chair.  Weaned oxygen down.  On room air this morning.  Underwent bronchoscopy yesterday  Pneumonia panel with parainfluenza virus  Cough and shortness of breath is much better.  No hemoptysis  No fever or chills  No nausea or vomiting    Objective   Objective     Vitals:   Temp:  [97.7 °F (36.5 °C)-98.2 °F (36.8 °C)] 97.7 °F (36.5 °C)  Heart Rate:  [61-67] 64  Resp:  [18] 18  BP: (122-157)/(57-87) 157/86  Flow (L/min):  [2] 2    Physical Exam   Vital Signs Reviewed   General:  WDWN, Alert, NAD.  Pleasant elderly female, lying in bed  HEENT:  PERRL, EOMI.  OP, nares clear  Chest: Improved aeration, improved rhonchi, no wheezing or crackles, equal rise and fall of chest, no work of breathing noted on 2 L nasal cannula  CV: RRR, no MGR, pulses 2+, equal.  Abd:  Soft, NT, ND, + BS, no HSM, obese  EXT:  no clubbing, no cyanosis, no edema  Neuro:  A&Ox3, CN grossly intact, no focal deficits.  Skin: No rashes or lesions noted    Result Review    Result Review:  I have personally reviewed the results from the time of this admission to 2024 07:12 EDT and agree with these findings:  [x]  Laboratory  [x]  Microbiology  [x]  Radiology  []  EKG/Telemetry   []  Cardiology/Vascular   []  Pathology  []  Old records  []  Other:  Most notable findings include:         Lab 24  0448 24  0449 24  0433 24  0501 24  0445 24  0359 24  0726    WBC 15.07* 17.11* 12.67* 10.83* 12.94* 15.11* 8.33   HEMOGLOBIN 11.6* 11.7* 11.3* 11.2* 11.1* 11.1* 12.6   HEMATOCRIT 36.2 37.0 35.8 35.8 34.4 34.7 40.5   PLATELETS 237 239 226 217 228 225 240   SODIUM 139 138 140 138 136 139 140   POTASSIUM 4.0 3.7 3.9 3.8 4.3 4.1 3.6   CHLORIDE 107 103 106 105 106 107 104   CO2 21.2* 23.2 21.1* 19.7* 20.0* 19.2* 22.4   BUN 24* 23 30* 29* 30* 20 17   CREATININE 1.06* 1.04* 1.21* 1.06* 1.10* 0.94 1.01*   GLUCOSE 98 85 99 111* 148* 162* 128*   CALCIUM 8.5* 8.8 9.0 8.6 9.2 8.8 9.0   PHOSPHORUS 2.9 3.5 3.3  --  3.4 3.0  --    TOTAL PROTEIN 5.9* 6.0  --   --   --   --  7.1   ALBUMIN 3.4* 3.7  --   --   --   --  4.2   GLOBULIN 2.5 2.3  --   --   --   --  2.9     CT Chest With Contrast Diagnostic    Result Date: 4/26/2024  CT CHEST W CONTRAST DIAGNOSTIC-  Date of Exam: 4/26/2024 7:39 AM  Indication: cp with soa, radiating to back.  Comparison: CT chest without contrast 6/26/2023  Technique: Axial CT images were obtained of the chest after the uneventful intravenous administration of 100 mL Isovue-370. Reconstructed coronal and sagittal images were also obtained. Automated exposure control and iterative construction methods were used.  Findings: Soft tissue of the lower neck are without acute abnormality. Heart size normal. Negative for pericardial effusion. Mild coronary artery calcification. No pulmonary embolus. Scattered mediastinal lymph nodes are below size criteria. Negative for aortic dissection. Aortic arch branch vasculature patent.  No pneumothorax. There are tree-in-bud opacities involving the posterior right upper lobe consistent with endobronchial infection or inflammation with bronchial wall thickening. Consolidation in the right middle lobe likely atelectasis which may relate to segmental bronchial mucous plugging. No suspicious pulmonary nodule.  Visualized portions of the liver, spleen, adrenal glands, and pancreas are without acute abnormality. Low-attenuation right  upper pole renal lesions partially imaged likely cyst. No free fluid in the upper abdomen. No aggressive osseous lesion or acute fracture.      Impression: Impression: 1. Negative for pulmonary embolus. 2. Right upper lobe tree-in-bud nodules consistent with endobronchial infection or inflammation. 3. Suspected mucous plugging in right middle lobe segmental bronchi with partial right middle lobe atelectasis. 4. Additional chronic findings above.    Electronically Signed By-Francisco Valenzuela MD On:4/26/2024 8:14 AM         Assessment & Plan   Assessment / Plan     Active Hospital Problems:  Active Hospital Problems    Diagnosis     **COPD (chronic obstructive pulmonary disease)     Shortness of breath      Impression:  Mucous plugging, difficulty with airway clearance   Bibasilar atelectasis   Acute COPD aspiration, not on any home O2   Recurrent rhinitis   CARMEN    History of fungal pneumonia s/p itraconazole course   History of pulmonary nodules   Tobacco abuse of cigarettes in remission      Plan:  -Continue to maintain SpO2 greater than 90%    -Okay to use home CPAP  -4/26 chest CT demonstrated no pulmonary embolus, right upper lobe tree-in-bud nodules.  Suspected mucous plugging in the right middle lobe segmental bronchi with partial right middle lobe atelectasis.    -R status post bronchoscopy and mucous plug removal.  Overall feeling better.  -Continue Brovana, Pulmicort, DuoNebs    -Continue prednisone 40 mg daily to complete 7 days   -Completed azithromycin x 5 days  -Continue Aldactone 25 mg oral 3 times weekly  Continue CPAP with sleep.  Can discharge from pulmonary standpoint.      -Follow-up pulmonary clinic 1 to 2 weeks of discharge       DVT prophylaxis:  Medical DVT prophylaxis orders are present.    CODE STATUS:   Level Of Support Discussed With: Patient  Code Status (Patient has no pulse and is not breathing): CPR (Attempt to Resuscitate)  Medical Interventions (Patient has pulse or is breathing): Full  Support    I have reviewed labs, imaging, pertinent clinical data and provider notes.   I have discussed with bedside nurse and primary service.     Electronically signed by Santino Baez MD, 05/02/24, 7:12 AM EDT.

## 2024-05-03 ENCOUNTER — TRANSITIONAL CARE MANAGEMENT TELEPHONE ENCOUNTER (OUTPATIENT)
Dept: CALL CENTER | Facility: HOSPITAL | Age: 79
End: 2024-05-03
Payer: MEDICARE

## 2024-05-03 NOTE — OUTREACH NOTE
Call Center TCM Note      Flowsheet Row Responses   [facility] patient discharged from? [facility]   Does the patient have one of the following disease processes/diagnoses(primary or secondary)? COPD   TCM attempt successful? Yes   Call start time 1342   Call end time 1345   Meds reviewed with patient/caregiver? Yes   Is the patient having any side effects they believe may be caused by any medication additions or changes? No   Does the patient have all medications ordered at discharge? Yes   Is the patient taking all medications as directed (includes completed medication regime)? Yes   Comments Hospital d/c follow up appt 5/6/24@1400.   Does the patient have an appointment with their PCP within 7-14 days of discharge? Yes   Has home health visited the patient within 72 hours of discharge? N/A   Pulse Ox monitoring Intermittent   Pulse Ox device source Patient   O2 Sat comments O2 sats 94% on RA   O2 Sat: education provided Sat levels, Monitoring frequency, When to seek care   Psychosocial issues? No   Did the patient receive a copy of their discharge instructions? Yes   Nursing interventions Reviewed instructions with patient   What is the patient's perception of their health status since discharge? Improving   Nursing Interventions Nurse provided patient education   Are the patient's immunizations up to date?  Yes   Nursing interventions Educated on importance of maintaining up to date immunizations as advised by provider   Is the patient/caregiver able to teach back the hierarchy of who to call/visit for symptoms/problems? PCP, Specialist, Home health nurse, Urgent Care, ED, 911 Yes   Is the patient able to teach back COPD zones? Yes   Nursing interventions Education provided on various zones   Patient reports what zone on this call? Yellow Zone   Green Zone Reports doing well, Usual activity and exercise level, Slept well last night, Appetite is good   Green Zone interventions: Take daily medications   Yellow  Zone More breathless than usual, I have less energy for my daily activities   Yellow interventions Continue taking daily medications, Get plenty of rest, Use quick relief inhaler   TCM call completed? Yes   Wrap up additional comments Patient reports she has not returned to baseline, using rescue inhaler and neb treatments as needed.   Call end time 1345   Would this patient benefit from a Referral to [facility] Social Work? No   Is the patient interested in additional calls from an ambulatory ? No            Caro Leong RN    5/3/2024, 13:46 EDT

## 2024-05-03 NOTE — PAYOR COMM NOTE
"Paulo Rios \"RHETT\" (79 y.o. Female)       Date of Birth   1945    Social Security Number       Address   [address] [place] [id number]    Home Phone   [phone]    MRN   [id number]       Restorationist   Christianity    Marital Status                               Admission Date   4/26/24    Admission Type   Emergency    Admitting Provider   Rita Alejo DO    Attending Provider       Department, Room/Bed   [facility] 4TH FLOOR MEDICAL TELEMETRY UNIT, 418/1       Discharge Date   5/2/2024    Discharge Disposition   Home or Self Care    Discharge Destination   Home                              Attending Provider: (none)   Allergies: Ibuprofen, Penicillins, Nsaids, Cephalosporins, Sulfa Antibiotics    Isolation: None   Infection: None   Code Status: Prior    Ht: 152.4 cm (60\")   Wt: 95.6 kg (210 lb 12.2 oz)    Admission Cmt: None   Principal Problem: COPD (chronic obstructive pulmonary disease) [J44.9]                   Active Insurance as of 4/26/2024       Primary Coverage       Payor Plan Insurance Group Employer/Plan Group    ANTH MEDICARE REPLACEMENT ANTHEM MEDICARE ADVANTAGE [id number]       Payor Plan Address Payor Plan Phone Number Payor Plan Fax Number Effective Dates    [address] [phone]  1/1/2024 - None Entered    [facility] [id number]         Subscriber Name Subscriber Birth Date Member ID       PAULO RIOS 1945 [id number]                     Emergency Contacts        (Rel.) Home Phone Work Phone Mobile Phone    DAVINA RIOS (Spouse) [phone] -- [phone]    ESTUARDODERRICK BARCLAY (Son) [phone] -- [phone]          #[id number]          PENDED CASE    DC 5/2    Nina S.  [facility] ,[facility]  Ph [phone]  F [phone]       Discharge Summary        Fernando Torres MD at 05/02/24 0957                         [facility]    Patient Name: Paulo MARISCAL " Blanca  : 1945  MRN: [id number]    Date of Admission: 2024  Date of Discharge:  2024  Primary Care Physician: Nick Patel,     Consults       Date and Time Order Name Status Description    2024 10:57 AM Inpatient Pulmonology Consult      2024  8:33 AM Inpatient Hospitalist Consult              Active and Resolved Hospital Problems:  Acute COPD exacerbation   Mucous plugging  Bibasilar atelectasis  Leukocytosis, likely reactive due to steroids  Lactic acidosis, likely due to nebulizer, resolved  History of CARMEN  History of hypertension  History of fungal pneumonia s/p itraconazole course  History of pulmonary nodules  History of recurrent rhinitis  Tobacco abuse, in remission    Hospital Course     Hospital Course:  Julia Rios is a 79 y.o. female with past medical history of CKD stage III, COPD not on home oxygen, hypertension, sleep apnea who presents with COPD exacerbation. Patient producing some clear sputum.  She reported some subjective fevers.  Patient's pulmonologist is Dr. Baez.  On presentation, patient was last vitals were stable and saturating well on room air.Patient's initial lactate is 2.8 with repeat lactic acid of 2.2 which was thought secondary to nebulizers she was receiving. CT of her chest with contrast showed right upper lobe tree-in-bud nodules consistent with endobronchial infection or inflammation; Suspected mucous plugging in right middle lobe segmental bronchi with partial right middle lobe atelectasis.  Pulmonology was consulted and patient was admitted for further evaluation and management.  Patient was treated with, bronchodilators, patient underwent bronchoscopy which was significant for parainfluenza virus.  Patient's respiratory status improved, she was evaluated by pulmonology on day of discharge and deemed safe to discharge home.  Patient is discharged home today in stable condition.  Patient should follow-up with pulmonology in 1 to  2 weeks    Day of Discharge     Vital Signs:  Temp:  [97.7 °F (36.5 °C)-98.2 °F (36.8 °C)] 97.9 °F (36.6 °C)  Heart Rate:  [61-67] 65  Resp:  [18] 18  BP: (122-157)/(57-97) 150/97  Flow (L/min):  [2] 2    Physical Exam:   GEN: No acute distress  HEENT: Moist mucous membranes  LUNGS: Equal chest rise bilaterally  CARDIAC: Regular rate and rhythm  NEURO: Moving all 4 extremities spontaneously  SKIN: No obvious breakdown    Discharge Details        Discharge Medications        New Medications        Instructions Start Date   guaiFENesin 600 MG 12 hr tablet  Commonly known as: MUCINEX   1,200 mg, Oral, Every 12 Hours Scheduled      predniSONE 20 MG tablet  Commonly known as: DELTASONE   40 mg, Oral, Daily With Breakfast   Start Date: May 3, 2024            Continue These Medications        Instructions Start Date   albuterol sulfate  (90 Base) MCG/ACT inhaler  Commonly known as: PROVENTIL HFA;VENTOLIN HFA;PROAIR HFA   2 puffs, Inhalation, Every 4 Hours PRN      albuterol 1.25 MG/3ML nebulizer solution  Commonly known as: ACCUNEB   3 mL, Nebulization, Every 6 Hours PRN      Arnuity Ellipta 100 MCG/ACT aerosol powder   Generic drug: Fluticasone Furoate   1 puff, Inhalation, Daily      azelastine 0.1 % nasal spray  Commonly known as: ASTELIN   2 sprays, Nasal, 2 Times Daily, Use in each nostril as directed      benzonatate 100 MG capsule  Commonly known as: TESSALON   100 mg, Oral, 3 Times Daily PRN      budesonide-formoterol 160-4.5 MCG/ACT inhaler  Commonly known as: SYMBICORT   2 puffs, Inhalation, Daily      D3-1000 25 MCG (1000 UT) capsule  Generic drug: Cholecalciferol   TAKE 1 CAPSULE BY MOUTH EVERY DAY      famotidine 20 MG tablet  Commonly known as: PEPCID   20 mg, Oral, Every Night at Bedtime      fexofenadine 180 MG tablet  Commonly known as: ALLEGRA   180 mg, Oral, Daily      gabapentin 300 MG capsule  Commonly known as: NEURONTIN   300 mg, Oral, Every Morning      gabapentin 300 MG capsule  Commonly  known as: NEURONTIN   600 mg, Oral, Nightly      losartan 100 MG tablet  Commonly known as: COZAAR   100 mg, Oral, Daily      metoprolol succinate  MG 24 hr tablet  Commonly known as: TOPROL-XL   100 mg, Oral, Daily, Hold if heartrate is <60.      montelukast 10 MG tablet  Commonly known as: SINGULAIR   10 mg, Oral, Nightly      spironolactone 25 MG tablet  Commonly known as: ALDACTONE   25 mg, Oral, 3 Times Weekly, Monday,Wednesday,Friday             ASK your doctor about these medications        Instructions Start Date   predniSONE 20 MG tablet  Commonly known as: DELTASONE  Ask about: Should I take this medication?   40 mg, Oral, Daily               Allergies   Allergen Reactions    Ibuprofen Hives    Penicillins Seizure and Other (See Comments)    Nsaids Other (See Comments)     CKD    Cephalosporins Rash    Sulfa Antibiotics Rash       Discharge Disposition:  Home or Self Care    Diet:  Hospital:  Diet Order   Procedures    Diet: Regular/House; Fluid Consistency: Thin (IDDSI 0)       Discharge Activity:       CODE STATUS:  Code Status and Medical Interventions:   Ordered at: 04/26/24 1025     Level Of Support Discussed With:    Patient     Code Status (Patient has no pulse and is not breathing):    CPR (Attempt to Resuscitate)     Medical Interventions (Patient has pulse or is breathing):    Full Support       Future Appointments   Date Time Provider Department Center   5/9/2024 10:45 AM Shanell Murray APRN [facility] ETW [facility]   5/24/2024  9:45 AM Nick Patel DO [facility] PC HFC [facility]   6/17/2024 11:00 AM Marielos Dover APRN [facility] CD ETOWN [facility]   7/26/2024 11:10 AM Celso rPakash MD [facility] L100 IZABEL   9/5/2024  2:45 PM Santino Baez MD [facility] ETW [facility]       Additional Instructions for the Follow-ups that You Need to Schedule       Discharge Follow-up with PCP   As directed       Currently Documented PCP:    Nick Patel DO    PCP Phone Number:    [phone]     Follow Up Details: 3  to 7 days        Discharge Follow-up with Specified Provider: pulm; 1 Week   As directed      To: pulm   Follow Up: 1 Week                Pertinent  and/or Most Recent Results     IMAGING:  XR Chest 1 View    Result Date: 5/1/2024  XR CHEST 1 VW-  Date of Exam: 5/1/2024 4:38 PM  Indication: Dyspnea, hypoxia; J44.1-Chronic obstructive pulmonary disease with (acute) exacerbation; R09.02-Hypoxemia; R13.10-Dysphagia, unspecified; Z78.9-Other specified health status; R06.02-Shortness of breath; J44.1-Chronic obstructive pulmonary disease with (acute) exacerbation.  Comparison Exams: CT chest from April 26, 2024  Technique: Single AP chest radiograph  FINDINGS: There are bibasilar opacities. The heart and mediastinal contours appear normal. The pulmonary vasculature appears normal. The osseous structures appear intact.      Bibasal opacities, which could reflect atelectasis or pneumonia.   Electronically Signed By-Danilo Adame MD On:5/1/2024 5:01 PM      CT Chest With Contrast Diagnostic    Result Date: 4/26/2024  CT CHEST W CONTRAST DIAGNOSTIC-  Date of Exam: 4/26/2024 7:39 AM  Indication: cp with soa, radiating to back.  Comparison: CT chest without contrast 6/26/2023  Technique: Axial CT images were obtained of the chest after the uneventful intravenous administration of 100 mL Isovue-370. Reconstructed coronal and sagittal images were also obtained. Automated exposure control and iterative construction methods were used.  Findings: Soft tissue of the lower neck are without acute abnormality. Heart size normal. Negative for pericardial effusion. Mild coronary artery calcification. No pulmonary embolus. Scattered mediastinal lymph nodes are below size criteria. Negative for aortic dissection. Aortic arch branch vasculature patent.  No pneumothorax. There are tree-in-bud opacities involving the posterior right upper lobe consistent with endobronchial infection or inflammation with bronchial wall thickening.  Consolidation in the right middle lobe likely atelectasis which may relate to segmental bronchial mucous plugging. No suspicious pulmonary nodule.  Visualized portions of the liver, spleen, adrenal glands, and pancreas are without acute abnormality. Low-attenuation right upper pole renal lesions partially imaged likely cyst. No free fluid in the upper abdomen. No aggressive osseous lesion or acute fracture.      Impression: 1. Negative for pulmonary embolus. 2. Right upper lobe tree-in-bud nodules consistent with endobronchial infection or inflammation. 3. Suspected mucous plugging in right middle lobe segmental bronchi with partial right middle lobe atelectasis. 4. Additional chronic findings above.    Electronically Signed By-Francisco Valenzuela MD On:4/26/2024 8:14 AM        LAB RESULTS:      Lab 05/02/24  0448 05/01/24  0449 04/30/24  0433 04/29/24  0501 04/28/24  0445 04/27/24  0359 04/26/24  2127 04/26/24  1534 04/26/24  1310 04/26/24  1032   WBC 15.07* 17.11* 12.67* 10.83* 12.94* 15.11*  --   --   --   --    HEMOGLOBIN 11.6* 11.7* 11.3* 11.2* 11.1* 11.1*  --   --   --   --    HEMATOCRIT 36.2 37.0 35.8 35.8 34.4 34.7  --   --   --   --    PLATELETS 237 239 226 217 228 225  --   --   --   --    NEUTROS ABS 9.44* 11.70* 7.67* 7.69* 11.09* 12.91*  --   --   --   --    IMMATURE GRANS (ABS) 0.28* 0.20* 0.16* 0.09* 0.07* 0.06*  --   --   --   --    LYMPHS ABS 3.88* 3.59* 3.39* 2.01 1.31 1.62  --   --   --   --    MONOS ABS 1.34* 1.56* 1.40* 1.03* 0.46 0.50  --   --   --   --    EOS ABS 0.06 0.02 0.01 0.00 0.00 0.00  --   --   --   --    MCV 85.4 85.8 86.5 86.3 84.5 85.3  --   --   --   --    PROCALCITONIN  --   --   --   --   --  0.10  --   --   --   --    LACTATE  --   --   --   --   --  1.5 3.1* 3.7* 2.7* 2.2*         Lab 05/02/24 0448 05/01/24 0449 04/30/24  0433 04/29/24  0501 04/28/24  0445 04/27/24  0359   SODIUM 139 138 140 138 136 139   POTASSIUM 4.0 3.7 3.9 3.8 4.3 4.1   CHLORIDE 107 103 106 105 106 107   CO2  21.2* 23.2 21.1* 19.7* 20.0* 19.2*   ANION GAP 10.8 11.8 12.9 13.3 10.0 12.8   BUN 24* 23 30* 29* 30* 20   CREATININE 1.06* 1.04* 1.21* 1.06* 1.10* 0.94   EGFR 53.5* 54.8* 45.7* 53.5* 51.2* 61.9   GLUCOSE 98 85 99 111* 148* 162*   CALCIUM 8.5* 8.8 9.0 8.6 9.2 8.8   MAGNESIUM 1.9 1.9 2.2  --  2.1 1.9   PHOSPHORUS 2.9 3.5 3.3  --  3.4 3.0         Lab 05/02/24  0448 05/01/24  0449 04/26/24  0726   TOTAL PROTEIN 5.9* 6.0 7.1   ALBUMIN 3.4* 3.7 4.2   GLOBULIN 2.5 2.3 2.9   ALT (SGPT) 13 13 8   AST (SGOT) 16 18 14   BILIRUBIN 0.5 0.6 0.4   ALK PHOS 65 67 87         Lab 04/26/24  0726   PROBNP 356.5   HSTROP T 9                 Brief Urine Lab Results  (Last result in the past 365 days)        Color   Clarity   Blood   Leuk Est   Nitrite   Protein   CREAT   Urine HCG        09/29/23 0925 Yellow   Turbid   Negative   Negative   Positive   Negative           09/29/23 0925             118.4         09/29/23 0925             134.9               Microbiology Results (last 10 days)       Procedure Component Value - Date/Time    AFB Culture - Wash, Bronchus [[id number]] Collected: 04/30/24 1131    Lab Status: Preliminary result Specimen: Wash from Bronchus Updated: 05/01/24 1438     AFB Stain No acid fast bacilli seen on direct smear      No acid fast bacilli seen on concentrated smear    Respiratory Culture - Wash, Bronchus [[id number]] Collected: 04/30/24 1131    Lab Status: Preliminary result Specimen: Wash from Bronchus Updated: 05/01/24 1014     Respiratory Culture Moderate growth (3+) The culture consists of normal respiratory rubén. This is a preliminary report; final report to follow.     Gram Stain Rare (1+) WBCs seen      Few (2+) Gram positive cocci in pairs and chains    AFB Culture - Lavage, Lung, Right Middle Lobe [[id number]] Collected: 04/30/24 1129    Lab Status: Preliminary result Specimen: Lavage from Lung, Right Middle Lobe Updated: 05/01/24 1439     AFB Stain No acid fast bacilli seen on direct smear      No  acid fast bacilli seen on concentrated smear    BAL Culture, Quantitative - Lavage, Lung, Right Middle Lobe [[id number]] Collected: 04/30/24 1129    Lab Status: Preliminary result Specimen: Lavage from Lung, Right Middle Lobe Updated: 05/01/24 1014     BAL Culture >100,000 CFU/mL The culture consists of normal respiratory rubén. This is a preliminary report; final report to follow.     Gram Stain Few (2+) WBCs seen      Rare (1+) Gram positive cocci in pairs and chains      Rare (1+) Epithelial cells seen    Pneumonia Panel - Lavage, Lung, Right Middle Lobe [[id number]]  (Abnormal) Collected: 04/30/24 1129    Lab Status: Final result Specimen: Lavage from Lung, Right Middle Lobe Updated: 04/30/24 1439     Escherichia coli PCR Not Detected     Acinetobacter calcoaceticus-baumannii complex PCR Not Detected     Enterobacter cloacae PCR Not Detected     Klebsiella oxytoca PCR Not Detected     Klebsiella pneumoniae group PCR Not Detected     Klebsiella aerogenes PCR Not Detected     Moraxella catarrhalis PCR Not Detected     Proteus species PCR Not Detected     Pseudomonas aeroginosa PCR Not Detected     Serratia marcescens PCR Not Detected     Staphylococcus aureus PCR Not Detected     Streptococcus pyogenes PCR Not Detected     Haemophilus influenzae PCR Not Detected     Streptococcus agalactiae PCR Not Detected     Streptococcus pneumoniae PCR Not Detected     Chlamydophila pneumoniae PCR Not Detected     Legionella pneumophilia PCR Not Detected     Mycoplasma pneumo by PCR Not Detected     ADENOVIRUS, PCR Not Detected     CTX-M Gene N/A     IMP Gene N/A     KPC Gene N/A     mecA/C and MREJ Gene N/A     NDM Gene N/A     OXA-48-like Gene N/A     VIM Gene N/A     Coronavirus Not Detected     Human Metapneumovirus Not Detected     Human Rhinovirus/Enterovirus Not Detected     Influenza A PCR Not Detected     Influenza B PCR Not Detected     RSV, PCR Not Detected     Parainfluenza virus PCR Detected    Respiratory Culture -  Sputum, Throat [[id number]] Collected: 04/27/24 0130    Lab Status: Final result Specimen: Sputum from Throat Updated: 04/29/24 0953     Respiratory Culture Moderate growth (3+) Normal respiratory rubén. No S. aureus or Pseudomonas aeruginosa detected. Final report.     Gram Stain Moderate (3+) Gram positive cocci in pairs      Few (2+) Gram positive bacilli      Few (2+) Gram negative bacilli      Few (2+) Yeast      Less than 10 Epithelial cells per low power field      Less than 20 WBCs per low power field    S. Pneumo Ag Urine or CSF - Urine, Urine, Clean Catch [[id number]]  (Normal) Collected: 04/26/24 1403    Lab Status: Final result Specimen: Urine, Clean Catch Updated: 04/26/24 1422     Strep Pneumo Ag Negative    Legionella Antigen, Urine - Urine, Urine, Clean Catch [[id number]]  (Normal) Collected: 04/26/24 1403    Lab Status: Final result Specimen: Urine, Clean Catch Updated: 04/26/24 1422     LEGIONELLA ANTIGEN, URINE Negative    COVID PRE-OP / PRE-PROCEDURE SCREENING ORDER (NO ISOLATION) - Swab, Nasopharynx [[id number]]  (Normal) Collected: 04/26/24 0727    Lab Status: Final result Specimen: Swab from Nasopharynx Updated: 04/26/24 0814    Narrative:      The following orders were created for panel order COVID PRE-OP / PRE-PROCEDURE SCREENING ORDER (NO ISOLATION) - Swab, Nasopharynx.  Procedure                               Abnormality         Status                     ---------                               -----------         ------                     COVID-19, FLU A/B, RSV P...[[id number]]  Normal              Final result                 Please view results for these tests on the individual orders.    COVID-19, FLU A/B, RSV PCR 1 HR TAT - Swab, Nasopharynx [[id number]]  (Normal) Collected: 04/26/24 0727    Lab Status: Final result Specimen: Swab from Nasopharynx Updated: 04/26/24 0814     COVID19 Not Detected     Influenza A PCR Not Detected     Influenza B PCR Not Detected     RSV, PCR Not Detected     Narrative:      Fact sheet for providers: https://www.fda.gov/media/144895/download    Fact sheet for patients: https://www.fda.gov/media/479519/download    Test performed by PCR.    Blood Culture - Blood, Arm, Left [[id number]]  (Normal) Collected: 04/26/24 0726    Lab Status: Final result Specimen: Blood from Arm, Left Updated: 05/01/24 0745     Blood Culture No growth at 5 days    Blood Culture - Blood, Arm, Right [[id number]]  (Normal) Collected: 04/26/24 0726    Lab Status: Final result Specimen: Blood from Arm, Right Updated: 05/01/24 0745     Blood Culture No growth at 5 days                  Time spent on Discharge including face to face service: Greater than 30 minutes      Electronically signed by Fernando Torres MD, 05/02/24, 9:57 AM EDT.        Electronically signed by Fernando Torres MD at 05/02/24 1000

## 2024-05-05 LAB
FUNGUS WND CULT: NORMAL
FUNGUS WND CULT: NORMAL
MYCOBACTERIUM SPEC CULT: NORMAL
MYCOBACTERIUM SPEC CULT: NORMAL
NIGHT BLUE STAIN TISS: NORMAL

## 2024-05-06 ENCOUNTER — OFFICE VISIT (OUTPATIENT)
Dept: FAMILY MEDICINE CLINIC | Facility: CLINIC | Age: 79
End: 2024-05-06
Payer: MEDICARE

## 2024-05-06 VITALS
TEMPERATURE: 96.9 F | OXYGEN SATURATION: 98 % | DIASTOLIC BLOOD PRESSURE: 72 MMHG | HEIGHT: 60 IN | SYSTOLIC BLOOD PRESSURE: 123 MMHG | WEIGHT: 212 LBS | HEART RATE: 60 BPM | BODY MASS INDEX: 41.62 KG/M2

## 2024-05-06 DIAGNOSIS — J41.1 BRONCHITIS, MUCOPURULENT RECURRENT: Primary | ICD-10-CM

## 2024-05-06 DIAGNOSIS — J44.9 CHRONIC OBSTRUCTIVE PULMONARY DISEASE, UNSPECIFIED COPD TYPE: ICD-10-CM

## 2024-05-06 DIAGNOSIS — Z09 HOSPITAL DISCHARGE FOLLOW-UP: ICD-10-CM

## 2024-05-06 PROCEDURE — 3074F SYST BP LT 130 MM HG: CPT | Performed by: FAMILY MEDICINE

## 2024-05-06 PROCEDURE — 99496 TRANSJ CARE MGMT HIGH F2F 7D: CPT | Performed by: FAMILY MEDICINE

## 2024-05-06 PROCEDURE — 3078F DIAST BP <80 MM HG: CPT | Performed by: FAMILY MEDICINE

## 2024-05-06 PROCEDURE — 1111F DSCHRG MED/CURRENT MED MERGE: CPT | Performed by: FAMILY MEDICINE

## 2024-05-08 RX ORDER — FAMOTIDINE 20 MG/1
20 TABLET, FILM COATED ORAL
Qty: 30 TABLET | Refills: 11 | Status: SHIPPED | OUTPATIENT
Start: 2024-05-08

## 2024-05-08 RX ORDER — LOSARTAN POTASSIUM 100 MG/1
100 TABLET ORAL DAILY
Qty: 90 TABLET | Refills: 3 | Status: SHIPPED | OUTPATIENT
Start: 2024-05-08

## 2024-05-09 ENCOUNTER — OFFICE VISIT (OUTPATIENT)
Dept: PULMONOLOGY | Facility: CLINIC | Age: 79
End: 2024-05-09
Payer: MEDICARE

## 2024-05-09 VITALS
TEMPERATURE: 97.5 F | HEART RATE: 53 BPM | OXYGEN SATURATION: 93 % | DIASTOLIC BLOOD PRESSURE: 76 MMHG | WEIGHT: 211.2 LBS | BODY MASS INDEX: 41.46 KG/M2 | SYSTOLIC BLOOD PRESSURE: 119 MMHG | HEIGHT: 60 IN | RESPIRATION RATE: 16 BRPM

## 2024-05-09 DIAGNOSIS — J98.11 ATELECTASIS: ICD-10-CM

## 2024-05-09 DIAGNOSIS — J12.2 PARAINFLUENZA VIRUS PNEUMONIA: ICD-10-CM

## 2024-05-09 DIAGNOSIS — J30.9 ALLERGIC RHINITIS, UNSPECIFIED SEASONALITY, UNSPECIFIED TRIGGER: ICD-10-CM

## 2024-05-09 DIAGNOSIS — J44.1 CHRONIC OBSTRUCTIVE PULMONARY DISEASE WITH ACUTE EXACERBATION: Primary | ICD-10-CM

## 2024-05-09 DIAGNOSIS — R09.82 PND (POST-NASAL DRIP): ICD-10-CM

## 2024-05-09 DIAGNOSIS — J45.40 MODERATE PERSISTENT ASTHMA, UNSPECIFIED WHETHER COMPLICATED: ICD-10-CM

## 2024-05-09 DIAGNOSIS — J44.9 CHRONIC OBSTRUCTIVE PULMONARY DISEASE, UNSPECIFIED COPD TYPE: ICD-10-CM

## 2024-05-09 DIAGNOSIS — G47.33 OSA (OBSTRUCTIVE SLEEP APNEA): ICD-10-CM

## 2024-05-09 DIAGNOSIS — T17.500A MUCUS PLUGGING OF BRONCHI: ICD-10-CM

## 2024-05-09 DIAGNOSIS — J41.1 BRONCHITIS, MUCOPURULENT RECURRENT: ICD-10-CM

## 2024-05-09 DIAGNOSIS — K21.00 GASTROESOPHAGEAL REFLUX DISEASE WITH ESOPHAGITIS WITHOUT HEMORRHAGE: ICD-10-CM

## 2024-05-09 RX ORDER — BUDESONIDE, GLYCOPYRROLATE, AND FORMOTEROL FUMARATE 160; 9; 4.8 UG/1; UG/1; UG/1
2 AEROSOL, METERED RESPIRATORY (INHALATION) 2 TIMES DAILY
Qty: 2 EACH | Refills: 0 | COMMUNITY
Start: 2024-05-09 | End: 2024-05-10

## 2024-05-09 RX ORDER — ALBUTEROL SULFATE 1.25 MG/3ML
3 SOLUTION RESPIRATORY (INHALATION) EVERY 6 HOURS PRN
Qty: 360 ML | Refills: 1 | Status: SHIPPED | OUTPATIENT
Start: 2024-05-09

## 2024-05-13 ENCOUNTER — READMISSION MANAGEMENT (OUTPATIENT)
Dept: CALL CENTER | Facility: HOSPITAL | Age: 79
End: 2024-05-13
Payer: MEDICARE

## 2024-05-13 NOTE — OUTREACH NOTE
COPD/PN Week 2 Survey      Flowsheet Row Responses   [facility] patient discharged from? [facility]   Does the patient have one of the following disease processes/diagnoses(primary or secondary)? COPD   Week 2 attempt successful? Yes   Call start time 1354   Call end time 1400   Discharge diagnosis COPD (chronic obstructive pulmonary disease)   Person spoke with today (if not patient) and relationship Patient   Meds reviewed with patient/caregiver? Yes   Does the patient have all medications ordered at discharge? Yes   Is the patient taking all medications as directed (includes completed medication regime)? Yes   Does the patient have a primary care provider?  Yes   Does the patient have an appointment with their PCP or specialist within 7 days of discharge? Yes   Has the patient kept scheduled appointments due by today? Yes   Has home health visited the patient within 72 hours of discharge? N/A   Pulse Ox monitoring Intermittent   Pulse Ox device source Patient   O2 Sat comments Reports mid 90's on room air.   O2 Sat: education provided Sat levels, Monitoring frequency, When to seek care   Psychosocial issues? No   Did the patient receive a copy of their discharge instructions? Yes   Nursing interventions Reviewed instructions with patient   What is the patient's perception of their health status since discharge? Improving   If the patient is a current smoker, are they able to teach back resources for cessation? Not a smoker   Is the patient/caregiver able to teach back the hierarchy of who to call/visit for symptoms/problems? PCP, Specialist, Home health nurse, Urgent Care, ED, 911 Yes   Is the patient able to teach back COPD zones? Yes   Patient reports what zone on this call? Green Zone   Green Zone Reports doing well  [Patient reports that she has improved and almost back to baseline.]   Green Zone interventions: Take daily medications, Use oxygen as prescribed, Continue regular exercise/diet plan   Week 2  call completed? Yes   Is the patient interested in additional calls from an ambulatory ? No   Would this patient benefit from a Referral to [facility] Social Work? No   Call end time 1400            PAULO BOWEN - Registered Nurse

## 2024-05-16 ENCOUNTER — TELEPHONE (OUTPATIENT)
Dept: PULMONOLOGY | Facility: CLINIC | Age: 79
End: 2024-05-16

## 2024-05-16 ENCOUNTER — HOSPITAL ENCOUNTER (OUTPATIENT)
Dept: GENERAL RADIOLOGY | Facility: HOSPITAL | Age: 79
Discharge: HOME | End: 2024-05-16
Admitting: NURSE PRACTITIONER
Payer: MEDICARE

## 2024-05-16 ENCOUNTER — OFFICE VISIT (OUTPATIENT)
Dept: PULMONOLOGY | Facility: CLINIC | Age: 79
End: 2024-05-16
Payer: MEDICARE

## 2024-05-16 VITALS
HEIGHT: 60 IN | SYSTOLIC BLOOD PRESSURE: 128 MMHG | HEART RATE: 60 BPM | OXYGEN SATURATION: 93 % | BODY MASS INDEX: 41.43 KG/M2 | RESPIRATION RATE: 18 BRPM | WEIGHT: 211 LBS | DIASTOLIC BLOOD PRESSURE: 85 MMHG

## 2024-05-16 DIAGNOSIS — J98.11 ATELECTASIS: ICD-10-CM

## 2024-05-16 DIAGNOSIS — J44.1 CHRONIC OBSTRUCTIVE PULMONARY DISEASE WITH ACUTE EXACERBATION: ICD-10-CM

## 2024-05-16 DIAGNOSIS — R05.1 ACUTE COUGH: ICD-10-CM

## 2024-05-16 DIAGNOSIS — J44.1 CHRONIC OBSTRUCTIVE PULMONARY DISEASE WITH ACUTE EXACERBATION: Primary | ICD-10-CM

## 2024-05-16 DIAGNOSIS — R06.02 SHORTNESS OF BREATH: ICD-10-CM

## 2024-05-16 PROCEDURE — 99214 OFFICE O/P EST MOD 30 MIN: CPT | Performed by: NURSE PRACTITIONER

## 2024-05-16 PROCEDURE — 1160F RVW MEDS BY RX/DR IN RCRD: CPT | Performed by: NURSE PRACTITIONER

## 2024-05-16 PROCEDURE — 1159F MED LIST DOCD IN RCRD: CPT | Performed by: NURSE PRACTITIONER

## 2024-05-16 PROCEDURE — 3074F SYST BP LT 130 MM HG: CPT | Performed by: NURSE PRACTITIONER

## 2024-05-16 PROCEDURE — 71046 X-RAY EXAM CHEST 2 VIEWS: CPT

## 2024-05-16 PROCEDURE — 3079F DIAST BP 80-89 MM HG: CPT | Performed by: NURSE PRACTITIONER

## 2024-05-16 RX ORDER — IPRATROPIUM BROMIDE AND ALBUTEROL SULFATE 2.5; .5 MG/3ML; MG/3ML
3 SOLUTION RESPIRATORY (INHALATION) ONCE
Status: SHIPPED | OUTPATIENT
Start: 2024-05-16

## 2024-05-16 RX ORDER — METHYLPREDNISOLONE 4 MG/1
TABLET ORAL
Qty: 21 TABLET | Refills: 0 | Status: SHIPPED | OUTPATIENT
Start: 2024-05-16

## 2024-05-16 RX ORDER — AZITHROMYCIN 250 MG/1
TABLET, FILM COATED ORAL
Qty: 6 TABLET | Refills: 0 | Status: SHIPPED | OUTPATIENT
Start: 2024-05-16

## 2024-05-16 NOTE — PROGRESS NOTES
Primary Care Provider  Nick Patel DO   Referring Provider  No ref. provider found    Patient Complaint  Follow-up (ACUTE ), TROUBLE BREATHING, and COPD      SUBJECTIVE    History of Presenting Illness  Julia Rios is a pleasant 79 y.o. female who presents to [facility] for acute visit for trouble breathing.  I last saw the patient last week, 5/9/2024, after she had been in the hospital for COPD exacerbation mucous plugging bilateral atelectasis.  Patient had had a bronchoscopy done by Dr. Baez 4/30/2024 due to persistent bronchitis and mucous plugging.Cytology returned negative for malignant cells. AFB and fungal cultures were no growth to date. BAL culture with normal respiratory rubén. Pneumonia panel positive for parainfluenza virus.     Patient presents today with trouble breathing, productive cough.  Patient states she just does not feel very good.  Her symptoms been going on for the past 48 hours. She takes allergy medicine azelastine nasal spray, Allegra, and montelukast for allergy symptoms.   She continues on albuterol nebulizer and albuterol inhaler as well as Breztri.  However patient states she has not done her albuterol today.  At present time she denies wheezing, headaches, chest pain, weight loss or hemoptysis. Denies fevers, chills and night sweats. Julia Rios is able to perform ADLs without difficulties and denies any swollen glands/lymph nodes in the head or neck.    I have personally reviewed the review of systems, past family, social, medical and surgical histories; and agree with their findings.    Review of Systems  Constitutional symptoms:  Denied complaints   Ear, nose, throat: Denied complaints  Cardiovascular:  Denied complaints  Respiratory: Shortness of air, cough, chest congestion  Gastrointestinal: Denied complaints  Musculoskeletal: Denied complaints    Family History   Problem Relation Age of Onset     Colon cancer Mother 61    Cancer Mother     Heart disease Mother     Heart failure Father     Asthma Father     Heart attack Father     Hyperlipidemia Father     Diabetes Brother     Breast cancer Maternal Grandmother     Stroke Paternal Grandfather     Malig Hyperthermia Neg Hx         Social History     Socioeconomic History    Marital status:    Tobacco Use    Smoking status: Former     Current packs/day: 0.00     Average packs/day: 0.5 packs/day for 32.0 years (16.0 ttl pk-yrs)     Types: Cigarettes     Start date: 1963     Quit date: 1995     Years since quittin.3     Passive exposure: Never    Smokeless tobacco: Never   Vaping Use    Vaping status: Never Used   Substance and Sexual Activity    Alcohol use: Never    Drug use: Never    Sexual activity: Defer        Past Medical History:   Diagnosis Date    Acute right-sided low back pain with right-sided sciatica 01/15/2018    Allergic rhinitis     Asthma     Back pain     2 bulging disc L2 L3    CHF (congestive heart failure)     Chronic heart failure with preserved ejection fraction     20 echocardiogram: 1.  Normal ejection fraction of 55%. 2.  Mild left ventricular hypertrophy. 3.  No significant valvular heart issues.     CKD stage 3 10/29/2019    COPD (chronic obstructive pulmonary disease)     Diverticulitis     Essential hypertension     GERD (gastroesophageal reflux disease)     Hemoptysis     non cancerous    Hyperlipidemia     Idiopathic chronic gout of multiple sites without tophus 2016    Primary osteoarthritis of right knee 2017    Right knee pain     Sleep apnea     CPAP    Vitamin D deficiency 2016        Immunization History   Administered Date(s) Administered    COVID-19 (MODERNA) 1st,2nd,3rd Dose Monovalent 2021, 2021, 2021, 2021    Fluzone High-Dose 65+yrs 2021, 2022, 2024    Fluzone Quad >6mos (Multi-dose) 10/01/2020    Pneumococcal Conjugate 13-Valent  (PCV13) 02/11/2015    Pneumococcal Conjugate 20-Valent (PCV20) 07/20/2023    Pneumococcal Polysaccharide (PPSV23) 05/01/2010       Allergies   Allergen Reactions    Ibuprofen Hives    Penicillins Seizure and Other (See Comments)    Nsaids Other (See Comments)     CKD    Cephalosporins Rash    Sulfa Antibiotics Rash          Current Outpatient Medications:     albuterol (ACCUNEB) 1.25 MG/3ML nebulizer solution, Take 3 mL by nebulization Every 6 (Six) Hours As Needed for Shortness of Air., Disp: 360 mL, Rfl: 1    albuterol sulfate  (90 Base) MCG/ACT inhaler, Inhale 2 puffs Every 4 (Four) Hours As Needed for Wheezing., Disp: 1 g, Rfl: 11    azelastine (ASTELIN) 0.1 % nasal spray, 2 sprays into the nostril(s) as directed by provider 2 (Two) Times a Day. Use in each nostril as directed, Disp: 30 mL, Rfl: 6    Budeson-Glycopyrrol-Formoterol (BREZTRI) 160-9-4.8 MCG/ACT aerosol inhaler, Inhale 2 puffs 2 (Two) Times a Day., Disp: 10.7 g, Rfl: 11    D3-1000 25 MCG (1000 UT) capsule, TAKE 1 CAPSULE BY MOUTH EVERY DAY, Disp: 90 capsule, Rfl: 3    famotidine (PEPCID) 20 MG tablet, Take 1 tablet by mouth every night at bedtime., Disp: 30 tablet, Rfl: 11    fexofenadine (ALLEGRA) 180 MG tablet, Take 1 tablet by mouth Daily., Disp: , Rfl:     gabapentin (NEURONTIN) 300 MG capsule, Take 1 capsule by mouth Every Morning., Disp: , Rfl:     gabapentin (NEURONTIN) 300 MG capsule, Take 2 capsules by mouth Every Night., Disp: , Rfl:     losartan (COZAAR) 100 MG tablet, Take 1 tablet by mouth Daily., Disp: 90 tablet, Rfl: 3    metoprolol succinate XL (TOPROL-XL) 100 MG 24 hr tablet, Take 1 tablet by mouth Daily. Hold if heartrate is <60., Disp: 90 tablet, Rfl: 3    montelukast (SINGULAIR) 10 MG tablet, Take 1 tablet by mouth Every Night., Disp: 90 tablet, Rfl: 3    spironolactone (ALDACTONE) 25 MG tablet, Take 1 tablet by mouth 3 (Three) Times a Week. Monday,Wednesday,Friday, Disp: , Rfl:     azithromycin (ZITHROMAX) 250 MG tablet,  "Take 2 by mouth today then 1 daily for 4 days, Disp: 6 tablet, Rfl: 0    methylPREDNISolone (MEDROL) 4 MG dose pack, Take as directed on package instructions., Disp: 21 tablet, Rfl: 0    Current Facility-Administered Medications:     ipratropium-albuterol (DUO-NEB) nebulizer solution 3 mL, 3 mL, Nebulization, Once, Shanell Murray, KALEB           Vital Signs   /85 (BP Location: Left arm, Patient Position: Sitting, Cuff Size: Adult)   Pulse 60   Resp 18   Ht 152.4 cm (60\")   Wt 95.7 kg (211 lb)   SpO2 93% Comment: Room air  BMI 41.21 kg/m²       OBJECTIVE    Physical Exam  Vital Signs Reviewed   WDWN, Alert, NAD.    HEENT:  PERRL, EOMI.  OP, nares clear  Neck:  Supple, no JVD, no thyromegaly  Chest:  good aeration, occasional rhonchi noted, no work of breathing noted  CV: RRR, no MGR, pulses 2+, equal.  Abd:  Soft, NT, ND, + BS, no HSM  EXT:  no clubbing, no cyanosis, no edema  Neuro:  A&Ox3, CN grossly intact, no focal deficits.  Skin: No rashes or lesions noted    Results Review  I have personally reviewed the prior office notes, hospital records, labs, and diagnostics.    ASSESSMENT         Patient Active Problem List   Diagnosis    Family history of colon cancer    Chronic obstructive pulmonary disease    Essential hypertension    Hyperlipidemia    CKD (chronic kidney disease) stage 3, GFR 30-59 ml/min    Chronic heart failure with preserved ejection fraction    CARMEN (obstructive sleep apnea)    Seasonal allergies    Gastroesophageal reflux disease    History of fungal pneumonia    Bronchitis, mucopurulent recurrent    Tobacco abuse, in remission    Pulmonary nodule    PND (post-nasal drip)    Status post fall    Allergic rhinitis    Asthma    Closed fracture of metatarsal bone    Diverticulitis    Hemoptysis    Idiopathic chronic gout of multiple sites without tophus    Primary localized osteoarthritis    Vitamin D deficiency    Morbid (severe) obesity due to excess calories    Umbilical hernia without " obstruction and without gangrene    Oral thrush    Arthritis of knee    Pre-diabetes    COPD (chronic obstructive pulmonary disease)    Shortness of breath    Hospital discharge follow-up       Encounter Diagnoses   Name Primary?    Chronic obstructive pulmonary disease with acute exacerbation Yes    Shortness of breath     Acute cough     Atelectasis       PLAN  -CXR now and will notify of results when available  -Duo nebulizer treatment now  -Azithromycin and Medrol Dosepak sent to her pharmacy for exacerbation  -Samples of Mucinex provided to patient in office today  -Patient advised if symptoms persist or worsen to call 911 or go to the emergency room  -Patient advised to continue with her albuterol inhaler nebulizer and Breztri as prescribed  -Patient advised to continue with her Allegra and Singulair for allergy symptoms      Diagnoses and all orders for this visit:    1. Chronic obstructive pulmonary disease with acute exacerbation (Primary)  -     XR Chest 2 View; Future  -     ipratropium-albuterol (DUO-NEB) nebulizer solution 3 mL  -     azithromycin (ZITHROMAX) 250 MG tablet; Take 2 by mouth today then 1 daily for 4 days  Dispense: 6 tablet; Refill: 0  -     methylPREDNISolone (MEDROL) 4 MG dose pack; Take as directed on package instructions.  Dispense: 21 tablet; Refill: 0    2. Shortness of breath  -     XR Chest 2 View; Future  -     ipratropium-albuterol (DUO-NEB) nebulizer solution 3 mL  -     azithromycin (ZITHROMAX) 250 MG tablet; Take 2 by mouth today then 1 daily for 4 days  Dispense: 6 tablet; Refill: 0  -     methylPREDNISolone (MEDROL) 4 MG dose pack; Take as directed on package instructions.  Dispense: 21 tablet; Refill: 0    3. Acute cough  -     XR Chest 2 View; Future  -     ipratropium-albuterol (DUO-NEB) nebulizer solution 3 mL  -     azithromycin (ZITHROMAX) 250 MG tablet; Take 2 by mouth today then 1 daily for 4 days  Dispense: 6 tablet; Refill: 0  -     methylPREDNISolone (MEDROL) 4  MG dose pack; Take as directed on package instructions.  Dispense: 21 tablet; Refill: 0    4. Atelectasis  -     XR Chest 2 View; Future  -     ipratropium-albuterol (DUO-NEB) nebulizer solution 3 mL  -     azithromycin (ZITHROMAX) 250 MG tablet; Take 2 by mouth today then 1 daily for 4 days  Dispense: 6 tablet; Refill: 0  -     methylPREDNISolone (MEDROL) 4 MG dose pack; Take as directed on package instructions.  Dispense: 21 tablet; Refill: 0           Smoking status: Former  Vaccination status: Reviewed  Medications personally reviewed    Follow Up  Return for Next scheduled follow up.    Patient was given instructions and counseling regarding her condition or for health maintenance advice. Please see specific information pulled into the AVS if appropriate.       I spent 25 minutes caring for Julia Rios on this date of service. This time includes time spent by me in the following activities:preparing for the visit, reviewing tests, obtaining and/or reviewing a separately obtained history, performing a medically appropriate examination and/or evaluation, counseling and educating the patient/family/caregiver, ordering medications, tests, or procedures, documenting information in the medical record, independently interpreting results and communicating that information with the patient/family/caregiver and answered questions family members, discuss medications.

## 2024-05-16 NOTE — TELEPHONE ENCOUNTER
"Caller: Julia Rios \"[email or web address]\"      Relationship: self     Best call back number: [phone]      Who are you requesting to speak with (clinical staff, provider,  specific staff member):      What was the call regarding: returning call to [facility]  "

## 2024-05-22 DIAGNOSIS — K21.00 GASTROESOPHAGEAL REFLUX DISEASE WITH ESOPHAGITIS WITHOUT HEMORRHAGE: ICD-10-CM

## 2024-05-22 DIAGNOSIS — J45.40 MODERATE PERSISTENT ASTHMA, UNSPECIFIED WHETHER COMPLICATED: ICD-10-CM

## 2024-05-22 DIAGNOSIS — J44.9 CHRONIC OBSTRUCTIVE PULMONARY DISEASE, UNSPECIFIED COPD TYPE: ICD-10-CM

## 2024-05-22 DIAGNOSIS — J30.9 ALLERGIC RHINITIS, UNSPECIFIED SEASONALITY, UNSPECIFIED TRIGGER: ICD-10-CM

## 2024-05-22 DIAGNOSIS — R09.82 PND (POST-NASAL DRIP): ICD-10-CM

## 2024-05-22 DIAGNOSIS — J41.1 BRONCHITIS, MUCOPURULENT RECURRENT: ICD-10-CM

## 2024-05-22 RX ORDER — MONTELUKAST SODIUM 10 MG/1
10 TABLET ORAL NIGHTLY
Qty: 90 TABLET | Refills: 3 | Status: SHIPPED | OUTPATIENT
Start: 2024-05-22

## 2024-05-22 RX ORDER — GABAPENTIN 300 MG/1
600 CAPSULE ORAL NIGHTLY
OUTPATIENT
Start: 2024-05-22

## 2024-05-22 RX ORDER — METOPROLOL SUCCINATE 100 MG/1
100 TABLET, EXTENDED RELEASE ORAL DAILY
Qty: 90 TABLET | Refills: 3 | Status: SHIPPED | OUTPATIENT
Start: 2024-05-22

## 2024-05-22 RX ORDER — GABAPENTIN 300 MG/1
300 CAPSULE ORAL EVERY MORNING
OUTPATIENT
Start: 2024-05-22

## 2024-05-23 ENCOUNTER — READMISSION MANAGEMENT (OUTPATIENT)
Dept: CALL CENTER | Facility: HOSPITAL | Age: 79
End: 2024-05-23
Payer: MEDICARE

## 2024-05-23 NOTE — OUTREACH NOTE
COPD/PN Week 3 Survey      Flowsheet Row Responses   [facility] patient discharged from? [facility]   Does the patient have one of the following disease processes/diagnoses(primary or secondary)? COPD   Week 3 attempt successful? Yes   Call start time 1716   Call end time 1719   Discharge diagnosis COPD (chronic obstructive pulmonary disease)   Person spoke with today (if not patient) and relationship Patient   Meds reviewed with patient/caregiver? Yes   Is the patient having any side effects they believe may be caused by any medication additions or changes? No   Does the patient have all medications ordered at discharge? Yes   Is the patient taking all medications as directed (includes completed medication regime)? Yes   Does the patient have a primary care provider?  Yes   Does the patient have an appointment with their PCP or specialist within 7 days of discharge? Yes   Has the patient kept scheduled appointments due by today? Yes   Psychosocial issues? No   Did the patient receive a copy of their discharge instructions? Yes   Nursing interventions Reviewed instructions with patient   What is the patient's perception of their health status since discharge? Improving   Nursing Interventions Nurse provided patient education   Are the patient's immunizations up to date?  Yes   If the patient is a current smoker, are they able to teach back resources for cessation? Not a smoker   Is the patient/caregiver able to teach back the hierarchy of who to call/visit for symptoms/problems? PCP, Specialist, Home health nurse, Urgent Care, ED, 911 Yes   Is the patient able to teach back COPD zones? Yes   Nursing interventions Education provided on various zones   Patient reports what zone on this call? Green Zone   Green Zone Reports doing well   Week 3 call completed? Yes   Graduated Yes   Is the patient interested in additional calls from an ambulatory ? No   Would this patient benefit from a Referral to Amb  Social Work? No   Wrap up additional comments Patient reports doing better. No needs at this time.   Call end time [id number]            Wiliam VALENTINO - Registered Nurse

## 2024-05-24 ENCOUNTER — OFFICE VISIT (OUTPATIENT)
Dept: FAMILY MEDICINE CLINIC | Facility: CLINIC | Age: 79
End: 2024-05-24
Payer: MEDICARE

## 2024-05-24 VITALS
HEART RATE: 55 BPM | OXYGEN SATURATION: 94 % | TEMPERATURE: 96.8 F | BODY MASS INDEX: 41.43 KG/M2 | SYSTOLIC BLOOD PRESSURE: 134 MMHG | WEIGHT: 211 LBS | HEIGHT: 60 IN | DIASTOLIC BLOOD PRESSURE: 64 MMHG

## 2024-05-24 DIAGNOSIS — J02.9 SORE THROAT: Primary | ICD-10-CM

## 2024-05-24 DIAGNOSIS — R73.03 PRE-DIABETES: ICD-10-CM

## 2024-05-24 DIAGNOSIS — I10 ESSENTIAL HYPERTENSION: ICD-10-CM

## 2024-05-24 DIAGNOSIS — E78.2 MIXED HYPERLIPIDEMIA: ICD-10-CM

## 2024-05-24 LAB
EXPIRATION DATE: NORMAL
INTERNAL CONTROL: NORMAL
Lab: NORMAL
S PYO AG THROAT QL: NEGATIVE

## 2024-05-24 PROCEDURE — 99214 OFFICE O/P EST MOD 30 MIN: CPT | Performed by: FAMILY MEDICINE

## 2024-05-24 PROCEDURE — 3078F DIAST BP <80 MM HG: CPT | Performed by: FAMILY MEDICINE

## 2024-05-24 PROCEDURE — 1126F AMNT PAIN NOTED NONE PRSNT: CPT | Performed by: FAMILY MEDICINE

## 2024-05-24 PROCEDURE — 3075F SYST BP GE 130 - 139MM HG: CPT | Performed by: FAMILY MEDICINE

## 2024-05-24 PROCEDURE — 87880 STREP A ASSAY W/OPTIC: CPT | Performed by: FAMILY MEDICINE

## 2024-05-24 NOTE — ASSESSMENT & PLAN NOTE
Unfortunately she was not able to tolerate the Nexletol either.  She does see cardiology in the near future.  She will talk to them about Repatha or Leqvio.

## 2024-05-24 NOTE — ASSESSMENT & PLAN NOTE
Her recent A1c was good but still prediabetic.  She will continue to work on reduce calories to reduce carbs increase physical activity as tolerated and weight loss.

## 2024-05-24 NOTE — PROGRESS NOTES
Chief Complaint   Patient presents with    Follow-up     4 month     Hyperlipidemia     C/O sore throat possibly due to c pap     Hypertension        Subjective     Julia Rios  has a past medical history of Acute right-sided low back pain with right-sided sciatica (01/15/2018), Allergic rhinitis, Asthma, Back pain, CHF (congestive heart failure), Chronic heart failure with preserved ejection fraction, CKD stage 3 (10/29/2019), COPD (chronic obstructive pulmonary disease), Diverticulitis, Essential hypertension, GERD (gastroesophageal reflux disease), Hemoptysis, Hyperlipidemia, Idiopathic chronic gout of multiple sites without tophus (05/20/2016), Primary osteoarthritis of right knee (01/16/2017), Right knee pain, Sleep apnea, and Vitamin D deficiency (01/13/2016).    Shortness of Breath  This is a chronic problem. The current episode started more than 1 month ago. The problem occurs daily. The problem has been improved. Associated symptoms include orthopnea, a sore throat and sputum production. Pertinent negatives include no chest pain, fever, headaches, hemoptysis, leg pain, leg swelling, PND, swollen glands, syncope, vomiting or wheezing. The symptoms are aggravated by exercise. Treatments tried: Breztri inhaler. The treatment provided significant relief. Her past medical history is significant for asthma, COPD and a heart failure. There has been no fever. Most recent home oxygen level percentage is 93  Hyperlipidemia  This is a chronic problem. The current episode started more than 1 year ago. The problem is uncontrolled. Recent lipid tests were reviewed and are high. Exacerbating diseases include diabetes and obesity. Associated symptoms include shortness of breath. Pertinent negatives include no chest pain or leg pain. She is currently on no antihyperlipidemic treatment. The current treatment provides no improvement of lipids. Compliance problems include medication side effects.  Risk factors for  coronary artery disease include diabetes mellitus, dyslipidemia, hypertension, male sex, obesity, post-menopausal and a sedentary lifestyle.   Hypertension  This is a chronic problem. The current episode started more than 1 year ago. The problem has been stable since onset. The problem is controlled. Associated symptoms include orthopnea and shortness of breath. Pertinent negatives include no chest pain, headaches or PND. Risk factors for coronary artery disease include diabetes mellitus, dyslipidemia, obesity and post-menopausal state. Current antihypertension treatment includes angiotensin blockers, beta blockers and diuretics. There are no compliance problems.  Hypertensive end-organ damage includes heart failure.       PHQ-2 Depression Screening  Little interest or pleasure in doing things?     Feeling down, depressed, or hopeless?     PHQ-2 Total Score     PHQ-9 Depression Screening  Little interest or pleasure in doing things?     Feeling down, depressed, or hopeless?     Trouble falling or staying asleep, or sleeping too much?     Feeling tired or having little energy?     Poor appetite or overeating?     Feeling bad about yourself - or that you are a failure or have let yourself or your family down?     Trouble concentrating on things, such as reading the newspaper or watching television?     Moving or speaking so slowly that other people could have noticed? Or the opposite - being so fidgety or restless that you have been moving around a lot more than usual?     Thoughts that you would be better off dead, or of hurting yourself in some way?     PHQ-9 Total Score     If you checked off any problems, how difficult have these problems made it for you to do your work, take care of things at home, or get along with other people?       Allergies   Allergen Reactions    Ibuprofen Hives    Penicillins Seizure and Other (See Comments)    Nsaids Other (See Comments)     CKD    Cephalosporins Rash    Sulfa Antibiotics  Rash       Prior to Admission medications    Medication Sig Start Date End Date Taking? Authorizing Provider   albuterol (ACCUNEB) 1.25 MG/3ML nebulizer solution Take 3 mL by nebulization Every 6 (Six) Hours As Needed for Shortness of Air. 5/9/24  Yes Shanell Murray APRN   albuterol sulfate  (90 Base) MCG/ACT inhaler Inhale 2 puffs Every 4 (Four) Hours As Needed for Wheezing. 10/6/23  Yes Shanell Murray APRN   azelastine (ASTELIN) 0.1 % nasal spray 2 sprays into the nostril(s) as directed by provider 2 (Two) Times a Day. Use in each nostril as directed 1/3/24  Yes Santino Baez MD   Budeson-Glycopyrrol-Formoterol (BREZTRI) 160-9-4.8 MCG/ACT aerosol inhaler Inhale 2 puffs 2 (Two) Times a Day. 5/9/24  Yes Shanell Murray APRN   D3-1000 25 MCG (1000 UT) capsule TAKE 1 CAPSULE BY MOUTH EVERY DAY 10/20/22  Yes Nick Patel, DO   famotidine (PEPCID) 20 MG tablet Take 1 tablet by mouth every night at bedtime. 5/8/24  Yes Shanell Murray APRN   fexofenadine (ALLEGRA) 180 MG tablet Take 1 tablet by mouth Daily.   Yes Rene Robert MD   gabapentin (NEURONTIN) 300 MG capsule Take 1 capsule by mouth Every Morning.   Yes Rene Robert MD   gabapentin (NEURONTIN) 300 MG capsule Take 2 capsules by mouth Every Night.   Yes Rene Robert MD   losartan (COZAAR) 100 MG tablet Take 1 tablet by mouth Daily. 5/8/24  Yes Nick Patel, DO   metoprolol succinate XL (TOPROL-XL) 100 MG 24 hr tablet Take 1 tablet by mouth Daily. Hold if heartrate is <60. 5/22/24  Yes Nick Patel DO   montelukast (SINGULAIR) 10 MG tablet Take 1 tablet by mouth Every Night. 5/22/24  Yes Nick Patel, DO   spironolactone (ALDACTONE) 25 MG tablet Take 1 tablet by mouth 3 (Three) Times a Week. Monday,Wednesday,Friday   Yes Provider, MD Rene   azithromycin (ZITHROMAX) 250 MG tablet Take 2 by mouth today then 1 daily for 4 days  Patient not taking: Reported on 5/24/2024 5/16/24    Shanell Murray APRN   methylPREDNISolone (MEDROL) 4 MG dose pack Take as directed on package instructions.  Patient not taking: Reported on 5/24/2024 5/16/24   Shanell Murray APRN        Patient Active Problem List   Diagnosis    Family history of colon cancer    Chronic obstructive pulmonary disease    Essential hypertension    Hyperlipidemia    CKD (chronic kidney disease) stage 3, GFR 30-59 ml/min    Chronic heart failure with preserved ejection fraction    CARMEN (obstructive sleep apnea)    Seasonal allergies    Gastroesophageal reflux disease    History of fungal pneumonia    Bronchitis, mucopurulent recurrent    Tobacco abuse, in remission    Pulmonary nodule    PND (post-nasal drip)    Status post fall    Allergic rhinitis    Asthma    Closed fracture of metatarsal bone    Diverticulitis    Hemoptysis    Idiopathic chronic gout of multiple sites without tophus    Primary localized osteoarthritis    Vitamin D deficiency    Morbid (severe) obesity due to excess calories    Umbilical hernia without obstruction and without gangrene    Oral thrush    Arthritis of knee    Pre-diabetes    COPD (chronic obstructive pulmonary disease)    Shortness of breath    Hospital discharge follow-up        Past Surgical History:   Procedure Laterality Date    BRONCHOSCOPY N/A 4/30/2024    Procedure: BRONCHOSCOPY WITH BRONCHOALVEOLAR LAVAGE, WASHING, AIRWAY INSPECTION;  Surgeon: Santino Baez MD;  Location: [facility] ENDOSCOPY;  Service: Pulmonary;  Laterality: N/A;  MUCOUS PLUGGING, BRONCHITIS    CATARACT EXTRACTION Right     COLONOSCOPY  2014    COLONOSCOPY N/A 10/12/2021    Procedure: COLONOSCOPY;  Surgeon: Jorge Diane MD;  Location: [facility] ENDOSCOPY;  Service: Gastroenterology;  Laterality: N/A;  DIVERTICULOSIS    ENDOSCOPY  2015    EYE SURGERY      cataract right eye    OTHER SURGICAL HISTORY      Fatty tumor removed off back    RETINAL DETACHMENT REPAIR Right     hole in retina repair    TOTAL KNEE ARTHROPLASTY  "Right 10/31/2023    Procedure: TOTAL KNEE ARTHROPLASTY;  Surgeon: Celso Prakash MD;  Location[facility];  Service: Orthopedics;  Laterality: Right;       Social History     Socioeconomic History    Marital status:    Tobacco Use    Smoking status: Former     Current packs/day: 0.00     Average packs/day: 0.5 packs/day for 32.0 years (16.0 ttl pk-yrs)     Types: Cigarettes     Start date: 1963     Quit date: 1995     Years since quittin.4     Passive exposure: Never    Smokeless tobacco: Never   Vaping Use    Vaping status: Never Used   Substance and Sexual Activity    Alcohol use: Never    Drug use: Never    Sexual activity: Defer       Family History   Problem Relation Age of Onset    Colon cancer Mother 61    Cancer Mother     Heart disease Mother     Heart failure Father     Asthma Father     Heart attack Father     Hyperlipidemia Father     Diabetes Brother     Breast cancer Maternal Grandmother     Stroke Paternal Grandfather     Malig Hyperthermia Neg Hx        Family history, surgical history, past medical history, Allergies and meds reviewed with patient today and updated in [facility] [employer]MR.     ROS:  Review of Systems   Constitutional:  Negative for fever.   HENT:  Positive for sore throat. Negative for congestion and swollen glands.    Respiratory:  Positive for cough, sputum production and shortness of breath. Negative for hemoptysis, chest tightness and wheezing.    Cardiovascular:  Positive for orthopnea. Negative for chest pain, leg swelling, syncope and PND.   Gastrointestinal:  Negative for vomiting.       OBJECTIVE:  Vitals:    24 0935   BP: 134/64   BP Location: Left arm   Patient Position: Sitting   Pulse: 55   Temp: 96.8 °F (36 °C)   SpO2: 94%   Weight: 95.7 kg (211 lb)   Height: 152.4 cm (60\")     No results found.   Body mass index is 41.21 kg/m².  No LMP recorded. Patient is postmenopausal.    The 10-year ASCVD risk score (Karthikeyan DK, et al., 2019) is: 54.1%    " Values used to calculate the score:      Age: 79 years      Sex: Female      Is Non- : No      Diabetic: Yes      Tobacco smoker: No      Systolic Blood Pressure: 134 mmHg      Is BP treated: Yes      HDL Cholesterol: 49 mg/dL      Total Cholesterol: 187 mg/dL     Physical Exam  Vitals and nursing note reviewed.   Constitutional:       General: She is not in acute distress.     Appearance: Normal appearance. She is obese.   HENT:      Head: Normocephalic.      Right Ear: Tympanic membrane, ear canal and external ear normal.      Left Ear: Tympanic membrane, ear canal and external ear normal.      Nose: Nose normal.      Mouth/Throat:      Mouth: Mucous membranes are moist.      Pharynx: Oropharynx is clear.   Eyes:      General: No scleral icterus.     Conjunctiva/sclera: Conjunctivae normal.      Pupils: Pupils are equal, round, and reactive to light.   Cardiovascular:      Rate and Rhythm: Normal rate and regular rhythm.      Pulses: Normal pulses.      Heart sounds: Normal heart sounds. No murmur heard.  Pulmonary:      Effort: Pulmonary effort is normal.      Breath sounds: Normal breath sounds. No wheezing, rhonchi or rales.   Musculoskeletal:      Cervical back: Neck supple. No rigidity or tenderness.   Lymphadenopathy:      Cervical: No cervical adenopathy.   Skin:     General: Skin is warm and dry.      Coloration: Skin is not jaundiced.      Findings: No rash.   Neurological:      General: No focal deficit present.      Mental Status: She is alert and oriented to person, place, and time.   Psychiatric:         Mood and Affect: Mood normal.         Thought Content: Thought content normal.         Judgment: Judgment normal.         Procedures    Office Visit on 05/24/2024   Component Date Value Ref Range Status    Rapid Strep A Screen 05/24/2024 Negative  Negative, VALID, INVALID, Not Performed Final    Internal Control 05/24/2024 Passed  Passed Final    Lot Number 05/24/2024 [id number]    Final    Expiration Date 05/24/2024 10/20/2025   Final   No results displayed because visit has over 200 results.      Admission on 04/25/2024, Discharged on 04/25/2024   Component Date Value Ref Range Status    SARS Antigen 04/25/2024 Not Detected  Not Detected, Presumptive Negative Final    Influenza A Antigen JULIA 04/25/2024 Not Detected  Not Detected Final    Influenza B Antigen JULIA 04/25/2024 Not Detected  Not Detected Final    Internal Control 04/25/2024 Passed  Passed Final    Lot Number 04/25/2024 [id number]   Final    Expiration Date 04/25/2024 1/16/25   Final       ASSESSMENT/ PLAN:    Diagnoses and all orders for this visit:    1. Sore throat (Primary)  -     POCT rapid strep A    2. Mixed hyperlipidemia  Assessment & Plan:   Unfortunately she was not able to tolerate the Nexletol either.  She does see cardiology in the near future.  She will talk to them about Repatha or Leqvio.      3. Essential hypertension  Assessment & Plan:  Her blood pressure is good here today.  Will continue her current meds.      4. Pre-diabetes  Assessment & Plan:  Her recent A1c was good but still prediabetic.  She will continue to work on reduce calories to reduce carbs increase physical activity as tolerated and weight loss.                 Orders Placed Today:     No orders of the defined types were placed in this encounter.       Management Plan:     An After Visit Summary was printed and given to the patient at discharge.    Follow-up: Return in about 4 months (around 9/24/2024) for Recheck.    Nick Patel,  5/24/2024 10:11 EDT  This note was electronically signed.  Answers submitted by the patient for this visit:  Office Visit on 5/24/2024  9:45 AM with Nick Patel  Primary Reason for Visit (Submitted on 5/17/2024)  What is the primary reason for your visit?: Shortness of Breath

## 2024-05-28 LAB
FUNGUS WND CULT: NORMAL
FUNGUS WND CULT: NORMAL
MYCOBACTERIUM SPEC CULT: NORMAL
NIGHT BLUE STAIN TISS: NORMAL
NIGHT BLUE STAIN TISS: NORMAL

## 2024-06-03 LAB
MYCOBACTERIUM SPEC CULT: ABNORMAL
MYCOBACTERIUM SPEC CULT: ABNORMAL
NIGHT BLUE STAIN TISS: ABNORMAL
NIGHT BLUE STAIN TISS: ABNORMAL

## 2024-06-04 LAB
MYCOBACTERIUM SPEC CULT: NORMAL
NIGHT BLUE STAIN TISS: NORMAL
NIGHT BLUE STAIN TISS: NORMAL

## 2024-06-10 ENCOUNTER — OFFICE VISIT (OUTPATIENT)
Dept: PULMONOLOGY | Facility: CLINIC | Age: 79
End: 2024-06-10
Payer: MEDICARE

## 2024-06-10 VITALS
DIASTOLIC BLOOD PRESSURE: 63 MMHG | TEMPERATURE: 97.7 F | OXYGEN SATURATION: 96 % | BODY MASS INDEX: 41.99 KG/M2 | SYSTOLIC BLOOD PRESSURE: 107 MMHG | WEIGHT: 213.9 LBS | RESPIRATION RATE: 16 BRPM | HEART RATE: 58 BPM | HEIGHT: 60 IN

## 2024-06-10 DIAGNOSIS — J44.1 CHRONIC OBSTRUCTIVE PULMONARY DISEASE WITH ACUTE EXACERBATION: Primary | ICD-10-CM

## 2024-06-10 DIAGNOSIS — J30.9 ALLERGIC RHINITIS, UNSPECIFIED SEASONALITY, UNSPECIFIED TRIGGER: ICD-10-CM

## 2024-06-10 DIAGNOSIS — J98.11 ATELECTASIS: ICD-10-CM

## 2024-06-10 DIAGNOSIS — T17.500A MUCUS PLUGGING OF BRONCHI: ICD-10-CM

## 2024-06-10 DIAGNOSIS — R06.02 SHORTNESS OF BREATH: ICD-10-CM

## 2024-06-10 PROCEDURE — 1160F RVW MEDS BY RX/DR IN RCRD: CPT | Performed by: NURSE PRACTITIONER

## 2024-06-10 PROCEDURE — 1159F MED LIST DOCD IN RCRD: CPT | Performed by: NURSE PRACTITIONER

## 2024-06-10 PROCEDURE — 99214 OFFICE O/P EST MOD 30 MIN: CPT | Performed by: NURSE PRACTITIONER

## 2024-06-10 PROCEDURE — 3074F SYST BP LT 130 MM HG: CPT | Performed by: NURSE PRACTITIONER

## 2024-06-10 PROCEDURE — 3078F DIAST BP <80 MM HG: CPT | Performed by: NURSE PRACTITIONER

## 2024-06-10 NOTE — PROGRESS NOTES
Primary Care Provider  Nick Patel DO   Referring Provider  No ref. provider found    Patient Complaint  COPD, Shortness of Breath, Wheezing, Cough, and Follow-up (1 month follow up)      SUBJECTIVE    History of Presenting Illness  Julia Rios is a pleasant 79 y.o. female who presents to [facility] for followup appointment.  I last saw the patient 5/16/2024 for an acute visit.  Patient was treated with azithromycin and Medrol Dosepak and chest x-ray showed no acute process.     Patient previously had been at [facility] for COPD exacerbation, mucous plugging bilateral, atelectasis.  Patient is here for follow-up on fungal culture and AFB which showed no growth to date.    Patient states she has improved since her last visit.  She still has a cough at times.  Still getting up some mucus at times.  At present she is not having any wheezing, headaches, chest pain, weight loss or hemoptysis. Denies fevers, chills and night sweats. Julia Rios is able to perform ADLs without difficulties and denies any swollen glands/lymph nodes in the head or neck.  She continues with albuterol inhaler and/or nebulizer as needed for shortness of air or wheeze.  Her maintenance inhaler is Breztri and she is taking Allegra and Singulair for her allergy symptoms.  Patient states she does have some Tussionex at home for cough if she needs it which was prescribed by her PCP.  She uses it very sparingly.    I have personally reviewed the review of systems, past family, social, medical and surgical histories; and agree with their findings.    Review of Systems  Constitutional symptoms:  Denied complaints   Ear, nose, throat: Denied complaints  Cardiovascular:  Denied complaints  Respiratory: Shortness of air with exertional activities and cough  Gastrointestinal: Denied complaints  Musculoskeletal: Denied complaints    Family History   Problem Relation Age  of Onset    Colon cancer Mother 61    Cancer Mother     Heart disease Mother     Heart failure Father     Asthma Father     Heart attack Father     Hyperlipidemia Father     Diabetes Brother     Breast cancer Maternal Grandmother     Stroke Paternal Grandfather     Malig Hyperthermia Neg Hx         Social History     Socioeconomic History    Marital status:    Tobacco Use    Smoking status: Former     Current packs/day: 0.00     Average packs/day: 0.5 packs/day for 32.0 years (16.0 ttl pk-yrs)     Types: Cigarettes     Start date: 1963     Quit date: 1995     Years since quittin.4     Passive exposure: Never    Smokeless tobacco: Never   Vaping Use    Vaping status: Never Used   Substance and Sexual Activity    Alcohol use: Never    Drug use: Never    Sexual activity: Defer        Past Medical History:   Diagnosis Date    Acute right-sided low back pain with right-sided sciatica 01/15/2018    Allergic rhinitis     Asthma     Back pain     2 bulging disc L2 L3    CHF (congestive heart failure)     Chronic heart failure with preserved ejection fraction     20 echocardiogram: 1.  Normal ejection fraction of 55%. 2.  Mild left ventricular hypertrophy. 3.  No significant valvular heart issues.     CKD stage 3 10/29/2019    COPD (chronic obstructive pulmonary disease)     Diverticulitis     Essential hypertension     GERD (gastroesophageal reflux disease)     Hemoptysis     non cancerous    Hyperlipidemia     Idiopathic chronic gout of multiple sites without tophus 2016    Primary osteoarthritis of right knee 2017    Right knee pain     Sleep apnea     CPAP    Vitamin D deficiency 2016        Immunization History   Administered Date(s) Administered    COVID-19 (MODERNA) 1st,2nd,3rd Dose Monovalent 2021, 2021, 2021, 2021    Fluzone High-Dose 65+yrs 2021, 2022, 2024    Fluzone Quad >6mos (Multi-dose) 10/01/2020    Pneumococcal Conjugate  13-Valent (PCV13) 02/11/2015    Pneumococcal Conjugate 20-Valent (PCV20) 07/20/2023    Pneumococcal Polysaccharide (PPSV23) 05/01/2010       Allergies   Allergen Reactions    Ibuprofen Hives    Penicillins Seizure and Other (See Comments)    Nsaids Other (See Comments)     CKD    Cephalosporins Rash    Sulfa Antibiotics Rash          Current Outpatient Medications:     albuterol (ACCUNEB) 1.25 MG/3ML nebulizer solution, Take 3 mL by nebulization Every 6 (Six) Hours As Needed for Shortness of Air., Disp: 360 mL, Rfl: 1    albuterol sulfate  (90 Base) MCG/ACT inhaler, Inhale 2 puffs Every 4 (Four) Hours As Needed for Wheezing., Disp: 1 g, Rfl: 11    azelastine (ASTELIN) 0.1 % nasal spray, 2 sprays into the nostril(s) as directed by provider 2 (Two) Times a Day. Use in each nostril as directed, Disp: 30 mL, Rfl: 6    Budeson-Glycopyrrol-Formoterol (BREZTRI) 160-9-4.8 MCG/ACT aerosol inhaler, Inhale 2 puffs 2 (Two) Times a Day., Disp: 10.7 g, Rfl: 11    D3-1000 25 MCG (1000 UT) capsule, TAKE 1 CAPSULE BY MOUTH EVERY DAY, Disp: 90 capsule, Rfl: 3    famotidine (PEPCID) 20 MG tablet, Take 1 tablet by mouth every night at bedtime., Disp: 30 tablet, Rfl: 11    fexofenadine (ALLEGRA) 180 MG tablet, Take 1 tablet by mouth Daily., Disp: , Rfl:     gabapentin (NEURONTIN) 300 MG capsule, Take 1 capsule by mouth Every Morning., Disp: , Rfl:     gabapentin (NEURONTIN) 300 MG capsule, Take 2 capsules by mouth Every Night., Disp: , Rfl:     losartan (COZAAR) 100 MG tablet, Take 1 tablet by mouth Daily., Disp: 90 tablet, Rfl: 3    metoprolol succinate XL (TOPROL-XL) 100 MG 24 hr tablet, Take 1 tablet by mouth Daily. Hold if heartrate is <60., Disp: 90 tablet, Rfl: 3    montelukast (SINGULAIR) 10 MG tablet, Take 1 tablet by mouth Every Night., Disp: 90 tablet, Rfl: 3    spironolactone (ALDACTONE) 25 MG tablet, Take 1 tablet by mouth 3 (Three) Times a Week. Monday,Wednesday,Friday, Disp: , Rfl:     Current Facility-Administered  "Medications:     ipratropium-albuterol (DUO-NEB) nebulizer solution 3 mL, 3 mL, Nebulization, Once, Shanell Murray, KALEB           Vital Signs   /63 (BP Location: Left arm, Patient Position: Sitting, Cuff Size: Large Adult)   Pulse 58   Temp 97.7 °F (36.5 °C) (Temporal)   Resp 16   Ht 152.4 cm (60\")   Wt 97 kg (213 lb 14.4 oz)   SpO2 96% Comment: room air  BMI 41.77 kg/m²       OBJECTIVE    Physical Exam  Vital Signs Reviewed   WDWN, Alert, NAD obese.    HEENT:  PERRL, EOMI.  OP, nares clear  Neck:  Supple, no JVD, no thyromegaly  Chest:  good aeration, occasional rhonchi, no work of breathing noted  CV: RRR, no MGR, pulses 2+, equal.  Abd:  Soft, NT, ND, + BS, no HSM  EXT:  no clubbing, no cyanosis, no edema  Neuro:  A&Ox3, CN grossly intact, no focal deficits.  Skin: No rashes or lesions noted    Results Review  I have personally reviewed the prior office notes, hospital records, labs, and diagnostics.  XR Chest 2 View [[id number]] (Order [id number])  Order  Status: Final result     Study Notes     Keely Naik on 5/16/2024  2:24 PM EDT   COPD. COUGH AND SOA FOR 3 DAYS. PT STATES WAS IN HOSPITAL LAST WEEK FOR EXACERBATION. QUIT SMOKING 20 YRS AGO. NO CA HX.     Appointment Information    PACS Images     Radiology Images  Study Result    Narrative & Impression      DATE OF EXAM:   5/16/2024 2:17 PM     PROCEDURE:   XR CHEST 2 VW-     INDICATIONS:   Shortness of air, productive cough; J44.1-Chronic obstructive pulmonary  disease with (acute) exacerbation; R06.02-Shortness of breath;  J98.11-Atelectasis; R05.1-Acute cough     COMPARISON:  5/1/2024     TECHNIQUE:   PA and lateral views of the chest were obtained.     FINDINGS:   The cardiomediastinal silhouette is within normal limits.  There is no  pneumothorax, focal consolidation, or large pleural effusion. Osseous  structures grossly intact. Aortic arch atherosclerosis. Mild basilar  scarring.     IMPRESSION:  No acute process.     Electronically Signed " By-Francisco Valenzuela MD On:5/16/2024 2:37 PM     ASSESSMENT         Patient Active Problem List   Diagnosis    Family history of colon cancer    Chronic obstructive pulmonary disease    Essential hypertension    Hyperlipidemia    CKD (chronic kidney disease) stage 3, GFR 30-59 ml/min    Chronic heart failure with preserved ejection fraction    CARMEN (obstructive sleep apnea)    Seasonal allergies    Gastroesophageal reflux disease    History of fungal pneumonia    Bronchitis, mucopurulent recurrent    Tobacco abuse, in remission    Pulmonary nodule    PND (post-nasal drip)    Status post fall    Allergic rhinitis    Asthma    Closed fracture of metatarsal bone    Diverticulitis    Hemoptysis    Idiopathic chronic gout of multiple sites without tophus    Primary localized osteoarthritis    Vitamin D deficiency    Morbid (severe) obesity due to excess calories    Umbilical hernia without obstruction and without gangrene    Oral thrush    Arthritis of knee    Pre-diabetes    COPD (chronic obstructive pulmonary disease)    Shortness of breath    Hospital discharge follow-up       Encounter Diagnoses   Name Primary?    Chronic obstructive pulmonary disease with acute exacerbation Yes    Shortness of breath     Atelectasis     Mucus plugging of bronchi     Allergic rhinitis, unspecified seasonality, unspecified trigger       PLAN  -Will continue notify patient of AFB final result when available  -Continue with Breztri 2 puffs twice daily rinsing out her mouth after each use  -Continue with albuterol nebulizer and/or inhaler for shortness of air or wheeze  -Return to office at neck scheduled appointment or sooner if needed  Diagnoses and all orders for this visit:    1. Chronic obstructive pulmonary disease with acute exacerbation (Primary)    2. Shortness of breath    3. Atelectasis    4. Mucus plugging of bronchi    5. Allergic rhinitis, unspecified seasonality, unspecified trigger           Smoking status: Former  Vaccination  status: Reviewed  Medications personally reviewed    Follow Up  Return for Next scheduled follow up.    Patient was given instructions and counseling regarding her condition or for health maintenance advice. Please see specific information pulled into the AVS if appropriate.       I spent 15 minutes caring for Julia Rios on this date of service. This time includes time spent by me in the following activities:preparing for the visit, reviewing tests, obtaining and/or reviewing a separately obtained history, performing a medically appropriate examination and/or evaluation, counseling and educating the patient/family/caregiver, ordering medications, tests, or procedures, documenting information in the medical record, independently interpreting results and communicating that information with the patient/family/caregiver and answered questions family members, discuss medications.

## 2024-06-11 LAB
MYCOBACTERIUM SPEC CULT: NORMAL
NIGHT BLUE STAIN TISS: NORMAL
NIGHT BLUE STAIN TISS: NORMAL

## 2024-06-17 ENCOUNTER — OFFICE VISIT (OUTPATIENT)
Dept: CARDIOLOGY | Facility: CLINIC | Age: 79
End: 2024-06-17
Payer: MEDICARE

## 2024-06-17 VITALS
HEART RATE: 59 BPM | SYSTOLIC BLOOD PRESSURE: 111 MMHG | BODY MASS INDEX: 41.62 KG/M2 | DIASTOLIC BLOOD PRESSURE: 48 MMHG | HEIGHT: 60 IN | WEIGHT: 212 LBS

## 2024-06-17 DIAGNOSIS — E78.2 MIXED HYPERLIPIDEMIA: ICD-10-CM

## 2024-06-17 DIAGNOSIS — I50.32 CHRONIC HEART FAILURE WITH PRESERVED EJECTION FRACTION: Primary | ICD-10-CM

## 2024-06-17 DIAGNOSIS — I10 ESSENTIAL HYPERTENSION: ICD-10-CM

## 2024-06-17 PROBLEM — R06.02 SHORTNESS OF BREATH: Status: RESOLVED | Noted: 2024-04-29 | Resolved: 2024-06-17

## 2024-06-17 PROBLEM — Z09 HOSPITAL DISCHARGE FOLLOW-UP: Status: RESOLVED | Noted: 2024-05-06 | Resolved: 2024-06-17

## 2024-06-17 PROBLEM — K57.92 DIVERTICULITIS: Status: RESOLVED | Noted: 2022-05-26 | Resolved: 2024-06-17

## 2024-06-17 PROBLEM — Z87.01 HISTORY OF FUNGAL PNEUMONIA: Status: RESOLVED | Noted: 2021-12-02 | Resolved: 2024-06-17

## 2024-06-17 PROCEDURE — 99214 OFFICE O/P EST MOD 30 MIN: CPT | Performed by: NURSE PRACTITIONER

## 2024-06-17 PROCEDURE — 3078F DIAST BP <80 MM HG: CPT | Performed by: NURSE PRACTITIONER

## 2024-06-17 PROCEDURE — 3074F SYST BP LT 130 MM HG: CPT | Performed by: NURSE PRACTITIONER

## 2024-06-17 NOTE — PROGRESS NOTES
Chief Complaint  Follow-up    Subjective            History of Present Illness  Julia Rios is a 79-year-old female patient who presents to the office today for follow-up.  She has chronic HFpEF, hypertension, and hyperlipidemia.  She is statin intolerant and PCP tried prescribing Nexletol however she had adverse effects with that as well.  She reports compliance with medication.  She denies any new or worsening cardiac symptoms today.    PMH  Past Medical History:   Diagnosis Date    Acute right-sided low back pain with right-sided sciatica 01/15/2018    Allergic rhinitis     Asthma     Chronic heart failure with preserved ejection fraction     11/16/20 echocardiogram: 1.  Normal ejection fraction of 55%. 2.  Mild left ventricular hypertrophy. 3.  No significant valvular heart issues.     CKD stage 3 10/29/2019    COPD (chronic obstructive pulmonary disease)     Diverticulitis     Essential hypertension     GERD (gastroesophageal reflux disease)     Hemoptysis     non cancerous    History of fungal pneumonia 12/02/2021    Hyperlipidemia     Idiopathic chronic gout of multiple sites without tophus 05/20/2016    Primary osteoarthritis of right knee 01/16/2017    Sleep apnea     CPAP    Vitamin D deficiency 01/13/2016         ALLERGY  Allergies   Allergen Reactions    Ibuprofen Hives    Penicillins Seizure and Other (See Comments)    Nsaids Other (See Comments)     CKD    Cephalosporins Rash    Sulfa Antibiotics Rash          SURGICALHX  Past Surgical History:   Procedure Laterality Date    BRONCHOSCOPY N/A 4/30/2024    Procedure: BRONCHOSCOPY WITH BRONCHOALVEOLAR LAVAGE, WASHING, AIRWAY INSPECTION;  Surgeon: Santino Baez MD;  Location: [facility] ENDOSCOPY;  Service: Pulmonary;  Laterality: N/A;  MUCOUS PLUGGING, BRONCHITIS    CATARACT EXTRACTION Right     COLONOSCOPY  2014    COLONOSCOPY N/A 10/12/2021    Procedure: COLONOSCOPY;  Surgeon: Jorge Diane MD;  Location: [facility] ENDOSCOPY;  Service:  Gastroenterology;  Laterality: N/A;  DIVERTICULOSIS    ENDOSCOPY      EYE SURGERY      cataract right eye    OTHER SURGICAL HISTORY      Fatty tumor removed off back    RETINAL DETACHMENT REPAIR Right     hole in retina repair    TOTAL KNEE ARTHROPLASTY Right 10/31/2023    Procedure: TOTAL KNEE ARTHROPLASTY;  Surgeon: Celso Prakash MD;  [place] [facility] [place] [facility] [place]  Service: [place]opedics;  Laterality: Right;          SOC  Social History     Socioeconomic History    Marital status:    Tobacco Use    Smoking status: Former     Current packs/day: 0.00     Average packs/day: 0.5 packs/day for 32.0 years (16.0 ttl pk-yrs)     Types: Cigarettes     Start date: 1963     Quit date: 1995     Years since quittin.4     Passive exposure: Never    Smokeless tobacco: Never   Vaping Use    Vaping status: Never Used   Substance and Sexual Activity    Alcohol use: Never    Drug use: Never    Sexual activity: Defer         FAMHX  Family History   Problem Relation Age of Onset    Colon cancer Mother 61    Cancer Mother     Heart disease Mother     Heart failure Father     Asthma Father     Heart attack Father     Hyperlipidemia Father     Diabetes Brother     Breast cancer Maternal Grandmother     Stroke Paternal Grandfather     Malig Hyperthermia Neg Hx           MEDSIGONLY  Current Outpatient Medications on File Prior to Visit   Medication Sig    albuterol (ACCUNEB) 1.25 MG/3ML nebulizer solution Take 3 mL by nebulization Every 6 (Six) Hours As Needed for Shortness of Air.    albuterol sulfate  (90 Base) MCG/ACT inhaler Inhale 2 puffs Every 4 (Four) Hours As Needed for Wheezing.    azelastine (ASTELIN) 0.1 % nasal spray 2 sprays into the nostril(s) as directed by provider 2 (Two) Times a Day. Use in each nostril as directed    Budeson-Glycopyrrol-Formoterol (BREZTRI) 160-9-4.8 MCG/ACT aerosol inhaler Inhale 2 puffs 2 (Two) Times a Day.    D3-1000 25 MCG (1000 UT) capsule TAKE 1 CAPSULE BY MOUTH EVERY  "DAY    famotidine (PEPCID) 20 MG tablet Take 1 tablet by mouth every night at bedtime.    fexofenadine (ALLEGRA) 180 MG tablet Take 1 tablet by mouth Daily.    gabapentin (NEURONTIN) 300 MG capsule Take 1 capsule by mouth Every Morning.    gabapentin (NEURONTIN) 300 MG capsule Take 2 capsules by mouth Every Night.    losartan (COZAAR) 100 MG tablet Take 1 tablet by mouth Daily.    metoprolol succinate XL (TOPROL-XL) 100 MG 24 hr tablet Take 1 tablet by mouth Daily. Hold if heartrate is <60.    montelukast (SINGULAIR) 10 MG tablet Take 1 tablet by mouth Every Night.    spironolactone (ALDACTONE) 25 MG tablet Take 1 tablet by mouth 3 (Three) Times a Week. Monday,Wednesday,Friday     Current Facility-Administered Medications on File Prior to Visit   Medication    ipratropium-albuterol (DUO-NEB) nebulizer solution 3 mL         Objective   /48   Pulse 59   Ht 152.4 cm (60\")   Wt 96.2 kg (212 lb)   BMI 41.40 kg/m²       Physical Exam  Constitutional:       Appearance: She is obese.   HENT:      Head: Normocephalic.   Neck:      Vascular: No carotid bruit.   Cardiovascular:      Rate and Rhythm: Regular rhythm. Bradycardia present.      Pulses: Normal pulses.      Heart sounds: Normal heart sounds. No murmur heard.  Pulmonary:      Effort: Pulmonary effort is normal.      Breath sounds: Normal breath sounds.   Musculoskeletal:      Cervical back: Neck supple.      Right lower leg: No edema.      Left lower leg: No edema.   Skin:     General: Skin is dry.   Neurological:      Mental Status: She is alert and oriented to person, place, and time.   Psychiatric:         Behavior: Behavior normal.       Result Review :   The following data was reviewed by: KALEB Shah on 06/17/2024:  proBNP   Date Value Ref Range Status   04/26/2024 356.5 0.0 - 1,800.0 pg/mL Final     CMP          5/2/2024    04:48   CMP   Glucose 98    BUN 24    Creatinine 1.06    EGFR 53.5    Sodium 139    Potassium 4.0    Chloride 107  " "  Calcium 8.5    Total Protein 5.9    Albumin 3.4    Globulin 2.5    Total Bilirubin 0.5    Alkaline Phosphatase 65    AST (SGOT) 16    ALT (SGPT) 13    Albumin/Globulin Ratio 1.4    BUN/Creatinine Ratio 22.6    Anion Gap 10.8      CBC w/diff          5/2/2024    04:48   CBC w/Diff   WBC 15.07    RBC 4.24    Hemoglobin 11.6    Hematocrit 36.2    MCV 85.4    MCH 27.4    MCHC 32.0    RDW 15.8    Platelets 237    Neutrophil Rel % 62.6    Immature Granulocyte Rel % 1.9    Lymphocyte Rel % 25.7    Monocyte Rel % 8.9    Eosinophil Rel % 0.4    Basophil Rel % 0.5       Lab Results   Component Value Date    TSH 2.300 10/29/2019      Lab Results   Component Value Date    FREET4 1.2 10/29/2019      No results found for: \"DDIMERQUANT\"  Magnesium   Date Value Ref Range Status   05/02/2024 1.9 1.6 - 2.4 mg/dL Final      No results found for: \"DIGOXIN\"   Lab Results   Component Value Date    TROPONINT 9 04/26/2024 4/1/2024    10:10   Lipid Panel   Total Cholesterol 187    Triglycerides 89    HDL Cholesterol 49    VLDL Cholesterol 16    LDL Cholesterol  122    LDL/HDL Ratio 2.45    The 10-year ASCVD risk score (Karthikeyan DK, et al., 2019) is: 41.3%    Values used to calculate the score:      Age: 79 years      Sex: Female      Is Non- : No      Diabetic: Yes      Tobacco smoker: No      Systolic Blood Pressure: 111 mmHg      Is BP treated: Yes      HDL Cholesterol: 49 mg/dL      Total Cholesterol: 187 mg/dL           Assessment and Plan    Diagnoses and all orders for this visit:    1. Chronic heart failure with preserved ejection fraction (Primary)  Symptomatically stable at this time and euvolemic on exam today, continue spironolactone Monday Wednesday Friday.    2. Essential hypertension  Currently controlled and without adverse effects from medication, continue metoprolol 100 mg daily and losartan 100 mg daily.    3. Mixed hyperlipidemia  She has tried statins and Nexletol with adverse " effects.  We talked about possibly initiating PCSK9 inhibitor if repeat lipid panel reveals elevated cholesterol levels.        Follow Up   Return in about 6 months (around 12/17/2024) for Follow up with Dr Cloud.    Patient was given instructions and counseling regarding her condition or for health maintenance advice. Please see specific information pulled into the AVS if appropriate.     Julia Rios  reports that she quit smoking about 29 years ago. Her smoking use included cigarettes. She started smoking about 61 years ago. She has a 16 pack-year smoking history. She has never been exposed to tobacco smoke. She has never used smokeless tobacco.            Marielos Dover, KALEB  06/17/24  10:48 EDT    Dictated Utilizing Dragon Dictation

## 2024-07-03 RX ORDER — SPIRONOLACTONE 25 MG/1
25 TABLET ORAL 3 TIMES WEEKLY
Qty: 36 TABLET | Refills: 3 | Status: SHIPPED | OUTPATIENT
Start: 2024-07-03

## 2024-08-02 ENCOUNTER — OFFICE VISIT (OUTPATIENT)
Dept: ORTHOPEDIC SURGERY | Facility: CLINIC | Age: 79
End: 2024-08-02
Payer: MEDICARE

## 2024-08-02 VITALS — WEIGHT: 216.2 LBS | TEMPERATURE: 97.8 F | BODY MASS INDEX: 39.79 KG/M2 | HEIGHT: 62 IN

## 2024-08-02 DIAGNOSIS — Z09 SURGERY FOLLOW-UP: ICD-10-CM

## 2024-08-02 DIAGNOSIS — R52 PAIN: Primary | ICD-10-CM

## 2024-08-02 PROCEDURE — 99212 OFFICE O/P EST SF 10 MIN: CPT | Performed by: ORTHOPAEDIC SURGERY

## 2024-08-02 NOTE — PROGRESS NOTES
"Julia Rios     : 1945     MRN: [id number]    DATE: 2024    DIAGNOSIS:  9 month follow up right total knee arthroplasty    SUBJECTIVE:  Patient returns today for follow up of a right total knee replacement. Patient reports doing well with no unusual complaints. Denies any limitations due to the knee.  She does feel like the leg is a little weak and she would like to do some more therapy.    OBJECTIVE:  Temp 97.8 °F (36.6 °C) (Temporal)   Ht 157.5 cm (62\")   Wt 98.1 kg (216 lb 3.2 oz)   BMI 39.54 kg/m²   Family History   Problem Relation Age of Onset    Colon cancer Mother 61    Cancer Mother     Heart disease Mother     Heart failure Father     Asthma Father     Heart attack Father     Hyperlipidemia Father     Diabetes Brother     Breast cancer Maternal Grandmother     Stroke Paternal Grandfather     Malig Hyperthermia Neg Hx      Past Medical History:   Diagnosis Date    Acute right-sided low back pain with right-sided sciatica 01/15/2018    Allergic rhinitis     Asthma     Chronic heart failure with preserved ejection fraction     20 echocardiogram: 1.  Normal ejection fraction of 55%. 2.  Mild left ventricular hypertrophy. 3.  No significant valvular heart issues.     CKD stage 3 10/29/2019    COPD (chronic obstructive pulmonary disease)     Diverticulitis     Essential hypertension     GERD (gastroesophageal reflux disease)     Hemoptysis     non cancerous    History of fungal pneumonia 2021    Hyperlipidemia     Idiopathic chronic gout of multiple sites without tophus 2016    Primary osteoarthritis of right knee 2017    Sleep apnea     CPAP    Vitamin D deficiency 2016     Past Surgical History:   Procedure Laterality Date    BRONCHOSCOPY N/A 2024    Procedure: BRONCHOSCOPY WITH BRONCHOALVEOLAR LAVAGE, WASHING, AIRWAY INSPECTION;  Surgeon: Santino Baez MD;  Location: [facility] ENDOSCOPY;  Service: Pulmonary;  Laterality: N/A;  MUCOUS PLUGGING, BRONCHITIS "    CATARACT EXTRACTION Right     COLONOSCOPY  2014    COLONOSCOPY N/A 10/12/2021    Procedure: COLONOSCOPY;  Surgeon: Jorge Diane MD;  Location: [facility] ENDOSCOPY;  Service: Gastroenterology;  Laterality: N/A;  DIVERTICULOSIS    ENDOSCOPY  2015    EYE SURGERY      cataract right eye    OTHER SURGICAL HISTORY      Fatty tumor removed off back    RETINAL DETACHMENT REPAIR Right     hole in retina repair    TOTAL KNEE ARTHROPLASTY Right 10/31/2023    Procedure: TOTAL KNEE ARTHROPLASTY;  Surgeon: Celso Prakash MD;  [place]  [facility] [place] [facility] [place]  Service: [place]opedics;  Laterality: Right;     Social History     Socioeconomic History    Marital status:    Tobacco Use    Smoking status: Former     Current packs/day: 0.00     Average packs/day: 0.5 packs/day for 32.0 years (16.0 ttl pk-yrs)     Types: Cigarettes     Start date: 1963     Quit date: 1995     Years since quittin.6     Passive exposure: Never    Smokeless tobacco: Never   Vaping Use    Vaping status: Never Used   Substance and Sexual Activity    Alcohol use: Never    Drug use: Never    Sexual activity: Defer       Review of Systems:   A 14 point review of systems is reviewed with the patient.  Pertinent positives are listed above.  All others negative.    Exam:  The incision is well healed.  Range of motion: 0 to 120.  The calf is soft and nontender with a negative Homans sign.  Alignment is neutral.  Good quad strength. There is no evidence of varus/valgus or flexion instability. No effusion.  Intact sensation to light touch.  Palpable pedal pulses    DIAGNOSTIC STUDIES    Xrays: AP, merchant and lateral views of the right knee were ordered and reviewed for evaluation of recent knee replacement.  The x-rays demonstrate a well positioned, well aligned knee replacement without complicating factors noted.  In comparison with previous films there has been no change.    ASSESSMENT:  9 month follow up right knee replacement.    PLAN:  Appropriate activity modifications and restrictions discussed.  Antibiotic prophylaxis recommendations discussed.  Follow-up annually.  As per her request, I did agree to refer her for PT.    Celso Prakash MD

## 2024-08-08 ENCOUNTER — OFFICE VISIT (OUTPATIENT)
Dept: PULMONOLOGY | Facility: CLINIC | Age: 79
End: 2024-08-08
Payer: MEDICARE

## 2024-08-08 VITALS
HEIGHT: 62 IN | TEMPERATURE: 98.2 F | WEIGHT: 217 LBS | HEART RATE: 53 BPM | DIASTOLIC BLOOD PRESSURE: 60 MMHG | OXYGEN SATURATION: 94 % | BODY MASS INDEX: 39.93 KG/M2 | RESPIRATION RATE: 16 BRPM | SYSTOLIC BLOOD PRESSURE: 112 MMHG

## 2024-08-08 DIAGNOSIS — J98.11 ATELECTASIS: ICD-10-CM

## 2024-08-08 DIAGNOSIS — R05.1 ACUTE COUGH: ICD-10-CM

## 2024-08-08 DIAGNOSIS — R06.02 SHORTNESS OF BREATH: ICD-10-CM

## 2024-08-08 DIAGNOSIS — J44.1 CHRONIC OBSTRUCTIVE PULMONARY DISEASE WITH ACUTE EXACERBATION: Primary | ICD-10-CM

## 2024-08-08 DIAGNOSIS — J30.9 ALLERGIC RHINITIS, UNSPECIFIED SEASONALITY, UNSPECIFIED TRIGGER: ICD-10-CM

## 2024-08-08 PROCEDURE — 1160F RVW MEDS BY RX/DR IN RCRD: CPT | Performed by: NURSE PRACTITIONER

## 2024-08-08 PROCEDURE — 99214 OFFICE O/P EST MOD 30 MIN: CPT | Performed by: NURSE PRACTITIONER

## 2024-08-08 PROCEDURE — 1159F MED LIST DOCD IN RCRD: CPT | Performed by: NURSE PRACTITIONER

## 2024-08-08 PROCEDURE — 3078F DIAST BP <80 MM HG: CPT | Performed by: NURSE PRACTITIONER

## 2024-08-08 PROCEDURE — 3074F SYST BP LT 130 MM HG: CPT | Performed by: NURSE PRACTITIONER

## 2024-08-08 RX ORDER — METHYLPREDNISOLONE 4 MG/1
TABLET ORAL
Qty: 21 TABLET | Refills: 0 | Status: SHIPPED | OUTPATIENT
Start: 2024-08-08

## 2024-08-08 RX ORDER — AZITHROMYCIN 250 MG/1
TABLET, FILM COATED ORAL
Qty: 6 TABLET | Refills: 0 | Status: SHIPPED | OUTPATIENT
Start: 2024-08-08

## 2024-08-08 NOTE — PROGRESS NOTES
Primary Care Provider  Nick Patel,    Referring Provider  No ref. provider found    Patient Complaint  Shortness of Breath, acute, and Cough (Yellow productive cough)      SUBJECTIVE    History of Presenting Illness  Julia Rios is a pleasant 79 y.o. female who presents to [facility] for acute visit.  Patient has a history of COPD, recurrent bronchitis, obstructive sleep apnea, postnasal drip, seasonal allergies, history of fungal pneumonia in past, completed a course of itraconazole, pulmonary nodule, gastroesophageal reflux disease and tobacco abuse of cigarettes in remission.  Patient states that actually when she made the appointment last week was when she was feeling worse.  She states that actually states she does feel much better.  She has been having some chest congestion and believe that she had a sinus infection previously but did not get treated for it.  She does have a productive cough of yellow sputum.  She just states that she has lots of fatigue.  Patient states that she has been visiting a friend who is in long-term care for rehab and she has not been masking she is not aware of any COVID or flu exposures.  States she did a breathing treatment yesterday but she has been using it every day at least once a day.  She has not been very proactive with using her flutter valve.  She continues on Breztri but does not feel like it is doing much for her at this time.  She does have shortness of air with exertional activities of moderate severity but improves with rest.  Patient states that actually today she did do some walking and feels she has improved since last week.  She denies wheezing, headaches, chest pain, weight loss or hemoptysis. Denies fevers, chills and night sweats. Julia Rios is able to perform ADLs without difficulties and denies any swollen glands/lymph nodes in the head or neck.    I have personally reviewed the  review of systems, past family, social, medical and surgical histories; and agree with their findings.    Review of Systems  Constitutional symptoms:  Denied complaints   Ear, nose, throat: Denied complaints  Cardiovascular:  Denied complaints  Respiratory: Fatigue, productive cough  Gastrointestinal: Denied complaints  Musculoskeletal: Denied complaints    Family History   Problem Relation Age of Onset    Colon cancer Mother 61    Cancer Mother     Heart disease Mother     Heart failure Father     Asthma Father     Heart attack Father     Hyperlipidemia Father     Diabetes Brother     Breast cancer Maternal Grandmother     Stroke Paternal Grandfather     Malig Hyperthermia Neg Hx         Social History     Socioeconomic History    Marital status:    Tobacco Use    Smoking status: Former     Current packs/day: 0.00     Average packs/day: 0.5 packs/day for 32.0 years (16.0 ttl pk-yrs)     Types: Cigarettes     Start date: 1963     Quit date: 1995     Years since quittin.6     Passive exposure: Never    Smokeless tobacco: Never   Vaping Use    Vaping status: Never Used   Substance and Sexual Activity    Alcohol use: Never    Drug use: Never    Sexual activity: Defer        Past Medical History:   Diagnosis Date    Acute right-sided low back pain with right-sided sciatica 01/15/2018    Allergic rhinitis     Asthma     Chronic heart failure with preserved ejection fraction     20 echocardiogram: 1.  Normal ejection fraction of 55%. 2.  Mild left ventricular hypertrophy. 3.  No significant valvular heart issues.     CKD stage 3 10/29/2019    COPD (chronic obstructive pulmonary disease)     Diverticulitis     Essential hypertension     GERD (gastroesophageal reflux disease)     Hemoptysis     non cancerous    History of fungal pneumonia 2021    Hyperlipidemia     Idiopathic chronic gout of multiple sites without tophus 2016    Primary osteoarthritis of right knee 2017    Sleep  apnea     CPAP    Vitamin D deficiency 01/13/2016        Immunization History   Administered Date(s) Administered    COVID-19 (MODERNA) 1st,2nd,3rd Dose Monovalent 01/28/2021, 01/28/2021, 02/23/2021, 02/23/2021    Fluzone High-Dose 65+yrs 12/02/2021, 12/28/2022, 01/03/2024    Fluzone Quad >6mos (Multi-dose) 10/01/2020    Pneumococcal Conjugate 13-Valent (PCV13) 02/11/2015    Pneumococcal Conjugate 20-Valent (PCV20) 07/20/2023    Pneumococcal Polysaccharide (PPSV23) 05/01/2010       Allergies   Allergen Reactions    Ibuprofen Hives    Penicillins Seizure and Other (See Comments)    Nsaids Other (See Comments)     CKD    Cephalosporins Rash    Sulfa Antibiotics Rash          Current Outpatient Medications:     albuterol (ACCUNEB) 1.25 MG/3ML nebulizer solution, Take 3 mL by nebulization Every 6 (Six) Hours As Needed for Shortness of Air., Disp: 360 mL, Rfl: 1    albuterol sulfate  (90 Base) MCG/ACT inhaler, Inhale 2 puffs Every 4 (Four) Hours As Needed for Wheezing., Disp: 1 g, Rfl: 11    azelastine (ASTELIN) 0.1 % nasal spray, 2 sprays into the nostril(s) as directed by provider 2 (Two) Times a Day. Use in each nostril as directed, Disp: 30 mL, Rfl: 6    Budeson-Glycopyrrol-Formoterol (BREZTRI) 160-9-4.8 MCG/ACT aerosol inhaler, Inhale 2 puffs 2 (Two) Times a Day., Disp: 10.7 g, Rfl: 11    D3-1000 25 MCG (1000 UT) capsule, TAKE 1 CAPSULE BY MOUTH EVERY DAY, Disp: 90 capsule, Rfl: 3    famotidine (PEPCID) 20 MG tablet, Take 1 tablet by mouth every night at bedtime., Disp: 30 tablet, Rfl: 11    fexofenadine (ALLEGRA) 180 MG tablet, Take 1 tablet by mouth Daily., Disp: , Rfl:     gabapentin (NEURONTIN) 300 MG capsule, Take 1 capsule by mouth Every Morning., Disp: , Rfl:     gabapentin (NEURONTIN) 300 MG capsule, Take 2 capsules by mouth Every Night., Disp: , Rfl:     losartan (COZAAR) 100 MG tablet, Take 1 tablet by mouth Daily., Disp: 90 tablet, Rfl: 3    metoprolol succinate XL (TOPROL-XL) 100 MG 24 hr tablet,  "Take 1 tablet by mouth Daily. Hold if heartrate is <60., Disp: 90 tablet, Rfl: 3    montelukast (SINGULAIR) 10 MG tablet, Take 1 tablet by mouth Every Night., Disp: 90 tablet, Rfl: 3    spironolactone (ALDACTONE) 25 MG tablet, Take 1 tablet by mouth 3 (Three) Times a Week. Monday,Wednesday,Friday, Disp: 36 tablet, Rfl: 3    azithromycin (ZITHROMAX) 250 MG tablet, Take 2 by mouth today then 1 daily for 4 days, Disp: 6 tablet, Rfl: 0    methylPREDNISolone (MEDROL) 4 MG dose pack, Take as directed on package instructions., Disp: 21 tablet, Rfl: 0    Current Facility-Administered Medications:     ipratropium-albuterol (DUO-NEB) nebulizer solution 3 mL, 3 mL, Nebulization, Once, Shanell Murray, KALEB           Vital Signs   /60 (BP Location: Left arm, Patient Position: Sitting, Cuff Size: Adult)   Pulse 53   Temp 98.2 °F (36.8 °C)   Resp 16   Ht 157.5 cm (62\")   Wt 98.4 kg (217 lb)   SpO2 94% Comment: room air  BMI 39.69 kg/m²       OBJECTIVE    Physical Exam  Vital Signs Reviewed   WDWN, Alert, NAD.    HEENT:  PERRL, EOMI.  OP, nares clear  Neck:  Supple, no JVD, no thyromegaly  Chest:  good aeration, clear to auscultation bilaterally, tympanic to percussion bilaterally, no work of breathing noted  CV: RRR, no MGR, pulses 2+, equal.  Abd:  Soft, NT, ND, + BS, no HSM  EXT:  no clubbing, no cyanosis, no edema  Neuro:  A&Ox3, CN grossly intact, no focal deficits.  Skin: No rashes or lesions noted    Results Review  I have personally reviewed the prior office notes, hospital records, labs, and diagnostics.    ASSESSMENT         Patient Active Problem List   Diagnosis    Family history of colon cancer    Chronic obstructive pulmonary disease    Essential hypertension    Hyperlipidemia    CKD (chronic kidney disease) stage 3, GFR 30-59 ml/min    Chronic heart failure with preserved ejection fraction    CARMEN (obstructive sleep apnea)    Seasonal allergies    Gastroesophageal reflux disease    Bronchitis, mucopurulent " recurrent    Tobacco abuse, in remission    Pulmonary nodule    PND (post-nasal drip)    Status post fall    Allergic rhinitis    Asthma    Closed fracture of metatarsal bone    Hemoptysis    Idiopathic chronic 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    Chronic obstructive pulmonary disease with acute exacerbation Yes    Shortness of breath     Atelectasis     Allergic rhinitis, unspecified seasonality, unspecified trigger     Acute cough       PLAN  -Continue with Breztri 2 puffs twice daily rinsing out her mouth after each use to prevent oral thrush  -Continue with albuterol inhaler and/or nebulizer as needed for shortness of air or wheeze  -Continue with Singulair and Allegra for allergy symptoms  -Continue with CPAP for sleep  -Continue with flutter valve up to 10 times a day to facilitate mucus  -Azithromycin and Medrol Dosepak sent to her pharmacy at this time  -We will schedule patient for a CT scan and notify of the results when available  Diagnoses and all orders for this visit:    1. Chronic obstructive pulmonary disease with acute exacerbation (Primary)  -     CT Chest Without Contrast; Future  -     methylPREDNISolone (MEDROL) 4 MG dose pack; Take as directed on package instructions.  Dispense: 21 tablet; Refill: 0  -     azithromycin (ZITHROMAX) 250 MG tablet; Take 2 by mouth today then 1 daily for 4 days  Dispense: 6 tablet; Refill: 0    2. Shortness of breath  -     CT Chest Without Contrast; Future  -     methylPREDNISolone (MEDROL) 4 MG dose pack; Take as directed on package instructions.  Dispense: 21 tablet; Refill: 0  -     azithromycin (ZITHROMAX) 250 MG tablet; Take 2 by mouth today then 1  daily for 4 days  Dispense: 6 tablet; Refill: 0    3. Atelectasis  -     CT Chest Without Contrast; Future  -     methylPREDNISolone (MEDROL) 4 MG dose pack; Take as directed on package instructions.  Dispense: 21 tablet; Refill: 0  -     azithromycin (ZITHROMAX) 250 MG tablet; Take 2 by mouth today then 1 daily for 4 days  Dispense: 6 tablet; Refill: 0    4. Allergic rhinitis, unspecified seasonality, unspecified trigger  -     CT Chest Without Contrast; Future  -     methylPREDNISolone (MEDROL) 4 MG dose pack; Take as directed on package instructions.  Dispense: 21 tablet; Refill: 0  -     azithromycin (ZITHROMAX) 250 MG tablet; Take 2 by mouth today then 1 daily for 4 days  Dispense: 6 tablet; Refill: 0    5. Acute cough  -     CT Chest Without Contrast; Future  -     methylPREDNISolone (MEDROL) 4 MG dose pack; Take as directed on package instructions.  Dispense: 21 tablet; Refill: 0  -     azithromycin (ZITHROMAX) 250 MG tablet; Take 2 by mouth today then 1 daily for 4 days  Dispense: 6 tablet; Refill: 0           Smoking status:former  Vaccination status:reviewed  Medications personally reviewed    Follow Up  Return for Next scheduled follow up.    Patient was given instructions and counseling regarding her condition or for health maintenance advice. Please see specific information pulled into the AVS if appropriate.      I spent 20 minutes caring for Julia Rios on this date of service. This time includes time spent by me in the following activities:preparing for the visit, reviewing tests, obtaining and/or reviewing a separately obtained history, performing a medically appropriate examination and/or evaluation, counseling and educating the patient/family/caregiver, ordering medications, tests, or procedures, documenting information in the medical record, independently interpreting results and communicating that information with the patient/family/caregiver and answered questions family members, discuss  medications.

## 2024-08-15 ENCOUNTER — TREATMENT (OUTPATIENT)
Dept: PHYSICAL THERAPY | Facility: CLINIC | Age: 79
End: 2024-08-15
Payer: MEDICARE

## 2024-08-15 DIAGNOSIS — G89.29 CHRONIC PAIN OF LEFT KNEE: ICD-10-CM

## 2024-08-15 DIAGNOSIS — R26.9 GAIT DISTURBANCE: ICD-10-CM

## 2024-08-15 DIAGNOSIS — R29.898 BILATERAL LEG WEAKNESS: Primary | ICD-10-CM

## 2024-08-15 DIAGNOSIS — M25.562 CHRONIC PAIN OF LEFT KNEE: ICD-10-CM

## 2024-08-15 DIAGNOSIS — R26.89 BALANCE DISORDER: ICD-10-CM

## 2024-08-15 NOTE — PROGRESS NOTES
"  Physical Therapy Initial Evaluation and Plan of Care      [facility]: [address] [place] [address]      Patient: Julia Rios   : 1945  Diagnosis/ICD-10 Code:  Bilateral leg weakness [R29.898]  Referring practitioner: Celso Prakash MD  Date of Initial Visit: 8/15/2024  Today's Date: 8/15/2024  Patient seen for 1 sessions           Subjective Questionnaire: LEFS: 66/80      Subjective   Pt reports to therapy w/ complaints of B LEs weakness. Pt does have a hx of  a R knee replacement and was in therapy in the past for this. Pt has continued their HEP, though they feel as though they still have some weakness present. Pt notices this when they are walking down hills. Pt does have a concern for their balance. Pt states they have not used an AD to walk, though they are more careful now. Pt does not complain of pain today, though they do have some at times.     Pt has been on steroids, which overall has decreased their pain levels.       Pt occupation: retired      Past Medical Hx: CKD stage 3, COPD, CHF preserved ejection fraction. R TKA       Objective          Strength/Myotome Testing     Left Hip   Planes of Motion   Flexion: 4-  Extension: 4-    Right Hip   Planes of Motion   Flexion: 4+  Extension: 4-    Left Knee   Flexion: 5  Extension: 5    Right Knee   Flexion: 5  Extension: 4+    Ambulation     Comments   Antalgic gait favoring L LE. Compensated trendelenburg to the L.     Functional Assessment     Comments  TU.69 w/o AD.   30 sec STS: 11; arms crossed.     Functional Gait Assessment    1. Gait level surface ___2__  Instructions: Walk at your normal speed from here to the next bethany (20')      2. Change in gait speed __1___  Instructions: Begin walking at your normal pace (for 5'), when I tell you \"go,\" walk as fast as you can (for 5'). When I tell you \"slow,\" walk as slowly as  you can (for 5').      3. Gait with horizontal head turns ___2__  Instructions: Begin walking at " "your normal pace. When I tell you to \"look right,\" keep walking straight, but turn your head to the right. Keep looking to  the right until I tell you, \"look left,\" then keep walking straight and turn your head to the left. Keep your head to the left until I tell you \"look straight,\"   then keep walking straight, but return your head to the center.      4. Gait with vertical head turns ___3__  Instructions: Begin walking at your normal pace. When I tell you to \"look up,\" keep walking straight, but tip your head up. Keep looking up until I tell  you, \"look down,\" then keep walking straight and tip your head down. Keep your head down until I tell you \"look straight,\" then keep walking straight,  but return your head to the center.      5. Gait and pivot turn __2___  Instructions: Begin walking at your normal pace. When I tell you, \"turn and stop,\" turn as quickly as you can to face the opposite direction and stop.      6. Step over obstacle __1__  Instructions: Begin walking at your normal speed. When you come to the shoebox, step over it, not around it, and keep walking.      7. Gait with narrow base of support __0___  Instructions: Walk forward with your arms folded across your chest with your feet aligned heel to toe in tandem (12 ft or up to 10 steps in straight line).      8. Steps __1___  Instructions: Walk up these stairs as you would at home, i.e., using the railing if necessary. At the top, turn around and walk down.      9. Ambulating backwards __1___  Instructions: walk backwards until I tell you to stop.      10. Gait with eyes closed ___2__  Instructions: Walk forward at normal speed with your eyes closed (20 ft).        TOTAL SCORE: __15_ / 30      Min for age: 16  Average for age: 25        See Exercise, Manual, and Modality Logs for complete treatment.       Assessment & Plan       Assessment  Impairments: abnormal coordination, abnormal gait, abnormal muscle firing, activity intolerance, impaired " balance, impaired physical strength and lacks appropriate home exercise program   Functional limitations: carrying objects, lifting, walking, pulling, standing, stooping and unable to perform repetitive tasks   Assessment details: Pt reports to therapy w/ complaints of B LE weakness and gait impairments. Pt presents w/ decreased strength, decreased endurance, gait abnormalities, and balance deficits. Pt has increased perceived deficits described by LEFS. Pt was educated on their HEP as well as encouraged to continue walking for exs. Patient will continue to benefit from skilled physical therapy to further address their deficits in strength and balance in order to improve upon their functional mobility and to return to ambulatory tasks and ADLs safely.     Prognosis: good    Goals  Plan Goals: Movement Disorders and balance impairments:       1. Balance problems with functional movement.    LTG 1: 12 weeks: The patient will improve their FGA score to 24 to demonstrate improvement in functional mobility and balance while performing daily ambulation within their household and community.   STATUS: new  STG 1: 6 weeks: The patient will improve their FGA score to 18 to demonstrate improvement in functional mobility and balance while performing daily ambulation within their household and community.   STATUS: new  TREATMENT: Gait training, aquatic therapy, therapeutic exercise, therapeutic activity, balance activities, and home exercise instruction and modalities as needed for pain to include:  electrical stimulation, moist heat, and ice.    2. Falls risk with ambulation.   LTG 2: The patient will achieve a time of 9 seconds on their TUG to decrease their overall falls risk and improve upon their safety in ambulating household and community distances safely.  STATUS: new  TREATMENT: Gait training, aquatic therapy, therapeutic exercise, therapeutic activity, balance activities, and home exercise instruction and modalities as  needed for pain to include:  electrical stimulation, moist heat, and ice.    3. Lower extremity weakness.   LTG 3: 12 weeks: The patient will perform 12 repetitions during 30 second sit to stand test to demonstrate improvement in lower extremity power for improvement in standing from standard chairs as well as to rise from toileting with decreased effort.   STATUS: new  STG 3: 6 weeks: The patient will perform 11 repetitions during 30 second sit to stand test.   STATUS: new  TREATMENT: Gait training, aquatic therapy, therapeutic exercise, therapeutic activity, balance activities, and home exercise instruction.     4. The patient demonstrates weakness of the B hip.  LTG 4: 12 weeks:  The patient will demonstrate 5/5 strength for B hip flexion, abduction, and extension in order to improve hip stability.  STATUS:  New  TREATMENT: Therapeutic exercises, manual therapy, aquatic therapy, home exercise instruction,  and modalities as needed for pain to include:  electrical stimulation, moist heat, ice, and ultrasound    5. Mobility: Walking/Moving Around Functional Limitation    LTG 5: 12 weeks:  The patient will demonstrate 12.5% limitation by achieving a score of 70/80 on the Lower Extremity Functional Scale.  STATUS:  New  TREATMENT:  Manual therapy, therapeutic exercise, home exercise instruction, and modalities as needed to include: moist heat, electrical stimulation, and ultrasound.             PLAN:  Therapy options: will receive skilled therapy services  Planned modality interventions: Cryotherapy and Heat  Planned therapy interventions:balance/weight-bearing training, ADL retraining, soft tissue mobilization, strengthening, stretching, therapeutic activities, manual therapy, joint mobilization, home exercise program/patient education, gait training, functional ROM exercises, flexibility, body mechanics training, postural training, and neuromuscular re-education  Frequency: 2x per week  Duration in weeks:  12  Treatment plan discussed with: patient      Visit Diagnoses:    ICD-10-CM ICD-9-CM   1. Bilateral leg weakness  R29.898 729.89   2. Chronic pain of left knee  M25.562 719.46    G89.29 338.29   3. Gait disturbance  R26.9 781.2   4. Balance disorder  R26.89 781.99       History # of Personal Factors and/or Comorbidities: MODERATE (1-2)  Examination of Body System(s): # of elements: LOW (1-2)  Clinical Presentation: STABLE   Clinical Decision Making: LOW       Timed:         Manual Therapy:    0     mins  24820;     Therapeutic Exercise:    15     mins  87515;     Neuromuscular Sheela:    10    mins  87161;    Therapeutic Activity:     0     mins  98290;     Gait Trainin     mins  98769;     Ultrasound:     0     mins  74994;    Ionto                               0    mins   96285  Self Care                       0     mins   05327  Canalith Repos    0     mins 34953      Un-Timed:  Electrical Stimulation:    0     mins  52847 (MC );  Dry Needling     0     mins self-pay  Traction     0     mins 68853  Low Eval     15     Mins  67007  Mod Eval     0     Mins  88697  High Eval                       0     Mins  57316  Re-Eval                           0    mins  96502    Timed Treatment:   25   mins   Total Treatment:     40   mins    PT SIGNATURE: Luigi Green PT, DPT    Electronically signed 8/15/2024    [place] License: PT - [id number]    Initial Certification  Certification Period: 8/15/2024 thru 2024  I certify that the therapy services are furnished while this patient is under my care.  The services outlined above are required by this patient, and will be reviewed every 90 days.     PHYSICIAN: Celso Prakash MD  NPI: [id number]      DATE:     Please sign and return via fax to [phone]. Thank you, [facility].

## 2024-08-19 RX ORDER — GABAPENTIN 300 MG/1
600 CAPSULE ORAL NIGHTLY
Qty: 180 CAPSULE | Refills: 1 | Status: SHIPPED | OUTPATIENT
Start: 2024-08-19

## 2024-08-19 RX ORDER — GABAPENTIN 300 MG/1
300 CAPSULE ORAL EVERY MORNING
Qty: 90 CAPSULE | Refills: 1 | Status: SHIPPED | OUTPATIENT
Start: 2024-08-19

## 2024-08-21 ENCOUNTER — HOSPITAL ENCOUNTER (OUTPATIENT)
Dept: CT IMAGING | Facility: HOSPITAL | Age: 79
Discharge: HOME | End: 2024-08-21
Admitting: NURSE PRACTITIONER
Payer: MEDICARE

## 2024-08-21 ENCOUNTER — TREATMENT (OUTPATIENT)
Dept: PHYSICAL THERAPY | Facility: CLINIC | Age: 79
End: 2024-08-21
Payer: MEDICARE

## 2024-08-21 DIAGNOSIS — R26.89 BALANCE DISORDER: ICD-10-CM

## 2024-08-21 DIAGNOSIS — M25.562 CHRONIC PAIN OF LEFT KNEE: ICD-10-CM

## 2024-08-21 DIAGNOSIS — R26.9 GAIT DISTURBANCE: ICD-10-CM

## 2024-08-21 DIAGNOSIS — R05.1 ACUTE COUGH: ICD-10-CM

## 2024-08-21 DIAGNOSIS — G89.29 CHRONIC PAIN OF LEFT KNEE: ICD-10-CM

## 2024-08-21 DIAGNOSIS — R29.898 BILATERAL LEG WEAKNESS: Primary | ICD-10-CM

## 2024-08-21 DIAGNOSIS — J30.9 ALLERGIC RHINITIS, UNSPECIFIED SEASONALITY, UNSPECIFIED TRIGGER: ICD-10-CM

## 2024-08-21 DIAGNOSIS — J44.1 CHRONIC OBSTRUCTIVE PULMONARY DISEASE WITH ACUTE EXACERBATION: ICD-10-CM

## 2024-08-21 DIAGNOSIS — R06.02 SHORTNESS OF BREATH: ICD-10-CM

## 2024-08-21 DIAGNOSIS — J98.11 ATELECTASIS: ICD-10-CM

## 2024-08-21 PROCEDURE — 71250 CT THORAX DX C-: CPT

## 2024-08-21 NOTE — PROGRESS NOTES
Outpatient Physical Therapy  [address] [facility], [place] [address]                            Physical Therapy Daily Treatment Note    Patient: Julia Rios   : 1945  Diagnosis/ICD-10 Code:  Bilateral leg weakness [R29.898]  Referring practitioner: No ref. provider found  Date of Initial Visit: Type: THERAPY  Noted: 8/15/2024  Today's Date: 2024  Patient seen for 2 sessions           Subjective   Julia Rios reports: that she feels like her right knee is her strong knee, the left feels like it's weak but she isn't even getting injections in it yet.     Objective   General fatigue.    See Exercise, Manual, and Modality Logs for complete treatment.     Assessment/Plan  Julia still experiencing increased BLE weakness, especially the L knee. Pt tolerated exercises well, general fatigue. Pt would benefit from skilled PT to address Range of Motion  and Strength deficits, pain management and any concerns with ADLs.       Progress per Plan of Care         Timed:  Manual Therapy:         mins  36248;  Therapeutic Exercise:    20     mins  58448;     Neuromuscular Sheela:        mins  93159;    Therapeutic Activity:     10     mins  67379;     Gait Training:           mins  61291;      Untimed:  Electrical Stimulation:         mins  52495 ( );  Mechanical Traction:         mins  09319;     Timed Treatment:   30   mins   Total Treatment:     30   mins      Electronically signed:     Naila Sarmiento PTA  Physical Therapist Assistant  [facility] [place] License #: [id number]

## 2024-08-22 DIAGNOSIS — R91.1 LUNG NODULE: Primary | ICD-10-CM

## 2024-08-23 ENCOUNTER — TREATMENT (OUTPATIENT)
Dept: PHYSICAL THERAPY | Facility: CLINIC | Age: 79
End: 2024-08-23
Payer: MEDICARE

## 2024-08-23 DIAGNOSIS — M25.562 CHRONIC PAIN OF LEFT KNEE: ICD-10-CM

## 2024-08-23 DIAGNOSIS — R26.89 BALANCE DISORDER: ICD-10-CM

## 2024-08-23 DIAGNOSIS — R26.9 GAIT DISTURBANCE: ICD-10-CM

## 2024-08-23 DIAGNOSIS — R29.898 BILATERAL LEG WEAKNESS: Primary | ICD-10-CM

## 2024-08-23 DIAGNOSIS — G89.29 CHRONIC PAIN OF LEFT KNEE: ICD-10-CM

## 2024-08-23 NOTE — PROGRESS NOTES
Outpatient Physical Therapy                   Physical Therapy Daily Treatment Note    Patient: Julia Rios   : 1945  Diagnosis/ICD-10 Code:  Bilateral leg weakness [R29.898]  Referring practitioner: Celso Prakash MD  Date of Initial Visit: Type: THERAPY  Noted: 8/15/2024  Today's Date: 2024  Patient seen for 3 sessions             Subjective   Julia Rios reports: she feels pretty good today.     Pain: 0/10 pain, at time of arrival.     Objective     See Exercise, Manual, and Modality Logs for complete treatment.     Assessment/Plan  Patient tolerated the session well with just general fatigue. Patient states she felt a slight cramp coming on in the right quad while on the bike but it went away with a short rest break. Pt would benefit from skilled PT to address Range of Motion  and Strength deficits, pain management and any concerns with ADLs.     Progress per Plan of Care      Timed:  Manual Therapy:    0     mins  87137;  Therapeutic Exercise:    20     mins  44610;     Neuromuscular Sheela:    0    mins  92350;    Therapeutic Activity:     10     mins  48150;     Gait Trainin     mins  17547;    Aquatic Therapy:     0     mins  18850;       Untimed:  Electrical Stimulation:    0     mins  82232 ( );  Mechanical Traction:    0     mins  48468;       Timed Treatment:   30   mins   Total Treatment:     30   mins      Electronically signed:   Bridgett Flores PTA  Physical Therapist Assistant  [facility] License #: [id number]

## 2024-08-26 ENCOUNTER — TREATMENT (OUTPATIENT)
Dept: PHYSICAL THERAPY | Facility: CLINIC | Age: 79
End: 2024-08-26
Payer: MEDICARE

## 2024-08-26 DIAGNOSIS — R26.89 BALANCE DISORDER: ICD-10-CM

## 2024-08-26 DIAGNOSIS — G89.29 CHRONIC PAIN OF LEFT KNEE: ICD-10-CM

## 2024-08-26 DIAGNOSIS — R26.9 GAIT DISTURBANCE: ICD-10-CM

## 2024-08-26 DIAGNOSIS — M25.562 CHRONIC PAIN OF LEFT KNEE: ICD-10-CM

## 2024-08-26 DIAGNOSIS — R29.898 BILATERAL LEG WEAKNESS: Primary | ICD-10-CM

## 2024-08-26 NOTE — PROGRESS NOTES
Physical Therapy Daily Treatment Note  [place] [facility]: [address] [facility] [place] [address]      Patient: Julia Rios   : 1945  Diagnosis/ICD-10 Code:  Bilateral leg weakness [R29.898]  Referring practitioner: Celso Prakash MD  Date of Initial Visit: Type: THERAPY  Noted: 8/15/2024  Today's Date: 2024  Patient seen for 4 sessions           Subjective   The patient reported their balance has been about the same, though they have noticed some increased strength in their B LEs. Pt has no pain at this time.     Objective   See Exercise, Manual, and Modality Logs for complete treatment.     Assessment/Plan  Pt does require breaks w/n the therapy session for their breathing. Patient will continue to benefit from skilled physical therapy to further address their deficits in strength and balance in order to improve upon their functional mobility and to have improved safety w/ ADLs.          Timed:  Manual Therapy:    0     mins  94022;  Therapeutic Exercise:    15     mins  39948;     Neuromuscular Sheela:   15    mins  19956;    Therapeutic Activity:     0     mins  66358;     Gait Trainin     mins  38569;     Aquatics                         0      mins  74324    Un-timed:  Mechanical Traction      0     mins  00466  Electrical Stimulation:    0     mins  46627 ( );      Timed Treatment:   30   mins   Total Treatment:     30   mins    Luigi Green PT, DPT    Electronically signed 2024    [place] License: PT - [id number]

## 2024-08-29 ENCOUNTER — TREATMENT (OUTPATIENT)
Dept: PHYSICAL THERAPY | Facility: CLINIC | Age: 79
End: 2024-08-29
Payer: MEDICARE

## 2024-08-29 DIAGNOSIS — G89.29 CHRONIC PAIN OF LEFT KNEE: ICD-10-CM

## 2024-08-29 DIAGNOSIS — R26.9 GAIT DISTURBANCE: ICD-10-CM

## 2024-08-29 DIAGNOSIS — R26.89 BALANCE DISORDER: ICD-10-CM

## 2024-08-29 DIAGNOSIS — R29.898 BILATERAL LEG WEAKNESS: Primary | ICD-10-CM

## 2024-08-29 DIAGNOSIS — M25.562 CHRONIC PAIN OF LEFT KNEE: ICD-10-CM

## 2024-08-29 NOTE — PROGRESS NOTES
Physical Therapy Daily Treatment Note  [place] [facility]: [address] [facility] [place] [address]      Patient: Julia Rios   : 1945  Diagnosis/ICD-10 Code:  Bilateral leg weakness [R29.898]  Referring practitioner: Celso Prakash MD  Date of Initial Visit: Type: THERAPY  Noted: 8/15/2024  Today's Date: 2024  Patient seen for 5 sessions           Subjective   The patient reported they have been doing well, though they are struggling some w/ their breathing.     Objective   See Exercise, Manual, and Modality Logs for complete treatment.     Assessment/Plan  Pt does require breaks w/n therapy to recover their breath. Discussed fall safety as well as use of a rollator to prolong their activity. Patient will continue to benefit from skilled physical therapy to further address their deficits in strength and balance in order to improve upon their functional mobility and to return to ambulatory tasks w/ improved safety.          Timed:  Manual Therapy:    0     mins  11930;  Therapeutic Exercise:    15     mins  89013;     Neuromuscular Sheela:   0    mins  06831;    Therapeutic Activity:     10     mins  90343;     Gait Trainin     mins  20227;     Aquatics                         0      mins  30351    Un-timed:  Mechanical Traction      0     mins  46048  Electrical Stimulation:    0     mins  96258 ( );      Timed Treatment:   25   mins   Total Treatment:     25   mins    Luigi Green PT, DPT    Electronically signed 2024    [place] License: PT - [id number]

## 2024-08-30 RX ORDER — GABAPENTIN 300 MG/1
CAPSULE ORAL
Status: SHIPPED
Start: 2024-08-30

## 2024-09-04 ENCOUNTER — TREATMENT (OUTPATIENT)
Dept: PHYSICAL THERAPY | Facility: CLINIC | Age: 79
End: 2024-09-04
Payer: MEDICARE

## 2024-09-04 DIAGNOSIS — R29.898 BILATERAL LEG WEAKNESS: Primary | ICD-10-CM

## 2024-09-04 DIAGNOSIS — G89.29 CHRONIC PAIN OF LEFT KNEE: ICD-10-CM

## 2024-09-04 DIAGNOSIS — R26.89 BALANCE DISORDER: ICD-10-CM

## 2024-09-04 DIAGNOSIS — M25.562 CHRONIC PAIN OF LEFT KNEE: ICD-10-CM

## 2024-09-04 DIAGNOSIS — R26.9 GAIT DISTURBANCE: ICD-10-CM

## 2024-09-04 NOTE — PROGRESS NOTES
Outpatient Physical Therapy  [address] [facility], [place] [address]                            Physical Therapy Daily Treatment Note    Patient: Julia Rios   : 1945  Diagnosis/ICD-10 Code:  Bilateral leg weakness [R29.898]  Referring practitioner: Celso Prakash MD  Date of Initial Visit: Type: THERAPY  Noted: 8/15/2024  Today's Date: 2024  Patient seen for 6 sessions           Subjective   Julia Rios reports: that she hyperextended her right knee and feels like it's a little more swollen. Overall both the knees are getting better.     Objective   General fatigue with step ups.    See Exercise, Manual, and Modality Logs for complete treatment.     Assessment/Plan  Julia progressing as evident by decreased overall B knee pain. Pt tolerated exercises well, just general fatigue. Pt would benefit from skilled PT to address Range of Motion  and Strength deficits, pain management and any concerns with ADLs.       Progress per Plan of Care         Timed:  Manual Therapy:         mins  38084;  Therapeutic Exercise:    20     mins  53070;     Neuromuscular Sheela:        mins  06772;    Therapeutic Activity:     10     mins  90162;     Gait Training:           mins  28636;      Untimed:  Electrical Stimulation:         mins  42233 ( );  Mechanical Traction:         mins  34265;     Timed Treatment:   30   mins   Total Treatment:     30   mins      Electronically signed:     Naila Sarmiento PTA  Physical Therapist Assistant  [facility] License #: [id number]

## 2024-09-06 ENCOUNTER — TREATMENT (OUTPATIENT)
Dept: PHYSICAL THERAPY | Facility: CLINIC | Age: 79
End: 2024-09-06
Payer: MEDICARE

## 2024-09-06 DIAGNOSIS — R26.9 GAIT DISTURBANCE: ICD-10-CM

## 2024-09-06 DIAGNOSIS — R26.89 BALANCE DISORDER: ICD-10-CM

## 2024-09-06 DIAGNOSIS — G89.29 CHRONIC PAIN OF LEFT KNEE: ICD-10-CM

## 2024-09-06 DIAGNOSIS — M25.562 CHRONIC PAIN OF LEFT KNEE: ICD-10-CM

## 2024-09-06 DIAGNOSIS — R29.898 BILATERAL LEG WEAKNESS: Primary | ICD-10-CM

## 2024-09-06 NOTE — PROGRESS NOTES
Outpatient Physical Therapy  [address] [facility], [place] [address]                            Physical Therapy Daily Treatment Note    Patient: Julia Rios   : 1945  Diagnosis/ICD-10 Code:  Bilateral leg weakness [R29.898]  Referring practitioner: Celso Prakash MD  Date of Initial Visit: Type: THERAPY  Noted: 8/15/2024  Today's Date: 2024  Patient seen for 7 sessions           Subjective Questionnaire: LEFS: 63/80 (40-59%)  Subjective   Julia Rios reports: overall she feels like her legs are doing better.     Pain: 2/10 pain, currently in the R knee  Pain at Worst: 4/10 when she has been up walking or standing a lot  Pain at Best: 0/10    Objective   TU.62 w/o AD.   30 sec STS: 11; arms crossed    Strength/Myotome Testing      Left Hip   Planes of Motion   Flexion: 4+  Extension: 5     Right Hip   Planes of Motion   Flexion: 4+; with some pain  Extension: 5     Left Knee   Flexion: 5  Extension: 5     Right Knee   Flexion: 5  Extension: 4+    See Exercise, Manual, and Modality Logs for complete treatment.     Assessment/Plan  Julia progressing as evident by decreased overall B knee pain. Pt tolerated exercises well, just general fatigue. Pt would benefit from skilled PT to address Range of Motion  and Strength deficits, pain management and any concerns with ADLs.       Goals  Plan Goals: Movement Disorders and balance impairments:         1. Balance problems with functional movement.    LTG 1: 12 weeks: The patient will improve their FGA score to 24 to demonstrate improvement in functional mobility and balance while performing daily ambulation within their household and community.   STATUS: PROGRESSING  STG 1: 6 weeks: The patient will improve their FGA score to 18 to demonstrate improvement in functional mobility and balance while performing daily ambulation within their household and community.   STATUS: PROGRESSING  TREATMENT: Gait training, aquatic therapy,  therapeutic exercise, therapeutic activity, balance activities, and home exercise instruction and modalities as needed for pain to include:  electrical stimulation, moist heat, and ice.     2. Falls risk with ambulation.   LTG 2: The patient will achieve a time of 9 seconds on their TUG to decrease their overall falls risk and improve upon their safety in ambulating household and community distances safely.  STATUS: MET  TREATMENT: Gait training, aquatic therapy, therapeutic exercise, therapeutic activity, balance activities, and home exercise instruction and modalities as needed for pain to include:  electrical stimulation, moist heat, and ice.     3. Lower extremity weakness.   LTG 3: 12 weeks: The patient will perform 12 repetitions during 30 second sit to stand test to demonstrate improvement in lower extremity power for improvement in standing from standard chairs as well as to rise from toileting with decreased effort.   STATUS: PROGRESSING  STG 3: 6 weeks: The patient will perform 11 repetitions during 30 second sit to stand test.   STATUS: MET  TREATMENT: Gait training, aquatic therapy, therapeutic exercise, therapeutic activity, balance activities, and home exercise instruction.      4. The patient demonstrates weakness of the B hip.  LTG 4: 12 weeks:  The patient will demonstrate 5/5 strength for B hip flexion, abduction, and extension in order to improve hip stability.  STATUS:  PROGRESSING  TREATMENT: Therapeutic exercises, manual therapy, aquatic therapy, home exercise instruction,  and modalities as needed for pain to include:  electrical stimulation, moist heat, ice, and ultrasound     5. Mobility: Walking/Moving Around Functional Limitation                   LTG 5: 12 weeks:  The patient will demonstrate 12.5% limitation by achieving a score of 70/80 on the Lower Extremity Functional Scale.  STATUS:  PROGRESSING  TREATMENT:  Manual therapy, therapeutic exercise, home exercise instruction, and  modalities as needed to include: moist heat, electrical stimulation, and ultrasound.     Progress per Plan of Care       Timed:  Manual Therapy:         mins  72468;  Therapeutic Exercise:    20     mins  87502;     Neuromuscular Sheela:        mins  05669;    Therapeutic Activity:     10     mins  16321;     Gait Training:           mins  91849;      Untimed:  Electrical Stimulation:         mins  13185 ( );  Mechanical Traction:         mins  89303;     Timed Treatment:   30   mins   Total Treatment:     30   mins      Electronically signed:     Naila Sarmiento PTA  Physical Therapist Assistant  [facility] License #: [id number]

## 2024-09-12 ENCOUNTER — TREATMENT (OUTPATIENT)
Dept: PHYSICAL THERAPY | Facility: CLINIC | Age: 79
End: 2024-09-12
Payer: MEDICARE

## 2024-09-12 DIAGNOSIS — M25.562 CHRONIC PAIN OF LEFT KNEE: ICD-10-CM

## 2024-09-12 DIAGNOSIS — G89.29 CHRONIC PAIN OF LEFT KNEE: ICD-10-CM

## 2024-09-12 DIAGNOSIS — R29.898 BILATERAL LEG WEAKNESS: Primary | ICD-10-CM

## 2024-09-12 DIAGNOSIS — R26.9 GAIT DISTURBANCE: ICD-10-CM

## 2024-09-12 DIAGNOSIS — R26.89 BALANCE DISORDER: ICD-10-CM

## 2024-09-12 NOTE — PROGRESS NOTES
"Progress Note / Discharge Summary  [place] [facility]: [address] [facility] [place] [address]      Patient: Julia Rios   : 1945  Diagnosis/ICD-10 Code:  Bilateral leg weakness [R29.898]  Referring practitioner: Celso Prakash MD  Date of Initial Visit: Type: THERAPY  Noted: 8/15/2024  Today's Date: 2024  Patient seen for 8 sessions      Subjective:   Subjective Questionnaire: LEFS: 49/80  Clinical Progress: worse  Home Program Compliance: Yes  Treatment has included: balance/weight-bearing training, ADL retraining, soft tissue mobilization, strengthening, stretching, therapeutic activities, manual therapy, joint mobilization, home exercise program/patient education, gait training, functional ROM exercises, flexibility, body mechanics training, postural training, and neuromuscular re-education    Subjective   Pt reports they are really struggling w/ their breathing at this time. Pt believes this is affecting their recovery w/ therapy. Pt reports this has always been worse in the summer. They do have an appointment w/ their pulmonologist. Pt would like to pause therapy at this time due to their breathing.       Objective   Strength/Myotome Testing      Left Hip   Planes of Motion   Flexion: 4-  Extension: 4-     Right Hip   Planes of Motion   Flexion: 4+  Extension: 4-     Left Knee   Flexion: 5  Extension: 5     Right Knee   Flexion: 5  Extension: 5     Ambulation      Comments   Antalgic gait favoring L LE. Compensated trendelenburg to the L.      Functional Assessment      Comments  TU.69 w/o AD.   30 sec STS: 13; arms crossed.      Functional Gait Assessment     1. Gait level surface ___2__  Instructions: Walk at your normal speed from here to the next bethany (20')        2. Change in gait speed __2___  Instructions: Begin walking at your normal pace (for 5'), when I tell you \"go,\" walk as fast as you can (for 5'). When I tell you \"slow,\" walk as slowly as  you can (for 5').        3. Gait " "with horizontal head turns ___2__  Instructions: Begin walking at your normal pace. When I tell you to \"look right,\" keep walking straight, but turn your head to the right. Keep looking to  the right until I tell you, \"look left,\" then keep walking straight and turn your head to the left. Keep your head to the left until I tell you \"look straight,\"   then keep walking straight, but return your head to the center.        4. Gait with vertical head turns ___3__  Instructions: Begin walking at your normal pace. When I tell you to \"look up,\" keep walking straight, but tip your head up. Keep looking up until I tell  you, \"look down,\" then keep walking straight and tip your head down. Keep your head down until I tell you \"look straight,\" then keep walking straight,  but return your head to the center.        5. Gait and pivot turn __2___  Instructions: Begin walking at your normal pace. When I tell you, \"turn and stop,\" turn as quickly as you can to face the opposite direction and stop.        6. Step over obstacle __1__  Instructions: Begin walking at your normal speed. When you come to the shoebox, step over it, not around it, and keep walking.        7. Gait with narrow base of support __0___  Instructions: Walk forward with your arms folded across your chest with your feet aligned heel to toe in tandem (12 ft or up to 10 steps in straight line).        8. Steps __1___  Instructions: Walk up these stairs as you would at home, i.e., using the railing if necessary. At the top, turn around and walk down.        9. Ambulating backwards __1___  Instructions: walk backwards until I tell you to stop.        10. Gait with eyes closed ___2__  Instructions: Walk forward at normal speed with your eyes closed (20 ft).           TOTAL SCORE: __16_ / 30        Min for age: 16  Average for age: 25    See Exercise, Manual, and Modality Logs for complete treatment.     Assessment/Plan  Impairments: abnormal coordination, abnormal gait, " abnormal muscle firing, activity intolerance, impaired balance, impaired physical strength and lacks appropriate home exercise program   Functional limitations: carrying objects, lifting, walking, pulling, standing, stooping and unable to perform repetitive tasks   Pt reported to therapy w/ complaints of B LE weakness and gait impairments. Pt does continue to have weakness in their LEs, though has been limited most by their breathing and recovery. Pt has been encouraged to continue their follow up w/ Pulmonology at this time. Pt encouraged to return to therapy when they are able to have improvement in their recovery and breathing. Pt is discharged at this time.       Goals  Plan Goals: Movement Disorders and balance impairments:         1. Balance problems with functional movement.    LTG 1: 12 weeks: The patient will improve their FGA score to 24 to demonstrate improvement in functional mobility and balance while performing daily ambulation within their household and community.   STATUS: not met  STG 1: 6 weeks: The patient will improve their FGA score to 18 to demonstrate improvement in functional mobility and balance while performing daily ambulation within their household and community.   STATUS: not met  TREATMENT: Gait training, aquatic therapy, therapeutic exercise, therapeutic activity, balance activities, and home exercise instruction and modalities as needed for pain to include:  electrical stimulation, moist heat, and ice.     2. Falls risk with ambulation.   LTG 2: The patient will achieve a time of 9 seconds on their TUG to decrease their overall falls risk and improve upon their safety in ambulating household and community distances safely.  STATUS: sufficiently met  TREATMENT: Gait training, aquatic therapy, therapeutic exercise, therapeutic activity, balance activities, and home exercise instruction and modalities as needed for pain to include:  electrical stimulation, moist heat, and ice.     3.  Lower extremity weakness.   LTG 3: 12 weeks: The patient will perform 12 repetitions during 30 second sit to stand test to demonstrate improvement in lower extremity power for improvement in standing from standard chairs as well as to rise from toileting with decreased effort.   STATUS: met  STG 3: 6 weeks: The patient will perform 11 repetitions during 30 second sit to stand test.   STATUS: met  TREATMENT: Gait training, aquatic therapy, therapeutic exercise, therapeutic activity, balance activities, and home exercise instruction.      4. The patient demonstrates weakness of the B hip.  LTG 4: 12 weeks:  The patient will demonstrate 5/5 strength for B hip flexion, abduction, and extension in order to improve hip stability.  STATUS:  not met  TREATMENT: Therapeutic exercises, manual therapy, aquatic therapy, home exercise instruction,  and modalities as needed for pain to include:  electrical stimulation, moist heat, ice, and ultrasound     5. Mobility: Walking/Moving Around Functional Limitation                   LTG 5: 12 weeks:  The patient will demonstrate 12.5% limitation by achieving a score of 70/80 on the Lower Extremity Functional Scale.  STATUS:  not met  TREATMENT:  Manual therapy, therapeutic exercise, home exercise instruction, and modalities as needed to include: moist heat, electrical stimulation, and ultrasound.      Progress toward previous goals: Partially Met      Recommendations: Discharge      PT Signature: Luigi Green PT, DPT    Electronically signed 2024    [place] License: PT - [id number]       Timed:  Manual Therapy:    0     mins  26523;  Therapeutic Exercise:    20     mins  99597;     Neuromuscular Sheela:    0    mins  24381;    Therapeutic Activity:     0     mins  26482;     Gait Trainin     mins  33564;     Aquatics                         0      mins  80034    Un-timed:  Mechanical Traction      0     mins  72671  Dry Needling     0     mins self-pay  Electrical Stimulation:    0      mins  71405 ( );    Timed Treatment:   20   mins   Total Treatment:     20   mins

## 2024-09-13 RX ORDER — FEXOFENADINE HCL 180 MG/1
180 TABLET ORAL DAILY
Qty: 90 TABLET | Refills: 3 | Status: SHIPPED | OUTPATIENT
Start: 2024-09-13

## 2024-09-23 ENCOUNTER — OFFICE VISIT (OUTPATIENT)
Dept: PULMONOLOGY | Facility: CLINIC | Age: 79
End: 2024-09-23
Payer: MEDICARE

## 2024-09-23 VITALS
DIASTOLIC BLOOD PRESSURE: 62 MMHG | RESPIRATION RATE: 16 BRPM | WEIGHT: 221 LBS | HEIGHT: 62 IN | OXYGEN SATURATION: 95 % | TEMPERATURE: 97.2 F | SYSTOLIC BLOOD PRESSURE: 134 MMHG | BODY MASS INDEX: 40.67 KG/M2 | HEART RATE: 58 BPM

## 2024-09-23 DIAGNOSIS — J44.1 CHRONIC OBSTRUCTIVE PULMONARY DISEASE WITH ACUTE EXACERBATION: ICD-10-CM

## 2024-09-23 DIAGNOSIS — R04.2 HEMOPTYSIS: ICD-10-CM

## 2024-09-23 DIAGNOSIS — J30.9 ALLERGIC RHINITIS, UNSPECIFIED SEASONALITY, UNSPECIFIED TRIGGER: ICD-10-CM

## 2024-09-23 DIAGNOSIS — J44.9 CHRONIC OBSTRUCTIVE PULMONARY DISEASE, UNSPECIFIED COPD TYPE: ICD-10-CM

## 2024-09-23 DIAGNOSIS — J45.40 MODERATE PERSISTENT ASTHMA, UNSPECIFIED WHETHER COMPLICATED: ICD-10-CM

## 2024-09-23 DIAGNOSIS — J30.2 SEASONAL ALLERGIES: Primary | ICD-10-CM

## 2024-09-23 DIAGNOSIS — K21.00 GASTROESOPHAGEAL REFLUX DISEASE WITH ESOPHAGITIS WITHOUT HEMORRHAGE: ICD-10-CM

## 2024-09-23 DIAGNOSIS — R09.82 PND (POST-NASAL DRIP): ICD-10-CM

## 2024-09-23 DIAGNOSIS — J41.1 BRONCHITIS, MUCOPURULENT RECURRENT: ICD-10-CM

## 2024-09-23 DIAGNOSIS — J30.2 SEASONAL ALLERGIES: ICD-10-CM

## 2024-09-23 DIAGNOSIS — F17.201 TOBACCO ABUSE, IN REMISSION: ICD-10-CM

## 2024-09-23 DIAGNOSIS — R91.1 PULMONARY NODULE: ICD-10-CM

## 2024-09-23 DIAGNOSIS — G47.33 OSA (OBSTRUCTIVE SLEEP APNEA): ICD-10-CM

## 2024-09-23 PROCEDURE — 1160F RVW MEDS BY RX/DR IN RCRD: CPT | Performed by: INTERNAL MEDICINE

## 2024-09-23 PROCEDURE — 99214 OFFICE O/P EST MOD 30 MIN: CPT | Performed by: INTERNAL MEDICINE

## 2024-09-23 PROCEDURE — 3078F DIAST BP <80 MM HG: CPT | Performed by: INTERNAL MEDICINE

## 2024-09-23 PROCEDURE — 3075F SYST BP GE 130 - 139MM HG: CPT | Performed by: INTERNAL MEDICINE

## 2024-09-23 PROCEDURE — 1159F MED LIST DOCD IN RCRD: CPT | Performed by: INTERNAL MEDICINE

## 2024-09-23 RX ORDER — MONTELUKAST SODIUM 10 MG/1
10 TABLET ORAL NIGHTLY
Qty: 90 TABLET | Refills: 3 | Status: SHIPPED | OUTPATIENT
Start: 2024-09-23

## 2024-09-23 RX ORDER — IPRATROPIUM BROMIDE AND ALBUTEROL SULFATE 2.5; .5 MG/3ML; MG/3ML
3 SOLUTION RESPIRATORY (INHALATION) 4 TIMES DAILY PRN
Qty: 360 ML | Refills: 1 | Status: SHIPPED | OUTPATIENT
Start: 2024-09-23 | End: 2024-09-23

## 2024-09-23 RX ORDER — FEXOFENADINE HCL 180 MG/1
180 TABLET ORAL DAILY
Qty: 90 TABLET | Refills: 3 | Status: SHIPPED | OUTPATIENT
Start: 2024-09-23

## 2024-09-23 RX ORDER — IPRATROPIUM BROMIDE AND ALBUTEROL SULFATE 2.5; .5 MG/3ML; MG/3ML
SOLUTION RESPIRATORY (INHALATION)
Qty: 1620 ML | Refills: 3 | Status: SHIPPED | OUTPATIENT
Start: 2024-09-23

## 2024-10-17 ENCOUNTER — OFFICE VISIT (OUTPATIENT)
Dept: PULMONOLOGY | Facility: CLINIC | Age: 79
End: 2024-10-17
Payer: MEDICARE

## 2024-10-17 ENCOUNTER — TRANSCRIBE ORDERS (OUTPATIENT)
Dept: LAB | Facility: HOSPITAL | Age: 79
End: 2024-10-17
Payer: MEDICARE

## 2024-10-17 ENCOUNTER — LAB (OUTPATIENT)
Dept: LAB | Facility: HOSPITAL | Age: 79
End: 2024-10-17
Payer: MEDICARE

## 2024-10-17 VITALS
OXYGEN SATURATION: 97 % | HEART RATE: 66 BPM | TEMPERATURE: 98.3 F | DIASTOLIC BLOOD PRESSURE: 58 MMHG | BODY MASS INDEX: 40.67 KG/M2 | RESPIRATION RATE: 16 BRPM | HEIGHT: 62 IN | SYSTOLIC BLOOD PRESSURE: 120 MMHG | WEIGHT: 221 LBS

## 2024-10-17 DIAGNOSIS — E83.42 HYPOMAGNESEMIA: ICD-10-CM

## 2024-10-17 DIAGNOSIS — J44.1 CHRONIC OBSTRUCTIVE PULMONARY DISEASE WITH ACUTE EXACERBATION: ICD-10-CM

## 2024-10-17 DIAGNOSIS — J41.1 BRONCHITIS, MUCOPURULENT RECURRENT: Primary | ICD-10-CM

## 2024-10-17 DIAGNOSIS — E11.9 DIABETES MELLITUS WITHOUT COMPLICATION: ICD-10-CM

## 2024-10-17 DIAGNOSIS — I10 ESSENTIAL HYPERTENSION, MALIGNANT: ICD-10-CM

## 2024-10-17 DIAGNOSIS — R06.02 SHORTNESS OF BREATH: ICD-10-CM

## 2024-10-17 DIAGNOSIS — N18.30 STAGE 3 CHRONIC KIDNEY DISEASE, UNSPECIFIED WHETHER STAGE 3A OR 3B CKD: ICD-10-CM

## 2024-10-17 DIAGNOSIS — N18.30 STAGE 3 CHRONIC KIDNEY DISEASE, UNSPECIFIED WHETHER STAGE 3A OR 3B CKD: Primary | ICD-10-CM

## 2024-10-17 DIAGNOSIS — J30.2 SEASONAL ALLERGIES: ICD-10-CM

## 2024-10-17 LAB
ALBUMIN SERPL-MCNC: 4 G/DL (ref 3.5–5.2)
ALBUMIN UR-MCNC: <1.2 MG/DL
ALBUMIN/GLOB SERPL: 1.5 G/DL
ALP SERPL-CCNC: 94 U/L (ref 39–117)
ALT SERPL W P-5'-P-CCNC: 9 U/L (ref 1–33)
ANION GAP SERPL CALCULATED.3IONS-SCNC: 8.1 MMOL/L (ref 5–15)
AST SERPL-CCNC: 17 U/L (ref 1–32)
BASOPHILS # BLD AUTO: 0.1 10*3/MM3 (ref 0–0.2)
BASOPHILS NFR BLD AUTO: 0.9 % (ref 0–1.5)
BILIRUB SERPL-MCNC: 0.5 MG/DL (ref 0–1.2)
BILIRUB UR QL STRIP: NEGATIVE
BUN SERPL-MCNC: 18 MG/DL (ref 8–23)
BUN/CREAT SERPL: 12.9 (ref 7–25)
CALCIUM SPEC-SCNC: 9.4 MG/DL (ref 8.6–10.5)
CHLORIDE SERPL-SCNC: 105 MMOL/L (ref 98–107)
CLARITY UR: CLEAR
CO2 SERPL-SCNC: 23.9 MMOL/L (ref 22–29)
COLOR UR: YELLOW
CREAT SERPL-MCNC: 1.39 MG/DL (ref 0.57–1)
CREAT UR-MCNC: 79.9 MG/DL
CREAT UR-MCNC: 86.7 MG/DL
DEPRECATED RDW RBC AUTO: 44.8 FL (ref 37–54)
EGFRCR SERPLBLD CKD-EPI 2021: 38.7 ML/MIN/1.73
EOSINOPHIL # BLD AUTO: 0.26 10*3/MM3 (ref 0–0.4)
EOSINOPHIL NFR BLD AUTO: 2.3 % (ref 0.3–6.2)
ERYTHROCYTE [DISTWIDTH] IN BLOOD BY AUTOMATED COUNT: 13.9 % (ref 12.3–15.4)
GLOBULIN UR ELPH-MCNC: 2.7 GM/DL
GLUCOSE SERPL-MCNC: 103 MG/DL (ref 65–99)
GLUCOSE UR STRIP-MCNC: NEGATIVE MG/DL
HBA1C MFR BLD: 5.9 % (ref 4.8–5.6)
HCT VFR BLD AUTO: 40.3 % (ref 34–46.6)
HGB BLD-MCNC: 12.5 G/DL (ref 12–15.9)
HGB UR QL STRIP.AUTO: NEGATIVE
IMM GRANULOCYTES # BLD AUTO: 0.03 10*3/MM3 (ref 0–0.05)
IMM GRANULOCYTES NFR BLD AUTO: 0.3 % (ref 0–0.5)
KETONES UR QL STRIP: NEGATIVE
LEUKOCYTE ESTERASE UR QL STRIP.AUTO: NEGATIVE
LYMPHOCYTES # BLD AUTO: 4.1 10*3/MM3 (ref 0.7–3.1)
LYMPHOCYTES NFR BLD AUTO: 35.8 % (ref 19.6–45.3)
MAGNESIUM SERPL-MCNC: 1.9 MG/DL (ref 1.6–2.4)
MCH RBC QN AUTO: 27.3 PG (ref 26.6–33)
MCHC RBC AUTO-ENTMCNC: 31 G/DL (ref 31.5–35.7)
MCV RBC AUTO: 88 FL (ref 79–97)
MICROALBUMIN/CREAT UR: NORMAL MG/G{CREAT}
MONOCYTES # BLD AUTO: 0.96 10*3/MM3 (ref 0.1–0.9)
MONOCYTES NFR BLD AUTO: 8.4 % (ref 5–12)
NEUTROPHILS NFR BLD AUTO: 5.99 10*3/MM3 (ref 1.7–7)
NEUTROPHILS NFR BLD AUTO: 52.3 % (ref 42.7–76)
NITRITE UR QL STRIP: NEGATIVE
NRBC BLD AUTO-RTO: 0 /100 WBC (ref 0–0.2)
PH UR STRIP.AUTO: 6 [PH] (ref 5–8)
PLATELET # BLD AUTO: 256 10*3/MM3 (ref 140–450)
PMV BLD AUTO: 12.1 FL (ref 6–12)
POTASSIUM SERPL-SCNC: 3.8 MMOL/L (ref 3.5–5.2)
PROT ?TM UR-MCNC: 8.9 MG/DL
PROT SERPL-MCNC: 6.7 G/DL (ref 6–8.5)
PROT UR QL STRIP: NEGATIVE
PROT/CREAT UR: 0.1 MG/G{CREAT}
RBC # BLD AUTO: 4.58 10*6/MM3 (ref 3.77–5.28)
SODIUM SERPL-SCNC: 137 MMOL/L (ref 136–145)
SP GR UR STRIP: 1.01 (ref 1–1.03)
UROBILINOGEN UR QL STRIP: NORMAL
WBC NRBC COR # BLD AUTO: 11.44 10*3/MM3 (ref 3.4–10.8)

## 2024-10-17 PROCEDURE — 80053 COMPREHEN METABOLIC PANEL: CPT

## 2024-10-17 PROCEDURE — 83036 HEMOGLOBIN GLYCOSYLATED A1C: CPT

## 2024-10-17 PROCEDURE — 82570 ASSAY OF URINE CREATININE: CPT

## 2024-10-17 PROCEDURE — 85025 COMPLETE CBC W/AUTO DIFF WBC: CPT

## 2024-10-17 PROCEDURE — 82043 UR ALBUMIN QUANTITATIVE: CPT

## 2024-10-17 PROCEDURE — 36415 COLL VENOUS BLD VENIPUNCTURE: CPT

## 2024-10-17 PROCEDURE — 81003 URINALYSIS AUTO W/O SCOPE: CPT

## 2024-10-17 PROCEDURE — 83735 ASSAY OF MAGNESIUM: CPT

## 2024-10-17 PROCEDURE — 84156 ASSAY OF PROTEIN URINE: CPT

## 2024-10-17 RX ORDER — PREDNISONE 20 MG/1
40 TABLET ORAL DAILY
Qty: 14 TABLET | Refills: 0 | Status: SHIPPED | OUTPATIENT
Start: 2024-10-17

## 2024-10-17 RX ORDER — DOXYCYCLINE 100 MG/1
100 CAPSULE ORAL 2 TIMES DAILY
Qty: 20 CAPSULE | Refills: 0 | Status: SHIPPED | OUTPATIENT
Start: 2024-10-17

## 2024-10-17 NOTE — PROGRESS NOTES
Primary Care Provider  Nick Patel DO   Referring Provider  No ref. provider found    Patient Complaint  acute, Shortness of Breath, and Cough (Productive yellow mucous)      Subjective       History of Presenting Illness  Julia Rios is a pleasant 79 y.o. female who presents to [facility] for acute visit.   Patient has history of history of COPD, obstructive sleep apnea, recurrent allergic rhinitis, history of fungal pneumonia, pulmonary nodules, and tobacco abuse of cigarettes in remission. She recently saw Dr. Baez on 9/23/2024. She was also started on Duo nebulizer in place of albuterol nebulizer. She continues with maintenance inhaler Breztri BID.  She states that last visit Dr. Baez was post to send her in Augmentin but she never did receive it.  She has had a productive cough with yellow mucus for the past 2 wee.  She states the cough is worse at night.  She does have some Tussionex at home but she does not use it very often.   Patient denies  wheezing, headaches, chest pain, weight loss or hemoptysis. Patient denies fevers, chills and night sweats. Julia Rios is able to perform ADLs without difficulties.    I have personally reviewed the review of systems, past family, social, medical and surgical histories; and agree with their findings.      Review of Systems   Constitutional: Negative.    HENT: Negative.     Respiratory:  Positive for cough and shortness of breath.    Cardiovascular: Negative.    Musculoskeletal: Negative.    Neurological: Negative.    Psychiatric/Behavioral: Negative.           Family History   Problem Relation Age of Onset    Colon cancer Mother 61    Cancer Mother     Heart disease Mother     Heart failure Father     Asthma Father     Heart attack Father     Hyperlipidemia Father     Emphysema Father     Diabetes Brother     Emphysema Brother     Breast cancer Maternal Grandmother     Stroke Paternal  Grandfather     Malmarva Hyperthermia Neg Hx         Social History     Socioeconomic History    Marital status:    Tobacco Use    Smoking status: Former     Current packs/day: 0.00     Average packs/day: 0.5 packs/day for 32.0 years (16.0 ttl pk-yrs)     Types: Cigarettes     Start date: 1963     Quit date: 1995     Years since quittin.8     Passive exposure: Never    Smokeless tobacco: Never   Vaping Use    Vaping status: Never Used   Substance and Sexual Activity    Alcohol use: Never    Drug use: Never    Sexual activity: Defer        Past Medical History:   Diagnosis Date    Acute right-sided low back pain with right-sided sciatica 01/15/2018    Allergic rhinitis     Asthma     Asthma, extrinsic     Asthma, intrinsic     Chronic heart failure with preserved ejection fraction     20 echocardiogram: 1.  Normal ejection fraction of 55%. 2.  Mild left ventricular hypertrophy. 3.  No significant valvular heart issues.     CKD stage 3 10/29/2019    COPD (chronic obstructive pulmonary disease)     Diverticulitis     Emphysema of lung     Essential hypertension     GERD (gastroesophageal reflux disease)     Hemoptysis     non cancerous    History of fungal pneumonia 2021    Hyperlipidemia     Idiopathic chronic gout of multiple sites without tophus 2016    Pneumonia     Primary osteoarthritis of right knee 2017    Sleep apnea     CPAP    Sleep apnea, obstructive     Vitamin D deficiency 2016        Immunization History   Administered Date(s) Administered    COVID-19 (MODERNA) 1st,2nd,3rd Dose Monovalent 2021, 2021, 2021, 2021    Fluzone High-Dose 65+yrs 2021, 2022, 2024    Fluzone Quad >6mos (Multi-dose) 10/01/2020    Pneumococcal Conjugate 13-Valent (PCV13) 2015    Pneumococcal Conjugate 20-Valent (PCV20) 2023    Pneumococcal Polysaccharide (PPSV23) 2010       Allergies   Allergen Reactions    Ibuprofen Hives     Penicillins Seizure and Other (See Comments)    Nsaids Other (See Comments)     CKD    Cephalosporins Rash    Sulfa Antibiotics Rash          Current Outpatient Medications:     albuterol sulfate  (90 Base) MCG/ACT inhaler, Inhale 2 puffs Every 4 (Four) Hours As Needed for Wheezing., Disp: 1 g, Rfl: 11    azelastine (ASTELIN) 0.1 % nasal spray, 2 sprays into the nostril(s) as directed by provider 2 (Two) Times a Day. Use in each nostril as directed, Disp: 30 mL, Rfl: 6    Budeson-Glycopyrrol-Formoterol (BREZTRI) 160-9-4.8 MCG/ACT aerosol inhaler, Inhale 2 puffs 2 (Two) Times a Day., Disp: 10.7 g, Rfl: 11    D3-1000 25 MCG (1000 UT) capsule, TAKE 1 CAPSULE BY MOUTH EVERY DAY, Disp: 90 capsule, Rfl: 3    famotidine (PEPCID) 20 MG tablet, Take 1 tablet by mouth every night at bedtime., Disp: 30 tablet, Rfl: 11    fexofenadine (ALLEGRA) 180 MG tablet, Take 1 tablet by mouth Daily., Disp: 90 tablet, Rfl: 3    gabapentin (NEURONTIN) 300 MG capsule, 1 capsule every morning and 2 capsule every evening., Disp: , Rfl:     ipratropium-albuterol (DUO-NEB) 0.5-2.5 mg/3 ml nebulizer, USE 3 ML VIA NEBULIZER FOUR TIMES DAILY AS NEEDED FOR WHEEZING, Disp: 1620 mL, Rfl: 3    losartan (COZAAR) 100 MG tablet, Take 1 tablet by mouth Daily., Disp: 90 tablet, Rfl: 3    metoprolol succinate XL (TOPROL-XL) 100 MG 24 hr tablet, Take 1 tablet by mouth Daily. Hold if heartrate is <60., Disp: 90 tablet, Rfl: 3    montelukast (SINGULAIR) 10 MG tablet, Take 1 tablet by mouth Every Night., Disp: 90 tablet, Rfl: 3    spironolactone (ALDACTONE) 25 MG tablet, Take 1 tablet by mouth 3 (Three) Times a Week. Monday,Wednesday,Friday, Disp: 36 tablet, Rfl: 3    doxycycline (VIBRAMYCIN) 100 MG capsule, Take 1 capsule by mouth 2 (Two) Times a Day., Disp: 20 capsule, Rfl: 0    predniSONE (DELTASONE) 20 MG tablet, Take 2 tablets by mouth Daily., Disp: 14 tablet, Rfl: 0    Current Facility-Administered Medications:     ipratropium-albuterol (DUO-NEB)  "nebulizer solution 3 mL, 3 mL, Nebulization, Once, Shanell Murray, KALEB         Vital Signs   /58 (BP Location: Left arm, Patient Position: Sitting, Cuff Size: Large Adult)   Pulse 66   Temp 98.3 °F (36.8 °C)   Resp 16   Ht 157.5 cm (62\")   Wt 100 kg (221 lb)   SpO2 97%   BMI 40.42 kg/m²       Objective     Physical Exam  Vitals reviewed.   Constitutional:       General: She is not in acute distress.     Appearance: Normal appearance. She is obese. She is not ill-appearing.   HENT:      Head: Normocephalic and atraumatic.      Nose: Nose normal.      Mouth/Throat:      Mouth: Mucous membranes are moist.      Pharynx: Oropharynx is clear.   Eyes:      Extraocular Movements: Extraocular movements intact.      Conjunctiva/sclera: Conjunctivae normal.      Pupils: Pupils are equal, round, and reactive to light.   Cardiovascular:      Rate and Rhythm: Normal rate and regular rhythm.      Pulses: Normal pulses.      Heart sounds: Normal heart sounds.   Pulmonary:      Effort: Pulmonary effort is normal. No respiratory distress.      Breath sounds: Normal breath sounds. No stridor. No wheezing, rhonchi or rales.   Abdominal:      General: Bowel sounds are normal.   Musculoskeletal:         General: Normal range of motion.      Cervical back: Normal range of motion and neck supple.   Skin:     General: Skin is warm and dry.   Neurological:      Mental Status: She is alert and oriented to person, place, and time.   Psychiatric:         Behavior: Behavior normal.         Results Review  I have personally reviewed the prior office notes, hospital records, labs, and diagnostics.    Assessment         Patient Active Problem List   Diagnosis    Family history of colon cancer    Chronic obstructive pulmonary disease    Essential hypertension    Hyperlipidemia    CKD (chronic kidney disease) stage 3, GFR 30-59 ml/min    Chronic heart failure with preserved ejection fraction    CARMEN (obstructive sleep apnea)    Seasonal " allergies    Gastroesophageal reflux disease    Bronchitis, mucopurulent recurrent    Tobacco abuse, in remission    Pulmonary nodule    PND (post-nasal drip)    Status post fall    Allergic rhinitis    Asthma    Closed fracture of metatarsal bone    Hemoptysis    Idiopathic chronic gout of multiple sites without tophus    Primary localized osteoarthritis    Vitamin D deficiency    Morbid (severe) obesity due to excess calories    Umbilical hernia without obstruction and without gangrene    Oral thrush    Arthritis of knee    Pre-diabetes    COPD (chronic obstructive pulmonary disease)        Plan     Diagnoses and all orders for this visit:    1. Bronchitis, mucopurulent recurrent (Primary)  -     predniSONE (DELTASONE) 20 MG tablet; Take 2 tablets by mouth Daily.  Dispense: 14 tablet; Refill: 0  -     doxycycline (VIBRAMYCIN) 100 MG capsule; Take 1 capsule by mouth 2 (Two) Times a Day.  Dispense: 20 capsule; Refill: 0  -     AFB Culture - , Cough; Future  -     Respiratory Culture - Sputum, Cough; Future    2. Chronic obstructive pulmonary disease with acute exacerbation  -     predniSONE (DELTASONE) 20 MG tablet; Take 2 tablets by mouth Daily.  Dispense: 14 tablet; Refill: 0  -     doxycycline (VIBRAMYCIN) 100 MG capsule; Take 1 capsule by mouth 2 (Two) Times a Day.  Dispense: 20 capsule; Refill: 0  -     AFB Culture - , Cough; Future  -     Respiratory Culture - Sputum, Cough; Future    3. Shortness of breath  -     predniSONE (DELTASONE) 20 MG tablet; Take 2 tablets by mouth Daily.  Dispense: 14 tablet; Refill: 0  -     doxycycline (VIBRAMYCIN) 100 MG capsule; Take 1 capsule by mouth 2 (Two) Times a Day.  Dispense: 20 capsule; Refill: 0  -     AFB Culture - , Cough; Future  -     Respiratory Culture - Sputum, Cough; Future    4. Seasonal allergies  -     predniSONE (DELTASONE) 20 MG tablet; Take 2 tablets by mouth Daily.  Dispense: 14 tablet; Refill: 0  -     doxycycline (VIBRAMYCIN) 100 MG capsule; Take 1  capsule by mouth 2 (Two) Times a Day.  Dispense: 20 capsule; Refill: 0  -     AFB Culture - , Cough; Future  -     Respiratory Culture - Sputum, Cough; Future       5.  Patient has if no improvement to return to office or go to urgent care        Smoking status:  reports that she quit smoking about 29 years ago. Her smoking use included cigarettes. She started smoking about 61 years ago. She has a 16 pack-year smoking history. She has never been exposed to tobacco smoke. She has never used smokeless tobacco.    Vaccination status: Reviewed  Immunization History   Administered Date(s) Administered    COVID-19 (MODERNA) 1st,2nd,3rd Dose Monovalent 01/28/2021, 01/28/2021, 02/23/2021, 02/23/2021    Fluzone High-Dose 65+yrs 12/02/2021, 12/28/2022, 01/03/2024    Fluzone Quad >6mos (Multi-dose) 10/01/2020    Pneumococcal Conjugate 13-Valent (PCV13) 02/11/2015    Pneumococcal Conjugate 20-Valent (PCV20) 07/20/2023    Pneumococcal Polysaccharide (PPSV23) 05/01/2010        Medications personally reviewed    Follow Up  Return for Next scheduled follow up.    Patient was given instructions and counseling regarding her condition or for health maintenance advice. Please see specific information pulled into the AVS if appropriate.     I spent 15 minutes caring for Julia Rios on this date of service. This time includes time spent by me in the following activities:preparing for the visit, reviewing tests, obtaining and/or reviewing a separately obtained history, performing a medically appropriate examination and/or evaluation, counseling and educating the patient/family/caregiver, ordering medications, tests, or procedures, documenting information in the medical record, independently interpreting results and communicating that information with the patient/family/caregiver and answered questions family members, discuss medications.

## 2024-10-18 ENCOUNTER — LAB (OUTPATIENT)
Dept: LAB | Facility: HOSPITAL | Age: 79
End: 2024-10-18
Payer: MEDICARE

## 2024-10-18 DIAGNOSIS — J41.1 BRONCHITIS, MUCOPURULENT RECURRENT: ICD-10-CM

## 2024-10-18 DIAGNOSIS — J30.2 SEASONAL ALLERGIES: ICD-10-CM

## 2024-10-18 DIAGNOSIS — R06.02 SHORTNESS OF BREATH: ICD-10-CM

## 2024-10-18 DIAGNOSIS — J44.1 CHRONIC OBSTRUCTIVE PULMONARY DISEASE WITH ACUTE EXACERBATION: ICD-10-CM

## 2024-10-18 PROCEDURE — 87206 SMEAR FLUORESCENT/ACID STAI: CPT

## 2024-10-18 PROCEDURE — 87205 SMEAR GRAM STAIN: CPT

## 2024-10-18 PROCEDURE — 87070 CULTURE OTHR SPECIMN AEROBIC: CPT

## 2024-10-18 PROCEDURE — 87116 MYCOBACTERIA CULTURE: CPT

## 2024-10-20 LAB
BACTERIA SPEC RESP CULT: NORMAL
GRAM STN SPEC: NORMAL

## 2024-10-21 ENCOUNTER — OFFICE VISIT (OUTPATIENT)
Dept: FAMILY MEDICINE CLINIC | Facility: CLINIC | Age: 79
End: 2024-10-21
Payer: MEDICARE

## 2024-10-21 VITALS
WEIGHT: 223 LBS | SYSTOLIC BLOOD PRESSURE: 121 MMHG | BODY MASS INDEX: 41.04 KG/M2 | TEMPERATURE: 96.9 F | HEART RATE: 72 BPM | DIASTOLIC BLOOD PRESSURE: 73 MMHG | OXYGEN SATURATION: 95 % | HEIGHT: 62 IN

## 2024-10-21 DIAGNOSIS — E78.2 MIXED HYPERLIPIDEMIA: Primary | ICD-10-CM

## 2024-10-21 DIAGNOSIS — J44.9 CHRONIC OBSTRUCTIVE PULMONARY DISEASE, UNSPECIFIED COPD TYPE: ICD-10-CM

## 2024-10-21 DIAGNOSIS — R73.03 PRE-DIABETES: ICD-10-CM

## 2024-10-21 DIAGNOSIS — N18.31 STAGE 3A CHRONIC KIDNEY DISEASE: ICD-10-CM

## 2024-10-21 DIAGNOSIS — I10 ESSENTIAL HYPERTENSION: ICD-10-CM

## 2024-10-21 PROCEDURE — 1170F FXNL STATUS ASSESSED: CPT | Performed by: FAMILY MEDICINE

## 2024-10-21 PROCEDURE — 1126F AMNT PAIN NOTED NONE PRSNT: CPT | Performed by: FAMILY MEDICINE

## 2024-10-21 PROCEDURE — G0439 PPPS, SUBSEQ VISIT: HCPCS | Performed by: FAMILY MEDICINE

## 2024-10-21 PROCEDURE — 3078F DIAST BP <80 MM HG: CPT | Performed by: FAMILY MEDICINE

## 2024-10-21 PROCEDURE — 3074F SYST BP LT 130 MM HG: CPT | Performed by: FAMILY MEDICINE

## 2024-10-21 PROCEDURE — 99214 OFFICE O/P EST MOD 30 MIN: CPT | Performed by: FAMILY MEDICINE

## 2024-10-21 NOTE — ASSESSMENT & PLAN NOTE
Her blood pressure is good here.  Will continue her current meds.  She had recent labs that were stable.

## 2024-10-21 NOTE — ASSESSMENT & PLAN NOTE
Currently she has an acute exacerbation of her COPD.  She recently saw pulmonary just a week ago.  She will continue to follow-up with them on a routine basis.

## 2024-10-21 NOTE — ASSESSMENT & PLAN NOTE
She has a pending lipid profile.  Encouraged her to get that done sometime in the near future so we can reassess her lipids.  Historically that when she took statins though she had some diffuse muscle pain.  If when she gets her lipids on are too high we can always try an alternative therapy

## 2024-10-21 NOTE — PROGRESS NOTES
Subjective   The ABCs of the Annual Wellness Visit  Medicare Wellness Visit      Julia Rios is a 79 y.o. patient who presents for a Medicare Wellness Visit.    The following portions of the patient's history were reviewed and   updated as appropriate: allergies, current medications, past family history, past medical history, past social history, past surgical history, and problem list.    Compared to one year ago, the patient's physical   health is better.  Compared to one year ago, the patient's mental   health is the same.    Recent Hospitalizations:  This patient has had a [facility] admission record on file within the last 365 days.  Current Medical Providers:  Patient Care Team:  Nick Patel DO as PCP - General (Family Medicine)  Shanell Murray APRN as Nurse Practitioner (Pulmonary Disease)  Santino Baez MD as Consulting Physician (Pulmonary Disease)    Outpatient Medications Prior to Visit   Medication Sig Dispense Refill    albuterol sulfate  (90 Base) MCG/ACT inhaler Inhale 2 puffs Every 4 (Four) Hours As Needed for Wheezing. 1 g 11    azelastine (ASTELIN) 0.1 % nasal spray 2 sprays into the nostril(s) as directed by provider 2 (Two) Times a Day. Use in each nostril as directed 30 mL 6    Budeson-Glycopyrrol-Formoterol (BREZTRI) 160-9-4.8 MCG/ACT aerosol inhaler Inhale 2 puffs 2 (Two) Times a Day. 10.7 g 11    D3-1000 25 MCG (1000 UT) capsule TAKE 1 CAPSULE BY MOUTH EVERY DAY 90 capsule 3    doxycycline (VIBRAMYCIN) 100 MG capsule Take 1 capsule by mouth 2 (Two) Times a Day. 20 capsule 0    famotidine (PEPCID) 20 MG tablet Take 1 tablet by mouth every night at bedtime. 30 tablet 11    fexofenadine (ALLEGRA) 180 MG tablet Take 1 tablet by mouth Daily. 90 tablet 3    gabapentin (NEURONTIN) 300 MG capsule 1 capsule every morning and 2 capsule every evening.      ipratropium-albuterol (DUO-NEB) 0.5-2.5 mg/3 ml nebulizer USE 3 ML VIA NEBULIZER FOUR TIMES DAILY AS NEEDED FOR  WHEEZING 1620 mL 3    losartan (COZAAR) 100 MG tablet Take 1 tablet by mouth Daily. 90 tablet 3    metoprolol succinate XL (TOPROL-XL) 100 MG 24 hr tablet Take 1 tablet by mouth Daily. Hold if heartrate is <60. 90 tablet 3    montelukast (SINGULAIR) 10 MG tablet Take 1 tablet by mouth Every Night. 90 tablet 3    predniSONE (DELTASONE) 20 MG tablet Take 2 tablets by mouth Daily. 14 tablet 0    spironolactone (ALDACTONE) 25 MG tablet Take 1 tablet by mouth 3 (Three) Times a Week. Monday,Wednesday,Friday 36 tablet 3     Facility-Administered Medications Prior to Visit   Medication Dose Route Frequency Provider Last Rate Last Admin    ipratropium-albuterol (DUO-NEB) nebulizer solution 3 mL  3 mL Nebulization Once Shanell Murray, KALEB         No opioid medication identified on active medication list. I have reviewed chart for other potential  high risk medication/s and harmful drug interactions in the elderly.      Aspirin is not on active medication list.  Aspirin use is not indicated based on review of current medical condition/s. Risk of harm outweighs potential benefits.  .    Patient Active Problem List   Diagnosis    Family history of colon cancer    Chronic obstructive pulmonary disease    Essential hypertension    Hyperlipidemia    CKD (chronic kidney disease) stage 3, GFR 30-59 ml/min    Chronic heart failure with preserved ejection fraction    CARMEN (obstructive sleep apnea)    Seasonal allergies    Gastroesophageal reflux disease    Bronchitis, mucopurulent recurrent    Tobacco abuse, in remission    Pulmonary nodule    PND (post-nasal drip)    Status post fall    Allergic rhinitis    Asthma    Closed fracture of metatarsal bone    Hemoptysis    Idiopathic chronic gout of multiple sites without tophus    Primary localized osteoarthritis    Vitamin D deficiency    Morbid (severe) obesity due to excess calories    Umbilical hernia without obstruction and without gangrene    Oral thrush    Arthritis of knee     "Pre-diabetes    COPD (chronic obstructive pulmonary disease)     Advance Care Planning Advance Directive is on file.  ACP discussion was held with the patient during this visit. Patient has an advance directive in EMR which is still valid.             Objective   Vitals:    10/21/24 1030   BP: 121/73   BP Location: Left arm   Patient Position: Sitting   Pulse: 72   Temp: 96.9 °F (36.1 °C)   SpO2: 95%   Weight: 101 kg (223 lb)   Height: 157.5 cm (62\")   PainSc: 0-No pain       Estimated body mass index is 40.79 kg/m² as calculated from the following:    Height as of this encounter: 157.5 cm (62\").    Weight as of this encounter: 101 kg (223 lb).            Does the patient have evidence of cognitive impairment? No  Lab Results   Component Value Date    HGBA1C 5.90 (H) 10/17/2024                                                                                                Health  Risk Assessment    Smoking Status:  Social History     Tobacco Use   Smoking Status Former    Current packs/day: 0.00    Average packs/day: 0.5 packs/day for 32.0 years (16.0 ttl pk-yrs)    Types: Cigarettes    Start date: 1963    Quit date: 1995    Years since quittin.8    Passive exposure: Never   Smokeless Tobacco Never     Alcohol Consumption:  Social History     Substance and Sexual Activity   Alcohol Use Never       Fall Risk Screen  STEADI Fall Risk Assessment was completed, and patient is at LOW risk for falls.Assessment completed on:10/21/2024    Depression Screening:      10/21/2024    10:32 AM   PHQ-2/PHQ-9 Depression Screening   Little interest or pleasure in doing things Not at all   Feeling down, depressed, or hopeless Not at all     Health Habits and Functional and Cognitive Screening:      10/21/2024    10:32 AM   Functional & Cognitive Status   Do you have difficulty preparing food and eating? No   Do you have difficulty bathing yourself, getting dressed or grooming yourself? No   Do you have difficulty using " the toilet? No   Do you have difficulty moving around from place to place? No   Do you have trouble with steps or getting out of a bed or a chair? No   Current Diet Other   Dental Exam Up to date   Eye Exam Up to date   Exercise (times per week) 5 times per week   Current Exercises Include Walking;Gardening   Do you need help using the phone?  No   Are you deaf or do you have serious difficulty hearing?  No   Do you need help to go to places out of walking distance? No   Do you need help shopping? No   Do you need help preparing meals?  No   Do you need help with housework?  No   Do you need help with laundry? No   Do you need help taking your medications? No   Do you need help managing money? No   Do you ever drive or ride in a car without wearing a seat belt? No   Have you felt unusual stress, anger or loneliness in the last month? No   Who do you live with? Spouse   If you need help, do you have trouble finding someone available to you? Yes   Have you been bothered in the last four weeks by sexual problems? No   Do you have difficulty concentrating, remembering or making decisions? No           Age-appropriate Screening Schedule:  Refer to the list below for future screening recommendations based on patient's age, sex and/or medical conditions. Orders for these recommended tests are listed in the plan section. The patient has been provided with a written plan.    Health Maintenance List  Health Maintenance   Topic Date Due    TDAP/TD VACCINES (1 - Tdap) Never done    ZOSTER VACCINE (1 of 2) Never done    DXA SCAN  09/14/2020    INFLUENZA VACCINE  08/01/2024    COVID-19 Vaccine (5 - 2023-24 season) 12/13/2024 (Originally 9/1/2024)    RSV Vaccine - Adults (1 - 1-dose 75+ series) 05/06/2025 (Originally 2/4/2020)    LIPID PANEL  04/01/2025    DIABETIC EYE EXAM  06/12/2025    BMI FOLLOWUP  08/02/2025    ANNUAL WELLNESS VISIT  10/21/2025    Pneumococcal Vaccine 65+  Completed    HEPATITIS C SCREENING  Discontinued     HEMOGLOBIN A1C  Discontinued    URINE MICROALBUMIN  Discontinued    COLONOSCOPY  Discontinued                                                                                                                                                CMS Preventative Services Quick Reference  Risk Factors Identified During Encounter  Immunizations Discussed/Encouraged: Tdap, Influenza, and Shingrix    The above risks/problems have been discussed with the patient.  Pertinent information has been shared with the patient in the After Visit Summary.  An After Visit Summary and PPPS were made available to the patient.    Follow Up:   Next Medicare Wellness visit to be scheduled in 1 year.          Additional E&M Note during same encounter follows:  Patient has multiple medical problems which are significant and separately identifiable that require additional work above and beyond the Medicare Wellness Visit.      Chief Complaint  Medicare Wellness-subsequent    Julia Rios is a 79 y.o. female who presents to [facility]     Prediabetes-she does not check her blood sugars outside the office.  She had an A1c done just 4 days ago.  She remains prediabetic at 5.9.    Hypertension-she has not been checking her blood pressure outside the office as of late.  Her blood pressure is great here today though at 121/73.    Hyperlipidemia-currently she is not on any statin therapy.    COPD-currently she states her breathing is worse.  She saw pulmonary just last week.  There is they are treating her for any current acute exacerbation of her COPD with a course of antibiotics and steroids.  She is doing a rescue nebulizer 3 times a day currently.    Review of Systems   Constitutional:  Positive for fatigue. Negative for chills and fever.   HENT:  Negative for congestion, postnasal drip and rhinorrhea.    Eyes:  Negative for visual disturbance.   Respiratory:  Positive for cough, chest tightness, shortness of  "breath and wheezing.    Cardiovascular:  Negative for chest pain and palpitations.   Endocrine: Negative for polydipsia and polyuria.   Neurological:  Negative for headaches.   Psychiatric/Behavioral:  The patient is not nervous/anxious.        Objective   Vital Signs:   Vitals:    10/21/24 1030   BP: 121/73   BP Location: Left arm   Patient Position: Sitting   Pulse: 72   Temp: 96.9 °F (36.1 °C)   SpO2: 95%   Weight: 101 kg (223 lb)   Height: 157.5 cm (62\")   PainSc: 0-No pain       Wt Readings from Last 3 Encounters:   10/21/24 101 kg (223 lb)   10/17/24 100 kg (221 lb)   09/23/24 100 kg (221 lb)     BP Readings from Last 3 Encounters:   10/21/24 121/73   10/17/24 120/58   09/23/24 134/62       Physical Exam  Vitals and nursing note reviewed.   Constitutional:       General: She is not in acute distress.     Appearance: Normal appearance. She is obese.   HENT:      Head: Normocephalic.      Right Ear: Tympanic membrane, ear canal and external ear normal.      Left Ear: Tympanic membrane, ear canal and external ear normal.      Nose: Nose normal.      Mouth/Throat:      Mouth: Mucous membranes are moist.      Pharynx: Oropharynx is clear.   Eyes:      General: No scleral icterus.     Conjunctiva/sclera: Conjunctivae normal.      Pupils: Pupils are equal, round, and reactive to light.   Cardiovascular:      Rate and Rhythm: Normal rate and regular rhythm.      Pulses: Normal pulses.      Heart sounds: Normal heart sounds. No murmur heard.  Pulmonary:      Effort: Pulmonary effort is normal.      Breath sounds: Normal breath sounds. No wheezing, rhonchi or rales.   Musculoskeletal:      Cervical back: Neck supple. No rigidity or tenderness.   Lymphadenopathy:      Cervical: No cervical adenopathy.   Skin:     General: Skin is warm and dry.      Coloration: Skin is not jaundiced.      Findings: No rash.   Neurological:      General: No focal deficit present.      Mental Status: She is alert and oriented to person, " place, and time.   Psychiatric:         Mood and Affect: Mood normal.         Thought Content: Thought content normal.         Judgment: Judgment normal.         The following data was reviewed by Nick Patel DO on 10/21/2024  CMP   CMP          5/1/2024    04:49 5/2/2024    04:48 10/17/2024    09:15   CMP   Glucose 85  98  103    BUN 23  24  18    Creatinine 1.04  1.06  1.39    EGFR 54.8  53.5  38.7    Sodium 138  139  137    Potassium 3.7  4.0  3.8    Chloride 103  107  105    Calcium 8.8  8.5  9.4    Total Protein 6.0  5.9  6.7    Albumin 3.7  3.4  4.0    Globulin 2.3  2.5  2.7    Total Bilirubin 0.6  0.5  0.5    Alkaline Phosphatase 67  65  94    AST (SGOT) 18  16  17    ALT (SGPT) 13  13  9    Albumin/Globulin Ratio 1.6  1.4  1.5    BUN/Creatinine Ratio 22.1  22.6  12.9    Anion Gap 11.8  10.8  8.1      CBC   CBC          5/1/2024    04:49 5/2/2024    04:48 10/17/2024    09:15   CBC   WBC 17.11  15.07  11.44    RBC 4.31  4.24  4.58    Hemoglobin 11.7  11.6  12.5    Hematocrit 37.0  36.2  40.3    MCV 85.8  85.4  88.0    MCH 27.1  27.4  27.3    MCHC 31.6  32.0  31.0    RDW 15.8  15.8  13.9    Platelets 239  237  256      LIPID   Lipid Panel          4/1/2024    10:10   Lipid Panel   Total Cholesterol 187    Triglycerides 89    HDL Cholesterol 49    VLDL Cholesterol 16    LDL Cholesterol  122    LDL/HDL Ratio 2.45      TSH   A1C   Most Recent A1C          10/17/2024    09:15   HGBA1C Most Recent   Hemoglobin A1C 5.90          Assessment & Plan   Diagnoses and all orders for this visit:    1. Mixed hyperlipidemia (Primary)  Assessment & Plan:   She has a pending lipid profile.  Encouraged her to get that done sometime in the near future so we can reassess her lipids.  Historically that when she took statins though she had some diffuse muscle pain.  If when she gets her lipids on are too high we can always try an alternative therapy      2. Essential hypertension  Assessment & Plan:  Her blood pressure  is good here.  Will continue her current meds.  She had recent labs that were stable.      3. Pre-diabetes  Assessment & Plan:  Her recent A1c remains good.  She remains prediabetic.      4. Stage 3a chronic kidney disease    5. Chronic obstructive pulmonary disease, unspecified COPD type  Assessment & Plan:  Currently she has an acute exacerbation of her COPD.  She recently saw pulmonary just a week ago.  She will continue to follow-up with them on a routine basis.                      FOLLOW UP  Return in about 6 months (around 4/21/2025) for Recheck.  Patient was given instructions and counseling regarding her condition or for health maintenance advice. Please see specific information pulled into the AVS if appropriate.     Nick Patel, DO  10/21/24  11:12 EDT

## 2024-10-25 LAB
MYCOBACTERIUM SPEC CULT: NORMAL
NIGHT BLUE STAIN TISS: NORMAL
NIGHT BLUE STAIN TISS: NORMAL

## 2024-11-01 LAB
MYCOBACTERIUM SPEC CULT: NORMAL
NIGHT BLUE STAIN TISS: NORMAL
NIGHT BLUE STAIN TISS: NORMAL

## 2024-11-08 LAB
MYCOBACTERIUM SPEC CULT: NORMAL
NIGHT BLUE STAIN TISS: NORMAL
NIGHT BLUE STAIN TISS: NORMAL

## 2024-11-11 DIAGNOSIS — J41.1 BRONCHITIS, MUCOPURULENT RECURRENT: ICD-10-CM

## 2024-11-11 DIAGNOSIS — R05.3 CHRONIC COUGH: Primary | ICD-10-CM

## 2024-11-11 DIAGNOSIS — R06.02 SHORTNESS OF BREATH: ICD-10-CM

## 2024-11-11 DIAGNOSIS — R53.83 FATIGUE, UNSPECIFIED TYPE: ICD-10-CM

## 2024-11-11 DIAGNOSIS — J44.1 CHRONIC OBSTRUCTIVE PULMONARY DISEASE WITH ACUTE EXACERBATION: ICD-10-CM

## 2024-11-11 DIAGNOSIS — J30.2 SEASONAL ALLERGIES: ICD-10-CM

## 2024-11-11 DIAGNOSIS — G47.33 OSA (OBSTRUCTIVE SLEEP APNEA): ICD-10-CM

## 2024-11-11 RX ORDER — DOXYCYCLINE 100 MG/1
100 CAPSULE ORAL 2 TIMES DAILY
Qty: 20 CAPSULE | Refills: 0 | Status: SHIPPED | OUTPATIENT
Start: 2024-11-11

## 2024-11-15 LAB
MYCOBACTERIUM SPEC CULT: NORMAL
NIGHT BLUE STAIN TISS: NORMAL
NIGHT BLUE STAIN TISS: NORMAL

## 2024-11-22 ENCOUNTER — OFFICE VISIT (OUTPATIENT)
Dept: FAMILY MEDICINE CLINIC | Facility: CLINIC | Age: 79
End: 2024-11-22
Payer: MEDICARE

## 2024-11-22 ENCOUNTER — TELEPHONE (OUTPATIENT)
Dept: FAMILY MEDICINE CLINIC | Facility: CLINIC | Age: 79
End: 2024-11-22
Payer: MEDICARE

## 2024-11-22 ENCOUNTER — HOSPITAL ENCOUNTER (OUTPATIENT)
Dept: GENERAL RADIOLOGY | Facility: HOSPITAL | Age: 79
Discharge: HOME | End: 2024-11-22
Payer: MEDICARE

## 2024-11-22 VITALS
OXYGEN SATURATION: 98 % | DIASTOLIC BLOOD PRESSURE: 74 MMHG | BODY MASS INDEX: 41.18 KG/M2 | HEIGHT: 62 IN | SYSTOLIC BLOOD PRESSURE: 126 MMHG | TEMPERATURE: 97.6 F | WEIGHT: 223.8 LBS | HEART RATE: 65 BPM

## 2024-11-22 DIAGNOSIS — M79.671 FOOT PAIN, RIGHT: ICD-10-CM

## 2024-11-22 DIAGNOSIS — M79.671 FOOT PAIN, RIGHT: Primary | ICD-10-CM

## 2024-11-22 LAB
MYCOBACTERIUM SPEC CULT: NORMAL
NIGHT BLUE STAIN TISS: NORMAL
NIGHT BLUE STAIN TISS: NORMAL

## 2024-11-22 PROCEDURE — 73630 X-RAY EXAM OF FOOT: CPT

## 2024-11-22 NOTE — TELEPHONE ENCOUNTER
Patient called stating she thinks she has a stress fracture in her right foot.  She has had them previously and the pain she is currently feeling is how it has felt in the past.  Patient stated the pain started yesterday morning.  Patient stated she has not done anything to injure her foot.  Patient would like to get an x-ray on her right foot.  Please advise.

## 2024-11-22 NOTE — PROGRESS NOTES
"Chief Complaint  Pain (R foot pain, ongoing a couple days)    Subjective      Julia Rios is a 79 y.o. female who presents to [facility]     History of Present Illness  The patient presents for evaluation of right foot pain.    She has been experiencing pain in her right foot for the past few days, which is exacerbated when standing or bending. She reports no recent falls or injuries. She has a history of stress fractures in the left foot, which presented with similar symptoms. She has completed a course of prednisone for COPD    ALLERGIES  She is allergic to PENICILLIN, SULFA DRUG, and IBUPROFEN.       Patient Care Team:  Nick Patel DO as PCP - General (Family Medicine)  Shanell Murray APRN as Nurse Practitioner (Pulmonary Disease)  Santino Baez MD as Consulting Physician (Pulmonary Disease)    Review of Systems   Respiratory:  Negative for cough and shortness of breath.    Cardiovascular:  Negative for chest pain and palpitations.   Musculoskeletal:         Foot pain   Neurological:  Negative for dizziness and light-headedness.         Objective   Vital Signs:   Vitals:    11/22/24 1042   BP: 126/74   BP Location: Left arm   Patient Position: Sitting   Cuff Size: Adult   Pulse: 65   Temp: 97.6 °F (36.4 °C)   TempSrc: Temporal   SpO2: 98%   Weight: 102 kg (223 lb 12.8 oz)   Height: 157.5 cm (62.01\")     Body mass index is 40.92 kg/m².    Wt Readings from Last 3 Encounters:   11/22/24 102 kg (223 lb 12.8 oz)   10/21/24 101 kg (223 lb)   10/17/24 100 kg (221 lb)     BP Readings from Last 3 Encounters:   11/22/24 126/74   10/21/24 121/73   10/17/24 120/58       Health Maintenance   Topic Date Due    TDAP/TD VACCINES (1 - Tdap) Never done    ZOSTER VACCINE (1 of 2) Never done    DXA SCAN  09/14/2020    INFLUENZA VACCINE  08/01/2024    COVID-19 Vaccine (5 - 2024-25 season) 12/13/2024 (Originally 9/1/2024)    RSV Vaccine - Adults (1 - 1-dose 75+ series) 05/06/2025 " (Originally 2/4/2020)    LIPID PANEL  04/01/2025    DIABETIC EYE EXAM  06/12/2025    BMI FOLLOWUP  08/02/2025    ANNUAL WELLNESS VISIT  10/21/2025    Pneumococcal Vaccine 65+  Completed    HEPATITIS C SCREENING  Discontinued    HEMOGLOBIN A1C  Discontinued    URINE MICROALBUMIN  Discontinued    COLONOSCOPY  Discontinued       Physical Exam  Constitutional:       Appearance: Normal appearance.   HENT:      Head: Normocephalic and atraumatic.   Cardiovascular:      Rate and Rhythm: Normal rate and regular rhythm.      Pulses: Normal pulses.      Heart sounds: Normal heart sounds.   Pulmonary:      Effort: Pulmonary effort is normal.      Breath sounds: Normal breath sounds.   Musculoskeletal:         General: Tenderness present.   Neurological:      General: No focal deficit present.      Mental Status: She is alert and oriented to person, place, and time.   Psychiatric:         Mood and Affect: Mood normal.         Behavior: Behavior normal.         Physical Exam         Result Review   The following data was reviewed by: Chey Brown MD on 11/22/2024:  [x]  Tests & Results  []  Hospitalization/Emergency Department/Urgent Care  []  Internal/External Consultant Notes      Results         ASSESSMENT/PLAN  Diagnoses and all orders for this visit:    1. Foot pain, right (Primary)  -     XR Foot 3+ View Right; Future          Assessment & Plan  1. Right foot pain.  The pain started a couple of days ago and is located across the top of the foot, worsening with standing or bending. The likelihood of a fracture is low, but with history of fracturing the other foot , frequent steroids use ,an x-ray of the foot will be ordered to rule out any bony abnormalities. The pain is likely due to a strain of the ligaments. Rest, wrapping with an Ace bandage starting from the toes, application of ice, and elevation of the foot are recommended. Over-the-counter pain relief such as Tylenol can be taken as needed.         Julia MARISCAL  Blanca  reports that she quit smoking about 29 years ago. Her smoking use included cigarettes. She started smoking about 61 years ago. She has a 16 pack-year smoking history. She has never been exposed to tobacco smoke. She has never used smokeless tobacco.        I spent  30 minutes caring for Julia on this date of service. This time includes time spent by me in the following activities:preparing for the visit, reviewing tests, obtaining and/or reviewing a separately obtained history, performing a medically appropriate examination and/or evaluation , counseling and educating the patient/family/caregiver, ordering medications, tests, or procedures, referring and communicating with other health care professionals , documenting information in the medical record, and independently interpreting results and communicating that information with the patient/family/caregiver     FOLLOW UP  Return if symptoms worsen or fail to improve.  Patient was given instructions and counseling regarding her condition or for health maintenance advice. Please see specific information pulled into the AVS if appropriate.       Chey Brown MD  11/22/24  11:37 EST    Patient or patient representative verbalized consent for the use of Ambient Listening during the visit with  Chey Brown MD for chart documentation. 11/22/2024  11:37 EST

## 2024-11-24 RX ORDER — GABAPENTIN 300 MG/1
CAPSULE ORAL
Start: 2024-11-24 | End: 2024-11-24 | Stop reason: SDUPTHER

## 2024-11-24 RX ORDER — GABAPENTIN 300 MG/1
CAPSULE ORAL
Qty: 270 CAPSULE | Refills: 1 | Status: SHIPPED | OUTPATIENT
Start: 2024-11-24

## 2024-11-29 LAB
MYCOBACTERIUM SPEC CULT: NORMAL
NIGHT BLUE STAIN TISS: NORMAL
NIGHT BLUE STAIN TISS: NORMAL

## 2024-12-04 ENCOUNTER — OFFICE VISIT (OUTPATIENT)
Dept: CARDIOLOGY | Facility: CLINIC | Age: 79
End: 2024-12-04
Payer: MEDICARE

## 2024-12-04 VITALS
DIASTOLIC BLOOD PRESSURE: 77 MMHG | OXYGEN SATURATION: 97 % | BODY MASS INDEX: 41.04 KG/M2 | HEIGHT: 62 IN | HEART RATE: 73 BPM | WEIGHT: 223 LBS | SYSTOLIC BLOOD PRESSURE: 111 MMHG

## 2024-12-04 DIAGNOSIS — J44.1 COPD WITH ACUTE EXACERBATION: Primary | ICD-10-CM

## 2024-12-04 DIAGNOSIS — I10 ESSENTIAL HYPERTENSION: ICD-10-CM

## 2024-12-04 DIAGNOSIS — E78.2 MIXED HYPERLIPIDEMIA: ICD-10-CM

## 2024-12-04 DIAGNOSIS — G47.33 OSA (OBSTRUCTIVE SLEEP APNEA): ICD-10-CM

## 2024-12-04 DIAGNOSIS — I50.32 CHRONIC HEART FAILURE WITH PRESERVED EJECTION FRACTION: Primary | ICD-10-CM

## 2024-12-04 RX ORDER — PREDNISONE 10 MG/1
TABLET ORAL
Qty: 42 TABLET | Refills: 0 | Status: ON HOLD | OUTPATIENT
Start: 2024-12-04

## 2024-12-04 RX ORDER — AZITHROMYCIN 250 MG/1
TABLET, FILM COATED ORAL
Qty: 6 TABLET | Refills: 0 | Status: ON HOLD | OUTPATIENT
Start: 2024-12-04

## 2024-12-04 NOTE — ASSESSMENT & PLAN NOTE
Patient with stable symptoms on Aldactone 3 times a week recommended trial of increasing to every day for the next 5 days as a way to see if this helps improve any of her edema issues also could consider as needed Lasix dose if issues get worse in the future.  Her breathing remained stable and she is states she is doing well clinically at this point

## 2024-12-04 NOTE — PROGRESS NOTES
Chief Complaint  Follow-up, Chronic heart failure with preserved ejection fraction, Hypertension, and Hyperlipidemia    Subjective    Patient remains stable symptomatically breathing wise has had some wheezing problems.  Weight is up about 10 pounds since last visit she reports stable lower extremity edema  Past Medical History:   Diagnosis Date    Acute right-sided low back pain with right-sided sciatica 01/15/2018    Allergic rhinitis     Asthma     Asthma, extrinsic     Asthma, intrinsic     CHF (congestive heart failure)     Chronic heart failure with preserved ejection fraction     11/16/20 echocardiogram: 1.  Normal ejection fraction of 55%. 2.  Mild left ventricular hypertrophy. 3.  No significant valvular heart issues.     CKD stage 3 10/29/2019    COPD (chronic obstructive pulmonary disease)     Diverticulitis     Emphysema of lung     Essential hypertension     GERD (gastroesophageal reflux disease)     Hemoptysis     non cancerous    History of fungal pneumonia 12/02/2021    Hyperlipidemia     Idiopathic chronic gout of multiple sites without tophus 05/20/2016    Pneumonia     Primary osteoarthritis of right knee 01/16/2017    Sleep apnea     CPAP    Sleep apnea, obstructive     Vitamin D deficiency 01/13/2016         Current Outpatient Medications:     albuterol sulfate  (90 Base) MCG/ACT inhaler, Inhale 2 puffs Every 4 (Four) Hours As Needed for Wheezing., Disp: 1 g, Rfl: 11    azelastine (ASTELIN) 0.1 % nasal spray, 2 sprays into the nostril(s) as directed by provider 2 (Two) Times a Day. Use in each nostril as directed, Disp: 30 mL, Rfl: 6    Budeson-Glycopyrrol-Formoterol (BREZTRI) 160-9-4.8 MCG/ACT aerosol inhaler, Inhale 2 puffs 2 (Two) Times a Day., Disp: 10.7 g, Rfl: 11    D3-1000 25 MCG (1000 UT) capsule, TAKE 1 CAPSULE BY MOUTH EVERY DAY, Disp: 90 capsule, Rfl: 3    famotidine (PEPCID) 20 MG tablet, Take 1 tablet by mouth every night at bedtime., Disp: 30 tablet, Rfl: 11    fexofenadine  (ALLEGRA) 180 MG tablet, Take 1 tablet by mouth Daily., Disp: 90 tablet, Rfl: 3    gabapentin (NEURONTIN) 300 MG capsule, 1 capsule every morning and 2 capsule every evening., Disp: 270 capsule, Rfl: 1    ipratropium-albuterol (DUO-NEB) 0.5-2.5 mg/3 ml nebulizer, USE 3 ML VIA NEBULIZER FOUR TIMES DAILY AS NEEDED FOR WHEEZING, Disp: 1620 mL, Rfl: 3    losartan (COZAAR) 100 MG tablet, Take 1 tablet by mouth Daily., Disp: 90 tablet, Rfl: 3    metoprolol succinate XL (TOPROL-XL) 100 MG 24 hr tablet, Take 1 tablet by mouth Daily. Hold if heartrate is <60., Disp: 90 tablet, Rfl: 3    montelukast (SINGULAIR) 10 MG tablet, Take 1 tablet by mouth Every Night., Disp: 90 tablet, Rfl: 3    spironolactone (ALDACTONE) 25 MG tablet, Take 1 tablet by mouth 3 (Three) Times a Week. Monday,Wednesday,Friday, Disp: 36 tablet, Rfl: 3    Current Facility-Administered Medications:     ipratropium-albuterol (DUO-NEB) nebulizer solution 3 mL, 3 mL, Nebulization, Once, Shanell Murray APRN    Medications Discontinued During This Encounter   Medication Reason    doxycycline (VIBRAMYCIN) 100 MG capsule *Therapy completed    predniSONE (DELTASONE) 20 MG tablet *Therapy completed     Allergies   Allergen Reactions    Ibuprofen Hives    Penicillins Seizure and Other (See Comments)    Nsaids Other (See Comments)     CKD    Cephalosporins Rash    Sulfa Antibiotics Rash        Social History     Tobacco Use    Smoking status: Former     Current packs/day: 0.00     Average packs/day: 0.5 packs/day for 32.0 years (16.0 ttl pk-yrs)     Types: Cigarettes     Start date: 1963     Quit date: 1995     Years since quittin.9     Passive exposure: Never    Smokeless tobacco: Never   Vaping Use    Vaping status: Never Used   Substance Use Topics    Alcohol use: Never    Drug use: Never       Family History   Problem Relation Age of Onset    Colon cancer Mother 61    Cancer Mother     Heart disease Mother     Heart failure Father     Asthma Father   "   Heart attack Father     Hyperlipidemia Father     Emphysema Father     Diabetes Brother     Emphysema Brother     Breast cancer Maternal Grandmother     Stroke Paternal Grandfather     Malig Hyperthermia Neg Hx         Objective     /77   Pulse 73   Ht 157.5 cm (62.01\")   Wt 101 kg (223 lb)   SpO2 97%   BMI 40.77 kg/m²       Physical Exam    General Appearance:   no acute distress  Alert and oriented x3  HENT:   lips not cyanotic  Atraumatic  Neck:  No jvd   supple  Respiratory:  no respiratory distress  Lateral x-ray wheezes  no rales  Cardiovascular:  Regular rate and rhythm  no S3, no S4   no murmur  no rub  Extremities  No cyanosis  lower extremity edema: Mild  Skin:   warm, dry  No rashes      Result Review :     proBNP   Date Value Ref Range Status   04/26/2024 356.5 0.0 - 1,800.0 pg/mL Final     CMP          5/1/2024    04:49 5/2/2024    04:48 10/17/2024    09:15   CMP   Glucose 85  98  103    BUN 23  24  18    Creatinine 1.04  1.06  1.39    EGFR 54.8  53.5  38.7    Sodium 138  139  137    Potassium 3.7  4.0  3.8    Chloride 103  107  105    Calcium 8.8  8.5  9.4    Total Protein 6.0  5.9  6.7    Albumin 3.7  3.4  4.0    Globulin 2.3  2.5  2.7    Total Bilirubin 0.6  0.5  0.5    Alkaline Phosphatase 67  65  94    AST (SGOT) 18  16  17    ALT (SGPT) 13  13  9    Albumin/Globulin Ratio 1.6  1.4  1.5    BUN/Creatinine Ratio 22.1  22.6  12.9    Anion Gap 11.8  10.8  8.1      CBC w/diff          5/1/2024    04:49 5/2/2024    04:48 10/17/2024    09:15   CBC w/Diff   WBC 17.11  15.07  11.44    RBC 4.31  4.24  4.58    Hemoglobin 11.7  11.6  12.5    Hematocrit 37.0  36.2  40.3    MCV 85.8  85.4  88.0    MCH 27.1  27.4  27.3    MCHC 31.6  32.0  31.0    RDW 15.8  15.8  13.9    Platelets 239  237  256    Neutrophil Rel % 68.4  62.6  52.3    Immature Granulocyte Rel % 1.2  1.9  0.3    Lymphocyte Rel % 21.0  25.7  35.8    Monocyte Rel % 9.1  8.9  8.4    Eosinophil Rel % 0.1  0.4  2.3    Basophil Rel % 0.2  " "0.5  0.9       Lab Results   Component Value Date    TSH 2.300 10/29/2019      Lab Results   Component Value Date    FREET4 1.2 10/29/2019      No results found for: \"DDIMERQUANT\"  Magnesium   Date Value Ref Range Status   10/17/2024 1.9 1.6 - 2.4 mg/dL Final      No results found for: \"DIGOXIN\"   Lab Results   Component Value Date    TROPONINT 9 04/26/2024           Lipid Panel          4/1/2024    10:10   Lipid Panel   Total Cholesterol 187    Triglycerides 89    HDL Cholesterol 49    VLDL Cholesterol 16    LDL Cholesterol  122    LDL/HDL Ratio 2.45      No results found for: \"POCTROP\"                   Diagnoses and all orders for this visit:    1. Chronic heart failure with preserved ejection fraction (Primary)  Assessment & Plan:  Patient with stable symptoms on Aldactone 3 times a week recommended trial of increasing to every day for the next 5 days as a way to see if this helps improve any of her edema issues also could consider as needed Lasix dose if issues get worse in the future.  Her breathing remained stable and she is states she is doing well clinically at this point      2. Essential hypertension  Assessment & Plan:  Blood pressure controlled continue with Aldactone 25 daily 3 times a week, losartan 100 daily, and Toprol 100 daily       3. Mixed hyperlipidemia    4. CARMEN (obstructive sleep apnea)            Follow Up     Return in about 6 months (around 6/4/2025) for Follow with Marielos Dover, EKG with F/U.          Patient was given instructions and counseling regarding her condition or for health maintenance advice. Please see specific information pulled into the AVS if appropriate.       "

## 2024-12-04 NOTE — ASSESSMENT & PLAN NOTE
Blood pressure controlled continue with Aldactone 25 daily 3 times a week, losartan 100 daily, and Toprol 100 daily    The emergency room cannot drill for lower pole teeth.  Use steroid anti-inflammatory use antibiotic use pain medication when needed use Orajel.  Please follow-up with dentist as you need likely root canals and ordered extractions due to severely deflated multiple caries in your teeth.

## 2024-12-06 ENCOUNTER — APPOINTMENT (OUTPATIENT)
Dept: GENERAL RADIOLOGY | Facility: HOSPITAL | Age: 79
DRG: 194 | End: 2024-12-06
Payer: MEDICARE

## 2024-12-06 ENCOUNTER — HOSPITAL ENCOUNTER (EMERGENCY)
Facility: HOSPITAL | Age: 79
Discharge: HOME | DRG: 194 | End: 2024-12-06
Attending: EMERGENCY MEDICINE
Payer: MEDICARE

## 2024-12-06 VITALS
HEART RATE: 74 BPM | DIASTOLIC BLOOD PRESSURE: 65 MMHG | SYSTOLIC BLOOD PRESSURE: 131 MMHG | TEMPERATURE: 97.9 F | OXYGEN SATURATION: 90 % | RESPIRATION RATE: 15 BRPM

## 2024-12-06 DIAGNOSIS — J44.1 COPD EXACERBATION: Primary | ICD-10-CM

## 2024-12-06 LAB
ALBUMIN SERPL-MCNC: 4.1 G/DL (ref 3.5–5.2)
ALBUMIN/GLOB SERPL: 1.4 G/DL
ALP SERPL-CCNC: 89 U/L (ref 39–117)
ALT SERPL W P-5'-P-CCNC: 9 U/L (ref 1–33)
ANION GAP SERPL CALCULATED.3IONS-SCNC: 15.7 MMOL/L (ref 5–15)
AST SERPL-CCNC: 17 U/L (ref 1–32)
BASOPHILS # BLD AUTO: 0.02 10*3/MM3 (ref 0–0.2)
BASOPHILS NFR BLD AUTO: 0.1 % (ref 0–1.5)
BILIRUB SERPL-MCNC: 0.4 MG/DL (ref 0–1.2)
BUN SERPL-MCNC: 21 MG/DL (ref 8–23)
BUN/CREAT SERPL: 18.4 (ref 7–25)
CALCIUM SPEC-SCNC: 9.5 MG/DL (ref 8.6–10.5)
CHLORIDE SERPL-SCNC: 105 MMOL/L (ref 98–107)
CO2 SERPL-SCNC: 18.3 MMOL/L (ref 22–29)
CREAT SERPL-MCNC: 1.14 MG/DL (ref 0.57–1)
DEPRECATED RDW RBC AUTO: 47.9 FL (ref 37–54)
EGFRCR SERPLBLD CKD-EPI 2021: 49.1 ML/MIN/1.73
EOSINOPHIL # BLD AUTO: 0 10*3/MM3 (ref 0–0.4)
EOSINOPHIL NFR BLD AUTO: 0 % (ref 0.3–6.2)
ERYTHROCYTE [DISTWIDTH] IN BLOOD BY AUTOMATED COUNT: 14.9 % (ref 12.3–15.4)
FLUAV SUBTYP SPEC NAA+PROBE: NOT DETECTED
FLUBV RNA ISLT QL NAA+PROBE: NOT DETECTED
GLOBULIN UR ELPH-MCNC: 2.9 GM/DL
GLUCOSE SERPL-MCNC: 172 MG/DL (ref 65–99)
HCT VFR BLD AUTO: 38.2 % (ref 34–46.6)
HGB BLD-MCNC: 12.2 G/DL (ref 12–15.9)
HOLD SPECIMEN: NORMAL
HOLD SPECIMEN: NORMAL
IMM GRANULOCYTES # BLD AUTO: 0.08 10*3/MM3 (ref 0–0.05)
IMM GRANULOCYTES NFR BLD AUTO: 0.5 % (ref 0–0.5)
LYMPHOCYTES # BLD AUTO: 1.37 10*3/MM3 (ref 0.7–3.1)
LYMPHOCYTES NFR BLD AUTO: 8.7 % (ref 19.6–45.3)
MAGNESIUM SERPL-MCNC: 1.9 MG/DL (ref 1.6–2.4)
MCH RBC QN AUTO: 27.8 PG (ref 26.6–33)
MCHC RBC AUTO-ENTMCNC: 31.9 G/DL (ref 31.5–35.7)
MCV RBC AUTO: 87 FL (ref 79–97)
MONOCYTES # BLD AUTO: 0.41 10*3/MM3 (ref 0.1–0.9)
MONOCYTES NFR BLD AUTO: 2.6 % (ref 5–12)
NEUTROPHILS NFR BLD AUTO: 13.94 10*3/MM3 (ref 1.7–7)
NEUTROPHILS NFR BLD AUTO: 88.1 % (ref 42.7–76)
NRBC BLD AUTO-RTO: 0 /100 WBC (ref 0–0.2)
NT-PROBNP SERPL-MCNC: 311.6 PG/ML (ref 0–1800)
PLATELET # BLD AUTO: 243 10*3/MM3 (ref 140–450)
PMV BLD AUTO: 12.2 FL (ref 6–12)
POTASSIUM SERPL-SCNC: 3.9 MMOL/L (ref 3.5–5.2)
PROT SERPL-MCNC: 7 G/DL (ref 6–8.5)
QT INTERVAL: 388 MS
QTC INTERVAL: 456 MS
RBC # BLD AUTO: 4.39 10*6/MM3 (ref 3.77–5.28)
RSV RNA NPH QL NAA+NON-PROBE: NOT DETECTED
SARS-COV-2 RNA RESP QL NAA+PROBE: NOT DETECTED
SODIUM SERPL-SCNC: 139 MMOL/L (ref 136–145)
TROPONIN T SERPL HS-MCNC: 11 NG/L
WBC NRBC COR # BLD AUTO: 15.82 10*3/MM3 (ref 3.4–10.8)
WHOLE BLOOD HOLD COAG: NORMAL
WHOLE BLOOD HOLD SPECIMEN: NORMAL

## 2024-12-06 PROCEDURE — 80053 COMPREHEN METABOLIC PANEL: CPT

## 2024-12-06 PROCEDURE — 25010000002 METHYLPREDNISOLONE PER 125 MG: Performed by: EMERGENCY MEDICINE

## 2024-12-06 PROCEDURE — 93010 ELECTROCARDIOGRAM REPORT: CPT | Performed by: INTERNAL MEDICINE

## 2024-12-06 PROCEDURE — 94799 UNLISTED PULMONARY SVC/PX: CPT

## 2024-12-06 PROCEDURE — 83880 ASSAY OF NATRIURETIC PEPTIDE: CPT

## 2024-12-06 PROCEDURE — 94640 AIRWAY INHALATION TREATMENT: CPT

## 2024-12-06 PROCEDURE — 36415 COLL VENOUS BLD VENIPUNCTURE: CPT

## 2024-12-06 PROCEDURE — 96374 THER/PROPH/DIAG INJ IV PUSH: CPT

## 2024-12-06 PROCEDURE — 93005 ELECTROCARDIOGRAM TRACING: CPT | Performed by: EMERGENCY MEDICINE

## 2024-12-06 PROCEDURE — 85025 COMPLETE CBC W/AUTO DIFF WBC: CPT

## 2024-12-06 PROCEDURE — 99284 EMERGENCY DEPT VISIT MOD MDM: CPT

## 2024-12-06 PROCEDURE — 71045 X-RAY EXAM CHEST 1 VIEW: CPT

## 2024-12-06 PROCEDURE — 87637 SARSCOV2&INF A&B&RSV AMP PRB: CPT

## 2024-12-06 PROCEDURE — 84484 ASSAY OF TROPONIN QUANT: CPT

## 2024-12-06 PROCEDURE — 93005 ELECTROCARDIOGRAM TRACING: CPT

## 2024-12-06 PROCEDURE — 83735 ASSAY OF MAGNESIUM: CPT

## 2024-12-06 RX ORDER — SODIUM CHLORIDE 0.9 % (FLUSH) 0.9 %
10 SYRINGE (ML) INJECTION AS NEEDED
Status: DISCONTINUED | OUTPATIENT
Start: 2024-12-06 | End: 2024-12-06 | Stop reason: HOSPADM

## 2024-12-06 RX ORDER — ALBUTEROL SULFATE 0.83 MG/ML
7.5 SOLUTION RESPIRATORY (INHALATION) CONTINUOUS
Status: DISCONTINUED | OUTPATIENT
Start: 2024-12-06 | End: 2024-12-06 | Stop reason: HOSPADM

## 2024-12-06 RX ORDER — METHYLPREDNISOLONE SODIUM SUCCINATE 125 MG/2ML
125 INJECTION, POWDER, LYOPHILIZED, FOR SOLUTION INTRAMUSCULAR; INTRAVENOUS ONCE
Status: COMPLETED | OUTPATIENT
Start: 2024-12-06 | End: 2024-12-06

## 2024-12-06 RX ADMIN — ALBUTEROL SULFATE 7.5 MG: 2.5 SOLUTION RESPIRATORY (INHALATION) at 18:52

## 2024-12-06 RX ADMIN — METHYLPREDNISOLONE SODIUM SUCCINATE 125 MG: 125 INJECTION, POWDER, FOR SOLUTION INTRAMUSCULAR; INTRAVENOUS at 18:48

## 2024-12-06 NOTE — ED PROVIDER NOTES
Time: 3:19 PM EST  Date of encounter:  12/6/2024  Independent Historian/Clinical History and Information was obtained by:   Patient    History is limited by: N/A    Chief Complaint   Patient presents with    Cough    Shortness of Breath         History of Present Illness:  Patient is a 79 y.o. year old female who presents to the emergency department for evaluation of shortness of air x 7 days.  Patient has a prior medical history consistent for CHF, COPD, CKD stage III.  Patient reports she sees pulmonology clinic who recently started her on antibiotics and steroids for COPD exacerbation she started yesterday.  States she has not had any known fevers.  Denies any blood in cough.  Reports last hospitalization in April in which she needed a bronchoscopy.  Reports history of frequent COPD exacerbations.  Reports chest pain when breathing in.  Reports she has had 4 breathing treatments since this morning without relief.    Patient Care Team  Primary Care Provider: Nick Patel DO    Past Medical History:     Allergies   Allergen Reactions    Ibuprofen Hives    Penicillins Seizure and Other (See Comments)    Nsaids Other (See Comments)     CKD    Cephalosporins Rash    Sulfa Antibiotics Rash     Past Medical History:   Diagnosis Date    Acute right-sided low back pain with right-sided sciatica 01/15/2018    Allergic rhinitis     Asthma     Asthma, extrinsic     Asthma, intrinsic     CHF (congestive heart failure)     Chronic heart failure with preserved ejection fraction     11/16/20 echocardiogram: 1.  Normal ejection fraction of 55%. 2.  Mild left ventricular hypertrophy. 3.  No significant valvular heart issues.     CKD stage 3 10/29/2019    COPD (chronic obstructive pulmonary disease)     Diverticulitis     Emphysema of lung     Essential hypertension     GERD (gastroesophageal reflux disease)     Hemoptysis     non cancerous    History of fungal pneumonia 12/02/2021    Hyperlipidemia     Idiopathic  chronic gout of multiple sites without tophus 05/20/2016    Pneumonia     Primary osteoarthritis of right knee 01/16/2017    Sleep apnea     CPAP    Sleep apnea, obstructive     Vitamin D deficiency 01/13/2016     Past Surgical History:   Procedure Laterality Date    BRONCHOSCOPY N/A 4/30/2024    Procedure: BRONCHOSCOPY WITH BRONCHOALVEOLAR LAVAGE, WASHING, AIRWAY INSPECTION;  Surgeon: Santino Baez MD;  Location: [facility];  Service: Pulmonary;  Laterality: N/A;  MUCOUS PLUGGING, BRONCHITIS    CATARACT EXTRACTION Right     COLONOSCOPY  2014    COLONOSCOPY N/A 10/12/2021    Procedure: COLONOSCOPY;  Surgeon: Jorge Diane MD;  Location: [facility] ENDOSCOPY;  Service: Gastroenterology;  Laterality: N/A;  DIVERTICULOSIS    ENDOSCOPY  2015    EYE SURGERY      cataract right eye    OTHER SURGICAL HISTORY      Fatty tumor removed off back    RETINAL DETACHMENT REPAIR Right     hole in retina repair    TOTAL KNEE ARTHROPLASTY Right 10/31/2023    Procedure: TOTAL KNEE ARTHROPLASTY;  Surgeon: Celso Prakash MD;  [facility];  Service: Orthopedics;  Laterality: Right;     Family History   Problem Relation Age of Onset    Colon cancer Mother 61    Cancer Mother     Heart disease Mother     Heart failure Father     Asthma Father     Heart attack Father     Hyperlipidemia Father     Emphysema Father     Diabetes Brother     Emphysema Brother     Breast cancer Maternal Grandmother     Stroke Paternal Grandfather     Malig Hyperthermia Neg Hx        Home Medications:  Prior to Admission medications    Medication Sig Start Date End Date Taking? Authorizing Provider   albuterol sulfate  (90 Base) MCG/ACT inhaler Inhale 2 puffs Every 4 (Four) Hours As Needed for Wheezing. 10/6/23   Shanell Murray APRN   azelastine (ASTELIN) 0.1 % nasal spray 2 sprays into the nostril(s) as directed by provider 2 (Two) Times a Day. Use in each nostril as directed 1/3/24   Santino Baez MD   azithromycin  (ZITHROMAX) 250 MG tablet Take 2 by mouth today then 1 daily for 4 days 24   Shanell Murray APRN   Budeson-Glycopyrrol-Formoterol (BREZTRI) 160-9-4.8 MCG/ACT aerosol inhaler Inhale 2 puffs 2 (Two) Times a Day. 24   Shanell Murray APRN   D3-1000 25 MCG (1000 UT) capsule TAKE 1 CAPSULE BY MOUTH EVERY DAY 10/20/22   Nick Patel DO   famotidine (PEPCID) 20 MG tablet Take 1 tablet by mouth every night at bedtime. 24   Shanell Murray APRN   fexofenadine (ALLEGRA) 180 MG tablet Take 1 tablet by mouth Daily. 24   Santino Baez MD   gabapentin (NEURONTIN) 300 MG capsule 1 capsule every morning and 2 capsule every evening. 24   Nick Patel DO   ipratropium-albuterol (DUO-NEB) 0.5-2.5 mg/3 ml nebulizer USE 3 ML VIA NEBULIZER FOUR TIMES DAILY AS NEEDED FOR WHEEZING 24   Santino Baez MD   losartan (COZAAR) 100 MG tablet Take 1 tablet by mouth Daily. 24   Nick Patel DO   metoprolol succinate XL (TOPROL-XL) 100 MG 24 hr tablet Take 1 tablet by mouth Daily. Hold if heartrate is <60. 24   Nick Patel DO   montelukast (SINGULAIR) 10 MG tablet Take 1 tablet by mouth Every Night. 24   Santino Baez MD   predniSONE (DELTASONE) 10 MG tablet 6 daily x 2 days, 5 daily x 2 days, 4 daily x 2 days, 3 daily x 2 days, 2 daily x 2 days, 1 daily x 2 days 24   Shanell Murray APRN   spironolactone (ALDACTONE) 25 MG tablet Take 1 tablet by mouth 3 (Three) Times a Week. Monday,Wednesday,Friday 7/3/24   Nick Patel DO        Social History:   Social History     Tobacco Use    Smoking status: Former     Current packs/day: 0.00     Average packs/day: 0.5 packs/day for 32.0 years (16.0 ttl pk-yrs)     Types: Cigarettes     Start date: 1963     Quit date: 1995     Years since quittin.9     Passive exposure: Never    Smokeless tobacco: Never   Vaping Use    Vaping status: Never Used   Substance Use Topics    Alcohol use:  Never    Drug use: Never         Review of Systems:  Review of Systems   Constitutional:  Negative for chills and fever.   HENT:  Negative for congestion, rhinorrhea and sore throat.    Eyes:  Negative for pain and visual disturbance.   Respiratory:  Positive for cough, shortness of breath and wheezing. Negative for apnea and chest tightness.    Cardiovascular:  Negative for chest pain and palpitations.   Gastrointestinal:  Negative for abdominal pain, diarrhea, nausea and vomiting.   Genitourinary:  Negative for difficulty urinating and dysuria.   Musculoskeletal:  Negative for joint swelling and myalgias.   Skin:  Negative for color change.   Neurological:  Negative for seizures and headaches.   Psychiatric/Behavioral: Negative.     All other systems reviewed and are negative.       Physical Exam:  /65 (BP Location: Left arm, Patient Position: Lying)   Pulse 79   Temp 97.9 °F (36.6 °C) (Oral)   Resp 15   SpO2 90%         Physical Exam  Vitals and nursing note reviewed.   Constitutional:       General: She is not in acute distress.     Appearance: Normal appearance. She is not toxic-appearing.   HENT:      Head: Normocephalic and atraumatic.      Jaw: There is normal jaw occlusion.      Mouth/Throat:      Mouth: Mucous membranes are moist.   Eyes:      General: Lids are normal.      Extraocular Movements: Extraocular movements intact.      Conjunctiva/sclera: Conjunctivae normal.      Pupils: Pupils are equal, round, and reactive to light.   Cardiovascular:      Rate and Rhythm: Normal rate and regular rhythm.      Pulses: Normal pulses.      Heart sounds: Normal heart sounds.   Pulmonary:      Effort: Pulmonary effort is normal. No respiratory distress.      Breath sounds: Wheezing present. No rhonchi.   Abdominal:      General: Abdomen is flat. There is no distension.      Palpations: Abdomen is soft.      Tenderness: There is no abdominal tenderness. There is no guarding or rebound.   Musculoskeletal:          General: Normal range of motion.      Cervical back: Normal range of motion and neck supple.      Right lower leg: No edema.      Left lower leg: No edema.   Skin:     General: Skin is warm and dry.   Neurological:      General: No focal deficit present.      Mental Status: She is alert and oriented to person, place, and time. Mental status is at baseline.   Psychiatric:         Mood and Affect: Mood normal.         Behavior: Behavior normal.                      Procedures:  Procedures      Medical Decision Making:      Comorbidities that affect care:    COPD    External Notes reviewed:    Previous Clinic Note: Patient was last seen in clinic for shortness of breath      The following orders were placed and all results were independently analyzed by me:  Orders Placed This Encounter   Procedures    COVID-19, FLU A/B, RSV PCR 1 HR TAT - Swab, Nasopharynx    XR Chest 1 View    Carmi Draw    Comprehensive Metabolic Panel    BNP    Single High Sensitivity Troponin T    CBC Auto Differential    Magnesium    NPO Diet NPO Type: Strict NPO    Undress & Gown    Continuous Pulse Oximetry    Vital Signs    Oxygen Therapy- Nasal Cannula; Titrate 1-6 LPM Per SpO2; 90 - 95%    ECG 12 Lead ED Triage Standing Order; SOA    Insert Peripheral IV    CBC & Differential    Green Top (Gel)    Lavender Top    Gold Top - SST    Light Blue Top       Medications Given in the Emergency Department:  Medications   sodium chloride 0.9 % flush 10 mL (has no administration in time range)   albuterol (PROVENTIL) nebulizer solution 0.083% 2.5 mg/3mL (0 mg Nebulization Stopped 12/6/24 1952)   methylPREDNISolone sodium succinate (SOLU-Medrol) injection 125 mg (125 mg Intravenous Given 12/6/24 1848)        ED Course:    The patient was initially evaluated in the triage area where orders were placed. The patient was later dispositioned by Annette Mckeon MD.      The patient was advised to stay for completion of workup which includes but is  not limited to communication of labs and radiological results, reassessment and plan. The patient was advised that leaving prior to disposition by a provider could result in critical findings that are not communicated to the patient.     ED Course as of 12/06/24 2120   Fri Dec 06, 2024   1521 --- PROVIDER IN TRIAGE NOTE ---    The patient was evaluated by Roby walsh in triage. Orders were placed and the patient is currently awaiting disposition.    [CB]      ED Course User Index  [CB] Roby Cahmbers, SAVANNAH       Labs:    Lab Results (last 24 hours)       Procedure Component Value Units Date/Time    COVID-19, FLU A/B, RSV PCR 1 HR TAT - Swab, Nasopharynx [[id number]]  (Normal) Collected: 12/06/24 1518    Specimen: Swab from Nasopharynx Updated: 12/06/24 1559     COVID19 Not Detected     Influenza A PCR Not Detected     Influenza B PCR Not Detected     RSV, PCR Not Detected    Narrative:      Fact sheet for providers: https://www.fda.gov/media/373484/download    Fact sheet for patients: https://www.fda.gov/media/522645/download    Test performed by PCR.    CBC & Differential [[id number]]  (Abnormal) Collected: 12/06/24 1521    Specimen: Blood from Arm, Right Updated: 12/06/24 1530    Narrative:      The following orders were created for panel order CBC & Differential.  Procedure                               Abnormality         Status                     ---------                               -----------         ------                     CBC Auto Differential[[id number]]        Abnormal            Final result                 Please view results for these tests on the individual orders.    Comprehensive Metabolic Panel [[id number]]  (Abnormal) Collected: 12/06/24 1521    Specimen: Blood from Arm, Right Updated: 12/06/24 1553     Glucose 172 mg/dL      BUN 21 mg/dL      Creatinine 1.14 mg/dL      Sodium 139 mmol/L      Potassium 3.9 mmol/L      Chloride 105 mmol/L      CO2 18.3 mmol/L      Calcium 9.5 mg/dL       Total Protein 7.0 g/dL      Albumin 4.1 g/dL      ALT (SGPT) 9 U/L      AST (SGOT) 17 U/L      Alkaline Phosphatase 89 U/L      Total Bilirubin 0.4 mg/dL      Globulin 2.9 gm/dL      A/G Ratio 1.4 g/dL      BUN/Creatinine Ratio 18.4     Anion Gap 15.7 mmol/L      eGFR 49.1 mL/min/1.73     Narrative:      GFR Normal >60  Chronic Kidney Disease <60  Kidney Failure <15    The GFR formula is only valid for adults with stable renal function between ages 18 and 70.    BNP [[id number]]  (Normal) Collected: 12/06/24 1521    Specimen: Blood from Arm, Right Updated: 12/06/24 1548     proBNP 311.6 pg/mL     Narrative:      This assay is used as an aid in the diagnosis of individuals suspected of having heart failure. It can be used as an aid in the diagnosis of acute decompensated heart failure (ADHF) in patients presenting with signs and symptoms of ADHF to the emergency department (ED). In addition, NT-proBNP of <300 pg/mL indicates ADHF is not likely.    Age Range Result Interpretation  NT-proBNP Concentration (pg/mL:      <50             Positive            >450                   Gray                 300-450                    Negative             <300    50-75           Positive            >900                  Gray                300-900                  Negative            <300      >75             Positive            >1800                  Gray                300-1800                  Negative            <300    Single High Sensitivity Troponin T [[id number]]  (Normal) Collected: 12/06/24 1521    Specimen: Blood from Arm, Right Updated: 12/06/24 1553     HS Troponin T 11 ng/L     Narrative:      High Sensitive Troponin T Reference Range:  <14.0 ng/L- Negative Female for AMI  <22.0 ng/L- Negative Male for AMI  >=14 - Abnormal Female indicating possible myocardial injury.  >=22 - Abnormal Male indicating possible myocardial injury.   Clinicians would have to utilize clinical acumen, EKG, Troponin, and serial changes to  determine if it is an Acute Myocardial Infarction or myocardial injury due to an underlying chronic condition.         CBC Auto Differential [[id number]]  (Abnormal) Collected: 12/06/24 1521    Specimen: Blood from Arm, Right Updated: 12/06/24 1530     WBC 15.82 10*3/mm3      RBC 4.39 10*6/mm3      Hemoglobin 12.2 g/dL      Hematocrit 38.2 %      MCV 87.0 fL      MCH 27.8 pg      MCHC 31.9 g/dL      RDW 14.9 %      RDW-SD 47.9 fl      MPV 12.2 fL      Platelets 243 10*3/mm3      Neutrophil % 88.1 %      Lymphocyte % 8.7 %      Monocyte % 2.6 %      Eosinophil % 0.0 %      Basophil % 0.1 %      Immature Grans % 0.5 %      Neutrophils, Absolute 13.94 10*3/mm3      Lymphocytes, Absolute 1.37 10*3/mm3      Monocytes, Absolute 0.41 10*3/mm3      Eosinophils, Absolute 0.00 10*3/mm3      Basophils, Absolute 0.02 10*3/mm3      Immature Grans, Absolute 0.08 10*3/mm3      nRBC 0.0 /100 WBC     Magnesium [[id number]]  (Normal) Collected: 12/06/24 1521    Specimen: Blood from Arm, Right Updated: 12/06/24 1553     Magnesium 1.9 mg/dL              Imaging:    XR Chest 1 View    Result Date: 12/6/2024  XR CHEST 1 VW Date of Exam: 12/6/2024 3:55 PM EST Indication: SOA Triage Protocol Comparison: Chest AP dated 5/16/2024 Findings: Irregular densities in the lung bases likely represent scarring or atelectasis. No acute infiltrate or effusion is identified. The cardiac and mediastinal silhouettes appear normal.     Impression: Mild bibasilar scarring. No acute infiltrate Electronically Signed: Shiva Jeffries MD  12/6/2024 4:13 PM EST  Workstation ID: [id number]       Differential Diagnosis and Discussion:      Dyspnea: Differential diagnosis includes but is not limited to metabolic acidosis, neurological disorders, psychogenic, asthma, pneumothorax, upper airway obstruction, COPD, pneumonia, noncardiogenic pulmonary edema, interstitial lung disease, anemia, congestive heart failure, and pulmonary embolism    All labs were reviewed and  interpreted by me.  All X-rays impressions were independently interpreted by me.  EKG was interpreted by me.    MDM     The patient presents with shortness of breath consistent with their COPD exacerbations. The patient was monitored in the ED for 7 hours.  The patient´s CBC that was reviewed and interpreted by me shows no abnormalities of critical concern. Of note, there is no anemia requiring a blood transfusion and the platelet count is acceptable.  The patient´s CMP that was reviewed and interpretted by me shows no abnormalities of critical concern. Of note, the patient´s sodium and potassium are acceptable. The patient´s liver enzymes are unremarkable. The patient´s renal function (creatinine) is preserved. The patient has a normal anion gap.  Troponin is negative.  Magnesium is 1.9.  The patient reports significant relief of their symptoms with ED treatment. The patient has no respiratory distress or hypoxia. This includes the absence of cervical, clavicular, and abdominal retractions; tachypnea and an oxygen saturation of less than 92% on room air.  The patient is able to speak in full sentences and is ambulatory without hypoxia on exertion. The patient's condition is stable and appropriate for discharge. The patient was advised to return for fever, respiratory distress, intractable vomiting, weakness, worsening shortness of breath, chest pain, or altered mental status. The patient will pursue further outpatient evaluation with the primary care physician. The patient has expressed a clear and thorough understanding and agreed to follow-up as instructed.    The patient presents with dyspnea.  Patient was placed on the cardiac monitor after given an hour-long breathing treatment.  They were monitored for ventricular ectopy, arrhythmia, tachycardia, hypoxia, and changes in blood pressure.  Continuous pulse oximetry was placed in waveform with corresponding oxygen saturation was assessed throughout their stay in  the emergency department.  Patient was rechecked several times in the ED for decline in mental status and worsening distress.    Total Critical Care time of 40 minutes. Total critical care time documented does not include time spent on separately billed procedures for services of nurses or physician assistants. I personally saw and examined the patient. I have reviewed all diagnostic interpretations and treatment plans as written. I was present for the key portions of any procedures performed and the inclusive time noted in any critical care statement. Critical care time includes patient management by me, time spent at the patients bedside,  time to review lab and imaging results, discussing patient care, documentation in the medical record, and time spent with family or caregiver.          Patient Care Considerations:    CT CHEST: I considered ordering a CT scan of the chest, however patient has had no hypoxia and has no risk factors for PE.      Consultants/Shared Management Plan:    None    Social Determinants of Health:    Patient is independent, reliable, and has access to care.       Disposition and Care Coordination:    Discharged: I considered escalation of care by admitting this patient to the hospital, however patient has no respiratory distress or hypoxia and reports improvement with ED treatment.    I have explained the patient´s condition, diagnoses and treatment plan based on the information available to me at this time. I have answered questions and addressed any concerns. The patient has a good  understanding of the patient´s diagnosis, condition, and treatment plan as can be expected at this point. The vital signs have been stable. The patient´s condition is stable and appropriate for discharge from the emergency department.      The patient will pursue further outpatient evaluation with the primary care physician or other designated or consulting physician as outlined in the discharge instructions.  They are agreeable to this plan of care and follow-up instructions have been explained in detail. The patient has received these instructions in written format and has expressed an understanding of the discharge instructions. The patient is aware that any significant change in condition or worsening of symptoms should prompt an immediate return to this or the closest emergency department or call to 911.  I have explained discharge medications and the need for follow up with the patient/caretakers. This was also printed in the discharge instructions. Patient was discharged with the following medications and follow up:      Medication List      No changes were made to your prescriptions during this visit.      Nick Patel, DO  [address] [place] [address]  [phone]    In 2 days         Final diagnoses:   COPD exacerbation        ED Disposition       ED Disposition   Discharge    Condition   Stable    Comment   --               This medical record created using voice recognition software.             Annette Mckeon MD  12/06/24 [id number]

## 2024-12-08 ENCOUNTER — HOSPITAL ENCOUNTER (INPATIENT)
Facility: HOSPITAL | Age: 79
LOS: 3 days | Discharge: HOME | End: 2024-12-11
Attending: EMERGENCY MEDICINE | Admitting: FAMILY MEDICINE
Payer: MEDICARE

## 2024-12-08 ENCOUNTER — APPOINTMENT (OUTPATIENT)
Dept: GENERAL RADIOLOGY | Facility: HOSPITAL | Age: 79
End: 2024-12-08
Payer: MEDICARE

## 2024-12-08 DIAGNOSIS — E87.20 LACTIC ACIDOSIS: ICD-10-CM

## 2024-12-08 DIAGNOSIS — J44.1 COPD EXACERBATION: Primary | ICD-10-CM

## 2024-12-08 DIAGNOSIS — J18.9 MULTIFOCAL PNEUMONIA: ICD-10-CM

## 2024-12-08 DIAGNOSIS — R26.2 DIFFICULTY WALKING: ICD-10-CM

## 2024-12-08 LAB
ALBUMIN SERPL-MCNC: 3.9 G/DL (ref 3.5–5.2)
ALBUMIN SERPL-MCNC: 4.2 G/DL (ref 3.5–5.2)
ALBUMIN/GLOB SERPL: 1.4 G/DL
ALBUMIN/GLOB SERPL: 1.5 G/DL
ALP SERPL-CCNC: 77 U/L (ref 39–117)
ALP SERPL-CCNC: 83 U/L (ref 39–117)
ALT SERPL W P-5'-P-CCNC: 11 U/L (ref 1–33)
ALT SERPL W P-5'-P-CCNC: 13 U/L (ref 1–33)
ANION GAP SERPL CALCULATED.3IONS-SCNC: 12 MMOL/L (ref 5–15)
ANION GAP SERPL CALCULATED.3IONS-SCNC: 17.5 MMOL/L (ref 5–15)
AST SERPL-CCNC: 25 U/L (ref 1–32)
AST SERPL-CCNC: 27 U/L (ref 1–32)
BASOPHILS # BLD AUTO: 0.01 10*3/MM3 (ref 0–0.2)
BASOPHILS # BLD AUTO: 0.02 10*3/MM3 (ref 0–0.2)
BASOPHILS NFR BLD AUTO: 0.1 % (ref 0–1.5)
BASOPHILS NFR BLD AUTO: 0.1 % (ref 0–1.5)
BILIRUB SERPL-MCNC: 0.4 MG/DL (ref 0–1.2)
BILIRUB SERPL-MCNC: 0.5 MG/DL (ref 0–1.2)
BUN SERPL-MCNC: 27 MG/DL (ref 8–23)
BUN SERPL-MCNC: 30 MG/DL (ref 8–23)
BUN/CREAT SERPL: 24.5 (ref 7–25)
BUN/CREAT SERPL: 27.8 (ref 7–25)
CALCIUM SPEC-SCNC: 8.9 MG/DL (ref 8.6–10.5)
CALCIUM SPEC-SCNC: 9.3 MG/DL (ref 8.6–10.5)
CHLORIDE SERPL-SCNC: 104 MMOL/L (ref 98–107)
CHLORIDE SERPL-SCNC: 109 MMOL/L (ref 98–107)
CO2 SERPL-SCNC: 17.5 MMOL/L (ref 22–29)
CO2 SERPL-SCNC: 20 MMOL/L (ref 22–29)
CREAT SERPL-MCNC: 1.08 MG/DL (ref 0.57–1)
CREAT SERPL-MCNC: 1.1 MG/DL (ref 0.57–1)
D-LACTATE SERPL-SCNC: 2 MMOL/L (ref 0.5–2)
D-LACTATE SERPL-SCNC: 3.6 MMOL/L (ref 0.5–2)
DEPRECATED RDW RBC AUTO: 49 FL (ref 37–54)
DEPRECATED RDW RBC AUTO: 49.3 FL (ref 37–54)
EGFRCR SERPLBLD CKD-EPI 2021: 51.2 ML/MIN/1.73
EGFRCR SERPLBLD CKD-EPI 2021: 52.4 ML/MIN/1.73
EOSINOPHIL # BLD AUTO: 0 10*3/MM3 (ref 0–0.4)
EOSINOPHIL # BLD AUTO: 0 10*3/MM3 (ref 0–0.4)
EOSINOPHIL NFR BLD AUTO: 0 % (ref 0.3–6.2)
EOSINOPHIL NFR BLD AUTO: 0 % (ref 0.3–6.2)
ERYTHROCYTE [DISTWIDTH] IN BLOOD BY AUTOMATED COUNT: 15.4 % (ref 12.3–15.4)
ERYTHROCYTE [DISTWIDTH] IN BLOOD BY AUTOMATED COUNT: 15.4 % (ref 12.3–15.4)
FLUAV SUBTYP SPEC NAA+PROBE: NOT DETECTED
FLUBV RNA ISLT QL NAA+PROBE: NOT DETECTED
GLOBULIN UR ELPH-MCNC: 2.6 GM/DL
GLOBULIN UR ELPH-MCNC: 3 GM/DL
GLUCOSE SERPL-MCNC: 143 MG/DL (ref 65–99)
GLUCOSE SERPL-MCNC: 169 MG/DL (ref 65–99)
HCT VFR BLD AUTO: 38.4 % (ref 34–46.6)
HCT VFR BLD AUTO: 40.3 % (ref 34–46.6)
HGB BLD-MCNC: 11.9 G/DL (ref 12–15.9)
HGB BLD-MCNC: 12.7 G/DL (ref 12–15.9)
HOLD SPECIMEN: NORMAL
HOLD SPECIMEN: NORMAL
IMM GRANULOCYTES # BLD AUTO: 0.11 10*3/MM3 (ref 0–0.05)
IMM GRANULOCYTES # BLD AUTO: 0.12 10*3/MM3 (ref 0–0.05)
IMM GRANULOCYTES NFR BLD AUTO: 0.8 % (ref 0–0.5)
IMM GRANULOCYTES NFR BLD AUTO: 1 % (ref 0–0.5)
LYMPHOCYTES # BLD AUTO: 1.11 10*3/MM3 (ref 0.7–3.1)
LYMPHOCYTES # BLD AUTO: 2.1 10*3/MM3 (ref 0.7–3.1)
LYMPHOCYTES NFR BLD AUTO: 14 % (ref 19.6–45.3)
LYMPHOCYTES NFR BLD AUTO: 9.7 % (ref 19.6–45.3)
MCH RBC QN AUTO: 27.3 PG (ref 26.6–33)
MCH RBC QN AUTO: 27.5 PG (ref 26.6–33)
MCHC RBC AUTO-ENTMCNC: 31 G/DL (ref 31.5–35.7)
MCHC RBC AUTO-ENTMCNC: 31.5 G/DL (ref 31.5–35.7)
MCV RBC AUTO: 87.2 FL (ref 79–97)
MCV RBC AUTO: 88.1 FL (ref 79–97)
MONOCYTES # BLD AUTO: 0.16 10*3/MM3 (ref 0.1–0.9)
MONOCYTES # BLD AUTO: 0.55 10*3/MM3 (ref 0.1–0.9)
MONOCYTES NFR BLD AUTO: 1.4 % (ref 5–12)
MONOCYTES NFR BLD AUTO: 3.7 % (ref 5–12)
NEUTROPHILS NFR BLD AUTO: 10.01 10*3/MM3 (ref 1.7–7)
NEUTROPHILS NFR BLD AUTO: 12.17 10*3/MM3 (ref 1.7–7)
NEUTROPHILS NFR BLD AUTO: 81.4 % (ref 42.7–76)
NEUTROPHILS NFR BLD AUTO: 87.8 % (ref 42.7–76)
NRBC BLD AUTO-RTO: 0 /100 WBC (ref 0–0.2)
NRBC BLD AUTO-RTO: 0 /100 WBC (ref 0–0.2)
NT-PROBNP SERPL-MCNC: 349.5 PG/ML (ref 0–1800)
PLATELET # BLD AUTO: 227 10*3/MM3 (ref 140–450)
PLATELET # BLD AUTO: 257 10*3/MM3 (ref 140–450)
PMV BLD AUTO: 12.3 FL (ref 6–12)
PMV BLD AUTO: 12.3 FL (ref 6–12)
POTASSIUM SERPL-SCNC: 3.9 MMOL/L (ref 3.5–5.2)
POTASSIUM SERPL-SCNC: 4.2 MMOL/L (ref 3.5–5.2)
PROT SERPL-MCNC: 6.5 G/DL (ref 6–8.5)
PROT SERPL-MCNC: 7.2 G/DL (ref 6–8.5)
QT INTERVAL: 375 MS
QTC INTERVAL: 444 MS
RBC # BLD AUTO: 4.36 10*6/MM3 (ref 3.77–5.28)
RBC # BLD AUTO: 4.62 10*6/MM3 (ref 3.77–5.28)
RSV RNA NPH QL NAA+NON-PROBE: NOT DETECTED
SARS-COV-2 RNA RESP QL NAA+PROBE: NOT DETECTED
SODIUM SERPL-SCNC: 139 MMOL/L (ref 136–145)
SODIUM SERPL-SCNC: 141 MMOL/L (ref 136–145)
TROPONIN T SERPL HS-MCNC: 15 NG/L
WBC NRBC COR # BLD AUTO: 11.4 10*3/MM3 (ref 3.4–10.8)
WBC NRBC COR # BLD AUTO: 14.96 10*3/MM3 (ref 3.4–10.8)
WHOLE BLOOD HOLD COAG: NORMAL
WHOLE BLOOD HOLD SPECIMEN: NORMAL

## 2024-12-08 PROCEDURE — 83605 ASSAY OF LACTIC ACID: CPT | Performed by: EMERGENCY MEDICINE

## 2024-12-08 PROCEDURE — 25010000002 MEROPENEM PER 100 MG: Performed by: EMERGENCY MEDICINE

## 2024-12-08 PROCEDURE — 80053 COMPREHEN METABOLIC PANEL: CPT | Performed by: EMERGENCY MEDICINE

## 2024-12-08 PROCEDURE — 99285 EMERGENCY DEPT VISIT HI MDM: CPT

## 2024-12-08 PROCEDURE — 25010000002 ENOXAPARIN PER 10 MG: Performed by: FAMILY MEDICINE

## 2024-12-08 PROCEDURE — 93005 ELECTROCARDIOGRAM TRACING: CPT

## 2024-12-08 PROCEDURE — 94640 AIRWAY INHALATION TREATMENT: CPT

## 2024-12-08 PROCEDURE — 94799 UNLISTED PULMONARY SVC/PX: CPT

## 2024-12-08 PROCEDURE — 93005 ELECTROCARDIOGRAM TRACING: CPT | Performed by: EMERGENCY MEDICINE

## 2024-12-08 PROCEDURE — 71045 X-RAY EXAM CHEST 1 VIEW: CPT

## 2024-12-08 PROCEDURE — 85025 COMPLETE CBC W/AUTO DIFF WBC: CPT | Performed by: FAMILY MEDICINE

## 2024-12-08 PROCEDURE — 85025 COMPLETE CBC W/AUTO DIFF WBC: CPT | Performed by: EMERGENCY MEDICINE

## 2024-12-08 PROCEDURE — 87637 SARSCOV2&INF A&B&RSV AMP PRB: CPT | Performed by: EMERGENCY MEDICINE

## 2024-12-08 PROCEDURE — 80053 COMPREHEN METABOLIC PANEL: CPT | Performed by: FAMILY MEDICINE

## 2024-12-08 PROCEDURE — 83880 ASSAY OF NATRIURETIC PEPTIDE: CPT | Performed by: EMERGENCY MEDICINE

## 2024-12-08 PROCEDURE — 36415 COLL VENOUS BLD VENIPUNCTURE: CPT | Performed by: FAMILY MEDICINE

## 2024-12-08 PROCEDURE — 87040 BLOOD CULTURE FOR BACTERIA: CPT | Performed by: EMERGENCY MEDICINE

## 2024-12-08 PROCEDURE — 99223 1ST HOSP IP/OBS HIGH 75: CPT | Performed by: FAMILY MEDICINE

## 2024-12-08 PROCEDURE — 84484 ASSAY OF TROPONIN QUANT: CPT | Performed by: EMERGENCY MEDICINE

## 2024-12-08 RX ORDER — IPRATROPIUM BROMIDE AND ALBUTEROL SULFATE 2.5; .5 MG/3ML; MG/3ML
3 SOLUTION RESPIRATORY (INHALATION)
Status: COMPLETED | OUTPATIENT
Start: 2024-12-08 | End: 2024-12-08

## 2024-12-08 RX ORDER — GUAIFENESIN 200 MG/10ML
200 LIQUID ORAL EVERY 4 HOURS PRN
Status: DISCONTINUED | OUTPATIENT
Start: 2024-12-08 | End: 2024-12-11 | Stop reason: HOSPADM

## 2024-12-08 RX ORDER — BISACODYL 10 MG
10 SUPPOSITORY, RECTAL RECTAL DAILY PRN
Status: DISCONTINUED | OUTPATIENT
Start: 2024-12-08 | End: 2024-12-11 | Stop reason: HOSPADM

## 2024-12-08 RX ORDER — POLYETHYLENE GLYCOL 3350 17 G/17G
17 POWDER, FOR SOLUTION ORAL DAILY PRN
Status: DISCONTINUED | OUTPATIENT
Start: 2024-12-08 | End: 2024-12-11 | Stop reason: HOSPADM

## 2024-12-08 RX ORDER — ENOXAPARIN SODIUM 100 MG/ML
40 INJECTION SUBCUTANEOUS EVERY 12 HOURS
Status: DISCONTINUED | OUTPATIENT
Start: 2024-12-08 | End: 2024-12-11 | Stop reason: HOSPADM

## 2024-12-08 RX ORDER — BISACODYL 5 MG/1
5 TABLET, DELAYED RELEASE ORAL DAILY PRN
Status: DISCONTINUED | OUTPATIENT
Start: 2024-12-08 | End: 2024-12-11 | Stop reason: HOSPADM

## 2024-12-08 RX ORDER — ONDANSETRON 2 MG/ML
4 INJECTION INTRAMUSCULAR; INTRAVENOUS EVERY 6 HOURS PRN
Status: DISCONTINUED | OUTPATIENT
Start: 2024-12-08 | End: 2024-12-11 | Stop reason: HOSPADM

## 2024-12-08 RX ORDER — SODIUM CHLORIDE 9 MG/ML
40 INJECTION, SOLUTION INTRAVENOUS AS NEEDED
Status: DISCONTINUED | OUTPATIENT
Start: 2024-12-08 | End: 2024-12-11 | Stop reason: HOSPADM

## 2024-12-08 RX ORDER — SODIUM CHLORIDE 0.9 % (FLUSH) 0.9 %
10 SYRINGE (ML) INJECTION AS NEEDED
Status: DISCONTINUED | OUTPATIENT
Start: 2024-12-08 | End: 2024-12-11 | Stop reason: HOSPADM

## 2024-12-08 RX ORDER — IPRATROPIUM BROMIDE AND ALBUTEROL SULFATE 2.5; .5 MG/3ML; MG/3ML
SOLUTION RESPIRATORY (INHALATION)
Status: COMPLETED
Start: 2024-12-08 | End: 2024-12-08

## 2024-12-08 RX ORDER — SODIUM CHLORIDE 0.9 % (FLUSH) 0.9 %
10 SYRINGE (ML) INJECTION EVERY 12 HOURS SCHEDULED
Status: DISCONTINUED | OUTPATIENT
Start: 2024-12-08 | End: 2024-12-11 | Stop reason: HOSPADM

## 2024-12-08 RX ORDER — AMOXICILLIN 250 MG
2 CAPSULE ORAL 2 TIMES DAILY PRN
Status: DISCONTINUED | OUTPATIENT
Start: 2024-12-08 | End: 2024-12-11 | Stop reason: HOSPADM

## 2024-12-08 RX ADMIN — Medication 10 ML: at 22:39

## 2024-12-08 RX ADMIN — GUAIFENESIN 200 MG: 100 LIQUID ORAL at 22:37

## 2024-12-08 RX ADMIN — IPRATROPIUM BROMIDE AND ALBUTEROL SULFATE 3 ML: .5; 3 SOLUTION RESPIRATORY (INHALATION) at 18:25

## 2024-12-08 RX ADMIN — IPRATROPIUM BROMIDE AND ALBUTEROL SULFATE 3 ML: .5; 3 SOLUTION RESPIRATORY (INHALATION) at 23:46

## 2024-12-08 RX ADMIN — ENOXAPARIN SODIUM 40 MG: 100 INJECTION SUBCUTANEOUS at 22:37

## 2024-12-08 RX ADMIN — IPRATROPIUM BROMIDE AND ALBUTEROL SULFATE 3 ML: .5; 3 SOLUTION RESPIRATORY (INHALATION) at 18:15

## 2024-12-08 RX ADMIN — IPRATROPIUM BROMIDE AND ALBUTEROL SULFATE 3 ML: .5; 3 SOLUTION RESPIRATORY (INHALATION) at 18:20

## 2024-12-08 RX ADMIN — MEROPENEM 1000 MG: 1 INJECTION, POWDER, FOR SOLUTION INTRAVENOUS at 20:26

## 2024-12-08 NOTE — ED PROVIDER NOTES
Time: 6:13 PM EST  Date of encounter:  12/8/2024  Independent Historian/Clinical History and Information was obtained by:   Patient  Chief Complaint: Shortness of breath    History is limited by: N/A    History of Present Illness:  Patient is a 79 y.o. year old female who presents to the emergency department for evaluation of shortness of breath.  The patient notes that she is chronically short of breath from asthma COPD.  The patient states that she started having occultly breathing about 3 days ago.  She did feel that this was more of her COPD.  She did discuss this with her pulmonologist by phone who put her on Zithromax and a steroid taper.  She was on 60 mg for 2 days and then 50 mg for 2 days.  She is currently on day 4.  She is using her nebulizer as requested.  Continues to have worsening shortness of breath.  She has chronic orthopnea and PND.  She does sleep elevated.  She does note some swelling in her legs but again this is chronic and unchanged.  She has no chest pain or chest pressure.  She does have a worse cough than baseline.  She says sometimes is productive and sometimes it is not.  She has no abdominal pain.  She has no back pain.  When discussing with her she thought this was more likely to be COPD than CHF    HPI    Patient Care Team  Primary Care Provider: Nick Patel,     Past Medical History:     Allergies   Allergen Reactions    Ibuprofen Hives    Penicillins Seizure and Other (See Comments)    Nsaids Other (See Comments)     CKD    Cephalosporins Rash    Sulfa Antibiotics Rash     Past Medical History:   Diagnosis Date    Acute right-sided low back pain with right-sided sciatica 01/15/2018    Allergic rhinitis     Asthma     Asthma, extrinsic     Asthma, intrinsic     CHF (congestive heart failure)     Chronic heart failure with preserved ejection fraction     11/16/20 echocardiogram: 1.  Normal ejection fraction of 55%. 2.  Mild left ventricular hypertrophy. 3.  No  significant valvular heart issues.     CKD stage 3 10/29/2019    COPD (chronic obstructive pulmonary disease)     Diverticulitis     Emphysema of lung     Essential hypertension     GERD (gastroesophageal reflux disease)     Hemoptysis     non cancerous    History of fungal pneumonia 12/02/2021    Hyperlipidemia     Idiopathic chronic gout of multiple sites without tophus 05/20/2016    Pneumonia     Primary osteoarthritis of right knee 01/16/2017    Sleep apnea     CPAP    Sleep apnea, obstructive     Vitamin D deficiency 01/13/2016     Past Surgical History:   Procedure Laterality Date    BRONCHOSCOPY N/A 4/30/2024    Procedure: BRONCHOSCOPY WITH BRONCHOALVEOLAR LAVAGE, WASHING, AIRWAY INSPECTION;  Surgeon: Santino Baez MD;  Location: [facility] ENDOSCOPY;  Service: Pulmonary;  Laterality: N/A;  MUCOUS PLUGGING, BRONCHITIS    CATARACT EXTRACTION Right     COLONOSCOPY  2014    COLONOSCOPY N/A 10/12/2021    Procedure: COLONOSCOPY;  Surgeon: Jorge Diane MD;  Location: [facility] ENDOSCOPY;  Service: Gastroenterology;  Laterality: N/A;  DIVERTICULOSIS    ENDOSCOPY  2015    EYE SURGERY      cataract right eye    OTHER SURGICAL HISTORY      Fatty tumor removed off back    RETINAL DETACHMENT REPAIR Right     hole in retina repair    TOTAL KNEE ARTHROPLASTY Right 10/31/2023    Procedure: TOTAL KNEE ARTHROPLASTY;  Surgeon: Celso Prakash MD;  [facility] OR [facility];  Service: Orthopedics;  Laterality: Right;     Family History   Problem Relation Age of Onset    Colon cancer Mother 61    Cancer Mother     Heart disease Mother     Heart failure Father     Asthma Father     Heart attack Father     Hyperlipidemia Father     Emphysema Father     Diabetes Brother     Emphysema Brother     Breast cancer Maternal Grandmother     Stroke Paternal Grandfather     Malig Hyperthermia Neg Hx        Home Medications:  Prior to Admission medications    Medication Sig Start Date End Date Taking? Authorizing Provider   albuterol  sulfate  (90 Base) MCG/ACT inhaler Inhale 2 puffs Every 4 (Four) Hours As Needed for Wheezing. 10/6/23   Shanell Murray APRN   azelastine (ASTELIN) 0.1 % nasal spray 2 sprays into the nostril(s) as directed by provider 2 (Two) Times a Day. Use in each nostril as directed 1/3/24   Santino Baez MD   azithromycin (ZITHROMAX) 250 MG tablet Take 2 by mouth today then 1 daily for 4 days 12/4/24   Shanell Murray APRN   Budeson-Glycopyrrol-Formoterol (BREZTRI) 160-9-4.8 MCG/ACT aerosol inhaler Inhale 2 puffs 2 (Two) Times a Day. 5/9/24   Shanell Murray APRN   D3-1000 25 MCG (1000 UT) capsule TAKE 1 CAPSULE BY MOUTH EVERY DAY 10/20/22   Nick Patel DO   famotidine (PEPCID) 20 MG tablet Take 1 tablet by mouth every night at bedtime. 5/8/24   Shanell Murray APRN   fexofenadine (ALLEGRA) 180 MG tablet Take 1 tablet by mouth Daily. 9/23/24   Santino Baez MD   gabapentin (NEURONTIN) 300 MG capsule 1 capsule every morning and 2 capsule every evening. 11/24/24   Nick Patel DO   ipratropium-albuterol (DUO-NEB) 0.5-2.5 mg/3 ml nebulizer USE 3 ML VIA NEBULIZER FOUR TIMES DAILY AS NEEDED FOR WHEEZING 9/23/24   Santino Baez MD   losartan (COZAAR) 100 MG tablet Take 1 tablet by mouth Daily. 5/8/24   Nick Patel DO   metoprolol succinate XL (TOPROL-XL) 100 MG 24 hr tablet Take 1 tablet by mouth Daily. Hold if heartrate is <60. 5/22/24   Nick Patel DO   montelukast (SINGULAIR) 10 MG tablet Take 1 tablet by mouth Every Night. 9/23/24   Santino Baez MD   predniSONE (DELTASONE) 10 MG tablet 6 daily x 2 days, 5 daily x 2 days, 4 daily x 2 days, 3 daily x 2 days, 2 daily x 2 days, 1 daily x 2 days 12/4/24   Shanell Murray APRN   spironolactone (ALDACTONE) 25 MG tablet Take 1 tablet by mouth 3 (Three) Times a Week. Monday,Wednesday,Friday 7/3/24   Nick Patel DO        Social History:   Social History     Tobacco Use    Smoking status: Former     Current  "packs/day: 0.00     Average packs/day: 0.5 packs/day for 32.0 years (16.0 ttl pk-yrs)     Types: Cigarettes     Start date: 1963     Quit date: 1995     Years since quittin.9     Passive exposure: Never    Smokeless tobacco: Never   Vaping Use    Vaping status: Never Used   Substance Use Topics    Alcohol use: Never    Drug use: Never         Review of Systems:  Review of Systems   Constitutional:  Positive for fatigue. Negative for chills, diaphoresis and fever.   HENT:  Negative for congestion, postnasal drip, rhinorrhea and sore throat.    Eyes:  Negative for photophobia.   Respiratory:  Positive for cough, shortness of breath and wheezing. Negative for chest tightness.    Cardiovascular:  Positive for leg swelling. Negative for chest pain and palpitations.   Gastrointestinal:  Negative for abdominal pain, diarrhea, nausea and vomiting.   Genitourinary:  Negative for difficulty urinating, dysuria, flank pain, frequency, hematuria and urgency.   Musculoskeletal:  Negative for neck pain and neck stiffness.   Skin:  Negative for pallor and rash.   Neurological:  Negative for dizziness, syncope, weakness, numbness and headaches.   Hematological:  Negative for adenopathy. Does not bruise/bleed easily.   Psychiatric/Behavioral: Negative.          Physical Exam:  /86   Pulse 68   Temp 98.4 °F (36.9 °C) (Oral)   Resp 20   Ht 157.5 cm (62.01\")   Wt 99.7 kg (219 lb 12.8 oz)   SpO2 (!) 87%   BMI 40.19 kg/m²     Physical Exam  Vitals and nursing note reviewed.   Constitutional:       General: She is not in acute distress.     Appearance: Normal appearance. She is not ill-appearing, toxic-appearing or diaphoretic.   HENT:      Head: Normocephalic and atraumatic.      Mouth/Throat:      Mouth: Mucous membranes are moist.   Eyes:      Pupils: Pupils are equal, round, and reactive to light.   Cardiovascular:      Rate and Rhythm: Normal rate and regular rhythm.      Pulses: Normal pulses.           " Carotid pulses are 2+ on the right side and 2+ on the left side.       Radial pulses are 2+ on the right side and 2+ on the left side.        Femoral pulses are 2+ on the right side and 2+ on the left side.       Popliteal pulses are 2+ on the right side and 2+ on the left side.        Dorsalis pedis pulses are 2+ on the right side and 2+ on the left side.        Posterior tibial pulses are 2+ on the right side and 2+ on the left side.      Heart sounds: Normal heart sounds. No murmur heard.  Pulmonary:      Effort: Pulmonary effort is normal. No accessory muscle usage, respiratory distress or retractions.      Breath sounds: Examination of the right-upper field reveals decreased breath sounds and wheezing. Examination of the left-upper field reveals decreased breath sounds and wheezing. Examination of the right-middle field reveals decreased breath sounds and wheezing. Examination of the left-middle field reveals decreased breath sounds and wheezing. Examination of the right-lower field reveals decreased breath sounds and rales. Examination of the left-lower field reveals decreased breath sounds and rales. Decreased breath sounds, wheezing and rales present. No rhonchi.   Chest:      Chest wall: No mass or tenderness.   Abdominal:      General: Abdomen is flat. There is no distension.      Palpations: Abdomen is soft. There is no mass or pulsatile mass.      Tenderness: There is no abdominal tenderness. There is no right CVA tenderness, left CVA tenderness, guarding or rebound.      Comments: No rigidity   Musculoskeletal:         General: No swelling, tenderness or deformity.      Cervical back: Neck supple. No tenderness.      Right lower leg: No tenderness. Edema present.      Left lower leg: No tenderness. Edema present.   Skin:     General: Skin is warm and dry.      Capillary Refill: Capillary refill takes less than 2 seconds.      Coloration: Skin is not jaundiced or pale.      Findings: No erythema.    Neurological:      General: No focal deficit present.      Mental Status: She is alert and oriented to person, place, and time. Mental status is at baseline.      Cranial Nerves: Cranial nerves 2-12 are intact. No cranial nerve deficit.      Sensory: Sensation is intact. No sensory deficit.      Motor: Motor function is intact. No weakness or pronator drift.      Coordination: Coordination is intact. Coordination normal.   Psychiatric:         Mood and Affect: Mood normal.         Behavior: Behavior normal.                  Procedures:  Procedures      Medical Decision Making:      Comorbidities that affect care:    Asthma hypertension, chronic kidney disease, GERD, COPD, congestive heart failure, hyperlipidemia    External Notes reviewed:    None      The following orders were placed and all results were independently analyzed by me:  Orders Placed This Encounter   Procedures    COVID-19, FLU A/B, RSV PCR 1 HR TAT - Swab, Nasopharynx    Blood Culture - Blood,    Blood Culture - Blood,    XR Chest 1 View    [place] Draw    Comprehensive Metabolic Panel    BNP    Single High Sensitivity Troponin T    CBC Auto Differential    Lactic Acid, Plasma    STAT Lactic Acid, Reflex    NPO Diet NPO Type: Strict NPO    Undress & Gown    Continuous Pulse Oximetry    Vital Signs    Inpatient Hospitalist Consult    Oxygen Therapy- Nasal Cannula; Titrate 1-6 LPM Per SpO2; 90 - 95%    ECG 12 Lead ED Triage Standing Order; SOA    Insert Peripheral IV    CBC & Differential    Green Top (Gel)    Lavender Top    Gold Top - SST    Light Blue Top       Medications Given in the Emergency Department:  Medications   sodium chloride 0.9 % flush 10 mL (has no administration in time range)   meropenem (MERREM) 1,000 mg in sodium chloride 0.9 % 100 mL IVPB-VTB (has no administration in time range)   ipratropium-albuterol (DUO-NEB) nebulizer solution 3 mL (3 mL Nebulization Given 12/8/24 8615)        ED Course:    ED Course as of 12/08/24 1371    Sun Dec 08, 2024   1820 EKG:    Rhythm: Sinus rhythm  Rate: 84  Intervals: Normal AL and QT interval  T-wave: Nonspecific T wave flattening, probable T wave inversion III  ST Segment: Baseline artifact does obscure detail.  There may be some subtle nonspecific ST abnormalities but there is no pathological ST elevation and reciprocal ST depression to suggest STEMI    EKG Comparison: No change in the QRS and ST morphology from the EKG performed December 6, 2024    Interpreted by me   [SD]      ED Course User Index  [SD] Celso Washington DO       Labs:    Lab Results (last 24 hours)       Procedure Component Value Units Date/Time    CBC & Differential [[id number]]  (Abnormal) Collected: 12/08/24 1746    Specimen: Blood Updated: 12/08/24 1753    Narrative:      The following orders were created for panel order CBC & Differential.  Procedure                               Abnormality         Status                     ---------                               -----------         ------                     CBC Auto Differential[[id number]]        Abnormal            Final result                 Please view results for these tests on the individual orders.    Comprehensive Metabolic Panel [[id number]]  (Abnormal) Collected: 12/08/24 1746    Specimen: Blood Updated: 12/08/24 1813     Glucose 143 mg/dL      BUN 27 mg/dL      Creatinine 1.10 mg/dL      Sodium 139 mmol/L      Potassium 3.9 mmol/L      Chloride 104 mmol/L      CO2 17.5 mmol/L      Calcium 9.3 mg/dL      Total Protein 7.2 g/dL      Albumin 4.2 g/dL      ALT (SGPT) 13 U/L      AST (SGOT) 27 U/L      Alkaline Phosphatase 83 U/L      Total Bilirubin 0.5 mg/dL      Globulin 3.0 gm/dL      A/G Ratio 1.4 g/dL      BUN/Creatinine Ratio 24.5     Anion Gap 17.5 mmol/L      eGFR 51.2 mL/min/1.73     Narrative:      GFR Normal >60  Chronic Kidney Disease <60  Kidney Failure <15    The GFR formula is only valid for adults with stable renal function between ages 18 and 70.    BNP  [[id number]]  (Normal) Collected: 12/08/24 1746    Specimen: Blood Updated: 12/08/24 1811     proBNP 349.5 pg/mL     Narrative:      This assay is used as an aid in the diagnosis of individuals suspected of having heart failure. It can be used as an aid in the diagnosis of acute decompensated heart failure (ADHF) in patients presenting with signs and symptoms of ADHF to the emergency department (ED). In addition, NT-proBNP of <300 pg/mL indicates ADHF is not likely.    Age Range Result Interpretation  NT-proBNP Concentration (pg/mL:      <50             Positive            >450                   Gray                 300-450                    Negative             <300    50-75           Positive            >900                  Gray                300-900                  Negative            <300      >75             Positive            >1800                  Gray                300-1800                  Negative            <300    Single High Sensitivity Troponin T [[id number]]  (Abnormal) Collected: 12/08/24 1746    Specimen: Blood Updated: 12/08/24 1813     HS Troponin T 15 ng/L     Narrative:      High Sensitive Troponin T Reference Range:  <14.0 ng/L- Negative Female for AMI  <22.0 ng/L- Negative Male for AMI  >=14 - Abnormal Female indicating possible myocardial injury.  >=22 - Abnormal Male indicating possible myocardial injury.   Clinicians would have to utilize clinical acumen, EKG, Troponin, and serial changes to determine if it is an Acute Myocardial Infarction or myocardial injury due to an underlying chronic condition.         CBC Auto Differential [[id number]]  (Abnormal) Collected: 12/08/24 1746    Specimen: Blood Updated: 12/08/24 1753     WBC 14.96 10*3/mm3      RBC 4.62 10*6/mm3      Hemoglobin 12.7 g/dL      Hematocrit 40.3 %      MCV 87.2 fL      MCH 27.5 pg      MCHC 31.5 g/dL      RDW 15.4 %      RDW-SD 49.3 fl      MPV 12.3 fL      Platelets 257 10*3/mm3      Neutrophil % 81.4 %      Lymphocyte %  14.0 %      Monocyte % 3.7 %      Eosinophil % 0.0 %      Basophil % 0.1 %      Immature Grans % 0.8 %      Neutrophils, Absolute 12.17 10*3/mm3      Lymphocytes, Absolute 2.10 10*3/mm3      Monocytes, Absolute 0.55 10*3/mm3      Eosinophils, Absolute 0.00 10*3/mm3      Basophils, Absolute 0.02 10*3/mm3      Immature Grans, Absolute 0.12 10*3/mm3      nRBC 0.0 /100 WBC     Lactic Acid, Plasma [[id number]]  (Abnormal) Collected: 12/08/24 1746    Specimen: Blood Updated: 12/08/24 1822     Lactate 3.6 mmol/L     COVID-19, FLU A/B, RSV PCR 1 HR TAT - Swab, Nasopharynx [[id number]]  (Normal) Collected: 12/08/24 1845    Specimen: Swab from Nasopharynx Updated: 12/08/24 1935     COVID19 Not Detected     Influenza A PCR Not Detected     Influenza B PCR Not Detected     RSV, PCR Not Detected    Narrative:      Fact sheet for providers: https://www.fda.gov/media/772636/download    Fact sheet for patients: https://www.fda.gov/media/795892/download    Test performed by PCR.             Imaging:    XR Chest 1 View    Result Date: 12/8/2024  XR CHEST 1 VW Date of Exam: 12/8/2024 6:02 PM EST Indication: SOA Triage Protocol Comparison: Chest radiograph dated 12/6/2024 Findings: The cardiomediastinal silhouette is within normal limits. There is aortic arch atherosclerotic calcification. Pulmonary vascularity appears normal. There is bibasilar airspace opacity representing atelectasis or pneumonia. There is no pleural effusion or  pneumothorax. There are degenerative changes of the thoracic spine.     Impression: Bibasilar airspace opacities representing atelectasis or pneumonia. Electronically Signed: Dakota Manzanareselor  12/8/2024 6:30 PM EST  Workstation ID: [id number]       Differential Diagnosis and Discussion:    Dyspnea: Differential diagnosis includes but is not limited to metabolic acidosis, neurological disorders, psychogenic, asthma, pneumothorax, upper airway obstruction, COPD, pneumonia, noncardiogenic pulmonary edema,  interstitial lung disease, anemia, congestive heart failure, and pulmonary embolism    All labs were reviewed and interpreted by me.  All X-rays impressions were independently interpreted by me.  EKG was interpreted by me.    MDM  Number of Diagnoses or Management Options  COPD exacerbation  Lactic acidosis  Multifocal pneumonia  Diagnosis management comments: Sepsis was not present in the emergency department or on arrival. This is supported as the patient does not either meet two out of the four SIRS criteria or has an obvious bacterial infection.      SIRS criteria considered:   1.                     Temperature > 100.4 or <98.6    2.                     Heart Rate > 90    3.                     Respiratory Rate > 22    4.                     WBC > 12K or <4K.    The patient's CBC was reviewed and shows no abnormalities of critical concern.  Of note, there is no anemia requiring a blood transfusion and the platelet count is acceptable    The patient's CMP was reviewed and shows no abnormalities of critical concern.  Of note, the patient's sodium and potassium are acceptable.  The patient's liver enzymes are unremarkable.  The patient's renal function including creatinine is preserved.  The patient has a normal anion gap.    The patient's EKG demonstrated a normal sinus rhythm.  The EKG demonstrated no evidence of dysrhythmia.  The patient had no acute ST abnormalities or acute T wave abnormalities to indicate STEMI or other acute cardiac pathology, injury or ischemia    The patient had a normal proBNP.  This makes acute decompensated heart failure unlikely    Patient's lactate is elevated 3.6    The patient's Covid swab was negative   The patient's Influenza swab was negative     Blood cultures have been obtained and pending at the time of disposition    This x-ray suggest multifocal pneumonia.    The patient is allergic to penicillin and cephalosporins.  The patient has been on Zithromax.  At this point I will  start meropenem for the pneumonia.  The patient was given 3 DuoNebs.    At the time of admission, the patient is resting comfortably.  She is not in any acute distress.  However she continues to have decreased breath sounds and some wheezing.  She has no tachypnea, accessory muscle use or intercostal retractions.  I do feel the patient warrants admission for failure of outpatient therapy.  Room air pulse ox is 91 to 92% at rest on room air       Amount and/or Complexity of Data Reviewed  Clinical lab tests: reviewed  Tests in the radiology section of CPT®: reviewed  Tests in the medicine section of CPT®: reviewed  Decide to obtain previous medical records or to obtain history from someone other than the patient: yes  Discuss the patient with other providers: yes (19:39 EST  I discussed the case with the hospitalist, Dr. Wood.  We have discussed the patient's presenting symptoms, laboratory values, imaging and condition at the time of admission.  They will evaluate the patient in the emergency room and admit the patient to the hospital)             Social Determinants of Health:    Patient is independent, reliable, and has access to care.       Disposition and Care Coordination:            Final diagnoses:   COPD exacerbation   Multifocal pneumonia   Lactic acidosis        ED Disposition       ED Disposition   Decision to Admit    Condition   --    Comment   --               This medical record created using voice recognition software.             Celso Washington DO  12/08/24 1942

## 2024-12-09 LAB
ALBUMIN SERPL-MCNC: 4 G/DL (ref 3.5–5.2)
ALBUMIN/GLOB SERPL: 1.5 G/DL
ALP SERPL-CCNC: 77 U/L (ref 39–117)
ALT SERPL W P-5'-P-CCNC: 13 U/L (ref 1–33)
ANION GAP SERPL CALCULATED.3IONS-SCNC: 12.2 MMOL/L (ref 5–15)
AST SERPL-CCNC: 26 U/L (ref 1–32)
BASOPHILS # BLD AUTO: 0.01 10*3/MM3 (ref 0–0.2)
BASOPHILS NFR BLD AUTO: 0.1 % (ref 0–1.5)
BILIRUB SERPL-MCNC: 0.5 MG/DL (ref 0–1.2)
BUN SERPL-MCNC: 29 MG/DL (ref 8–23)
BUN/CREAT SERPL: 27.9 (ref 7–25)
CALCIUM SPEC-SCNC: 9 MG/DL (ref 8.6–10.5)
CHLORIDE SERPL-SCNC: 109 MMOL/L (ref 98–107)
CO2 SERPL-SCNC: 19.8 MMOL/L (ref 22–29)
CREAT SERPL-MCNC: 1.04 MG/DL (ref 0.57–1)
DEPRECATED RDW RBC AUTO: 49.6 FL (ref 37–54)
EGFRCR SERPLBLD CKD-EPI 2021: 54.8 ML/MIN/1.73
EOSINOPHIL # BLD AUTO: 0 10*3/MM3 (ref 0–0.4)
EOSINOPHIL NFR BLD AUTO: 0 % (ref 0.3–6.2)
ERYTHROCYTE [DISTWIDTH] IN BLOOD BY AUTOMATED COUNT: 15.3 % (ref 12.3–15.4)
GLOBULIN UR ELPH-MCNC: 2.6 GM/DL
GLUCOSE SERPL-MCNC: 142 MG/DL (ref 65–99)
HCT VFR BLD AUTO: 40.5 % (ref 34–46.6)
HGB BLD-MCNC: 12.6 G/DL (ref 12–15.9)
IMM GRANULOCYTES # BLD AUTO: 0.09 10*3/MM3 (ref 0–0.05)
IMM GRANULOCYTES NFR BLD AUTO: 0.8 % (ref 0–0.5)
L PNEUMO1 AG UR QL IA: NEGATIVE
LYMPHOCYTES # BLD AUTO: 1.69 10*3/MM3 (ref 0.7–3.1)
LYMPHOCYTES NFR BLD AUTO: 14.3 % (ref 19.6–45.3)
MCH RBC QN AUTO: 27.6 PG (ref 26.6–33)
MCHC RBC AUTO-ENTMCNC: 31.1 G/DL (ref 31.5–35.7)
MCV RBC AUTO: 88.6 FL (ref 79–97)
MONOCYTES # BLD AUTO: 0.25 10*3/MM3 (ref 0.1–0.9)
MONOCYTES NFR BLD AUTO: 2.1 % (ref 5–12)
NEUTROPHILS NFR BLD AUTO: 82.7 % (ref 42.7–76)
NEUTROPHILS NFR BLD AUTO: 9.81 10*3/MM3 (ref 1.7–7)
NRBC BLD AUTO-RTO: 0 /100 WBC (ref 0–0.2)
PLATELET # BLD AUTO: 248 10*3/MM3 (ref 140–450)
PMV BLD AUTO: 12.3 FL (ref 6–12)
POTASSIUM SERPL-SCNC: 4.3 MMOL/L (ref 3.5–5.2)
PROCALCITONIN SERPL-MCNC: 0.07 NG/ML (ref 0–0.25)
PROT SERPL-MCNC: 6.6 G/DL (ref 6–8.5)
RBC # BLD AUTO: 4.57 10*6/MM3 (ref 3.77–5.28)
S PNEUM AG SPEC QL LA: NEGATIVE
SODIUM SERPL-SCNC: 141 MMOL/L (ref 136–145)
WBC NRBC COR # BLD AUTO: 11.85 10*3/MM3 (ref 3.4–10.8)

## 2024-12-09 PROCEDURE — 97161 PT EVAL LOW COMPLEX 20 MIN: CPT

## 2024-12-09 PROCEDURE — 87899 AGENT NOS ASSAY W/OPTIC: CPT | Performed by: INTERNAL MEDICINE

## 2024-12-09 PROCEDURE — 85025 COMPLETE CBC W/AUTO DIFF WBC: CPT | Performed by: FAMILY MEDICINE

## 2024-12-09 PROCEDURE — 94799 UNLISTED PULMONARY SVC/PX: CPT

## 2024-12-09 PROCEDURE — 87070 CULTURE OTHR SPECIMN AEROBIC: CPT | Performed by: INTERNAL MEDICINE

## 2024-12-09 PROCEDURE — 87205 SMEAR GRAM STAIN: CPT | Performed by: INTERNAL MEDICINE

## 2024-12-09 PROCEDURE — 99232 SBSQ HOSP IP/OBS MODERATE 35: CPT | Performed by: INTERNAL MEDICINE

## 2024-12-09 PROCEDURE — 25010000002 ENOXAPARIN PER 10 MG: Performed by: FAMILY MEDICINE

## 2024-12-09 PROCEDURE — 84145 PROCALCITONIN (PCT): CPT | Performed by: INTERNAL MEDICINE

## 2024-12-09 PROCEDURE — 25010000002 METHYLPREDNISOLONE PER 125 MG: Performed by: INTERNAL MEDICINE

## 2024-12-09 PROCEDURE — 87449 NOS EACH ORGANISM AG IA: CPT | Performed by: INTERNAL MEDICINE

## 2024-12-09 PROCEDURE — 80053 COMPREHEN METABOLIC PANEL: CPT | Performed by: FAMILY MEDICINE

## 2024-12-09 RX ORDER — ALBUTEROL SULFATE 0.83 MG/ML
2.5 SOLUTION RESPIRATORY (INHALATION) EVERY 4 HOURS PRN
Status: DISCONTINUED | OUTPATIENT
Start: 2024-12-09 | End: 2024-12-11 | Stop reason: HOSPADM

## 2024-12-09 RX ORDER — LOSARTAN POTASSIUM 50 MG/1
100 TABLET ORAL DAILY
Status: DISCONTINUED | OUTPATIENT
Start: 2024-12-09 | End: 2024-12-11 | Stop reason: HOSPADM

## 2024-12-09 RX ORDER — IPRATROPIUM BROMIDE AND ALBUTEROL SULFATE 2.5; .5 MG/3ML; MG/3ML
3 SOLUTION RESPIRATORY (INHALATION) EVERY 6 HOURS PRN
Status: DISCONTINUED | OUTPATIENT
Start: 2024-12-09 | End: 2024-12-11 | Stop reason: HOSPADM

## 2024-12-09 RX ORDER — GABAPENTIN 300 MG/1
600 CAPSULE ORAL NIGHTLY
Status: DISCONTINUED | OUTPATIENT
Start: 2024-12-09 | End: 2024-12-11 | Stop reason: HOSPADM

## 2024-12-09 RX ORDER — METHYLPREDNISOLONE SODIUM SUCCINATE 125 MG/2ML
60 INJECTION, POWDER, LYOPHILIZED, FOR SOLUTION INTRAMUSCULAR; INTRAVENOUS EVERY 12 HOURS
Status: DISCONTINUED | OUTPATIENT
Start: 2024-12-09 | End: 2024-12-10

## 2024-12-09 RX ORDER — CETIRIZINE HYDROCHLORIDE 10 MG/1
10 TABLET ORAL DAILY
Status: DISCONTINUED | OUTPATIENT
Start: 2024-12-09 | End: 2024-12-11 | Stop reason: HOSPADM

## 2024-12-09 RX ORDER — GABAPENTIN 300 MG/1
600 CAPSULE ORAL NIGHTLY
COMMUNITY

## 2024-12-09 RX ORDER — BUDESONIDE 0.5 MG/2ML
0.5 INHALANT ORAL
Status: DISCONTINUED | OUTPATIENT
Start: 2024-12-09 | End: 2024-12-11 | Stop reason: HOSPADM

## 2024-12-09 RX ORDER — AZELASTINE 1 MG/ML
2 SPRAY, METERED NASAL 2 TIMES DAILY
Status: DISCONTINUED | OUTPATIENT
Start: 2024-12-09 | End: 2024-12-09

## 2024-12-09 RX ORDER — ARFORMOTEROL TARTRATE 15 UG/2ML
15 SOLUTION RESPIRATORY (INHALATION)
Status: DISCONTINUED | OUTPATIENT
Start: 2024-12-09 | End: 2024-12-11 | Stop reason: HOSPADM

## 2024-12-09 RX ORDER — AZELASTINE 1 MG/ML
2 SPRAY, METERED NASAL 2 TIMES DAILY
Status: DISCONTINUED | OUTPATIENT
Start: 2024-12-10 | End: 2024-12-11 | Stop reason: HOSPADM

## 2024-12-09 RX ORDER — LEVOFLOXACIN 750 MG/1
750 TABLET, FILM COATED ORAL EVERY 24 HOURS
Status: DISCONTINUED | OUTPATIENT
Start: 2024-12-09 | End: 2024-12-10

## 2024-12-09 RX ORDER — MONTELUKAST SODIUM 10 MG/1
10 TABLET ORAL NIGHTLY
Status: DISCONTINUED | OUTPATIENT
Start: 2024-12-09 | End: 2024-12-11 | Stop reason: HOSPADM

## 2024-12-09 RX ORDER — METOPROLOL SUCCINATE 50 MG/1
100 TABLET, EXTENDED RELEASE ORAL DAILY
Status: DISCONTINUED | OUTPATIENT
Start: 2024-12-09 | End: 2024-12-11 | Stop reason: HOSPADM

## 2024-12-09 RX ORDER — IPRATROPIUM BROMIDE AND ALBUTEROL SULFATE 2.5; .5 MG/3ML; MG/3ML
3 SOLUTION RESPIRATORY (INHALATION)
Status: DISCONTINUED | OUTPATIENT
Start: 2024-12-09 | End: 2024-12-11 | Stop reason: HOSPADM

## 2024-12-09 RX ORDER — ALBUTEROL SULFATE 1.25 MG/3ML
1 SOLUTION RESPIRATORY (INHALATION) EVERY 6 HOURS PRN
COMMUNITY
End: 2024-12-11 | Stop reason: HOSPADM

## 2024-12-09 RX ORDER — GABAPENTIN 300 MG/1
300 CAPSULE ORAL DAILY
Status: DISCONTINUED | OUTPATIENT
Start: 2024-12-09 | End: 2024-12-11 | Stop reason: HOSPADM

## 2024-12-09 RX ORDER — FAMOTIDINE 20 MG/1
20 TABLET, FILM COATED ORAL NIGHTLY
Status: DISCONTINUED | OUTPATIENT
Start: 2024-12-09 | End: 2024-12-11 | Stop reason: HOSPADM

## 2024-12-09 RX ADMIN — ENOXAPARIN SODIUM 40 MG: 100 INJECTION SUBCUTANEOUS at 08:43

## 2024-12-09 RX ADMIN — CETIRIZINE HYDROCHLORIDE 10 MG: 10 TABLET, FILM COATED ORAL at 09:59

## 2024-12-09 RX ADMIN — METHYLPREDNISOLONE SODIUM SUCCINATE 60 MG: 125 INJECTION, POWDER, FOR SOLUTION INTRAMUSCULAR; INTRAVENOUS at 09:59

## 2024-12-09 RX ADMIN — BUDESONIDE 0.5 MG: 0.5 INHALANT ORAL at 09:56

## 2024-12-09 RX ADMIN — ARFORMOTEROL TARTRATE 15 MCG: 15 SOLUTION RESPIRATORY (INHALATION) at 09:56

## 2024-12-09 RX ADMIN — GABAPENTIN 600 MG: 300 CAPSULE ORAL at 20:35

## 2024-12-09 RX ADMIN — LEVOFLOXACIN 750 MG: 750 TABLET, FILM COATED ORAL at 10:54

## 2024-12-09 RX ADMIN — Medication 10 ML: at 08:43

## 2024-12-09 RX ADMIN — BUDESONIDE 0.5 MG: 0.5 INHALANT ORAL at 18:31

## 2024-12-09 RX ADMIN — FAMOTIDINE 20 MG: 20 TABLET ORAL at 20:35

## 2024-12-09 RX ADMIN — MONTELUKAST 10 MG: 10 TABLET, FILM COATED ORAL at 20:35

## 2024-12-09 RX ADMIN — IPRATROPIUM BROMIDE AND ALBUTEROL SULFATE 3 ML: .5; 3 SOLUTION RESPIRATORY (INHALATION) at 21:59

## 2024-12-09 RX ADMIN — GABAPENTIN 300 MG: 300 CAPSULE ORAL at 09:58

## 2024-12-09 RX ADMIN — IPRATROPIUM BROMIDE AND ALBUTEROL SULFATE 3 ML: .5; 3 SOLUTION RESPIRATORY (INHALATION) at 12:04

## 2024-12-09 RX ADMIN — METOPROLOL SUCCINATE 100 MG: 50 TABLET, EXTENDED RELEASE ORAL at 09:59

## 2024-12-09 RX ADMIN — ARFORMOTEROL TARTRATE 15 MCG: 15 SOLUTION RESPIRATORY (INHALATION) at 18:31

## 2024-12-09 RX ADMIN — METHYLPREDNISOLONE SODIUM SUCCINATE 60 MG: 125 INJECTION, POWDER, FOR SOLUTION INTRAMUSCULAR; INTRAVENOUS at 20:35

## 2024-12-09 RX ADMIN — Medication 10 ML: at 20:38

## 2024-12-09 RX ADMIN — LOSARTAN POTASSIUM 100 MG: 50 TABLET, FILM COATED ORAL at 09:59

## 2024-12-09 RX ADMIN — ENOXAPARIN SODIUM 40 MG: 100 INJECTION SUBCUTANEOUS at 20:34

## 2024-12-09 RX ADMIN — IPRATROPIUM BROMIDE AND ALBUTEROL SULFATE 3 ML: .5; 3 SOLUTION RESPIRATORY (INHALATION) at 18:32

## 2024-12-09 NOTE — PLAN OF CARE
Goal Outcome Evaluation:  Plan of Care Reviewed With: patient        Progress: no change  Outcome Evaluation: Pt presents with no physical limitations that impede her ability to return home independently or with assist from family as needed. Pt was encouraged to continue ambulating as tolerated while here at the hospital. She will be discharged from PT caseload.    Anticipated Discharge Disposition (PT): home, home with outpatient therapy services (pu;[facility] rehab)

## 2024-12-09 NOTE — PROGRESS NOTES
[facility]   Hospitalist Progress Note  Date: 2024  Patient Name: Julia Rios  : 1945  MRN: [id number]  Date of admission: 2024      Subjective   Subjective     Chief Complaint: Shortness of breath, cough    Summary: 79 y.o. year old female with past medical history of hypertension, hyperlipidemia CKD 3, GERD, CHF with preserved EF, and COPD who presents to the emergency department for evaluation of shortness of breath. The patient notes that she is chronically short of breath from asthma/COPD. The patient states that she started having occultly breathing about 3 days ago. Patient states that she felt it was a COPD exacerbation. She did discuss this with her pulmonologist (Dr. Baez) by phone who put her on Zithromax and a steroid taper. She was on 60 mg for 2 days and then 50 mg for 2 days. She is currently on day 4. She is using her nebulizer as requested. Continues to have worsening shortness of breath. She also complains of worsening cough. She denies any improvement with the medications.  She was found to have evidence of pneumonia and was admitted for further care.  She was started on Levaquin due to failure of azithromycin and allergy profile.  Started on IV steroids, frequent bronchodilators.    Interval Followup: No acute events overnight.  She states she already feels a bit better today than she did yesterday.  Still gets short of breath easily and gets especially winded with exertion.  Still having intermittent wheezing.    Objective   Objective     Vitals:   Temp:  [97.6 °F (36.4 °C)-98.6 °F (37 °C)] 97.9 °F (36.6 °C)  Heart Rate:  [57-79] 66  Resp:  [18-24] 18  BP: (144-175)/(65-95) 166/65  Physical Exam    Constitutional: Awake, alert, no acute distress   Respiratory: Expiratory wheezing noted in bilateral bases, nonlabored respirations    Cardiovascular: RRR, no MRG   Gastrointestinal: Positive bowel sounds, soft, nontender, nondistended   Neurologic: Oriented x 3, strength  symmetric in all extremities, Cranial Nerves grossly intact to confrontation, speech clear    Result Review    I have personally reviewed the results below:  [x]  Laboratory personally reviewed CMP, CBC, procalcitonin  []  Microbiology  []  Radiology  []  EKG/Telemetry   []  Cardiology/Vascular   []  Pathology  []  Old records  []  Other:  CBC          12/6/2024    15:21 12/8/2024    17:46 12/8/2024    20:59 12/9/2024    05:01   CBC   WBC 15.82  14.96  11.40  11.85    RBC 4.39  4.62  4.36  4.57    Hemoglobin 12.2  12.7  11.9  12.6    Hematocrit 38.2  40.3  38.4  40.5    MCV 87.0  87.2  88.1  88.6    MCH 27.8  27.5  27.3  27.6    MCHC 31.9  31.5  31.0  31.1    RDW 14.9  15.4  15.4  15.3    Platelets 243  257  227  248      CMP          12/6/2024    15:21 12/8/2024    17:46 12/8/2024    20:59 12/9/2024    05:01   CMP   Glucose 172  143  169  142    BUN 21  27  30  29    Creatinine 1.14  1.10  1.08  1.04    EGFR 49.1  51.2  52.4  54.8    Sodium 139  139  141  141    Potassium 3.9  3.9  4.2  4.3    Chloride 105  104  109  109    Calcium 9.5  9.3  8.9  9.0    Total Protein 7.0  7.2  6.5  6.6    Albumin 4.1  4.2  3.9  4.0    Globulin 2.9  3.0  2.6  2.6    Total Bilirubin 0.4  0.5  0.4  0.5    Alkaline Phosphatase 89  83  77  77    AST (SGOT) 17  27  25  26    ALT (SGPT) 9  13  11  13    Albumin/Globulin Ratio 1.4  1.4  1.5  1.5    BUN/Creatinine Ratio 18.4  24.5  27.8  27.9    Anion Gap 15.7  17.5  12.0  12.2        Assessment & Plan   Assessment / Plan   Community-acquired pneumonia due to unknown bacterial source, presumed gram-negative  COPD with acute exacerbation  Hypertension  Chronic diastolic congestive heart failure  CKD stage III  Chronic diastolic congestive heart failure  History of CARMEN  Seasonal allergic rhinitis    Continue to monitor in the hospital for workup and management of the above  Transition antibiotics to Levaquin 750 mg daily, will treat for minimum of 5 days  Start IV Solu-Medrol 60 mg every 12  hours, likely transition to prednisone tomorrow  Start scheduled DuoNebs, Pulmicort and Brovana twice daily, albuterol as needed  Obtain procalcitonin, strep and Legionella antigen  If worsening, will consider pulmonology consult but she does states she is improving so we will hold off at this time  Restart appropriate home medications including gabapentin and metoprolol  Trend renal function and electrolytes with a.m. BMP, magnesium   Trend Hgb and WBC with a.m. CBC     Discussed plan with RN    VTE Prophylaxis:  Pharmacologic VTE prophylaxis orders are present.        CODE STATUS:   Level Of Support Discussed With: Patient  Code Status (Patient has no pulse and is not breathing): CPR (Attempt to Resuscitate)  Medical Interventions (Patient has pulse or is breathing): Full Support

## 2024-12-09 NOTE — PROGRESS NOTES
Respiratory Therapist Broncho-Pulmonary Hygiene Progress Note      Patient Name:  Julia Rios  YOB: 1945    Julia Rios meets the qualification for Level 2 of the Bronco-Pulmonary Hygiene Protocol. This was based on my daily patient assessment and includes review of chest x-ray results, cough ability and quality, oxygenation, secretions or risk for secretion development and patient mobility.     Broncho-Pulmonary Hygiene Assessment:    Level of Movement: Actively changing positions without assistance  Alert/ oriented/ cooperative    Breath Sounds: Clear to slightly diminished    Cough: Strong, effective    Chest X-Ray: Presence of atelectasis and/or consolidation    Sputum Productions: None or small amount of thin or watery secretions with effective cough    History and Physical: Chronic condition    SpO2 to Oxygen Need: greater than 92% on room air or  less than 3L nasal canula    Current SpO2 is: 93 on room air    Based on this information, I have completed the following interventions: Aerobika with bronchodialtor medication or TID      Electronically signed by Martha Vargas RRT, 12/09/24, 11:40 AM EST.

## 2024-12-09 NOTE — PLAN OF CARE
Goal Outcome Evaluation:  Plan of Care Reviewed With: patient        Progress: no change  Outcome Evaluation: A/ox4, ambulating independently to the bathroom but does feel SOB and begins coughing, no significant changes this shift, plan of care on going

## 2024-12-09 NOTE — PLAN OF CARE
Problem: Adult Inpatient Plan of Care  Goal: Plan of Care Review  12/9/2024 1720 by Gerardo Tovar, RN  Outcome: Progressing  Flowsheets  Taken 12/9/2024 1720  Progress: no change  Taken 12/9/2024 1717  Outcome Evaluation: A/ox4, ambulating independently to the bathroom but does feel SOB and begins coughing, no significant changes this shift, plan of care on going  Plan of Care Reviewed With: patient  12/9/2024 1717 by Gerardo Tovar, RN  Outcome: Progressing  Flowsheets (Taken 12/9/2024 1717)  Progress: no change  Outcome Evaluation: A/ox4, ambulating independently to the bathroom but does feel SOB and begins coughing, no significant changes this shift, plan of care on going  Plan of Care Reviewed With: patient   Goal Outcome Evaluation:  Plan of Care Reviewed With: patient        Progress: no change  Outcome Evaluation: A/ox4, ambulating independently to the bathroom but does feel SOB and begins coughing, no significant changes this shift, plan of care on going

## 2024-12-09 NOTE — THERAPY EVALUATION
Acute Care - Physical Therapy Initial Evaluation  [facility]     Patient Name: Julia Rios  : 1945  MRN: [id number]  Today's Date: 2024      Visit Dx:     ICD-10-CM ICD-9-CM   1. COPD exacerbation  J44.1 491.21   2. Multifocal pneumonia  J18.9 486   3. Lactic acidosis  E87.20 276.2   4. Difficulty walking  R26.2 719.7     Patient Active Problem List   Diagnosis    Family history of colon cancer    Chronic obstructive pulmonary disease    Essential hypertension    Hyperlipidemia    CKD (chronic kidney disease) stage 3, GFR 30-59 ml/min    Chronic heart failure with preserved ejection fraction    CARMEN (obstructive sleep apnea)    Seasonal allergies    Gastroesophageal reflux disease    Bronchitis, mucopurulent recurrent    Tobacco abuse, in remission    Pulmonary nodule    PND (post-nasal drip)    Status post fall    Allergic rhinitis    Asthma    Closed fracture of metatarsal bone    Hemoptysis    Idiopathic chronic gout of multiple sites without tophus    Primary localized osteoarthritis    Vitamin D deficiency    Morbid (severe) obesity due to excess calories    Umbilical hernia without obstruction and without gangrene    Oral thrush    Arthritis of knee    Pre-diabetes    COPD (chronic obstructive pulmonary disease)    Pneumonia     Past Medical History:   Diagnosis Date    Acute right-sided low back pain with right-sided sciatica 01/15/2018    Allergic rhinitis     Asthma     Asthma, extrinsic     Asthma, intrinsic     CHF (congestive heart failure)     Chronic heart failure with preserved ejection fraction     20 echocardiogram: 1.  Normal ejection fraction of 55%. 2.  Mild left ventricular hypertrophy. 3.  No significant valvular heart issues.     CKD stage 3 10/29/2019    COPD (chronic obstructive pulmonary disease)     Diverticulitis     Emphysema of lung     Essential hypertension     GERD (gastroesophageal reflux disease)     Hemoptysis     non cancerous    History of fungal pneumonia  12/02/2021    Hyperlipidemia     Idiopathic chronic gout of multiple sites without tophus 05/20/2016    Pneumonia     Primary osteoarthritis of right knee 01/16/2017    Sleep apnea     CPAP    Sleep apnea, obstructive     Vitamin D deficiency 01/13/2016     Past Surgical History:   Procedure Laterality Date    BRONCHOSCOPY N/A 4/30/2024    Procedure: BRONCHOSCOPY WITH BRONCHOALVEOLAR LAVAGE, WASHING, AIRWAY INSPECTION;  Surgeon: Santino Baez MD;  Location: [facility] ENDOSCOPY;  Service: Pulmonary;  Laterality: N/A;  MUCOUS PLUGGING, BRONCHITIS    CATARACT EXTRACTION Right     COLONOSCOPY  2014    COLONOSCOPY N/A 10/12/2021    Procedure: COLONOSCOPY;  Surgeon: Jorge Diane MD;  Location: [facility] ENDOSCOPY;  Service: Gastroenterology;  Laterality: N/A;  DIVERTICULOSIS    ENDOSCOPY  2015    EYE SURGERY      cataract right eye    OTHER SURGICAL HISTORY      Fatty tumor removed off back    RETINAL DETACHMENT REPAIR Right     hole in retina repair    TOTAL KNEE ARTHROPLASTY Right 10/31/2023    Procedure: TOTAL KNEE ARTHROPLASTY;  Surgeon: Celso Prakash MD;  Location[facility];  Service: Orthopedics;  Laterality: Right;     PT Assessment (Last 12 Hours)       PT Evaluation and Treatment       Row Name 12/09/24 1100          Physical Therapy Time and Intention    Subjective Information dyspnea  -CS     Document Type evaluation  -CS     Mode of Treatment individual therapy;physical therapy  -CS     Patient Effort good  -CS     Symptoms Noted During/After Treatment shortness of breath  -CS       Row Name 12/09/24 1100          General Information    Patient Profile Reviewed yes  -CS     Patient Observations alert;cooperative;agree to therapy  -CS     Prior Level of Function independent:;all household mobility;gait;transfer;bed mobility;ADL's  -CS     Equipment Currently Used at Home none  -CS     Existing Precautions/Restrictions no known precautions/restrictions  -CS     Barriers to Rehab none identified   -CS       Row Name 12/09/24 1100          Living Environment    Current Living Arrangements home  -CS     Home Accessibility stairs to enter home;stairs within home  -CS     People in Home spouse  -CS     Primary Care Provided by self  -CS       Row Name 12/09/24 1100          Home Main Entrance    Number of Stairs, Main Entrance three  -CS     Stair Railings, Main Entrance railings safe and in good condition  -CS       Row Name 12/09/24 1100          Stairs Within Home, Primary    Number of Stairs, Within Home, Primary none  -CS       Row Name 12/09/24 1100          Pain    Pretreatment Pain Rating 0/10 - no pain  -CS     Posttreatment Pain Rating 0/10 - no pain  -CS       Row Name 12/09/24 1100          Cognition    Orientation Status (Cognition) oriented x 3  -CS       Row Name 12/09/24 1100          Range of Motion Comprehensive    General Range of Motion bilateral lower extremity ROM WFL  -CS       Row Name 12/09/24 1100          Strength Comprehensive (MMT)    General Manual Muscle Testing (MMT) Assessment no strength deficits identified  -CS       Row Name 12/09/24 1100          Bed Mobility    Bed Mobility bed mobility (all) activities  -CS     All Activities, [place] (Bed Mobility) independent  -CS       Row Name 12/09/24 1100          Transfers    Comment, (Transfers) Pt completed all functional transfers independently without assistive device.  -       Row Name 12/09/24 1100          Gait/Stairs (Locomotion)    Gait/Stairs Locomotion gait/ambulation independence  -CS     [place] Level (Gait) independent  -CS     Assistive Device (Gait) other (see comments)  no AD  -CS     Patient was able to Ambulate yes  -CS     Distance in Feet (Gait) 20  -CS     Pattern (Gait) step-through  -CS       Row Name 12/09/24 1100          Safety Issues/Impairments Affecting Functional Mobility    Impairments Affecting Function (Mobility) shortness of breath  -       Row Name 12/09/24 1100          Balance     Balance Assessment standing dynamic balance  -CS     Dynamic Standing Balance independent  -CS     Position/Device Used, Standing Balance unsupported  -CS       Row Name 12/09/24 1100          Plan of Care Review    Plan of Care Reviewed With patient  -CS     Progress no change  -CS     Outcome Evaluation Pt presents with no physical limitations that impede her ability to return home independently or with assist from family as needed. Pt was encouraged to continue ambulating as tolerated while here at the hospital. She will be discharged from PT caseload.  -CS       Row Name 12/09/24 1100          Positioning and Restraints    Pre-Treatment Position standing in room  -CS     Post Treatment Position bed  -CS     In Bed fowlers;call light within reach;encouraged to call for assist  -CS       Row Name 12/09/24 1100          Therapy Assessment/Plan (PT)    Criteria for Skilled Interventions Met (PT) no problems identified which require skilled intervention  -CS     Therapy Frequency (PT) evaluation only  -CS       Row Name 12/09/24 1100          PT Evaluation Complexity    History, PT Evaluation Complexity no personal factors and/or comorbidities  -CS     Examination of Body Systems (PT Eval Complexity) total of 4 or more elements  -CS     Clinical Presentation (PT Evaluation Complexity) stable  -CS     Clinical Decision Making (PT Evaluation Complexity) low complexity  -CS     Overall Complexity (PT Evaluation Complexity) low complexity  -       Row Name 12/09/24 1100          Therapy Plan Review/Discharge Plan (PT)    Therapy Plan Review (PT) evaluation/treatment results reviewed;patient  -CS       Row Name 12/09/24 1100          Physical Therapy Goals    Problem Specific Goal Selection (PT) problem specific goal 1, PT  -CS       Row Name 12/09/24 1100          Problem Specific Goal 1 (PT)    Problem Specific Goal 1 (PT) Complete PT evaluation  -CS     Time Frame (Problem Specific Goal 1, PT) by discharge  -CS      Progress/Outcome (Problem Specific Goal 1, PT) goal met  -CS               User Key  (r) = Recorded By, (t) = Taken By, (c) = Cosigned By      Initials Name Provider Type    Delmi Gutierrez PT Physical Therapist                    Physical Therapy Education       Title: PT OT SLP Therapies (Not Started)       Topic: Physical Therapy (Not Started)       Point: Mobility training (Not Started)       Learner Progress:  Not documented in this visit.              Point: Home exercise program (Not Started)       Learner Progress:  Not documented in this visit.              Point: Body mechanics (Not Started)       Learner Progress:  Not documented in this visit.              Point: Precautions (Not Started)       Learner Progress:  Not documented in this visit.                                  PT Recommendation and Plan  Anticipated Discharge Disposition (PT): home, [facility]  Therapy Frequency (PT): evaluation only  Plan of Care Reviewed With: patient  Progress: no change  Outcome Evaluation: Pt presents with no physical limitations that impede her ability to return home independently or with assist from family as needed. Pt was encouraged to continue ambulating as tolerated while here at the hospital. She will be discharged from PT caseload.   Outcome Measures       Row Name 12/09/24 1100             How much help from another person do you currently need...    Turning from your back to your side while in flat bed without using bedrails? 4  -CS      Moving from lying on back to sitting on the side of a flat bed without bedrails? 4  -CS      Moving to and from a bed to a chair (including a wheelchair)? 4  -CS      Standing up from a chair using your arms (e.g., wheelchair, bedside chair)? 4  -CS      Climbing 3-5 steps with a railing? 4  -CS      To walk in hospital room? 4  -CS      AM-PAC 6 Clicks Score (PT) 24  -CS         Functional Assessment    Outcome Measure  Options AM-PAC 6 Clicks Basic Mobility (PT)  -CS                User Key  (r) = Recorded By, (t) = Taken By, (c) = Cosigned By      Initials Name Provider Type    Delmi Gutierrez PT Physical Therapist                     Time Calculation:    PT Charges       Row Name 12/09/24 1142             Time Calculation    PT Received On 12/09/24  -CS         Untimed Charges    PT Eval/Re-eval Minutes 32  -CS         Total Minutes    Untimed Charges Total Minutes 32  -CS       Total Minutes 32  -CS                User Key  (r) = Recorded By, (t) = Taken By, (c) = Cosigned By      Initials Name Provider Type    Delmi Gutierrez PT Physical Therapist                  Therapy Charges for Today       Code Description Service Date Service Provider Modifiers Qty    [id number] HC PT EVAL LOW COMPLEXITY 3 12/9/2024 Delmi Simons PT GP 1            PT G-Codes  Outcome Measure Options: AM-PAC 6 Clicks Basic Mobility (PT)  AM-PAC 6 Clicks Score (PT): 24    Delmi Simons PT  12/9/2024

## 2024-12-09 NOTE — H&P
Psychiatric   HISTORY AND PHYSICAL    Patient Name: Julia Rios  : 1945  MRN: [id number]  Primary Care Physician:  Nick Patel DO  Date of admission: 2024    Subjective   Subjective     Chief Complaint: Shortness of breath and cough    History of Present Illness  Patient is a 79 y.o. year old female with past medical history of hypertension, hyperlipidemia CKD 3, GERD, CHF with preserved EF, and COPD who presents to the emergency department for evaluation of shortness of breath.  The patient notes that she is chronically short of breath from asthma/COPD.  The patient states that she started having occultly breathing about 3 days ago.  Patient states that she felt it was a COPD exacerbation.  She did discuss this with her pulmonologist by phone who put her on Zithromax and a steroid taper.  She was on 60 mg for 2 days and then 50 mg for 2 days.  She is currently on day 4.  She is using her nebulizer as requested.  Continues to have worsening shortness of breath.  She also complains of worsening cough.  She denies any improvement with the medications.  She denies any fevers, chills, diaphoresis, chest pain, nausea, vomiting, constipation or diarrhea.  In the ED she was given DuoNeb, meropenem.  Chest x-ray showed bilateral infiltrates.  She was admitted for further evaluation and management.      Review of Systems   Constitutional:  Positive for fatigue. Negative for activity change, appetite change, chills and diaphoresis.   HENT:  Negative for drooling, facial swelling, sneezing and sore throat.    Respiratory:  Positive for cough and shortness of breath.    Cardiovascular:  Negative for chest pain.   Gastrointestinal:  Negative for abdominal pain, constipation, diarrhea and nausea.   Endocrine: Negative for polydipsia and polyphagia.   Musculoskeletal:  Negative for joint swelling.   Neurological:  Negative for dizziness, weakness, numbness and headaches.    Psychiatric/Behavioral:  Negative for agitation.         Personal History     Past Medical History:   Diagnosis Date    Acute right-sided low back pain with right-sided sciatica 01/15/2018    Allergic rhinitis     Asthma     Asthma, extrinsic     Asthma, intrinsic     CHF (congestive heart failure)     Chronic heart failure with preserved ejection fraction     11/16/20 echocardiogram: 1.  Normal ejection fraction of 55%. 2.  Mild left ventricular hypertrophy. 3.  No significant valvular heart issues.     CKD stage 3 10/29/2019    COPD (chronic obstructive pulmonary disease)     Diverticulitis     Emphysema of lung     Essential hypertension     GERD (gastroesophageal reflux disease)     Hemoptysis     non cancerous    History of fungal pneumonia 12/02/2021    Hyperlipidemia     Idiopathic chronic gout of multiple sites without tophus 05/20/2016    Pneumonia     Primary osteoarthritis of right knee 01/16/2017    Sleep apnea     CPAP    Sleep apnea, obstructive     Vitamin D deficiency 01/13/2016       Past Surgical History:   Procedure Laterality Date    BRONCHOSCOPY N/A 4/30/2024    Procedure: BRONCHOSCOPY WITH BRONCHOALVEOLAR LAVAGE, WASHING, AIRWAY INSPECTION;  Surgeon: Santino Baez MD;  Location: [facility] ENDOSCOPY;  Service: Pulmonary;  Laterality: N/A;  MUCOUS PLUGGING, BRONCHITIS    CATARACT EXTRACTION Right     COLONOSCOPY  2014    COLONOSCOPY N/A 10/12/2021    Procedure: COLONOSCOPY;  Surgeon: Jorge Diane MD;  Location: [facility] ENDOSCOPY;  Service: Gastroenterology;  Laterality: N/A;  DIVERTICULOSIS    ENDOSCOPY  2015    EYE SURGERY      cataract right eye    OTHER SURGICAL HISTORY      Fatty tumor removed off back    RETINAL DETACHMENT REPAIR Right     hole in retina repair    TOTAL KNEE ARTHROPLASTY Right 10/31/2023    Procedure: TOTAL KNEE ARTHROPLASTY;  Surgeon: Celso Prakash MD;  Location: [facility] OR [facility];  Service: Orthopedics;  Laterality: Right;       Family History: family history  includes Asthma in her father; Breast cancer in her maternal grandmother; Cancer in her mother; Colon cancer (age of onset: 61) in her mother; Diabetes in her brother; Emphysema in her brother and father; Heart attack in her father; Heart disease in her mother; Heart failure in her father; Hyperlipidemia in her father; Stroke in her paternal grandfather. Otherwise pertinent FHx was reviewed and not pertinent to current issue.    Social History:  reports that she quit smoking about 29 years ago. Her smoking use included cigarettes. She started smoking about 61 years ago. She has a 16 pack-year smoking history. She has never been exposed to tobacco smoke. She has never used smokeless tobacco. She reports that she does not drink alcohol and does not use drugs.    Home Medications:  Budeson-Glycopyrrol-Formoterol, Cholecalciferol, albuterol sulfate HFA, azelastine, azithromycin, famotidine, fexofenadine, gabapentin, ipratropium-albuterol, losartan, metoprolol succinate XL, montelukast, predniSONE, and spironolactone    Allergies:  Allergies   Allergen Reactions    Ibuprofen Hives    Penicillins Seizure and Other (See Comments)    Nsaids Other (See Comments)     CKD    Cephalosporins Rash    Sulfa Antibiotics Rash       Objective    Objective     Vitals:   Temp:  [98.3 °F (36.8 °C)-98.4 °F (36.9 °C)] 98.3 °F (36.8 °C)  Heart Rate:  [64-79] 66  Resp:  [20-24] 22  BP: (144-175)/(69-95) 155/88    Physical Exam  Constitutional:       General: She is not in acute distress.     Appearance: She is not ill-appearing.   HENT:      Head: Normocephalic and atraumatic.      Nose: No rhinorrhea.      Mouth/Throat:      Mouth: Mucous membranes are moist.      Pharynx: Oropharynx is clear.   Eyes:      Extraocular Movements: Extraocular movements intact.      Pupils: Pupils are equal, round, and reactive to light.   Cardiovascular:      Rate and Rhythm: Normal rate and regular rhythm.      Heart sounds: No murmur heard.  Pulmonary:       Effort: Pulmonary effort is normal. No respiratory distress.      Breath sounds: Wheezing and rhonchi present.   Chest:      Chest wall: No tenderness.   Abdominal:      General: Abdomen is flat. Bowel sounds are normal. There is no distension.      Palpations: Abdomen is soft.      Tenderness: There is no abdominal tenderness. There is no guarding.   Musculoskeletal:         General: No swelling or tenderness.      Cervical back: Normal range of motion.   Skin:     General: Skin is warm and dry.   Neurological:      General: No focal deficit present.      Mental Status: She is alert and oriented to person, place, and time.   Psychiatric:         Mood and Affect: Mood normal.         Result Review    Result Review:  I have personally reviewed the results from the time of this admission to 12/8/2024 20:15 EST and agree with these findings:  []  Laboratory list / accordion  []  Microbiology  []  Radiology  []  EKG/Telemetry   []  Cardiology/Vascular   []  Pathology  []  Old records  []  Other:  Most notable findings include:      Assessment & Plan   Assessment / Plan     Brief Patient Summary:  Julia Rios is a 79 y.o. female who presented to the ED with complaints of shortness of breath    Active Hospital Problems:  Active Hospital Problems    Diagnosis     **Pneumonia      Plan:   Bilateral pneumonia:  Chest x-ray showed Bibasilar airspace opacities representing atelectasis or pneumonia.   Admit to inpatient  Cardiac diet  Continue with IV antibiotics of Rocephin 1 g daily  Supportive care keep O2 > 93%  Blood cultures pending  Consulted PT for eval and treatment   Consider consulting pulmonology if symptoms worsen  Daily CBC and CMP, will continue to monitor  Will continue to monitor vitals    COPD exacerbation:  Admit to inpatient  Cardiac diet  Continue with O2 support, keep O2 > 93%  Order DuoNebs every 6 hours as needed  Consider consulting pulmonology if symptoms worsen  Daily CBC and CMP, will continue  to monitor  Will continue to monitor vitals    Hx of HTN:   Will continue home medications    HX of CHF:  Continue with home medication    DVT prophylaxis: Lovenox  GI prophylaxis: Zofran, Protonix  CODE STATUS: CPR (full code)    VTE Prophylaxis:  Pharmacologic VTE prophylaxis orders are present.        CODE STATUS:    Level Of Support Discussed With: Patient  Code Status (Patient has no pulse and is not breathing): CPR (Attempt to Resuscitate)  Medical Interventions (Patient has pulse or is breathing): Full Support    Admission Status:  I believe this patient meets FULL CODE status.    James Wood MD

## 2024-12-10 LAB
ANION GAP SERPL CALCULATED.3IONS-SCNC: 13.3 MMOL/L (ref 5–15)
BASOPHILS # BLD AUTO: 0.01 10*3/MM3 (ref 0–0.2)
BASOPHILS NFR BLD AUTO: 0.1 % (ref 0–1.5)
BUN SERPL-MCNC: 33 MG/DL (ref 8–23)
BUN/CREAT SERPL: 27 (ref 7–25)
CALCIUM SPEC-SCNC: 9.3 MG/DL (ref 8.6–10.5)
CHLORIDE SERPL-SCNC: 103 MMOL/L (ref 98–107)
CO2 SERPL-SCNC: 20.7 MMOL/L (ref 22–29)
CREAT SERPL-MCNC: 1.22 MG/DL (ref 0.57–1)
DEPRECATED RDW RBC AUTO: 48 FL (ref 37–54)
EGFRCR SERPLBLD CKD-EPI 2021: 45.2 ML/MIN/1.73
EOSINOPHIL # BLD AUTO: 0 10*3/MM3 (ref 0–0.4)
EOSINOPHIL NFR BLD AUTO: 0 % (ref 0.3–6.2)
ERYTHROCYTE [DISTWIDTH] IN BLOOD BY AUTOMATED COUNT: 15 % (ref 12.3–15.4)
GLUCOSE SERPL-MCNC: 152 MG/DL (ref 65–99)
HCT VFR BLD AUTO: 39.1 % (ref 34–46.6)
HGB BLD-MCNC: 12.1 G/DL (ref 12–15.9)
IMM GRANULOCYTES # BLD AUTO: 0.16 10*3/MM3 (ref 0–0.05)
IMM GRANULOCYTES NFR BLD AUTO: 1.3 % (ref 0–0.5)
LYMPHOCYTES # BLD AUTO: 2.06 10*3/MM3 (ref 0.7–3.1)
LYMPHOCYTES NFR BLD AUTO: 16.7 % (ref 19.6–45.3)
MAGNESIUM SERPL-MCNC: 2.3 MG/DL (ref 1.6–2.4)
MCH RBC QN AUTO: 27.3 PG (ref 26.6–33)
MCHC RBC AUTO-ENTMCNC: 30.9 G/DL (ref 31.5–35.7)
MCV RBC AUTO: 88.3 FL (ref 79–97)
MONOCYTES # BLD AUTO: 0.58 10*3/MM3 (ref 0.1–0.9)
MONOCYTES NFR BLD AUTO: 4.7 % (ref 5–12)
NEUTROPHILS NFR BLD AUTO: 77.2 % (ref 42.7–76)
NEUTROPHILS NFR BLD AUTO: 9.56 10*3/MM3 (ref 1.7–7)
NRBC BLD AUTO-RTO: 0 /100 WBC (ref 0–0.2)
PHOSPHATE SERPL-MCNC: 3.4 MG/DL (ref 2.5–4.5)
PLATELET # BLD AUTO: 263 10*3/MM3 (ref 140–450)
PMV BLD AUTO: 12.6 FL (ref 6–12)
POTASSIUM SERPL-SCNC: 4.1 MMOL/L (ref 3.5–5.2)
QT INTERVAL: 388 MS
QTC INTERVAL: 456 MS
RBC # BLD AUTO: 4.43 10*6/MM3 (ref 3.77–5.28)
SODIUM SERPL-SCNC: 137 MMOL/L (ref 136–145)
WBC NRBC COR # BLD AUTO: 12.37 10*3/MM3 (ref 3.4–10.8)

## 2024-12-10 PROCEDURE — 83735 ASSAY OF MAGNESIUM: CPT | Performed by: INTERNAL MEDICINE

## 2024-12-10 PROCEDURE — 84100 ASSAY OF PHOSPHORUS: CPT | Performed by: INTERNAL MEDICINE

## 2024-12-10 PROCEDURE — 63710000001 PREDNISONE PER 1 MG: Performed by: STUDENT IN AN ORGANIZED HEALTH CARE EDUCATION/TRAINING PROGRAM

## 2024-12-10 PROCEDURE — 25010000002 ENOXAPARIN PER 10 MG: Performed by: FAMILY MEDICINE

## 2024-12-10 PROCEDURE — 25010000002 METHYLPREDNISOLONE PER 125 MG: Performed by: INTERNAL MEDICINE

## 2024-12-10 PROCEDURE — 94799 UNLISTED PULMONARY SVC/PX: CPT

## 2024-12-10 PROCEDURE — 85025 COMPLETE CBC W/AUTO DIFF WBC: CPT | Performed by: INTERNAL MEDICINE

## 2024-12-10 PROCEDURE — 99232 SBSQ HOSP IP/OBS MODERATE 35: CPT | Performed by: STUDENT IN AN ORGANIZED HEALTH CARE EDUCATION/TRAINING PROGRAM

## 2024-12-10 PROCEDURE — 80048 BASIC METABOLIC PNL TOTAL CA: CPT | Performed by: INTERNAL MEDICINE

## 2024-12-10 RX ORDER — LEVOFLOXACIN 750 MG/1
750 TABLET, FILM COATED ORAL EVERY OTHER DAY
Status: DISCONTINUED | OUTPATIENT
Start: 2024-12-11 | End: 2024-12-11 | Stop reason: HOSPADM

## 2024-12-10 RX ORDER — PREDNISONE 20 MG/1
40 TABLET ORAL
Status: DISCONTINUED | OUTPATIENT
Start: 2024-12-10 | End: 2024-12-11 | Stop reason: HOSPADM

## 2024-12-10 RX ADMIN — IPRATROPIUM BROMIDE AND ALBUTEROL SULFATE 3 ML: .5; 3 SOLUTION RESPIRATORY (INHALATION) at 18:38

## 2024-12-10 RX ADMIN — BUDESONIDE 0.5 MG: 0.5 INHALANT ORAL at 18:38

## 2024-12-10 RX ADMIN — IPRATROPIUM BROMIDE AND ALBUTEROL SULFATE 3 ML: .5; 3 SOLUTION RESPIRATORY (INHALATION) at 07:05

## 2024-12-10 RX ADMIN — BUDESONIDE 0.5 MG: 0.5 INHALANT ORAL at 07:05

## 2024-12-10 RX ADMIN — GABAPENTIN 300 MG: 300 CAPSULE ORAL at 09:10

## 2024-12-10 RX ADMIN — ARFORMOTEROL TARTRATE 15 MCG: 15 SOLUTION RESPIRATORY (INHALATION) at 18:37

## 2024-12-10 RX ADMIN — IPRATROPIUM BROMIDE AND ALBUTEROL SULFATE 3 ML: .5; 3 SOLUTION RESPIRATORY (INHALATION) at 00:24

## 2024-12-10 RX ADMIN — AZELASTINE HYDROCHLORIDE 2 SPRAY: 137 SPRAY, METERED NASAL at 20:04

## 2024-12-10 RX ADMIN — CETIRIZINE HYDROCHLORIDE 10 MG: 10 TABLET, FILM COATED ORAL at 09:10

## 2024-12-10 RX ADMIN — Medication 10 ML: at 20:05

## 2024-12-10 RX ADMIN — METOPROLOL SUCCINATE 100 MG: 50 TABLET, EXTENDED RELEASE ORAL at 09:10

## 2024-12-10 RX ADMIN — MONTELUKAST 10 MG: 10 TABLET, FILM COATED ORAL at 20:04

## 2024-12-10 RX ADMIN — LOSARTAN POTASSIUM 100 MG: 50 TABLET, FILM COATED ORAL at 09:10

## 2024-12-10 RX ADMIN — FAMOTIDINE 20 MG: 20 TABLET ORAL at 20:04

## 2024-12-10 RX ADMIN — GABAPENTIN 600 MG: 300 CAPSULE ORAL at 20:03

## 2024-12-10 RX ADMIN — IPRATROPIUM BROMIDE AND ALBUTEROL SULFATE 3 ML: .5; 3 SOLUTION RESPIRATORY (INHALATION) at 11:33

## 2024-12-10 RX ADMIN — ENOXAPARIN SODIUM 40 MG: 100 INJECTION SUBCUTANEOUS at 09:10

## 2024-12-10 RX ADMIN — ENOXAPARIN SODIUM 40 MG: 100 INJECTION SUBCUTANEOUS at 20:03

## 2024-12-10 RX ADMIN — Medication 10 ML: at 09:10

## 2024-12-10 RX ADMIN — PREDNISONE 40 MG: 20 TABLET ORAL at 09:52

## 2024-12-10 RX ADMIN — ARFORMOTEROL TARTRATE 15 MCG: 15 SOLUTION RESPIRATORY (INHALATION) at 07:05

## 2024-12-10 NOTE — PROGRESS NOTES
[facility]   Hospitalist Progress Note  Date: 12/10/2024  Patient Name: Julia Rios  : 1945  MRN: [id number]  Date of admission: 2024      Subjective   Subjective     Chief Complaint: Shortness of breath, cough    Summary: 79 y.o. year old female with past medical history of hypertension, hyperlipidemia CKD 3, GERD, CHF with preserved EF, and COPD who presents to the emergency department for evaluation of shortness of breath. The patient notes that she is chronically short of breath from asthma/COPD. The patient states that she started having occultly breathing about 3 days ago. Patient states that she felt it was a COPD exacerbation. She did discuss this with her pulmonologist (Dr. Baez) by phone who put her on Zithromax and a steroid taper. She was on 60 mg for 2 days and then 50 mg for 2 days. She is currently on day 4. She is using her nebulizer as requested. Continues to have worsening shortness of breath. She also complains of worsening cough. She denies any improvement with the medications.  She was found to have evidence of pneumonia and was admitted for further care.  She was started on Levaquin due to failure of azithromycin and allergy profile.  Started on IV steroids, frequent bronchodilators.    Interval Followup: No acute events overnight.  She stated she feels  better today than she did yesterday; was able to sleep last night after a long time.  Saturating well in RA. Sitting in her recliner and eating breakfast during my evaluation. No active complaints.    Objective   Objective     Vitals:   Temp:  [97.7 °F (36.5 °C)-98.8 °F (37.1 °C)] 97.9 °F (36.6 °C)  Heart Rate:  [57-79] 65  Resp:  [18-20] 18  BP: (129-166)/(65-89) 154/89  Physical Exam    Constitutional: Awake, alert, no acute distress   Respiratory: No wheeze appreciated, nonlabored respirations    Cardiovascular: RRR, no MRG   Gastrointestinal: Positive bowel sounds, soft, nontender, nondistended   Neurologic: Oriented  x 3, strength symmetric in all extremities, Cranial Nerves grossly intact to confrontation, speech clear    Result Review    I have personally reviewed the results below:  [x]  Laboratory personally reviewed CMP, CBC, procalcitonin  []  Microbiology  []  Radiology  []  EKG/Telemetry   []  Cardiology/Vascular   []  Pathology  []  Old records  []  Other:  CBC          12/8/2024    17:46 12/8/2024    20:59 12/9/2024    05:01 12/10/2024    05:03   CBC   WBC 14.96  11.40  11.85  12.37    RBC 4.62  4.36  4.57  4.43    Hemoglobin 12.7  11.9  12.6  12.1    Hematocrit 40.3  38.4  40.5  39.1    MCV 87.2  88.1  88.6  88.3    MCH 27.5  27.3  27.6  27.3    MCHC 31.5  31.0  31.1  30.9    RDW 15.4  15.4  15.3  15.0    Platelets 257  227  248  263      CMP          12/8/2024    17:46 12/8/2024    20:59 12/9/2024    05:01 12/10/2024    05:03   CMP   Glucose 143  169  142  152    BUN 27  30  29  33    Creatinine 1.10  1.08  1.04  1.22    EGFR 51.2  52.4  54.8  45.2    Sodium 139  141  141  137    Potassium 3.9  4.2  4.3  4.1    Chloride 104  109  109  103    Calcium 9.3  8.9  9.0  9.3    Total Protein 7.2  6.5  6.6     Albumin 4.2  3.9  4.0     Globulin 3.0  2.6  2.6     Total Bilirubin 0.5  0.4  0.5     Alkaline Phosphatase 83  77  77     AST (SGOT) 27  25  26     ALT (SGPT) 13  11  13     Albumin/Globulin Ratio 1.4  1.5  1.5     BUN/Creatinine Ratio 24.5  27.8  27.9  27.0    Anion Gap 17.5  12.0  12.2  13.3        Assessment & Plan   Assessment / Plan   Assessment:  Community-acquired pneumonia due to unknown bacterial source, presumed gram-negative  COPD with acute exacerbation  Hypertension  Chronic diastolic congestive heart failure  CKD stage III  Chronic diastolic congestive heart failure  History of CARMEN  Seasonal allergic rhinitis    Plan:  -Continue to monitor in the hospital for workup and management of the above  -Transition antibiotics to Levaquin 750 mg daily, will treat for minimum of 5 days  -S/p IV Solu-Medrol;  transitioning to prednisone 40 mg daily from today.  -Respiratory status improving.  -Continue scheduled DuoNebs, Pulmicort and Brovana twice daily, albuterol as needed.  -If worsening, will consider pulmonology consult but she does states she is improving so we will hold off at this time.  -Restart appropriate home medications including gabapentin and metoprolol  -Trend renal function and electrolytes with a.m. BMP, magnesium   -Trend Hgb and WBC with a.m. CBC.  -Likely discharge in next 24 hours if stable.     Discussed plan with RN    VTE Prophylaxis:  Pharmacologic VTE prophylaxis orders are present.        CODE STATUS:   Level Of Support Discussed With: Patient  Code Status (Patient has no pulse and is not breathing): CPR (Attempt to Resuscitate)  Medical Interventions (Patient has pulse or is breathing): Full Support    Electronically signed by Justine Nicholson MD, 12/10/24, 9:06 AM EST.

## 2024-12-10 NOTE — PLAN OF CARE
Goal Outcome Evaluation:   No acute events this shift. Plan for ortho to take down cast on R ankle tomorrow. No c/o pain. Will continue current plan of care.

## 2024-12-10 NOTE — PROGRESS NOTES
Levaquin adjusted to 750 mg every 48 hours per renal.     Body mass index is 40.19 kg/m².      12/08/24  1729   Weight: 99.7 kg (219 lb 12.8 oz)     Estimated Creatinine Clearance: 41.3 mL/min (A) (by C-G formula based on SCr of 1.22 mg/dL (H)).     PD/HD/CRRT? None - close i-Vent    Levofloxacin renal dosing    Indication  CrCl (mL/min) >=50 20-49 10-19 HD or CAPD CRRT   Bacteremia  Bone & joint infection  Empiric  Intra-abdominal infection  Pneumonia  SSTI  Pyelonephritis  Severe/complicated** cystitis  IV:PO conversion is 1:1 750 mg Q24H 750 mg Q48H 750 mg x 1 then   500 mg Q48H 750 mg x 1 then 500 mg Q24H or 750 mg Q48H   COPD exacerbation  Neutropenia prophylaxis  500 mg Q24H 500 mg x 1 then 250 mg Q24H 500 mg x 1 then   250 mg Q48H 500 mg x 1 then 250 mg Q24H or 500 mg Q48H   Uncomplicated cystitis  250 mg Q24H 250 mg Q48H 250 mg Q24H   **complicated defined as urinary obstruction, neurogenic bladder, presence of foreign bodies (including calculi, indwelling catheters or other drainage devices), pregnancy, or immunosuppression

## 2024-12-10 NOTE — PLAN OF CARE
Goal Outcome Evaluation:   Switch to PO steroids, no other acute events. Remains on room air. Will continue current plan of care.

## 2024-12-11 ENCOUNTER — READMISSION MANAGEMENT (OUTPATIENT)
Dept: CALL CENTER | Facility: HOSPITAL | Age: 79
End: 2024-12-11
Payer: MEDICARE

## 2024-12-11 VITALS
TEMPERATURE: 97.7 F | SYSTOLIC BLOOD PRESSURE: 143 MMHG | BODY MASS INDEX: 40.45 KG/M2 | HEART RATE: 66 BPM | WEIGHT: 219.8 LBS | OXYGEN SATURATION: 92 % | HEIGHT: 62 IN | DIASTOLIC BLOOD PRESSURE: 70 MMHG | RESPIRATION RATE: 18 BRPM

## 2024-12-11 LAB
ANION GAP SERPL CALCULATED.3IONS-SCNC: 16.1 MMOL/L (ref 5–15)
BACTERIA SPEC RESP CULT: NORMAL
BASOPHILS # BLD AUTO: 0.02 10*3/MM3 (ref 0–0.2)
BASOPHILS NFR BLD AUTO: 0.1 % (ref 0–1.5)
BUN SERPL-MCNC: 33 MG/DL (ref 8–23)
BUN/CREAT SERPL: 27.3 (ref 7–25)
CALCIUM SPEC-SCNC: 8.8 MG/DL (ref 8.6–10.5)
CHLORIDE SERPL-SCNC: 103 MMOL/L (ref 98–107)
CO2 SERPL-SCNC: 18.9 MMOL/L (ref 22–29)
CREAT SERPL-MCNC: 1.21 MG/DL (ref 0.57–1)
DEPRECATED RDW RBC AUTO: 49.1 FL (ref 37–54)
EGFRCR SERPLBLD CKD-EPI 2021: 45.7 ML/MIN/1.73
EOSINOPHIL # BLD AUTO: 0 10*3/MM3 (ref 0–0.4)
EOSINOPHIL NFR BLD AUTO: 0 % (ref 0.3–6.2)
ERYTHROCYTE [DISTWIDTH] IN BLOOD BY AUTOMATED COUNT: 15 % (ref 12.3–15.4)
GLUCOSE SERPL-MCNC: 138 MG/DL (ref 65–99)
GRAM STN SPEC: NORMAL
HCT VFR BLD AUTO: 39.5 % (ref 34–46.6)
HGB BLD-MCNC: 12.2 G/DL (ref 12–15.9)
IMM GRANULOCYTES # BLD AUTO: 0.16 10*3/MM3 (ref 0–0.05)
IMM GRANULOCYTES NFR BLD AUTO: 1.2 % (ref 0–0.5)
LYMPHOCYTES # BLD AUTO: 2.62 10*3/MM3 (ref 0.7–3.1)
LYMPHOCYTES NFR BLD AUTO: 19.6 % (ref 19.6–45.3)
MAGNESIUM SERPL-MCNC: 2.2 MG/DL (ref 1.6–2.4)
MCH RBC QN AUTO: 27.6 PG (ref 26.6–33)
MCHC RBC AUTO-ENTMCNC: 30.9 G/DL (ref 31.5–35.7)
MCV RBC AUTO: 89.4 FL (ref 79–97)
MONOCYTES # BLD AUTO: 1.14 10*3/MM3 (ref 0.1–0.9)
MONOCYTES NFR BLD AUTO: 8.5 % (ref 5–12)
NEUTROPHILS NFR BLD AUTO: 70.6 % (ref 42.7–76)
NEUTROPHILS NFR BLD AUTO: 9.45 10*3/MM3 (ref 1.7–7)
NRBC BLD AUTO-RTO: 0 /100 WBC (ref 0–0.2)
PHOSPHATE SERPL-MCNC: 3.5 MG/DL (ref 2.5–4.5)
PLATELET # BLD AUTO: 245 10*3/MM3 (ref 140–450)
PMV BLD AUTO: 12.1 FL (ref 6–12)
POTASSIUM SERPL-SCNC: 4 MMOL/L (ref 3.5–5.2)
RBC # BLD AUTO: 4.42 10*6/MM3 (ref 3.77–5.28)
SODIUM SERPL-SCNC: 138 MMOL/L (ref 136–145)
WBC NRBC COR # BLD AUTO: 13.39 10*3/MM3 (ref 3.4–10.8)

## 2024-12-11 PROCEDURE — 94664 DEMO&/EVAL PT USE INHALER: CPT

## 2024-12-11 PROCEDURE — 25010000002 ENOXAPARIN PER 10 MG: Performed by: FAMILY MEDICINE

## 2024-12-11 PROCEDURE — 83735 ASSAY OF MAGNESIUM: CPT | Performed by: INTERNAL MEDICINE

## 2024-12-11 PROCEDURE — 94799 UNLISTED PULMONARY SVC/PX: CPT

## 2024-12-11 PROCEDURE — 84100 ASSAY OF PHOSPHORUS: CPT | Performed by: INTERNAL MEDICINE

## 2024-12-11 PROCEDURE — 80048 BASIC METABOLIC PNL TOTAL CA: CPT | Performed by: INTERNAL MEDICINE

## 2024-12-11 PROCEDURE — 85025 COMPLETE CBC W/AUTO DIFF WBC: CPT | Performed by: INTERNAL MEDICINE

## 2024-12-11 PROCEDURE — 99239 HOSP IP/OBS DSCHRG MGMT >30: CPT | Performed by: STUDENT IN AN ORGANIZED HEALTH CARE EDUCATION/TRAINING PROGRAM

## 2024-12-11 PROCEDURE — 63710000001 PREDNISONE PER 1 MG: Performed by: STUDENT IN AN ORGANIZED HEALTH CARE EDUCATION/TRAINING PROGRAM

## 2024-12-11 RX ORDER — PREDNISONE 20 MG/1
TABLET ORAL
Qty: 14 TABLET | Refills: 0 | Status: SHIPPED | OUTPATIENT
Start: 2024-12-12 | End: 2024-12-25

## 2024-12-11 RX ORDER — LEVOFLOXACIN 750 MG/1
750 TABLET, FILM COATED ORAL EVERY OTHER DAY
Qty: 1 TABLET | Refills: 0 | Status: SHIPPED | OUTPATIENT
Start: 2024-12-13 | End: 2024-12-14

## 2024-12-11 RX ADMIN — IPRATROPIUM BROMIDE AND ALBUTEROL SULFATE 3 ML: .5; 3 SOLUTION RESPIRATORY (INHALATION) at 13:14

## 2024-12-11 RX ADMIN — LEVOFLOXACIN 750 MG: 750 TABLET, FILM COATED ORAL at 08:58

## 2024-12-11 RX ADMIN — LOSARTAN POTASSIUM 100 MG: 50 TABLET, FILM COATED ORAL at 08:57

## 2024-12-11 RX ADMIN — IPRATROPIUM BROMIDE AND ALBUTEROL SULFATE 3 ML: .5; 3 SOLUTION RESPIRATORY (INHALATION) at 00:48

## 2024-12-11 RX ADMIN — ARFORMOTEROL TARTRATE 15 MCG: 15 SOLUTION RESPIRATORY (INHALATION) at 06:59

## 2024-12-11 RX ADMIN — Medication 2.5 MG: at 00:58

## 2024-12-11 RX ADMIN — PREDNISONE 40 MG: 20 TABLET ORAL at 08:58

## 2024-12-11 RX ADMIN — METOPROLOL SUCCINATE 100 MG: 50 TABLET, EXTENDED RELEASE ORAL at 08:57

## 2024-12-11 RX ADMIN — CETIRIZINE HYDROCHLORIDE 10 MG: 10 TABLET, FILM COATED ORAL at 08:58

## 2024-12-11 RX ADMIN — Medication 10 ML: at 08:58

## 2024-12-11 RX ADMIN — GUAIFENESIN 200 MG: 100 LIQUID ORAL at 14:09

## 2024-12-11 RX ADMIN — ENOXAPARIN SODIUM 40 MG: 100 INJECTION SUBCUTANEOUS at 08:57

## 2024-12-11 RX ADMIN — AZELASTINE HYDROCHLORIDE 2 SPRAY: 137 SPRAY, METERED NASAL at 08:58

## 2024-12-11 RX ADMIN — GABAPENTIN 300 MG: 300 CAPSULE ORAL at 08:57

## 2024-12-11 RX ADMIN — GUAIFENESIN 200 MG: 100 LIQUID ORAL at 01:01

## 2024-12-11 RX ADMIN — IPRATROPIUM BROMIDE AND ALBUTEROL SULFATE 3 ML: .5; 3 SOLUTION RESPIRATORY (INHALATION) at 06:59

## 2024-12-11 RX ADMIN — BUDESONIDE 0.5 MG: 0.5 INHALANT ORAL at 06:59

## 2024-12-11 NOTE — DISCHARGE SUMMARY
[facility]         HOSPITALIST  DISCHARGE SUMMARY    Patient Name: Julia Rios  : 1945  MRN: [id number]    Date of Admission: 2024  Date of Discharge:  2024  Primary Care Physician: Nick Patel DO    Consults       Date and Time Order Name Status Description    2024  7:31 PM Inpatient Hospitalist Consult              Active and Resolved Hospital Problems:  Active Hospital Problems    Diagnosis POA    **Pneumonia [J18.9] Yes      Resolved Hospital Problems   No resolved problems to display.       Hospital Course     Hospital Course:  Julia Rios is a 79 y.o. female with past medical history of hypertension, hyperlipidemia CKD 3, GERD, CHF with preserved EF, and COPD who presents to the emergency department for evaluation of shortness of breath. The patient notes that she is chronically short of breath from asthma/COPD. The patient states that she started having occultly breathing about 3 days ago. Patient states that she felt it was a COPD exacerbation. She did discuss this with her pulmonologist (Dr. Baez) by phone who put her on Zithromax and a steroid taper. She was on 60 mg for 2 days and then 50 mg for 2 days. She is currently on day 4. She is using her nebulizer as requested. Continues to have worsening shortness of breath. She also complains of worsening cough. She denies any improvement with the medications. She was found to have evidence of pneumonia and was admitted for further care. She was started on Levaquin due to failure of azithromycin and allergy profile. Started on IV steroids, frequent bronchodilators.  Patient's respiratory status improved.  She saturated well in room air throughout.  Antibiotic was switched to Levaquin to complete 5-day course of antibiotic.  S/p IV Solu-Medrol.  Switch to prednisone 40 mg daily.  Given patient with significant improvement in her respiratory status, plan to discharge her today was  made.    Patient is being discharged home today.  She is stable at the time of discharge.  Patient will follow-up with PCP in 3-7 days, needs CBC nutrition during follow-up.  Pulmonology follow-up as outpatient.  Advised to be compliant with medications and diet.  Fall precautions.      DISCHARGE Follow Up Recommendations for labs and diagnostics: As mentioned above.      Day of Discharge     Vital Signs:  Temp:  [97.5 °F (36.4 °C)-98 °F (36.7 °C)] 97.7 °F (36.5 °C)  Heart Rate:  [59-77] 66  Resp:  [16-20] 18  BP: (130-173)/(64-86) 143/70  Physical Exam:   Constitutional: Awake, alert, no acute distress              Respiratory: No wheeze appreciated, nonlabored respirations               Cardiovascular: RRR, no MRG              Gastrointestinal: Positive bowel sounds, soft, nontender, nondistended              Neurologic: Oriented x 3, strength symmetric in all extremities, Cranial Nerves grossly intact to confrontation, speech clear       Discharge Details        Discharge Medications        New Medications        Instructions Start Date   levoFLOXacin 750 MG tablet  Commonly known as: LEVAQUIN   750 mg, Oral, Every Other Day   Start Date: December 13, 2024            Changes to Medications        Instructions Start Date   gabapentin 300 MG capsule  Commonly known as: NEURONTIN  What changed:   how much to take  how to take this  when to take this   1 capsule every morning and 2 capsule every evening.      gabapentin 300 MG capsule  Commonly known as: NEURONTIN  What changed: Another medication with the same name was changed. Make sure you understand how and when to take each.   600 mg, Nightly      ipratropium-albuterol 0.5-2.5 mg/3 ml nebulizer  Commonly known as: DUO-NEB  What changed: See the new instructions.   USE 3 ML VIA NEBULIZER FOUR TIMES DAILY AS NEEDED FOR WHEEZING      montelukast 10 MG tablet  Commonly known as: SINGULAIR  What changed: when to take this   10 mg, Oral, Nightly      predniSONE 20 MG  tablet  Commonly known as: DELTASONE  What changed:   medication strength  See the new instructions.   Take 2 tablets by mouth Daily With Breakfast for 3 days, THEN 1 tablet Daily With Breakfast for 5 days, THEN 0.5 tablets Daily With Breakfast for 5 days.   Start Date: December 12, 2024            Continue These Medications        Instructions Start Date   azelastine 0.1 % nasal spray  Commonly known as: ASTELIN   2 sprays, Nasal, 2 Times Daily, Use in each nostril as directed      Budeson-Glycopyrrol-Formoterol 160-9-4.8 MCG/ACT aerosol inhaler  Commonly known as: BREZTRI   2 puffs, Inhalation, 2 Times Daily      D3-1000 25 MCG (1000 UT) capsule  Generic drug: Cholecalciferol   TAKE 1 CAPSULE BY MOUTH EVERY DAY      famotidine 20 MG tablet  Commonly known as: PEPCID   20 mg, Oral, Every Night at Bedtime      fexofenadine 180 MG tablet  Commonly known as: ALLEGRA   180 mg, Oral, Daily      losartan 100 MG tablet  Commonly known as: COZAAR   100 mg, Oral, Daily      metoprolol succinate  MG 24 hr tablet  Commonly known as: TOPROL-XL   100 mg, Oral, Daily, Hold if heartrate is <60.      spironolactone 25 MG tablet  Commonly known as: ALDACTONE   25 mg, Oral, 3 Times Weekly, Monday,Wednesday,Friday             Stop These Medications      albuterol 1.25 MG/3ML nebulizer solution  Commonly known as: ACCUNEB     albuterol sulfate  (90 Base) MCG/ACT inhaler  Commonly known as: PROVENTIL HFA;VENTOLIN HFA;PROAIR HFA     azithromycin 250 MG tablet  Commonly known as: ZITHROMAX              Allergies   Allergen Reactions    Ibuprofen Hives    Penicillins Other (See Comments) and Seizure     1. When was your reaction? < 10 years ago  2. Did your reaction happen after the first dose or after several doses? Do not know  3. Did your reaction require ED or hospital care to manage your reaction? Do not know  4. Did your reaction require treatment with epinephrine? Do not know  5. Have you taken amoxicillin (Amoxil) or  amoxicillin-clavulanate (Augmentin) without issue since? No  6. Have you taken cephalexin (Keflex) without issue since?  No     Nsaids Other (See Comments)     CKD    Cephalosporins Rash    Sulfa Antibiotics Rash       Discharge Disposition:  Home or Self Care    Diet:  Hospital:  Diet Order   Procedures    Diet: Cardiac; Healthy Heart (2-3 Na+); Fluid Consistency: Thin (IDDSI 0)       Discharge Activity:       CODE STATUS:  Code Status and Medical Interventions: CPR (Attempt to Resuscitate); Full Support   Ordered at: 12/08/24 2014     Level Of Support Discussed With:    Patient     Code Status (Patient has no pulse and is not breathing):    CPR (Attempt to Resuscitate)     Medical Interventions (Patient has pulse or is breathing):    Full Support       Future Appointments   Date Time Provider Department Center   2/19/2025 11:00 AM JONEL PENELOPE CT 2 [facility] ED ETWCT [facility]   3/24/2025 11:15 AM Santino Baez MD [facility] PCC ETW [facility]   4/21/2025 10:15 AM Nick Patel DO [facility] HFC [facility]   6/11/2025 10:30 AM Marielos Dover APRN [facility] CD ETOWN [facility]   11/7/2025  9:00 AM Nick Patel DO [facility]       Additional Instructions for the Follow-ups that You Need to Schedule       Discharge Follow-up with PCP   As directed       Currently Documented PCP:    Nick Patel DO    PCP Phone Number:    [phone]     Follow Up Details: In 3-7 days; needs CBC and chemistries trended during follow up.                Pertinent  and/or Most Recent Results     PROCEDURES:       LAB RESULTS:      Lab 12/11/24  0445 12/10/24  0503 12/09/24  0501 12/08/24 2059 12/08/24  1746   WBC 13.39* 12.37* 11.85* 11.40* 14.96*   HEMOGLOBIN 12.2 12.1 12.6 11.9* 12.7   HEMATOCRIT 39.5 39.1 40.5 38.4 40.3   PLATELETS 245 263 248 227 257   NEUTROS ABS 9.45* 9.56* 9.81* 10.01* 12.17*   IMMATURE GRANS (ABS) 0.16* 0.16* 0.09* 0.11* 0.12*   LYMPHS ABS 2.62 2.06 1.69 1.11 2.10   MONOS ABS 1.14* 0.58 0.25 0.16 0.55   EOS ABS 0.00  0.00 0.00 0.00 0.00   MCV 89.4 88.3 88.6 88.1 87.2   PROCALCITONIN  --   --  0.07  --   --    LACTATE  --   --   --  2.0 3.6*         Lab 12/11/24  0445 12/10/24  0503 12/09/24  0501 12/08/24 2059 12/08/24 1746 12/06/24  1521   SODIUM 138 137 141 141 139 139   POTASSIUM 4.0 4.1 4.3 4.2 3.9 3.9   CHLORIDE 103 103 109* 109* 104 105   CO2 18.9* 20.7* 19.8* 20.0* 17.5* 18.3*   ANION GAP 16.1* 13.3 12.2 12.0 17.5* 15.7*   BUN 33* 33* 29* 30* 27* 21   CREATININE 1.21* 1.22* 1.04* 1.08* 1.10* 1.14*   EGFR 45.7* 45.2* 54.8* 52.4* 51.2* 49.1*   GLUCOSE 138* 152* 142* 169* 143* 172*   CALCIUM 8.8 9.3 9.0 8.9 9.3 9.5   MAGNESIUM 2.2 2.3  --   --   --  1.9   PHOSPHORUS 3.5 3.4  --   --   --   --          Lab 12/09/24  0501 12/08/24 2059 12/08/24 1746 12/06/24  1521   TOTAL PROTEIN 6.6 6.5 7.2 7.0   ALBUMIN 4.0 3.9 4.2 4.1   GLOBULIN 2.6 2.6 3.0 2.9   ALT (SGPT) 13 11 13 9   AST (SGOT) 26 25 27 17   BILIRUBIN 0.5 0.4 0.5 0.4   ALK PHOS 77 77 83 89         Lab 12/08/24 1746 12/06/24  1521   PROBNP 349.5 311.6   HSTROP T 15* 11                 Brief Urine Lab Results  (Last result in the past 365 days)        Color   Clarity   Blood   Leuk Est   Nitrite   Protein   CREAT   Urine HCG        10/17/24 0923 Yellow   Clear   Negative   Negative   Negative   Negative           10/17/24 0923             79.9         10/17/24 0923             86.7               Microbiology Results (last 10 days)       Procedure Component Value - Date/Time    S. Pneumo Ag Urine or CSF - Urine, Urine, Clean Catch [[id number]]  (Normal) Collected: 12/09/24 1734    Lab Status: Final result Specimen: Urine, Clean Catch Updated: 12/09/24 1806     Strep Pneumo Ag Negative    Legionella Antigen, Urine - Urine, Urine, Clean Catch [[id number]]  (Normal) Collected: 12/09/24 1734    Lab Status: Final result Specimen: Urine, Clean Catch Updated: 12/09/24 1806     LEGIONELLA ANTIGEN, URINE Negative    Respiratory Culture - Sputum, Cough [[id number]] Collected:  12/09/24 1404    Lab Status: Final result Specimen: Sputum from Cough Updated: 12/11/24 1017     Respiratory Culture Rare growth Normal respiratory rubén. No S. aureus or Pseudomonas aeruginosa detected. Final report.     Gram Stain Occasional WBCs seen      Rare (1+) Gram positive cocci      Occasional Gram negative bacilli    Blood Culture - Blood, Arm, Right [[id number]]  (Normal) Collected: 12/08/24 1954    Lab Status: Preliminary result Specimen: Blood from Arm, Right Updated: 12/10/24 2000     Blood Culture No growth at 2 days    Narrative:      Less than seven (7) mL's of blood was collected.  Insufficient quantity may yield false negative results.    Blood Culture - Blood, Wrist, Left [[id number]]  (Normal) Collected: 12/08/24 1954    Lab Status: Preliminary result Specimen: Blood from Wrist, Left Updated: 12/10/24 2000     Blood Culture No growth at 2 days    Narrative:      Less than seven (7) mL's of blood was collected.  Insufficient quantity may yield false negative results.    COVID-19, FLU A/B, RSV PCR 1 HR TAT - Swab, Nasopharynx [[id number]]  (Normal) Collected: 12/08/24 1845    Lab Status: Final result Specimen: Swab from Nasopharynx Updated: 12/08/24 1935     COVID19 Not Detected     Influenza A PCR Not Detected     Influenza B PCR Not Detected     RSV, PCR Not Detected    Narrative:      Fact sheet for providers: https://www.fda.gov/media/468981/download    Fact sheet for patients: https://www.fda.gov/media/146428/download    Test performed by PCR.    COVID-19, FLU A/B, RSV PCR 1 HR TAT - Swab, Nasopharynx [[id number]]  (Normal) Collected: 12/06/24 1518    Lab Status: Final result Specimen: Swab from Nasopharynx Updated: 12/06/24 1559     COVID19 Not Detected     Influenza A PCR Not Detected     Influenza B PCR Not Detected     RSV, PCR Not Detected    Narrative:      Fact sheet for providers: https://www.fda.gov/media/956455/download    Fact sheet for patients:  https://www.fda.gov/media/649532/download    Test performed by PCR.            XR Chest 1 View    Result Date: 12/8/2024  Impression: Bibasilar airspace opacities representing atelectasis or pneumonia. Electronically Signed: Dakota Summers  12/8/2024 6:30 PM EST  Workstation ID: [id number]    XR Chest 1 View    Result Date: 12/6/2024  Impression: Mild bibasilar scarring. No acute infiltrate Electronically Signed: Shiva Jeffries MD  12/6/2024 4:13 PM EST  Workstation ID: [id number]                  Labs Pending at Discharge:  Pending Labs       Order Current Status    Blood Culture - Blood, Arm, Right Preliminary result    Blood Culture - Blood, Wrist, Left Preliminary result              Time spent on Discharge including face to face service:  35 minutes    Electronically signed by Justine Nicholson MD, 12/11/24, 9:47 AM EST.

## 2024-12-11 NOTE — OUTREACH NOTE
Prep Survey      Flowsheet Row Responses   [facility] patient discharged from? [facility]   Is LACE score < 7 ? No   Eligibility [facility]   Date of Admission 12/08/24   Date of Discharge 12/11/24   Discharge Disposition Home or Self Care   Discharge diagnosis Pneumonia   Does the patient have one of the following disease processes/diagnoses(primary or secondary)? Pneumonia   Does the patient have Home health ordered? No   Is there a DME ordered? No   Prep survey completed? Yes            Belgica MARISCAL - Registered Nurse

## 2024-12-11 NOTE — NURSING NOTE
Patient discharge education given, verbalized understanding. IV's removed, gauze and tape applied. Transport put in. Patient  on his way. Transport took patient to lobby to exit in private vehicle.

## 2024-12-11 NOTE — PLAN OF CARE
Goal Outcome Evaluation:              Outcome Evaluation: Patient alert and oriented x4, VSS. No significant changes this shift. Patient continues on room air and up ad maria guadalupe. Will continue to monitor plan of care.

## 2024-12-11 NOTE — PLAN OF CARE
Goal Outcome Evaluation:              Outcome Evaluation: Patient aox4, vss. Patient is up ad maria guadalupe in room. No c/o pain or discomfort. No SOA noted. Cough has been productive. Call light in reach.

## 2024-12-12 ENCOUNTER — TRANSITIONAL CARE MANAGEMENT TELEPHONE ENCOUNTER (OUTPATIENT)
Dept: CALL CENTER | Facility: HOSPITAL | Age: 79
End: 2024-12-12
Payer: MEDICARE

## 2024-12-12 NOTE — OUTREACH NOTE
Call Center TCM Note      Flowsheet Row Responses   [facility] patient discharged from? [facility]   Does the patient have one of the following disease processes/diagnoses(primary or secondary)? Pneumonia   TCM attempt successful? Yes   Call start time 1309   Call end time 1312   Discharge diagnosis Pneumonia   Meds reviewed with patient/caregiver? Yes   Is the patient having any side effects they believe may be caused by any medication additions or changes? No   Does the patient have all medications ordered at discharge? Yes   Is the patient taking all medications as directed (includes completed medication regime)? Yes   Comments Hospital d/c follow up 12/26/24.   Does the patient have an appointment with their PCP within 7-14 days of discharge? Yes   Has home health visited the patient within 72 hours of discharge? N/A   Pulse Ox monitoring Intermittent   Pulse Ox device source Patient   O2 Sat comments O2 sats in 90's on RA   O2 Sat: education provided Sat levels, Monitoring frequency, When to seek care   Psychosocial issues? No   Did the patient receive a copy of their discharge instructions? Yes   Nursing interventions Reviewed instructions with patient   What is the patient's perception of their health status since discharge? Improving   Nursing Interventions Nurse provided patient education   Are the patient's immunizations up to date?  Yes   Nursing interventions Educated on importance of maintaining up to date immunizations as advised by provider   Is the patient/caregiver able to teach back the hierarchy of who to call/visit for symptoms/problems? PCP, Specialist, Home health nurse, Urgent Care, ED, 911 Yes   Is the patient/caregiver able to teach back signs and symptoms of worsening condition: Fever/chills, Shortness of breath, Chest pain   Is the patient/caregiver able to teach back importance of completing antibiotic course of treatment? Yes   TCM call completed? Yes   Wrap up additional comments  Patient reports she is feeling better, no questions today.   Call end time 1312   Would this patient benefit from a Referral to [facility] Social Work? No   Is the patient interested in additional calls from an ambulatory ? No            Caro Leong RN    12/12/2024, 13:12 EST

## 2024-12-13 LAB
BACTERIA SPEC AEROBE CULT: NORMAL
BACTERIA SPEC AEROBE CULT: NORMAL

## 2024-12-26 ENCOUNTER — OFFICE VISIT (OUTPATIENT)
Dept: FAMILY MEDICINE CLINIC | Facility: CLINIC | Age: 79
End: 2024-12-26
Payer: MEDICARE

## 2024-12-26 ENCOUNTER — LAB (OUTPATIENT)
Dept: LAB | Facility: HOSPITAL | Age: 79
End: 2024-12-26
Payer: MEDICARE

## 2024-12-26 VITALS
HEART RATE: 76 BPM | OXYGEN SATURATION: 94 % | DIASTOLIC BLOOD PRESSURE: 76 MMHG | WEIGHT: 222 LBS | HEIGHT: 62 IN | BODY MASS INDEX: 40.85 KG/M2 | SYSTOLIC BLOOD PRESSURE: 133 MMHG | TEMPERATURE: 97 F

## 2024-12-26 DIAGNOSIS — I50.32 CHRONIC HEART FAILURE WITH PRESERVED EJECTION FRACTION: ICD-10-CM

## 2024-12-26 DIAGNOSIS — Z09 HOSPITAL DISCHARGE FOLLOW-UP: Primary | ICD-10-CM

## 2024-12-26 DIAGNOSIS — J32.0 CHRONIC MAXILLARY SINUSITIS: ICD-10-CM

## 2024-12-26 DIAGNOSIS — J18.9 PNEUMONIA OF BOTH LUNGS DUE TO INFECTIOUS ORGANISM, UNSPECIFIED PART OF LUNG: ICD-10-CM

## 2024-12-26 LAB
ANION GAP SERPL CALCULATED.3IONS-SCNC: 9 MMOL/L (ref 5–15)
BUN SERPL-MCNC: 17 MG/DL (ref 8–23)
BUN/CREAT SERPL: 13.6 (ref 7–25)
CALCIUM SPEC-SCNC: 9.2 MG/DL (ref 8.6–10.5)
CHLORIDE SERPL-SCNC: 106 MMOL/L (ref 98–107)
CO2 SERPL-SCNC: 26 MMOL/L (ref 22–29)
CREAT SERPL-MCNC: 1.25 MG/DL (ref 0.57–1)
DEPRECATED RDW RBC AUTO: 46.8 FL (ref 37–54)
EGFRCR SERPLBLD CKD-EPI 2021: 43.9 ML/MIN/1.73
ERYTHROCYTE [DISTWIDTH] IN BLOOD BY AUTOMATED COUNT: 14.5 % (ref 12.3–15.4)
GLUCOSE SERPL-MCNC: 80 MG/DL (ref 65–99)
HCT VFR BLD AUTO: 38.8 % (ref 34–46.6)
HGB BLD-MCNC: 12.7 G/DL (ref 12–15.9)
MCH RBC QN AUTO: 29 PG (ref 26.6–33)
MCHC RBC AUTO-ENTMCNC: 32.7 G/DL (ref 31.5–35.7)
MCV RBC AUTO: 88.6 FL (ref 79–97)
NT-PROBNP SERPL-MCNC: 216 PG/ML (ref 0–1800)
PLATELET # BLD AUTO: 180 10*3/MM3 (ref 140–450)
PMV BLD AUTO: 12 FL (ref 6–12)
POTASSIUM SERPL-SCNC: 4 MMOL/L (ref 3.5–5.2)
RBC # BLD AUTO: 4.38 10*6/MM3 (ref 3.77–5.28)
SODIUM SERPL-SCNC: 141 MMOL/L (ref 136–145)
WBC NRBC COR # BLD AUTO: 15.41 10*3/MM3 (ref 3.4–10.8)

## 2024-12-26 PROCEDURE — 85027 COMPLETE CBC AUTOMATED: CPT | Performed by: FAMILY MEDICINE

## 2024-12-26 PROCEDURE — 3078F DIAST BP <80 MM HG: CPT | Performed by: FAMILY MEDICINE

## 2024-12-26 PROCEDURE — 83880 ASSAY OF NATRIURETIC PEPTIDE: CPT

## 2024-12-26 PROCEDURE — 3075F SYST BP GE 130 - 139MM HG: CPT | Performed by: FAMILY MEDICINE

## 2024-12-26 PROCEDURE — 99214 OFFICE O/P EST MOD 30 MIN: CPT | Performed by: FAMILY MEDICINE

## 2024-12-26 PROCEDURE — 1126F AMNT PAIN NOTED NONE PRSNT: CPT | Performed by: FAMILY MEDICINE

## 2024-12-26 PROCEDURE — 80048 BASIC METABOLIC PNL TOTAL CA: CPT | Performed by: FAMILY MEDICINE

## 2024-12-26 PROCEDURE — 36415 COLL VENOUS BLD VENIPUNCTURE: CPT | Performed by: FAMILY MEDICINE

## 2024-12-26 NOTE — ASSESSMENT & PLAN NOTE
She is improved since her hospital discharge.  She does have a follow-up with pulmonary next week.

## 2024-12-26 NOTE — ASSESSMENT & PLAN NOTE
They felt in her hospitalization that her chest x-ray he may have represented pneumonia more than atelectasis.  She has finished all of her antibiotics and her steroids.  She will continue her routine pulmonary care and follow-up with pulmonology.

## 2024-12-26 NOTE — ASSESSMENT & PLAN NOTE
She has some swelling over her maxillary sinuses but they are nontender.  She does have some chronic congestion and drainage.  With all the antibiotic she has been on recently and steroids I will hold off on any additional antibiotics but will get a CT of her sinuses to assess that.

## 2024-12-26 NOTE — PROGRESS NOTES
Chief Complaint   Patient presents with    Hospital Follow Up Visit        Subjective     Julia Rios  has a past medical history of Acute right-sided low back pain with right-sided sciatica (01/15/2018), Allergic rhinitis, Asthma, Asthma, extrinsic, Asthma, intrinsic, CHF (congestive heart failure), Chronic heart failure with preserved ejection fraction, CKD stage 3 (10/29/2019), COPD (chronic obstructive pulmonary disease), Diverticulitis, Emphysema of lung, Essential hypertension, GERD (gastroesophageal reflux disease), Hemoptysis, History of fungal pneumonia (12/02/2021), Hyperlipidemia, Idiopathic chronic gout of multiple sites without tophus (05/20/2016), Pneumonia, Primary osteoarthritis of right knee (01/16/2017), Sleep apnea, Sleep apnea, obstructive, and Vitamin D deficiency (01/13/2016).    Hospital [id number] was admitted at [facility] 12/8 through 12/11/2024.  She had an acute exacerbation of COPD.  There is some concern regarding pneumonia.  Her chest x-ray suggested atelectasis versus pneumonia.  During her hospital stay she did well.  She was treated appropriately with steroids antibiotics and routine pulmonary care.  Upon coming home she finished her antibiotics and her taper of prednisone.  Since she has been home she states breathing wise she is doing much better.  She does have some persistent fatigue worse than normal.        PHQ-2 Depression Screening  Little interest or pleasure in doing things?     Feeling down, depressed, or hopeless?     PHQ-2 Total Score     PHQ-9 Depression Screening  Little interest or pleasure in doing things?     Feeling down, depressed, or hopeless?     Trouble falling or staying asleep, or sleeping too much?     Feeling tired or having little energy?     Poor appetite or overeating?     Feeling bad about yourself - or that you are a failure or have let yourself or your family down?     Trouble concentrating on things, such as reading the newspaper  or watching television?     Moving or speaking so slowly that other people could have noticed? Or the opposite - being so fidgety or restless that you have been moving around a lot more than usual?     Thoughts that you would be better off dead, or of hurting yourself in some way?     PHQ-9 Total Score     If you checked off any problems, how difficult have these problems made it for you to do your work, take care of things at home, or get along with other people?       Allergies   Allergen Reactions    Ibuprofen Hives    Penicillins Other (See Comments) and Seizure     1. When was your reaction? < 10 years ago  2. Did your reaction happen after the first dose or after several doses? Do not know  3. Did your reaction require ED or hospital care to manage your reaction? Do not know  4. Did your reaction require treatment with epinephrine? Do not know  5. Have you taken amoxicillin (Amoxil) or amoxicillin-clavulanate (Augmentin) without issue since? No  6. Have you taken cephalexin (Keflex) without issue since?  No     Nsaids Other (See Comments)     CKD    Cephalosporins Rash    Sulfa Antibiotics Rash       Prior to Admission medications    Medication Sig Start Date End Date Taking? Authorizing Provider   azelastine (ASTELIN) 0.1 % nasal spray 2 sprays into the nostril(s) as directed by provider 2 (Two) Times a Day. Use in each nostril as directed 1/3/24  Yes Santino Baez MD   Budeson-Glycopyrrol-Formoterol (BREZTRI) 160-9-4.8 MCG/ACT aerosol inhaler Inhale 2 puffs 2 (Two) Times a Day. 5/9/24  Yes Shanell Murray APRN   D3-1000 25 MCG (1000 UT) capsule TAKE 1 CAPSULE BY MOUTH EVERY DAY 10/20/22  Yes Nick Patel, DO   famotidine (PEPCID) 20 MG tablet Take 1 tablet by mouth every night at bedtime. 5/8/24  Yes Shanell Murray APRN   fexofenadine (ALLEGRA) 180 MG tablet Take 1 tablet by mouth Daily. 9/23/24  Yes Santino Baez MD   gabapentin (NEURONTIN) 300 MG capsule 1 capsule every morning and 2 capsule  every evening.  Patient taking differently: Take 1 capsule by mouth Every Morning. 1 capsule every morning and 2 capsule every evening. 11/24/24  Yes Nick Patel DO   ipratropium-albuterol (DUO-NEB) 0.5-2.5 mg/3 ml nebulizer USE 3 ML VIA NEBULIZER FOUR TIMES DAILY AS NEEDED FOR WHEEZING  Patient taking differently: Take 3 mL by nebulization 4 (Four) Times a Day. 9/23/24  Yes Santino Baez MD   losartan (COZAAR) 100 MG tablet Take 1 tablet by mouth Daily. 5/8/24  Yes Nick Patel DO   metoprolol succinate XL (TOPROL-XL) 100 MG 24 hr tablet Take 1 tablet by mouth Daily. Hold if heartrate is <60. 5/22/24  Yes Nick Patel DO   montelukast (SINGULAIR) 10 MG tablet Take 1 tablet by mouth Every Night.  Patient taking differently: Take 1 tablet by mouth Every Morning. 9/23/24  Yes Santino Baez MD   spironolactone (ALDACTONE) 25 MG tablet Take 1 tablet by mouth 3 (Three) Times a Week. Monday,Wednesday,Friday 7/3/24  Yes Nick Patel DO   gabapentin (NEURONTIN) 300 MG capsule Take 2 capsules by mouth Every Night. 1 capsule every morning and 2 capsule every evening.  12/26/24 Yes Rene Robert MD   predniSONE (DELTASONE) 20 MG tablet Take 2 tablets by mouth Daily With Breakfast for 3 days, THEN 1 tablet Daily With Breakfast for 5 days, THEN 0.5 tablets Daily With Breakfast for 5 days. 12/12/24 12/26/24  Justine Nicholson MD        Patient Active Problem List   Diagnosis    Family history of colon cancer    Chronic obstructive pulmonary disease    Essential hypertension    Hyperlipidemia    CKD (chronic kidney disease) stage 3, GFR 30-59 ml/min    Chronic heart failure with preserved ejection fraction    CARMEN (obstructive sleep apnea)    Seasonal allergies    Gastroesophageal reflux disease    Bronchitis, mucopurulent recurrent    Tobacco abuse, in remission    Pulmonary nodule    PND (post-nasal drip)    Status post fall    Allergic rhinitis    Asthma    Closed  fracture of metatarsal bone    Hemoptysis    Idiopathic chronic gout of multiple sites without tophus    Primary localized osteoarthritis    Vitamin D deficiency    Morbid (severe) obesity due to excess calories    Umbilical hernia without obstruction and without gangrene    Oral thrush    Arthritis of knee    Pre-diabetes    COPD (chronic obstructive pulmonary disease)    Pneumonia    Chronic maxillary sinusitis        Past Surgical History:   Procedure Laterality Date    BRONCHOSCOPY N/A 2024    Procedure: BRONCHOSCOPY WITH BRONCHOALVEOLAR LAVAGE, WASHING, AIRWAY INSPECTION;  Surgeon: Santino Baez MD;  Location: [facility] ENDOSCOPY;  Service: Pulmonary;  Laterality: N/A;  MUCOUS PLUGGING, BRONCHITIS    CATARACT EXTRACTION Right     COLONOSCOPY      COLONOSCOPY N/A 10/12/2021    Procedure: COLONOSCOPY;  Surgeon: Jorge Diane MD;  Location: [facility] ENDOSCOPY;  Service: Gastroenterology;  Laterality: N/A;  DIVERTICULOSIS    ENDOSCOPY      EYE SURGERY      cataract right eye    OTHER SURGICAL HISTORY      Fatty tumor removed off back    RETINAL DETACHMENT REPAIR Right     hole in retina repair    TOTAL KNEE ARTHROPLASTY Right 10/31/2023    Procedure: TOTAL KNEE ARTHROPLASTY;  Surgeon: Celso Prakash MD;  Location[facility];  Service: Orthopedics;  Laterality: Right;       Social History     Socioeconomic History    Marital status:    Tobacco Use    Smoking status: Former     Current packs/day: 0.00     Average packs/day: 0.5 packs/day for 32.0 years (16.0 ttl pk-yrs)     Types: Cigarettes     Start date: 1963     Quit date: 1995     Years since quittin.0     Passive exposure: Never    Smokeless tobacco: Never   Vaping Use    Vaping status: Never Used   Substance and Sexual Activity    Alcohol use: Never    Drug use: Never    Sexual activity: Defer       Family History   Problem Relation Age of Onset    Colon cancer Mother 61    Cancer Mother     Heart disease Mother   "   Heart failure Father     Asthma Father     Heart attack Father     Hyperlipidemia Father     Emphysema Father     Diabetes Brother     Emphysema Brother     Breast cancer Maternal Grandmother     Stroke Paternal Grandfather     Malig Hyperthermia Neg Hx        Family history, surgical history, past medical history, Allergies and meds reviewed with patient today and updated in [facility] EMR.     ROS:  Review of Systems   Constitutional:  Positive for fatigue. Negative for chills and fever.   HENT:  Positive for congestion and postnasal drip.    Respiratory:  Positive for cough, shortness of breath and wheezing. Negative for chest tightness.    Cardiovascular:  Positive for leg swelling. Negative for chest pain.       OBJECTIVE:  Vitals:    12/26/24 1024   BP: 133/76   BP Location: Left arm   Patient Position: Sitting   Pulse: 76   Temp: 97 °F (36.1 °C)   SpO2: 94%   Weight: 101 kg (222 lb)   Height: 157.5 cm (62.01\")     No results found.   Body mass index is 40.59 kg/m².  No LMP recorded. Patient is postmenopausal.    The 10-year ASCVD risk score (Karthikeyan DK, et al., 2019) is: 53.5%    Values used to calculate the score:      Age: 79 years      Sex: Female      Is Non- : No      Diabetic: Yes      Tobacco smoker: No      Systolic Blood Pressure: 133 mmHg      Is BP treated: Yes      HDL Cholesterol: 49 mg/dL      Total Cholesterol: 187 mg/dL     Physical Exam  Vitals and nursing note reviewed.   Constitutional:       General: She is not in acute distress.     Appearance: Normal appearance. She is obese.   HENT:      Head: Normocephalic.        Comments: Swelling over maxillary sinuses, non-tender     Right Ear: Tympanic membrane, ear canal and external ear normal.      Left Ear: Tympanic membrane, ear canal and external ear normal.      Nose: Nose normal.      Mouth/Throat:      Mouth: Mucous membranes are moist.      Pharynx: Oropharynx is clear.   Eyes:      General: No scleral icterus.     " Conjunctiva/sclera: Conjunctivae normal.      Pupils: Pupils are equal, round, and reactive to light.   Cardiovascular:      Rate and Rhythm: Normal rate and regular rhythm.      Pulses: Normal pulses.      Heart sounds: Normal heart sounds. No murmur heard.  Pulmonary:      Effort: Pulmonary effort is normal.      Breath sounds: Normal breath sounds. No wheezing, rhonchi or rales.   Musculoskeletal:      Cervical back: Neck supple. No rigidity or tenderness.   Lymphadenopathy:      Cervical: No cervical adenopathy.   Skin:     General: Skin is warm and dry.      Coloration: Skin is not jaundiced.      Findings: No rash.   Neurological:      General: No focal deficit present.      Mental Status: She is alert and oriented to person, place, and time.   Psychiatric:         Mood and Affect: Mood normal.         Thought Content: Thought content normal.         Judgment: Judgment normal.         Procedures    No results displayed because visit has over 200 results.      Admission on 12/06/2024, Discharged on 12/06/2024   Component Date Value Ref Range Status    QT Interval 12/06/2024 388  ms Final    QTC Interval 12/06/2024 456  ms Final    Glucose 12/06/2024 172 (H)  65 - 99 mg/dL Final    BUN 12/06/2024 21  8 - 23 mg/dL Final    Creatinine 12/06/2024 1.14 (H)  0.57 - 1.00 mg/dL Final    Sodium 12/06/2024 139  136 - 145 mmol/L Final    Potassium 12/06/2024 3.9  3.5 - 5.2 mmol/L Final    Chloride 12/06/2024 105  98 - 107 mmol/L Final    CO2 12/06/2024 18.3 (L)  22.0 - 29.0 mmol/L Final    Calcium 12/06/2024 9.5  8.6 - 10.5 mg/dL Final    Total Protein 12/06/2024 7.0  6.0 - 8.5 g/dL Final    Albumin 12/06/2024 4.1  3.5 - 5.2 g/dL Final    ALT (SGPT) 12/06/2024 9  1 - 33 U/L Final    AST (SGOT) 12/06/2024 17  1 - 32 U/L Final    Alkaline Phosphatase 12/06/2024 89  39 - 117 U/L Final    Total Bilirubin 12/06/2024 0.4  0.0 - 1.2 mg/dL Final    Globulin 12/06/2024 2.9  gm/dL Final    A/G Ratio 12/06/2024 1.4  g/dL Final     BUN/Creatinine Ratio 12/06/2024 18.4  7.0 - 25.0 Final    Anion Gap 12/06/2024 15.7 (H)  5.0 - 15.0 mmol/L Final    eGFR 12/06/2024 49.1 (L)  >60.0 mL/min/1.73 Final    proBNP 12/06/2024 311.6  0.0 - 1,800.0 pg/mL Final    HS Troponin T 12/06/2024 11  <14 ng/L Final    Extra Tube 12/06/2024 Hold for add-ons.   Final    Auto resulted.    Extra Tube 12/06/2024 hold for add-on   Final    Auto resulted    Extra Tube 12/06/2024 Hold for add-ons.   Final    Auto resulted.    Extra Tube 12/06/2024 Hold for add-ons.   Final    Auto resulted    WBC 12/06/2024 15.82 (H)  3.40 - 10.80 10*3/mm3 Final    RBC 12/06/2024 4.39  3.77 - 5.28 10*6/mm3 Final    Hemoglobin 12/06/2024 12.2  12.0 - 15.9 g/dL Final    Hematocrit 12/06/2024 38.2  34.0 - 46.6 % Final    MCV 12/06/2024 87.0  79.0 - 97.0 fL Final    MCH 12/06/2024 27.8  26.6 - 33.0 pg Final    MCHC 12/06/2024 31.9  31.5 - 35.7 g/dL Final    RDW 12/06/2024 14.9  12.3 - 15.4 % Final    RDW-SD 12/06/2024 47.9  37.0 - 54.0 fl Final    MPV 12/06/2024 12.2 (H)  6.0 - 12.0 fL Final    Platelets 12/06/2024 243  140 - 450 10*3/mm3 Final    Neutrophil % 12/06/2024 88.1 (H)  42.7 - 76.0 % Final    Lymphocyte % 12/06/2024 8.7 (L)  19.6 - 45.3 % Final    Monocyte % 12/06/2024 2.6 (L)  5.0 - 12.0 % Final    Eosinophil % 12/06/2024 0.0 (L)  0.3 - 6.2 % Final    Basophil % 12/06/2024 0.1  0.0 - 1.5 % Final    Immature Grans % 12/06/2024 0.5  0.0 - 0.5 % Final    Neutrophils, Absolute 12/06/2024 13.94 (H)  1.70 - 7.00 10*3/mm3 Final    Lymphocytes, Absolute 12/06/2024 1.37  0.70 - 3.10 10*3/mm3 Final    Monocytes, Absolute 12/06/2024 0.41  0.10 - 0.90 10*3/mm3 Final    Eosinophils, Absolute 12/06/2024 0.00  0.00 - 0.40 10*3/mm3 Final    Basophils, Absolute 12/06/2024 0.02  0.00 - 0.20 10*3/mm3 Final    Immature Grans, Absolute 12/06/2024 0.08 (H)  0.00 - 0.05 10*3/mm3 Final    nRBC 12/06/2024 0.0  0.0 - 0.2 /100 WBC Final    COVID19 12/06/2024 Not Detected  Not Detected - Ref. Range Final     Influenza A PCR 12/06/2024 Not Detected  Not Detected Final    Influenza B PCR 12/06/2024 Not Detected  Not Detected Final    RSV, PCR 12/06/2024 Not Detected  Not Detected Final    Magnesium 12/06/2024 1.9  1.6 - 2.4 mg/dL Final       ASSESSMENT/ PLAN:    Diagnoses and all orders for this visit:    1. Hospital discharge follow-up (Primary)  Assessment & Plan:  She is improved since her hospital discharge.  She does have a follow-up with pulmonary next week.      2. Pneumonia of both lungs due to infectious organism, unspecified part of lung  Assessment & Plan:  They felt in her hospitalization that her chest x-ray he may have represented pneumonia more than atelectasis.  She has finished all of her antibiotics and her steroids.  She will continue her routine pulmonary care and follow-up with pulmonology.    Orders:  -     CBC (No Diff)    3. Chronic heart failure with preserved ejection fraction  Assessment & Plan:  She does have some increased swelling.  Overall her lungs are clear and her shortness of breath is actually improved.  Will check a BNP to make sure that is fine.    Orders:  -     Basic Metabolic Panel  -     proBNP; Future    4. Chronic maxillary sinusitis  Assessment & Plan:  She has some swelling over her maxillary sinuses but they are nontender.  She does have some chronic congestion and drainage.  With all the antibiotic she has been on recently and steroids I will hold off on any additional antibiotics but will get a CT of her sinuses to assess that.    Orders:  -     CT Sinus Without Contrast; Future               Orders Placed Today:     No orders of the defined types were placed in this encounter.       Management Plan:     An After Visit Summary was printed and given to the patient at discharge.    Follow-up: Return for Recheck, Next scheduled follow up.    Nick Patel DO 12/26/2024 10:52 EST  This note was electronically signed.

## 2024-12-26 NOTE — ASSESSMENT & PLAN NOTE
She does have some increased swelling.  Overall her lungs are clear and her shortness of breath is actually improved.  Will check a BNP to make sure that is fine.

## 2024-12-28 LAB
QT INTERVAL: 375 MS
QTC INTERVAL: 444 MS

## 2025-01-02 NOTE — PROGRESS NOTES
Primary Care Provider  Nick Patel DO   Referring Provider  No ref. provider found    Patient Complaint  Pneumonia (Hospital follow up), Shortness of Breath, and Cough      Subjective       History of Presenting Illness  Julia Rios is a pleasant 79 y.o. female  of  Dr. Baez's who presents to [facility] with history of hypertension, hyperlipidemia CKD 3, GERD, CHF with preserved EF, and COPD, pneumonia, here for hospital followup appointment. Patient was at [facility] 12/8-12/11/2024 for pneumonia, COPD exacerbation. Hospital course includes the following: Julia Rios is a 79 y.o. female with past medical history of hypertension, hyperlipidemia CKD 3, GERD, CHF with preserved EF, and COPD who presents to the emergency department for evaluation of shortness of breath. The patient notes that she is chronically short of breath from asthma/COPD. The patient states that she started having occultly breathing about 3 days ago. Patient states that she felt it was a COPD exacerbation. She did discuss this with her pulmonologist (Dr. Baez) by phone who put her on Zithromax and a steroid taper. She was on 60 mg for 2 days and then 50 mg for 2 days. She is currently on day 4. She is using her nebulizer as requested. Continues to have worsening shortness of breath. She also complains of worsening cough. She denies any improvement with the medications. She was found to have evidence of pneumonia and was admitted for further care. She was started on Levaquin due to failure of azithromycin and allergy profile. Started on IV steroids, frequent bronchodilators.  Patient's respiratory status improved.  She saturated well in room air throughout.  Antibiotic was switched to Levaquin to complete 5-day course of antibiotic.  S/p IV Solu-Medrol.  Switch to prednisone 40 mg daily.  Given patient with significant improvement in her respiratory status, plan to discharge  her today was made.     Patient is being discharged home today.  She is stable at the time of discharge.  Patient will follow-up with PCP in 3-7 days, needs CBC nutrition during follow-up.  Pulmonology follow-up as outpatient.  Advised to be compliant with medications and diet.  Fall precautions.      Patient presents today doing well having been in the hospital.  She states her breathing is improved from when she went to the hospital.  She still has shortness of air with minimal exertional activity such as getting in the shower.  She uses her rescue inhaler and/or nebulizer at least 2 times a day.  Patient states that she is using her CPAP machine but notices as soon as she takes it off in the morning she feels very short of air and she has to do a breathing treatment right away.  She states her sleep study was about 10 years ago.  At present time patient denies  wheezing, headaches, chest pain, weight loss or hemoptysis. Patient denies fevers, chills and night sweats. Julia Rios is able to perform ADLs with some difficulty.      I have personally reviewed the review of systems, past family, social, medical and surgical histories; and agree with their findings.      Review of Systems   Constitutional: Negative.    HENT: Negative.     Respiratory:  Positive for cough and shortness of breath.    Cardiovascular: Negative.    Musculoskeletal: Negative.    Neurological: Negative.    Psychiatric/Behavioral: Negative.           Family History   Problem Relation Age of Onset    Colon cancer Mother 61    Cancer Mother     Heart disease Mother     Heart failure Father     Asthma Father     Heart attack Father     Hyperlipidemia Father     Emphysema Father     Diabetes Brother     Emphysema Brother     Breast cancer Maternal Grandmother     Stroke Paternal Grandfather     Malig Hyperthermia Neg Hx         Social History     Socioeconomic History    Marital status:    Tobacco Use    Smoking status: Former      Current packs/day: 0.00     Average packs/day: 0.5 packs/day for 32.0 years (16.0 ttl pk-yrs)     Types: Cigarettes     Start date: 1963     Quit date: 1995     Years since quittin.0     Passive exposure: Never    Smokeless tobacco: Never   Vaping Use    Vaping status: Never Used   Substance and Sexual Activity    Alcohol use: Never    Drug use: Never    Sexual activity: Defer        Past Medical History:   Diagnosis Date    Acute right-sided low back pain with right-sided sciatica 01/15/2018    Allergic rhinitis     Asthma     Asthma, extrinsic     Asthma, intrinsic     CHF (congestive heart failure)     Chronic heart failure with preserved ejection fraction     20 echocardiogram: 1.  Normal ejection fraction of 55%. 2.  Mild left ventricular hypertrophy. 3.  No significant valvular heart issues.     CKD stage 3 10/29/2019    COPD (chronic obstructive pulmonary disease)     Diverticulitis     Emphysema of lung     Essential hypertension     GERD (gastroesophageal reflux disease)     Hemoptysis     non cancerous    History of fungal pneumonia 2021    Hyperlipidemia     Idiopathic chronic gout of multiple sites without tophus 2016    Pneumonia     Primary osteoarthritis of right knee 2017    Sleep apnea     CPAP    Sleep apnea, obstructive     Vitamin D deficiency 2016        Immunization History   Administered Date(s) Administered    COVID-19 (MODERNA) 1st,2nd,3rd Dose Monovalent 2021, 2021, 2021, 2021    Fluzone High-Dose 65+yrs 2021, 2022, 2024    Fluzone Quad >6mos (Multi-dose) 10/01/2020    Pneumococcal Conjugate 13-Valent (PCV13) 2015    Pneumococcal Conjugate 20-Valent (PCV20) 2023    Pneumococcal Polysaccharide (PPSV23) 2010       Allergies   Allergen Reactions    Ibuprofen Hives    Penicillins Other (See Comments) and Seizure     1. When was your reaction? < 10 years ago  2. Did your reaction happen after  the first dose or after several doses? Do not know  3. Did your reaction require ED or hospital care to manage your reaction? Do not know  4. Did your reaction require treatment with epinephrine? Do not know  5. Have you taken amoxicillin (Amoxil) or amoxicillin-clavulanate (Augmentin) without issue since? No  6. Have you taken cephalexin (Keflex) without issue since?  No     Nsaids Other (See Comments)     CKD    Cephalosporins Rash    Sulfa Antibiotics Rash          Current Outpatient Medications:     azelastine (ASTELIN) 0.1 % nasal spray, 2 sprays into the nostril(s) as directed by provider 2 (Two) Times a Day. Use in each nostril as directed, Disp: 30 mL, Rfl: 6    Budeson-Glycopyrrol-Formoterol (BREZTRI) 160-9-4.8 MCG/ACT aerosol inhaler, Inhale 2 puffs 2 (Two) Times a Day., Disp: 10.7 g, Rfl: 11    D3-1000 25 MCG (1000 UT) capsule, TAKE 1 CAPSULE BY MOUTH EVERY DAY, Disp: 90 capsule, Rfl: 3    famotidine (PEPCID) 20 MG tablet, Take 1 tablet by mouth every night at bedtime., Disp: 30 tablet, Rfl: 11    fexofenadine (ALLEGRA) 180 MG tablet, Take 1 tablet by mouth Daily., Disp: 90 tablet, Rfl: 3    gabapentin (NEURONTIN) 300 MG capsule, 1 capsule every morning and 2 capsule every evening. (Patient taking differently: Take 1 capsule by mouth Every Morning. 1 capsule every morning and 2 capsule every evening.), Disp: 270 capsule, Rfl: 1    ipratropium-albuterol (DUO-NEB) 0.5-2.5 mg/3 ml nebulizer, USE 3 ML VIA NEBULIZER FOUR TIMES DAILY AS NEEDED FOR WHEEZING (Patient taking differently: Take 3 mL by nebulization 4 (Four) Times a Day.), Disp: 1620 mL, Rfl: 3    losartan (COZAAR) 100 MG tablet, Take 1 tablet by mouth Daily., Disp: 90 tablet, Rfl: 3    metoprolol succinate XL (TOPROL-XL) 100 MG 24 hr tablet, Take 1 tablet by mouth Daily. Hold if heartrate is <60., Disp: 90 tablet, Rfl: 3    montelukast (SINGULAIR) 10 MG tablet, Take 1 tablet by mouth Every Night. (Patient taking differently: Take 1 tablet by mouth  "Every Morning.), Disp: 90 tablet, Rfl: 3    spironolactone (ALDACTONE) 25 MG tablet, Take 1 tablet by mouth 3 (Three) Times a Week. Monday,Wednesday,Friday, Disp: 36 tablet, Rfl: 3    azithromycin (ZITHROMAX) 250 MG tablet, Take 2 by mouth today then 1 daily for 4 days, Disp: 6 tablet, Rfl: 0    predniSONE (DELTASONE) 10 MG tablet, 6 daily x 2 days, 5 daily x 2 days, 4 daily x 2 days, 3 daily x 2 days, 2 daily x 2 days, 1 daily x 2 days, Disp: 42 tablet, Rfl: 0         Vital Signs   /81 (BP Location: Left arm, Patient Position: Sitting, Cuff Size: Adult)   Pulse 91   Temp 97.2 °F (36.2 °C) (Tympanic)   Resp 18   Ht 157.5 cm (62.01\")   Wt 101 kg (222 lb 12.8 oz)   SpO2 90% Comment: room air  BMI 40.74 kg/m²       Objective     Physical Exam  Vitals reviewed.   Constitutional:       General: She is not in acute distress.     Appearance: Normal appearance. She is obese. She is not ill-appearing.   HENT:      Head: Normocephalic and atraumatic.      Nose: Nose normal.      Mouth/Throat:      Mouth: Mucous membranes are moist.      Pharynx: Oropharynx is clear.   Eyes:      Extraocular Movements: Extraocular movements intact.      Conjunctiva/sclera: Conjunctivae normal.      Pupils: Pupils are equal, round, and reactive to light.   Cardiovascular:      Rate and Rhythm: Normal rate and regular rhythm.      Pulses: Normal pulses.      Heart sounds: Normal heart sounds.   Pulmonary:      Effort: Pulmonary effort is normal. No respiratory distress.      Breath sounds: Normal breath sounds. No stridor. No wheezing, rhonchi or rales.   Abdominal:      General: Bowel sounds are normal.   Musculoskeletal:         General: Normal range of motion.      Cervical back: Normal range of motion and neck supple.   Skin:     General: Skin is warm and dry.   Neurological:      Mental Status: She is alert and oriented to person, place, and time.   Psychiatric:         Behavior: Behavior normal.         Results Review  I have " personally reviewed the prior office notes, hospital records, labs, and diagnostics.    XR Chest 1 View [[id number]] (Order [id number])  Order  Status: Final result     Study Notes     Ashwini Velez on 12/8/2024  6:18 PM EST   Cough; Shortness of Breath   Jacqueline Beckford on 12/8/2024  5:36 PM EST   NURSE/TECH @ 1736     Appointment Information    PACS Images     Radiology Images  Study Result    Narrative & Impression   XR CHEST 1 VW     Date of Exam: 12/8/2024 6:02 PM EST     Indication: SOA Triage Protocol     Comparison: Chest radiograph dated 12/6/2024     Findings:  The cardiomediastinal silhouette is within normal limits. There is aortic arch atherosclerotic calcification. Pulmonary vascularity appears normal. There is bibasilar airspace opacity representing atelectasis or pneumonia. There is no pleural effusion or   pneumothorax. There are degenerative changes of the thoracic spine.     IMPRESSION:  Impression:  Bibasilar airspace opacities representing atelectasis or pneumonia.           Electronically Signed: Dakota Summers    12/8/2024 6:30 PM EST    Workstation ID: [id number]       Assessment         Patient Active Problem List   Diagnosis    Family history of colon cancer    Chronic obstructive pulmonary disease    Essential hypertension    Hyperlipidemia    CKD (chronic kidney disease) stage 3, GFR 30-59 ml/min    Chronic heart failure with preserved ejection fraction    CARMEN (obstructive sleep apnea)    Seasonal allergies    Gastroesophageal reflux disease    Bronchitis, mucopurulent recurrent    Tobacco abuse, in remission    Pulmonary nodule    PND (post-nasal drip)    Status post fall    Allergic rhinitis    Asthma    Closed fracture of metatarsal bone    Hemoptysis    Idiopathic chronic gout of multiple sites without tophus    Primary localized osteoarthritis    Vitamin D deficiency    Morbid (severe) obesity due to excess calories    Umbilical hernia without obstruction and without gangrene     Oral thrush    Arthritis of knee    Pre-diabetes    COPD (chronic obstructive pulmonary disease)    Pneumonia    Chronic maxillary sinusitis        Plan     Diagnoses and all orders for this visit:    1. Chronic obstructive pulmonary disease with acute exacerbation (Primary)  Comments:  Continue with Breztri; refill on steroid antibiotic to prescribe for patient to have on hand for future exacerbations with instructions in use  Orders:  -     azithromycin (ZITHROMAX) 250 MG tablet; Take 2 by mouth today then 1 daily for 4 days  Dispense: 6 tablet; Refill: 0  -     predniSONE (DELTASONE) 10 MG tablet; 6 daily x 2 days, 5 daily x 2 days, 4 daily x 2 days, 3 daily x 2 days, 2 daily x 2 days, 1 daily x 2 days  Dispense: 42 tablet; Refill: 0    2. Pneumonia due to infectious organism, unspecified laterality, unspecified part of lung  Comments:  Patient has completed antibiotics and steroid from hospitalization.    3. Shortness of breath  Comments:  Continue with albuterol and duo nebulizer; patient states she had an overnight sleep pulse oximetry test, results pending    4. Chronic cough  Comments:  Continue with flutter valve    5. CARMEN (obstructive sleep apnea)  Comments:  We will do a sleep study with titration to ensure patient settings are appropriate for her as her last sleep study was 10 years ago.  Orders:  -     Polysomnography 4 or More Parameters With CPAP; Future             Smoking status:  reports that she quit smoking about 30 years ago. Her smoking use included cigarettes. She started smoking about 62 years ago. She has a 16 pack-year smoking history. She has never been exposed to tobacco smoke. She has never used smokeless tobacco.    Vaccination status: Reviewed  Immunization History   Administered Date(s) Administered    COVID-19 (MODERNA) 1st,2nd,3rd Dose Monovalent 01/28/2021, 01/28/2021, 02/23/2021, 02/23/2021    Fluzone High-Dose 65+yrs 12/02/2021, 12/28/2022, 01/03/2024    Fluzone Quad >6mos  (Multi-dose) 10/01/2020    Pneumococcal Conjugate 13-Valent (PCV13) 02/11/2015    Pneumococcal Conjugate 20-Valent (PCV20) 07/20/2023    Pneumococcal Polysaccharide (PPSV23) 05/01/2010        Medications personally reviewed    Follow Up  Return in about 2 months (around 3/3/2025).    Patient was given instructions and counseling regarding her condition or for health maintenance advice. Please see specific information pulled into the AVS if appropriate.     I spent 20 minutes caring for Julia Rios on this date of service. This time includes time spent by me in the following activities:preparing for the visit, reviewing tests, obtaining and/or reviewing a separately obtained history, performing a medically appropriate examination and/or evaluation, counseling and educating the patient/family/caregiver, ordering medications, tests, or procedures, documenting information in the medical record, independently interpreting results and communicating that information with the patient/family/caregiver and answered questions family members, discuss medications.

## 2025-01-03 ENCOUNTER — OFFICE VISIT (OUTPATIENT)
Dept: PULMONOLOGY | Facility: CLINIC | Age: 80
End: 2025-01-03
Payer: MEDICARE

## 2025-01-03 VITALS
TEMPERATURE: 97.2 F | OXYGEN SATURATION: 90 % | BODY MASS INDEX: 41 KG/M2 | RESPIRATION RATE: 18 BRPM | HEIGHT: 62 IN | SYSTOLIC BLOOD PRESSURE: 139 MMHG | WEIGHT: 222.8 LBS | DIASTOLIC BLOOD PRESSURE: 81 MMHG | HEART RATE: 91 BPM

## 2025-01-03 DIAGNOSIS — R06.02 SHORTNESS OF BREATH: ICD-10-CM

## 2025-01-03 DIAGNOSIS — G47.33 OSA (OBSTRUCTIVE SLEEP APNEA): ICD-10-CM

## 2025-01-03 DIAGNOSIS — R05.3 CHRONIC COUGH: ICD-10-CM

## 2025-01-03 DIAGNOSIS — J44.1 CHRONIC OBSTRUCTIVE PULMONARY DISEASE WITH ACUTE EXACERBATION: Primary | ICD-10-CM

## 2025-01-03 DIAGNOSIS — J18.9 PNEUMONIA DUE TO INFECTIOUS ORGANISM, UNSPECIFIED LATERALITY, UNSPECIFIED PART OF LUNG: ICD-10-CM

## 2025-01-03 RX ORDER — AZITHROMYCIN 250 MG/1
TABLET, FILM COATED ORAL
Qty: 6 TABLET | Refills: 0 | Status: SHIPPED | OUTPATIENT
Start: 2025-01-03

## 2025-01-03 RX ORDER — PREDNISONE 10 MG/1
TABLET ORAL
Qty: 42 TABLET | Refills: 0 | Status: SHIPPED | OUTPATIENT
Start: 2025-01-03

## 2025-01-07 DIAGNOSIS — G47.34 NOCTURNAL HYPOXIA: Primary | ICD-10-CM

## 2025-01-08 ENCOUNTER — APPOINTMENT (OUTPATIENT)
Dept: GENERAL RADIOLOGY | Facility: HOSPITAL | Age: 80
DRG: 175 | End: 2025-01-08
Payer: MEDICARE

## 2025-01-08 ENCOUNTER — HOSPITAL ENCOUNTER (OUTPATIENT)
Dept: CT IMAGING | Facility: HOSPITAL | Age: 80
Discharge: HOME | End: 2025-01-08
Admitting: FAMILY MEDICINE
Payer: MEDICARE

## 2025-01-08 ENCOUNTER — APPOINTMENT (OUTPATIENT)
Dept: CT IMAGING | Facility: HOSPITAL | Age: 80
DRG: 175 | End: 2025-01-08
Payer: MEDICARE

## 2025-01-08 ENCOUNTER — APPOINTMENT (OUTPATIENT)
Dept: CARDIOLOGY | Facility: HOSPITAL | Age: 80
DRG: 175 | End: 2025-01-08
Payer: MEDICARE

## 2025-01-08 ENCOUNTER — HOSPITAL ENCOUNTER (INPATIENT)
Facility: HOSPITAL | Age: 80
LOS: 5 days | Discharge: HOME | DRG: 175 | End: 2025-01-13
Attending: EMERGENCY MEDICINE | Admitting: INTERNAL MEDICINE
Payer: MEDICARE

## 2025-01-08 ENCOUNTER — APPOINTMENT (OUTPATIENT)
Facility: HOSPITAL | Age: 80
DRG: 175 | End: 2025-01-08
Payer: MEDICARE

## 2025-01-08 DIAGNOSIS — J96.01 ACUTE RESPIRATORY FAILURE WITH HYPOXIA: Primary | ICD-10-CM

## 2025-01-08 DIAGNOSIS — I26.09 ACUTE PULMONARY EMBOLISM WITH ACUTE COR PULMONALE, UNSPECIFIED PULMONARY EMBOLISM TYPE: ICD-10-CM

## 2025-01-08 DIAGNOSIS — J32.0 CHRONIC MAXILLARY SINUSITIS: ICD-10-CM

## 2025-01-08 DIAGNOSIS — R26.2 DIFFICULTY WALKING: ICD-10-CM

## 2025-01-08 PROBLEM — I26.99 ACUTE PULMONARY EMBOLISM: Status: ACTIVE | Noted: 2025-01-08

## 2025-01-08 LAB
ALBUMIN SERPL-MCNC: 4 G/DL (ref 3.5–5.2)
ALBUMIN/GLOB SERPL: 1.4 G/DL
ALP SERPL-CCNC: 82 U/L (ref 39–117)
ALT SERPL W P-5'-P-CCNC: 10 U/L (ref 1–33)
ANION GAP SERPL CALCULATED.3IONS-SCNC: 14.4 MMOL/L (ref 5–15)
APTT PPP: 184.3 SECONDS (ref 78–95.9)
APTT PPP: 27.3 SECONDS (ref 78–95.9)
ARTERIAL PATENCY WRIST A: ABNORMAL
AST SERPL-CCNC: 18 U/L (ref 1–32)
ATMOSPHERIC PRESS: 753.5 MMHG
BASE EXCESS BLDA CALC-SCNC: -2.9 MMOL/L (ref -2–2)
BASOPHILS # BLD AUTO: 0.06 10*3/MM3 (ref 0–0.2)
BASOPHILS NFR BLD AUTO: 0.6 % (ref 0–1.5)
BDY SITE: ABNORMAL
BILIRUB SERPL-MCNC: 0.6 MG/DL (ref 0–1.2)
BUN SERPL-MCNC: 14 MG/DL (ref 8–23)
BUN/CREAT SERPL: 12.5 (ref 7–25)
CA-I BLDA-SCNC: 1.19 MMOL/L (ref 1.13–1.32)
CALCIUM SPEC-SCNC: 9.2 MG/DL (ref 8.6–10.5)
CHLORIDE BLDA-SCNC: 110 MMOL/L (ref 98–107)
CHLORIDE SERPL-SCNC: 107 MMOL/L (ref 98–107)
CO2 SERPL-SCNC: 20.6 MMOL/L (ref 22–29)
CREAT SERPL-MCNC: 1.12 MG/DL (ref 0.57–1)
D-LACTATE SERPL-SCNC: 1 MMOL/L
D-LACTATE SERPL-SCNC: 1.3 MMOL/L (ref 0.5–2)
DEPRECATED RDW RBC AUTO: 51.9 FL (ref 37–54)
EGFRCR SERPLBLD CKD-EPI 2021: 50.1 ML/MIN/1.73
EOSINOPHIL # BLD AUTO: 0.19 10*3/MM3 (ref 0–0.4)
EOSINOPHIL NFR BLD AUTO: 2 % (ref 0.3–6.2)
ERYTHROCYTE [DISTWIDTH] IN BLOOD BY AUTOMATED COUNT: 15.8 % (ref 12.3–15.4)
FLUAV SUBTYP SPEC NAA+PROBE: NOT DETECTED
FLUBV RNA ISLT QL NAA+PROBE: NOT DETECTED
GEN 5 1HR TROPONIN T REFLEX: 82 NG/L
GLOBULIN UR ELPH-MCNC: 2.8 GM/DL
GLUCOSE BLDC GLUCOMTR-MCNC: 122 MG/DL (ref 65–99)
GLUCOSE SERPL-MCNC: 112 MG/DL (ref 65–99)
HCO3 BLDA-SCNC: 20.6 MMOL/L (ref 22–26)
HCT VFR BLD AUTO: 39.1 % (ref 34–46.6)
HCT VFR BLD CALC: 38 % (ref 38–51)
HEMODILUTION: NO
HGB BLD-MCNC: 12 G/DL (ref 12–15.9)
HGB BLDA-MCNC: 12.9 G/DL (ref 12–18)
HOLD SPECIMEN: NORMAL
IMM GRANULOCYTES # BLD AUTO: 0.08 10*3/MM3 (ref 0–0.05)
IMM GRANULOCYTES NFR BLD AUTO: 0.8 % (ref 0–0.5)
INHALED O2 CONCENTRATION: 21 %
INR PPP: 1.11 (ref 0.86–1.15)
LYMPHOCYTES # BLD AUTO: 1.87 10*3/MM3 (ref 0.7–3.1)
LYMPHOCYTES NFR BLD AUTO: 19.9 % (ref 19.6–45.3)
Lab: ABNORMAL
MCH RBC QN AUTO: 27.5 PG (ref 26.6–33)
MCHC RBC AUTO-ENTMCNC: 30.7 G/DL (ref 31.5–35.7)
MCV RBC AUTO: 89.7 FL (ref 79–97)
MODALITY: ABNORMAL
MONOCYTES # BLD AUTO: 1.04 10*3/MM3 (ref 0.1–0.9)
MONOCYTES NFR BLD AUTO: 11 % (ref 5–12)
NEUTROPHILS NFR BLD AUTO: 6.18 10*3/MM3 (ref 1.7–7)
NEUTROPHILS NFR BLD AUTO: 65.7 % (ref 42.7–76)
NOTIFIED WHO: ABNORMAL
NRBC BLD AUTO-RTO: 0 /100 WBC (ref 0–0.2)
NT-PROBNP SERPL-MCNC: 256.1 PG/ML (ref 0–1800)
PCO2 BLDA: 31.3 MM HG (ref 35–45)
PH BLDA: 7.42 PH UNITS (ref 7.35–7.45)
PLATELET # BLD AUTO: 217 10*3/MM3 (ref 140–450)
PMV BLD AUTO: 12.3 FL (ref 6–12)
PO2 BLD: 203 MM[HG] (ref 0–500)
PO2 BLDA: 42.6 MM HG (ref 80–100)
POTASSIUM BLDA-SCNC: 3.6 MMOL/L (ref 3.5–5)
POTASSIUM SERPL-SCNC: 3.5 MMOL/L (ref 3.5–5.2)
PROT SERPL-MCNC: 6.8 G/DL (ref 6–8.5)
PROTHROMBIN TIME: 14.5 SECONDS (ref 11.8–14.9)
RBC # BLD AUTO: 4.36 10*6/MM3 (ref 3.77–5.28)
READ BACK: YES
RSV RNA NPH QL NAA+NON-PROBE: NOT DETECTED
SAO2 % BLDCOA: 80.1 % (ref 95–99)
SARS-COV-2 RNA RESP QL NAA+PROBE: NOT DETECTED
SODIUM BLD-SCNC: 142 MMOL/L (ref 131–143)
SODIUM SERPL-SCNC: 142 MMOL/L (ref 136–145)
TROPONIN T % DELTA: 8 %
TROPONIN T NUMERIC DELTA: 6 NG/L
TROPONIN T SERPL HS-MCNC: 76 NG/L
WBC NRBC COR # BLD AUTO: 9.42 10*3/MM3 (ref 3.4–10.8)
WHOLE BLOOD HOLD COAG: NORMAL
WHOLE BLOOD HOLD SPECIMEN: NORMAL

## 2025-01-08 PROCEDURE — 25010000002 METHYLPREDNISOLONE PER 125 MG: Performed by: EMERGENCY MEDICINE

## 2025-01-08 PROCEDURE — 25010000002 HEPARIN (PORCINE) 25000-0.45 UT/250ML-% SOLUTION: Performed by: EMERGENCY MEDICINE

## 2025-01-08 PROCEDURE — 94799 UNLISTED PULMONARY SVC/PX: CPT

## 2025-01-08 PROCEDURE — 93306 TTE W/DOPPLER COMPLETE: CPT | Performed by: INTERNAL MEDICINE

## 2025-01-08 PROCEDURE — 25510000001 IOPAMIDOL PER 1 ML: Performed by: EMERGENCY MEDICINE

## 2025-01-08 PROCEDURE — 87040 BLOOD CULTURE FOR BACTERIA: CPT | Performed by: EMERGENCY MEDICINE

## 2025-01-08 PROCEDURE — 82803 BLOOD GASES ANY COMBINATION: CPT

## 2025-01-08 PROCEDURE — 93306 TTE W/DOPPLER COMPLETE: CPT

## 2025-01-08 PROCEDURE — 82948 REAGENT STRIP/BLOOD GLUCOSE: CPT

## 2025-01-08 PROCEDURE — 73560 X-RAY EXAM OF KNEE 1 OR 2: CPT

## 2025-01-08 PROCEDURE — 83880 ASSAY OF NATRIURETIC PEPTIDE: CPT | Performed by: EMERGENCY MEDICINE

## 2025-01-08 PROCEDURE — 85025 COMPLETE CBC W/AUTO DIFF WBC: CPT | Performed by: EMERGENCY MEDICINE

## 2025-01-08 PROCEDURE — 71045 X-RAY EXAM CHEST 1 VIEW: CPT

## 2025-01-08 PROCEDURE — 71275 CT ANGIOGRAPHY CHEST: CPT

## 2025-01-08 PROCEDURE — 36415 COLL VENOUS BLD VENIPUNCTURE: CPT | Performed by: INTERNAL MEDICINE

## 2025-01-08 PROCEDURE — 83605 ASSAY OF LACTIC ACID: CPT | Performed by: EMERGENCY MEDICINE

## 2025-01-08 PROCEDURE — 84484 ASSAY OF TROPONIN QUANT: CPT | Performed by: EMERGENCY MEDICINE

## 2025-01-08 PROCEDURE — 99223 1ST HOSP IP/OBS HIGH 75: CPT | Performed by: INTERNAL MEDICINE

## 2025-01-08 PROCEDURE — 83605 ASSAY OF LACTIC ACID: CPT

## 2025-01-08 PROCEDURE — 93970 EXTREMITY STUDY: CPT

## 2025-01-08 PROCEDURE — 94664 DEMO&/EVAL PT USE INHALER: CPT

## 2025-01-08 PROCEDURE — 80053 COMPREHEN METABOLIC PANEL: CPT | Performed by: EMERGENCY MEDICINE

## 2025-01-08 PROCEDURE — 87637 SARSCOV2&INF A&B&RSV AMP PRB: CPT | Performed by: EMERGENCY MEDICINE

## 2025-01-08 PROCEDURE — 85730 THROMBOPLASTIN TIME PARTIAL: CPT | Performed by: INTERNAL MEDICINE

## 2025-01-08 PROCEDURE — 70486 CT MAXILLOFACIAL W/O DYE: CPT

## 2025-01-08 PROCEDURE — 82330 ASSAY OF CALCIUM: CPT

## 2025-01-08 PROCEDURE — 93010 ELECTROCARDIOGRAM REPORT: CPT | Performed by: SPECIALIST

## 2025-01-08 PROCEDURE — 85610 PROTHROMBIN TIME: CPT | Performed by: EMERGENCY MEDICINE

## 2025-01-08 PROCEDURE — 85730 THROMBOPLASTIN TIME PARTIAL: CPT | Performed by: EMERGENCY MEDICINE

## 2025-01-08 PROCEDURE — 36600 WITHDRAWAL OF ARTERIAL BLOOD: CPT

## 2025-01-08 PROCEDURE — 99291 CRITICAL CARE FIRST HOUR: CPT

## 2025-01-08 PROCEDURE — 94761 N-INVAS EAR/PLS OXIMETRY MLT: CPT

## 2025-01-08 PROCEDURE — 93005 ELECTROCARDIOGRAM TRACING: CPT | Performed by: EMERGENCY MEDICINE

## 2025-01-08 PROCEDURE — 93970 EXTREMITY STUDY: CPT | Performed by: SURGERY

## 2025-01-08 PROCEDURE — 80051 ELECTROLYTE PANEL: CPT

## 2025-01-08 RX ORDER — AMOXICILLIN 250 MG
2 CAPSULE ORAL 2 TIMES DAILY PRN
Status: DISCONTINUED | OUTPATIENT
Start: 2025-01-08 | End: 2025-01-13 | Stop reason: HOSPADM

## 2025-01-08 RX ORDER — HEPARIN SODIUM 10000 [USP'U]/100ML
18 INJECTION, SOLUTION INTRAVENOUS
Status: DISCONTINUED | OUTPATIENT
Start: 2025-01-08 | End: 2025-01-11

## 2025-01-08 RX ORDER — SODIUM CHLORIDE 0.9 % (FLUSH) 0.9 %
10 SYRINGE (ML) INJECTION EVERY 12 HOURS SCHEDULED
Status: DISCONTINUED | OUTPATIENT
Start: 2025-01-08 | End: 2025-01-13 | Stop reason: HOSPADM

## 2025-01-08 RX ORDER — ALBUTEROL SULFATE 0.83 MG/ML
5 SOLUTION RESPIRATORY (INHALATION) ONCE
Status: COMPLETED | OUTPATIENT
Start: 2025-01-08 | End: 2025-01-08

## 2025-01-08 RX ORDER — ALBUTEROL SULFATE 0.83 MG/ML
2.5 SOLUTION RESPIRATORY (INHALATION) ONCE
Status: COMPLETED | OUTPATIENT
Start: 2025-01-08 | End: 2025-01-08

## 2025-01-08 RX ORDER — ARFORMOTEROL TARTRATE 15 UG/2ML
15 SOLUTION RESPIRATORY (INHALATION)
Status: DISCONTINUED | OUTPATIENT
Start: 2025-01-08 | End: 2025-01-13 | Stop reason: HOSPADM

## 2025-01-08 RX ORDER — FAMOTIDINE 20 MG/1
20 TABLET, FILM COATED ORAL
Status: DISCONTINUED | OUTPATIENT
Start: 2025-01-08 | End: 2025-01-13

## 2025-01-08 RX ORDER — ONDANSETRON 2 MG/ML
4 INJECTION INTRAMUSCULAR; INTRAVENOUS EVERY 6 HOURS PRN
Status: DISCONTINUED | OUTPATIENT
Start: 2025-01-08 | End: 2025-01-13 | Stop reason: HOSPADM

## 2025-01-08 RX ORDER — IPRATROPIUM BROMIDE AND ALBUTEROL SULFATE 2.5; .5 MG/3ML; MG/3ML
SOLUTION RESPIRATORY (INHALATION)
Status: DISCONTINUED
Start: 2025-01-08 | End: 2025-01-08 | Stop reason: WASHOUT

## 2025-01-08 RX ORDER — MONTELUKAST SODIUM 10 MG/1
10 TABLET ORAL EVERY MORNING
Status: DISCONTINUED | OUTPATIENT
Start: 2025-01-09 | End: 2025-01-13 | Stop reason: HOSPADM

## 2025-01-08 RX ORDER — IPRATROPIUM BROMIDE AND ALBUTEROL SULFATE 2.5; .5 MG/3ML; MG/3ML
3 SOLUTION RESPIRATORY (INHALATION) EVERY 4 HOURS PRN
Status: DISCONTINUED | OUTPATIENT
Start: 2025-01-08 | End: 2025-01-13 | Stop reason: HOSPADM

## 2025-01-08 RX ORDER — SODIUM CHLORIDE 0.9 % (FLUSH) 0.9 %
10 SYRINGE (ML) INJECTION AS NEEDED
Status: DISCONTINUED | OUTPATIENT
Start: 2025-01-08 | End: 2025-01-13 | Stop reason: HOSPADM

## 2025-01-08 RX ORDER — IPRATROPIUM BROMIDE AND ALBUTEROL SULFATE 2.5; .5 MG/3ML; MG/3ML
3 SOLUTION RESPIRATORY (INHALATION)
Status: DISCONTINUED | OUTPATIENT
Start: 2025-01-08 | End: 2025-01-13 | Stop reason: HOSPADM

## 2025-01-08 RX ORDER — POTASSIUM CHLORIDE 750 MG/1
40 CAPSULE, EXTENDED RELEASE ORAL ONCE
Status: COMPLETED | OUTPATIENT
Start: 2025-01-08 | End: 2025-01-08

## 2025-01-08 RX ORDER — BUDESONIDE 0.5 MG/2ML
0.5 INHALANT ORAL
Status: DISCONTINUED | OUTPATIENT
Start: 2025-01-08 | End: 2025-01-13 | Stop reason: HOSPADM

## 2025-01-08 RX ORDER — GABAPENTIN 300 MG/1
600 CAPSULE ORAL EVERY EVENING
COMMUNITY

## 2025-01-08 RX ORDER — GABAPENTIN 300 MG/1
300 CAPSULE ORAL EVERY MORNING
COMMUNITY

## 2025-01-08 RX ORDER — GABAPENTIN 300 MG/1
300 CAPSULE ORAL EVERY MORNING
Status: DISCONTINUED | OUTPATIENT
Start: 2025-01-09 | End: 2025-01-13 | Stop reason: HOSPADM

## 2025-01-08 RX ORDER — SODIUM CHLORIDE 9 MG/ML
40 INJECTION, SOLUTION INTRAVENOUS AS NEEDED
Status: DISCONTINUED | OUTPATIENT
Start: 2025-01-08 | End: 2025-01-13 | Stop reason: HOSPADM

## 2025-01-08 RX ORDER — METOPROLOL SUCCINATE 50 MG/1
100 TABLET, EXTENDED RELEASE ORAL DAILY
Status: DISCONTINUED | OUTPATIENT
Start: 2025-01-09 | End: 2025-01-13 | Stop reason: HOSPADM

## 2025-01-08 RX ORDER — GABAPENTIN 300 MG/1
600 CAPSULE ORAL EVERY EVENING
Status: DISCONTINUED | OUTPATIENT
Start: 2025-01-08 | End: 2025-01-13 | Stop reason: HOSPADM

## 2025-01-08 RX ORDER — BISACODYL 10 MG
10 SUPPOSITORY, RECTAL RECTAL DAILY PRN
Status: DISCONTINUED | OUTPATIENT
Start: 2025-01-08 | End: 2025-01-13 | Stop reason: HOSPADM

## 2025-01-08 RX ORDER — SPIRONOLACTONE 25 MG/1
25 TABLET ORAL 3 TIMES WEEKLY
Status: DISCONTINUED | OUTPATIENT
Start: 2025-01-10 | End: 2025-01-13 | Stop reason: HOSPADM

## 2025-01-08 RX ORDER — LOSARTAN POTASSIUM 50 MG/1
100 TABLET ORAL DAILY
Status: DISCONTINUED | OUTPATIENT
Start: 2025-01-09 | End: 2025-01-13 | Stop reason: HOSPADM

## 2025-01-08 RX ORDER — IOPAMIDOL 755 MG/ML
100 INJECTION, SOLUTION INTRAVASCULAR
Status: COMPLETED | OUTPATIENT
Start: 2025-01-08 | End: 2025-01-08

## 2025-01-08 RX ORDER — METHYLPREDNISOLONE SODIUM SUCCINATE 125 MG/2ML
125 INJECTION, POWDER, LYOPHILIZED, FOR SOLUTION INTRAMUSCULAR; INTRAVENOUS ONCE
Status: COMPLETED | OUTPATIENT
Start: 2025-01-08 | End: 2025-01-08

## 2025-01-08 RX ORDER — ALBUTEROL SULFATE 0.83 MG/ML
SOLUTION RESPIRATORY (INHALATION)
Status: DISPENSED
Start: 2025-01-08 | End: 2025-01-09

## 2025-01-08 RX ORDER — POLYETHYLENE GLYCOL 3350 17 G/17G
17 POWDER, FOR SOLUTION ORAL DAILY PRN
Status: DISCONTINUED | OUTPATIENT
Start: 2025-01-08 | End: 2025-01-13 | Stop reason: HOSPADM

## 2025-01-08 RX ORDER — NITROGLYCERIN 0.4 MG/1
0.4 TABLET SUBLINGUAL
Status: DISCONTINUED | OUTPATIENT
Start: 2025-01-08 | End: 2025-01-13 | Stop reason: HOSPADM

## 2025-01-08 RX ORDER — BISACODYL 5 MG/1
5 TABLET, DELAYED RELEASE ORAL DAILY PRN
Status: DISCONTINUED | OUTPATIENT
Start: 2025-01-08 | End: 2025-01-13 | Stop reason: HOSPADM

## 2025-01-08 RX ADMIN — METHYLPREDNISOLONE SODIUM SUCCINATE 125 MG: 125 INJECTION, POWDER, FOR SOLUTION INTRAMUSCULAR; INTRAVENOUS at 13:48

## 2025-01-08 RX ADMIN — IOPAMIDOL 100 ML: 755 INJECTION, SOLUTION INTRAVENOUS at 14:29

## 2025-01-08 RX ADMIN — ALBUTEROL SULFATE 5 MG: 2.5 SOLUTION RESPIRATORY (INHALATION) at 13:52

## 2025-01-08 RX ADMIN — IPRATROPIUM BROMIDE AND ALBUTEROL SULFATE 3 ML: 2.5; .5 SOLUTION RESPIRATORY (INHALATION) at 19:11

## 2025-01-08 RX ADMIN — HEPARIN SODIUM 18 UNITS/KG/HR: 10000 INJECTION, SOLUTION INTRAVENOUS at 15:22

## 2025-01-08 RX ADMIN — POTASSIUM CHLORIDE 40 MEQ: 750 CAPSULE, EXTENDED RELEASE ORAL at 15:35

## 2025-01-08 RX ADMIN — ALBUTEROL SULFATE 2.5 MG: 2.5 SOLUTION RESPIRATORY (INHALATION) at 10:46

## 2025-01-08 RX ADMIN — BUDESONIDE 0.5 MG: 0.5 INHALANT RESPIRATORY (INHALATION) at 19:11

## 2025-01-08 RX ADMIN — ARFORMOTEROL TARTRATE 15 MCG: 15 SOLUTION RESPIRATORY (INHALATION) at 19:11

## 2025-01-08 RX ADMIN — GABAPENTIN 600 MG: 300 CAPSULE ORAL at 16:33

## 2025-01-08 NOTE — ED PROVIDER NOTES
Time: 1:26 PM EST  Date of encounter:  1/8/2025  Independent Historian/Clinical History and Information was obtained by:   Patient and   Chief Complaint: Shortness of    History is limited by: N/A    History of Present Illness:   Patient is a 79 y.o. year old female who presents to the emergency department for evaluation of shortness of breath.  Patient with a history of COPD and asthma.  Patient is not on home oxygen.  Patient is followed by Dr. Baez.  Patient is over the last week or so.  He has gotten worse.  Patient denies any fevers.  She does admit to a cough but is nonproductive.  Patient is her shortness of breath is worse with exertion.  Patient is normally she is short of breath every morning when she first wakes up whether she uses her CPAP or not.  Patient has a pulmonary treatment plan that she goes through which usually makes her breathing better and then she can start her day.  Patient is a day this morning was much worse than normal.    HPI    Patient Care Team  Primary Care Provider: Nick Patel,     Past Medical History:     Allergies   Allergen Reactions    Ibuprofen Hives    Penicillins Other (See Comments) and Seizure     1. When was your reaction? < 10 years ago  2. Did your reaction happen after the first dose or after several doses? Do not know  3. Did your reaction require ED or hospital care to manage your reaction? Do not know  4. Did your reaction require treatment with epinephrine? Do not know  5. Have you taken amoxicillin (Amoxil) or amoxicillin-clavulanate (Augmentin) without issue since? No  6. Have you taken cephalexin (Keflex) without issue since?  No     Nsaids Other (See Comments)     CKD    Cephalosporins Rash    Sulfa Antibiotics Rash     Past Medical History:   Diagnosis Date    Acute right-sided low back pain with right-sided sciatica 01/15/2018    Allergic rhinitis     Asthma     Asthma, extrinsic     Asthma, intrinsic     CHF (congestive heart failure)      Chronic heart failure with preserved ejection fraction     11/16/20 echocardiogram: 1.  Normal ejection fraction of 55%. 2.  Mild left ventricular hypertrophy. 3.  No significant valvular heart issues.     CKD stage 3 10/29/2019    COPD (chronic obstructive pulmonary disease)     Diverticulitis     Emphysema of lung     Essential hypertension     GERD (gastroesophageal reflux disease)     Hemoptysis     non cancerous    History of fungal pneumonia 12/02/2021    Hyperlipidemia     Idiopathic chronic gout of multiple sites without tophus 05/20/2016    Pneumonia     Primary osteoarthritis of right knee 01/16/2017    Sleep apnea     CPAP    Sleep apnea, obstructive     Vitamin D deficiency 01/13/2016     Past Surgical History:   Procedure Laterality Date    BRONCHOSCOPY N/A 4/30/2024    Procedure: BRONCHOSCOPY WITH BRONCHOALVEOLAR LAVAGE, WASHING, AIRWAY INSPECTION;  Surgeon: Santino Baez MD;  Location: [facility] ENDOSCOPY;  Service: Pulmonary;  Laterality: N/A;  MUCOUS PLUGGING, BRONCHITIS    CATARACT EXTRACTION Right     COLONOSCOPY  2014    COLONOSCOPY N/A 10/12/2021    Procedure: COLONOSCOPY;  Surgeon: Jorge Diane MD;  Location: [facility] ENDOSCOPY;  Service: Gastroenterology;  Laterality: N/A;  DIVERTICULOSIS    ENDOSCOPY  2015    EYE SURGERY      cataract right eye    OTHER SURGICAL HISTORY      Fatty tumor removed off back    RETINAL DETACHMENT REPAIR Right     hole in retina repair    TOTAL KNEE ARTHROPLASTY Right 10/31/2023    Procedure: TOTAL KNEE ARTHROPLASTY;  Surgeon: Celso Prakash MD;  Location: [facility] OR [facility];  Service: Orthopedics;  Laterality: Right;     Family History   Problem Relation Age of Onset    Colon cancer Mother 61    Cancer Mother     Heart disease Mother     Heart failure Father     Asthma Father     Heart attack Father     Hyperlipidemia Father     Emphysema Father     Diabetes Brother     Emphysema Brother     Breast cancer Maternal Grandmother     Stroke Paternal  Grandfather     Malmarva Hyperthermia Neg Hx        Home Medications:  Prior to Admission medications    Medication Sig Start Date End Date Taking? Authorizing Provider   azelastine (ASTELIN) 0.1 % nasal spray 2 sprays into the nostril(s) as directed by provider 2 (Two) Times a Day. Use in each nostril as directed 1/3/24   Santino Baez MD   azithromycin (ZITHROMAX) 250 MG tablet Take 2 by mouth today then 1 daily for 4 days 1/3/25   Shanell Murray APRN   Budeson-Glycopyrrol-Formoterol (BREZTRI) 160-9-4.8 MCG/ACT aerosol inhaler Inhale 2 puffs 2 (Two) Times a Day. 5/9/24   Shanell Murray APRN   D3-1000 25 MCG (1000 UT) capsule TAKE 1 CAPSULE BY MOUTH EVERY DAY 10/20/22   Nick Patel,    famotidine (PEPCID) 20 MG tablet Take 1 tablet by mouth every night at bedtime. 5/8/24   Shanell Murray APRN   fexofenadine (ALLEGRA) 180 MG tablet Take 1 tablet by mouth Daily. 9/23/24   Santino Baez MD   gabapentin (NEURONTIN) 300 MG capsule 1 capsule every morning and 2 capsule every evening.  Patient taking differently: Take 1 capsule by mouth Every Morning. 1 capsule every morning and 2 capsule every evening. 11/24/24   Nick Patel DO   ipratropium-albuterol (DUO-NEB) 0.5-2.5 mg/3 ml nebulizer USE 3 ML VIA NEBULIZER FOUR TIMES DAILY AS NEEDED FOR WHEEZING  Patient taking differently: Take 3 mL by nebulization 4 (Four) Times a Day. 9/23/24   Santino Baez MD   losartan (COZAAR) 100 MG tablet Take 1 tablet by mouth Daily. 5/8/24   Nick Patel DO   metoprolol succinate XL (TOPROL-XL) 100 MG 24 hr tablet Take 1 tablet by mouth Daily. Hold if heartrate is <60. 5/22/24   Nick Patel DO   montelukast (SINGULAIR) 10 MG tablet Take 1 tablet by mouth Every Night.  Patient taking differently: Take 1 tablet by mouth Every Morning. 9/23/24   Santino Baez MD   predniSONE (DELTASONE) 10 MG tablet 6 daily x 2 days, 5 daily x 2 days, 4 daily x 2 days, 3 daily x 2 days, 2 daily x 2 days,  "1 daily x 2 days 1/3/25   TerriDelonKALEB noble   spironolactone (ALDACTONE) 25 MG tablet Take 1 tablet by mouth 3 (Three) Times a Week. Monday,Wednesday,Friday 7/3/24   Nick Patel DO        Social History:   Social History     Tobacco Use    Smoking status: Former     Current packs/day: 0.00     Average packs/day: 0.5 packs/day for 32.0 years (16.0 ttl pk-yrs)     Types: Cigarettes     Start date: 1963     Quit date: 1995     Years since quittin.0     Passive exposure: Never    Smokeless tobacco: Never   Vaping Use    Vaping status: Never Used   Substance Use Topics    Alcohol use: Never    Drug use: Never         Review of Systems:  Review of Systems   Constitutional:  Negative for chills and fever.   HENT:  Negative for congestion, ear pain and sore throat.    Eyes:  Negative for pain.   Respiratory:  Positive for cough and shortness of breath. Negative for chest tightness.    Cardiovascular:  Negative for chest pain.   Gastrointestinal:  Negative for abdominal pain, diarrhea, nausea and vomiting.   Genitourinary:  Negative for flank pain and hematuria.   Musculoskeletal:  Negative for joint swelling.   Skin:  Negative for pallor.   Neurological:  Negative for seizures and headaches.   All other systems reviewed and are negative.       Physical Exam:  /73 (BP Location: Left arm, Patient Position: Sitting)   Pulse 84   Temp 98 °F (36.7 °C) (Oral)   Resp 20   Ht 157.5 cm (62\")   Wt 99.4 kg (219 lb 2.2 oz)   SpO2 95%   BMI 40.08 kg/m²     Physical Exam    Vital signs were reviewed under triage note.  General appearance - Patient appears well-developed and well-nourished.  Patient is in no acute distress.  Head - Normocephalic, atraumatic.  Pupils - Equal, round, reactive to light.  Extraocular muscles are intact.  Conjunctiva is clear.  Nasal - Normal inspection.  No evidence of trauma or epistaxis.  Tympanic membranes - Gray, intact without erythema or retractions.  Oral mucosa " - Pink and moist without lesions or erythema.  Uvula is midline.  Chest wall - Atraumatic.  Chest wall is nontender.  There are no vesicular rashes noted.  Neck - Supple.  Trachea was midline.  There is no palpable lymphadenopathy or thyromegaly.  There are no meningeal signs  Lungs - Auscultation moderately decreased breath sounds bilaterally.  Heart - Regular rate and rhythm without any murmurs, clicks, or gallops.  Abdomen - Soft.  Bowel sounds are present.  There is no palpable tenderness.  There is no rebound, guarding, or rigidity.  There are no palpable masses.  There are no pulsatile masses.  Back - Spine is straight and midline.  There is no CVA tenderness.  Extremities - Intact x4 with full range of motion.  There is no palpable edema.  Pulses are intact x4 and equal.  Neurologic - Patient is awake, alert, and oriented x3.  Cranial nerves II through XII are grossly intact.  Motor and sensory functions grossly intact.  Cerebellar function was normal.  Integument - There are no rashes.  There are no petechia or purpura lesions noted.  There are no vesicular lesions noted.           Procedures:  Procedures      Medical Decision Making:      Comorbidities that affect care:    Asthma, CKD stage III, sleep apnea, GERD, gout, hyperlipidemia, COPD, CHF    External Notes reviewed:    Hospital Discharge Summary: Hospital discharge summary from 12/11/2024 was reviewed by me.      The following orders were placed and all results were independently analyzed by me:  Orders Placed This Encounter   Procedures    COVID-19, FLU A/B, RSV PCR 1 HR TAT - Swab, Nasopharynx    Blood Culture - Blood,    Blood Culture - Blood,    XR Chest 1 View    CT Angiogram Chest Pulmonary Embolism    [place] Draw    Comprehensive Metabolic Panel    BNP    High Sensitivity Troponin T    CBC Auto Differential    Lactic Acid, Plasma    Arterial Blood Gas,H&H,Lytes,Lactate    Blood Gas, Arterial -    High Sensitivity Troponin T 1Hr    Protime-INR     aPTT    aPTT    NPO Diet NPO Type: Strict NPO    Undress & Gown    Vital Signs    Undress & Gown    Vital Signs    Notify Provider Platelet Count Less Than 49068    Notify Provider if PTT Not in Therapeutic Range After 24 Hours    Stop Infusion & Notify Provider if Bleeding Occurs    RN To Release aPTT Order 6 Hours After Heparin Bolus & 6 Hours After Any Heparin Rate Change    After 2 Consecutive Therapeutic aPTTs, Obtain aPTT Daily.  If a Rate Adjustment is Necessary, Resume Every 6 Hour aPTT Draws    Vital Signs    Intake & Output    Weigh Patient    Oral Care    Maintain IV Access    Telemetry - Place Orders & Notify Provider of Results When Patient Experiences Acute Chest Pain, Dysrhythmia or Respiratory Distress    Up With Assistance    Continuous Pulse Oximetry    Code Status and Medical Interventions: CPR (Attempt to Resuscitate); Full Support    IP General Consult (Use specialty-specific consult if known)    Hospitalist (on-call MD unless specified)    Inpatient Pulmonology Consult    Oxygen Therapy- Nasal Cannula; Titrate 1-6 LPM Per SpO2; 90 - 95%    Oxygen Therapy- Nasal Cannula; Titrate 1-6 LPM Per SpO2; 90 - 95%    POC Glucose Once    POC Lactate    POC Electrolyte Panel    ECG 12 Lead ED Triage Standing Order; SOA    Adult Transthoracic Echo Complete w/ Color, Spectral and Contrast if necessary per protocol    Insert Peripheral IV    Insert Peripheral IV    Insert Peripheral IV    Inpatient Admission    CBC & Differential    Green Top (Gel)    Lavender Top    Gold Top - SST    Light Blue Top    CBC & Differential       Medications Given in the Emergency Department:  Medications   sodium chloride 0.9 % flush 10 mL (has no administration in time range)   sodium chloride 0.9 % flush 10 mL (has no administration in time range)   albuterol (PROVENTIL) (2.5 MG/3ML) 0.083% nebulizer solution  - ADS Override Pull (  Canceled Entry 1/8/25 8838)   heparin bolus from bag solution 8,000 Units (has no  administration in time range)   heparin 15498 units/250 mL (100 units/mL) in 0.45 % NaCl infusion (has no administration in time range)   heparin bolus from bag solution 5,000 Units (has no administration in time range)   heparin bolus from bag solution 2,500 Units (has no administration in time range)   sodium chloride 0.9 % flush 10 mL (has no administration in time range)   sodium chloride 0.9 % flush 10 mL (has no administration in time range)   sodium chloride 0.9 % infusion 40 mL (has no administration in time range)   ondansetron (ZOFRAN) injection 4 mg (has no administration in time range)   nitroglycerin (NITROSTAT) SL tablet 0.4 mg (has no administration in time range)   sennosides-docusate (PERICOLACE) 8.6-50 MG per tablet 2 tablet (has no administration in time range)     And   polyethylene glycol (MIRALAX) packet 17 g (has no administration in time range)     And   bisacodyl (DULCOLAX) EC tablet 5 mg (has no administration in time range)     And   bisacodyl (DULCOLAX) suppository 10 mg (has no administration in time range)   potassium chloride (MICRO-K/KLOR-CON) CR capsule (has no administration in time range)   albuterol (PROVENTIL) nebulizer solution 0.083% 2.5 mg/3mL (2.5 mg Nebulization Given 1/8/25 1046)   albuterol (PROVENTIL) nebulizer solution 0.083% 2.5 mg/3mL (5 mg Nebulization Given 1/8/25 1352)   methylPREDNISolone sodium succinate (SOLU-Medrol) injection 125 mg (125 mg Intravenous Given 1/8/25 1348)   iopamidol (ISOVUE-370) 76 % injection 100 mL (100 mL Intravenous Given 1/8/25 1429)        ED Course:    ED Course as of 01/08/25 1504   Wed Jan 08, 2025   1050 EKG performed at 946 was interpreted by me to show a normal sinus rhythm with a ventricular rate of 99 bpm.  The MD interval is 193 ms.  P waves are normal.  QRS interval is normal.  Axis was at -30 degrees.  There was no acute ischemic ST or T wave change identified.  QT corrected is 440 ms. [TB]      ED Course User Index  [TB] Casie  DO Elbert       The patient was seen and evaluated in the ED by me.  The above history and physical examination was performed as documented.  Diagnostic data was obtained.  Results reviewed.  Findings were discussed with the patient.  Patient was started on IV heparin.  Patient required acute hospitalization for her PEs as well as her hypoxia.  Dr. Lanza was consulted and will see in consultation.  Hospital service was consulted as well for primary admission.    Labs:    Lab Results (last 24 hours)       Procedure Component Value Units Date/Time    POC Glucose Once [[id number]]  (Abnormal) Collected: 01/08/25 1040    Specimen: Arterial Blood Updated: 01/08/25 1047     Glucose 122 mg/dL      Comment: Serial Number: [id number]:  [id number]       Blood Gas, Arterial - [[id number]]  (Abnormal) Collected: 01/08/25 1040    Specimen: Arterial Blood Updated: 01/08/25 1047     Site Right Brachial     Max's Test N/A     pH, Arterial 7.425 pH units      pCO2, Arterial 31.3 mm Hg      pO2, Arterial 42.6 mm Hg      HCO3, Arterial 20.6 mmol/L      Base Excess, Arterial -2.9 mmol/L      Comment: Serial Number: [id number]:  [id number]        O2 Saturation, Arterial 80.1 %      Hemoglobin, Blood Gas 12.9 g/dL      Hematocrit, Blood Gas 38.0 %      Barometric Pressure for Blood Gas 753.5000 mmHg      Modality Room Air     FIO2 21 %      Notified Who Dr Jason     Read Back Yes     Notified Time --     Hemodilution No     PO2/FIO2 203    POC Lactate [[id number]]  (Normal) Collected: 01/08/25 1040    Specimen: Arterial Blood Updated: 01/08/25 1047     Lactate 1.0 mmol/L      Comment: Serial Number: [id number]:  [id number]       POC Electrolyte Panel [[id number]]  (Abnormal) Collected: 01/08/25 1040    Specimen: Arterial Blood Updated: 01/08/25 1047     Sodium 142 mmol/L      POC Potassium 3.6 mmol/L      Chloride 110 mmol/L      Ionized Calcium 1.19 mmol/L      Comment: Serial Number: [id number]:  [id number]       CBC & Differential  [[id number]]  (Abnormal) Collected: 01/08/25 1056    Specimen: Blood Updated: 01/08/25 1114    Narrative:      The following orders were created for panel order CBC & Differential.  Procedure                               Abnormality         Status                     ---------                               -----------         ------                     CBC Auto Differential[[id number]]        Abnormal            Final result                 Please view results for these tests on the individual orders.    Comprehensive Metabolic Panel [[id number]]  (Abnormal) Collected: 01/08/25 1056    Specimen: Blood Updated: 01/08/25 1217     Glucose 112 mg/dL      BUN 14 mg/dL      Creatinine 1.12 mg/dL      Sodium 142 mmol/L      Potassium 3.5 mmol/L      Chloride 107 mmol/L      CO2 20.6 mmol/L      Calcium 9.2 mg/dL      Total Protein 6.8 g/dL      Albumin 4.0 g/dL      ALT (SGPT) 10 U/L      AST (SGOT) 18 U/L      Alkaline Phosphatase 82 U/L      Total Bilirubin 0.6 mg/dL      Globulin 2.8 gm/dL      A/G Ratio 1.4 g/dL      BUN/Creatinine Ratio 12.5     Anion Gap 14.4 mmol/L      eGFR 50.1 mL/min/1.73     Narrative:      GFR Categories in Chronic Kidney Disease (CKD)      GFR Category          GFR (mL/min/1.73)    Interpretation  G1                     90 or greater         Normal or high (1)  G2                      60-89                Mild decrease (1)  G3a                   45-59                Mild to moderate decrease  G3b                   30-44                Moderate to severe decrease  G4                    15-29                Severe decrease  G5                    14 or less           Kidney failure          (1)In the absence of evidence of kidney disease, neither GFR category G1 or G2 fulfill the criteria for CKD.    eGFR calculation 2021 CKD-EPI creatinine equation, which does not include race as a factor    BNP [[id number]]  (Normal) Collected: 01/08/25 1056    Specimen: Blood Updated: 01/08/25 1141     proBNP  256.1 pg/mL     Narrative:      This assay is used as an aid in the diagnosis of individuals suspected of having heart failure. It can be used as an aid in the diagnosis of acute decompensated heart failure (ADHF) in patients presenting with signs and symptoms of ADHF to the emergency department (ED). In addition, NT-proBNP of <300 pg/mL indicates ADHF is not likely.    Age Range Result Interpretation  NT-proBNP Concentration (pg/mL:      <50             Positive            >450                   Gray                 300-450                    Negative             <300    50-75           Positive            >900                  Gray                300-900                  Negative            <300      >75             Positive            >1800                  Gray                300-1800                  Negative            <300    High Sensitivity Troponin T [[id number]]  (Abnormal) Collected: 01/08/25 1056    Specimen: Blood Updated: 01/08/25 1158     HS Troponin T 76 ng/L     Narrative:      High Sensitive Troponin T Reference Range:  <14.0 ng/L- Negative Female for AMI  <22.0 ng/L- Negative Male for AMI  >=14 - Abnormal Female indicating possible myocardial injury.  >=22 - Abnormal Male indicating possible myocardial injury.   Clinicians would have to utilize clinical acumen, EKG, Troponin, and serial changes to determine if it is an Acute Myocardial Infarction or myocardial injury due to an underlying chronic condition.         CBC Auto Differential [[id number]]  (Abnormal) Collected: 01/08/25 1056    Specimen: Blood Updated: 01/08/25 1114     WBC 9.42 10*3/mm3      RBC 4.36 10*6/mm3      Hemoglobin 12.0 g/dL      Hematocrit 39.1 %      MCV 89.7 fL      MCH 27.5 pg      MCHC 30.7 g/dL      RDW 15.8 %      RDW-SD 51.9 fl      MPV 12.3 fL      Platelets 217 10*3/mm3      Neutrophil % 65.7 %      Lymphocyte % 19.9 %      Monocyte % 11.0 %      Eosinophil % 2.0 %      Basophil % 0.6 %      Immature Grans % 0.8 %       Neutrophils, Absolute 6.18 10*3/mm3      Lymphocytes, Absolute 1.87 10*3/mm3      Monocytes, Absolute 1.04 10*3/mm3      Eosinophils, Absolute 0.19 10*3/mm3      Basophils, Absolute 0.06 10*3/mm3      Immature Grans, Absolute 0.08 10*3/mm3      nRBC 0.0 /100 WBC     Lactic Acid, Plasma [[id number]]  (Normal) Collected: 01/08/25 1056    Specimen: Blood Updated: 01/08/25 1142     Lactate 1.3 mmol/L     Blood Culture - Blood, Arm, Left [[id number]] Collected: 01/08/25 1056    Specimen: Blood from Arm, Left Updated: 01/08/25 1110    Blood Culture - Blood, Arm, Right [[id number]] Collected: 01/08/25 1056    Specimen: Blood from Arm, Right Updated: 01/08/25 1110    Protime-INR [[id number]]  (Normal) Collected: 01/08/25 1056    Specimen: Blood Updated: 01/08/25 1448     Protime 14.5 Seconds      INR 1.11    Narrative:      Suggested Therapeutic Ranges For Oral Anticoagulant Therapy:  Level of Therapy                      INR Target Range  Standard Dose                            2.0-3.0  High Dose                                2.5-3.5  Patients not receiving anticoagulant  Therapy Normal Range                     0.86-1.15    COVID-19, FLU A/B, RSV PCR 1 HR TAT - Swab, Nasopharynx [[id number]]  (Normal) Collected: 01/08/25 1100    Specimen: Swab from Nasopharynx Updated: 01/08/25 1152     COVID19 Not Detected     Influenza A PCR Not Detected     Influenza B PCR Not Detected     RSV, PCR Not Detected    Narrative:      Fact sheet for providers: https://www.fda.gov/media/655609/download    Fact sheet for patients: https://www.fda.gov/media/969287/download    Test performed by PCR.    High Sensitivity Troponin T 1Hr [[id number]]  (Abnormal) Collected: 01/08/25 1223    Specimen: Blood Updated: 01/08/25 1316     HS Troponin T 82 ng/L      Troponin T Numeric Delta 6 ng/L      Troponin T % Delta 8 %     Narrative:      High Sensitive Troponin T Reference Range:  <14.0 ng/L- Negative Female for AMI  <22.0 ng/L- Negative Male for  AMI  >=14 - Abnormal Female indicating possible myocardial injury.  >=22 - Abnormal Male indicating possible myocardial injury.   Clinicians would have to utilize clinical acumen, EKG, Troponin, and serial changes to determine if it is an Acute Myocardial Infarction or myocardial injury due to an underlying chronic condition.                  Imaging:    CT Angiogram Chest Pulmonary Embolism    Result Date: 1/8/2025  CT ANGIOGRAM CHEST PULMONARY EMBOLISM Date of Exam: 1/8/2025 2:21 PM EST Indication: Hypoxia. Comparison: None available. Technique: Axial CT images were obtained of the chest after the uneventful intravenous administration of iodinated contrast utilizing pulmonary embolism protocol.  Reconstructed coronal and sagittal images were also obtained. Automated exposure control and iterative construction methods were used. Findings: The pulmonary artery outflow track is patent. There is pulmonary embolism at the right main pulmonary artery bifurcation with partially occlusive pulmonary emboli involving the left upper lobe segmental and subsegmental branches. There is also partially occlusive pulmonary embolism extending into the right lower lobe lobar and few segmental and subsegmental branches. Also partially occlusive pulmonary embolism at the origin of the right middle lobe pulmonary artery. Partially occlusive embolism in left upper lobe and left lower lobe segmental and few subsegmental branches. There is cardiomegaly. The right heart lumen is mildly enlarged as compared to the left and there is minimal leftward bowing of the interventricular septum compatible with right heart strain. There are no pericardial effusions. The thoracic aorta shows a normal diameter with atherosclerosis. Patent three-vessel arch. Subcentimeter shotty mediastinal lymph nodes There is mild bibasilar reticular lung thickening and mild bibasilar discoid atelectasis. No pleural effusion or pneumothorax. No acute fractures or  destructive bone lesions. No suspicious pulmonary nodules Tiny hiatal hernia. No acute abnormalities in the upper abdomen     Impression: 1.Bilateral partially occlusive pulmonary emboli involving all lobes of both lungs. There is evidence of right heart strain. 2.Cardiomegaly. 3.Mild bibasilar reticular lung thickening and discoid atelectasis. Findings discussed over the phone with Dr. Jason On 1/8/2025 at 2:38 p.m. Electronically Signed: Tim Rodriguez MD  1/8/2025 2:43 PM EST  Workstation ID: [id number]    CT Sinus Without Contrast    Result Date: 1/8/2025  CT SINUS WO CONTRAST Date of Exam: 1/8/2025 9:29 AM EST Indication: Chronic sinusitis. Comparison: None available. Technique: Axial CT images were obtained from the inferior aspect of the mandible through the frontal sinuses without contrast administration.  Reconstructed coronal and sagittal images were also obtained. Automated exposure control and iterative construction methods were used. Findings: In the left maxillary sinus is a 1.1 cm polyp or mucous retention cyst. The paranasal sinuses are otherwise clear. The ostiomeatal complexes are patent bilaterally. The frontal ethmoidal recesses and sphenoethmoidal recesses are patent. The sphenoid sinuses are hypoplastic. Guerline bullosa is noted on the left. The nasal cavity otherwise appears unremarkable. No focal osseous lesion is seen. The visualized extracranial soft tissues appear normal.     Impression: 1.1 cm polyp or mucous retention cyst in the left maxillary sinus. Electronically Signed: Shiva Jeffries MD  1/8/2025 10:38 AM EST  Workstation ID: [id number]    XR Chest 1 View    Result Date: 1/8/2025  XR CHEST 1 VW Date of Exam: 1/8/2025 10:10 AM EST Indication: SOA Triage Protocol Comparison: 12/8/2024 Findings: Cardiomediastinal silhouette is unremarkable. There is nonspecific interstitial prominence, similar prior exam. No airspace disease, pneumothorax, nor pleural effusion. No acute osseous abnormality  identified.     Impression: 1.Stable chronic interstitial prominence without acute process identified. Electronically Signed: Rao Gu MD  1/8/2025 10:16 AM EST  Workstation ID: [id number]       Differential Diagnosis and Discussion:    Dyspnea: Differential diagnosis includes but is not limited to metabolic acidosis, neurological disorders, psychogenic, asthma, pneumothorax, upper airway obstruction, COPD, pneumonia, noncardiogenic pulmonary edema, interstitial lung disease, anemia, congestive heart failure, and pulmonary embolism    Labs were collected in the emergency department and all labs were reviewed and interpreted by me.  X-ray were performed in the emergency department and all X-ray impressions were independently interpreted by me.  An EKG was performed and the EKG was interpreted by me.  CT scan was performed in the emergency department and the CT scan radiology impression was interpreted by me.    [facility]       Critical Care Note: Total Critical Care time of 40 minutes. Total critical care time documented does not include time spent on separately billed procedures for services of nurses or physician assistants. I personally saw and examined the patient. I have reviewed all diagnostic interpretations and treatment plans as written. I was present for the key portions of any procedures performed and the inclusive time noted in any critical care statement. Critical care time includes patient management by me, time spent at the patients bedside,  time to review lab and imaging results, discussing patient care, documentation in the medical record, and time spent with family or caregiver.    Patient Care Considerations:    SEPSIS was considered but is NOT present in the emergency department as SIRS criteria is not present.      Consultants/Shared Management Plan:    Hospitalist: I have discussed the case with Dr. Hendrix who agrees to accept the patient for admission.  Consultant: I have discussed the case with  Dr. Lanza who agrees to consult on the patient.    Social Determinants of Health:    Patient is independent, reliable, and has access to care.       Disposition and Care Coordination:    Admit:   Through independent evaluation of the patient's history, physical, and imperical data, the patient meets criteria for inpatient admission to the hospital.        Final diagnoses:   Acute respiratory failure with hypoxia   Acute pulmonary embolism with acute cor pulmonale, unspecified pulmonary embolism type        ED Disposition       ED Disposition   Decision to Admit    Condition   --    Comment   Level of Care: Telemetry [5]   Diagnosis: Acute pulmonary embolism [616910]   Admitting Physician: AIMEE OWENS [[id number]]   Attending Physician: AIMEE OWENS [[id number]]   Isolate for COVID?: No [0]   Certification: I Certify That Inpatient Hospital Services Are Medically Necessary For Greater Than 2 Midnights                 This medical record created using voice recognition software.             Elbert Jason DO  01/09/25 2036

## 2025-01-08 NOTE — H&P
[facility]   HOSPITALIST HISTORY AND PHYSICAL  Date: 2025   Patient Name: Julia Rios  : 1945  MRN: [id number]  Primary Care Physician:  Nick Patel DO  Date of admission: 2025    Subjective   Subjective     Chief Complaint: short of air     HPI:    Julia Rios is a 79 y.o. female with history of COPD not on oxygen, hypertension, hyperlipidemia, chronic kidney disease, congestive heart failure who presented to the emergency department with shortness of breath.  Patient was recently discharged from the hospital on  where she was treated for pneumonia.  Patient states that she had gone to see her primary care doctor on  and then saw the pulmonary nurse practitioner on January 3 where she continued to state that she was short of breath however was told that that was due to her recent pneumonia.  Patient states that her shortness of breath did not get better it continued to get worse.  Patient states that she felt so bad she was unable to get up and do many things around the house.  Patient was evaluated in the ER where she was requiring 6 L of oxygen however she is not on oxygen at home.  Patient was afebrile.  Blood pressure well-controlled.  Patient did have a CT of the chest which showed bilateral partially occlusive pulmonary emboli involving all lobes of both lungs.  There is evidence of right heart strain.  Patient was placed on a heparin drip in the ER.  Pulmonary was also consulted.  Patient's laboratory data was unremarkable.  Hospitalist service consulted for admission due to acute pulmonary embolism requiring IV heparin.      Personal History     Past Medical History:  Past Medical History:   Diagnosis Date    Acute right-sided low back pain with right-sided sciatica 01/15/2018    Allergic rhinitis     Asthma     Asthma, extrinsic     Asthma, intrinsic     CHF (congestive heart failure)     Chronic heart failure with preserved ejection  fraction     11/16/20 echocardiogram: 1.  Normal ejection fraction of 55%. 2.  Mild left ventricular hypertrophy. 3.  No significant valvular heart issues.     CKD stage 3 10/29/2019    COPD (chronic obstructive pulmonary disease)     Diverticulitis     Emphysema of lung     Essential hypertension     GERD (gastroesophageal reflux disease)     Hemoptysis     non cancerous    History of fungal pneumonia 12/02/2021    Hyperlipidemia     Idiopathic chronic gout of multiple sites without tophus 05/20/2016    Pneumonia     Primary osteoarthritis of right knee 01/16/2017    Sleep apnea     CPAP    Sleep apnea, obstructive     Vitamin D deficiency 01/13/2016       Past Surgical History:  Past Surgical History:   Procedure Laterality Date    BRONCHOSCOPY N/A 4/30/2024    Procedure: BRONCHOSCOPY WITH BRONCHOALVEOLAR LAVAGE, WASHING, AIRWAY INSPECTION;  Surgeon: Santino Baez MD;  Location: [facility] ENDOSCOPY;  Service: Pulmonary;  Laterality: N/A;  MUCOUS PLUGGING, BRONCHITIS    CATARACT EXTRACTION Right     COLONOSCOPY  2014    COLONOSCOPY N/A 10/12/2021    Procedure: COLONOSCOPY;  Surgeon: Jorge Diane MD;  Location: [facility] ENDOSCOPY;  Service: Gastroenterology;  Laterality: N/A;  DIVERTICULOSIS    ENDOSCOPY  2015    EYE SURGERY      cataract right eye    OTHER SURGICAL HISTORY      Fatty tumor removed off back    RETINAL DETACHMENT REPAIR Right     hole in retina repair    TOTAL KNEE ARTHROPLASTY Right 10/31/2023    Procedure: TOTAL KNEE ARTHROPLASTY;  Surgeon: Celso Prakash MD;  Location: [facility] OR [facility] [place];  Service: Orthopedics;  Laterality: Right;       Family History:   Family History   Problem Relation Age of Onset    Colon cancer Mother 61    Cancer Mother     Heart disease Mother     Heart failure Father     Asthma Father     Heart attack Father     Hyperlipidemia Father     Emphysema Father     Diabetes Brother     Emphysema Brother     Breast cancer Maternal Grandmother     Stroke Paternal Grandfather      Malig Hyperthermia Neg Hx        Social History:   Social History     Socioeconomic History    Marital status:    Tobacco Use    Smoking status: Former     Current packs/day: 0.00     Average packs/day: 0.5 packs/day for 32.0 years (16.0 ttl pk-yrs)     Types: Cigarettes     Start date: 1963     Quit date: 1995     Years since quittin.0     Passive exposure: Never    Smokeless tobacco: Never   Vaping Use    Vaping status: Never Used   Substance and Sexual Activity    Alcohol use: Never    Drug use: Never    Sexual activity: Defer       Home Medications:  Budeson-Glycopyrrol-Formoterol, Cholecalciferol, azelastine, azithromycin, famotidine, fexofenadine, gabapentin, ipratropium-albuterol, losartan, metoprolol succinate XL, montelukast, predniSONE, and spironolactone    Allergies:  Allergies   Allergen Reactions    Ibuprofen Hives    Penicillins Other (See Comments) and Seizure     1. When was your reaction? < 10 years ago  2. Did your reaction happen after the first dose or after several doses? Do not know  3. Did your reaction require ED or hospital care to manage your reaction? Do not know  4. Did your reaction require treatment with epinephrine? Do not know  5. Have you taken amoxicillin (Amoxil) or amoxicillin-clavulanate (Augmentin) without issue since? No  6. Have you taken cephalexin (Keflex) without issue since?  No     Nsaids Other (See Comments)     CKD    Cephalosporins Rash    Sulfa Antibiotics Rash       Review of Systems  History obtained from the patient  General ROS: Positive for Fatigue, Negative for - chills, fever, malaise, or night sweats  Psychological ROS: negative for - anxiety, depression, hallucinations, or mood swings  Ophthalmic ROS: negative for - blurry vision, double vision, or itchy eyes  ENT ROS: negative for - headaches, nasal congestion, sneezing, or sore throat  Hematological and Lymphatic ROS: negative for - bleeding problems, bruising, or jaundice  Endocrine  ROS: negative for - malaise/lethargy, polydipsia/polyuria, or temperature intolerance  Respiratory ROS: Positive for shortness of air   cardiovascular ROS: negative for - chest pain, dyspnea on exertion, edema, palpitations, or paroxysmal nocturnal dyspnea  Gastrointestinal ROS: negative for - abdominal pain, constipation, diarrhea, heartburn, hematemesis, or nausea/vomiting  Genito-Urinary ROS: negative for - change in urinary stream, hematuria, incontinence, or urinary frequency/urgency  Musculoskeletal ROS: Positive for right knee pain.  Patient states that she fell within the past month  Neurological ROS: negative for - confusion, dizziness, gait disturbance, headaches, impaired coordination/balance, memory loss, numbness/tingling, seizures, or visual changes  Dermatological ROS: negative for dry skin and rash       Objective   Objective     Vitals:   Temp:  [98 °F (36.7 °C)] 98 °F (36.7 °C)  Heart Rate:  [] 84  Resp:  [18-27] 20  BP: (112-138)/(67-91) 112/73  Flow (L/min) (Oxygen Therapy):  [6] 6    Physical Exam    Constitutional: Awake, alert, no acute distress.  Patient is resting in the ER rHumbird   Eyes: Pupils equal, sclerae anicteric, no conjunctival injection   HENT: NCAT, mucous membranes moist   Neck: Supple, no thyromegaly, no lymphadenopathy, trachea midline   Respiratory: Clear to auscultation bilaterally, nonlabored respirations    Cardiovascular: RRR, no murmurs, rubs, or gallops, palpable pedal pulses bilaterally   Gastrointestinal: Positive bowel sounds, soft, nontender, nondistended   Musculoskeletal: No bilateral ankle edema, no clubbing or cyanosis to extremities.  Small bruising to right knee   Psychiatric: Appropriate affect, cooperative   Neurologic: Oriented x 3, strength symmetric in all extremities, Cranial Nerves grossly intact to confrontation, speech clear   Skin: No rashes     Result Review    Result Review:  I have personally reviewed the results from the time of this  "admission to 1/8/2025 14:51 EST and agree with these findings:  [x]  Laboratory  CBC          12/11/2024    04:45 12/26/2024    11:15 1/8/2025    10:56   CBC   WBC 13.39  15.41  9.42    RBC 4.42  4.38  4.36    Hemoglobin 12.2  12.7  12.0    Hematocrit 39.5  38.8  39.1    MCV 89.4  88.6  89.7    MCH 27.6  29.0  27.5    MCHC 30.9  32.7  30.7    RDW 15.0  14.5  15.8    Platelets 245  180  217      BMP          12/11/2024    04:45 12/26/2024    11:15 1/8/2025    10:56   BMP   BUN 33  17  14    Creatinine 1.21  1.25  1.12    Sodium 138  141  142    Potassium 4.0  4.0  3.5    Chloride 103  106  107    CO2 18.9  26.0  20.6    Calcium 8.8  9.2  9.2        [x]  Microbiology  No results found for: \"ACANTHNAEG\", \"AFBCX\", \"BPERTUSSISCX\", \"BLOODCX\"  No results found for: \"BCIDPCR\", \"CXREFLEX\", \"CSFCX\", \"CULTURETIS\"  No results found for: \"CULTURES\", \"HSVCX\", \"URCX\"  No results found for: \"EYECULTURE\", \"GCCX\", \"HSVCULTURE\", \"LABHSV\"  No results found for: \"LEGIONELLA\", \"MRSACX\", \"MUMPSCX\", \"MYCOPLASCX\"  No results found for: \"NOCARDIACX\", \"STOOLCX\"  No results found for: \"THROATCX\", \"UNSTIMCULT\", \"URINECX\", \"CULTURE\", \"VZVCULTUR\"  No results found for: \"VIRALCULTU\", \"WOUNDCX\"    [x]  Radiology  CT Angiogram Chest Pulmonary Embolism    Result Date: 1/8/2025  CT ANGIOGRAM CHEST PULMONARY EMBOLISM Date of Exam: 1/8/2025 2:21 PM EST Indication: Hypoxia. Comparison: None available. Technique: Axial CT images were obtained of the chest after the uneventful intravenous administration of iodinated contrast utilizing pulmonary embolism protocol.  Reconstructed coronal and sagittal images were also obtained. Automated exposure control and iterative construction methods were used. Findings: The pulmonary artery outflow track is patent. There is pulmonary embolism at the right main pulmonary artery bifurcation with partially occlusive pulmonary emboli involving the left upper lobe segmental and subsegmental branches. There is also partially " occlusive pulmonary embolism extending into the right lower lobe lobar and few segmental and subsegmental branches. Also partially occlusive pulmonary embolism at the origin of the right middle lobe pulmonary artery. Partially occlusive embolism in left upper lobe and left lower lobe segmental and few subsegmental branches. There is cardiomegaly. The right heart lumen is mildly enlarged as compared to the left and there is minimal leftward bowing of the interventricular septum compatible with right heart strain. There are no pericardial effusions. The thoracic aorta shows a normal diameter with atherosclerosis. Patent three-vessel arch. Subcentimeter shotty mediastinal lymph nodes There is mild bibasilar reticular lung thickening and mild bibasilar discoid atelectasis. No pleural effusion or pneumothorax. No acute fractures or destructive bone lesions. No suspicious pulmonary nodules Tiny hiatal hernia. No acute abnormalities in the upper abdomen     Impression: Impression: 1.Bilateral partially occlusive pulmonary emboli involving all lobes of both lungs. There is evidence of right heart strain. 2.Cardiomegaly. 3.Mild bibasilar reticular lung thickening and discoid atelectasis. Findings discussed over the phone with Dr. Jason On 1/8/2025 at 2:38 p.m. Electronically Signed: Tim Rodriguez MD  1/8/2025 2:43 PM EST  Workstation ID: [id number]     []  EKG/Telemetry   []  Cardiology/Vascular   []  Pathology  []  Old records  []  Other:      Assessment & Plan   Assessment / Plan     Assessment/Plan:   Acute pulmonary emboli involving all lobes of the lung with evidence of right heart strain  History of COPD however not on home oxygen  Chronic kidney disease stage III  Morbid obesity with a BMI of 40  GERD  Hyperlipidemia    PLAN  -- Patient will be admitted to a telemetry bed  -- Continue patient on heparin drip  -- Pulmonary has been consulted  -- Obtain echocardiogram  ---resume patients home medications  -- Resume  patient's bronchodilators  -- Discussed plan with nurse and with patient      VTE Prophylaxis:  Pharmacologic VTE prophylaxis orders are present.        CODE STATUS:    Code Status (Patient has no pulse and is not breathing): CPR (Attempt to Resuscitate)  Medical Interventions (Patient has pulse or is breathing): Full Support      Admission Status:  I believe this patient meets inpatient status.    Electronically signed by Phil Hendrix DO, 01/08/25, 2:51 PM EST.

## 2025-01-08 NOTE — ED NOTES
Patient's oxygen level on room air was sitting at 78%. Patient has been placed on 6 L of oxygen via nasal canula and is currently at a saturation of 88-93%. Respiratory Therapy has been called.

## 2025-01-08 NOTE — CONSULTS
Pulmonary / Critical Care Consult Note      Patient Name: Julia Rios  : 1945  MRN: [id number]  Primary Care Physician:  Nick Patel DO  Referring Physician: Phil Hendrix DO  Date of admission: 2025    Subjective   Subjective     Reason for Consult/ Chief Complaint:   Worsening hypoxia, pulmonary emboli    HPI:  Julia Rios is a 79 y.o. female with a COPD not on oxygen, obstructive sleep apnea, history of fungal pneumonia, pulmonary nodules and former smoker came in short of breath and feeling poorly.  Patient ports felt 1 week of progressively worsening shortness of breath and chest pain across the anterior chest.  Also dry hacking cough.  Reports fell on right leg a few days ago on  which that is when symptoms started.  She states that she has been laying in her chair and been doing little activity since injuring her leg.  Emergency Department patient increased work of breathing with O2 saturations in the 70s and ultimately required 8 L of oxygen.  Exam did not yield much in the way of respiratory symptoms so chest CT was ordered showing diffuse bilateral pulmonary emboli.  Denies any Swelling but does have some right lower extremity pain.  Has never had blood clots.  No recent travel.  No family history of blood clots.  She is a former cigarette smoker.  Currently on oxygen and heparin drip.        Personal History     Past Medical History:   Diagnosis Date    Acute right-sided low back pain with right-sided sciatica 01/15/2018    Allergic rhinitis     Asthma     Asthma, extrinsic     Asthma, intrinsic     CHF (congestive heart failure)     Chronic heart failure with preserved ejection fraction     20 echocardiogram: 1.  Normal ejection fraction of 55%. 2.  Mild left ventricular hypertrophy. 3.  No significant valvular heart issues.     CKD stage 3 10/29/2019    COPD (chronic obstructive pulmonary disease)     Diverticulitis     Emphysema of lung     Essential  hypertension     GERD (gastroesophageal reflux disease)     Hemoptysis     non cancerous    History of fungal pneumonia 12/02/2021    Hyperlipidemia     Idiopathic chronic gout of multiple sites without tophus 05/20/2016    Pneumonia     Primary osteoarthritis of right knee 01/16/2017    Sleep apnea     CPAP    Sleep apnea, obstructive     Vitamin D deficiency 01/13/2016       Past Surgical History:   Procedure Laterality Date    BRONCHOSCOPY N/A 4/30/2024    Procedure: BRONCHOSCOPY WITH BRONCHOALVEOLAR LAVAGE, WASHING, AIRWAY INSPECTION;  Surgeon: Santino Baez MD;  Location: [facility] ENDOSCOPY;  Service: Pulmonary;  Laterality: N/A;  MUCOUS PLUGGING, BRONCHITIS    CATARACT EXTRACTION Right     COLONOSCOPY  2014    COLONOSCOPY N/A 10/12/2021    Procedure: COLONOSCOPY;  Surgeon: Jorge Diane MD;  Location: [facility] ENDOSCOPY;  Service: Gastroenterology;  Laterality: N/A;  DIVERTICULOSIS    ENDOSCOPY  2015    EYE SURGERY      cataract right eye    OTHER SURGICAL HISTORY      Fatty tumor removed off back    RETINAL DETACHMENT REPAIR Right     hole in retina repair    TOTAL KNEE ARTHROPLASTY Right 10/31/2023    Procedure: TOTAL KNEE ARTHROPLASTY;  Surgeon: Celso Prakash MD;  Location: [facility] OR [facility] [place];  Service: Orthopedics;  Laterality: Right;       Family History: family history includes Asthma in her father; Breast cancer in her maternal grandmother; Cancer in her mother; Colon cancer (age of onset: 61) in her mother; Diabetes in her brother; Emphysema in her brother and father; Heart attack in her father; Heart disease in her mother; Heart failure in her father; Hyperlipidemia in her father; Stroke in her paternal grandfather. Otherwise pertinent FHx was reviewed and not pertinent to current issue.    Social History:  reports that she quit smoking about 30 years ago. Her smoking use included cigarettes. She started smoking about 62 years ago. She has a 16 pack-year smoking history. She has never been  exposed to tobacco smoke. She has never used smokeless tobacco. She reports that she does not drink alcohol and does not use drugs.    Home Medications:  Budeson-Glycopyrrol-Formoterol, Cholecalciferol, azelastine, azithromycin, famotidine, fexofenadine, gabapentin, ipratropium-albuterol, losartan, metoprolol succinate XL, montelukast, predniSONE, and spironolactone    Allergies:  Allergies   Allergen Reactions    Ibuprofen Hives    Penicillins Other (See Comments) and Seizure     1. When was your reaction? < 10 years ago  2. Did your reaction happen after the first dose or after several doses? Do not know  3. Did your reaction require ED or hospital care to manage your reaction? Do not know  4. Did your reaction require treatment with epinephrine? Do not know  5. Have you taken amoxicillin (Amoxil) or amoxicillin-clavulanate (Augmentin) without issue since? No  6. Have you taken cephalexin (Keflex) without issue since?  No     Nsaids Other (See Comments)     CKD    Cephalosporins Rash    Sulfa Antibiotics Rash       Objective    Objective     Vitals:   Temp:  [98 °F (36.7 °C)] 98 °F (36.7 °C)  Heart Rate:  [] 84  Resp:  [18-27] 20  BP: (112-138)/(67-91) 112/73  Flow (L/min) (Oxygen Therapy):  [6] 6    Physical Exam:  Vital Signs Reviewed   General:  WDWN, Alert, NAD.    HEENT:  PERRL, EOMI.  OP, nares clear, no sinus tenderness  Neck:  Supple, no JVD, no thyromegaly  Lymph: no axillary, cervical, supraclavicular lymphadenopathy noted bilaterally  Chest:  good aeration, rhonchi bilaterally, tympanic to percussion bilaterally, no work of breathing noted  CV: RRR, no MGR, pulses 2+, equal.  Abd:  Soft, NT, ND, + BS, no HSM, obese  EXT:  no clubbing, no cyanosis, no edema, tenderness and bruising noted of right shin and calf  Neuro:  A&Ox3, CN grossly intact, no focal deficits.  Skin: No rashes or lesions noted      Result Review    Result Review:  I have personally reviewed the results from the time of this  admission to 1/8/2025 14:56 EST and agree with these findings:  [x]  Laboratory  [x]  Microbiology  [x]  Radiology  [x]  EKG/Telemetry   []  Cardiology/Vascular   []  Pathology  [x]  Old records  []  Other:  Most notable findings include:       Lab 01/08/25  1056   WBC 9.42   HEMOGLOBIN 12.0   HEMATOCRIT 39.1   PLATELETS 217   SODIUM 142   POTASSIUM 3.5   CHLORIDE 107   CO2 20.6*   BUN 14   CREATININE 1.12*   GLUCOSE 112*   CALCIUM 9.2   TOTAL PROTEIN 6.8   ALBUMIN 4.0   GLOBULIN 2.8     CT Angiogram Chest Pulmonary Embolism    Result Date: 1/8/2025  CT ANGIOGRAM CHEST PULMONARY EMBOLISM Date of Exam: 1/8/2025 2:21 PM EST Indication: Hypoxia. Comparison: None available. Technique: Axial CT images were obtained of the chest after the uneventful intravenous administration of iodinated contrast utilizing pulmonary embolism protocol.  Reconstructed coronal and sagittal images were also obtained. Automated exposure control and iterative construction methods were used. Findings: The pulmonary artery outflow track is patent. There is pulmonary embolism at the right main pulmonary artery bifurcation with partially occlusive pulmonary emboli involving the left upper lobe segmental and subsegmental branches. There is also partially occlusive pulmonary embolism extending into the right lower lobe lobar and few segmental and subsegmental branches. Also partially occlusive pulmonary embolism at the origin of the right middle lobe pulmonary artery. Partially occlusive embolism in left upper lobe and left lower lobe segmental and few subsegmental branches. There is cardiomegaly. The right heart lumen is mildly enlarged as compared to the left and there is minimal leftward bowing of the interventricular septum compatible with right heart strain. There are no pericardial effusions. The thoracic aorta shows a normal diameter with atherosclerosis. Patent three-vessel arch. Subcentimeter shotty mediastinal lymph nodes There is mild  bibasilar reticular lung thickening and mild bibasilar discoid atelectasis. No pleural effusion or pneumothorax. No acute fractures or destructive bone lesions. No suspicious pulmonary nodules Tiny hiatal hernia. No acute abnormalities in the upper abdomen     Impression: Impression: 1.Bilateral partially occlusive pulmonary emboli involving all lobes of both lungs. There is evidence of right heart strain. 2.Cardiomegaly. 3.Mild bibasilar reticular lung thickening and discoid atelectasis. Findings discussed over the phone with Dr. Jason On 1/8/2025 at 2:38 p.m. Electronically Signed: Tim Rodriguez MD  1/8/2025 2:43 PM EST  Workstation ID: [id number]     Pulmonary office notes personally reviewed  High-sensitivity troponin 82  Flu/COVID/RSV negative  NT BNP normal  ABG 7.42/31/42/20      Assessment & Plan   Assessment / Plan     Active Hospital Problems:  Active Hospital Problems    Diagnosis     **Acute pulmonary embolism     Acute pulmonary embolism with acute cor pulmonale     Essential hypertension      Impression:  Acute hypoxemic respiratory failure  Acute bilateral pulmonary emboli.  Provoked.  Has some features of submassive PE on chest CT with right heart strain  Question acute cor pulmonale  COPD without exacerbation  CKD stage III  Class III obesity with BMI 40    Plan:  Patient appears to have multiple pulmonary emboli with features of right heart strain.  Suspect possible submassive pulmonary embolism given this presentation.  NT BNP is normal but troponin is elevated.  Will check echocardiogram  I suspect PEs were provoked.  She fell on her right leg and states that she has been laying in her recliner and mobile for hours at a time.  This coupled with her gender and BMI may have make this a provoked VTE  Will do heparin drip for now.  Ultimately transition to Eliquis in the next 1 to 2 days.  Recommend 6 months of Eliquis for provoked submassive VTE  Check leg Dopplers to evaluate for DVT  Takes Breztri  at home.  Will start nebulizers and bronchopulmonary hygiene  Continue Singulair  Wean O2 to keep SPO2 greater than 90%    VTE Prophylaxis:  Pharmacologic VTE prophylaxis orders are present.    Code Status and Medical Interventions: CPR (Attempt to Resuscitate); Full Support   Ordered at: 01/08/25 1451     Code Status (Patient has no pulse and is not breathing):    CPR (Attempt to Resuscitate)     Medical Interventions (Patient has pulse or is breathing):    Full Support        Labs, imaging, notes and medications personally reviewed.  Discussed with primary and emergency department    Thank you for involving me in the care of this patient.    Electronically signed by Carlitos Lanza MD, 01/08/25, 4:30 PM EST.

## 2025-01-09 DIAGNOSIS — R93.89 ABNORMAL CT SCAN: Primary | ICD-10-CM

## 2025-01-09 LAB
ANION GAP SERPL CALCULATED.3IONS-SCNC: 12.2 MMOL/L (ref 5–15)
APTT PPP: 114.7 SECONDS (ref 78–95.9)
APTT PPP: 147.1 SECONDS (ref 78–95.9)
APTT PPP: 46.1 SECONDS (ref 78–95.9)
AV MEAN PRESS GRAD SYS DOP V1V2: 8 MMHG
AV VMAX SYS DOP: 194 CM/SEC
BASOPHILS # BLD AUTO: 0.03 10*3/MM3 (ref 0–0.2)
BASOPHILS NFR BLD AUTO: 0.3 % (ref 0–1.5)
BH CV ECHO MEAS - AO MAX PG: 15.1 MMHG
BH CV ECHO MEAS - AO ROOT DIAM: 2.4 CM
BH CV ECHO MEAS - AO V2 VTI: 32.5 CM
BH CV ECHO MEAS - AVA(I,D): 1.88 CM2
BH CV ECHO MEAS - EDV(CUBED): 64 ML
BH CV ECHO MEAS - EDV(MOD-SP2): 35.5 ML
BH CV ECHO MEAS - EDV(MOD-SP4): 48.2 ML
BH CV ECHO MEAS - EF(MOD-SP2): 60.3 %
BH CV ECHO MEAS - EF(MOD-SP4): 60.8 %
BH CV ECHO MEAS - ESV(CUBED): 19.7 ML
BH CV ECHO MEAS - ESV(MOD-SP2): 14.1 ML
BH CV ECHO MEAS - ESV(MOD-SP4): 18.9 ML
BH CV ECHO MEAS - FS: 32.5 %
BH CV ECHO MEAS - IVS/LVPW: 1 CM
BH CV ECHO MEAS - IVSD: 1.1 CM
BH CV ECHO MEAS - LA DIMENSION: 2.7 CM
BH CV ECHO MEAS - LAT PEAK E' VEL: 9.8 CM/SEC
BH CV ECHO MEAS - LV DIASTOLIC VOL/BSA (35-75): 24.3 CM2
BH CV ECHO MEAS - LV MASS(C)D: 145.6 GRAMS
BH CV ECHO MEAS - LV MAX PG: 6.2 MMHG
BH CV ECHO MEAS - LV MEAN PG: 3 MMHG
BH CV ECHO MEAS - LV SYSTOLIC VOL/BSA (12-30): 9.5 CM2
BH CV ECHO MEAS - LV V1 MAX: 124 CM/SEC
BH CV ECHO MEAS - LV V1 VTI: 19.4 CM
BH CV ECHO MEAS - LVIDD: 4 CM
BH CV ECHO MEAS - LVIDS: 2.7 CM
BH CV ECHO MEAS - LVOT AREA: 3.1 CM2
BH CV ECHO MEAS - LVOT DIAM: 2 CM
BH CV ECHO MEAS - LVPWD: 1.1 CM
BH CV ECHO MEAS - MED PEAK E' VEL: 7.4 CM/SEC
BH CV ECHO MEAS - MV A MAX VEL: 114 CM/SEC
BH CV ECHO MEAS - MV DEC SLOPE: 457 CM/SEC2
BH CV ECHO MEAS - MV DEC TIME: 0.16 SEC
BH CV ECHO MEAS - MV E MAX VEL: 71.3 CM/SEC
BH CV ECHO MEAS - MV E/A: 0.63
BH CV ECHO MEAS - MV MEAN PG: 3 MMHG
BH CV ECHO MEAS - MV V2 VTI: 21.4 CM
BH CV ECHO MEAS - MVA(VTI): 2.8 CM2
BH CV ECHO MEAS - RVDD: 3.3 CM
BH CV ECHO MEAS - SV(LVOT): 60.9 ML
BH CV ECHO MEAS - SV(MOD-SP2): 21.4 ML
BH CV ECHO MEAS - SV(MOD-SP4): 29.3 ML
BH CV ECHO MEAS - SVI(LVOT): 30.7 ML/M2
BH CV ECHO MEAS - SVI(MOD-SP2): 10.8 ML/M2
BH CV ECHO MEAS - SVI(MOD-SP4): 14.7 ML/M2
BH CV ECHO MEAS - TAPSE (>1.6): 1.69 CM
BH CV ECHO MEASUREMENTS AVERAGE E/E' RATIO: 8.29
BH CV LOW VAS RIGHT POSTERIOR TIBIAL VESSEL: 1
BH CV LOWER VASCULAR LEFT COMMON FEMORAL AUGMENT: NORMAL
BH CV LOWER VASCULAR LEFT COMMON FEMORAL COMPETENT: NORMAL
BH CV LOWER VASCULAR LEFT COMMON FEMORAL COMPRESS: NORMAL
BH CV LOWER VASCULAR LEFT COMMON FEMORAL PHASIC: NORMAL
BH CV LOWER VASCULAR LEFT COMMON FEMORAL SPONT: NORMAL
BH CV LOWER VASCULAR LEFT DISTAL FEMORAL AUGMENT: NORMAL
BH CV LOWER VASCULAR LEFT DISTAL FEMORAL COMPETENT: NORMAL
BH CV LOWER VASCULAR LEFT DISTAL FEMORAL PHASIC: NORMAL
BH CV LOWER VASCULAR LEFT DISTAL FEMORAL SPONT: NORMAL
BH CV LOWER VASCULAR LEFT GASTRONEMIUS COMPRESS: NORMAL
BH CV LOWER VASCULAR LEFT GREATER SAPH AK COMPRESS: NORMAL
BH CV LOWER VASCULAR LEFT GREATER SAPH BK COMPRESS: NORMAL
BH CV LOWER VASCULAR LEFT MID FEMORAL AUGMENT: NORMAL
BH CV LOWER VASCULAR LEFT MID FEMORAL COMPETENT: NORMAL
BH CV LOWER VASCULAR LEFT MID FEMORAL COMPRESS: NORMAL
BH CV LOWER VASCULAR LEFT MID FEMORAL PHASIC: NORMAL
BH CV LOWER VASCULAR LEFT MID FEMORAL SPONT: NORMAL
BH CV LOWER VASCULAR LEFT PERONEAL COMPRESS: NORMAL
BH CV LOWER VASCULAR LEFT POPLITEAL AUGMENT: NORMAL
BH CV LOWER VASCULAR LEFT POPLITEAL COMPETENT: NORMAL
BH CV LOWER VASCULAR LEFT POPLITEAL COMPRESS: NORMAL
BH CV LOWER VASCULAR LEFT POPLITEAL PHASIC: NORMAL
BH CV LOWER VASCULAR LEFT POPLITEAL SPONT: NORMAL
BH CV LOWER VASCULAR LEFT POSTERIOR TIBIAL COMPRESS: NORMAL
BH CV LOWER VASCULAR LEFT PROXIMAL FEMORAL COMPRESS: NORMAL
BH CV LOWER VASCULAR LEFT SAPHENOFEMORAL JUNCTION COMPRESS: NORMAL
BH CV LOWER VASCULAR RIGHT COMMON FEMORAL AUGMENT: NORMAL
BH CV LOWER VASCULAR RIGHT COMMON FEMORAL COMPETENT: NORMAL
BH CV LOWER VASCULAR RIGHT COMMON FEMORAL COMPRESS: NORMAL
BH CV LOWER VASCULAR RIGHT COMMON FEMORAL PHASIC: NORMAL
BH CV LOWER VASCULAR RIGHT COMMON FEMORAL SPONT: NORMAL
BH CV LOWER VASCULAR RIGHT DISTAL FEMORAL COMPRESS: NORMAL
BH CV LOWER VASCULAR RIGHT GASTRONEMIUS COMPRESS: NORMAL
BH CV LOWER VASCULAR RIGHT GREATER SAPH AK COMPRESS: NORMAL
BH CV LOWER VASCULAR RIGHT GREATER SAPH BK COMPRESS: NORMAL
BH CV LOWER VASCULAR RIGHT LESSER SAPH COMPRESS: NORMAL
BH CV LOWER VASCULAR RIGHT MID FEMORAL AUGMENT: NORMAL
BH CV LOWER VASCULAR RIGHT MID FEMORAL COMPETENT: NORMAL
BH CV LOWER VASCULAR RIGHT MID FEMORAL COMPRESS: NORMAL
BH CV LOWER VASCULAR RIGHT MID FEMORAL PHASIC: NORMAL
BH CV LOWER VASCULAR RIGHT MID FEMORAL SPONT: NORMAL
BH CV LOWER VASCULAR RIGHT PERONEAL COMPRESS: NORMAL
BH CV LOWER VASCULAR RIGHT POPLITEAL AUGMENT: NORMAL
BH CV LOWER VASCULAR RIGHT POPLITEAL COMPETENT: NORMAL
BH CV LOWER VASCULAR RIGHT POPLITEAL COMPRESS: NORMAL
BH CV LOWER VASCULAR RIGHT POPLITEAL PHASIC: NORMAL
BH CV LOWER VASCULAR RIGHT POPLITEAL SPONT: NORMAL
BH CV LOWER VASCULAR RIGHT POSTERIOR TIBIAL COMPRESS: NORMAL
BH CV LOWER VASCULAR RIGHT PROXIMAL FEMORAL COMPRESS: NORMAL
BH CV LOWER VASCULAR RIGHT SAPHENOFEMORAL JUNCTION COMPRESS: NORMAL
BH CV POP FLUID COLLECT LEFT: 1
BH CV VAS PRELIMINARY FINDINGS SCRIPTING: 1
BH CV XLRA - TDI S': 12.4 CM/SEC
BUN SERPL-MCNC: 20 MG/DL (ref 8–23)
BUN/CREAT SERPL: 17.4 (ref 7–25)
CALCIUM SPEC-SCNC: 9 MG/DL (ref 8.6–10.5)
CHLORIDE SERPL-SCNC: 107 MMOL/L (ref 98–107)
CO2 SERPL-SCNC: 17.8 MMOL/L (ref 22–29)
CREAT SERPL-MCNC: 1.15 MG/DL (ref 0.57–1)
DEPRECATED RDW RBC AUTO: 49.1 FL (ref 37–54)
EGFRCR SERPLBLD CKD-EPI 2021: 48.6 ML/MIN/1.73
EOSINOPHIL # BLD AUTO: 0 10*3/MM3 (ref 0–0.4)
EOSINOPHIL NFR BLD AUTO: 0 % (ref 0.3–6.2)
ERYTHROCYTE [DISTWIDTH] IN BLOOD BY AUTOMATED COUNT: 15.4 % (ref 12.3–15.4)
GLUCOSE SERPL-MCNC: 164 MG/DL (ref 65–99)
HCT VFR BLD AUTO: 37.6 % (ref 34–46.6)
HGB BLD-MCNC: 11.8 G/DL (ref 12–15.9)
HOLD SPECIMEN: NORMAL
IMM GRANULOCYTES # BLD AUTO: 0.09 10*3/MM3 (ref 0–0.05)
IMM GRANULOCYTES NFR BLD AUTO: 0.9 % (ref 0–0.5)
LEFT ATRIUM VOLUME INDEX: 15.6 ML/M2
LV EF BIPLANE MOD: 60.8 %
LYMPHOCYTES # BLD AUTO: 1.26 10*3/MM3 (ref 0.7–3.1)
LYMPHOCYTES NFR BLD AUTO: 12.2 % (ref 19.6–45.3)
MAGNESIUM SERPL-MCNC: 2 MG/DL (ref 1.6–2.4)
MCH RBC QN AUTO: 27.4 PG (ref 26.6–33)
MCHC RBC AUTO-ENTMCNC: 31.4 G/DL (ref 31.5–35.7)
MCV RBC AUTO: 87.4 FL (ref 79–97)
MONOCYTES # BLD AUTO: 0.32 10*3/MM3 (ref 0.1–0.9)
MONOCYTES NFR BLD AUTO: 3.1 % (ref 5–12)
NEUTROPHILS NFR BLD AUTO: 8.59 10*3/MM3 (ref 1.7–7)
NEUTROPHILS NFR BLD AUTO: 83.5 % (ref 42.7–76)
NRBC BLD AUTO-RTO: 0 /100 WBC (ref 0–0.2)
PLATELET # BLD AUTO: 226 10*3/MM3 (ref 140–450)
PMV BLD AUTO: 12.1 FL (ref 6–12)
POTASSIUM SERPL-SCNC: 4.6 MMOL/L (ref 3.5–5.2)
RBC # BLD AUTO: 4.3 10*6/MM3 (ref 3.77–5.28)
SODIUM SERPL-SCNC: 137 MMOL/L (ref 136–145)
WBC NRBC COR # BLD AUTO: 10.29 10*3/MM3 (ref 3.4–10.8)

## 2025-01-09 PROCEDURE — 83735 ASSAY OF MAGNESIUM: CPT | Performed by: INTERNAL MEDICINE

## 2025-01-09 PROCEDURE — 94799 UNLISTED PULMONARY SVC/PX: CPT

## 2025-01-09 PROCEDURE — 85025 COMPLETE CBC W/AUTO DIFF WBC: CPT | Performed by: INTERNAL MEDICINE

## 2025-01-09 PROCEDURE — 85730 THROMBOPLASTIN TIME PARTIAL: CPT | Performed by: INTERNAL MEDICINE

## 2025-01-09 PROCEDURE — 94664 DEMO&/EVAL PT USE INHALER: CPT

## 2025-01-09 PROCEDURE — 80048 BASIC METABOLIC PNL TOTAL CA: CPT | Performed by: INTERNAL MEDICINE

## 2025-01-09 PROCEDURE — 99233 SBSQ HOSP IP/OBS HIGH 50: CPT | Performed by: INTERNAL MEDICINE

## 2025-01-09 PROCEDURE — 94760 N-INVAS EAR/PLS OXIMETRY 1: CPT

## 2025-01-09 PROCEDURE — 25010000002 HEPARIN (PORCINE) 25000-0.45 UT/250ML-% SOLUTION: Performed by: INTERNAL MEDICINE

## 2025-01-09 PROCEDURE — 25010000002 FUROSEMIDE PER 20 MG: Performed by: INTERNAL MEDICINE

## 2025-01-09 RX ORDER — FUROSEMIDE 10 MG/ML
40 INJECTION INTRAMUSCULAR; INTRAVENOUS ONCE
Status: COMPLETED | OUTPATIENT
Start: 2025-01-09 | End: 2025-01-09

## 2025-01-09 RX ADMIN — BUDESONIDE 0.5 MG: 0.5 INHALANT RESPIRATORY (INHALATION) at 06:25

## 2025-01-09 RX ADMIN — GABAPENTIN 300 MG: 300 CAPSULE ORAL at 08:46

## 2025-01-09 RX ADMIN — FAMOTIDINE 20 MG: 20 TABLET ORAL at 17:44

## 2025-01-09 RX ADMIN — IPRATROPIUM BROMIDE AND ALBUTEROL SULFATE 3 ML: 2.5; .5 SOLUTION RESPIRATORY (INHALATION) at 01:39

## 2025-01-09 RX ADMIN — ARFORMOTEROL TARTRATE 15 MCG: 15 SOLUTION RESPIRATORY (INHALATION) at 06:25

## 2025-01-09 RX ADMIN — HEPARIN SODIUM 14 UNITS/KG/HR: 10000 INJECTION, SOLUTION INTRAVENOUS at 02:20

## 2025-01-09 RX ADMIN — Medication 10 ML: at 08:47

## 2025-01-09 RX ADMIN — IPRATROPIUM BROMIDE AND ALBUTEROL SULFATE 3 ML: 2.5; .5 SOLUTION RESPIRATORY (INHALATION) at 11:20

## 2025-01-09 RX ADMIN — IPRATROPIUM BROMIDE AND ALBUTEROL SULFATE 3 ML: 2.5; .5 SOLUTION RESPIRATORY (INHALATION) at 19:27

## 2025-01-09 RX ADMIN — MONTELUKAST 10 MG: 10 TABLET, FILM COATED ORAL at 08:47

## 2025-01-09 RX ADMIN — ARFORMOTEROL TARTRATE 15 MCG: 15 SOLUTION RESPIRATORY (INHALATION) at 19:27

## 2025-01-09 RX ADMIN — Medication 10 ML: at 20:59

## 2025-01-09 RX ADMIN — FUROSEMIDE 40 MG: 10 INJECTION, SOLUTION INTRAMUSCULAR; INTRAVENOUS at 09:56

## 2025-01-09 RX ADMIN — HEPARIN SODIUM 14 UNITS/KG/HR: 10000 INJECTION, SOLUTION INTRAVENOUS at 20:57

## 2025-01-09 RX ADMIN — GABAPENTIN 600 MG: 300 CAPSULE ORAL at 17:44

## 2025-01-09 RX ADMIN — FAMOTIDINE 20 MG: 20 TABLET ORAL at 08:47

## 2025-01-09 RX ADMIN — METOPROLOL SUCCINATE 100 MG: 50 TABLET, EXTENDED RELEASE ORAL at 08:47

## 2025-01-09 RX ADMIN — BUDESONIDE 0.5 MG: 0.5 INHALANT RESPIRATORY (INHALATION) at 19:27

## 2025-01-09 RX ADMIN — LOSARTAN POTASSIUM 100 MG: 50 TABLET, FILM COATED ORAL at 08:47

## 2025-01-09 RX ADMIN — IPRATROPIUM BROMIDE AND ALBUTEROL SULFATE 3 ML: 2.5; .5 SOLUTION RESPIRATORY (INHALATION) at 06:25

## 2025-01-09 NOTE — PAYOR COMM NOTE
"Paulo Donato \"RHETT\" (79 y.o. Female)       Date of Birth   1945    Social Security Number       Address   [address] [facility] [address] [place] [id number]    Home Phone   [phone]    MRN   [id number]       Pentecostal   Religious    Marital Status                               Admission Date   25    Admission Type   Emergency    Admitting Provider   Phil Hendrix DO    Attending Provider   Phil Hendrix DO    Department, Room/Bed   [facility] 5TH FLOOR SURGICAL TELEMETRY UNIT, 514/1       Discharge Date       Discharge Disposition       Discharge Destination                                 Attending Provider: Phil Hendrix DO    Allergies: Ibuprofen, Penicillins, Nsaids, Cephalosporins, Sulfa Antibiotics    Isolation: None   Infection: None   Code Status: CPR    Ht: 154.9 cm (61\")   Wt: 99.3 kg (219 lb)    Admission Cmt: None   Principal Problem: Acute pulmonary embolism [I26.99]                   Active Insurance as of 2025       Primary Coverage       Payor Plan Insurance Group Employer/Plan Group    ANTHEM MEDICARE REPLACEMENT ANTHEM MED ADV HMO [id number]       Payor Plan Address Payor Plan Phone Number Payor Plan Fax Number Effective Dates    [address] [phone]  2025 - None Entered    [facility] [id number]         Subscriber Name Subscriber Birth Date Member ID       PAULO DONATO 1945 [id number]                     Emergency Contacts        (Rel.) Home Phone Work Phone Mobile Phone    DAVINA DONATO (Spouse) [phone] -- [phone]    DERRICK DONATO (Son) [phone] -- [phone]                 History & Physical        Phil Hendrix DO at [address] [place], [place] [address]IST HISTORY AND PHYSICAL  Date: 2025   Patient Name: Paulo Donato  : 1945  MRN: [id number]  Primary Care Physician:  Nick Patel DO  Date of admission: 2025    Subjective  Subjective     Chief " Complaint: short of air     HPI:    Julia Rios is a 79 y.o. female with history of COPD not on oxygen, hypertension, hyperlipidemia, chronic kidney disease, congestive heart failure who presented to the emergency department with shortness of breath.  Patient was recently discharged from the hospital on December 11 where she was treated for pneumonia.  Patient states that she had gone to see her primary care doctor on December 26 and then saw the pulmonary nurse practitioner on January 3 where she continued to state that she was short of breath however was told that that was due to her recent pneumonia.  Patient states that her shortness of breath did not get better it continued to get worse.  Patient states that she felt so bad she was unable to get up and do many things around the house.  Patient was evaluated in the ER where she was requiring 6 L of oxygen however she is not on oxygen at home.  Patient was afebrile.  Blood pressure well-controlled.  Patient did have a CT of the chest which showed bilateral partially occlusive pulmonary emboli involving all lobes of both lungs.  There is evidence of right heart strain.  Patient was placed on a heparin drip in the ER.  Pulmonary was also consulted.  Patient's laboratory data was unremarkable.  Hospitalist service consulted for admission due to acute pulmonary embolism requiring IV heparin.      Personal History     Past Medical History:  Past Medical History:   Diagnosis Date    Acute right-sided low back pain with right-sided sciatica 01/15/2018    Allergic rhinitis     Asthma     Asthma, extrinsic     Asthma, intrinsic     CHF (congestive heart failure)     Chronic heart failure with preserved ejection fraction     11/16/20 echocardiogram: 1.  Normal ejection fraction of 55%. 2.  Mild left ventricular hypertrophy. 3.  No significant valvular heart issues.     CKD stage 3 10/29/2019    COPD (chronic obstructive pulmonary disease)     Diverticulitis      Emphysema of lung     Essential hypertension     GERD (gastroesophageal reflux disease)     Hemoptysis     non cancerous    History of fungal pneumonia 12/02/2021    Hyperlipidemia     Idiopathic chronic gout of multiple sites without tophus 05/20/2016    Pneumonia     Primary osteoarthritis of right knee 01/16/2017    Sleep apnea     CPAP    Sleep apnea, obstructive     Vitamin D deficiency 01/13/2016       Past Surgical History:  Past Surgical History:   Procedure Laterality Date    BRONCHOSCOPY N/A 4/30/2024    Procedure: BRONCHOSCOPY WITH BRONCHOALVEOLAR LAVAGE, WASHING, AIRWAY INSPECTION;  Surgeon: Santino Baez MD;  Location: [facility] ENDOSCOPY;  Service: Pulmonary;  Laterality: N/A;  MUCOUS PLUGGING, BRONCHITIS    CATARACT EXTRACTION Right     COLONOSCOPY  2014    COLONOSCOPY N/A 10/12/2021    Procedure: COLONOSCOPY;  Surgeon: Jorge Diane MD;  Location: [facility] ENDOSCOPY;  Service: Gastroenterology;  Laterality: N/A;  DIVERTICULOSIS    ENDOSCOPY  2015    EYE SURGERY      cataract right eye    OTHER SURGICAL HISTORY      Fatty tumor removed off back    RETINAL DETACHMENT REPAIR Right     hole in retina repair    TOTAL KNEE ARTHROPLASTY Right 10/31/2023    Procedure: TOTAL KNEE ARTHROPLASTY;  Surgeon: Celso Prakash MD;  Location: [facility];  Service: Orthopedics;  Laterality: Right;       Family History:   Family History   Problem Relation Age of Onset    Colon cancer Mother 61    Cancer Mother     Heart disease Mother     Heart failure Father     Asthma Father     Heart attack Father     Hyperlipidemia Father     Emphysema Father     Diabetes Brother     Emphysema Brother     Breast cancer Maternal Grandmother     Stroke Paternal Grandfather     Malig Hyperthermia Neg Hx        Social History:   Social History     Socioeconomic History    Marital status:    Tobacco Use    Smoking status: Former     Current packs/day: 0.00     Average packs/day: 0.5 packs/day for 32.0 years (16.0 ttl  pk-yrs)     Types: Cigarettes     Start date: 1963     Quit date: 1995     Years since quittin.0     Passive exposure: Never    Smokeless tobacco: Never   Vaping Use    Vaping status: Never Used   Substance and Sexual Activity    Alcohol use: Never    Drug use: Never    Sexual activity: Defer       Home Medications:  Budeson-Glycopyrrol-Formoterol, Cholecalciferol, azelastine, azithromycin, famotidine, fexofenadine, gabapentin, ipratropium-albuterol, losartan, metoprolol succinate XL, montelukast, predniSONE, and spironolactone    Allergies:  Allergies   Allergen Reactions    Ibuprofen Hives    Penicillins Other (See Comments) and Seizure     1. When was your reaction? < 10 years ago  2. Did your reaction happen after the first dose or after several doses? Do not know  3. Did your reaction require ED or hospital care to manage your reaction? Do not know  4. Did your reaction require treatment with epinephrine? Do not know  5. Have you taken amoxicillin (Amoxil) or amoxicillin-clavulanate (Augmentin) without issue since? No  6. Have you taken cephalexin (Keflex) without issue since?  No     Nsaids Other (See Comments)     CKD    Cephalosporins Rash    Sulfa Antibiotics Rash       Review of Systems  History obtained from the patient  General ROS: Positive for Fatigue, Negative for - chills, fever, malaise, or night sweats  Psychological ROS: negative for - anxiety, depression, hallucinations, or mood swings  Ophthalmic ROS: negative for - blurry vision, double vision, or itchy eyes  ENT ROS: negative for - headaches, nasal congestion, sneezing, or sore throat  Hematological and Lymphatic ROS: negative for - bleeding problems, bruising, or jaundice  Endocrine ROS: negative for - malaise/lethargy, polydipsia/polyuria, or temperature intolerance  Respiratory ROS: Positive for shortness of air   cardiovascular ROS: negative for - chest pain, dyspnea on exertion, edema, palpitations, or paroxysmal nocturnal  dyspnea  Gastrointestinal ROS: negative for - abdominal pain, constipation, diarrhea, heartburn, hematemesis, or nausea/vomiting  Genito-Urinary ROS: negative for - change in urinary stream, hematuria, incontinence, or urinary frequency/urgency  Musculoskeletal ROS: Positive for right knee pain.  Patient states that she fell within the past month  Neurological ROS: negative for - confusion, dizziness, gait disturbance, headaches, impaired coordination/balance, memory loss, numbness/tingling, seizures, or visual changes  Dermatological ROS: negative for dry skin and rash       Objective  Objective     Vitals:   Temp:  [98 °F (36.7 °C)] 98 °F (36.7 °C)  Heart Rate:  [] 84  Resp:  [18-27] 20  BP: (112-138)/(67-91) 112/73  Flow (L/min) (Oxygen Therapy):  [6] 6    Physical Exam    Constitutional: Awake, alert, no acute distress.  Patient is resting in the ER gurney   Eyes: Pupils equal, sclerae anicteric, no conjunctival injection   HENT: NCAT, mucous membranes moist   Neck: Supple, no thyromegaly, no lymphadenopathy, trachea midline   Respiratory: Clear to auscultation bilaterally, nonlabored respirations    Cardiovascular: RRR, no murmurs, rubs, or gallops, palpable pedal pulses bilaterally   Gastrointestinal: Positive bowel sounds, soft, nontender, nondistended   Musculoskeletal: No bilateral ankle edema, no clubbing or cyanosis to extremities.  Small bruising to right knee   Psychiatric: Appropriate affect, cooperative   Neurologic: Oriented x 3, strength symmetric in all extremities, Cranial Nerves grossly intact to confrontation, speech clear   Skin: No rashes     Result Review   Result Review:  I have personally reviewed the results from the time of this admission to 1/8/2025 14:51 EST and agree with these findings:  [x]  Laboratory  CBC          12/11/2024    04:45 12/26/2024    11:15 1/8/2025    10:56   CBC   WBC 13.39  15.41  9.42    RBC 4.42  4.38  4.36    Hemoglobin 12.2  12.7  12.0    Hematocrit 39.5   "38.8  39.1    MCV 89.4  88.6  89.7    MCH 27.6  29.0  27.5    MCHC 30.9  32.7  30.7    RDW 15.0  14.5  15.8    Platelets 245  180  217      BMP          12/11/2024    04:45 12/26/2024    11:15 1/8/2025    10:56   BMP   BUN 33  17  14    Creatinine 1.21  1.25  1.12    Sodium 138  141  142    Potassium 4.0  4.0  3.5    Chloride 103  106  107    CO2 18.9  26.0  20.6    Calcium 8.8  9.2  9.2        [x]  Microbiology  No results found for: \"ACANTHNAEG\", \"AFBCX\", \"BPERTUSSISCX\", \"BLOODCX\"  No results found for: \"BCIDPCR\", \"CXREFLEX\", \"CSFCX\", \"CULTURETIS\"  No results found for: \"CULTURES\", \"HSVCX\", \"URCX\"  No results found for: \"EYECULTURE\", \"GCCX\", \"HSVCULTURE\", \"LABHSV\"  No results found for: \"LEGIONELLA\", \"MRSACX\", \"MUMPSCX\", \"MYCOPLASCX\"  No results found for: \"NOCARDIACX\", \"STOOLCX\"  No results found for: \"THROATCX\", \"UNSTIMCULT\", \"URINECX\", \"CULTURE\", \"VZVCULTUR\"  No results found for: \"VIRALCULTU\", \"WOUNDCX\"    [x]  Radiology  CT Angiogram Chest Pulmonary Embolism    Result Date: 1/8/2025  CT ANGIOGRAM CHEST PULMONARY EMBOLISM Date of Exam: 1/8/2025 2:21 PM EST Indication: Hypoxia. Comparison: None available. Technique: Axial CT images were obtained of the chest after the uneventful intravenous administration of iodinated contrast utilizing pulmonary embolism protocol.  Reconstructed coronal and sagittal images were also obtained. Automated exposure control and iterative construction methods were used. Findings: The pulmonary artery outflow track is patent. There is pulmonary embolism at the right main pulmonary artery bifurcation with partially occlusive pulmonary emboli involving the left upper lobe segmental and subsegmental branches. There is also partially occlusive pulmonary embolism extending into the right lower lobe lobar and few segmental and subsegmental branches. Also partially occlusive pulmonary embolism at the origin of the right middle lobe pulmonary artery. Partially occlusive embolism in left " upper lobe and left lower lobe segmental and few subsegmental branches. There is cardiomegaly. The right heart lumen is mildly enlarged as compared to the left and there is minimal leftward bowing of the interventricular septum compatible with right heart strain. There are no pericardial effusions. The thoracic aorta shows a normal diameter with atherosclerosis. Patent three-vessel arch. Subcentimeter shotty mediastinal lymph nodes There is mild bibasilar reticular lung thickening and mild bibasilar discoid atelectasis. No pleural effusion or pneumothorax. No acute fractures or destructive bone lesions. No suspicious pulmonary nodules Tiny hiatal hernia. No acute abnormalities in the upper abdomen     Impression: Impression: 1.Bilateral partially occlusive pulmonary emboli involving all lobes of both lungs. There is evidence of right heart strain. 2.Cardiomegaly. 3.Mild bibasilar reticular lung thickening and discoid atelectasis. Findings discussed over the phone with Dr. Jason On 1/8/2025 at 2:38 p.m. Electronically Signed: Tim Rodriguez MD  1/8/2025 2:43 PM EST  Workstation ID: [id number]     []  EKG/Telemetry   []  Cardiology/Vascular   []  Pathology  []  Old records  []  Other:      Assessment & Plan  Assessment / Plan     Assessment/Plan:   Acute pulmonary emboli involving all lobes of the lung with evidence of right heart strain  History of COPD however not on home oxygen  Chronic kidney disease stage III  Morbid obesity with a BMI of 40  GERD  Hyperlipidemia    PLAN  -- Patient will be admitted to a telemetry bed  -- Continue patient on heparin drip  -- Pulmonary has been consulted  -- Obtain echocardiogram  ---resume patients home medications  -- Resume patient's bronchodilators  -- Discussed plan with nurse and with patient      VTE Prophylaxis:  Pharmacologic VTE prophylaxis orders are present.        CODE STATUS:    Code Status (Patient has no pulse and is not breathing): CPR (Attempt to  Resuscitate)  Medical Interventions (Patient has pulse or is breathing): Full Support      Admission Status:  I believe this patient meets inpatient status.    Electronically signed by Phil Hendrix DO, 01/08/25, 2:51 PM EST.             Electronically signed by Phil Hendrix DO at 01/08/25 1625       Vital Signs (last day)       Date/Time Temp Temp src Pulse Resp BP Patient Position SpO2    01/08/25 1911 98.1 (36.7) Oral -- 22 136/66 Lying 94    01/08/25 1715 -- -- 84 -- 125/67 -- 91    01/08/25 1700 -- -- 86 -- 128/72 -- 91    01/08/25 1645 -- -- 87 -- 130/69 -- 92    01/08/25 1630 -- -- 88 -- 128/69 -- 91    01/08/25 1615 -- -- 88 -- 119/71 -- 92    01/08/25 1600 -- -- 93 -- 129/75 -- --    01/08/25 1545 -- -- 89 20 136/58 Sitting 93    01/08/25 1530 -- -- 88 -- 123/69 -- 93    01/08/25 1522 -- -- 90 -- -- -- 94    01/08/25 1521 98.1 (36.7) Oral 88 20 119/62 Sitting 94    01/08/25 1500 -- -- 89 -- 134/71 -- --    01/08/25 1445 -- -- 90 -- 145/82 -- 95    01/08/25 1430 -- -- 97 -- 148/63 -- 100    01/08/25 1404 -- -- 84 -- -- -- 95    01/08/25 1401 -- -- 86 -- -- -- 99    01/08/25 1400 -- -- 81 20 112/73 Sitting 98    01/08/25 1357 -- -- 83 18 -- -- 97    01/08/25 1353 -- -- 82 23 -- Lying 98    01/08/25 1352 -- -- 82 -- -- -- 98    01/08/25 1350 -- -- 83 20 117/67 Sitting 98    01/08/25 1200 -- -- 88 20 124/77 Sitting 94    01/08/25 1157 -- -- 89 -- -- -- 94    01/08/25 11:01:52 -- -- -- -- -- -- 91    01/08/25 1047 -- -- 95 21 131/81 Lying 96    01/08/25 0953 -- -- 102 -- -- -- 95    01/08/25 0951 -- -- 99 -- -- -- 93    01/08/25 0946 -- -- -- -- 138/91 Sitting --    01/08/25 0940 98 (36.7) Oral 104 27 -- -- 78          Oxygen Therapy (last day)       Date/Time SpO2 Device (Oxygen Therapy) Flow (L/min) (Oxygen Therapy) Oxygen Concentration (%) ETCO2 (mmHg)    01/08/25 1911 94 humidified;high-flow nasal cannula 7 -- --    01/08/25 1715 91 -- -- -- --    01/08/25 1700 91 -- -- -- --    01/08/25 1645 92 -- -- --  --    01/08/25 1630 91 -- -- -- --    01/08/25 1615 92 -- -- -- --    01/08/25 1545 93 high-flow nasal cannula;humidified 6 -- --    01/08/25 1530 93 -- -- -- --    01/08/25 1522 94 -- -- -- --    01/08/25 1521 94 high-flow nasal cannula;humidified 6 -- --    01/08/25 1445 95 -- -- -- --    01/08/25 1430 100 -- -- -- --    01/08/25 1404 95 -- -- -- --    01/08/25 1401 99 -- -- -- --    01/08/25 1400 98 high-flow nasal cannula;humidified 6 -- --    01/08/25 1357 97 humidified;high-flow nasal cannula 6 -- --    01/08/25 1353 98 humidified;high-flow nasal cannula 6 -- --    01/08/25 1352 98 -- -- -- --    01/08/25 13:51:37 -- high-flow nasal cannula;humidified 6 -- --    01/08/25 1350 98 high-flow nasal cannula;humidified 6 -- --    01/08/25 1200 94 high-flow nasal cannula;humidified 6 -- --    01/08/25 1157 94 -- -- -- --    01/08/25 11:01:52 91 high-flow nasal cannula;humidified -- -- --    01/08/25 1047 96 high-flow nasal cannula;humidified 6 -- --    01/08/25 0953 95 nasal cannula 6 -- --    01/08/25 0951 93 -- -- -- --    01/08/25 09:46:23 -- nasal cannula 6 -- --    01/08/25 0940 78 room air -- -- --          Facility-Administered Medications as of 1/8/2025   Medication Dose Route Frequency Provider Last Rate Last Admin    albuterol (PROVENTIL) (2.5 MG/3ML) 0.083% nebulizer solution  - ADS Override Pull             [COMPLETED] albuterol (PROVENTIL) nebulizer solution 0.083% 2.5 mg/3mL  2.5 mg Nebulization Once Elbert Jason DO   2.5 mg at 01/08/25 1046    [COMPLETED] albuterol (PROVENTIL) nebulizer solution 0.083% 2.5 mg/3mL  5 mg Nebulization Once Elbert Jason DO   5 mg at 01/08/25 1352    arformoterol (BROVANA) nebulizer solution 15 mcg  15 mcg Nebulization BID - RT Carlitos Lanza MD   15 mcg at 01/08/25 1911    sennosides-docusate (PERICOLACE) 8.6-50 MG per tablet 2 tablet  2 tablet Oral BID PRN Phil Hendrix DO        And    polyethylene glycol (MIRALAX) packet 17 g  17 g Oral Daily PRN Phil Hendrix DO         And    bisacodyl (DULCOLAX) EC tablet 5 mg  5 mg Oral Daily PRN Phil Hendrix DO        And    bisacodyl (DULCOLAX) suppository 10 mg  10 mg Rectal Daily PRN Phil Hendrix DO        budesonide (PULMICORT) nebulizer solution 0.5 mg  0.5 mg Nebulization BID - RT Carlitos Lanza MD   0.5 mg at 01/08/25 1911    famotidine (PEPCID) tablet 20 mg  20 mg Oral BID AC Phil Hendrix DO        [START ON 1/9/2025] gabapentin (NEURONTIN) capsule 300 mg  300 mg Oral QAM Phil Hendrix DO        gabapentin (NEURONTIN) capsule 600 mg  600 mg Oral Q PM Phil Hendrix DO   600 mg at 01/08/25 1633    heparin 03094 units/250 mL (100 units/mL) in 0.45 % NaCl infusion  18 Units/kg/hr Intravenous Titrated Phil Hendrix DO 17.89 mL/hr at 01/08/25 1727 18 Units/kg/hr at 01/08/25 1727    heparin bolus from bag solution 2,500 Units  25 Units/kg Intravenous PRN Phil Hendrix DO        heparin bolus from bag solution 5,000 Units  50 Units/kg Intravenous PRN Phil Hendrix DO        [COMPLETED] heparin bolus from bag solution 8,000 Units  80 Units/kg Intravenous Once Elbert Jason DO   8,000 Units at 01/08/25 1518    [COMPLETED] iopamidol (ISOVUE-370) 76 % injection 100 mL  100 mL Intravenous Once in imaging Elbert Jason DO   100 mL at 01/08/25 1429    ipratropium-albuterol (DUO-NEB) nebulizer solution 3 mL  3 mL Nebulization 4x Daily - RT Phil Hendrix DO   3 mL at 01/08/25 1911    ipratropium-albuterol (DUO-NEB) nebulizer solution 3 mL  3 mL Nebulization Q4H PRN Carlitos Lanza MD        [START ON 1/9/2025] losartan (COZAAR) tablet 100 mg  100 mg Oral Daily Phil Hendrix DO        [COMPLETED] methylPREDNISolone sodium succinate (SOLU-Medrol) injection 125 mg  125 mg Intravenous Once Elbert Jason DO   125 mg at 01/08/25 1348    [START ON 1/9/2025] metoprolol succinate XL (TOPROL-XL) 24 hr tablet 100 mg  100 mg Oral Daily Phil Hendrix DO        [START ON 1/9/2025] montelukast (SINGULAIR) tablet 10 mg  10 mg Oral QAM Phil Hendrix DO         nitroglycerin (NITROSTAT) SL tablet 0.4 mg  0.4 mg Sublingual Q5 Min PRN Phil Hendrix DO        ondansetron (ZOFRAN) injection 4 mg  4 mg Intravenous Q6H PRN Phil Hendrix DO        [COMPLETED] potassium chloride (MICRO-K/KLOR-CON) CR capsule  40 mEq Oral Once Carlitos Lanza MD   40 mEq at 01/08/25 1535    sodium chloride 0.9 % flush 10 mL  10 mL Intravenous PRN Phil Hendrix,         sodium chloride 0.9 % flush 10 mL  10 mL Intravenous PRN Phil Hendrix,         sodium chloride 0.9 % flush 10 mL  10 mL Intravenous Q12H Phil Hendrix DO        sodium chloride 0.9 % flush 10 mL  10 mL Intravenous PRN Phil Hendrix DO        sodium chloride 0.9 % infusion 40 mL  40 mL Intravenous PRN Phil Hendrix DO        [START ON 1/10/2025] spironolactone (ALDACTONE) tablet 25 mg  25 mg Oral Once per day on Monday Wednesday Friday Phil Hendrix DO         Lab Results (last 24 hours)       Procedure Component Value Units Date/Time    aPTT [[id number]]  (Abnormal) Collected: 01/08/25 1515    Specimen: Blood Updated: 01/08/25 1530     PTT 27.3 seconds     Protime-INR [[id number]]  (Normal) Collected: 01/08/25 1056    Specimen: Blood Updated: 01/08/25 1448     Protime 14.5 Seconds      INR 1.11    Narrative:      Suggested Therapeutic Ranges For Oral Anticoagulant Therapy:  Level of Therapy                      INR Target Range  Standard Dose                            2.0-3.0  High Dose                                2.5-3.5  Patients not receiving anticoagulant  Therapy Normal Range                     0.86-1.15    High Sensitivity Troponin T 1Hr [[id number]]  (Abnormal) Collected: 01/08/25 1223    Specimen: Blood Updated: 01/08/25 1316     HS Troponin T 82 ng/L      Troponin T Numeric Delta 6 ng/L      Troponin T % Delta 8 %     Narrative:      High Sensitive Troponin T Reference Range:  <14.0 ng/L- Negative Female for AMI  <22.0 ng/L- Negative Male for AMI  >=14 - Abnormal Female indicating possible myocardial  injury.  >=22 - Abnormal Male indicating possible myocardial injury.   Clinicians would have to utilize clinical acumen, EKG, Troponin, and serial changes to determine if it is an Acute Myocardial Infarction or myocardial injury due to an underlying chronic condition.         Comprehensive Metabolic Panel [[id number]]  (Abnormal) Collected: 01/08/25 1056    Specimen: Blood Updated: 01/08/25 1217     Glucose 112 mg/dL      BUN 14 mg/dL      Creatinine 1.12 mg/dL      Sodium 142 mmol/L      Potassium 3.5 mmol/L      Chloride 107 mmol/L      CO2 20.6 mmol/L      Calcium 9.2 mg/dL      Total Protein 6.8 g/dL      Albumin 4.0 g/dL      ALT (SGPT) 10 U/L      AST (SGOT) 18 U/L      Alkaline Phosphatase 82 U/L      Total Bilirubin 0.6 mg/dL      Globulin 2.8 gm/dL      A/G Ratio 1.4 g/dL      BUN/Creatinine Ratio 12.5     Anion Gap 14.4 mmol/L      eGFR 50.1 mL/min/1.73     Narrative:      GFR Categories in Chronic Kidney Disease (CKD)      GFR Category          GFR (mL/min/1.73)    Interpretation  G1                     90 or greater         Normal or high (1)  G2                      60-89                Mild decrease (1)  G3a                   45-59                Mild to moderate decrease  G3b                   30-44                Moderate to severe decrease  G4                    15-29                Severe decrease  G5                    14 or less           Kidney failure          (1)In the absence of evidence of kidney disease, neither GFR category G1 or G2 fulfill the criteria for CKD.    eGFR calculation 2021 CKD-EPI creatinine equation, which does not include race as a factor    High Sensitivity Troponin T [[id number]]  (Abnormal) Collected: 01/08/25 1056    Specimen: Blood Updated: 01/08/25 1158     HS Troponin T 76 ng/L     Narrative:      High Sensitive Troponin T Reference Range:  <14.0 ng/L- Negative Female for AMI  <22.0 ng/L- Negative Male for AMI  >=14 - Abnormal Female indicating possible myocardial  injury.  >=22 - Abnormal Male indicating possible myocardial injury.   Clinicians would have to utilize clinical acumen, EKG, Troponin, and serial changes to determine if it is an Acute Myocardial Infarction or myocardial injury due to an underlying chronic condition.         COVID-19, FLU A/B, RSV PCR 1 HR TAT - Swab, Nasopharynx [[id number]]  (Normal) Collected: 01/08/25 1100    Specimen: Swab from Nasopharynx Updated: 01/08/25 1152     COVID19 Not Detected     Influenza A PCR Not Detected     Influenza B PCR Not Detected     RSV, PCR Not Detected    Narrative:      Fact sheet for providers: https://www.fda.gov/media/365561/download    Fact sheet for patients: https://www.fda.gov/media/977596/download    Test performed by PCR.    Lactic Acid, Plasma [[id number]]  (Normal) Collected: 01/08/25 1056    Specimen: Blood Updated: 01/08/25 1142     Lactate 1.3 mmol/L     BNP [[id number]]  (Normal) Collected: 01/08/25 1056    Specimen: Blood Updated: 01/08/25 1141     proBNP 256.1 pg/mL     Narrative:      This assay is used as an aid in the diagnosis of individuals suspected of having heart failure. It can be used as an aid in the diagnosis of acute decompensated heart failure (ADHF) in patients presenting with signs and symptoms of ADHF to the emergency department (ED). In addition, NT-proBNP of <300 pg/mL indicates ADHF is not likely.    Age Range Result Interpretation  NT-proBNP Concentration (pg/mL:      <50             Positive            >450                   Gray                 300-450                    Negative             <300    50-75           Positive            >900                  Gray                300-900                  Negative            <300      >75             Positive            >1800                  Gray                300-1800                  Negative            <300    [place] Draw [[id number]] Collected: 01/08/25 1056    Specimen: Blood Updated: 01/08/25 1115    Narrative:      The  following orders were created for panel order [place] Draw.  Procedure                               Abnormality         Status                     ---------                               -----------         ------                     Green Top (Gel)[[id number]]                                  Final result               Lavender Top[[id number]]                                     Final result               Gold Top - SST[[id number]]                                   Final result               Light Blue Top[[id number]]                                   Final result                 Please view results for these tests on the individual orders.    Green Top (Gel) [[id number]] Collected: 01/08/25 1056    Specimen: Blood Updated: 01/08/25 1115     Extra Tube Hold for add-ons.     Comment: Auto resulted.       Lavender Top [[id number]] Collected: 01/08/25 1056    Specimen: Blood Updated: 01/08/25 1115     Extra Tube hold for add-on     Comment: Auto resulted       Light Blue Top [[id number]] Collected: 01/08/25 1056    Specimen: Blood Updated: 01/08/25 1115     Extra Tube Hold for add-ons.     Comment: Auto resulted       Gold Top - SST [[id number]] Collected: 01/08/25 1056    Specimen: Blood Updated: 01/08/25 1115     Extra Tube Hold for add-ons.     Comment: Auto resulted.       CBC & Differential [[id number]]  (Abnormal) Collected: 01/08/25 1056    Specimen: Blood Updated: 01/08/25 1114    Narrative:      The following orders were created for panel order CBC & Differential.  Procedure                               Abnormality         Status                     ---------                               -----------         ------                     CBC Auto Differential[[id number]]        Abnormal            Final result                 Please view results for these tests on the individual orders.    CBC Auto Differential [[id number]]  (Abnormal) Collected: 01/08/25 1056    Specimen: Blood Updated: 01/08/25 1114     WBC 9.42  10*3/mm3      RBC 4.36 10*6/mm3      Hemoglobin 12.0 g/dL      Hematocrit 39.1 %      MCV 89.7 fL      MCH 27.5 pg      MCHC 30.7 g/dL      RDW 15.8 %      RDW-SD 51.9 fl      MPV 12.3 fL      Platelets 217 10*3/mm3      Neutrophil % 65.7 %      Lymphocyte % 19.9 %      Monocyte % 11.0 %      Eosinophil % 2.0 %      Basophil % 0.6 %      Immature Grans % 0.8 %      Neutrophils, Absolute 6.18 10*3/mm3      Lymphocytes, Absolute 1.87 10*3/mm3      Monocytes, Absolute 1.04 10*3/mm3      Eosinophils, Absolute 0.19 10*3/mm3      Basophils, Absolute 0.06 10*3/mm3      Immature Grans, Absolute 0.08 10*3/mm3      nRBC 0.0 /100 WBC     Blood Culture - Blood, Arm, Right [[id number]] Collected: 01/08/25 1056    Specimen: Blood from Arm, Right Updated: 01/08/25 1110    Blood Culture - Blood, Arm, Left [[id number]] Collected: 01/08/25 1056    Specimen: Blood from Arm, Left Updated: 01/08/25 1110    POC Glucose Once [[id number]]  (Abnormal) Collected: 01/08/25 1040    Specimen: Arterial Blood Updated: 01/08/25 1047     Glucose 122 mg/dL      Comment: Serial Number: [id number]:  [id number]       Blood Gas, Arterial - [[id number]]  (Abnormal) Collected: 01/08/25 1040    Specimen: Arterial Blood Updated: 01/08/25 1047     Site Right Brachial     Max's Test N/A     pH, Arterial 7.425 pH units      pCO2, Arterial 31.3 mm Hg      pO2, Arterial 42.6 mm Hg      HCO3, Arterial 20.6 mmol/L      Base Excess, Arterial -2.9 mmol/L      Comment: Serial Number: [id number]:  [id number]        O2 Saturation, Arterial 80.1 %      Hemoglobin, Blood Gas 12.9 g/dL      Hematocrit, Blood Gas 38.0 %      Barometric Pressure for Blood Gas 753.5000 mmHg      Modality Room Air     FIO2 21 %      Notified Who Dr Jason     Read Back Yes     Notified Time --     Hemodilution No     PO2/FIO2 203    POC Lactate [[id number]]  (Normal) Collected: 01/08/25 1040    Specimen: Arterial Blood Updated: 01/08/25 1047     Lactate 1.0 mmol/L      Comment: Serial Number:  [id number]:  [id number]       POC Electrolyte Panel [[id number]]  (Abnormal) Collected: 01/08/25 1040    Specimen: Arterial Blood Updated: 01/08/25 1047     Sodium 142 mmol/L      POC Potassium 3.6 mmol/L      Chloride 110 mmol/L      Ionized Calcium 1.19 mmol/L      Comment: Serial Number: [id number]:  [id number]             Imaging Results (Last 24 Hours)       Procedure Component Value Units Date/Time    XR Knee 1 or 2 View Right [[id number]] Collected: 01/08/25 1827     Updated: 01/08/25 1846    Narrative:      XR KNEE 1 OR 2 VW RIGHT    Date of Exam: 1/8/2025 6:03 PM EST    Indication: Fall    Comparison: Right knee radiographs dated 10/31/2023 from [facility]    Findings:  There is a right total knee prosthesis. The hardware appears in expected position. There is no evidence of periprosthetic fracture or loosening. There is distal quadriceps tendon enthesopathy. There is no joint effusion.      Impression:      Impression:  Right total knee prosthesis without evidence of hardware complication.        Electronically Signed: Dakota Summers    1/8/2025 6:32 PM EST    Workstation ID: [id number]    CT Angiogram Chest Pulmonary Embolism [[id number]] Collected: 01/08/25 1433     Updated: 01/08/25 1446    Narrative:      CT ANGIOGRAM CHEST PULMONARY EMBOLISM    Date of Exam: 1/8/2025 2:21 PM EST    Indication: Hypoxia.    Comparison: None available.    Technique: Axial CT images were obtained of the chest after the uneventful intravenous administration of iodinated contrast utilizing pulmonary embolism protocol.  Reconstructed coronal and sagittal images were also obtained. Automated exposure control   and iterative construction methods were used.      Findings:  The pulmonary artery outflow track is patent. There is pulmonary embolism at the right main pulmonary artery bifurcation with partially occlusive pulmonary emboli involving the left upper lobe segmental and subsegmental branches. There is also partially   occlusive  pulmonary embolism extending into the right lower lobe lobar and few segmental and subsegmental branches. Also partially occlusive pulmonary embolism at the origin of the right middle lobe pulmonary artery. Partially occlusive embolism in left   upper lobe and left lower lobe segmental and few subsegmental branches.    There is cardiomegaly. The right heart lumen is mildly enlarged as compared to the left and there is minimal leftward bowing of the interventricular septum compatible with right heart strain. There are no pericardial effusions.    The thoracic aorta shows a normal diameter with atherosclerosis. Patent three-vessel arch. Subcentimeter shotty mediastinal lymph nodes    There is mild bibasilar reticular lung thickening and mild bibasilar discoid atelectasis. No pleural effusion or pneumothorax.    No acute fractures or destructive bone lesions. No suspicious pulmonary nodules    Tiny hiatal hernia. No acute abnormalities in the upper abdomen      Impression:      Impression:  1.Bilateral partially occlusive pulmonary emboli involving all lobes of both lungs. There is evidence of right heart strain.  2.Cardiomegaly.  3.Mild bibasilar reticular lung thickening and discoid atelectasis.        Findings discussed over the phone with Dr. Jason On 1/8/2025 at 2:38 p.m.      Electronically Signed: Tim Rodriguez MD    1/8/2025 2:43 PM EST    Workstation ID: [id number]    XR Chest 1 View [[id number]] Collected: 01/08/25 1015     Updated: 01/08/25 1018    Narrative:      XR CHEST 1 VW    Date of Exam: 1/8/2025 10:10 AM EST    Indication: SOA Triage Protocol    Comparison: 12/8/2024    Findings:  Cardiomediastinal silhouette is unremarkable. There is nonspecific interstitial prominence, similar prior exam. No airspace disease, pneumothorax, nor pleural effusion. No acute osseous abnormality identified.      Impression:      Impression:  1.Stable chronic interstitial prominence without acute process  identified.      Electronically Signed: Rao Gu MD    1/8/2025 10:16 AM EST    Workstation ID: [id number]          ECG/EMG Results (last 24 hours)       Procedure Component Value Units Date/Time    ECG 12 Lead ED Triage Standing Order; SOA [[id number]] Collected: 01/08/25 0946     Updated: 01/08/25 0947     QT Interval 342 ms      QTC Interval 440 ms     Narrative:      HEART RATE=99  bpm  RR Znjxlqqe=857  ms  NM Iuomzlhm=961  ms  P Horizontal Axis=8  deg  P Front Axis=40  deg  QRSD Interval=81  ms  QT Mydjdlhq=537  ms  GMgO=040  ms  QRS Axis=-30  deg  T Wave Axis=20  deg  - OTHERWISE NORMAL ECG -  Sinus rhythm  Left axis deviation  Low voltage, precordial leads  Date and Time of Study:2025-01-08 09:46:24    Adult Transthoracic Echo Complete w/ Color, Spectral and Contrast if necessary per protocol [[id number]] Resulted: 01/08/25 1628     Updated: 01/08/25 1628     EF(MOD-bp) 60.8 %      LVIDd 4.0 cm      LVIDs 2.7 cm      IVSd 1.10 cm      LVPWd 1.10 cm      FS 32.5 %      IVS/LVPW 1.00 cm      ESV(cubed) 19.7 ml      LV Sys Vol (BSA corrected) 9.5 cm2      EDV(cubed) 64.0 ml      LV Benavidez Vol (BSA corrected) 24.3 cm2      LV mass(C)d 145.6 grams      LVOT area 3.1 cm2      LVOT diam 2.00 cm      EDV(MOD-sp2) 35.5 ml      EDV(MOD-sp4) 48.2 ml      ESV(MOD-sp2) 14.1 ml      ESV(MOD-sp4) 18.9 ml      SV(MOD-sp2) 21.4 ml      SV(MOD-sp4) 29.3 ml      SVi(MOD-SP2) 10.8 ml/m2      SVi(MOD-SP4) 14.7 ml/m2      SVi (LVOT) 30.7 ml/m2      EF(MOD-sp2) 60.3 %      EF(MOD-sp4) 60.8 %      MV E max bhavin 71.3 cm/sec      MV A max bhavin 114.0 cm/sec      MV dec time 0.16 sec      MV E/A 0.63     LA ESV Index (BP) 15.6 ml/m2      Med Peak E' Bhavin 7.4 cm/sec      Lat Peak E' Bhavin 9.8 cm/sec      Avg E/e' ratio 8.29     SV(LVOT) 60.9 ml      RVIDd 3.3 cm      TAPSE (>1.6) 1.69 cm      RV S' 12.4 cm/sec      LA dimension (2D)  2.7 cm      LV V1 max 124.0 cm/sec      LV V1 max PG 6.2 mmHg      LV V1 mean PG 3.0 mmHg      LV V1 VTI 19.4  cm      Ao pk bridgette 194.0 cm/sec      Ao max PG 15.1 mmHg      Ao mean PG 8.0 mmHg      Ao V2 VTI 32.5 cm      KEILY(I,D) 1.88 cm2      MV mean PG 3.0 mmHg      MV V2 VTI 21.4 cm      MVA(VTI) 2.8 cm2      MV dec slope 457.0 cm/sec2      Ao root diam 2.40 cm           Orders (active)        Start     Ordered    01/10/25 0900  spironolactone (ALDACTONE) tablet 25 mg  Once per day on Monday Wednesday Friday 01/08/25 1624    01/09/25 0900  losartan (COZAAR) tablet 100 mg  Daily         01/08/25 1624    01/09/25 0900  metoprolol succinate XL (TOPROL-XL) 24 hr tablet 100 mg  Daily         01/08/25 1624    01/09/25 0700  gabapentin (NEURONTIN) capsule 300 mg  Every Morning         01/08/25 1624    01/09/25 0700  montelukast (SINGULAIR) tablet 10 mg  Every Morning         01/08/25 1624    01/09/25 0600  CBC & Differential  Every Third Day      Comments: Discontinue After Heparin Stopped      01/08/25 1439    01/09/25 0600  Basic Metabolic Panel  Morning Draw         01/08/25 1633    01/09/25 0600  CBC (No Diff)  Morning Draw         01/08/25 1633    01/09/25 0600  Magnesium  Morning Draw         01/08/25 1633    01/08/25 2122  aPTT  Timed         01/08/25 1810    01/08/25 2100  sodium chloride 0.9 % flush 10 mL  Every 12 Hours Scheduled         01/08/25 1451    01/08/25 1900  ipratropium-albuterol (DUO-NEB) nebulizer solution 3 mL  4 Times Daily - RT         01/08/25 1624    01/08/25 1900  arformoterol (BROVANA) nebulizer solution 15 mcg  2 Times Daily - RT         01/08/25 1630    01/08/25 1900  budesonide (PULMICORT) nebulizer solution 0.5 mg  2 Times Daily - RT         01/08/25 1630    01/08/25 1800  Oral Care  2 Times Daily       01/08/25 1451    01/08/25 1730  famotidine (PEPCID) tablet 20 mg  2 Times Daily Before Meals         01/08/25 1624    01/08/25 1700  gabapentin (NEURONTIN) capsule 600 mg  Every Evening         01/08/25 1624    01/08/25 1631  RT to Initiate Bronchodilator Protocol  Once         01/08/25  "1630    01/08/25 1631  RT to Initiate Bronchopulmonary Hygiene Protocol  Once         01/08/25 1630    01/08/25 1630  ipratropium-albuterol (DUO-NEB) nebulizer solution 3 mL  Every 4 Hours PRN         01/08/25 1630    01/08/25 1626  Diet: Regular/House; Fluid Consistency: Thin (IDDSI 0)  Diet Effective Now         01/08/25 1625    01/08/25 1500  heparin 46376 units/250 mL (100 units/mL) in 0.45 % NaCl infusion  Titrated         01/08/25 1439    01/08/25 1456  Duplex Venous Lower Extremity - Bilateral CV-READ  Once         01/08/25 1455    01/08/25 1452  Adult Transthoracic Echo Complete w/ Color, Spectral and Contrast if necessary per protocol  Once         01/08/25 1451    01/08/25 1451  sennosides-docusate (PERICOLACE) 8.6-50 MG per tablet 2 tablet  2 Times Daily PRN        Placed in \"And\" Linked Group    01/08/25 1451    01/08/25 1451  polyethylene glycol (MIRALAX) packet 17 g  Daily PRN        Placed in \"And\" Linked Group    01/08/25 1451    01/08/25 1451  bisacodyl (DULCOLAX) EC tablet 5 mg  Daily PRN        Placed in \"And\" Linked Group    01/08/25 1451    01/08/25 1451  bisacodyl (DULCOLAX) suppository 10 mg  Daily PRN        Placed in \"And\" Linked Group    01/08/25 1451    01/08/25 1451  Code Status and Medical Interventions: CPR (Attempt to Resuscitate); Full Support  Continuous         01/08/25 1451    01/08/25 1451  Continuous Cardiac Monitoring  Continuous        Comments: Follow Standing Orders As Outlined in Process Instructions (Open Order Report to View Full Instructions)    01/08/25 1451    01/08/25 1451  Telemetry - Place Orders & Notify Provider of Results When Patient Experiences Acute Chest Pain, Dysrhythmia or Respiratory Distress  Continuous        Comments: Open Order Report to View Parameters Requiring Provider Notification    01/08/25 1451    01/08/25 1450  nitroglycerin (NITROSTAT) SL tablet 0.4 mg  Every 5 Minutes PRN         01/08/25 1451    01/08/25 1450  Intake & Output  Every Shift    "    01/08/25 1451    01/08/25 1450  Weigh Patient  Once         01/08/25 1451    01/08/25 1450  Insert Peripheral IV  Once         01/08/25 1451    01/08/25 1449  sodium chloride 0.9 % flush 10 mL  As Needed         01/08/25 1451    01/08/25 1449  sodium chloride 0.9 % infusion 40 mL  As Needed         01/08/25 1451    01/08/25 1449  ondansetron (ZOFRAN) injection 4 mg  Every 6 Hours PRN         01/08/25 1451    01/08/25 1440  Initiate & Follow Heparin Protocol  Continuous         01/08/25 1439    01/08/25 1440  Notify Provider Platelet Count Less Than 39411  Continuous        Comments: Open Order Report to View Parameters Requiring Provider Notification    01/08/25 1439    01/08/25 1440  Notify Provider if PTT Not in Therapeutic Range After 24 Hours  Continuous        Comments: Open Order Report to View Parameters Requiring Provider Notification    01/08/25 1439    01/08/25 1440  Stop Infusion & Notify Provider if Bleeding Occurs  Continuous        Comments: Open Order Report to View Parameters Requiring Provider Notification    01/08/25 1439    01/08/25 1439  heparin bolus from bag solution 5,000 Units  As Needed         01/08/25 1439    01/08/25 1439  heparin bolus from bag solution 2,500 Units  As Needed         01/08/25 1439    01/08/25 1439  Hospitalist (on-call MD unless specified)  Once        Specialty:  Hospitalist  Provider:  Phil Hendrix DO    01/08/25 1439    01/08/25 1356  albuterol (PROVENTIL) (2.5 MG/3ML) 0.083% nebulizer solution  - ADS Override Pull        Note to Pharmacy: Created by cabinet override    01/08/25 1356    01/08/25 1324  IP General Consult (Use specialty-specific consult if known)  Once        Provider:  Carlitos Lanza MD    01/08/25 1323    01/08/25 0948  Insert Peripheral IV  Once         01/08/25 0947    01/08/25 0948  Blood Culture - Blood, Arm, Left  Once         01/08/25 0947    01/08/25 0948  Blood Culture - Blood, Arm, Right  Once         01/08/25 0947    01/08/25 0947   sodium chloride 0.9 % flush 10 mL  As Needed         25 0947    25 0940  Insert Peripheral IV  Once         25 0940    25 0939  sodium chloride 0.9 % flush 10 mL  As Needed         25 0940    Unscheduled  Oxygen Therapy- Nasal Cannula; Titrate 1-6 LPM Per SpO2; 90 - 95%  Continuous PRN       25 0940    Unscheduled  Oxygen Therapy- Nasal Cannula; Titrate 1-6 LPM Per SpO2; 90 - 95%  Continuous PRN       25 0947    Unscheduled  aPTT  As Needed       25 1439    Unscheduled  RN To Release aPTT Order 6 Hours After Heparin Bolus & 6 Hours After Any Heparin Rate Change  As Needed       25 1439    Unscheduled  After 2 Consecutive Therapeutic aPTTs, Obtain aPTT Daily.  If a Rate Adjustment is Necessary, Resume Every 6 Hour aPTT Draws  As Needed       25 1439    Unscheduled  Up With Assistance  As Needed       25 1451                     Consult Notes (last 24 hours)        Carlitos Lanza MD at 25 1456        Consult Orders    1. Inpatient Pulmonology Consult [[id number]] ordered by Phil Hendrix DO at 25 1451                 Pulmonary / Critical Care Consult Note      Patient Name: Julia Rios  : 1945  MRN: [id number]  Primary Care Physician:  Nick Patel DO  Referring Physician: Phil Hendrix DO  Date of admission: 2025    Subjective   Subjective     Reason for Consult/ Chief Complaint:   Worsening hypoxia, pulmonary emboli    HPI:  Julia Rios is a 79 y.o. female with a COPD not on oxygen, obstructive sleep apnea, history of fungal pneumonia, pulmonary nodules and former smoker came in short of breath and feeling poorly.  Patient ports felt 1 week of progressively worsening shortness of breath and chest pain across the anterior chest.  Also dry hacking cough.  Reports fell on right leg a few days ago on  which that is when symptoms started.  She states that she has been laying in her chair and been  doing little activity since injuring her leg.  Emergency Department patient increased work of breathing with O2 saturations in the 70s and ultimately required 8 L of oxygen.  Exam did not yield much in the way of respiratory symptoms so chest CT was ordered showing diffuse bilateral pulmonary emboli.  Denies any Swelling but does have some right lower extremity pain.  Has never had blood clots.  No recent travel.  No family history of blood clots.  She is a former cigarette smoker.  Currently on oxygen and heparin drip.        Personal History     Past Medical History:   Diagnosis Date    Acute right-sided low back pain with right-sided sciatica 01/15/2018    Allergic rhinitis     Asthma     Asthma, extrinsic     Asthma, intrinsic     CHF (congestive heart failure)     Chronic heart failure with preserved ejection fraction     11/16/20 echocardiogram: 1.  Normal ejection fraction of 55%. 2.  Mild left ventricular hypertrophy. 3.  No significant valvular heart issues.     CKD stage 3 10/29/2019    COPD (chronic obstructive pulmonary disease)     Diverticulitis     Emphysema of lung     Essential hypertension     GERD (gastroesophageal reflux disease)     Hemoptysis     non cancerous    History of fungal pneumonia 12/02/2021    Hyperlipidemia     Idiopathic chronic gout of multiple sites without tophus 05/20/2016    Pneumonia     Primary osteoarthritis of right knee 01/16/2017    Sleep apnea     CPAP    Sleep apnea, obstructive     Vitamin D deficiency 01/13/2016       Past Surgical History:   Procedure Laterality Date    BRONCHOSCOPY N/A 4/30/2024    Procedure: BRONCHOSCOPY WITH BRONCHOALVEOLAR LAVAGE, WASHING, AIRWAY INSPECTION;  Surgeon: Santino Baez MD;  Location: [facility] ENDOSCOPY;  Service: Pulmonary;  Laterality: N/A;  MUCOUS PLUGGING, BRONCHITIS    CATARACT EXTRACTION Right     COLONOSCOPY  2014    COLONOSCOPY N/A 10/12/2021    Procedure: COLONOSCOPY;  Surgeon: Jorge Diane MD;  Location: [facility]  ENDOSCOPY;  Service: Gastroenterology;  Laterality: N/A;  DIVERTICULOSIS    ENDOSCOPY  2015    EYE SURGERY      cataract right eye    OTHER SURGICAL HISTORY      Fatty tumor removed off back    RETINAL DETACHMENT REPAIR Right     hole in retina repair    TOTAL KNEE ARTHROPLASTY Right 10/31/2023    Procedure: TOTAL KNEE ARTHROPLASTY;  Surgeon: Celso Prakash MD;  [place] [facility] [place] [facility] [place]: [place]opedics;  Laterality: Right;       Family History: family history includes Asthma in her father; Breast cancer in her maternal grandmother; Cancer in her mother; Colon cancer (age of onset: 61) in her mother; Diabetes in her brother; Emphysema in her brother and father; Heart attack in her father; Heart disease in her mother; Heart failure in her father; Hyperlipidemia in her father; Stroke in her paternal grandfather. Otherwise pertinent FHx was reviewed and not pertinent to current issue.    Social History:  reports that she quit smoking about 30 years ago. Her smoking use included cigarettes. She started smoking about 62 years ago. She has a 16 pack-year smoking history. She has never been exposed to tobacco smoke. She has never used smokeless tobacco. She reports that she does not drink alcohol and does not use drugs.    Home Medications:  Budeson-Glycopyrrol-Formoterol, Cholecalciferol, azelastine, azithromycin, famotidine, fexofenadine, gabapentin, ipratropium-albuterol, losartan, metoprolol succinate XL, montelukast, predniSONE, and spironolactone    Allergies:  Allergies   Allergen Reactions    Ibuprofen Hives    Penicillins Other (See Comments) and Seizure     1. When was your reaction? < 10 years ago  2. Did your reaction happen after the first dose or after several doses? Do not know  3. Did your reaction require ED or hospital care to manage your reaction? Do not know  4. Did your reaction require treatment with epinephrine? Do not know  5. Have you taken amoxicillin (Amoxil) or amoxicillin-clavulanate  (Augmentin) without issue since? No  6. Have you taken cephalexin (Keflex) without issue since?  No     Nsaids Other (See Comments)     CKD    Cephalosporins Rash    Sulfa Antibiotics Rash       Objective    Objective     Vitals:   Temp:  [98 °F (36.7 °C)] 98 °F (36.7 °C)  Heart Rate:  [] 84  Resp:  [18-27] 20  BP: (112-138)/(67-91) 112/73  Flow (L/min) (Oxygen Therapy):  [6] 6    Physical Exam:  Vital Signs Reviewed   General:  WDWN, Alert, NAD.    HEENT:  PERRL, EOMI.  OP, nares clear, no sinus tenderness  Neck:  Supple, no JVD, no thyromegaly  Lymph: no axillary, cervical, supraclavicular lymphadenopathy noted bilaterally  Chest:  good aeration, rhonchi bilaterally, tympanic to percussion bilaterally, no work of breathing noted  CV: RRR, no MGR, pulses 2+, equal.  Abd:  Soft, NT, ND, + BS, no HSM, obese  EXT:  no clubbing, no cyanosis, no edema, tenderness and bruising noted of right shin and calf  Neuro:  A&Ox3, CN grossly intact, no focal deficits.  Skin: No rashes or lesions noted      Result Review    Result Review:  I have personally reviewed the results from the time of this admission to 1/8/2025 14:56 EST and agree with these findings:  [x]  Laboratory  [x]  Microbiology  [x]  Radiology  [x]  EKG/Telemetry   []  Cardiology/Vascular   []  Pathology  [x]  Old records  []  Other:  Most notable findings include:       Lab 01/08/25  1056   WBC 9.42   HEMOGLOBIN 12.0   HEMATOCRIT 39.1   PLATELETS 217   SODIUM 142   POTASSIUM 3.5   CHLORIDE 107   CO2 20.6*   BUN 14   CREATININE 1.12*   GLUCOSE 112*   CALCIUM 9.2   TOTAL PROTEIN 6.8   ALBUMIN 4.0   GLOBULIN 2.8     CT Angiogram Chest Pulmonary Embolism    Result Date: 1/8/2025  CT ANGIOGRAM CHEST PULMONARY EMBOLISM Date of Exam: 1/8/2025 2:21 PM EST Indication: Hypoxia. Comparison: None available. Technique: Axial CT images were obtained of the chest after the uneventful intravenous administration of iodinated contrast utilizing pulmonary embolism protocol.   Reconstructed coronal and sagittal images were also obtained. Automated exposure control and iterative construction methods were used. Findings: The pulmonary artery outflow track is patent. There is pulmonary embolism at the right main pulmonary artery bifurcation with partially occlusive pulmonary emboli involving the left upper lobe segmental and subsegmental branches. There is also partially occlusive pulmonary embolism extending into the right lower lobe lobar and few segmental and subsegmental branches. Also partially occlusive pulmonary embolism at the origin of the right middle lobe pulmonary artery. Partially occlusive embolism in left upper lobe and left lower lobe segmental and few subsegmental branches. There is cardiomegaly. The right heart lumen is mildly enlarged as compared to the left and there is minimal leftward bowing of the interventricular septum compatible with right heart strain. There are no pericardial effusions. The thoracic aorta shows a normal diameter with atherosclerosis. Patent three-vessel arch. Subcentimeter shotty mediastinal lymph nodes There is mild bibasilar reticular lung thickening and mild bibasilar discoid atelectasis. No pleural effusion or pneumothorax. No acute fractures or destructive bone lesions. No suspicious pulmonary nodules Tiny hiatal hernia. No acute abnormalities in the upper abdomen     Impression: Impression: 1.Bilateral partially occlusive pulmonary emboli involving all lobes of both lungs. There is evidence of right heart strain. 2.Cardiomegaly. 3.Mild bibasilar reticular lung thickening and discoid atelectasis. Findings discussed over the phone with Dr. Jason On 1/8/2025 at 2:38 p.m. Electronically Signed: Tim Rodriguez MD  1/8/2025 2:43 PM EST  Workstation ID: [id number]     Pulmonary office notes personally reviewed  High-sensitivity troponin 82  Flu/COVID/RSV negative  NT BNP normal  ABG 7.42/31/42/20      Assessment & Plan   Assessment / Plan     Active  Hospital Problems:  Active Hospital Problems    Diagnosis     **Acute pulmonary embolism     Acute pulmonary embolism with acute cor pulmonale     Essential hypertension      Impression:  Acute hypoxemic respiratory failure  Acute bilateral pulmonary emboli.  Provoked.  Has some features of submassive PE on chest CT with right heart strain  Question acute cor pulmonale  COPD without exacerbation  CKD stage III  Class III obesity with BMI 40    Plan:  Patient appears to have multiple pulmonary emboli with features of right heart strain.  Suspect possible submassive pulmonary embolism given this presentation.  NT BNP is normal but troponin is elevated.  Will check echocardiogram  I suspect PEs were provoked.  She fell on her right leg and states that she has been laying in her recliner and mobile for hours at a time.  This coupled with her gender and BMI may have make this a provoked VTE  Will do heparin drip for now.  Ultimately transition to Eliquis in the next 1 to 2 days.  Recommend 6 months of Eliquis for provoked submassive VTE  Check leg Dopplers to evaluate for DVT  Takes Breztri at home.  Will start nebulizers and bronchopulmonary hygiene  Continue Singulair  Wean O2 to keep SPO2 greater than 90%    VTE Prophylaxis:  Pharmacologic VTE prophylaxis orders are present.    Code Status and Medical Interventions: CPR (Attempt to Resuscitate); Full Support   Ordered at: 01/08/25 1451     Code Status (Patient has no pulse and is not breathing):    CPR (Attempt to Resuscitate)     Medical Interventions (Patient has pulse or is breathing):    Full Support        Labs, imaging, notes and medications personally reviewed.  Discussed with primary and emergency department    Thank you for involving me in the care of this patient.    Electronically signed by Carlitos Lanza MD, 01/08/25, 4:30 PM EST.          Electronically signed by Carlitos Lanza MD at 01/08/25 1620

## 2025-01-09 NOTE — PROGRESS NOTES
[facility]   Hospitalist Progress Note  Date: 2025  Patient Name: Julia Rios  : 1945  MRN: [id number]  Date of admission: 2025  Room/Bed: [facility]      Subjective   Subjective     Chief Complaint: short of air     Summary:Julia Rios is a 79 y.o. female  with history of COPD not on oxygen, hypertension, hyperlipidemia, chronic kidney disease, congestive heart failure who presented to the emergency department with shortness of breath.  Patient was recently discharged from the hospital on  where she was treated for pneumonia.  Patient states that she had gone to see her primary care doctor on  and then saw the pulmonary nurse practitioner on January 3 where she continued to state that she was short of breath however was told that that was due to her recent pneumonia.  Patient states that her shortness of breath did not get better it continued to get worse.  Patient states that she felt so bad she was unable to get up and do many things around the house.  Patient was evaluated in the ER where she was requiring 6 L of oxygen however she is not on oxygen at home.  Patient was afebrile.  Blood pressure well-controlled.  Patient did have a CT of the chest which showed bilateral partially occlusive pulmonary emboli involving all lobes of both lungs.  There is evidence of right heart strain.  Patient was placed on a heparin drip in the ER.  Pulmonary was also consulted.  Patient's laboratory data was unremarkable.  Hospitalist service consulted for admission due to acute pulmonary embolism requiring IV heparin.     Interval Followup:   Patient remains on 7 L of oxygen  Patient states she feels slightly better  Venous Doppler is positive for DVT on the right  Vitals stable    Review of Systems    All systems reviewed and negative except for what is outlined above.      Objective   Objective     Vitals:   Temp:  [97.3 °F (36.3 °C)-98.1 °F (36.7 °C)] 97.9 °F (36.6 °C)  Heart  Rate:  [67-97] 73  Resp:  [18-23] 18  BP: (105-148)/(58-82) 105/79  Flow (L/min) (Oxygen Therapy):  [6-7] 7    Physical Exam   General: Awake, alert, NAD  HENT: NCAT, MMM  Eyes: pupils equal, no scleral icterus  Cardiovascular: RRR, no murmurs   Pulmonary: CTA bilaterally; no wheezes; no conversational dyspnea  Gastrointestinal: S/ND/NT, +BS  Musculoskeletal: No gross deformities  Skin: No jaundice, no rash on exposed skin appreciated  Neuro: CN II through XII grossly intact; speech clear; no tremor  Psych: Mood and affect appropriate  : No Carlson catheter; no suprapubic tenderness    Result Review    Result Review:  I have personally reviewed these results:  [x]  Laboratory      Lab 01/09/25  0602 01/08/25  2126 01/08/25  1515 01/08/25  1056 01/08/25  1040   WBC 10.29  --   --  9.42  --    HEMOGLOBIN 11.8*  --   --  12.0  --    HEMATOCRIT 37.6  --   --  39.1  --    PLATELETS 226  --   --  217  --    NEUTROS ABS 8.59*  --   --  6.18  --    IMMATURE GRANS (ABS) 0.09*  --   --  0.08*  --    LYMPHS ABS 1.26  --   --  1.87  --    MONOS ABS 0.32  --   --  1.04*  --    EOS ABS 0.00  --   --  0.19  --    MCV 87.4  --   --  89.7  --    LACTATE  --   --   --  1.3 1.0   PROTIME  --   --   --  14.5  --    APTT 147.1* 184.3* 27.3*  --   --          Lab 01/09/25  0602 01/08/25  1056   SODIUM 137 142   POTASSIUM 4.6 3.5   CHLORIDE 107 107   CO2 17.8* 20.6*   ANION GAP 12.2 14.4   BUN 20 14   CREATININE 1.15* 1.12*   EGFR 48.6* 50.1*   GLUCOSE 164* 112*   CALCIUM 9.0 9.2   MAGNESIUM 2.0  --          Lab 01/08/25  1056   TOTAL PROTEIN 6.8   ALBUMIN 4.0   GLOBULIN 2.8   ALT (SGPT) 10   AST (SGOT) 18   BILIRUBIN 0.6   ALK PHOS 82         Lab 01/08/25  1223 01/08/25  1056   PROBNP  --  256.1   HSTROP T 82* 76*   PROTIME  --  14.5   INR  --  1.11                 Lab 01/08/25  1040   PH, ARTERIAL 7.425   PCO2, ARTERIAL 31.3*   PO2 ART 42.6*   O2 SATURATION ART 80.1*   FIO2 21   HCO3 ART 20.6*   BASE EXCESS ART -2.9*     Brief Urine Lab  Results  (Last result in the past 365 days)        Color   Clarity   Blood   Leuk Est   Nitrite   Protein   CREAT   Urine HCG        10/17/24 0923 Yellow   Clear   Negative   Negative   Negative   Negative           10/17/24 0923             79.9         10/17/24 0923             86.7               [x]  Microbiology   Microbiology Results (last 10 days)       Procedure Component Value - Date/Time    COVID-19, FLU A/B, RSV PCR 1 HR TAT - Swab, Nasopharynx [[id number]]  (Normal) Collected: 01/08/25 1100    Lab Status: Final result Specimen: Swab from Nasopharynx Updated: 01/08/25 1152     COVID19 Not Detected     Influenza A PCR Not Detected     Influenza B PCR Not Detected     RSV, PCR Not Detected    Narrative:      Fact sheet for providers: https://www.fda.gov/media/551516/download    Fact sheet for patients: https://www.fda.gov/media/448301/download    Test performed by PCR.    Blood Culture - Blood, Arm, Left [[id number]]  (Normal) Collected: 01/08/25 1056    Lab Status: Preliminary result Specimen: Blood from Arm, Left Updated: 01/09/25 1115     Blood Culture No growth at 24 hours    Blood Culture - Blood, Arm, Right [[id number]]  (Normal) Collected: 01/08/25 1056    Lab Status: Preliminary result Specimen: Blood from Arm, Right Updated: 01/09/25 1115     Blood Culture No growth at 24 hours          [x]  Radiology  XR Knee 1 or 2 View Right    Result Date: 1/8/2025  Impression: Right total knee prosthesis without evidence of hardware complication. Electronically Signed: Dakota Summers  1/8/2025 6:32 PM EST  Workstation ID: [id number]    CT Angiogram Chest Pulmonary Embolism    Result Date: 1/8/2025  Impression: 1.Bilateral partially occlusive pulmonary emboli involving all lobes of both lungs. There is evidence of right heart strain. 2.Cardiomegaly. 3.Mild bibasilar reticular lung thickening and discoid atelectasis. Findings discussed over the phone with Dr. Jason On 1/8/2025 at 2:38 p.m. Electronically Signed:  Tim Rodriguez MD  1/8/2025 2:43 PM EST  Workstation ID: [id number]    CT Sinus Without Contrast    Result Date: 1/8/2025  Impression: 1.1 cm polyp or mucous retention cyst in the left maxillary sinus. Electronically Signed: Shiva Jeffries MD  1/8/2025 10:38 AM EST  Workstation ID: [id number]    XR Chest 1 View    Result Date: 1/8/2025  Impression: 1.Stable chronic interstitial prominence without acute process identified. Electronically Signed: Rao Gu MD  1/8/2025 10:16 AM EST  Workstation ID: [id number]   []  EKG/Telemetry   []  Cardiology/Vascular   []  Pathology  []  Old records  []  Other:    Assessment & Plan   Assessment / Plan     Assessment:  Acute pulmonary emboli involving all lobes of the lung with evidence of right heart strain  History of COPD however not on home oxygen  Chronic kidney disease stage III  Morbid obesity with a BMI of 40  GERD  Hyperlipidemia  Acute right leg dvt     Plan:  -- Patient remains admitted to the medicine service  -- Continue patient on heparin drip  -- Pulmonary following  --Venous Doppler study positive for DVT in the right lower extremity.  Patient did state that she had a recent fall on that leg shortly after she was discharged previously  -- Echocardiogram appears fine.  Preserved ejection fraction.  Diastolic dysfunction noted  ---resumed patients home medications  -- Resume patient's bronchodilators  -- Discussed plan with nurse and with patient       Discussed with RN.    VTE Prophylaxis:  Pharmacologic VTE prophylaxis orders are present.        CODE STATUS:   Code Status (Patient has no pulse and is not breathing): CPR (Attempt to Resuscitate)  Medical Interventions (Patient has pulse or is breathing): Full Support      Electronically signed by Phil Hendrix DO, 1/9/2025, 12:27 EST.

## 2025-01-09 NOTE — PLAN OF CARE
Goal Outcome Evaluation:  Plan of Care Reviewed With: patient        Progress: improving  Outcome Evaluation: Remains on heparin drip per protocol, up standby to BSC, up to chair throughout afternoon, no c/o SOA or pain, remains on 7L.

## 2025-01-09 NOTE — PROGRESS NOTES
Pulmonary / Critical Care Progress Note      Patient Name: Julia Rios  : 1945  MRN: [id number]  Attending:  Phil Hendrix DO  Date of admission: 2025    Subjective   Subjective   Follow-up for worsening hypoxia, pulmonary emboli    No acute events overnight.    This morning,  Resting in bed  Currently on 7 L HFNC  Continues to report dyspnea  Nonproductive cough  Chest pain improving  Right venous Doppler positive for DVT  No fever or chills  No hemoptysis  Remains weak and fatigued      Objective   Objective     Vitals:   Temp:  [97.3 °F (36.3 °C)-98.1 °F (36.7 °C)] 97.9 °F (36.6 °C)  Heart Rate:  [67-97] 73  Resp:  [18-23] 18  BP: (105-148)/(58-82) 105/79  Flow (L/min) (Oxygen Therapy):  [6-7] 7    Physical Exam   Vital Signs Reviewed   General: Pleasant, elderly female, Alert, NAD, sitting up in bed.    Chest:  good aeration, rhonchi to auscultation bilaterally, increased work of breathing noted on 7 L HFNC  CV: RRR, no MGR, pulses 2+, equal.  Abd:  Soft, NT, ND, + BS, no HSM, obese  EXT:  no clubbing, no cyanosis, no edema, tenderness and bruising noted of right shin and calf   Neuro:  A&Ox3, CN grossly intact, no focal deficits.  Skin: No rashes or lesions noted      Result Review    Result Review:  I have personally reviewed the results from the time of this admission to 2025 11:53 EST and agree with these findings:  [x]  Laboratory  [x]  Microbiology  [x]  Radiology  [x]  EKG/Telemetry   []  Cardiology/Vascular   []  Pathology  []  Old records  []  Other:  Most notable findings include:   Pulmonary office notes personally reviewed  High-sensitivity troponin 82  Flu/COVID/RSV negative  NT BNP normal  ABG 7.42//      Lab 25  0602 25  1056   WBC 10.29 9.42   HEMOGLOBIN 11.8* 12.0   HEMATOCRIT 37.6 39.1   PLATELETS 226 217   SODIUM 137 142   POTASSIUM 4.6 3.5   CHLORIDE 107 107   CO2 17.8* 20.6*   BUN 20 14   CREATININE 1.15* 1.12*   GLUCOSE 164* 112*   CALCIUM 9.0 9.2    TOTAL PROTEIN  --  6.8   ALBUMIN  --  4.0   GLOBULIN  --  2.8     CT Angiogram Chest Pulmonary Embolism    Result Date: 1/8/2025  CT ANGIOGRAM CHEST PULMONARY EMBOLISM Date of Exam: 1/8/2025 2:21 PM EST Indication: Hypoxia. Comparison: None available. Technique: Axial CT images were obtained of the chest after the uneventful intravenous administration of iodinated contrast utilizing pulmonary embolism protocol.  Reconstructed coronal and sagittal images were also obtained. Automated exposure control and iterative construction methods were used. Findings: The pulmonary artery outflow track is patent. There is pulmonary embolism at the right main pulmonary artery bifurcation with partially occlusive pulmonary emboli involving the left upper lobe segmental and subsegmental branches. There is also partially occlusive pulmonary embolism extending into the right lower lobe lobar and few segmental and subsegmental branches. Also partially occlusive pulmonary embolism at the origin of the right middle lobe pulmonary artery. Partially occlusive embolism in left upper lobe and left lower lobe segmental and few subsegmental branches. There is cardiomegaly. The right heart lumen is mildly enlarged as compared to the left and there is minimal leftward bowing of the interventricular septum compatible with right heart strain. There are no pericardial effusions. The thoracic aorta shows a normal diameter with atherosclerosis. Patent three-vessel arch. Subcentimeter shotty mediastinal lymph nodes There is mild bibasilar reticular lung thickening and mild bibasilar discoid atelectasis. No pleural effusion or pneumothorax. No acute fractures or destructive bone lesions. No suspicious pulmonary nodules Tiny hiatal hernia. No acute abnormalities in the upper abdomen     Impression: Impression: 1.Bilateral partially occlusive pulmonary emboli involving all lobes of both lungs. There is evidence of right heart strain. 2.Cardiomegaly.  3.Mild bibasilar reticular lung thickening and discoid atelectasis. Findings discussed over the phone with Dr. Jason On 1/8/2025 at 2:38 p.m. Electronically Signed: Tim Rodriguez MD  1/8/2025 2:43 PM EST  Workstation ID: [id number]   Results for orders placed during the hospital encounter of 01/08/25    Duplex Venous Lower Extremity - Bilateral CV-READ    Interpretation Summary    Acute right lower extremity deep vein thrombosis noted in the posterior tibial.    Left popliteal fossa fluid collection.    All other veins appeared normal bilaterally.    Results for orders placed during the hospital encounter of 01/08/25    Adult Transthoracic Echo Complete w/ Color, Spectral and Contrast if necessary per protocol    Interpretation Summary    Left ventricular systolic function is normal. Left ventricular ejection fraction appears to be 56 - 60%.    Left ventricular diastolic function is consistent with (grade I) impaired relaxation.    No evidence of cardioembolic source by transthoracic echo    Assessment & Plan   Assessment / Plan     Active Hospital Problems:  Active Hospital Problems    Diagnosis     **Acute pulmonary embolism     Acute pulmonary embolism with acute cor pulmonale     Essential hypertension      Impression:  Acute hypoxemic respiratory failure  Acute bilateral pulmonary emboli.  Provoked.    Question acute cor pulmonale  Acute right lower extremity DVT  COPD without exacerbation  CKD stage III  Class III obesity with BMI 40     Plan:  -Continue to wean O2 to maintain SpO2 greater than 90%  -CT chest with multiple pulmonary emboli with features of right heart strain.  BNP is normal and echocardiogram with no evidence of right heart strain so just provoked simple pulmonary emboli  -Echocardiogram with normal EF, grade 1 diastolic dysfunction, no evidence of cardioembolic source  -Continue heparin drip for now.  May transition to Eliquis in the next 1 to 2 days.  Recommend 6 months of Eliquis for  provoked submassive VTE.  -Lower extremity venous Doppler positive for acute right lower extremity DVT  -Continue nebulizers and BPH protocol.  -Continue Singulair  -Encourage mobilization.  Out of bed to chair.  -Follow-up with pulmonology in 1 to 2 weeks after discharge    VTE Prophylaxis:  Pharmacologic VTE prophylaxis orders are present.    CODE STATUS:   Code Status (Patient has no pulse and is not breathing): CPR (Attempt to Resuscitate)  Medical Interventions (Patient has pulse or is breathing): Full Support      Labs, imaging, microbiology, notes and medications personally reviewed  Discussed with primary    I, Dr. Carlitos Lanza, have spent more than 50% of the total time managing the patient in this encounter today.  This included personally reviewing all pertinent labs, imaging, microbiology and documentation. Also discussing the case with the patient and any available family, the admitting physician and any available ancillary staff.    Electronically signed by KALEB Aguilar, 01/09/25, 11:45 AM EST.  Electronically signed by Carlitos Lanza MD, 01/09/25, 1:37 PM EST.

## 2025-01-09 NOTE — PROGRESS NOTES
Respiratory Therapist Broncho-Pulmonary Hygiene Progress Note      Patient Name:  Julia Rios  YOB: 1945    Julia Rios meets the qualification for Level 2 of the Bronco-Pulmonary Hygiene Protocol. This was based on my daily patient assessment and includes review of chest x-ray results, cough ability and quality, oxygenation, secretions or risk for secretion development and patient mobility.     Broncho-Pulmonary Hygiene Assessment:    Level of Movement: Actively changing positions without assistance  Alert/ oriented/ cooperative    Breath Sounds: Bilateral localized decreased air exchange and/or coarse rhonchi    Cough: Strong, effective and/or frequent    Chest X-Ray: Possible signs of consolidation and/or atelectasis or clear.     Sputum Productions: Able to produce small to moderate amount, of moderately thick secretions    History and Physical: Chronic condition    SpO2 to Oxygen Need: greater than 90% on  greater than 6L, Vapotherm, oxygen mask greater than 40% or ventilator    Current SpO2 is: 94 on 7lpm HFNC    Based on this information, I have completed the following interventions: Aerobika with bronchodialtor medication or TID      Electronically signed by Go Pizarro, MELVIN, 01/08/25, 8:41 PM EST.

## 2025-01-10 LAB
APTT PPP: 61.6 SECONDS (ref 78–95.9)
APTT PPP: 75.9 SECONDS (ref 78–95.9)
APTT PPP: 91.3 SECONDS (ref 78–95.9)
HOLD SPECIMEN: NORMAL
WHOLE BLOOD HOLD SPECIMEN: NORMAL

## 2025-01-10 PROCEDURE — 94760 N-INVAS EAR/PLS OXIMETRY 1: CPT

## 2025-01-10 PROCEDURE — 99233 SBSQ HOSP IP/OBS HIGH 50: CPT | Performed by: INTERNAL MEDICINE

## 2025-01-10 PROCEDURE — 99232 SBSQ HOSP IP/OBS MODERATE 35: CPT | Performed by: INTERNAL MEDICINE

## 2025-01-10 PROCEDURE — 85730 THROMBOPLASTIN TIME PARTIAL: CPT | Performed by: INTERNAL MEDICINE

## 2025-01-10 PROCEDURE — 94799 UNLISTED PULMONARY SVC/PX: CPT

## 2025-01-10 PROCEDURE — 25010000002 HEPARIN (PORCINE) 25000-0.45 UT/250ML-% SOLUTION: Performed by: INTERNAL MEDICINE

## 2025-01-10 PROCEDURE — 94664 DEMO&/EVAL PT USE INHALER: CPT

## 2025-01-10 PROCEDURE — 25010000002 FUROSEMIDE PER 20 MG: Performed by: NURSE PRACTITIONER

## 2025-01-10 RX ORDER — FUROSEMIDE 10 MG/ML
40 INJECTION INTRAMUSCULAR; INTRAVENOUS ONCE
Status: COMPLETED | OUTPATIENT
Start: 2025-01-10 | End: 2025-01-10

## 2025-01-10 RX ADMIN — Medication 10 ML: at 08:37

## 2025-01-10 RX ADMIN — IPRATROPIUM BROMIDE AND ALBUTEROL SULFATE 3 ML: 2.5; .5 SOLUTION RESPIRATORY (INHALATION) at 20:48

## 2025-01-10 RX ADMIN — MONTELUKAST 10 MG: 10 TABLET, FILM COATED ORAL at 08:36

## 2025-01-10 RX ADMIN — FAMOTIDINE 20 MG: 20 TABLET ORAL at 17:51

## 2025-01-10 RX ADMIN — BUDESONIDE 0.5 MG: 0.5 INHALANT RESPIRATORY (INHALATION) at 20:48

## 2025-01-10 RX ADMIN — GABAPENTIN 600 MG: 300 CAPSULE ORAL at 17:51

## 2025-01-10 RX ADMIN — ARFORMOTEROL TARTRATE 15 MCG: 15 SOLUTION RESPIRATORY (INHALATION) at 06:55

## 2025-01-10 RX ADMIN — IPRATROPIUM BROMIDE AND ALBUTEROL SULFATE 3 ML: 2.5; .5 SOLUTION RESPIRATORY (INHALATION) at 13:00

## 2025-01-10 RX ADMIN — ARFORMOTEROL TARTRATE 15 MCG: 15 SOLUTION RESPIRATORY (INHALATION) at 20:48

## 2025-01-10 RX ADMIN — FAMOTIDINE 20 MG: 20 TABLET ORAL at 08:36

## 2025-01-10 RX ADMIN — HEPARIN SODIUM 15 UNITS/KG/HR: 10000 INJECTION, SOLUTION INTRAVENOUS at 18:06

## 2025-01-10 RX ADMIN — IPRATROPIUM BROMIDE AND ALBUTEROL SULFATE 3 ML: 2.5; .5 SOLUTION RESPIRATORY (INHALATION) at 01:53

## 2025-01-10 RX ADMIN — IPRATROPIUM BROMIDE AND ALBUTEROL SULFATE 3 ML: 2.5; .5 SOLUTION RESPIRATORY (INHALATION) at 06:55

## 2025-01-10 RX ADMIN — GABAPENTIN 300 MG: 300 CAPSULE ORAL at 08:36

## 2025-01-10 RX ADMIN — BUDESONIDE 0.5 MG: 0.5 INHALANT RESPIRATORY (INHALATION) at 06:55

## 2025-01-10 RX ADMIN — SENNOSIDES AND DOCUSATE SODIUM 2 TABLET: 50; 8.6 TABLET ORAL at 19:03

## 2025-01-10 RX ADMIN — FUROSEMIDE 40 MG: 10 INJECTION, SOLUTION INTRAMUSCULAR; INTRAVENOUS at 11:00

## 2025-01-10 NOTE — PROGRESS NOTES
Pulmonary / Critical Care Progress Note      Patient Name: Julia Rios  : 1945  MRN: [id number]  Attending:  Phil Hendrix DO  Date of admission: 2025    Subjective   Subjective   Follow-up for worsening hypoxia, pulmonary emboli    No acute events overnight.    This morning,  Sitting up on side of bed  Currently on 4 L nasal cannula  Overall feeling better  Dyspnea and cough improving  No fever or chills  No hemoptysis  Remains weak and fatigued      Objective   Objective     Vitals:   Temp:  [97.2 °F (36.2 °C)-97.9 °F (36.6 °C)] 97.3 °F (36.3 °C)  Heart Rate:  [62-75] 62  Resp:  [16-20] 16  BP: ()/(51-81) 102/56  Flow (L/min) (Oxygen Therapy):  [4-7] 4    Physical Exam   Vital Signs Reviewed   General: Pleasant, elderly female, Alert, NAD, sitting up on side of bed  Chest:  good aeration, rhonchi to auscultation bilaterally, increased work of breathing noted on 4 L NC   CV: RRR, no MGR, pulses 2+, equal.  Abd:  Soft, NT, ND, + BS, no HSM, obese  EXT:  no clubbing, no cyanosis, no edema, tenderness and bruising noted of right shin and calf   Neuro:  A&Ox3, CN grossly intact, no focal deficits.  Skin: No rashes or lesions noted      Result Review    Result Review:  I have personally reviewed the results from the time of this admission to 1/10/2025 09:53 EST and agree with these findings:  [x]  Laboratory  [x]  Microbiology  [x]  Radiology  [x]  EKG/Telemetry   []  Cardiology/Vascular   []  Pathology  []  Old records  []  Other:  Most notable findings include:   Pulmonary office notes personally reviewed  High-sensitivity troponin 82  Flu/COVID/RSV negative  NT BNP normal  ABG 7.42/      Lab 25  0602 25  1056   WBC 10.29 9.42   HEMOGLOBIN 11.8* 12.0   HEMATOCRIT 37.6 39.1   PLATELETS 226 217   SODIUM 137 142   POTASSIUM 4.6 3.5   CHLORIDE 107 107   CO2 17.8* 20.6*   BUN 20 14   CREATININE 1.15* 1.12*   GLUCOSE 164* 112*   CALCIUM 9.0 9.2   TOTAL PROTEIN  --  6.8   ALBUMIN   --  4.0   GLOBULIN  --  2.8     CT Angiogram Chest Pulmonary Embolism    Result Date: 1/8/2025  CT ANGIOGRAM CHEST PULMONARY EMBOLISM Date of Exam: 1/8/2025 2:21 PM EST Indication: Hypoxia. Comparison: None available. Technique: Axial CT images were obtained of the chest after the uneventful intravenous administration of iodinated contrast utilizing pulmonary embolism protocol.  Reconstructed coronal and sagittal images were also obtained. Automated exposure control and iterative construction methods were used. Findings: The pulmonary artery outflow track is patent. There is pulmonary embolism at the right main pulmonary artery bifurcation with partially occlusive pulmonary emboli involving the left upper lobe segmental and subsegmental branches. There is also partially occlusive pulmonary embolism extending into the right lower lobe lobar and few segmental and subsegmental branches. Also partially occlusive pulmonary embolism at the origin of the right middle lobe pulmonary artery. Partially occlusive embolism in left upper lobe and left lower lobe segmental and few subsegmental branches. There is cardiomegaly. The right heart lumen is mildly enlarged as compared to the left and there is minimal leftward bowing of the interventricular septum compatible with right heart strain. There are no pericardial effusions. The thoracic aorta shows a normal diameter with atherosclerosis. Patent three-vessel arch. Subcentimeter shotty mediastinal lymph nodes There is mild bibasilar reticular lung thickening and mild bibasilar discoid atelectasis. No pleural effusion or pneumothorax. No acute fractures or destructive bone lesions. No suspicious pulmonary nodules Tiny hiatal hernia. No acute abnormalities in the upper abdomen     Impression: Impression: 1.Bilateral partially occlusive pulmonary emboli involving all lobes of both lungs. There is evidence of right heart strain. 2.Cardiomegaly. 3.Mild bibasilar reticular lung  thickening and discoid atelectasis. Findings discussed over the phone with Dr. Jason On 1/8/2025 at 2:38 p.m. Electronically Signed: Tim Rodriguez MD  1/8/2025 2:43 PM EST  Workstation ID: [id number]   Results for orders placed during the hospital encounter of 01/08/25    Duplex Venous Lower Extremity - Bilateral CV-READ    Interpretation Summary    Acute right lower extremity deep vein thrombosis noted in the posterior tibial.    Left popliteal fossa fluid collection.    All other veins appeared normal bilaterally.    Results for orders placed during the hospital encounter of 01/08/25    Adult Transthoracic Echo Complete w/ Color, Spectral and Contrast if necessary per protocol    Interpretation Summary    Left ventricular systolic function is normal. Left ventricular ejection fraction appears to be 56 - 60%.    Left ventricular diastolic function is consistent with (grade I) impaired relaxation.    No evidence of cardioembolic source by transthoracic echo    Assessment & Plan   Assessment / Plan     Active Hospital Problems:  Active Hospital Problems    Diagnosis     **Acute pulmonary embolism     Acute pulmonary embolism with acute cor pulmonale     Essential hypertension      Impression:  Acute hypoxemic respiratory failure  Acute bilateral pulmonary emboli.  Provoked.    Question acute cor pulmonale  Acute right lower extremity DVT  COPD without exacerbation  CKD stage III  Class III obesity with BMI 40     Plan:  -Continue to wean O2 to maintain SpO2 greater than 90%.  Does not wear O2 at baseline.  -CT chest with multiple pulmonary emboli with features of right heart strain.  BNP is normal and echocardiogram with no evidence of right heart strain so just provoked simple pulmonary emboli  -Echocardiogram with normal EF, grade 1 diastolic dysfunction, no evidence of cardioembolic source  -Continue heparin drip for now.  Will plan to transition to Eliquis tomorrow.  Recommend 6 months of Eliquis for provoked  submassive VTE.  -Give 40 mg IV Lasix x 1  -Trend renal panel and electrolytes  -Lower extremity venous Doppler positive for acute right lower extremity DVT  -Continue nebulizers and BPH protocol.  -Continue Singulair  -Encourage mobilization.  Out of bed to chair.  -Follow-up with pulmonology in 1 to 2 weeks after discharge    VTE Prophylaxis:  Pharmacologic VTE prophylaxis orders are present.    CODE STATUS:   Code Status (Patient has no pulse and is not breathing): CPR (Attempt to Resuscitate)  Medical Interventions (Patient has pulse or is breathing): Full Support      Labs, imaging, microbiology, notes and medications personally reviewed  Discussed with primary    I, Dr. Carlitos Lanza, have spent more than 50% of the total time managing the patient in this encounter today.  This included personally reviewing all pertinent labs, imaging, microbiology and documentation. Also discussing the case with the patient and any available family, the admitting physician and any available ancillary staff.    Electronically signed by KALEB Aguilar, 01/10/25, 12:29 PM EST.  Electronically signed by Carlitos Lanza MD, 01/10/25, 2:15 PM EST.

## 2025-01-10 NOTE — PLAN OF CARE
Goal Outcome Evaluation:  Plan of Care Reviewed With: patient        Progress: improving  Outcome Evaluation: Remains on heparin drip per protocol, up standby to BSC, no c/o SOA, weaned down to 3L.

## 2025-01-10 NOTE — PROGRESS NOTES
[facility]   Hospitalist Progress Note  Date: 1/10/2025  Patient Name: Julia Rios  : 1945  MRN: [id number]  Date of admission: 2025  Room/Bed: [facility]      Subjective   Subjective     Chief Complaint: short of air     Summary:Julia Rios is a 79 y.o. female  with history of COPD not on oxygen, hypertension, hyperlipidemia, chronic kidney disease, congestive heart failure who presented to the emergency department with shortness of breath.  Patient was recently discharged from the hospital on  where she was treated for pneumonia.  Patient states that she had gone to see her primary care doctor on  and then saw the pulmonary nurse practitioner on January 3 where she continued to state that she was short of breath however was told that that was due to her recent pneumonia.  Patient states that her shortness of breath did not get better it continued to get worse.  Patient states that she felt so bad she was unable to get up and do many things around the house.  Patient was evaluated in the ER where she was requiring 6 L of oxygen however she is not on oxygen at home.  Patient was afebrile.  Blood pressure well-controlled.  Patient did have a CT of the chest which showed bilateral partially occlusive pulmonary emboli involving all lobes of both lungs.  There is evidence of right heart strain.  Patient was placed on a heparin drip in the ER.  Pulmonary was also consulted.  Patient's laboratory data was unremarkable.  Hospitalist service consulted for admission due to acute pulmonary embolism requiring IV heparin.     Interval Followup:   Patient is down to 3 L of oxygen  Patient states she feels slightly better.  Patient is very short of breath with any type of ambulation  Venous Doppler is positive for DVT on the right  Vitals stable    Review of Systems    All systems reviewed and negative except for what is outlined above.      Objective   Objective     Vitals:   Temp:   [97.2 °F (36.2 °C)-97.9 °F (36.6 °C)] 97.7 °F (36.5 °C)  Heart Rate:  [60-75] 60  Resp:  [16-18] 18  BP: ()/(51-81) 97/63  Flow (L/min) (Oxygen Therapy):  [4-7] 4    Physical Exam   General: Awake, alert, NAD  HENT: NCAT, MMM  Eyes: pupils equal, no scleral icterus  Cardiovascular: RRR, no murmurs   Pulmonary: CTA bilaterally; no wheezes; no conversational dyspnea  Gastrointestinal: S/ND/NT, +BS  Musculoskeletal: No gross deformities  Skin: No jaundice, no rash on exposed skin appreciated  Neuro: CN II through XII grossly intact; speech clear; no tremor  Psych: Mood and affect appropriate  : No Carlson catheter; no suprapubic tenderness    Result Review    Result Review:  I have personally reviewed these results:  [x]  Laboratory      Lab 01/10/25  1118 01/10/25  0447 01/09/25  2148 01/09/25  1448 01/09/25  0602 01/08/25  1515 01/08/25  1056 01/08/25  1040   WBC  --   --   --   --  10.29  --  9.42  --    HEMOGLOBIN  --   --   --   --  11.8*  --  12.0  --    HEMATOCRIT  --   --   --   --  37.6  --  39.1  --    PLATELETS  --   --   --   --  226  --  217  --    NEUTROS ABS  --   --   --   --  8.59*  --  6.18  --    IMMATURE GRANS (ABS)  --   --   --   --  0.09*  --  0.08*  --    LYMPHS ABS  --   --   --   --  1.26  --  1.87  --    MONOS ABS  --   --   --   --  0.32  --  1.04*  --    EOS ABS  --   --   --   --  0.00  --  0.19  --    MCV  --   --   --   --  87.4  --  89.7  --    LACTATE  --   --   --   --   --   --  1.3 1.0   PROTIME  --   --   --   --   --   --  14.5  --    APTT 75.9* 91.3 114.7*   < > 147.1*   < >  --   --     < > = values in this interval not displayed.         Lab 01/09/25  0602 01/08/25  1056   SODIUM 137 142   POTASSIUM 4.6 3.5   CHLORIDE 107 107   CO2 17.8* 20.6*   ANION GAP 12.2 14.4   BUN 20 14   CREATININE 1.15* 1.12*   EGFR 48.6* 50.1*   GLUCOSE 164* 112*   CALCIUM 9.0 9.2   MAGNESIUM 2.0  --          Lab 01/08/25  1056   TOTAL PROTEIN 6.8   ALBUMIN 4.0   GLOBULIN 2.8   ALT (SGPT) 10    AST (SGOT) 18   BILIRUBIN 0.6   ALK PHOS 82         Lab 01/08/25  1223 01/08/25  1056   PROBNP  --  256.1   HSTROP T 82* 76*   PROTIME  --  14.5   INR  --  1.11                 Lab 01/08/25  1040   PH, ARTERIAL 7.425   PCO2, ARTERIAL 31.3*   PO2 ART 42.6*   O2 SATURATION ART 80.1*   FIO2 21   HCO3 ART 20.6*   BASE EXCESS ART -2.9*     Brief Urine Lab Results  (Last result in the past 365 days)        Color   Clarity   Blood   Leuk Est   Nitrite   Protein   CREAT   Urine HCG        10/17/24 0923 Yellow   Clear   Negative   Negative   Negative   Negative           10/17/24 0923             79.9         10/17/24 0923             86.7               [x]  Microbiology   Microbiology Results (last 10 days)       Procedure Component Value - Date/Time    COVID-19, FLU A/B, RSV PCR 1 HR TAT - Swab, Nasopharynx [[id number]]  (Normal) Collected: 01/08/25 1100    Lab Status: Final result Specimen: Swab from Nasopharynx Updated: 01/08/25 1152     COVID19 Not Detected     Influenza A PCR Not Detected     Influenza B PCR Not Detected     RSV, PCR Not Detected    Narrative:      Fact sheet for providers: https://www.fda.gov/media/142166/download    Fact sheet for patients: https://www.fda.gov/media/916740/download    Test performed by PCR.    Blood Culture - Blood, Arm, Left [[id number]]  (Normal) Collected: 01/08/25 1056    Lab Status: Preliminary result Specimen: Blood from Arm, Left Updated: 01/10/25 1115     Blood Culture No growth at 2 days    Blood Culture - Blood, Arm, Right [[id number]]  (Normal) Collected: 01/08/25 1056    Lab Status: Preliminary result Specimen: Blood from Arm, Right Updated: 01/10/25 1115     Blood Culture No growth at 2 days          [x]  Radiology  XR Knee 1 or 2 View Right    Result Date: 1/8/2025  Impression: Right total knee prosthesis without evidence of hardware complication. Electronically Signed: Dakota Summers  1/8/2025 6:32 PM EST  Workstation ID: [id number]    CT Angiogram Chest Pulmonary  Embolism    Result Date: 1/8/2025  Impression: 1.Bilateral partially occlusive pulmonary emboli involving all lobes of both lungs. There is evidence of right heart strain. 2.Cardiomegaly. 3.Mild bibasilar reticular lung thickening and discoid atelectasis. Findings discussed over the phone with Dr. Jason On 1/8/2025 at 2:38 p.m. Electronically Signed: Tim Rodriguez MD  1/8/2025 2:43 PM EST  Workstation ID: [id number]    CT Sinus Without Contrast    Result Date: 1/8/2025  Impression: 1.1 cm polyp or mucous retention cyst in the left maxillary sinus. Electronically Signed: Shiva Jeffries MD  1/8/2025 10:38 AM EST  Workstation ID: [id number]    XR Chest 1 View    Result Date: 1/8/2025  Impression: 1.Stable chronic interstitial prominence without acute process identified. Electronically Signed: Rao Gu MD  1/8/2025 10:16 AM EST  Workstation ID: [id number]   []  EKG/Telemetry   []  Cardiology/Vascular   []  Pathology  []  Old records  []  Other:    Assessment & Plan   Assessment / Plan     Assessment:  Acute pulmonary emboli involving all lobes of the lung with evidence of right heart strain  History of COPD however not on home oxygen  Chronic kidney disease stage III  Morbid obesity with a BMI of 40  GERD  Hyperlipidemia  Acute right leg dvt     Plan:  -- Patient remains admitted to the medicine service  -- Continue patient on heparin drip.  Can transition to Eliquis tomorrow  --Pulmonary has given IV Lasix x 1 today  -- Pulmonary following  --Venous Doppler study positive for DVT in the right lower extremity.  Patient did state that she had a recent fall on that leg shortly after she was discharged previously  -- Echocardiogram appears fine.  Preserved ejection fraction.  Diastolic dysfunction noted  ---resumed patients home medications  -- Resume patient's bronchodilators  -- Discussed plan with nurse and with patient       Discussed with RN.    VTE Prophylaxis:  Pharmacologic VTE prophylaxis orders are  present.        CODE STATUS:   Code Status (Patient has no pulse and is not breathing): CPR (Attempt to Resuscitate)  Medical Interventions (Patient has pulse or is breathing): Full Support      Electronically signed by Phil Hendrix DO, 1/10/2025, 12:11 EST.

## 2025-01-10 NOTE — SIGNIFICANT NOTE
01/09/25 1245   Coping/Psychosocial   Observed Emotional State calm;cooperative   Verbalized Emotional State relief;hopefulness   Trust Relationship/Rapport empathic listening provided   Involvement in Care interacting with patient   Additional Documentation Spiritual Care (Group)   Spiritual Care   Use of Spiritual Resources non-[employer] use of spiritual care   Spiritual Care Source  initiative   Spiritual Care Follow-Up follow-up, none required as presently assessed   Response to Spiritual Care receptive of support;engaged in conversation;thanks expressed   Spiritual Care Interventions supportive conversation provided   Spiritual Care Visit Type initial   Receptivity to Spiritual Care visit welcomed     Duration of visit: 10 min

## 2025-01-11 LAB
ANION GAP SERPL CALCULATED.3IONS-SCNC: 12.4 MMOL/L (ref 5–15)
APTT PPP: 88.4 SECONDS (ref 78–95.9)
APTT PPP: >200 SECONDS (ref 78–95.9)
BUN SERPL-MCNC: 35 MG/DL (ref 8–23)
BUN/CREAT SERPL: 23.2 (ref 7–25)
CALCIUM SPEC-SCNC: 9.1 MG/DL (ref 8.6–10.5)
CHLORIDE SERPL-SCNC: 106 MMOL/L (ref 98–107)
CO2 SERPL-SCNC: 19.6 MMOL/L (ref 22–29)
CREAT SERPL-MCNC: 1.51 MG/DL (ref 0.57–1)
EGFRCR SERPLBLD CKD-EPI 2021: 35 ML/MIN/1.73
GLUCOSE SERPL-MCNC: 112 MG/DL (ref 65–99)
MAGNESIUM SERPL-MCNC: 2 MG/DL (ref 1.6–2.4)
POTASSIUM SERPL-SCNC: 4 MMOL/L (ref 3.5–5.2)
SODIUM SERPL-SCNC: 138 MMOL/L (ref 136–145)
WHOLE BLOOD HOLD SPECIMEN: NORMAL

## 2025-01-11 PROCEDURE — 83735 ASSAY OF MAGNESIUM: CPT | Performed by: INTERNAL MEDICINE

## 2025-01-11 PROCEDURE — 85730 THROMBOPLASTIN TIME PARTIAL: CPT | Performed by: INTERNAL MEDICINE

## 2025-01-11 PROCEDURE — 94640 AIRWAY INHALATION TREATMENT: CPT

## 2025-01-11 PROCEDURE — 94799 UNLISTED PULMONARY SVC/PX: CPT

## 2025-01-11 PROCEDURE — 25010000002 HEPARIN (PORCINE) 25000-0.45 UT/250ML-% SOLUTION: Performed by: INTERNAL MEDICINE

## 2025-01-11 PROCEDURE — 80048 BASIC METABOLIC PNL TOTAL CA: CPT | Performed by: INTERNAL MEDICINE

## 2025-01-11 PROCEDURE — 94664 DEMO&/EVAL PT USE INHALER: CPT

## 2025-01-11 PROCEDURE — 99233 SBSQ HOSP IP/OBS HIGH 50: CPT | Performed by: INTERNAL MEDICINE

## 2025-01-11 PROCEDURE — 99232 SBSQ HOSP IP/OBS MODERATE 35: CPT | Performed by: INTERNAL MEDICINE

## 2025-01-11 RX ADMIN — BUDESONIDE 0.5 MG: 0.5 INHALANT RESPIRATORY (INHALATION) at 19:04

## 2025-01-11 RX ADMIN — ARFORMOTEROL TARTRATE 15 MCG: 15 SOLUTION RESPIRATORY (INHALATION) at 06:44

## 2025-01-11 RX ADMIN — FAMOTIDINE 20 MG: 20 TABLET ORAL at 08:31

## 2025-01-11 RX ADMIN — APIXABAN 10 MG: 5 TABLET, FILM COATED ORAL at 21:32

## 2025-01-11 RX ADMIN — IPRATROPIUM BROMIDE AND ALBUTEROL SULFATE 3 ML: 2.5; .5 SOLUTION RESPIRATORY (INHALATION) at 00:27

## 2025-01-11 RX ADMIN — GABAPENTIN 600 MG: 300 CAPSULE ORAL at 17:30

## 2025-01-11 RX ADMIN — Medication 10 ML: at 21:34

## 2025-01-11 RX ADMIN — IPRATROPIUM BROMIDE AND ALBUTEROL SULFATE 3 ML: 2.5; .5 SOLUTION RESPIRATORY (INHALATION) at 14:03

## 2025-01-11 RX ADMIN — MONTELUKAST 10 MG: 10 TABLET, FILM COATED ORAL at 06:09

## 2025-01-11 RX ADMIN — IPRATROPIUM BROMIDE AND ALBUTEROL SULFATE 3 ML: 2.5; .5 SOLUTION RESPIRATORY (INHALATION) at 19:05

## 2025-01-11 RX ADMIN — FAMOTIDINE 20 MG: 20 TABLET ORAL at 17:30

## 2025-01-11 RX ADMIN — GABAPENTIN 300 MG: 300 CAPSULE ORAL at 06:09

## 2025-01-11 RX ADMIN — BUDESONIDE 0.5 MG: 0.5 INHALANT RESPIRATORY (INHALATION) at 06:44

## 2025-01-11 RX ADMIN — IPRATROPIUM BROMIDE AND ALBUTEROL SULFATE 3 ML: 2.5; .5 SOLUTION RESPIRATORY (INHALATION) at 06:44

## 2025-01-11 RX ADMIN — HEPARIN SODIUM 15 UNITS/KG/HR: 10000 INJECTION, SOLUTION INTRAVENOUS at 06:09

## 2025-01-11 RX ADMIN — ARFORMOTEROL TARTRATE 15 MCG: 15 SOLUTION RESPIRATORY (INHALATION) at 19:04

## 2025-01-11 RX ADMIN — APIXABAN 10 MG: 5 TABLET, FILM COATED ORAL at 14:47

## 2025-01-11 NOTE — PLAN OF CARE
Goal Outcome Evaluation:   Pt slept well most of the night heparin gtt did have to be adjust a few times currently running at 15 units/kg. Next ptt at 1200. Vitals were stable no other changes noted during the shift.

## 2025-01-11 NOTE — PROGRESS NOTES
[facility]   Hospitalist Progress Note  Date: 2025  Patient Name: Julia Rios  : 1945  MRN: [id number]  Date of admission: 2025  Room/Bed: [facility]      Subjective   Subjective     Chief Complaint: short of air     Summary:Julia Rios is a 79 y.o. female  with history of COPD not on oxygen, hypertension, hyperlipidemia, chronic kidney disease, congestive heart failure who presented to the emergency department with shortness of breath.  Patient was recently discharged from the hospital on  where she was treated for pneumonia.  Patient states that she had gone to see her primary care doctor on  and then saw the pulmonary nurse practitioner on January 3 where she continued to state that she was short of breath however was told that that was due to her recent pneumonia.  Patient states that her shortness of breath did not get better it continued to get worse.  Patient states that she felt so bad she was unable to get up and do many things around the house.  Patient was evaluated in the ER where she was requiring 6 L of oxygen however she is not on oxygen at home.  Patient was afebrile.  Blood pressure well-controlled.  Patient did have a CT of the chest which showed bilateral partially occlusive pulmonary emboli involving all lobes of both lungs.  There is evidence of right heart strain.  Patient was placed on a heparin drip in the ER.  Pulmonary was also consulted.  Patient's laboratory data was unremarkable.  Hospitalist service consulted for admission due to acute pulmonary embolism requiring IV heparin.     Interval Followup:   Patient is down to 3 L of oxygen  Patient states she feels slightly better.  Patient is very short of breath with any type of ambulation  Venous Doppler is positive for DVT on the right  Vitals stable  Patient will be transitioned over to Eliquis today      Review of Systems    All systems reviewed and negative except for what is outlined  above.      Objective   Objective     Vitals:   Temp:  [97.3 °F (36.3 °C)-97.9 °F (36.6 °C)] 97.7 °F (36.5 °C)  Heart Rate:  [60-90] 76  Resp:  [16-20] 18  BP: ()/(48-63) 90/58  Flow (L/min) (Oxygen Therapy):  [3-4] 3    Physical Exam   General: Awake, alert, NAD  HENT: NCAT, MMM  Eyes: pupils equal, no scleral icterus  Cardiovascular: RRR, no murmurs   Pulmonary: CTA bilaterally; no wheezes; no conversational dyspnea  Gastrointestinal: S/ND/NT, +BS  Musculoskeletal: No gross deformities  Skin: No jaundice, no rash on exposed skin appreciated  Neuro: CN II through XII grossly intact; speech clear; no tremor  Psych: Mood and affect appropriate  : No Carlson catheter; no suprapubic tenderness    Result Review    Result Review:  I have personally reviewed these results:  [x]  Laboratory      Lab 01/11/25  0157 01/10/25  1825 01/10/25  1118 01/09/25  1448 01/09/25  0602 01/08/25  1515 01/08/25  1056 01/08/25  1040   WBC  --   --   --   --  10.29  --  9.42  --    HEMOGLOBIN  --   --   --   --  11.8*  --  12.0  --    HEMATOCRIT  --   --   --   --  37.6  --  39.1  --    PLATELETS  --   --   --   --  226  --  217  --    NEUTROS ABS  --   --   --   --  8.59*  --  6.18  --    IMMATURE GRANS (ABS)  --   --   --   --  0.09*  --  0.08*  --    LYMPHS ABS  --   --   --   --  1.26  --  1.87  --    MONOS ABS  --   --   --   --  0.32  --  1.04*  --    EOS ABS  --   --   --   --  0.00  --  0.19  --    MCV  --   --   --   --  87.4  --  89.7  --    LACTATE  --   --   --   --   --   --  1.3 1.0   PROTIME  --   --   --   --   --   --  14.5  --    APTT >200.0* 61.6* 75.9*   < > 147.1*   < >  --   --     < > = values in this interval not displayed.         Lab 01/11/25  0157 01/09/25  0602 01/08/25  1056   SODIUM 138 137 142   POTASSIUM 4.0 4.6 3.5   CHLORIDE 106 107 107   CO2 19.6* 17.8* 20.6*   ANION GAP 12.4 12.2 14.4   BUN 35* 20 14   CREATININE 1.51* 1.15* 1.12*   EGFR 35.0* 48.6* 50.1*   GLUCOSE 112* 164* 112*   CALCIUM 9.1 9.0  9.2   MAGNESIUM 2.0 2.0  --          Lab 01/08/25  1056   TOTAL PROTEIN 6.8   ALBUMIN 4.0   GLOBULIN 2.8   ALT (SGPT) 10   AST (SGOT) 18   BILIRUBIN 0.6   ALK PHOS 82         Lab 01/08/25  1223 01/08/25  1056   PROBNP  --  256.1   HSTROP T 82* 76*   PROTIME  --  14.5   INR  --  1.11                 Lab 01/08/25  1040   PH, ARTERIAL 7.425   PCO2, ARTERIAL 31.3*   PO2 ART 42.6*   O2 SATURATION ART 80.1*   FIO2 21   HCO3 ART 20.6*   BASE EXCESS ART -2.9*     Brief Urine Lab Results  (Last result in the past 365 days)        Color   Clarity   Blood   Leuk Est   Nitrite   Protein   CREAT   Urine HCG        10/17/24 0923 Yellow   Clear   Negative   Negative   Negative   Negative           10/17/24 0923             79.9         10/17/24 0923             86.7               [x]  Microbiology   Microbiology Results (last 10 days)       Procedure Component Value - Date/Time    COVID-19, FLU A/B, RSV PCR 1 HR TAT - Swab, Nasopharynx [[id number]]  (Normal) Collected: 01/08/25 1100    Lab Status: Final result Specimen: Swab from Nasopharynx Updated: 01/08/25 1152     COVID19 Not Detected     Influenza A PCR Not Detected     Influenza B PCR Not Detected     RSV, PCR Not Detected    Narrative:      Fact sheet for providers: https://www.fda.gov/media/360122/download    Fact sheet for patients: https://www.fda.gov/media/942379/download    Test performed by PCR.    Blood Culture - Blood, Arm, Left [[id number]]  (Normal) Collected: 01/08/25 1056    Lab Status: Preliminary result Specimen: Blood from Arm, Left Updated: 01/10/25 1115     Blood Culture No growth at 2 days    Blood Culture - Blood, Arm, Right [[id number]]  (Normal) Collected: 01/08/25 1056    Lab Status: Preliminary result Specimen: Blood from Arm, Right Updated: 01/10/25 1115     Blood Culture No growth at 2 days          [x]  Radiology  XR Knee 1 or 2 View Right    Result Date: 1/8/2025  Impression: Right total knee prosthesis without evidence of hardware complication.  Electronically Signed: Dakota Summers  1/8/2025 6:32 PM EST  Workstation ID: [id number]    CT Angiogram Chest Pulmonary Embolism    Result Date: 1/8/2025  Impression: 1.Bilateral partially occlusive pulmonary emboli involving all lobes of both lungs. There is evidence of right heart strain. 2.Cardiomegaly. 3.Mild bibasilar reticular lung thickening and discoid atelectasis. Findings discussed over the phone with Dr. Jason On 1/8/2025 at 2:38 p.m. Electronically Signed: Tim Rodriguez MD  1/8/2025 2:43 PM EST  Workstation ID: [id number]    CT Sinus Without Contrast    Result Date: 1/8/2025  Impression: 1.1 cm polyp or mucous retention cyst in the left maxillary sinus. Electronically Signed: Shiva Jeffries MD  1/8/2025 10:38 AM EST  Workstation ID: [id number]    XR Chest 1 View    Result Date: 1/8/2025  Impression: 1.Stable chronic interstitial prominence without acute process identified. Electronically Signed: Rao Gu MD  1/8/2025 10:16 AM EST  Workstation ID: [id number]   []  EKG/Telemetry   []  Cardiology/Vascular   []  Pathology  []  Old records  []  Other:    Assessment & Plan   Assessment / Plan     Assessment:  Acute pulmonary emboli involving all lobes of the lung with evidence of right heart strain  History of COPD however not on home oxygen  Chronic kidney disease stage III  Morbid obesity with a BMI of 40  GERD  Hyperlipidemia  Acute right leg dvt     Plan:  -- Patient remains admitted to the medicine service  -- Patient will be transitioned over to Eliquis today  --IV Lasix as needed  -- Pulmonary following  --Venous Doppler study positive for DVT in the right lower extremity.  Patient did state that she had a recent fall on that leg shortly after she was discharged previously  -- Echocardiogram appears fine.  Preserved ejection fraction.  Diastolic dysfunction noted  ---resumed patients home medications  -- Resume patient's bronchodilators  --Will perform oximetry test tomorrow.  As patient will likely need  oxygen upon discharge home  -- Discussed plan with nurse and with patient       Discussed with RN.    VTE Prophylaxis:  Pharmacologic VTE prophylaxis orders are present.        CODE STATUS:   Code Status (Patient has no pulse and is not breathing): CPR (Attempt to Resuscitate)  Medical Interventions (Patient has pulse or is breathing): Full Support      Electronically signed by Phil Hendrix DO, 1/11/2025, 09:45 EST.

## 2025-01-11 NOTE — PROGRESS NOTES
Pulmonary / Critical Care Progress Note      Patient Name: Julia Rios  : 1945  MRN: [id number]  Attending:  Phil Hendrix DO  Date of admission: 2025    Subjective   Subjective   Follow-up for worsening hypoxia, pulmonary emboli    No acute events overnight.    This afternoon,  Sitting up in bed  Currently on 3 L nasal cannula  Overall feeling better  Dyspnea and cough improving  Continues to desat easily with activity  Creatinine trending up  BP soft  No fever or chills  No hemoptysis  Remains weak and fatigued      Objective   Objective     Vitals:   Temp:  [97.3 °F (36.3 °C)-97.9 °F (36.6 °C)] 97.3 °F (36.3 °C)  Heart Rate:  [69-90] 81  Resp:  [18-20] 20  BP: ()/(48-79) 105/55  Flow (L/min) (Oxygen Therapy):  [3] 3    Physical Exam   Vital Signs Reviewed   General: Pleasant, elderly female, Alert, NAD, sitting up in bed  Chest:  good aeration, clear to auscultation bilaterally, no work of breathing noted  CV: RRR, no MGR, pulses 2+, equal.  EXT:  no clubbing, no cyanosis, no edema, tenderness and bruising noted of right shin and calf   Neuro:  A&Ox3, CN grossly intact, no focal deficits.  Skin: No rashes or lesions noted        Result Review    Result Review:  I have personally reviewed the results from the time of this admission to 2025 18:06 EST and agree with these findings:  [x]  Laboratory  [x]  Microbiology  [x]  Radiology  [x]  EKG/Telemetry   []  Cardiology/Vascular   []  Pathology  []  Old records  []  Other:  Most notable findings include:   Flu/COVID/RSV negative  NT BNP normal  ABG 7.42//20      Lab 25  0157 25  0602 25  1056   WBC  --  10.29 9.42   HEMOGLOBIN  --  11.8* 12.0   HEMATOCRIT  --  37.6 39.1   PLATELETS  --  226 217   SODIUM 138 137 142   POTASSIUM 4.0 4.6 3.5   CHLORIDE 106 107 107   CO2 19.6* 17.8* 20.6*   BUN 35* 20 14   CREATININE 1.51* 1.15* 1.12*   GLUCOSE 112* 164* 112*   CALCIUM 9.1 9.0 9.2   TOTAL PROTEIN  --   --  6.8   ALBUMIN   --   --  4.0   GLOBULIN  --   --  2.8     CT Angiogram Chest Pulmonary Embolism    Result Date: 1/8/2025  CT ANGIOGRAM CHEST PULMONARY EMBOLISM Date of Exam: 1/8/2025 2:21 PM EST Indication: Hypoxia. Comparison: None available. Technique: Axial CT images were obtained of the chest after the uneventful intravenous administration of iodinated contrast utilizing pulmonary embolism protocol.  Reconstructed coronal and sagittal images were also obtained. Automated exposure control and iterative construction methods were used. Findings: The pulmonary artery outflow track is patent. There is pulmonary embolism at the right main pulmonary artery bifurcation with partially occlusive pulmonary emboli involving the left upper lobe segmental and subsegmental branches. There is also partially occlusive pulmonary embolism extending into the right lower lobe lobar and few segmental and subsegmental branches. Also partially occlusive pulmonary embolism at the origin of the right middle lobe pulmonary artery. Partially occlusive embolism in left upper lobe and left lower lobe segmental and few subsegmental branches. There is cardiomegaly. The right heart lumen is mildly enlarged as compared to the left and there is minimal leftward bowing of the interventricular septum compatible with right heart strain. There are no pericardial effusions. The thoracic aorta shows a normal diameter with atherosclerosis. Patent three-vessel arch. Subcentimeter shotty mediastinal lymph nodes There is mild bibasilar reticular lung thickening and mild bibasilar discoid atelectasis. No pleural effusion or pneumothorax. No acute fractures or destructive bone lesions. No suspicious pulmonary nodules Tiny hiatal hernia. No acute abnormalities in the upper abdomen     Impression: Impression: 1.Bilateral partially occlusive pulmonary emboli involving all lobes of both lungs. There is evidence of right heart strain. 2.Cardiomegaly. 3.Mild bibasilar reticular  lung thickening and discoid atelectasis. Findings discussed over the phone with Dr. Jason On 1/8/2025 at 2:38 p.m. Electronically Signed: Tim Rodriguez MD  1/8/2025 2:43 PM EST  Workstation ID: [id number]   Results for orders placed during the hospital encounter of 01/08/25    Duplex Venous Lower Extremity - Bilateral CV-READ    Interpretation Summary    Acute right lower extremity deep vein thrombosis noted in the posterior tibial.    Left popliteal fossa fluid collection.    All other veins appeared normal bilaterally.    Results for orders placed during the hospital encounter of 01/08/25    Adult Transthoracic Echo Complete w/ Color, Spectral and Contrast if necessary per protocol    Interpretation Summary    Left ventricular systolic function is normal. Left ventricular ejection fraction appears to be 56 - 60%.    Left ventricular diastolic function is consistent with (grade I) impaired relaxation.    No evidence of cardioembolic source by transthoracic echo    Assessment & Plan   Assessment / Plan     Active Hospital Problems:  Active Hospital Problems    Diagnosis     **Acute pulmonary embolism     Acute pulmonary embolism with acute cor pulmonale     Essential hypertension    Impression:  Acute hypoxemic respiratory failure  Acute bilateral pulmonary emboli.  Provoked.    Question acute cor pulmonale  Acute right lower extremity DVT  COPD without exacerbation  CKD stage III  Class III obesity with BMI 40     Plan:  -Continue to wean O2 to maintain SpO2 greater than 90%.  Does not wear O2 at baseline.  -CT chest with multiple pulmonary emboli with features of right heart strain.  BNP is normal and echocardiogram with no evidence of right heart strain so just provoked simple pulmonary emboli  -Echocardiogram with normal EF, grade 1 diastolic dysfunction, no evidence of cardioembolic source  -Heparin drip discontinued.  Start Eliquis twice daily.  Recommend 6 months of Eliquis for provoked submassive  VTE.  -Creatinine trending up.  Hold spot dose diuresis.  -Trend renal panel and electrolytes.  Replace electrolytes as needed.  -Lower extremity venous Doppler positive for acute right lower extremity DVT  -Continue nebulizers and BPH protocol.  -Continue Singulair  -Encourage mobilization.  Out of bed to chair.  -Follow-up with pulmonology in 1 to 2 weeks after discharge      VTE Prophylaxis:  Pharmacologic VTE prophylaxis orders are present.    CODE STATUS:   Code Status (Patient has no pulse and is not breathing): CPR (Attempt to Resuscitate)  Medical Interventions (Patient has pulse or is breathing): Full Support      Labs, imaging, microbiology, notes and medications personally reviewed  Discussed with primary    Electronically signed by KALEB Aguilar, 01/11/25, 6:35 PM EST.  I personally seen  examined this patient and the medical decision making and assessment and plan reflect my and I assume responsibility for the care of this patient    Electronically signed by Jessee Maxwell DO, 01/11/25, 9:10 PM EST.

## 2025-01-12 LAB
BASOPHILS # BLD AUTO: 0.15 10*3/MM3 (ref 0–0.2)
BASOPHILS NFR BLD AUTO: 1.2 % (ref 0–1.5)
DEPRECATED RDW RBC AUTO: 53 FL (ref 37–54)
EOSINOPHIL # BLD AUTO: 0.3 10*3/MM3 (ref 0–0.4)
EOSINOPHIL NFR BLD AUTO: 2.4 % (ref 0.3–6.2)
ERYTHROCYTE [DISTWIDTH] IN BLOOD BY AUTOMATED COUNT: 15.9 % (ref 12.3–15.4)
HCT VFR BLD AUTO: 40.9 % (ref 34–46.6)
HGB BLD-MCNC: 12.5 G/DL (ref 12–15.9)
HOLD SPECIMEN: NORMAL
IMM GRANULOCYTES # BLD AUTO: 0.26 10*3/MM3 (ref 0–0.05)
IMM GRANULOCYTES NFR BLD AUTO: 2.1 % (ref 0–0.5)
LYMPHOCYTES # BLD AUTO: 4.43 10*3/MM3 (ref 0.7–3.1)
LYMPHOCYTES NFR BLD AUTO: 36.1 % (ref 19.6–45.3)
MCH RBC QN AUTO: 27.8 PG (ref 26.6–33)
MCHC RBC AUTO-ENTMCNC: 30.6 G/DL (ref 31.5–35.7)
MCV RBC AUTO: 90.9 FL (ref 79–97)
MONOCYTES # BLD AUTO: 1.43 10*3/MM3 (ref 0.1–0.9)
MONOCYTES NFR BLD AUTO: 11.7 % (ref 5–12)
NEUTROPHILS NFR BLD AUTO: 46.5 % (ref 42.7–76)
NEUTROPHILS NFR BLD AUTO: 5.7 10*3/MM3 (ref 1.7–7)
NRBC BLD AUTO-RTO: 0 /100 WBC (ref 0–0.2)
PLATELET # BLD AUTO: 253 10*3/MM3 (ref 140–450)
PMV BLD AUTO: 12.9 FL (ref 6–12)
RBC # BLD AUTO: 4.5 10*6/MM3 (ref 3.77–5.28)
WBC NRBC COR # BLD AUTO: 12.27 10*3/MM3 (ref 3.4–10.8)

## 2025-01-12 PROCEDURE — 94799 UNLISTED PULMONARY SVC/PX: CPT

## 2025-01-12 PROCEDURE — 99232 SBSQ HOSP IP/OBS MODERATE 35: CPT | Performed by: INTERNAL MEDICINE

## 2025-01-12 PROCEDURE — 85025 COMPLETE CBC W/AUTO DIFF WBC: CPT | Performed by: INTERNAL MEDICINE

## 2025-01-12 PROCEDURE — 94761 N-INVAS EAR/PLS OXIMETRY MLT: CPT

## 2025-01-12 PROCEDURE — 94664 DEMO&/EVAL PT USE INHALER: CPT

## 2025-01-12 RX ADMIN — GABAPENTIN 600 MG: 300 CAPSULE ORAL at 16:32

## 2025-01-12 RX ADMIN — Medication 10 ML: at 10:03

## 2025-01-12 RX ADMIN — APIXABAN 10 MG: 5 TABLET, FILM COATED ORAL at 10:02

## 2025-01-12 RX ADMIN — ARFORMOTEROL TARTRATE 15 MCG: 15 SOLUTION RESPIRATORY (INHALATION) at 19:47

## 2025-01-12 RX ADMIN — ARFORMOTEROL TARTRATE 15 MCG: 15 SOLUTION RESPIRATORY (INHALATION) at 07:08

## 2025-01-12 RX ADMIN — FAMOTIDINE 20 MG: 20 TABLET ORAL at 10:03

## 2025-01-12 RX ADMIN — FAMOTIDINE 20 MG: 20 TABLET ORAL at 16:32

## 2025-01-12 RX ADMIN — METOPROLOL SUCCINATE 100 MG: 50 TABLET, EXTENDED RELEASE ORAL at 10:02

## 2025-01-12 RX ADMIN — IPRATROPIUM BROMIDE AND ALBUTEROL SULFATE 3 ML: 2.5; .5 SOLUTION RESPIRATORY (INHALATION) at 14:03

## 2025-01-12 RX ADMIN — Medication 10 ML: at 21:29

## 2025-01-12 RX ADMIN — IPRATROPIUM BROMIDE AND ALBUTEROL SULFATE 3 ML: 2.5; .5 SOLUTION RESPIRATORY (INHALATION) at 03:45

## 2025-01-12 RX ADMIN — MONTELUKAST 10 MG: 10 TABLET, FILM COATED ORAL at 10:03

## 2025-01-12 RX ADMIN — BUDESONIDE 0.5 MG: 0.5 INHALANT RESPIRATORY (INHALATION) at 19:47

## 2025-01-12 RX ADMIN — BUDESONIDE 0.5 MG: 0.5 INHALANT RESPIRATORY (INHALATION) at 07:09

## 2025-01-12 RX ADMIN — IPRATROPIUM BROMIDE AND ALBUTEROL SULFATE 3 ML: 2.5; .5 SOLUTION RESPIRATORY (INHALATION) at 07:08

## 2025-01-12 RX ADMIN — GABAPENTIN 300 MG: 300 CAPSULE ORAL at 10:02

## 2025-01-12 RX ADMIN — APIXABAN 10 MG: 5 TABLET, FILM COATED ORAL at 21:28

## 2025-01-12 RX ADMIN — LOSARTAN POTASSIUM 100 MG: 50 TABLET, FILM COATED ORAL at 10:03

## 2025-01-12 RX ADMIN — IPRATROPIUM BROMIDE AND ALBUTEROL SULFATE 3 ML: 2.5; .5 SOLUTION RESPIRATORY (INHALATION) at 19:47

## 2025-01-12 NOTE — PROGRESS NOTES
Left message for patient to schedule appointment with Jere   Pulmonary / Critical Care Progress Note      Patient Name: Julia Rios  : 1945  MRN: [id number]  Attending:  Phil Hendrix DO  Date of admission: 2025    Subjective   Subjective   Follow-up for worsening hypoxia, pulmonary emboli    No acute events overnight.    This afternoon,  Sitting up in chair  Remains on 3 L nasal cannula  Overall feeling better  Dyspnea and cough improving  Continues to desat easily with activity  Creatinine elevated  Remains easily fatigued      Objective   Objective     Vitals:   Temp:  [97.9 °F (36.6 °C)-98.2 °F (36.8 °C)] 98.2 °F (36.8 °C)  Heart Rate:  [] 80  Resp:  [18] 18  BP: (103-117)/(62-70) 104/63  Flow (L/min) (Oxygen Therapy):  [3] 3    Physical Exam   Vital Signs Reviewed   General: Pleasant, elderly female, Alert, NAD, sitting up in chair  Chest:  good aeration, clear to auscultation bilaterally, no work of breathing noted  CV: RRR, no MGR, pulses 2+, equal.  EXT:  no clubbing, no cyanosis, no edema, tenderness and bruising noted of right shin and calf   Neuro:  A&Ox3, CN grossly intact, no focal deficits.  Skin: No rashes or lesions noted        Result Review    Result Review:  I have personally reviewed the results from the time of this admission to 2025 17:28 EST and agree with these findings:  [x]  Laboratory  [x]  Microbiology  [x]  Radiology  [x]  EKG/Telemetry   []  Cardiology/Vascular   []  Pathology  []  Old records  []  Other:  Most notable findings include:   Flu/COVID/RSV negative  NT BNP normal  ABG 7.42//20      Lab 25  0409 25  0157 25  0602 25  1056   WBC 12.27*  --  10.29 9.42   HEMOGLOBIN 12.5  --  11.8* 12.0   HEMATOCRIT 40.9  --  37.6 39.1   PLATELETS 253  --  226 217   SODIUM  --  138 137 142   POTASSIUM  --  4.0 4.6 3.5   CHLORIDE  --  106 107 107   CO2  --  19.6* 17.8* 20.6*   BUN  --  35* 20 14   CREATININE  --  1.51* 1.15* 1.12*   GLUCOSE  --  112* 164* 112*   CALCIUM  --  9.1 9.0 9.2   TOTAL  PROTEIN  --   --   --  6.8   ALBUMIN  --   --   --  4.0   GLOBULIN  --   --   --  2.8     CT Angiogram Chest Pulmonary Embolism    Result Date: 1/8/2025  CT ANGIOGRAM CHEST PULMONARY EMBOLISM Date of Exam: 1/8/2025 2:21 PM EST Indication: Hypoxia. Comparison: None available. Technique: Axial CT images were obtained of the chest after the uneventful intravenous administration of iodinated contrast utilizing pulmonary embolism protocol.  Reconstructed coronal and sagittal images were also obtained. Automated exposure control and iterative construction methods were used. Findings: The pulmonary artery outflow track is patent. There is pulmonary embolism at the right main pulmonary artery bifurcation with partially occlusive pulmonary emboli involving the left upper lobe segmental and subsegmental branches. There is also partially occlusive pulmonary embolism extending into the right lower lobe lobar and few segmental and subsegmental branches. Also partially occlusive pulmonary embolism at the origin of the right middle lobe pulmonary artery. Partially occlusive embolism in left upper lobe and left lower lobe segmental and few subsegmental branches. There is cardiomegaly. The right heart lumen is mildly enlarged as compared to the left and there is minimal leftward bowing of the interventricular septum compatible with right heart strain. There are no pericardial effusions. The thoracic aorta shows a normal diameter with atherosclerosis. Patent three-vessel arch. Subcentimeter shotty mediastinal lymph nodes There is mild bibasilar reticular lung thickening and mild bibasilar discoid atelectasis. No pleural effusion or pneumothorax. No acute fractures or destructive bone lesions. No suspicious pulmonary nodules Tiny hiatal hernia. No acute abnormalities in the upper abdomen     Impression: Impression: 1.Bilateral partially occlusive pulmonary emboli involving all lobes of both lungs. There is evidence of right heart  strain. 2.Cardiomegaly. 3.Mild bibasilar reticular lung thickening and discoid atelectasis. Findings discussed over the phone with Dr. Jason On 1/8/2025 at 2:38 p.m. Electronically Signed: Tim Rodriguez MD  1/8/2025 2:43 PM EST  Workstation ID: [id number]   Results for orders placed during the hospital encounter of 01/08/25    Duplex Venous Lower Extremity - Bilateral CV-READ    Interpretation Summary    Acute right lower extremity deep vein thrombosis noted in the posterior tibial.    Left popliteal fossa fluid collection.    All other veins appeared normal bilaterally.    Results for orders placed during the hospital encounter of 01/08/25    Adult Transthoracic Echo Complete w/ Color, Spectral and Contrast if necessary per protocol    Interpretation Summary    Left ventricular systolic function is normal. Left ventricular ejection fraction appears to be 56 - 60%.    Left ventricular diastolic function is consistent with (grade I) impaired relaxation.    No evidence of cardioembolic source by transthoracic echo    Assessment & Plan   Assessment / Plan     Active Hospital Problems:  Active Hospital Problems    Diagnosis     **Acute pulmonary embolism     Acute pulmonary embolism with acute cor pulmonale     Essential hypertension      Impression:  Acute hypoxemic respiratory failure  Acute bilateral pulmonary emboli.  Provoked.    Question acute cor pulmonale  Acute right lower extremity DVT  COPD without exacerbation  CKD stage III  Class III obesity with BMI 40     Plan:  -Continue to wean O2 to maintain SpO2 greater than 90%.  Does not wear O2 at baseline.  -CT chest with multiple pulmonary emboli with features of right heart strain.  BNP is normal and echocardiogram with no evidence of right heart strain so just provoked simple pulmonary emboli  -Echocardiogram with normal EF, grade 1 diastolic dysfunction, no evidence of cardioembolic source  -Continue Eliquis twice daily.  Recommend 6 months of Eliquis for  provoked submassive VTE.  Can speak with her primary pulmonologist Dr. Baez about the possibility of lifelong anticoagulation  -Trend renal panel and electrolytes.  Replace electrolytes as needed.  -Lower extremity venous Doppler positive for acute right lower extremity DVT  -Continue nebulizers and BPH protocol.  -Continue Singulair  -Encourage mobilization.  Out of bed to chair.  -Anticipate discharge in the next 24 hours  -Follow-up with pulmonology in 1 to 2 weeks after discharge      Will sign off at this time please call with questions should follow-up with Dr. Baez or Georgette Jang in the office in 1 to 2 weeks    VTE Prophylaxis:  Pharmacologic VTE prophylaxis orders are present.    CODE STATUS:   Code Status (Patient has no pulse and is not breathing): CPR (Attempt to Resuscitate)  Medical Interventions (Patient has pulse or is breathing): Full Support      Labs, imaging, microbiology, notes and medications personally reviewed  Discussed with primary    Electronically signed by KALEB Aguilar, 01/12/25, 6:04 PM EST.    I personally seen  examined this patient and the medical decision making and assessment and plan reflect my and I assume responsibility for the care of this patient    Electronically signed by Jessee Maxwell DO, 01/12/25, 6:42 PM EST.

## 2025-01-12 NOTE — PROGRESS NOTES
[facility]   Hospitalist Progress Note  Date: 2025  Patient Name: Julia Rios  : 1945  MRN: [id number]  Date of admission: 2025  Room/Bed: [facility]      Subjective   Subjective     Chief Complaint: short of air     Summary:Julia Rios is a 79 y.o. female  with history of COPD not on oxygen, hypertension, hyperlipidemia, chronic kidney disease, congestive heart failure who presented to the emergency department with shortness of breath.  Patient was recently discharged from the hospital on  where she was treated for pneumonia.  Patient states that she had gone to see her primary care doctor on  and then saw the pulmonary nurse practitioner on January 3 where she continued to state that she was short of breath however was told that that was due to her recent pneumonia.  Patient states that her shortness of breath did not get better it continued to get worse.  Patient states that she felt so bad she was unable to get up and do many things around the house.  Patient was evaluated in the ER where she was requiring 6 L of oxygen however she is not on oxygen at home.  Patient was afebrile.  Blood pressure well-controlled.  Patient did have a CT of the chest which showed bilateral partially occlusive pulmonary emboli involving all lobes of both lungs.  There is evidence of right heart strain.  Patient was placed on a heparin drip in the ER.  Pulmonary was also consulted.  Patient's laboratory data was unremarkable.  Hospitalist service consulted for admission due to acute pulmonary embolism requiring IV heparin.     Interval Followup:   Patient remains on oxygen   Patient is able to move more  Venous Doppler is positive for DVT on the right  Vitals stable  Remains on eliquis       Review of Systems    All systems reviewed and negative except for what is outlined above.      Objective   Objective     Vitals:   Temp:  [97.3 °F (36.3 °C)-98.1 °F (36.7 °C)] 98.1 °F (36.7  °C)  Heart Rate:  [] 98  Resp:  [18-20] 18  BP: (103-117)/(55-70) 117/64  Flow (L/min) (Oxygen Therapy):  [3] 3    Physical Exam   General: Awake, alert, NAD sitting in the bedside chair   HENT: NCAT, MMM  Eyes: pupils equal, no scleral icterus  Cardiovascular: RRR, no murmurs   Pulmonary: CTA bilaterally; no wheezes; no conversational dyspnea  Gastrointestinal: S/ND/NT, +BS  Musculoskeletal: No gross deformities  Skin: No jaundice, no rash on exposed skin appreciated  Neuro: CN II through XII grossly intact; speech clear; no tremor  Psych: Mood and affect appropriate  : No Carlson catheter; no suprapubic tenderness    Result Review    Result Review:  I have personally reviewed these results:  [x]  Laboratory      Lab 01/12/25  0409 01/11/25  1143 01/11/25  0157 01/10/25  1825 01/09/25  1448 01/09/25  0602 01/08/25  1515 01/08/25  1056 01/08/25  1040   WBC 12.27*  --   --   --   --  10.29  --  9.42  --    HEMOGLOBIN 12.5  --   --   --   --  11.8*  --  12.0  --    HEMATOCRIT 40.9  --   --   --   --  37.6  --  39.1  --    PLATELETS 253  --   --   --   --  226  --  217  --    NEUTROS ABS 5.70  --   --   --   --  8.59*  --  6.18  --    IMMATURE GRANS (ABS) 0.26*  --   --   --   --  0.09*  --  0.08*  --    LYMPHS ABS 4.43*  --   --   --   --  1.26  --  1.87  --    MONOS ABS 1.43*  --   --   --   --  0.32  --  1.04*  --    EOS ABS 0.30  --   --   --   --  0.00  --  0.19  --    MCV 90.9  --   --   --   --  87.4  --  89.7  --    LACTATE  --   --   --   --   --   --   --  1.3 1.0   PROTIME  --   --   --   --   --   --   --  14.5  --    APTT  --  88.4 >200.0* 61.6*   < > 147.1*   < >  --   --     < > = values in this interval not displayed.         Lab 01/11/25  0157 01/09/25  0602 01/08/25  1056   SODIUM 138 137 142   POTASSIUM 4.0 4.6 3.5   CHLORIDE 106 107 107   CO2 19.6* 17.8* 20.6*   ANION GAP 12.4 12.2 14.4   BUN 35* 20 14   CREATININE 1.51* 1.15* 1.12*   EGFR 35.0* 48.6* 50.1*   GLUCOSE 112* 164* 112*   CALCIUM  9.1 9.0 9.2   MAGNESIUM 2.0 2.0  --          Lab 01/08/25  1056   TOTAL PROTEIN 6.8   ALBUMIN 4.0   GLOBULIN 2.8   ALT (SGPT) 10   AST (SGOT) 18   BILIRUBIN 0.6   ALK PHOS 82         Lab 01/08/25  1223 01/08/25  1056   PROBNP  --  256.1   HSTROP T 82* 76*   PROTIME  --  14.5   INR  --  1.11                 Lab 01/08/25  1040   PH, ARTERIAL 7.425   PCO2, ARTERIAL 31.3*   PO2 ART 42.6*   O2 SATURATION ART 80.1*   FIO2 21   HCO3 ART 20.6*   BASE EXCESS ART -2.9*     Brief Urine Lab Results  (Last result in the past 365 days)        Color   Clarity   Blood   Leuk Est   Nitrite   Protein   CREAT   Urine HCG        10/17/24 0923 Yellow   Clear   Negative   Negative   Negative   Negative           10/17/24 0923             79.9         10/17/24 0923             86.7               [x]  Microbiology   Microbiology Results (last 10 days)       Procedure Component Value - Date/Time    COVID-19, FLU A/B, RSV PCR 1 HR TAT - Swab, Nasopharynx [[id number]]  (Normal) Collected: 01/08/25 1100    Lab Status: Final result Specimen: Swab from Nasopharynx Updated: 01/08/25 1152     COVID19 Not Detected     Influenza A PCR Not Detected     Influenza B PCR Not Detected     RSV, PCR Not Detected    Narrative:      Fact sheet for providers: https://www.fda.gov/media/908431/download    Fact sheet for patients: https://www.fda.gov/media/062561/download    Test performed by PCR.    Blood Culture - Blood, Arm, Left [[id number]]  (Normal) Collected: 01/08/25 1056    Lab Status: Preliminary result Specimen: Blood from Arm, Left Updated: 01/12/25 1115     Blood Culture No growth at 4 days    Blood Culture - Blood, Arm, Right [[id number]]  (Normal) Collected: 01/08/25 1056    Lab Status: Preliminary result Specimen: Blood from Arm, Right Updated: 01/12/25 1115     Blood Culture No growth at 4 days          [x]  Radiology  XR Knee 1 or 2 View Right    Result Date: 1/8/2025  Impression: Right total knee prosthesis without evidence of hardware  complication. Electronically Signed: Dakota Summers  1/8/2025 6:32 PM EST  Workstation ID: [id number]    CT Angiogram Chest Pulmonary Embolism    Result Date: 1/8/2025  Impression: 1.Bilateral partially occlusive pulmonary emboli involving all lobes of both lungs. There is evidence of right heart strain. 2.Cardiomegaly. 3.Mild bibasilar reticular lung thickening and discoid atelectasis. Findings discussed over the phone with Dr. Jason On 1/8/2025 at 2:38 p.m. Electronically Signed: Tim Rodriguez MD  1/8/2025 2:43 PM EST  Workstation ID: [id number]    CT Sinus Without Contrast    Result Date: 1/8/2025  Impression: 1.1 cm polyp or mucous retention cyst in the left maxillary sinus. Electronically Signed: Shiva Jeffries MD  1/8/2025 10:38 AM EST  Workstation ID: [id number]    XR Chest 1 View    Result Date: 1/8/2025  Impression: 1.Stable chronic interstitial prominence without acute process identified. Electronically Signed: Rao Gu MD  1/8/2025 10:16 AM EST  Workstation ID: [id number]   []  EKG/Telemetry   []  Cardiology/Vascular   []  Pathology  []  Old records  []  Other:    Assessment & Plan   Assessment / Plan     Assessment:  Acute pulmonary emboli involving all lobes of the lung with evidence of right heart strain  History of COPD however not on home oxygen  Chronic kidney disease stage III  Morbid obesity with a BMI of 40  GERD  Hyperlipidemia  Acute right leg dvt     Plan:  -- Patient remains admitted to the medicine service  -- Continue Eliquis   --IV Lasix as needed  -- Pulmonary following  --Venous Doppler study positive for DVT in the right lower extremity.  Patient did state that she had a recent fall on that leg shortly after she was discharged previously  -- Echocardiogram appears fine.  Preserved ejection fraction.  Diastolic dysfunction noted  ---resumed patients home medications  -- Resume patient's bronchodilators  --oximetry test today  --patient to bring in cpap from home   -- Discussed plan with  nurse and with patient       Discussed with RN.    VTE Prophylaxis:  Pharmacologic VTE prophylaxis orders are present.        CODE STATUS:   Code Status (Patient has no pulse and is not breathing): CPR (Attempt to Resuscitate)  Medical Interventions (Patient has pulse or is breathing): Full Support      Electronically signed by Phil Hendrix DO, 1/12/2025, 13:17 EST.

## 2025-01-12 NOTE — PROGRESS NOTES
Walking Oximetry Progress Note      Patient Name:  Julia Rios  YOB: 1945  Date of Procedure: 01/12/25              ROOM AIR BASELINE   SpO2%  85%   Heart Rate  84     EXERCISE ON ROOM AIR SpO2% EXERCISE ON O2 LPM SpO2%   1 MINUTE  1 MINUTE  3  92%   2 MINUTES  2 MINUTES  3  93%   3 MINUTES  3 MINUTES  3  93%   4 MINUTES  4 MINUTES  3  87%   5 MINUTES  5 MINUTES  4  90%   6 MINUTES  6 MINUTES  4  93%              Time to Recovery  1   SpO2% Post Exercise  93% on 3 Lpm.    HR Post Exercise  79     Comments:  Patient was on 3 lpm nasal cannula prior to room air SpO2.  After approximately 5 minutes on room air while at rest patient SpO2 dropped to 85%.  Placed patient on 2 lpm and she wouldn't recover until increased to 3 lpm.  Initiated walk on 3 lpm.  Patient had to stop several times to catch her breath and developed bad cough with exertion.  Patient states any time she ambulates she coughs and has a hard time catching her breath.  Around the 5 minute bethany she dropped to 87% and O2 was increased to 4 lpm.   Once 6 minutes complete patient returned to room and was able to wean back to previous 3 lpm while at rest.           Electronically signed by Allegra June RRT, 01/12/25, 4:08 PM EST.

## 2025-01-13 ENCOUNTER — READMISSION MANAGEMENT (OUTPATIENT)
Dept: CALL CENTER | Facility: HOSPITAL | Age: 80
End: 2025-01-13
Payer: MEDICARE

## 2025-01-13 VITALS
RESPIRATION RATE: 18 BRPM | DIASTOLIC BLOOD PRESSURE: 61 MMHG | TEMPERATURE: 97.9 F | HEART RATE: 89 BPM | OXYGEN SATURATION: 96 % | WEIGHT: 219 LBS | SYSTOLIC BLOOD PRESSURE: 97 MMHG | HEIGHT: 61 IN | BODY MASS INDEX: 41.35 KG/M2

## 2025-01-13 PROBLEM — I51.89 DIASTOLIC DYSFUNCTION: Status: ACTIVE | Noted: 2025-01-13

## 2025-01-13 PROBLEM — I82.401 ACUTE DEEP VEIN THROMBOSIS (DVT) OF RIGHT LOWER EXTREMITY: Status: ACTIVE | Noted: 2025-01-13

## 2025-01-13 LAB
ANION GAP SERPL CALCULATED.3IONS-SCNC: 10.3 MMOL/L (ref 5–15)
BACTERIA SPEC AEROBE CULT: NORMAL
BACTERIA SPEC AEROBE CULT: NORMAL
BUN SERPL-MCNC: 27 MG/DL (ref 8–23)
BUN/CREAT SERPL: 20.3 (ref 7–25)
CALCIUM SPEC-SCNC: 8.9 MG/DL (ref 8.6–10.5)
CHLORIDE SERPL-SCNC: 108 MMOL/L (ref 98–107)
CO2 SERPL-SCNC: 20.7 MMOL/L (ref 22–29)
CREAT SERPL-MCNC: 1.33 MG/DL (ref 0.57–1)
DEPRECATED RDW RBC AUTO: 52.9 FL (ref 37–54)
EGFRCR SERPLBLD CKD-EPI 2021: 40.8 ML/MIN/1.73
ERYTHROCYTE [DISTWIDTH] IN BLOOD BY AUTOMATED COUNT: 15.9 % (ref 12.3–15.4)
GLUCOSE SERPL-MCNC: 94 MG/DL (ref 65–99)
HCT VFR BLD AUTO: 37.9 % (ref 34–46.6)
HGB BLD-MCNC: 11.6 G/DL (ref 12–15.9)
MAGNESIUM SERPL-MCNC: 2 MG/DL (ref 1.6–2.4)
MCH RBC QN AUTO: 27.7 PG (ref 26.6–33)
MCHC RBC AUTO-ENTMCNC: 30.6 G/DL (ref 31.5–35.7)
MCV RBC AUTO: 90.5 FL (ref 79–97)
PLATELET # BLD AUTO: 232 10*3/MM3 (ref 140–450)
PMV BLD AUTO: 12.8 FL (ref 6–12)
POTASSIUM SERPL-SCNC: 4.4 MMOL/L (ref 3.5–5.2)
RBC # BLD AUTO: 4.19 10*6/MM3 (ref 3.77–5.28)
SODIUM SERPL-SCNC: 139 MMOL/L (ref 136–145)
WBC NRBC COR # BLD AUTO: 12.08 10*3/MM3 (ref 3.4–10.8)

## 2025-01-13 PROCEDURE — 94664 DEMO&/EVAL PT USE INHALER: CPT

## 2025-01-13 PROCEDURE — 94799 UNLISTED PULMONARY SVC/PX: CPT

## 2025-01-13 PROCEDURE — 80048 BASIC METABOLIC PNL TOTAL CA: CPT | Performed by: INTERNAL MEDICINE

## 2025-01-13 PROCEDURE — 94760 N-INVAS EAR/PLS OXIMETRY 1: CPT

## 2025-01-13 PROCEDURE — 99232 SBSQ HOSP IP/OBS MODERATE 35: CPT | Performed by: STUDENT IN AN ORGANIZED HEALTH CARE EDUCATION/TRAINING PROGRAM

## 2025-01-13 PROCEDURE — 97161 PT EVAL LOW COMPLEX 20 MIN: CPT

## 2025-01-13 PROCEDURE — 83735 ASSAY OF MAGNESIUM: CPT | Performed by: INTERNAL MEDICINE

## 2025-01-13 PROCEDURE — 94761 N-INVAS EAR/PLS OXIMETRY MLT: CPT

## 2025-01-13 PROCEDURE — 85027 COMPLETE CBC AUTOMATED: CPT | Performed by: INTERNAL MEDICINE

## 2025-01-13 PROCEDURE — 99239 HOSP IP/OBS DSCHRG MGMT >30: CPT | Performed by: INTERNAL MEDICINE

## 2025-01-13 RX ORDER — FAMOTIDINE 20 MG/1
20 TABLET, FILM COATED ORAL NIGHTLY
Status: DISCONTINUED | OUTPATIENT
Start: 2025-01-13 | End: 2025-01-13 | Stop reason: HOSPADM

## 2025-01-13 RX ADMIN — SPIRONOLACTONE 25 MG: 25 TABLET ORAL at 08:42

## 2025-01-13 RX ADMIN — FAMOTIDINE 20 MG: 20 TABLET ORAL at 05:48

## 2025-01-13 RX ADMIN — IPRATROPIUM BROMIDE AND ALBUTEROL SULFATE 3 ML: 2.5; .5 SOLUTION RESPIRATORY (INHALATION) at 00:27

## 2025-01-13 RX ADMIN — MONTELUKAST 10 MG: 10 TABLET, FILM COATED ORAL at 05:48

## 2025-01-13 RX ADMIN — IPRATROPIUM BROMIDE AND ALBUTEROL SULFATE 3 ML: 2.5; .5 SOLUTION RESPIRATORY (INHALATION) at 07:28

## 2025-01-13 RX ADMIN — Medication 10 ML: at 08:42

## 2025-01-13 RX ADMIN — APIXABAN 10 MG: 5 TABLET, FILM COATED ORAL at 08:42

## 2025-01-13 RX ADMIN — ARFORMOTEROL TARTRATE 15 MCG: 15 SOLUTION RESPIRATORY (INHALATION) at 07:28

## 2025-01-13 RX ADMIN — IPRATROPIUM BROMIDE AND ALBUTEROL SULFATE 3 ML: 2.5; .5 SOLUTION RESPIRATORY (INHALATION) at 14:05

## 2025-01-13 RX ADMIN — BUDESONIDE 0.5 MG: 0.5 INHALANT RESPIRATORY (INHALATION) at 07:28

## 2025-01-13 RX ADMIN — LOSARTAN POTASSIUM 100 MG: 50 TABLET, FILM COATED ORAL at 08:41

## 2025-01-13 RX ADMIN — GABAPENTIN 300 MG: 300 CAPSULE ORAL at 05:48

## 2025-01-13 RX ADMIN — METOPROLOL SUCCINATE 100 MG: 50 TABLET, EXTENDED RELEASE ORAL at 08:42

## 2025-01-13 NOTE — PLAN OF CARE
Goal Outcome Evaluation:  Plan of Care Reviewed With: patient        Progress: no change  Outcome Evaluation: Patient presents with deficits in balance, endurance, transfers, and ambulation. Patient will benefit from skilled PT services to address these mobility deficits and decrease risk of falls.    Anticipated Discharge Disposition (PT): home with outpatient therapy services (pulmonary rehab)

## 2025-01-13 NOTE — THERAPY EVALUATION
Acute Care - Physical Therapy Initial Evaluation  [facility]     Patient Name: Julia Rios  : 1945  MRN: [id number]  Today's Date: 2025      Visit Dx:     ICD-10-CM ICD-9-CM   1. Acute respiratory failure with hypoxia  J96.01 518.81   2. Acute pulmonary embolism with acute cor pulmonale, unspecified pulmonary embolism type  I26.09 415.19     415.0   3. Difficulty walking  R26.2 719.7     Patient Active Problem List   Diagnosis    Family history of colon cancer    Chronic obstructive pulmonary disease    Essential hypertension    Hyperlipidemia    CKD (chronic kidney disease) stage 3, GFR 30-59 ml/min    Chronic heart failure with preserved ejection fraction    CARMEN (obstructive sleep apnea)    Seasonal allergies    Gastroesophageal reflux disease    Bronchitis, mucopurulent recurrent    Tobacco abuse, in remission    Pulmonary nodule    PND (post-nasal drip)    Status post fall    Allergic rhinitis    Asthma    Closed fracture of metatarsal bone    Hemoptysis    Idiopathic chronic gout of multiple sites without tophus    Primary localized osteoarthritis    Vitamin D deficiency    Morbid (severe) obesity due to excess calories    Umbilical hernia without obstruction and without gangrene    Oral thrush    Arthritis of knee    Pre-diabetes    COPD (chronic obstructive pulmonary disease)    Pneumonia    Chronic maxillary sinusitis    Acute pulmonary embolism with acute cor pulmonale    Acute pulmonary embolism    Acute deep vein thrombosis (DVT) of right lower extremity    Diastolic dysfunction     Past Medical History:   Diagnosis Date    Acute right-sided low back pain with right-sided sciatica 01/15/2018    Allergic rhinitis     Asthma     Asthma, extrinsic     Asthma, intrinsic     CHF (congestive heart failure)     Chronic heart failure with preserved ejection fraction     20 echocardiogram: 1.  Normal ejection fraction of 55%. 2.  Mild left ventricular hypertrophy. 3.  No significant valvular  heart issues.     CKD stage 3 10/29/2019    COPD (chronic obstructive pulmonary disease)     Diverticulitis     Emphysema of lung     Essential hypertension     GERD (gastroesophageal reflux disease)     Hemoptysis     non cancerous    History of fungal pneumonia 12/02/2021    Hyperlipidemia     Idiopathic chronic gout of multiple sites without tophus 05/20/2016    Pneumonia     Primary osteoarthritis of right knee 01/16/2017    Sleep apnea     CPAP    Sleep apnea, obstructive     Vitamin D deficiency 01/13/2016     Past Surgical History:   Procedure Laterality Date    BRONCHOSCOPY N/A 4/30/2024    Procedure: BRONCHOSCOPY WITH BRONCHOALVEOLAR LAVAGE, WASHING, AIRWAY INSPECTION;  Surgeon: Santino Baez MD;  Location: [facility] ENDOSCOPY;  Service: Pulmonary;  Laterality: N/A;  MUCOUS PLUGGING, BRONCHITIS    CATARACT EXTRACTION Right     COLONOSCOPY  2014    COLONOSCOPY N/A 10/12/2021    Procedure: COLONOSCOPY;  Surgeon: Jogre Diane MD;  Location: [facility] ENDOSCOPY;  Service: Gastroenterology;  Laterality: N/A;  DIVERTICULOSIS    ENDOSCOPY  2015    EYE SURGERY      cataract right eye    OTHER SURGICAL HISTORY      Fatty tumor removed off back    RETINAL DETACHMENT REPAIR Right     hole in retina repair    TOTAL KNEE ARTHROPLASTY Right 10/31/2023    Procedure: TOTAL KNEE ARTHROPLASTY;  Surgeon: Celso Prakash MD;  Location:  [facility] [place];  Service: Orthopedics;  Laterality: Right;     PT Assessment (Last 12 Hours)       PT Evaluation and Treatment       Row Name 01/13/25 1424          Physical Therapy Time and Intention    Subjective Information complains of;dyspnea  -AV     Document Type evaluation  -AV     Mode of Treatment individual therapy;physical therapy  -AV       Row Name 01/13/25 1424          General Information    Patient Profile Reviewed yes  -AV     Patient Observations alert;cooperative;agree to therapy  -AV     Prior Level of Function independent:;all household mobility;gait;transfer;ADL's   Ambulated without an assistive device. No home O2.  -AV     Equipment Currently Used at Home none  -AV     Existing Precautions/Restrictions oxygen therapy device and L/min  -AV       Row Name 01/13/25 1424          Living Environment    Current Living Arrangements home  -AV     Home Accessibility stairs to enter home  -AV     People in Home spouse  -AV       Row Name 01/13/25 1424          Home Main Entrance    Number of Stairs, Main Entrance three  -AV     Stair Railings, Main Entrance railings on both sides of stairs  -AV       Row Name 01/13/25 1424          Cognition    Orientation Status (Cognition) oriented x 3  -AV       Row Name 01/13/25 1424          Range of Motion (ROM)    Range of Motion bilateral lower extremities;ROM is WFL  -AV       [facility] Name 01/13/25 1424          Strength (Manual Muscle Testing)    Strength (Manual Muscle Testing) bilateral lower extremities;strength is WFL  -AV       [facility] Name 01/13/25 1424          Bed Mobility    Comment, (Bed Mobility) Patient seated upright in recliner upon therapist entry  -AV       Row Name 01/13/25 1424          Transfers    Transfers sit-stand transfer;stand-sit transfer  -AV       Row Name 01/13/25 1424          Sit-Stand Transfer    Sit-Stand [place] (Transfers) supervision  -AV     Assistive Device (Sit-Stand Transfers) --  No AD  -AV       Row Name 01/13/25 1424          Stand-Sit Transfer    Stand-Sit [place] (Transfers) supervision  -AV     Assistive Device (Stand-Sit Transfers) --  No AD  -AV       [facility] Name 01/13/25 1424          Gait/Stairs (Locomotion)    Gait/Stairs Locomotion gait/ambulation independence;gait/ambulation assistive device;distance ambulated  -AV     [place] Level (Gait) supervision  -AV     Assistive Device (Gait) --  No AD  -AV     Distance in Feet (Gait) 50  Patient has been up ad maria guadalupe in room prior to PT arrival  -AV     Comment, (Gait/Stairs) Patient educated on pursed lip breathing following activity to minimize  feelings of SOA.  -AV       Row Name 01/13/25 1424          Safety Issues/Impairments Affecting Functional Mobility    Impairments Affecting Function (Mobility) balance;endurance/activity tolerance;shortness of breath  -AV       Row Name 01/13/25 1424          Balance    Balance Assessment standing dynamic balance  -AV     Dynamic Standing Balance supervision  -AV     Position/Device Used, Standing Balance unsupported  -AV       Row Name 01/13/25 1501          Plan of Care Review    Plan of Care Reviewed With patient  -AV     Progress no change  -AV     Outcome Evaluation Patient presents with deficits in balance, endurance, transfers, and ambulation. Patient will benefit from skilled PT services to address these mobility deficits and decrease risk of falls.  -AV       Row Name 01/13/25 1501          Vital Signs    O2 Delivery Pre Treatment nasal cannula  -AV     O2 Delivery Intra Treatment nasal cannula  -AV     O2 Delivery Post Treatment nasal cannula  -AV       Row Name 01/13/25 1501          Positioning and Restraints    Pre-Treatment Position sitting in chair/recliner  -AV     Post Treatment Position chair  -AV     In Chair sitting;call light within reach;encouraged to call for assist  No alarms active upon therapist entry  -AV       Row Name 01/13/25 1501          Therapy Assessment/Plan (PT)    Criteria for Skilled Interventions Met (PT) no problems identified which require skilled intervention  -AV     Therapy Frequency (PT) evaluation only  -AV       Row Name 01/13/25 1501          PT Evaluation Complexity    History, PT Evaluation Complexity 1-2 personal factors and/or comorbidities  -AV     Examination of Body Systems (PT Eval Complexity) total of 4 or more elements  -AV     Clinical Presentation (PT Evaluation Complexity) stable  -AV     Clinical Decision Making (PT Evaluation Complexity) low complexity  -AV     Overall Complexity (PT Evaluation Complexity) low complexity  -AV       Row Name 01/13/25  1501          Therapy Plan Review/Discharge Plan (PT)    Therapy Plan Review (PT) evaluation/treatment results reviewed;patient  -AV       Row Name 01/13/25 1501          Physical Therapy Goals    Problem Specific Goal Selection (PT) problem specific goal 1, PT  -AV       Row Name 01/13/25 1501          Problem Specific Goal 1 (PT)    Problem Specific Goal 1 (PT) Complete PT evaluation  -AV     Time Frame (Problem Specific Goal 1, PT) 1 day  -AV     Progress/Outcome (Problem Specific Goal 1, PT) goal met  -AV               User Key  (r) = Recorded By, (t) = Taken By, (c) = Cosigned By      Initials Name Provider Type    AV Jon Cerda, PT Physical Therapist                    Physical Therapy Education       Title: PT OT SLP Therapies (In Progress)       Topic: Physical Therapy (In Progress)       Point: Mobility training (Done)       Learning Progress Summary            Patient Acceptance, E,TB, VU by AV at 1/13/2025 1506                      Point: Home exercise program (Not Started)       Learner Progress:  Not documented in this visit.              Point: Body mechanics (Done)       Learning Progress Summary            Patient Acceptance, E,TB, VU by AV at 1/13/2025 1506                      Point: Precautions (Done)       Learning Progress Summary            Patient Acceptance, E,TB, VU by AV at 1/13/2025 1506                                      User Key       Initials Effective Dates Name Provider Type Discipline     06/11/21 -  Jon Cerda, PT Physical Therapist PT                  PT Recommendation and Plan  Anticipated Discharge Disposition (PT): home with outpatient therapy services (pulmonary rehab)  Therapy Frequency (PT): evaluation only  Plan of Care Reviewed With: patient  Progress: no change  Outcome Evaluation: Patient presents with deficits in balance, endurance, transfers, and ambulation. Patient will benefit from skilled PT services to address these mobility deficits and decrease  risk of falls.   Outcome Measures       Row Name 01/13/25 1500             How much help from another person do you currently need...    Turning from your back to your side while in flat bed without using bedrails? 4  -AV      Moving from lying on back to sitting on the side of a flat bed without bedrails? 4  -AV      Moving to and from a bed to a chair (including a wheelchair)? 4  -AV      Standing up from a chair using your arms (e.g., wheelchair, bedside chair)? 4  -AV      Climbing 3-5 steps with a railing? 4  -AV      To walk in hospital room? 4  -AV      AM-PAC 6 Clicks Score (PT) 24  -AV         Functional Assessment    Outcome Measure Options AM-PAC 6 Clicks Basic Mobility (PT)  -AV                User Key  (r) = Recorded By, (t) = Taken By, (c) = Cosigned By      Initials Name Provider Type    Jon Eugene, PT Physical Therapist                     Time Calculation:    PT Charges       Row Name 01/13/25 1505             Time Calculation    PT Received On 01/13/25  -AV         Untimed Charges    PT Eval/Re-eval Minutes 32  -AV         Total Minutes    Untimed Charges Total Minutes 32  -AV       Total Minutes 32  -AV                User Key  (r) = Recorded By, (t) = Taken By, (c) = Cosigned By      Initials Name Provider Type    Jon Eugene, TONY Physical Therapist                  Therapy Charges for Today       Code Description Service Date Service Provider Modifiers Qty    [id number] HC PT EVAL LOW COMPLEXITY 3 1/13/2025 Jon Cerda, PT GP 1            PT G-Codes  Outcome Measure Options: AM-PAC 6 Clicks Basic Mobility (PT)  AM-PAC 6 Clicks Score (PT): 24    Jon Cerda PT  1/13/2025

## 2025-01-13 NOTE — DISCHARGE SUMMARY
[facility]         HOSPITALIST  DISCHARGE SUMMARY    Patient Name: Julia Rios  : 1945  MRN: [id number]    Date of Admission: 2025  Date of Discharge:  2025    Primary Care Physician: Nick Patel,     Consults       Date and Time Order Name Status Description    2025  2:51 PM Inpatient Pulmonology Consult Completed     2025  2:39 PM Hospitalist (on-call MD unless specified)      2025  1:23 PM IP General Consult (Use specialty-specific consult if known)              Active and Resolved Hospital Problems:  Active Hospital Problems    Diagnosis POA    **Acute pulmonary embolism [I26.99] Yes    Acute pulmonary embolism with acute cor pulmonale [I26.09] Unknown    Essential hypertension [I10] Yes      Resolved Hospital Problems   No resolved problems to display.       Hospital Course     Hospital Course:  Julia Rios is a 79 y.o. female with history of COPD not on oxygen, hypertension, hyperlipidemia, chronic kidney disease, congestive heart failure who presented to the emergency department with shortness of breath.  Patient was recently discharged from the hospital on  where she was treated for pneumonia.  Patient states that she had gone to see her primary care doctor on  and then saw the pulmonary nurse practitioner on January 3 where she continued to state that she was short of breath however was told that that was due to her recent pneumonia.  Patient states that her shortness of breath did not get better it continued to get worse.  Patient states that she felt so bad she was unable to get up and do many things around the house.  Patient was evaluated in the ER where she was requiring 6 L of oxygen however she is not on oxygen at home.  Patient was afebrile.  Blood pressure well-controlled.  Patient did have a CT of the chest which showed bilateral partially occlusive pulmonary emboli involving all lobes of both lungs.   There is evidence of right heart strain.  Patient was placed on a heparin drip in the ER.  Pulmonary was also consulted.  Patient's laboratory data was unremarkable.  Hospitalist service consulted for admission due to acute pulmonary embolism requiring IV heparin.  Patient was also found to have a DVT in the right leg.  Patient has been transitioned over to oral Eliquis.  Patient is still requiring oxygen.  Patient will need to discharge home with oxygen.  Overall patient is feeling better.  Patient will discharge home today in stable condition.  Patient will need close outpatient follow-up      DISCHARGE Follow Up Recommendations for labs and diagnostics:   Follow-up with pulmonary in 1 week  Follow-up with primary care in 1 week      Day of Discharge     Vital Signs:  Temp:  [97.9 °F (36.6 °C)-98.4 °F (36.9 °C)] 97.9 °F (36.6 °C)  Heart Rate:  [73-99] 73  Resp:  [16-18] 18  BP: ()/(53-74) 97/61  Flow (L/min) (Oxygen Therapy):  [3-4] 4  Physical Exam:   General: Awake, alert, NAD sitting in the bedside chair   HENT: NCAT, MMM  Eyes: pupils equal, no scleral icterus  Cardiovascular: RRR, no murmurs   Pulmonary: CTA bilaterally; no wheezes; no conversational dyspnea  Gastrointestinal: S/ND/NT, +BS  Musculoskeletal: No gross deformities  Skin: No jaundice, no rash on exposed skin appreciated  Neuro: CN II through XII grossly intact; speech clear; no tremor  Psych: Mood and affect appropriate  : No Carlson catheter; no suprapubic tenderness      Discharge Details        Discharge Medications        New Medications        Instructions Start Date   apixaban 5 MG tablet tablet  Commonly known as: ELIQUIS   Take 2 tablets by mouth 2 (Two) Times a Day for 5 days, THEN 1 tablet 2 (Two) Times a Day for 25 days. Indications: DVT/PE (active thrombosis)   Start Date: January 13, 2025            Changes to Medications        Instructions Start Date   montelukast 10 MG tablet  Commonly known as: RADHAIR  What changed: when  to take this   10 mg, Oral, Nightly             Continue These Medications        Instructions Start Date   azelastine 0.1 % nasal spray  Commonly known as: ASTELIN   2 sprays, Nasal, 2 Times Daily, Use in each nostril as directed      Budeson-Glycopyrrol-Formoterol 160-9-4.8 MCG/ACT aerosol inhaler  Commonly known as: BREZTRI   2 puffs, Inhalation, 2 Times Daily      D3-1000 25 MCG (1000 UT) capsule  Generic drug: Cholecalciferol   TAKE 1 CAPSULE BY MOUTH EVERY DAY      famotidine 20 MG tablet  Commonly known as: PEPCID   20 mg, Oral, Every Night at Bedtime      fexofenadine 180 MG tablet  Commonly known as: ALLEGRA   180 mg, Oral, Daily      gabapentin 300 MG capsule  Commonly known as: NEURONTIN   300 mg, Every Morning      gabapentin 300 MG capsule  Commonly known as: NEURONTIN   600 mg, Every Evening      losartan 100 MG tablet  Commonly known as: COZAAR   100 mg, Oral, Daily      metoprolol succinate  MG 24 hr tablet  Commonly known as: TOPROL-XL   100 mg, Oral, Daily, Hold if heartrate is <60.      spironolactone 25 MG tablet  Commonly known as: ALDACTONE   25 mg, Oral, 3 Times Weekly, Monday,Wednesday,Friday             Stop These Medications      azithromycin 250 MG tablet  Commonly known as: ZITHROMAX     ipratropium-albuterol 0.5-2.5 mg/3 ml nebulizer  Commonly known as: DUO-NEB     predniSONE 10 MG tablet  Commonly known as: DELTASONE              Allergies   Allergen Reactions    Ibuprofen Hives    Penicillins Other (See Comments) and Seizure     1. When was your reaction? < 10 years ago  2. Did your reaction happen after the first dose or after several doses? Do not know  3. Did your reaction require ED or hospital care to manage your reaction? Do not know  4. Did your reaction require treatment with epinephrine? Do not know  5. Have you taken amoxicillin (Amoxil) or amoxicillin-clavulanate (Augmentin) without issue since? No  6. Have you taken cephalexin (Keflex) without issue since?  No      Nsaids Other (See Comments)     CKD    Cephalosporins Rash    Sulfa Antibiotics Rash       Discharge Disposition:  Home or Self Care    Diet:  Hospital:  Diet Order   Procedures    Diet: Regular/House; Fluid Consistency: Thin (IDDSI 0)       Discharge Activity:  as tolerated       CODE STATUS:  Code Status and Medical Interventions: CPR (Attempt to Resuscitate); Full Support   Ordered at: 01/08/25 1451     Code Status (Patient has no pulse and is not breathing):    CPR (Attempt to Resuscitate)     Medical Interventions (Patient has pulse or is breathing):    Full Support         Future Appointments   Date Time Provider Department Center   2/19/2025 11:00 AM [facility] PENELOPE CT 2  JONEL ETWCT [facility]   3/7/2025 10:30 AM Shanell Murray APRN [facility] ETW [facility]   3/24/2025 11:15 AM Santino Baez MD [facility] ETW [facility]   4/21/2025 10:15 AM Nick Patel, [facility]   6/11/2025 10:30 AM Marielos Dover APRN [facility] CD ETOWN [facility]   11/7/2025  9:00 AM Nick Patel, [facility]           Pertinent  and/or Most Recent Results     PROCEDURES:   None     LAB RESULTS:      Lab 01/13/25  0428 01/12/25  0409 01/11/25  1143 01/11/25  0157 01/10/25  1825 01/10/25  1118 01/10/25  0447 01/09/25  1448 01/09/25  0602 01/08/25  1515 01/08/25  1056 01/08/25  1040   WBC 12.08* 12.27*  --   --   --   --   --   --  10.29  --  9.42  --    HEMOGLOBIN 11.6* 12.5  --   --   --   --   --   --  11.8*  --  12.0  --    HEMATOCRIT 37.9 40.9  --   --   --   --   --   --  37.6  --  39.1  --    PLATELETS 232 253  --   --   --   --   --   --  226  --  217  --    NEUTROS ABS  --  5.70  --   --   --   --   --   --  8.59*  --  6.18  --    IMMATURE GRANS (ABS)  --  0.26*  --   --   --   --   --   --  0.09*  --  0.08*  --    LYMPHS ABS  --  4.43*  --   --   --   --   --   --  1.26  --  1.87  --    MONOS ABS  --  1.43*  --   --   --   --   --   --  0.32  --  1.04*  --    EOS ABS  --  0.30  --   --   --   --   --   --  0.00  --  0.19  --    MCV  90.5 90.9  --   --   --   --   --   --  87.4  --  89.7  --    LACTATE  --   --   --   --   --   --   --   --   --   --  1.3 1.0   PROTIME  --   --   --   --   --   --   --   --   --   --  14.5  --    APTT  --   --  88.4 >200.0* 61.6* 75.9* 91.3   < > 147.1*   < >  --   --     < > = values in this interval not displayed.         Lab 01/13/25  0428 01/11/25  0157 01/09/25  0602 01/08/25  1056   SODIUM 139 138 137 142   POTASSIUM 4.4 4.0 4.6 3.5   CHLORIDE 108* 106 107 107   CO2 20.7* 19.6* 17.8* 20.6*   ANION GAP 10.3 12.4 12.2 14.4   BUN 27* 35* 20 14   CREATININE 1.33* 1.51* 1.15* 1.12*   EGFR 40.8* 35.0* 48.6* 50.1*   GLUCOSE 94 112* 164* 112*   CALCIUM 8.9 9.1 9.0 9.2   MAGNESIUM 2.0 2.0 2.0  --          Lab 01/08/25  1056   TOTAL PROTEIN 6.8   ALBUMIN 4.0   GLOBULIN 2.8   ALT (SGPT) 10   AST (SGOT) 18   BILIRUBIN 0.6   ALK PHOS 82         Lab 01/08/25  1223 01/08/25  1056   PROBNP  --  256.1   HSTROP T 82* 76*   PROTIME  --  14.5   INR  --  1.11                 Lab 01/08/25  1040   PH, ARTERIAL 7.425   PCO2, ARTERIAL 31.3*   PO2 ART 42.6*   O2 SATURATION ART 80.1*   FIO2 21   HCO3 ART 20.6*   BASE EXCESS ART -2.9*     Brief Urine Lab Results  (Last result in the past 365 days)        Color   Clarity   Blood   Leuk Est   Nitrite   Protein   CREAT   Urine HCG        10/17/24 0923 Yellow   Clear   Negative   Negative   Negative   Negative           10/17/24 0923             79.9         10/17/24 0923             86.7               Microbiology Results (last 10 days)       Procedure Component Value - Date/Time    COVID-19, FLU A/B, RSV PCR 1 HR TAT - Swab, Nasopharynx [[id number]]  (Normal) Collected: 01/08/25 1100    Lab Status: Final result Specimen: Swab from Nasopharynx Updated: 01/08/25 1152     COVID19 Not Detected     Influenza A PCR Not Detected     Influenza B PCR Not Detected     RSV, PCR Not Detected    Narrative:      Fact sheet for providers: https://www.fda.gov/media/191226/download    Fact sheet for  patients: https://www.fda.gov/media/916948/download    Test performed by PCR.    Blood Culture - Blood, Arm, Left [[id number]]  (Normal) Collected: 01/08/25 1056    Lab Status: Final result Specimen: Blood from Arm, Left Updated: 01/13/25 1115     Blood Culture No growth at 5 days    Blood Culture - Blood, Arm, Right [[id number]]  (Normal) Collected: 01/08/25 1056    Lab Status: Final result Specimen: Blood from Arm, Right Updated: 01/13/25 1115     Blood Culture No growth at 5 days            XR Knee 1 or 2 View Right    Result Date: 1/8/2025  Impression: Right total knee prosthesis without evidence of hardware complication. Electronically Signed: Dakota Summers  1/8/2025 6:32 PM EST  Workstation ID: [id number]    CT Angiogram Chest Pulmonary Embolism    Result Date: 1/8/2025  Impression: 1.Bilateral partially occlusive pulmonary emboli involving all lobes of both lungs. There is evidence of right heart strain. 2.Cardiomegaly. 3.Mild bibasilar reticular lung thickening and discoid atelectasis. Findings discussed over the phone with Dr. Jason On 1/8/2025 at 2:38 p.m. Electronically Signed: Tim Rodriguez MD  1/8/2025 2:43 PM EST  Workstation ID: [id number]    CT Sinus Without Contrast    Result Date: 1/8/2025  Impression: 1.1 cm polyp or mucous retention cyst in the left maxillary sinus. Electronically Signed: Shiva Jeffries MD  1/8/2025 10:38 AM EST  Workstation ID: [id number]    XR Chest 1 View    Result Date: 1/8/2025  Impression: 1.Stable chronic interstitial prominence without acute process identified. Electronically Signed: Rao Gu MD  1/8/2025 10:16 AM EST  Workstation ID: [id number]      Results for orders placed during the hospital encounter of 01/08/25    Duplex Venous Lower Extremity - Bilateral CV-READ    Interpretation Summary    Acute right lower extremity deep vein thrombosis noted in the posterior tibial.    Left popliteal fossa fluid collection.    All other veins appeared normal  bilaterally.      Results for orders placed during the hospital encounter of 01/08/25    Duplex Venous Lower Extremity - Bilateral CV-READ    Interpretation Summary    Acute right lower extremity deep vein thrombosis noted in the posterior tibial.    Left popliteal fossa fluid collection.    All other veins appeared normal bilaterally.      Results for orders placed during the hospital encounter of 01/08/25    Adult Transthoracic Echo Complete w/ Color, Spectral and Contrast if necessary per protocol    Interpretation Summary    Left ventricular systolic function is normal. Left ventricular ejection fraction appears to be 56 - 60%.    Left ventricular diastolic function is consistent with (grade I) impaired relaxation.    No evidence of cardioembolic source by transthoracic echo      Labs Pending at Discharge:        Time spent on Discharge including face to face service:  more than 35 minutes    Electronically signed by Phil Hendrix DO, 01/13/25, 2:03 PM EST.

## 2025-01-13 NOTE — PROGRESS NOTES
Pulmonary / Critical Care Progress Note      Patient Name: Julia Rios  : 1945  MRN: [id number]  Attending:  Phil Hendrix DO  Date of admission: 2025    Subjective   Subjective   Follow-up for worsening hypoxia, pulmonary emboli    No acute events overnight.    This morning,  Sitting up in chair  Remains on 3 L nasal cannula  Overall feeling better  Dyspnea and cough improving  Continues to desat easily with activity  Creatinine trending down  Remains easily fatigued  Feels ready to be discharged home      Objective   Objective     Vitals:   Temp:  [98 °F (36.7 °C)-98.4 °F (36.9 °C)] 98.1 °F (36.7 °C)  Heart Rate:  [75-99] 99  Resp:  [16-18] 18  BP: ()/(53-74) 96/66  Flow (L/min) (Oxygen Therapy):  [3-4] 4    Physical Exam   Vital Signs Reviewed   General: Pleasant, elderly female, Alert, NAD, sitting up in chair  Chest:  good aeration, clear to auscultation bilaterally, no work of breathing noted on 3 L NC  CV: RRR, no MGR, pulses 2+, equal.  EXT:  no clubbing, no cyanosis, no edema, tenderness and bruising noted of right shin and calf   Neuro:  A&Ox3, CN grossly intact, no focal deficits.  Skin: No rashes or lesions noted        Result Review    Result Review:  I have personally reviewed the results from the time of this admission to 2025 11:53 EST and agree with these findings:  [x]  Laboratory  [x]  Microbiology  [x]  Radiology  [x]  EKG/Telemetry   []  Cardiology/Vascular   []  Pathology  []  Old records  []  Other:  Most notable findings include:   Flu/COVID/RSV negative  NT BNP normal  ABG 7.42/31/42/20      Lab 25  0428 25  0409 25  0157 25  0602 25  1056   WBC 12.08* 12.27*  --  10.29 9.42   HEMOGLOBIN 11.6* 12.5  --  11.8* 12.0   HEMATOCRIT 37.9 40.9  --  37.6 39.1   PLATELETS 232 253  --  226 217   SODIUM 139  --  138 137 142   POTASSIUM 4.4  --  4.0 4.6 3.5   CHLORIDE 108*  --  106 107 107   CO2 20.7*  --  19.6* 17.8* 20.6*   BUN 27*  --  35* 20  14   CREATININE 1.33*  --  1.51* 1.15* 1.12*   GLUCOSE 94  --  112* 164* 112*   CALCIUM 8.9  --  9.1 9.0 9.2   TOTAL PROTEIN  --   --   --   --  6.8   ALBUMIN  --   --   --   --  4.0   GLOBULIN  --   --   --   --  2.8     CT Angiogram Chest Pulmonary Embolism    Result Date: 1/8/2025  CT ANGIOGRAM CHEST PULMONARY EMBOLISM Date of Exam: 1/8/2025 2:21 PM EST Indication: Hypoxia. Comparison: None available. Technique: Axial CT images were obtained of the chest after the uneventful intravenous administration of iodinated contrast utilizing pulmonary embolism protocol.  Reconstructed coronal and sagittal images were also obtained. Automated exposure control and iterative construction methods were used. Findings: The pulmonary artery outflow track is patent. There is pulmonary embolism at the right main pulmonary artery bifurcation with partially occlusive pulmonary emboli involving the left upper lobe segmental and subsegmental branches. There is also partially occlusive pulmonary embolism extending into the right lower lobe lobar and few segmental and subsegmental branches. Also partially occlusive pulmonary embolism at the origin of the right middle lobe pulmonary artery. Partially occlusive embolism in left upper lobe and left lower lobe segmental and few subsegmental branches. There is cardiomegaly. The right heart lumen is mildly enlarged as compared to the left and there is minimal leftward bowing of the interventricular septum compatible with right heart strain. There are no pericardial effusions. The thoracic aorta shows a normal diameter with atherosclerosis. Patent three-vessel arch. Subcentimeter shotty mediastinal lymph nodes There is mild bibasilar reticular lung thickening and mild bibasilar discoid atelectasis. No pleural effusion or pneumothorax. No acute fractures or destructive bone lesions. No suspicious pulmonary nodules Tiny hiatal hernia. No acute abnormalities in the upper abdomen     Impression:  Impression: 1.Bilateral partially occlusive pulmonary emboli involving all lobes of both lungs. There is evidence of right heart strain. 2.Cardiomegaly. 3.Mild bibasilar reticular lung thickening and discoid atelectasis. Findings discussed over the phone with Dr. Jason On 1/8/2025 at 2:38 p.m. Electronically Signed: Tim Rodriguez MD  1/8/2025 2:43 PM EST  Workstation ID: [id number]   Results for orders placed during the hospital encounter of 01/08/25    Duplex Venous Lower Extremity - Bilateral CV-READ    Interpretation Summary    Acute right lower extremity deep vein thrombosis noted in the posterior tibial.    Left popliteal fossa fluid collection.    All other veins appeared normal bilaterally.    Results for orders placed during the hospital encounter of 01/08/25    Adult Transthoracic Echo Complete w/ Color, Spectral and Contrast if necessary per protocol    Interpretation Summary    Left ventricular systolic function is normal. Left ventricular ejection fraction appears to be 56 - 60%.    Left ventricular diastolic function is consistent with (grade I) impaired relaxation.    No evidence of cardioembolic source by transthoracic echo    Assessment & Plan   Assessment / Plan     Active Hospital Problems:  Active Hospital Problems    Diagnosis     **Acute pulmonary embolism     Acute pulmonary embolism with acute cor pulmonale     Essential hypertension      Impression:  Acute hypoxemic respiratory failure  Acute bilateral pulmonary emboli.  Provoked.    Question acute cor pulmonale  Acute right lower extremity DVT  COPD without exacerbation  CKD stage III  Class III obesity with BMI 40     Plan:  -Continue to wean O2 to maintain SpO2 greater than 90%.  Does not wear O2 at baseline.  -Will need 6 MWT prior to discharge  -CT chest with multiple pulmonary emboli with features of right heart strain.  BNP is normal and echocardiogram with no evidence of right heart strain so just provoked simple pulmonary  emboli  -Echocardiogram with normal EF, grade 1 diastolic dysfunction, no evidence of cardioembolic source  -Continue Eliquis twice daily.  Recommend 6 months of Eliquis for provoked submassive VTE.  Can speak with her primary pulmonologist Dr. Baez about the possibility of stopping eliquis at that time.  -Trend renal panel and electrolytes.  Replace electrolytes as needed.  -Lower extremity venous Doppler positive for acute right lower extremity DVT  -Continue nebulizers and BPH protocol.  -Continue Singulair  -Encourage mobilization.  Out of bed to chair.  -Okay to discharge home from pulmonary standpoint  -Follow-up with pulmonology in 1 to 2 weeks after discharge    VTE Prophylaxis:  Pharmacologic VTE prophylaxis orders are present.    CODE STATUS:   Code Status (Patient has no pulse and is not breathing): CPR (Attempt to Resuscitate)  Medical Interventions (Patient has pulse or is breathing): Full Support      Labs, imaging, microbiology, notes and medications personally reviewed  Discussed with primary    Electronically signed by KALEB Aguilar, 01/13/25, 11:53 AM EST.  This patient was seen by both a physician and a NP. INura MD, spent >50% of time in accordance with split shared billing. This included personally reviewing all pertinent labs, imaging, microbiology and documentation. Also discussing the case with the patient and any available family, the admitting physician and any available ancillary staff.   Electronically signed by Nura Chris MD, 01/13/25, 2:46 PM EST.

## 2025-01-13 NOTE — PLAN OF CARE
Goal Outcome Evaluation:  Plan of Care Reviewed With: patient        Progress: improving  Outcome Evaluation: pt without any new complaints, wore home cpap with o2 filtered into it, sats stayed in the 90s. shift uneventful.Bev Thompson RN

## 2025-01-13 NOTE — PLAN OF CARE
Goal Outcome Evaluation:  Plan of Care Reviewed With: patient        Progress: improving  Outcome Evaluation: VSS on 3L NC. no c/o pain. tolerating diet. C/o interrmittent SOA with exertion, able to ambulate independently. Discharging home today with home O2. patient agreeable. education and written material provided. will CTM.

## 2025-01-13 NOTE — OUTREACH NOTE
Prep Survey      Flowsheet Row Responses   [facility] patient discharged from? [facility]   Is LACE score < 7 ? No   Eligibility [facility]   Date of Admission 01/08/25   Date of Discharge 01/13/25   Discharge Disposition Home or Self Care   Discharge diagnosis Acute pulmonary embolism   Does the patient have one of the following disease processes/diagnoses(primary or secondary)? Other   Does the patient have Home health ordered? No   Is there a DME ordered? No   Prep survey completed? Yes            KOKO A - Registered Nurse

## 2025-01-13 NOTE — PROGRESS NOTES
[facility] Clinical Pharmacy Services: Renal Dose Adjustment     Pepcid has been appropriately renally dose adjusted based on our System P&T approved policy. Pharmacy will continue to monitor patient renal function while in-house.     Chriss Mendoza RP  Clinical Pharmacist

## 2025-01-14 ENCOUNTER — TRANSITIONAL CARE MANAGEMENT TELEPHONE ENCOUNTER (OUTPATIENT)
Dept: CALL CENTER | Facility: HOSPITAL | Age: 80
End: 2025-01-14
Payer: MEDICARE

## 2025-01-14 LAB
QT INTERVAL: 342 MS
QTC INTERVAL: 440 MS

## 2025-01-14 NOTE — OUTREACH NOTE
"Call Center TCM Note      Flowsheet Row Responses   [facility] patient discharged from? [facility]   Does the patient have one of the following disease processes/diagnoses(primary or secondary)? Other   TCM attempt successful? Yes   Call start time 1321   Call end time 1328   Discharge diagnosis Acute pulmonary embolism   Meds reviewed with patient/caregiver? Yes   Is the patient having any side effects they believe may be caused by any medication additions or changes? No   Does the patient have all medications ordered at discharge? Yes   Is the patient taking all medications as directed (includes completed medication regime)? Yes   Comments Hospital d/c follow up 1/21/25,  Pulm apt on 1/28/25   Does the patient have an appointment with their PCP within 7-14 days of discharge? Yes   Has home health visited the patient within 72 hours of discharge? N/A   What DME was ordered? Oxygen   DME comments Pt wears 4L oxygen all the time,  pulse ox reads 95%   Psychosocial issues? No   Did the patient receive a copy of their discharge instructions? Yes   Nursing interventions Reviewed instructions with patient   What is the patient's perception of their health status since discharge? Improving  [\"I feel wonderful\" per pt]   Is the patient/caregiver able to teach back signs and symptoms related to disease process for when to call PCP? Yes   Is the patient/caregiver able to teach back signs and symptoms related to disease process for when to call 911? Yes   Is the patient/caregiver able to teach back the hierarchy of who to call/visit for symptoms/problems? PCP, Specialist, Home health nurse, Urgent Care, ED, 911 Yes   If the patient is a current smoker, are they able to teach back resources for cessation? Not a smoker   TCM call completed? Yes   Call end time 1328            Gladis Jeff RN    1/14/2025, 13:28 EST        "

## 2025-01-17 ENCOUNTER — HOSPITAL ENCOUNTER (EMERGENCY)
Facility: HOSPITAL | Age: 80
Discharge: HOME | End: 2025-01-18
Attending: EMERGENCY MEDICINE
Payer: MEDICARE

## 2025-01-17 ENCOUNTER — APPOINTMENT (OUTPATIENT)
Dept: GENERAL RADIOLOGY | Facility: HOSPITAL | Age: 80
End: 2025-01-17
Payer: MEDICARE

## 2025-01-17 VITALS
HEART RATE: 66 BPM | BODY MASS INDEX: 41.62 KG/M2 | SYSTOLIC BLOOD PRESSURE: 147 MMHG | OXYGEN SATURATION: 97 % | HEIGHT: 61 IN | RESPIRATION RATE: 12 BRPM | DIASTOLIC BLOOD PRESSURE: 79 MMHG | WEIGHT: 220.46 LBS | TEMPERATURE: 98.4 F

## 2025-01-17 DIAGNOSIS — R06.00 ACUTE DYSPNEA: Primary | ICD-10-CM

## 2025-01-17 LAB
QT INTERVAL: 394 MS
QTC INTERVAL: 413 MS

## 2025-01-17 PROCEDURE — 84484 ASSAY OF TROPONIN QUANT: CPT | Performed by: EMERGENCY MEDICINE

## 2025-01-17 PROCEDURE — 93005 ELECTROCARDIOGRAM TRACING: CPT | Performed by: EMERGENCY MEDICINE

## 2025-01-17 PROCEDURE — 87637 SARSCOV2&INF A&B&RSV AMP PRB: CPT | Performed by: EMERGENCY MEDICINE

## 2025-01-17 PROCEDURE — 80053 COMPREHEN METABOLIC PANEL: CPT | Performed by: EMERGENCY MEDICINE

## 2025-01-17 PROCEDURE — 71045 X-RAY EXAM CHEST 1 VIEW: CPT

## 2025-01-17 PROCEDURE — 85025 COMPLETE CBC W/AUTO DIFF WBC: CPT | Performed by: EMERGENCY MEDICINE

## 2025-01-17 PROCEDURE — 99284 EMERGENCY DEPT VISIT MOD MDM: CPT

## 2025-01-17 PROCEDURE — 83880 ASSAY OF NATRIURETIC PEPTIDE: CPT | Performed by: EMERGENCY MEDICINE

## 2025-01-17 RX ADMIN — APIXABAN 10 MG: 5 TABLET, FILM COATED ORAL at 23:05

## 2025-01-18 LAB
ALBUMIN SERPL-MCNC: 3.4 G/DL (ref 3.5–5.2)
ALBUMIN/GLOB SERPL: 1.3 G/DL
ALP SERPL-CCNC: 94 U/L (ref 39–117)
ALT SERPL W P-5'-P-CCNC: 10 U/L (ref 1–33)
ANION GAP SERPL CALCULATED.3IONS-SCNC: 10.3 MMOL/L (ref 5–15)
AST SERPL-CCNC: 15 U/L (ref 1–32)
BASOPHILS # BLD AUTO: 0.1 10*3/MM3 (ref 0–0.2)
BASOPHILS NFR BLD AUTO: 1 % (ref 0–1.5)
BILIRUB SERPL-MCNC: 0.3 MG/DL (ref 0–1.2)
BUN SERPL-MCNC: 12 MG/DL (ref 8–23)
BUN/CREAT SERPL: 8.7 (ref 7–25)
CALCIUM SPEC-SCNC: 9.2 MG/DL (ref 8.6–10.5)
CHLORIDE SERPL-SCNC: 109 MMOL/L (ref 98–107)
CO2 SERPL-SCNC: 22.7 MMOL/L (ref 22–29)
CREAT SERPL-MCNC: 1.38 MG/DL (ref 0.57–1)
DEPRECATED RDW RBC AUTO: 52.7 FL (ref 37–54)
EGFRCR SERPLBLD CKD-EPI 2021: 39 ML/MIN/1.73
EOSINOPHIL # BLD AUTO: 0.39 10*3/MM3 (ref 0–0.4)
EOSINOPHIL NFR BLD AUTO: 3.8 % (ref 0.3–6.2)
ERYTHROCYTE [DISTWIDTH] IN BLOOD BY AUTOMATED COUNT: 15.8 % (ref 12.3–15.4)
FLUAV SUBTYP SPEC NAA+PROBE: NOT DETECTED
FLUBV RNA ISLT QL NAA+PROBE: NOT DETECTED
GLOBULIN UR ELPH-MCNC: 2.7 GM/DL
GLUCOSE SERPL-MCNC: 98 MG/DL (ref 65–99)
HCT VFR BLD AUTO: 38.6 % (ref 34–46.6)
HGB BLD-MCNC: 11.6 G/DL (ref 12–15.9)
IMM GRANULOCYTES # BLD AUTO: 0.18 10*3/MM3 (ref 0–0.05)
IMM GRANULOCYTES NFR BLD AUTO: 1.7 % (ref 0–0.5)
LYMPHOCYTES # BLD AUTO: 3.5 10*3/MM3 (ref 0.7–3.1)
LYMPHOCYTES NFR BLD AUTO: 33.7 % (ref 19.6–45.3)
MCH RBC QN AUTO: 27.5 PG (ref 26.6–33)
MCHC RBC AUTO-ENTMCNC: 30.1 G/DL (ref 31.5–35.7)
MCV RBC AUTO: 91.5 FL (ref 79–97)
MONOCYTES # BLD AUTO: 1.11 10*3/MM3 (ref 0.1–0.9)
MONOCYTES NFR BLD AUTO: 10.7 % (ref 5–12)
NEUTROPHILS NFR BLD AUTO: 49.1 % (ref 42.7–76)
NEUTROPHILS NFR BLD AUTO: 5.1 10*3/MM3 (ref 1.7–7)
NRBC BLD AUTO-RTO: 0 /100 WBC (ref 0–0.2)
NT-PROBNP SERPL-MCNC: 422.1 PG/ML (ref 0–1800)
PLATELET # BLD AUTO: 228 10*3/MM3 (ref 140–450)
PMV BLD AUTO: 13 FL (ref 6–12)
POTASSIUM SERPL-SCNC: 3.8 MMOL/L (ref 3.5–5.2)
PROT SERPL-MCNC: 6.1 G/DL (ref 6–8.5)
RBC # BLD AUTO: 4.22 10*6/MM3 (ref 3.77–5.28)
RSV RNA NPH QL NAA+NON-PROBE: NOT DETECTED
SARS-COV-2 RNA RESP QL NAA+PROBE: NOT DETECTED
SODIUM SERPL-SCNC: 142 MMOL/L (ref 136–145)
TROPONIN T SERPL HS-MCNC: 30 NG/L
WBC NRBC COR # BLD AUTO: 10.38 10*3/MM3 (ref 3.4–10.8)

## 2025-01-18 NOTE — ED PROVIDER NOTES
Time: 10:52 PM EST  Date of encounter:  1/17/2025  Independent Historian/Clinical History and Information was obtained by:   Patient    History is limited by: N/A    Chief Complaint: Shortness of breath      History of Present Illness:  Patient is a 79 y.o. year old female who presents to the emergency department for evaluation of shortness of breath    States she has been doing well since she was recently discharged from the hospital.  She does state that she has had a mild productive cough over the course of the day.  But became acutely short of breath tonight when she woke from her nap in her chair and believes that she became panicked.  States she missed her evening doses of medications due to her nap.  States she is back at baseline now and has no difficulty breathing.  She denies any chest pain.  States that her legs are mildly swollen although less than normal.  She denies fevers or chills.      Patient Care Team  Primary Care Provider: Nick Patel,     Past Medical History:     Allergies   Allergen Reactions    Ibuprofen Hives    Penicillins Other (See Comments) and Seizure     1. When was your reaction? < 10 years ago  2. Did your reaction happen after the first dose or after several doses? Do not know  3. Did your reaction require ED or hospital care to manage your reaction? Do not know  4. Did your reaction require treatment with epinephrine? Do not know  5. Have you taken amoxicillin (Amoxil) or amoxicillin-clavulanate (Augmentin) without issue since? No  6. Have you taken cephalexin (Keflex) without issue since?  No     Nsaids Other (See Comments)     CKD    Cephalosporins Rash    Sulfa Antibiotics Rash     Past Medical History:   Diagnosis Date    Acute right-sided low back pain with right-sided sciatica 01/15/2018    Allergic rhinitis     Asthma     Asthma, extrinsic     Asthma, intrinsic     CHF (congestive heart failure)     Chronic heart failure with preserved ejection fraction      11/16/20 echocardiogram: 1.  Normal ejection fraction of 55%. 2.  Mild left ventricular hypertrophy. 3.  No significant valvular heart issues.     CKD stage 3 10/29/2019    COPD (chronic obstructive pulmonary disease)     Diverticulitis     Emphysema of lung     Essential hypertension     GERD (gastroesophageal reflux disease)     Hemoptysis     non cancerous    History of fungal pneumonia 12/02/2021    Hyperlipidemia     Idiopathic chronic gout of multiple sites without tophus 05/20/2016    Pneumonia     Primary osteoarthritis of right knee 01/16/2017    Sleep apnea     CPAP    Sleep apnea, obstructive     Vitamin D deficiency 01/13/2016     Past Surgical History:   Procedure Laterality Date    BRONCHOSCOPY N/A 4/30/2024    Procedure: BRONCHOSCOPY WITH BRONCHOALVEOLAR LAVAGE, WASHING, AIRWAY INSPECTION;  Surgeon: Santino Baez MD;  Location: [facility] ENDOSCOPY;  Service: Pulmonary;  Laterality: N/A;  MUCOUS PLUGGING, BRONCHITIS    CATARACT EXTRACTION Right     COLONOSCOPY  2014    COLONOSCOPY N/A 10/12/2021    Procedure: COLONOSCOPY;  Surgeon: Jorge Diane MD;  Location: [facility] ENDOSCOPY;  Service: Gastroenterology;  Laterality: N/A;  DIVERTICULOSIS    ENDOSCOPY  2015    EYE SURGERY      cataract right eye    OTHER SURGICAL HISTORY      Fatty tumor removed off back    RETINAL DETACHMENT REPAIR Right     hole in retina repair    TOTAL KNEE ARTHROPLASTY Right 10/31/2023    Procedure: TOTAL KNEE ARTHROPLASTY;  Surgeon: Celso Prakash MD;  Location: [facility] OR [facility];  Service: Orthopedics;  Laterality: Right;     Family History   Problem Relation Age of Onset    Colon cancer Mother 61    Cancer Mother     Heart disease Mother     Heart failure Father     Asthma Father     Heart attack Father     Hyperlipidemia Father     Emphysema Father     Diabetes Brother     Emphysema Brother     Breast cancer Maternal Grandmother     Stroke Paternal Grandfather     Malig Hyperthermia Neg Hx        Home  Medications:  Prior to Admission medications    Medication Sig Start Date End Date Taking? Authorizing Provider   apixaban (ELIQUIS) 5 MG tablet tablet Take 2 tablets by mouth 2 (Two) Times a Day for 5 days, THEN 1 tablet 2 (Two) Times a Day for 25 days. Indications: DVT/PE (active thrombosis) 1/13/25 2/12/25  Phil Hendrix DO   azelastine (ASTELIN) 0.1 % nasal spray 2 sprays into the nostril(s) as directed by provider 2 (Two) Times a Day. Use in each nostril as directed 1/3/24   Santino Baez MD   Budeson-Glycopyrrol-Formoterol (BREZTRI) 160-9-4.8 MCG/ACT aerosol inhaler Inhale 2 puffs 2 (Two) Times a Day. 5/9/24   Shanell Murray APRN   D3-1000 25 MCG (1000 UT) capsule TAKE 1 CAPSULE BY MOUTH EVERY DAY 10/20/22   Nick Patel DO   famotidine (PEPCID) 20 MG tablet Take 1 tablet by mouth every night at bedtime. 5/8/24   Shanell Murray APRN   fexofenadine (ALLEGRA) 180 MG tablet Take 1 tablet by mouth Daily. 9/23/24   Santino Baez MD   gabapentin (NEURONTIN) 300 MG capsule Take 1 capsule by mouth Every Morning. 1 capsule every morning and 2 capsule every evening.    ProviderRene MD   gabapentin (NEURONTIN) 300 MG capsule Take 2 capsules by mouth Every Evening. 1 capsule every morning and 2 capsule every evening.    ProviderRene MD   losartan (COZAAR) 100 MG tablet Take 1 tablet by mouth Daily. 5/8/24   Nick Patel DO   metoprolol succinate XL (TOPROL-XL) 100 MG 24 hr tablet Take 1 tablet by mouth Daily. Hold if heartrate is <60. 5/22/24   Nick Patel DO   montelukast (SINGULAIR) 10 MG tablet Take 1 tablet by mouth Every Night.  Patient taking differently: Take 1 tablet by mouth Every Morning. 9/23/24   Santino Baez MD   spironolactone (ALDACTONE) 25 MG tablet Take 1 tablet by mouth 3 (Three) Times a Week. Monday,Wednesday,Friday 7/3/24   Nick Patel,         Social History:   Social History     Tobacco Use    Smoking status: Former      "Current packs/day: 0.00     Average packs/day: 0.5 packs/day for 32.0 years (16.0 ttl pk-yrs)     Types: Cigarettes     Start date: 1963     Quit date: 1995     Years since quittin.0     Passive exposure: Never    Smokeless tobacco: Never   Vaping Use    Vaping status: Never Used   Substance Use Topics    Alcohol use: Never    Drug use: Never         Review of Systems:  Review of Systems   Constitutional:  Negative for chills and fever.   HENT:  Negative for congestion, ear pain and sore throat.    Eyes:  Negative for pain.   Respiratory:  Positive for cough and shortness of breath. Negative for chest tightness.    Cardiovascular:  Negative for chest pain.   Gastrointestinal:  Negative for abdominal pain, diarrhea, nausea and vomiting.   Genitourinary:  Negative for flank pain and hematuria.   Musculoskeletal:  Negative for joint swelling.   Skin:  Negative for pallor.   Neurological:  Negative for seizures and headaches.   Psychiatric/Behavioral:  The patient is nervous/anxious.    All other systems reviewed and are negative.       Physical Exam:  /79 (BP Location: Right arm, Patient Position: Sitting)   Pulse 66   Temp 98.4 °F (36.9 °C) (Oral)   Resp 12   Ht 154.9 cm (60.98\")   Wt 100 kg (220 lb 7.4 oz)   SpO2 97%   BMI 41.68 kg/m²     Physical Exam  Vitals and nursing note reviewed.   Constitutional:       General: She is not in acute distress.     Appearance: Normal appearance. She is not toxic-appearing.   HENT:      Head: Normocephalic and atraumatic.      Jaw: There is normal jaw occlusion.   Eyes:      General: Lids are normal.      Extraocular Movements: Extraocular movements intact.      Conjunctiva/sclera: Conjunctivae normal.      Pupils: Pupils are equal, round, and reactive to light.   Cardiovascular:      Rate and Rhythm: Normal rate and regular rhythm.      Pulses: Normal pulses.      Heart sounds: Normal heart sounds.   Pulmonary:      Effort: Pulmonary effort is normal. No " respiratory distress.      Breath sounds: Normal breath sounds. No wheezing or rhonchi.   Abdominal:      General: Abdomen is flat.      Palpations: Abdomen is soft.      Tenderness: There is no abdominal tenderness. There is no guarding or rebound.   Musculoskeletal:         General: Normal range of motion.      Cervical back: Normal range of motion and neck supple.      Right lower leg: Edema present.      Left lower leg: Edema present.      Comments: Mild bilateral lower extremity pitting pedal edema   Skin:     General: Skin is warm and dry.   Neurological:      Mental Status: She is alert and oriented to person, place, and time. Mental status is at baseline.   Psychiatric:         Mood and Affect: Mood normal.              Medical Decision Making:      Comorbidities that affect care:    Asthma, chronic kidney disease, heart failure, COPD, chronic anticoagulation on Eliquis    External Notes reviewed:    Hospital Discharge Summary: Recent hospitalization for respiratory failure and acute pulmonary embolism 1/8/2025      The following orders were placed and all results were independently analyzed by me:  Orders Placed This Encounter   Procedures    COVID-19, FLU A/B, RSV PCR 1 HR TAT - Swab, Nasopharynx    XR Chest 1 View    High Sensitivity Troponin T    Comprehensive Metabolic Panel    BNP    CBC Auto Differential    ECG 12 Lead Dyspnea    CBC & Differential       Medications Given in the Emergency Department:  Medications   apixaban (ELIQUIS) tablet 10 mg (10 mg Oral Given 1/17/25 2305)        ED Course:         Labs:    Lab Results (last 24 hours)       Procedure Component Value Units Date/Time    High Sensitivity Troponin T [[id number]]  (Abnormal) Collected: 01/17/25 2314    Specimen: Blood Updated: 01/18/25 0020     HS Troponin T 30 ng/L     Narrative:      High Sensitive Troponin T Reference Range:  <14.0 ng/L- Negative Female for AMI  <22.0 ng/L- Negative Male for AMI  >=14 - Abnormal Female indicating  possible myocardial injury.  >=22 - Abnormal Male indicating possible myocardial injury.   Clinicians would have to utilize clinical acumen, EKG, Troponin, and serial changes to determine if it is an Acute Myocardial Infarction or myocardial injury due to an underlying chronic condition.         CBC & Differential [[id number]]  (Abnormal) Collected: 01/17/25 2314    Specimen: Blood Updated: 01/18/25 0000    Narrative:      The following orders were created for panel order CBC & Differential.  Procedure                               Abnormality         Status                     ---------                               -----------         ------                     CBC Auto Differential[[id number]]        Abnormal            Final result                 Please view results for these tests on the individual orders.    Comprehensive Metabolic Panel [[id number]]  (Abnormal) Collected: 01/17/25 2314    Specimen: Blood Updated: 01/18/25 0026     Glucose 98 mg/dL      BUN 12 mg/dL      Creatinine 1.38 mg/dL      Sodium 142 mmol/L      Potassium 3.8 mmol/L      Chloride 109 mmol/L      CO2 22.7 mmol/L      Calcium 9.2 mg/dL      Total Protein 6.1 g/dL      Albumin 3.4 g/dL      ALT (SGPT) 10 U/L      AST (SGOT) 15 U/L      Alkaline Phosphatase 94 U/L      Total Bilirubin 0.3 mg/dL      Globulin 2.7 gm/dL      A/G Ratio 1.3 g/dL      BUN/Creatinine Ratio 8.7     Anion Gap 10.3 mmol/L      eGFR 39.0 mL/min/1.73     Narrative:      GFR Categories in Chronic Kidney Disease (CKD)      GFR Category          GFR (mL/min/1.73)    Interpretation  G1                     90 or greater         Normal or high (1)  G2                      60-89                Mild decrease (1)  G3a                   45-59                Mild to moderate decrease  G3b                   30-44                Moderate to severe decrease  G4                    15-29                Severe decrease  G5                    14 or less           Kidney  failure          (1)In the absence of evidence of kidney disease, neither GFR category G1 or G2 fulfill the criteria for CKD.    eGFR calculation 2021 CKD-EPI creatinine equation, which does not include race as a factor    BNP [[id number]]  (Normal) Collected: 01/17/25 2314    Specimen: Blood Updated: 01/18/25 0018     proBNP 422.1 pg/mL     Narrative:      This assay is used as an aid in the diagnosis of individuals suspected of having heart failure. It can be used as an aid in the diagnosis of acute decompensated heart failure (ADHF) in patients presenting with signs and symptoms of ADHF to the emergency department (ED). In addition, NT-proBNP of <300 pg/mL indicates ADHF is not likely.    Age Range Result Interpretation  NT-proBNP Concentration (pg/mL:      <50             Positive            >450                   Gray                 300-450                    Negative             <300    50-75           Positive            >900                  Gray                300-900                  Negative            <300      >75             Positive            >1800                  Gray                300-1800                  Negative            <300    CBC Auto Differential [[id number]]  (Abnormal) Collected: 01/17/25 2314    Specimen: Blood Updated: 01/18/25 0000     WBC 10.38 10*3/mm3      RBC 4.22 10*6/mm3      Hemoglobin 11.6 g/dL      Hematocrit 38.6 %      MCV 91.5 fL      MCH 27.5 pg      MCHC 30.1 g/dL      RDW 15.8 %      RDW-SD 52.7 fl      MPV 13.0 fL      Platelets 228 10*3/mm3      Neutrophil % 49.1 %      Lymphocyte % 33.7 %      Monocyte % 10.7 %      Eosinophil % 3.8 %      Basophil % 1.0 %      Immature Grans % 1.7 %      Neutrophils, Absolute 5.10 10*3/mm3      Lymphocytes, Absolute 3.50 10*3/mm3      Monocytes, Absolute 1.11 10*3/mm3      Eosinophils, Absolute 0.39 10*3/mm3      Basophils, Absolute 0.10 10*3/mm3      Immature Grans, Absolute 0.18 10*3/mm3      nRBC 0.0 /100 WBC     COVID-19, FLU  A/B, RSV PCR 1 HR TAT - Swab, Nasopharynx [[id number]]  (Normal) Collected: 01/17/25 2316    Specimen: Swab from Nasopharynx Updated: 01/18/25 0002     COVID19 Not Detected     Influenza A PCR Not Detected     Influenza B PCR Not Detected     RSV, PCR Not Detected    Narrative:      Fact sheet for providers: https://www.fda.gov/media/127411/download    Fact sheet for patients: https://www.fda.gov/media/670705/download    Test performed by PCR.             Imaging:    XR Chest 1 View    Result Date: 1/17/2025  AP PORTABLE CHEST  HISTORY: Shortness of air.  COMPARISON: 1/8/2025  TECHNIQUE: AP portable chest x-ray.  FINDINGS: Heart remains enlarged. Atherosclerotic disease is present in the aorta. Pulmonary vascularity is normal. There is chronic scarring in the lung bases. The lungs are otherwise clear. No pneumothorax.      No acute cardiopulmonary findings.  Electronically Signed By-Dr. Raheem Adorno MD On:1/17/2025 11:22 PM         Differential Diagnosis and Discussion:    Dyspnea: Differential diagnosis includes but is not limited to metabolic acidosis, neurological disorders, psychogenic, asthma, pneumothorax, upper airway obstruction, COPD, pneumonia, noncardiogenic pulmonary edema, interstitial lung disease, anemia, congestive heart failure, and pulmonary embolism    PROCEDURES:    Labs were collected in the emergency department and all labs were reviewed and interpreted by me.  X-ray were performed in the emergency department and all X-ray impressions were independently interpreted by me.  An EKG was performed and the EKG was interpreted by me.    ECG 12 Lead Dyspnea   Preliminary Result   HEART RATE=66  bpm   RR Rtmigdtm=200  ms   AZ Molnvhew=866  ms   P Horizontal Axis=43  deg   P Front Axis=39  deg   QRSD Interval=90  ms   QT Vozyfuxh=690  ms   MFzB=193  ms   QRS Axis=-19  deg   T Wave Axis=18  deg   - OTHERWISE NORMAL ECG -   Sinus rhythm   Borderline left axis deviation   Low voltage, precordial leads    Date and Time of Study:2025-01-17 23:25:13          Procedures    [facility]                     Patient Care Considerations:    ANTIBIOTICS: I considered prescribing antibiotics as an outpatient however no bacterial focus of infection was found.      Consultants/Shared Management Plan:    None    Social Determinants of Health:    Patient has presented with family members who are responsible, reliable and will ensure follow up care.      Disposition and Care Coordination:    Discharged: The patient is suitable and stable for discharge with no need for consideration of admission.    I have explained the patient´s condition, diagnoses and treatment plan based on the information available to me at this time. I have answered questions and addressed any concerns. The patient has a good  understanding of the patient´s diagnosis, condition, and treatment plan as can be expected at this point. The vital signs have been stable. The patient´s condition is stable and appropriate for discharge from the emergency department.      The patient will pursue further outpatient evaluation with the primary care physician or other designated or consulting physician as outlined in the discharge instructions. They are agreeable to this plan of care and follow-up instructions have been explained in detail. The patient has received these instructions in written format and has expressed an understanding of the discharge instructions. The patient is aware that any significant change in condition or worsening of symptoms should prompt an immediate return to this or the closest emergency department or call to 911.    Final diagnoses:   Acute dyspnea        ED Disposition       ED Disposition   Discharge    Condition   Stable    Comment   --               This medical record created using voice recognition software.             Aries Schwarz MD  01/18/25 [id number]

## 2025-01-21 ENCOUNTER — OFFICE VISIT (OUTPATIENT)
Dept: FAMILY MEDICINE CLINIC | Facility: CLINIC | Age: 80
End: 2025-01-21
Payer: MEDICARE

## 2025-01-21 VITALS
HEART RATE: 57 BPM | TEMPERATURE: 97.6 F | WEIGHT: 221.6 LBS | DIASTOLIC BLOOD PRESSURE: 75 MMHG | BODY MASS INDEX: 41.84 KG/M2 | OXYGEN SATURATION: 95 % | HEIGHT: 61 IN | SYSTOLIC BLOOD PRESSURE: 125 MMHG

## 2025-01-21 DIAGNOSIS — Z86.711 HISTORY OF PULMONARY EMBOLISM: ICD-10-CM

## 2025-01-21 DIAGNOSIS — Z09 HOSPITAL DISCHARGE FOLLOW-UP: Primary | ICD-10-CM

## 2025-01-21 DIAGNOSIS — N18.31 STAGE 3A CHRONIC KIDNEY DISEASE: ICD-10-CM

## 2025-01-21 DIAGNOSIS — I10 ESSENTIAL HYPERTENSION: ICD-10-CM

## 2025-01-21 DIAGNOSIS — I50.32 CHRONIC HEART FAILURE WITH PRESERVED EJECTION FRACTION: ICD-10-CM

## 2025-01-21 DIAGNOSIS — J44.9 CHRONIC OBSTRUCTIVE PULMONARY DISEASE, UNSPECIFIED COPD TYPE: ICD-10-CM

## 2025-01-21 PROCEDURE — 1111F DSCHRG MED/CURRENT MED MERGE: CPT | Performed by: STUDENT IN AN ORGANIZED HEALTH CARE EDUCATION/TRAINING PROGRAM

## 2025-01-21 PROCEDURE — 1160F RVW MEDS BY RX/DR IN RCRD: CPT | Performed by: STUDENT IN AN ORGANIZED HEALTH CARE EDUCATION/TRAINING PROGRAM

## 2025-01-21 PROCEDURE — 1159F MED LIST DOCD IN RCRD: CPT | Performed by: STUDENT IN AN ORGANIZED HEALTH CARE EDUCATION/TRAINING PROGRAM

## 2025-01-21 PROCEDURE — 3074F SYST BP LT 130 MM HG: CPT | Performed by: STUDENT IN AN ORGANIZED HEALTH CARE EDUCATION/TRAINING PROGRAM

## 2025-01-21 PROCEDURE — 3078F DIAST BP <80 MM HG: CPT | Performed by: STUDENT IN AN ORGANIZED HEALTH CARE EDUCATION/TRAINING PROGRAM

## 2025-01-21 PROCEDURE — 1126F AMNT PAIN NOTED NONE PRSNT: CPT | Performed by: STUDENT IN AN ORGANIZED HEALTH CARE EDUCATION/TRAINING PROGRAM

## 2025-01-21 PROCEDURE — 99495 TRANSJ CARE MGMT MOD F2F 14D: CPT | Performed by: STUDENT IN AN ORGANIZED HEALTH CARE EDUCATION/TRAINING PROGRAM

## 2025-01-21 NOTE — PROGRESS NOTES
Chief Complaint  Hospital Follow Up Visit, Foot Swelling (bilateral), and Leg Swelling (bilateral)    Subjective      Julia Rios is a 79 y.o. female who presents to [facility]     History of Present Illness  The patient presents for evaluation of bilateral leg swelling, pulmonary embolism, and COPD.    She was recently hospitalized from 1/8/2025 to 1/13/2025 with shortness of breath and leg pain, she was found to have bilateral partially occlusive pulmonary emboli involving all lobes of both lungs without right heart strain .  She was initially placed on heparin drip, later transitioned to Eliquis.  She was on 6 L of oxygen in the hospital discharge she was kept on 4 L.  She is currently on 4 L of nasal cannula at home, denies shortness of breath or increased oxygen demand.  She has a follow-up appointment with pulmonology next Thursday and hoping to come off of oxygen.  She has a history of knee replacement surgery and a recent fall prior to her pneumonia diagnosis. She reports swelling in her ankle, knee, foot, and leg, which she has been advised against massaging. She has not used compression stockings due to concerns about impaired circulation. She reports no pain or tenderness in her legs, but does experience some tenderness, which she attributes to arthritis. She has been attempting to resume her normal activities and increase her mobility. She is currently taking spironolactone on Monday, Wednesday, and Friday mornings, as well as losartan and metoprolol.    She has a long-standing history of COPD, but this is her first experience requiring oxygen therapy. She reports a decrease in her coughing symptoms.    MEDICATIONS  Eliquis, spironolactone, losartan, metoprolol       Patient Care Team:  Nick Patel DO as PCP - General (Family Medicine)  Shanell Murray APRN as Nurse Practitioner (Pulmonary Disease)  Santino Baez MD as Consulting Physician (Pulmonary  "Disease)      Review of Systems   HENT:  Negative for congestion.    Respiratory:  Negative for cough and shortness of breath.    Cardiovascular:  Negative for chest pain and palpitations.   Musculoskeletal:  Positive for arthralgias, joint swelling and myalgias.   Neurological:  Negative for dizziness and light-headedness.         Objective   Vital Signs:   Vitals:    01/21/25 1503   BP: 125/75   BP Location: Left arm   Patient Position: Sitting   Cuff Size: Adult   Pulse: 57   Temp: 97.6 °F (36.4 °C)   TempSrc: Temporal   SpO2: 95%   Weight: 101 kg (221 lb 9.6 oz)   Height: 154.9 cm (60.98\")     Body mass index is 41.89 kg/m².    Wt Readings from Last 3 Encounters:   01/21/25 101 kg (221 lb 9.6 oz)   01/17/25 100 kg (220 lb 7.4 oz)   01/08/25 99.3 kg (219 lb)     BP Readings from Last 3 Encounters:   01/21/25 125/75   01/17/25 147/79   01/13/25 97/61       Health Maintenance   Topic Date Due    TDAP/TD VACCINES (1 - Tdap) Never done    ZOSTER VACCINE (1 of 2) Never done    DXA SCAN  09/14/2020    COVID-19 Vaccine (5 - 2024-25 season) 03/17/2025 (Originally 9/1/2024)    INFLUENZA VACCINE  03/31/2025 (Originally 7/1/2024)    RSV Vaccine - Adults (1 - 1-dose 75+ series) 05/06/2025 (Originally 2/4/2020)    LIPID PANEL  04/01/2025    DIABETIC EYE EXAM  06/12/2025    BMI FOLLOWUP  08/02/2025    ANNUAL WELLNESS VISIT  10/21/2025    Pneumococcal Vaccine 65+  Completed    HEPATITIS C SCREENING  Discontinued    HEMOGLOBIN A1C  Discontinued    URINE MICROALBUMIN  Discontinued    COLONOSCOPY  Discontinued       Physical Exam  Constitutional:       Appearance: Normal appearance.      Comments: On 4 L nasal cannula   HENT:      Head: Normocephalic and atraumatic.   Eyes:      Pupils: Pupils are equal, round, and reactive to light.   Cardiovascular:      Rate and Rhythm: Normal rate and regular rhythm.      Pulses: Normal pulses.      Heart sounds: Normal heart sounds.   Pulmonary:      Effort: Pulmonary effort is normal.      " Breath sounds: Normal breath sounds.   Musculoskeletal:         General: Swelling present.      Right lower leg: Edema present.      Left lower leg: Edema present.      Comments: Bilateral 2+ pitting edema noted, with no tenderness negative Homans' sign   Neurological:      General: No focal deficit present.      Mental Status: She is alert and oriented to person, place, and time.   Psychiatric:         Mood and Affect: Mood normal.         Behavior: Behavior normal.         Physical Exam  Lungs are clear.  Heart sounds are normal.    Vital Signs  Blood pressure is normal.       Result Review   The following data was reviewed by: Chey Brown MD on 01/21/2025:  [x]  Tests & Results  []  Hospitalization/Emergency Department/Urgent Care  []  Internal/External Consultant Notes      Results         ASSESSMENT/PLAN  Diagnoses and all orders for this visit:    1. Hospital discharge follow-up (Primary)    2. Chronic heart failure with preserved ejection fraction    3. Essential hypertension    4. Chronic obstructive pulmonary disease, unspecified COPD type    5. Stage 3a chronic kidney disease    6. History of pulmonary embolism          Assessment & Plan  1. Bilateral lower extremity edema.  The edema is likely due to fluid retention, exacerbated by recent hospitalization and immobility. There is no associated pain or tenderness, and she is currently on anticoagulation therapy. Her oxygen requirements have not increased, and her dyspnea has not worsened. Blood pressure is within normal limits. She is advised to limit fluid intake to less than 2 liters per day and elevate her legs with a pillow during sleep. She is also instructed to take an additional dose of spironolactone today , then resume her regular schedule of Monday Wednesday Friday, if the edema worsens or becomes concerning, she should contact the office for further evaluation, which may include an ultrasound.    2. Pulmonary embolism.  She is currently on  Eliquis to prevent further blood clots. T She reports improvement in symptoms and is currently on 4 L of oxygen. She is advised to continue her current oxygen therapy until her pulmonology appointment next Thursday, where a walk test will be conducted to assess the need for continued oxygen therapy.    3. Chronic obstructive pulmonary disease (COPD).  She has a history of COPD but reports no chronic lung issues requiring oxygen until the recent pulmonary embolism. She is advised to continue her current management and follow up with pulmonology as scheduled.    PROCEDURE  The patient underwent knee replacement surgery in the past.               Julia Rios  reports that she quit smoking about 30 years ago. Her smoking use included cigarettes. She started smoking about 62 years ago. She has a 16 pack-year smoking history. She has never been exposed to tobacco smoke. She has never used smokeless tobacco.       FOLLOW UP  Return if symptoms worsen or fail to improve.  Patient was given instructions and counseling regarding her condition or for health maintenance advice. Please see specific information pulled into the AVS if appropriate.       Chey Brown MD  01/21/25  15:43 EST    Patient or patient representative verbalized consent for the use of Ambient Listening during the visit with  Chey Brown MD for chart documentation. 1/21/2025  15:43 EST

## 2025-01-21 NOTE — PROGRESS NOTES
Primary Care Provider  Nick Patel DO   Referring Provider  No ref. provider found    Patient Complaint  Hospital Follow Up Visit, COPD, Cough, Sleep Apnea, and Asthma    Patient or patient representative verbalized consent for the use of Ambient Listening during the visit with  KALEB Cary for chart documentation. 1/23/2025  14:38 EST      Subjective       History of Presenting Illness  Julia Rios is a pleasant 79 y.o. female  of  Dr. Baez who presents to [facility] with history of hypertension, hyperlipidemia CKD 3, GERD, CHF with preserved EF, and COPD, pneumonia, here for emergency room follow-up.  Patient was seen in the emergency room 1/17/2025 for acute shortness of air.  Chest x-ray was negative patient was not treated with any antibiotics.      History of Present Illness  The patient presents for pulmonary embolism, deep vein thrombosis, chronic obstructive pulmonary disease, anxiety.    She reports a recent emergency room visit due to a missed dose of her medication, which she believes triggered a panic attack. She was hospitalized from 01/08/2024 to 01/13/2024 for a pulmonary embolism (PE) and was seen by 3 doctors during her stay. She is currently on Eliquis, initially at a dose of 10 mg, now reduced to 5 mg twice daily. She has a prescription for 25 days' worth of medication. She has a routine CT scan scheduled for 02/19/2024 at 11:00 AM. She has a sleep study scheduled and is currently using oxygen at a rate of 4 liters. She underwent a 6-minute walk test before her hospital discharge, which she did not perform well on. Her oxygen saturation levels have been consistently around 94 or 93, never dropping below 90. She experienced severe shortness of breath and coughing during her hospitalization, which led to her emergency room visit. She was advised to use plain albuterol instead of DuoNeb for her nebulizer treatments. She  has 2 boxes of albuterol left. She has been managing her household chores with periods of rest in between. She has been sleeping in a recliner due to fear of getting into bed. She acknowledges that her COPD is worsening with age.    She has a history of deep vein thrombosis (DVT) in her right lower extremity.    She has a history of anxiety.    She has a history of chronic obstructive pulmonary disease (COPD).    FAMILY HISTORY  Her father had COPD.    MEDICATIONS  Current: Eliquis, albuterol         At present time patient denies dyspnea, coughing, wheezing, headaches, chest pain, weight loss or hemoptysis. Patient denies fevers, chills and night sweats. Julia Rios is able to perform ADLs.      I have personally reviewed the review of systems, past family, social, medical and surgical histories; and agree with their findings.      Review of Systems   Constitutional: Negative.    HENT: Negative.     Respiratory: Negative.     Cardiovascular: Negative.    Musculoskeletal: Negative.    Neurological: Negative.    Psychiatric/Behavioral: Negative.           Family History   Problem Relation Age of Onset    Colon cancer Mother 61    Cancer Mother     Heart disease Mother     Heart failure Father     Asthma Father     Heart attack Father     Hyperlipidemia Father     Emphysema Father     COPD Father         Black Lung    Diabetes Brother     Emphysema Brother     COPD Brother     Breast cancer Maternal Grandmother     Stroke Paternal Grandfather     COPD Sister         lower lobectomy    Malig Hyperthermia Neg Hx         Social History     Socioeconomic History    Marital status:    Tobacco Use    Smoking status: Former     Current packs/day: 0.00     Average packs/day: 0.5 packs/day for 32.0 years (16.0 ttl pk-yrs)     Types: Cigarettes     Start date: 1963     Quit date: 1995     Years since quittin.0     Passive exposure: Never    Smokeless tobacco: Never   Vaping Use    Vaping status: Never  Used   Substance and Sexual Activity    Alcohol use: Never    Drug use: Never    Sexual activity: Defer        Past Medical History:   Diagnosis Date    Acute right-sided low back pain with right-sided sciatica 01/15/2018    Allergic     Allergic rhinitis     Asthma     Asthma, extrinsic     Asthma, intrinsic     CHF (congestive heart failure)     Chronic heart failure with preserved ejection fraction     11/16/20 echocardiogram: 1.  Normal ejection fraction of 55%. 2.  Mild left ventricular hypertrophy. 3.  No significant valvular heart issues.     CKD stage 3 10/29/2019    COPD (chronic obstructive pulmonary disease)     Diverticulitis     Emphysema of lung     Essential hypertension     GERD (gastroesophageal reflux disease)     Headache     Hemoptysis     non cancerous    History of fungal pneumonia 12/02/2021    Hyperlipidemia     Idiopathic chronic gout of multiple sites without tophus 05/20/2016    Obesity     Pneumonia     Primary osteoarthritis of right knee 01/16/2017    Pulmonary embolism     Sleep apnea     CPAP    Sleep apnea, obstructive     Vitamin D deficiency 01/13/2016        Immunization History   Administered Date(s) Administered    COVID-19 (MODERNA) 1st,2nd,3rd Dose Monovalent 01/28/2021, 01/28/2021, 02/23/2021, 02/23/2021    Fluzone High-Dose 65+yrs 12/02/2021, 12/28/2022, 01/03/2024    Fluzone Quad >6mos (Multi-dose) 10/01/2020    Pneumococcal Conjugate 13-Valent (PCV13) 02/11/2015    Pneumococcal Conjugate 20-Valent (PCV20) 07/20/2023    Pneumococcal Polysaccharide (PPSV23) 05/01/2010       Allergies   Allergen Reactions    Ibuprofen Hives    Penicillins Other (See Comments) and Seizure     1. When was your reaction? < 10 years ago  2. Did your reaction happen after the first dose or after several doses? Do not know  3. Did your reaction require ED or hospital care to manage your reaction? Do not know  4. Did your reaction require treatment with epinephrine? Do not know  5. Have you taken  amoxicillin (Amoxil) or amoxicillin-clavulanate (Augmentin) without issue since? No  6. Have you taken cephalexin (Keflex) without issue since?  No     Nsaids Other (See Comments)     CKD    Cephalosporins Rash    Sulfa Antibiotics Rash          Current Outpatient Medications:     azelastine (ASTELIN) 0.1 % nasal spray, 2 sprays into the nostril(s) as directed by provider 2 (Two) Times a Day. Use in each nostril as directed, Disp: 30 mL, Rfl: 6    Budeson-Glycopyrrol-Formoterol (BREZTRI) 160-9-4.8 MCG/ACT aerosol inhaler, Inhale 2 puffs 2 (Two) Times a Day., Disp: 10.7 g, Rfl: 11    D3-1000 25 MCG (1000 UT) capsule, TAKE 1 CAPSULE BY MOUTH EVERY DAY, Disp: 90 capsule, Rfl: 3    famotidine (PEPCID) 20 MG tablet, Take 1 tablet by mouth every night at bedtime., Disp: 30 tablet, Rfl: 11    fexofenadine (ALLEGRA) 180 MG tablet, Take 1 tablet by mouth Daily., Disp: 90 tablet, Rfl: 3    gabapentin (NEURONTIN) 300 MG capsule, Take 1 capsule by mouth Every Morning. 1 capsule every morning and 2 capsule every evening., Disp: , Rfl:     gabapentin (NEURONTIN) 300 MG capsule, Take 2 capsules by mouth Every Evening. 1 capsule every morning and 2 capsule every evening., Disp: , Rfl:     losartan (COZAAR) 100 MG tablet, Take 1 tablet by mouth Daily., Disp: 90 tablet, Rfl: 3    metoprolol succinate XL (TOPROL-XL) 100 MG 24 hr tablet, Take 1 tablet by mouth Daily. Hold if heartrate is <60., Disp: 90 tablet, Rfl: 3    montelukast (SINGULAIR) 10 MG tablet, Take 1 tablet by mouth Every Night. (Patient taking differently: Take 1 tablet by mouth Every Morning.), Disp: 90 tablet, Rfl: 3    spironolactone (ALDACTONE) 25 MG tablet, Take 1 tablet by mouth 3 (Three) Times a Week. Monday,Wednesday,Friday, Disp: 36 tablet, Rfl: 3    albuterol (PROVENTIL) (2.5 MG/3ML) 0.083% nebulizer solution, Take 2.5 mg by nebulization 4 (Four) Times a Day As Needed for Wheezing., Disp: 360 mL, Rfl: 3    apixaban (ELIQUIS) 5 MG tablet tablet, Take 1 tablet by  "mouth 2 (Two) Times a Day., Disp: 180 tablet, Rfl: 1         Vital Signs   /76 (BP Location: Left arm, Patient Position: Sitting, Cuff Size: Adult)   Pulse 64   Temp 98 °F (36.7 °C) (Temporal)   Resp 14   Ht 154.9 cm (60.98\")   Wt 100 kg (221 lb)   SpO2 97%   BMI 41.78 kg/m²       Objective     Physical Exam  Vitals reviewed.   Constitutional:       Appearance: Normal appearance.   HENT:      Head: Normocephalic and atraumatic.      Nose: Nose normal.      Mouth/Throat:      Mouth: Mucous membranes are moist.      Pharynx: Oropharynx is clear.   Eyes:      Extraocular Movements: Extraocular movements intact.      Conjunctiva/sclera: Conjunctivae normal.      Pupils: Pupils are equal, round, and reactive to light.   Cardiovascular:      Rate and Rhythm: Normal rate and regular rhythm.      Pulses: Normal pulses.      Heart sounds: Normal heart sounds.   Pulmonary:      Effort: Pulmonary effort is normal.      Breath sounds: Normal breath sounds.   Abdominal:      General: Bowel sounds are normal.   Musculoskeletal:         General: Normal range of motion.      Cervical back: Normal range of motion and neck supple.   Skin:     General: Skin is warm and dry.   Neurological:      Mental Status: She is alert and oriented to person, place, and time.   Psychiatric:         Behavior: Behavior normal.         Physical Exam  Lungs were auscultated.    Vital Signs  Oxygen saturation is at 97 percent.         Results Review  I have personally reviewed the prior office notes, hospital records, labs, and diagnostics.    Walking Oximetry Progress Note        Patient Name:  Julia Rios  YOB: 1945  Date of Procedure: 01/12/25                                                                                                       ROOM AIR BASELINE   SpO2%  85%   Heart Rate  84      EXERCISE ON ROOM AIR SpO2% EXERCISE ON O2 LPM SpO2%   1 MINUTE   1 MINUTE  3  92%   2 MINUTES   2 MINUTES  3  93%   3 " MINUTES   3 MINUTES  3  93%   4 MINUTES   4 MINUTES  3  87%   5 MINUTES   5 MINUTES  4  90%   6 MINUTES   6 MINUTES  4  93%                                                                                                                 Time to Recovery  1   SpO2% Post Exercise  93% on 3 Lpm.    HR Post Exercise  79      Comments:  Patient was on 3 lpm nasal cannula prior to room air SpO2.  After approximately 5 minutes on room air while at rest patient SpO2 dropped to 85%.  Placed patient on 2 lpm and she wouldn't recover until increased to 3 lpm.  Initiated walk on 3 lpm.  Patient had to stop several times to catch her breath and developed bad cough with exertion.  Patient states any time she ambulates she coughs and has a hard time catching her breath.  Around the 5 minute bethany she dropped to 87% and O2 was increased to 4 lpm.   Once 6 minutes complete patient returned to room and was able to wean back to previous 3 lpm while at rest.       CT Angiogram Chest Pulmonary Embolism [[id number]] (Order [id number])  Order  Status: Final result     Study Notes     Luigi Sanchez on 1/8/2025  2:27 PM EST   TECH CELIO  DLP 99mGycm  CTDI vol 3.06mGym  100cc ISOVUE 370  Hypoxia  DONE TO CHECK FOR PE     Appointment Information    PACS Images     Radiology Images  Study Result    Narrative & Impression   CT ANGIOGRAM CHEST PULMONARY EMBOLISM     Date of Exam: 1/8/2025 2:21 PM EST     Indication: Hypoxia.     Comparison: None available.     Technique: Axial CT images were obtained of the chest after the uneventful intravenous administration of iodinated contrast utilizing pulmonary embolism protocol.  Reconstructed coronal and sagittal images were also obtained. Automated exposure control   and iterative construction methods were used.        Findings:  The pulmonary artery outflow track is patent. There is pulmonary embolism at the right main pulmonary artery bifurcation with partially occlusive pulmonary emboli involving the  left upper lobe segmental and subsegmental branches. There is also partially   occlusive pulmonary embolism extending into the right lower lobe lobar and few segmental and subsegmental branches. Also partially occlusive pulmonary embolism at the origin of the right middle lobe pulmonary artery. Partially occlusive embolism in left   upper lobe and left lower lobe segmental and few subsegmental branches.     There is cardiomegaly. The right heart lumen is mildly enlarged as compared to the left and there is minimal leftward bowing of the interventricular septum compatible with right heart strain. There are no pericardial effusions.     The thoracic aorta shows a normal diameter with atherosclerosis. Patent three-vessel arch. Subcentimeter shotty mediastinal lymph nodes     There is mild bibasilar reticular lung thickening and mild bibasilar discoid atelectasis. No pleural effusion or pneumothorax.     No acute fractures or destructive bone lesions. No suspicious pulmonary nodules     Tiny hiatal hernia. No acute abnormalities in the upper abdomen     IMPRESSION:  Impression:  1.Bilateral partially occlusive pulmonary emboli involving all lobes of both lungs. There is evidence of right heart strain.  2.Cardiomegaly.  3.Mild bibasilar reticular lung thickening and discoid atelectasis.           Findings discussed over the phone with Dr. Jason On 1/8/2025 at 2:38 p.m.        Electronically Signed: Tim Rodriguez MD    1/8/2025 2:43 PM EST    Workstation ID: [id number]   XR Chest 1 View [[id number]] (Order [id number])  Order  Status: Final result     Study Notes     Jeane Adorno on 1/17/2025 11:20 PM EST   SHORT OF AIR     Appointment Information    PACS Images     Radiology Images  Study Result    Narrative & Impression   AP PORTABLE CHEST     HISTORY:  Shortness of air.     COMPARISON:  1/8/2025     TECHNIQUE:  AP portable chest x-ray.     FINDINGS:  Heart remains enlarged. Atherosclerotic disease is present in the  aorta.  Pulmonary vascularity is normal. There is chronic scarring in the lung  bases. The lungs are otherwise clear. No pneumothorax.     IMPRESSION:  No acute cardiopulmonary findings.     Electronically Signed By-Dr. Raheem Adorno MD On:1/17/2025 11:22 PM     Results  Laboratory Studies  BNP was normal.    Imaging  CT of chest showed multiple pulmonary emboli with features of right heart strain. Echocardiogram did not show any right heart strain, just a provoked pulmonary emboli. Chest x-ray showed mucus but no consolidation.          Assessment         Patient Active Problem List   Diagnosis    Family history of colon cancer    Chronic obstructive pulmonary disease    Essential hypertension    Hyperlipidemia    CKD (chronic kidney disease) stage 3, GFR 30-59 ml/min    Chronic heart failure with preserved ejection fraction    CARMEN (obstructive sleep apnea)    Seasonal allergies    Gastroesophageal reflux disease    Bronchitis, mucopurulent recurrent    Tobacco abuse, in remission    Pulmonary nodule    PND (post-nasal drip)    Status post fall    Allergic rhinitis    Asthma    Closed fracture of metatarsal bone    Hemoptysis    Idiopathic chronic gout of multiple sites without tophus    Primary localized osteoarthritis    Vitamin D deficiency    Morbid (severe) obesity due to excess calories    Umbilical hernia without obstruction and without gangrene    Oral thrush    Arthritis of knee    Pre-diabetes    COPD (chronic obstructive pulmonary disease)    Pneumonia    Chronic maxillary sinusitis    Acute pulmonary embolism with acute cor pulmonale    Acute pulmonary embolism    Acute deep vein thrombosis (DVT) of right lower extremity    Diastolic dysfunction        Plan     Diagnoses and all orders for this visit:    1. Chronic obstructive pulmonary disease with acute exacerbation (Primary)  -     albuterol (PROVENTIL) (2.5 MG/3ML) 0.083% nebulizer solution; Take 2.5 mg by nebulization 4 (Four) Times a Day As  Needed for Wheezing.  Dispense: 360 mL; Refill: 3    2. Shortness of breath  -     albuterol (PROVENTIL) (2.5 MG/3ML) 0.083% nebulizer solution; Take 2.5 mg by nebulization 4 (Four) Times a Day As Needed for Wheezing.  Dispense: 360 mL; Refill: 3    3. Nocturnal hypoxia  -     albuterol (PROVENTIL) (2.5 MG/3ML) 0.083% nebulizer solution; Take 2.5 mg by nebulization 4 (Four) Times a Day As Needed for Wheezing.  Dispense: 360 mL; Refill: 3    4. Allergic rhinitis, unspecified seasonality, unspecified trigger  -     albuterol (PROVENTIL) (2.5 MG/3ML) 0.083% nebulizer solution; Take 2.5 mg by nebulization 4 (Four) Times a Day As Needed for Wheezing.  Dispense: 360 mL; Refill: 3    5. Acute pulmonary embolism, unspecified pulmonary embolism type, unspecified whether acute cor pulmonale present  -     apixaban (ELIQUIS) 5 MG tablet tablet; Take 1 tablet by mouth 2 (Two) Times a Day.  Dispense: 180 tablet; Refill: 1  -     CT Chest With Contrast; Future  -     albuterol (PROVENTIL) (2.5 MG/3ML) 0.083% nebulizer solution; Take 2.5 mg by nebulization 4 (Four) Times a Day As Needed for Wheezing.  Dispense: 360 mL; Refill: 3    6. Lung nodule  -     CT Chest With Contrast; Future  -     albuterol (PROVENTIL) (2.5 MG/3ML) 0.083% nebulizer solution; Take 2.5 mg by nebulization 4 (Four) Times a Day As Needed for Wheezing.  Dispense: 360 mL; Refill: 3         Assessment & Plan  1. Pulmonary Embolism.  She was hospitalized from January 8 through January 13 for a pulmonary embolism and is currently on Eliquis. She started with 10 mg and transitioned to 5 mg twice a day. A new prescription for Eliquis 5 mg twice a day for 6 months will be sent to [facility]. A repeat CT with contrast is scheduled for April to monitor the clot and lung nodule, and the February CT without contrast will be canceled.    2. Deep Vein Thrombosis.  She has a DVT in the right lower extremity. Continue Eliquis 5 mg twice a day for 6 months. Monitor for any  signs of worsening or new clots.    3. Chronic Obstructive Pulmonary Disease.  She reports that her COPD worsens with age. Continue current COPD management, including the use of albuterol nebulizer solution. A refill for albuterol nebulizer solution will be sent.    4. Anxiety.  She experienced a panic attack leading to an ER visit. Continue current anxiety management strategies. Monitor for any worsening symptoms.    Follow-up  The patient will follow up on March 7.      Smoking status:  reports that she quit smoking about 30 years ago. Her smoking use included cigarettes. She started smoking about 62 years ago. She has a 16 pack-year smoking history. She has never been exposed to tobacco smoke. She has never used smokeless tobacco.    Vaccination status: Reviewed  Immunization History   Administered Date(s) Administered    COVID-19 (MODERNA) 1st,2nd,3rd Dose Monovalent 01/28/2021, 01/28/2021, 02/23/2021, 02/23/2021    Fluzone High-Dose 65+yrs 12/02/2021, 12/28/2022, 01/03/2024    Fluzone Quad >6mos (Multi-dose) 10/01/2020    Pneumococcal Conjugate 13-Valent (PCV13) 02/11/2015    Pneumococcal Conjugate 20-Valent (PCV20) 07/20/2023    Pneumococcal Polysaccharide (PPSV23) 05/01/2010        Medications personally reviewed    Follow Up  Return for Next scheduled follow up.    Patient was given instructions and counseling regarding her condition or for health maintenance advice. Please see specific information pulled into the AVS if appropriate.     I spent 31 minutes caring for Julia Rios on this date of service. This time includes time spent by me in the following activities:preparing for the visit, reviewing tests, obtaining and/or reviewing a separately obtained history, performing a medically appropriate examination and/or evaluation, counseling and educating the patient/family/caregiver, ordering medications, tests, or procedures, documenting information in the medical record, independently interpreting  results and communicating that information with the patient/family/caregiver and answered questions family members, discuss medications.

## 2025-01-23 ENCOUNTER — OFFICE VISIT (OUTPATIENT)
Dept: PULMONOLOGY | Facility: CLINIC | Age: 80
End: 2025-01-23
Payer: MEDICARE

## 2025-01-23 VITALS
RESPIRATION RATE: 14 BRPM | OXYGEN SATURATION: 97 % | SYSTOLIC BLOOD PRESSURE: 132 MMHG | WEIGHT: 221 LBS | TEMPERATURE: 98 F | HEIGHT: 61 IN | DIASTOLIC BLOOD PRESSURE: 76 MMHG | HEART RATE: 64 BPM | BODY MASS INDEX: 41.72 KG/M2

## 2025-01-23 DIAGNOSIS — R06.02 SHORTNESS OF BREATH: ICD-10-CM

## 2025-01-23 DIAGNOSIS — G47.34 NOCTURNAL HYPOXIA: ICD-10-CM

## 2025-01-23 DIAGNOSIS — J44.1 CHRONIC OBSTRUCTIVE PULMONARY DISEASE WITH ACUTE EXACERBATION: Primary | ICD-10-CM

## 2025-01-23 DIAGNOSIS — I26.99 ACUTE PULMONARY EMBOLISM, UNSPECIFIED PULMONARY EMBOLISM TYPE, UNSPECIFIED WHETHER ACUTE COR PULMONALE PRESENT: ICD-10-CM

## 2025-01-23 DIAGNOSIS — R91.1 LUNG NODULE: ICD-10-CM

## 2025-01-23 DIAGNOSIS — J30.9 ALLERGIC RHINITIS, UNSPECIFIED SEASONALITY, UNSPECIFIED TRIGGER: ICD-10-CM

## 2025-01-23 RX ORDER — ALBUTEROL SULFATE 0.83 MG/ML
2.5 SOLUTION RESPIRATORY (INHALATION) 4 TIMES DAILY PRN
Qty: 360 ML | Refills: 3 | Status: SHIPPED | OUTPATIENT
Start: 2025-01-23

## 2025-01-24 ENCOUNTER — READMISSION MANAGEMENT (OUTPATIENT)
Dept: CALL CENTER | Facility: HOSPITAL | Age: 80
End: 2025-01-24
Payer: MEDICARE

## 2025-01-24 NOTE — OUTREACH NOTE
Medical Week 2 Survey      Flowsheet Row Responses   [facility] patient discharged from? [facility]   Does the patient have one of the following disease processes/diagnoses(primary or secondary)? Other   Week 2 attempt successful? Yes   Call start time 1322   Discharge diagnosis Acute pulmonary embolism   Call end time 1323   Meds reviewed with patient/caregiver? Yes   Is the patient taking all medications as directed (includes completed medication regime)? Yes   Does the patient have a primary care provider?  Yes   Has the patient kept scheduled appointments due by today? Yes  [Pulm/PCP apt since hosp dc]   DME comments pt is weaning off of her oxygen   What is the patient's perception of their health status since discharge? Improving   Week 2 Call Completed? Yes   Graduated Yes   Did the patient feel the follow up calls were helpful during their recovery period? Yes   Graduated/Revoked comments Pt improving,  Pt has been seen by her PCP and pulm doc   Call end time 1323            Gladis العراقي - Registered Nurse

## 2025-01-30 LAB
QT INTERVAL: 394 MS
QTC INTERVAL: 413 MS

## 2025-01-31 DIAGNOSIS — R06.02 SHORTNESS OF BREATH: ICD-10-CM

## 2025-01-31 DIAGNOSIS — G47.34 NOCTURNAL HYPOXIA: ICD-10-CM

## 2025-01-31 DIAGNOSIS — J44.1 CHRONIC OBSTRUCTIVE PULMONARY DISEASE WITH ACUTE EXACERBATION: Primary | ICD-10-CM

## 2025-02-02 DIAGNOSIS — I26.99 ACUTE PULMONARY EMBOLISM, UNSPECIFIED PULMONARY EMBOLISM TYPE, UNSPECIFIED WHETHER ACUTE COR PULMONALE PRESENT: ICD-10-CM

## 2025-02-17 ENCOUNTER — HOSPITAL ENCOUNTER (OUTPATIENT)
Dept: RESPIRATORY THERAPY | Facility: HOSPITAL | Age: 80
Discharge: HOME | End: 2025-02-17
Admitting: NURSE PRACTITIONER
Payer: MEDICARE

## 2025-02-17 DIAGNOSIS — G47.34 NOCTURNAL HYPOXIA: ICD-10-CM

## 2025-02-17 DIAGNOSIS — R06.02 SHORTNESS OF BREATH: ICD-10-CM

## 2025-02-17 DIAGNOSIS — J44.1 CHRONIC OBSTRUCTIVE PULMONARY DISEASE WITH ACUTE EXACERBATION: ICD-10-CM

## 2025-02-17 PROCEDURE — 94618 PULMONARY STRESS TESTING: CPT

## 2025-02-17 NOTE — PROGRESS NOTES
"Exercise Oximetry    Patient Name:Julia Rios   MRN: [id number]   Date: 02/17/25             ROOM AIR BASELINE   SpO2% 95   Heart Rate 61   Blood Pressure 116/76     EXERCISE ON ROOM AIR SpO2% EXERCISE ON O2 @  LPM SpO2%   1 MINUTE 91 1 MINUTE    2 MINUTES 90 2 MINUTES    3 MINUTES 93 3 MINUTES    4 MINUTES 90 4 MINUTES    5 MINUTES 91 5 MINUTES    6 MINUTES 91 6 MINUTES               Distance Walked  660' Distance Walked   Dyspnea (Gurdeep Scale)  Pre-0 Post-1 Dyspnea (Gurdeep Scale)   Fatigue (Gurdeep Scale)  1 Fatigue (Gurdeep Scale)   SpO2% Post Exercise  96 SpO2% Post Exercise   HR Post Exercise  75 HR Post Exercise   Time to Recovery  1'39\" Time to Recovery     Comments: patient uses O2 3LPM, testing completed on room air  "

## 2025-03-05 ENCOUNTER — TRANSCRIBE ORDERS (OUTPATIENT)
Dept: ADMINISTRATIVE | Facility: HOSPITAL | Age: 80
End: 2025-03-05
Payer: MEDICARE

## 2025-03-05 ENCOUNTER — LAB (OUTPATIENT)
Dept: LAB | Facility: HOSPITAL | Age: 80
End: 2025-03-05
Payer: MEDICARE

## 2025-03-05 DIAGNOSIS — E55.9 VITAMIN D DEFICIENCY: ICD-10-CM

## 2025-03-05 DIAGNOSIS — I10 ESSENTIAL HYPERTENSION, MALIGNANT: ICD-10-CM

## 2025-03-05 DIAGNOSIS — N17.9 ACUTE RENAL FAILURE, UNSPECIFIED ACUTE RENAL FAILURE TYPE: Primary | ICD-10-CM

## 2025-03-05 DIAGNOSIS — N17.9 ACUTE RENAL FAILURE, UNSPECIFIED ACUTE RENAL FAILURE TYPE: ICD-10-CM

## 2025-03-05 DIAGNOSIS — E11.9 DIABETES MELLITUS WITHOUT COMPLICATION: ICD-10-CM

## 2025-03-05 DIAGNOSIS — N18.30 STAGE 3 CHRONIC KIDNEY DISEASE, UNSPECIFIED WHETHER STAGE 3A OR 3B CKD: ICD-10-CM

## 2025-03-05 LAB
25(OH)D3 SERPL-MCNC: 44.9 NG/ML (ref 30–100)
ALBUMIN SERPL-MCNC: 3.8 G/DL (ref 3.5–5.2)
ALBUMIN/GLOB SERPL: 1.3 G/DL
ALP SERPL-CCNC: 76 U/L (ref 39–117)
ALT SERPL W P-5'-P-CCNC: 7 U/L (ref 1–33)
ANION GAP SERPL CALCULATED.3IONS-SCNC: 9.9 MMOL/L (ref 5–15)
AST SERPL-CCNC: 16 U/L (ref 1–32)
BACTERIA UR QL AUTO: ABNORMAL /HPF
BASOPHILS # BLD AUTO: 0.14 10*3/MM3 (ref 0–0.2)
BASOPHILS NFR BLD AUTO: 1.4 % (ref 0–1.5)
BILIRUB SERPL-MCNC: 0.6 MG/DL (ref 0–1.2)
BILIRUB UR QL STRIP: NEGATIVE
BUN SERPL-MCNC: 14 MG/DL (ref 8–23)
BUN/CREAT SERPL: 11.2 (ref 7–25)
CALCIUM SPEC-SCNC: 9.7 MG/DL (ref 8.6–10.5)
CHLORIDE SERPL-SCNC: 108 MMOL/L (ref 98–107)
CLARITY UR: ABNORMAL
CO2 SERPL-SCNC: 25.1 MMOL/L (ref 22–29)
COLOR UR: YELLOW
CREAT SERPL-MCNC: 1.25 MG/DL (ref 0.57–1)
DEPRECATED RDW RBC AUTO: 44.6 FL (ref 37–54)
EGFRCR SERPLBLD CKD-EPI 2021: 43.7 ML/MIN/1.73
EOSINOPHIL # BLD AUTO: 0.32 10*3/MM3 (ref 0–0.4)
EOSINOPHIL NFR BLD AUTO: 3.2 % (ref 0.3–6.2)
ERYTHROCYTE [DISTWIDTH] IN BLOOD BY AUTOMATED COUNT: 13.7 % (ref 12.3–15.4)
GLOBULIN UR ELPH-MCNC: 2.9 GM/DL
GLUCOSE SERPL-MCNC: 90 MG/DL (ref 65–99)
GLUCOSE UR STRIP-MCNC: NEGATIVE MG/DL
HBA1C MFR BLD: 5.8 % (ref 4.8–5.6)
HCT VFR BLD AUTO: 38.6 % (ref 34–46.6)
HGB BLD-MCNC: 12.1 G/DL (ref 12–15.9)
HGB UR QL STRIP.AUTO: NEGATIVE
HYALINE CASTS UR QL AUTO: ABNORMAL /LPF
IMM GRANULOCYTES # BLD AUTO: 0.02 10*3/MM3 (ref 0–0.05)
IMM GRANULOCYTES NFR BLD AUTO: 0.2 % (ref 0–0.5)
KETONES UR QL STRIP: NEGATIVE
LEUKOCYTE ESTERASE UR QL STRIP.AUTO: ABNORMAL
LYMPHOCYTES # BLD AUTO: 3.29 10*3/MM3 (ref 0.7–3.1)
LYMPHOCYTES NFR BLD AUTO: 33 % (ref 19.6–45.3)
MCH RBC QN AUTO: 27.9 PG (ref 26.6–33)
MCHC RBC AUTO-ENTMCNC: 31.3 G/DL (ref 31.5–35.7)
MCV RBC AUTO: 89.1 FL (ref 79–97)
MONOCYTES # BLD AUTO: 1.08 10*3/MM3 (ref 0.1–0.9)
MONOCYTES NFR BLD AUTO: 10.8 % (ref 5–12)
NEUTROPHILS NFR BLD AUTO: 5.13 10*3/MM3 (ref 1.7–7)
NEUTROPHILS NFR BLD AUTO: 51.4 % (ref 42.7–76)
NITRITE UR QL STRIP: POSITIVE
NRBC BLD AUTO-RTO: 0 /100 WBC (ref 0–0.2)
PH UR STRIP.AUTO: 6.5 [PH] (ref 5–8)
PLATELET # BLD AUTO: 246 10*3/MM3 (ref 140–450)
PMV BLD AUTO: 13 FL (ref 6–12)
POTASSIUM SERPL-SCNC: 4.3 MMOL/L (ref 3.5–5.2)
PROT SERPL-MCNC: 6.7 G/DL (ref 6–8.5)
PROT UR QL STRIP: NEGATIVE
RBC # BLD AUTO: 4.33 10*6/MM3 (ref 3.77–5.28)
RBC # UR STRIP: ABNORMAL /HPF
REF LAB TEST METHOD: ABNORMAL
SODIUM SERPL-SCNC: 143 MMOL/L (ref 136–145)
SP GR UR STRIP: 1.02 (ref 1–1.03)
SQUAMOUS #/AREA URNS HPF: ABNORMAL /HPF
UROBILINOGEN UR QL STRIP: ABNORMAL
WBC # UR STRIP: ABNORMAL /HPF
WBC NRBC COR # BLD AUTO: 9.98 10*3/MM3 (ref 3.4–10.8)

## 2025-03-05 PROCEDURE — 36415 COLL VENOUS BLD VENIPUNCTURE: CPT

## 2025-03-05 PROCEDURE — 81001 URINALYSIS AUTO W/SCOPE: CPT

## 2025-03-05 PROCEDURE — 82306 VITAMIN D 25 HYDROXY: CPT

## 2025-03-05 PROCEDURE — 83036 HEMOGLOBIN GLYCOSYLATED A1C: CPT

## 2025-03-05 PROCEDURE — 80053 COMPREHEN METABOLIC PANEL: CPT

## 2025-03-05 PROCEDURE — 85025 COMPLETE CBC W/AUTO DIFF WBC: CPT

## 2025-03-06 ENCOUNTER — HOSPITAL ENCOUNTER (OUTPATIENT)
Dept: SLEEP MEDICINE | Facility: HOSPITAL | Age: 80
Discharge: HOME | End: 2025-03-06
Admitting: NURSE PRACTITIONER
Payer: MEDICARE

## 2025-03-06 DIAGNOSIS — G47.33 OSA (OBSTRUCTIVE SLEEP APNEA): ICD-10-CM

## 2025-03-06 PROCEDURE — 95811 POLYSOM 6/>YRS CPAP 4/> PARM: CPT

## 2025-03-17 ENCOUNTER — TELEPHONE (OUTPATIENT)
Dept: PULMONOLOGY | Facility: CLINIC | Age: 80
End: 2025-03-17
Payer: MEDICARE

## 2025-03-17 NOTE — TELEPHONE ENCOUNTER
Patient has an appointment with Dr Baez on Monday. Ct scan scheduled for 4/7. Would patient like to keep her Monday appointment or reschedule until after CT completed

## 2025-03-19 DIAGNOSIS — J44.9 CHRONIC OBSTRUCTIVE PULMONARY DISEASE, UNSPECIFIED COPD TYPE: ICD-10-CM

## 2025-03-19 DIAGNOSIS — J30.9 ALLERGIC RHINITIS, UNSPECIFIED SEASONALITY, UNSPECIFIED TRIGGER: ICD-10-CM

## 2025-03-19 DIAGNOSIS — J41.1 BRONCHITIS, MUCOPURULENT RECURRENT: ICD-10-CM

## 2025-03-19 DIAGNOSIS — R09.82 PND (POST-NASAL DRIP): ICD-10-CM

## 2025-03-19 DIAGNOSIS — J45.40 MODERATE PERSISTENT ASTHMA, UNSPECIFIED WHETHER COMPLICATED: ICD-10-CM

## 2025-03-19 DIAGNOSIS — K21.00 GASTROESOPHAGEAL REFLUX DISEASE WITH ESOPHAGITIS WITHOUT HEMORRHAGE: ICD-10-CM

## 2025-03-20 RX ORDER — AZELASTINE 1 MG/ML
SPRAY, METERED NASAL
Qty: 30 ML | Refills: 6 | Status: SHIPPED | OUTPATIENT
Start: 2025-03-20

## 2025-03-24 ENCOUNTER — OFFICE VISIT (OUTPATIENT)
Dept: PULMONOLOGY | Facility: CLINIC | Age: 80
End: 2025-03-24
Payer: MEDICARE

## 2025-03-24 ENCOUNTER — HOSPITAL ENCOUNTER (OUTPATIENT)
Dept: RESPIRATORY THERAPY | Facility: HOSPITAL | Age: 80
Discharge: HOME | End: 2025-03-24
Admitting: INTERNAL MEDICINE
Payer: MEDICARE

## 2025-03-24 ENCOUNTER — SPECIALTY PHARMACY (OUTPATIENT)
Dept: PULMONOLOGY | Facility: CLINIC | Age: 80
End: 2025-03-24
Payer: MEDICARE

## 2025-03-24 VITALS
WEIGHT: 218.4 LBS | TEMPERATURE: 97.2 F | BODY MASS INDEX: 41.23 KG/M2 | OXYGEN SATURATION: 94 % | HEART RATE: 67 BPM | HEIGHT: 61 IN | RESPIRATION RATE: 18 BRPM | SYSTOLIC BLOOD PRESSURE: 149 MMHG | DIASTOLIC BLOOD PRESSURE: 77 MMHG

## 2025-03-24 DIAGNOSIS — R04.2 HEMOPTYSIS: ICD-10-CM

## 2025-03-24 DIAGNOSIS — I51.89 DIASTOLIC DYSFUNCTION: ICD-10-CM

## 2025-03-24 DIAGNOSIS — J45.40 MODERATE PERSISTENT ASTHMA, UNSPECIFIED WHETHER COMPLICATED: ICD-10-CM

## 2025-03-24 DIAGNOSIS — G47.33 OSA (OBSTRUCTIVE SLEEP APNEA): ICD-10-CM

## 2025-03-24 DIAGNOSIS — B37.0 ORAL THRUSH: ICD-10-CM

## 2025-03-24 DIAGNOSIS — J41.1 BRONCHITIS, MUCOPURULENT RECURRENT: ICD-10-CM

## 2025-03-24 DIAGNOSIS — E66.01 MORBID (SEVERE) OBESITY DUE TO EXCESS CALORIES: ICD-10-CM

## 2025-03-24 DIAGNOSIS — J44.1 CHRONIC OBSTRUCTIVE PULMONARY DISEASE WITH ACUTE EXACERBATION: ICD-10-CM

## 2025-03-24 DIAGNOSIS — J30.2 SEASONAL ALLERGIES: ICD-10-CM

## 2025-03-24 DIAGNOSIS — J44.9 CHRONIC OBSTRUCTIVE PULMONARY DISEASE, UNSPECIFIED COPD TYPE: ICD-10-CM

## 2025-03-24 DIAGNOSIS — J30.2 SEASONAL ALLERGIES: Primary | ICD-10-CM

## 2025-03-24 DIAGNOSIS — I26.09 OTHER ACUTE PULMONARY EMBOLISM WITH ACUTE COR PULMONALE: ICD-10-CM

## 2025-03-24 DIAGNOSIS — R91.1 PULMONARY NODULE: ICD-10-CM

## 2025-03-24 DIAGNOSIS — I26.99 ACUTE PULMONARY EMBOLISM, UNSPECIFIED PULMONARY EMBOLISM TYPE, UNSPECIFIED WHETHER ACUTE COR PULMONALE PRESENT: ICD-10-CM

## 2025-03-24 DIAGNOSIS — J30.9 ALLERGIC RHINITIS, UNSPECIFIED SEASONALITY, UNSPECIFIED TRIGGER: ICD-10-CM

## 2025-03-24 DIAGNOSIS — F17.201 TOBACCO ABUSE, IN REMISSION: ICD-10-CM

## 2025-03-24 PROCEDURE — 94726 PLETHYSMOGRAPHY LUNG VOLUMES: CPT | Performed by: INTERNAL MEDICINE

## 2025-03-24 PROCEDURE — 3077F SYST BP >= 140 MM HG: CPT | Performed by: INTERNAL MEDICINE

## 2025-03-24 PROCEDURE — 1159F MED LIST DOCD IN RCRD: CPT | Performed by: INTERNAL MEDICINE

## 2025-03-24 PROCEDURE — 94729 DIFFUSING CAPACITY: CPT | Performed by: INTERNAL MEDICINE

## 2025-03-24 PROCEDURE — 1160F RVW MEDS BY RX/DR IN RCRD: CPT | Performed by: INTERNAL MEDICINE

## 2025-03-24 PROCEDURE — 94726 PLETHYSMOGRAPHY LUNG VOLUMES: CPT

## 2025-03-24 PROCEDURE — 94729 DIFFUSING CAPACITY: CPT

## 2025-03-24 PROCEDURE — 94060 EVALUATION OF WHEEZING: CPT | Performed by: INTERNAL MEDICINE

## 2025-03-24 PROCEDURE — 99214 OFFICE O/P EST MOD 30 MIN: CPT | Performed by: INTERNAL MEDICINE

## 2025-03-24 PROCEDURE — 94060 EVALUATION OF WHEEZING: CPT

## 2025-03-24 PROCEDURE — 3078F DIAST BP <80 MM HG: CPT | Performed by: INTERNAL MEDICINE

## 2025-03-24 RX ORDER — ALBUTEROL SULFATE 90 UG/1
2 INHALANT RESPIRATORY (INHALATION) EVERY 4 HOURS PRN
COMMUNITY

## 2025-03-24 RX ORDER — ALBUTEROL SULFATE 0.83 MG/ML
2.5 SOLUTION RESPIRATORY (INHALATION) ONCE
Status: COMPLETED | OUTPATIENT
Start: 2025-03-24 | End: 2025-03-24

## 2025-03-24 RX ADMIN — ALBUTEROL SULFATE 2.5 MG: 2.5 SOLUTION RESPIRATORY (INHALATION) at 10:46

## 2025-03-24 NOTE — PROGRESS NOTES
Primary Care Provider  Nick Patel DO     Referring Provider  No ref. provider found     Chief Complaint  Follow-up, Sleep Apnea, oxygen, Shortness of Breath, and Cough    Subjective          Julia Rios presents to [facility]  History of Present Illness  Julia Rios is a 80 y.o. female patient   History of Present Illness  The patient is an 80-year-old female with a history of recurrent bronchitis, obstructive sleep apnea, allergic rhinitis, and tobacco abuse (cigarettes) who presents for follow-up. She had a traumatic fall and developed a pulmonary embolism in 01/2025, requiring hospitalization for an acute submassive PE. She is currently using Breztri inhaler twice daily, albuterol as needed, Eliquis twice daily, and an albuterol nebulizer.    She experienced a traumatic fall in 01/2025, which resulted in bilateral pulmonary embolisms. She has been compliant with her Eliquis regimen, ensuring no missed doses. She reports significant dyspnea during activities, necessitating the use of her rescue inhaler approximately twice in the past month. She also reports a persistent cough and unilateral back pain on the left side, which has been present for the past 2 to 3 days. Her leg swelling has improved, and she has been taking spironolactone daily as advised upon her hospital discharge. She has not required any steroids or antibiotics since her hospital discharge. She has a scheduled CT scan in 04/2025 but no echocardiogram. She continues to work daily and has not experienced any weight changes in the past 6 months. She maintains an active lifestyle, walking nearly 2 miles daily.    She has not been utilizing her CPAP machine due to perceived smothering, which she attributes to the machine. She has not used the machine for the past 2 to 3 months. She underwent a sleep study approximately 3 weeks ago, which indicated a need for nighttime  oxygen. She continues to use oxygen at night and occasionally during the day for 2 to 3 hours. She typically experiences shortness of breath in the evening, around 7:00 or 8:00 PM. She has been sleeping in a recliner since Thanksgiving due to a bout of pneumonia and has not slept in a bed for more than 2 weeks. She attempts to lie down in bed but prefers to sleep elevated.    She reports excessive mucus production, which contributes to her cough. She attempts to keep her nasal passages clear. She has not required any steroids or antibiotics since her hospital discharge.    She had pneumonia in December 2024.    She goes to the dentist and has her teeth cleaned every 4 months. When she goes, this side is always sore, so she mentioned it to the dentist because she thought she had a salivary gland that was clogged or blocked. He said no, he thought she had a sinus infection. So, she went to her primary care physician, and he had a CT scan of her sinuses. There is a polyp or something. She sees the ENT doctor on Wednesday for the first time since the CT of the sinus.    MEDICATIONS  Current: Breztri inhaler, albuterol, Eliquis, spironolactone    Review of Systems     General:  No Fatigue, No Fever No weight loss or loss of appetite  HEENT: No dysphagia, No Visual Changes, no rhinorrhea  Respiratory:  + cough,+Dyspnea, mucoid phlegm, No Pleuritic Pain, no wheezing, no hemoptysis.  Cardiovascular: Denies chest pain, denies palpitations,+ECHEVERRIA, No Chest Pressure  Gastrointestinal:  No Abdominal Pain, No Nausea, No Vomiting, No Diarrhea  Genitourinary:  No Dysuria, No Frequency, No Hesitancy  Musculoskeletal: No muscle pain or swelling  Endocrine:  No Heat Intolerance, No Cold Intolerance  Hematologic:  No Bleeding, No Bruising  Psychiatric:  No Anxiety, No Depression  Neurologic:  No Confusion, no Dysarthria, No Headaches  Skin:  No Rash, No Open Wounds    Family History   Problem Relation Age of Onset    Colon cancer Mother  61    Cancer Mother     Heart disease Mother     Thyroid disease Mother     Migraines Mother     Heart failure Father     Asthma Father     Heart attack Father     Hyperlipidemia Father     Emphysema Father     COPD Father         Black Lung    Diabetes Brother     Emphysema Brother     COPD Brother     Breast cancer Maternal Grandmother     Stroke Paternal Grandfather     COPD Sister         lower lobectomy    Thyroid disease Sister     Malig Hyperthermia Neg Hx         Social History     Socioeconomic History    Marital status:    Tobacco Use    Smoking status: Former     Current packs/day: 0.00     Average packs/day: 0.5 packs/day for 32.0 years (16.0 ttl pk-yrs)     Types: Cigarettes     Start date: 1963     Quit date: 1995     Years since quittin.2     Passive exposure: Never    Smokeless tobacco: Never   Vaping Use    Vaping status: Never Used   Substance and Sexual Activity    Alcohol use: Never    Drug use: Never    Sexual activity: Defer        Past Medical History:   Diagnosis Date    Acute right-sided low back pain with right-sided sciatica 01/15/2018    Allergic     Allergic rhinitis     Asthma     Asthma, extrinsic     Asthma, intrinsic     CHF (congestive heart failure)     Chronic heart failure with preserved ejection fraction     20 echocardiogram: 1.  Normal ejection fraction of 55%. 2.  Mild left ventricular hypertrophy. 3.  No significant valvular heart issues.     CKD stage 3 10/29/2019    COPD (chronic obstructive pulmonary disease)     Diverticulitis     Emphysema of lung     Essential hypertension     GERD (gastroesophageal reflux disease)     Headache     Hemoptysis     non cancerous    History of fungal pneumonia 2021    Hyperlipidemia     Idiopathic chronic gout of multiple sites without tophus 2016    Lumbosacral disc disease     Obesity     Pneumonia     Primary osteoarthritis of right knee 2017    Pulmonary embolism     Shortness of breath      Sleep apnea     CPAP    Sleep apnea, obstructive     Vitamin D deficiency 01/13/2016        Immunization History   Administered Date(s) Administered    COVID-19 (MODERNA) 1st,2nd,3rd Dose Monovalent 01/28/2021, 01/28/2021, 02/23/2021, 02/23/2021    Fluzone High-Dose 65+yrs 12/02/2021, 12/28/2022, 01/03/2024    Fluzone Quad >6mos (Multi-dose) 10/01/2020    Pneumococcal Conjugate 13-Valent (PCV13) 02/11/2015    Pneumococcal Conjugate 20-Valent (PCV20) 07/20/2023    Pneumococcal Polysaccharide (PPSV23) 05/01/2010         Allergies   Allergen Reactions    Ibuprofen Hives    Penicillins Other (See Comments) and Seizure     1. When was your reaction? < 10 years ago  2. Did your reaction happen after the first dose or after several doses? Do not know  3. Did your reaction require ED or hospital care to manage your reaction? Do not know  4. Did your reaction require treatment with epinephrine? Do not know  5. Have you taken amoxicillin (Amoxil) or amoxicillin-clavulanate (Augmentin) without issue since? No  6. Have you taken cephalexin (Keflex) without issue since?  No     Nsaids Other (See Comments)     CKD    Cephalosporins Rash    Sulfa Antibiotics Rash          Current Outpatient Medications:     albuterol (PROVENTIL) (2.5 MG/3ML) 0.083% nebulizer solution, Take 2.5 mg by nebulization 4 (Four) Times a Day As Needed for Wheezing., Disp: 360 mL, Rfl: 3    albuterol sulfate  (90 Base) MCG/ACT inhaler, Inhale 2 puffs Every 4 (Four) Hours As Needed for Wheezing., Disp: , Rfl:     apixaban (ELIQUIS) 5 MG tablet tablet, Take 1 tablet by mouth 2 (Two) Times a Day., Disp: 180 tablet, Rfl: 1    azelastine (ASTELIN) 0.1 % nasal spray, USE 2 SPRAYS INTO THE NOSTRILS AS DIRECTED TWICE DAILY., Disp: 30 mL, Rfl: 6    Budeson-Glycopyrrol-Formoterol (BREZTRI) 160-9-4.8 MCG/ACT aerosol inhaler, Inhale 2 puffs 2 (Two) Times a Day., Disp: 10.7 g, Rfl: 11    D3-1000 25 MCG (1000 UT) capsule, TAKE 1 CAPSULE BY MOUTH EVERY DAY, Disp:  "90 capsule, Rfl: 3    famotidine (PEPCID) 20 MG tablet, Take 1 tablet by mouth every night at bedtime., Disp: 30 tablet, Rfl: 11    fexofenadine (ALLEGRA) 180 MG tablet, Take 1 tablet by mouth Daily., Disp: 90 tablet, Rfl: 3    gabapentin (NEURONTIN) 300 MG capsule, Take 1 capsule by mouth Every Morning. 1 capsule every morning and 2 capsule every evening., Disp: , Rfl:     gabapentin (NEURONTIN) 300 MG capsule, Take 2 capsules by mouth Every Evening. 1 capsule every morning and 2 capsule every evening., Disp: , Rfl:     losartan (COZAAR) 100 MG tablet, Take 1 tablet by mouth Daily., Disp: 90 tablet, Rfl: 3    metoprolol succinate XL (TOPROL-XL) 100 MG 24 hr tablet, Take 1 tablet by mouth Daily. Hold if heartrate is <60., Disp: 90 tablet, Rfl: 3    montelukast (SINGULAIR) 10 MG tablet, Take 1 tablet by mouth Every Night. (Patient taking differently: Take 1 tablet by mouth Every Morning.), Disp: 90 tablet, Rfl: 3    spironolactone (ALDACTONE) 25 MG tablet, Take 1 tablet by mouth 3 (Three) Times a Week. Monday,Wednesday,Friday (Patient taking differently: Take 1 tablet by mouth Daily. Monday,Wednesday,Friday), Disp: 36 tablet, Rfl: 3     Objective   Physical Exam  Physical Exam      Vital Signs:   /77 (BP Location: Left arm, Patient Position: Sitting, Cuff Size: Adult)   Pulse 67   Temp 97.2 °F (36.2 °C) (Tympanic)   Resp 18   Ht 154.9 cm (60.98\")   Wt 99.1 kg (218 lb 6.4 oz)   SpO2 94% Comment: room air  BMI 41.29 kg/m²       Vital Signs Reviewed  Morbidly obese female, Alert, has mild conversational dyspnea.   HEENT:  PERRL, EOMI.  OP, nares clear, no sinus tenderness  Mallampatti classification : 1  Neck:  Supple, no JVD, no thyromegaly  Lymph: no axillary, cervical, supraclavicular lymphadenopathy noted bilaterally  Chest:  good aeration, bilateral diminished breath sounds, no wheezing, crackles or rhonchi, resonant to percussion b/l  CV: RRR, no MGR, pulses 2+, equal.  Abd:  Soft, NT, ND, + BS, no " HSM  EXT:  no clubbing, no cyanosis, No BLE edema  Neuro:  A&Ox3, CN grossly intact, no focal deficits.  Skin: No rashes or lesions noted     Result Review :   The following data was reviewed by: Santino Baez MD on 03/24/2025:  Common labs          1/13/2025    04:28 1/17/2025    23:14 3/5/2025    09:37   Common Labs   Glucose 94  98  90    BUN 27  12  14    Creatinine 1.33  1.38  1.25    Sodium 139  142  143    Potassium 4.4  3.8  4.3    Chloride 108  109  108    Calcium 8.9  9.2  9.7    Albumin  3.4  3.8    Total Bilirubin  0.3  0.6    Alkaline Phosphatase  94  76    AST (SGOT)  15  16    ALT (SGPT)  10  7    WBC 12.08  10.38  9.98    Hemoglobin 11.6  11.6  12.1    Hematocrit 37.9  38.6  38.6    Platelets 232  228  246    Hemoglobin A1C   5.80      CMP          1/13/2025    04:28 1/17/2025    23:14 3/5/2025    09:37   CMP   Glucose 94  98  90    BUN 27  12  14    Creatinine 1.33  1.38  1.25    EGFR 40.8  39.0  43.7    Sodium 139  142  143    Potassium 4.4  3.8  4.3    Chloride 108  109  108    Calcium 8.9  9.2  9.7    Total Protein  6.1  6.7    Albumin  3.4  3.8    Globulin  2.7  2.9    Total Bilirubin  0.3  0.6    Alkaline Phosphatase  94  76    AST (SGOT)  15  16    ALT (SGPT)  10  7    Albumin/Globulin Ratio  1.3  1.3    BUN/Creatinine Ratio 20.3  8.7  11.2    Anion Gap 10.3  10.3  9.9      CBC          1/13/2025    04:28 1/17/2025    23:14 3/5/2025    09:37   CBC   WBC 12.08  10.38  9.98    RBC 4.19  4.22  4.33    Hemoglobin 11.6  11.6  12.1    Hematocrit 37.9  38.6  38.6    MCV 90.5  91.5  89.1    MCH 27.7  27.5  27.9    MCHC 30.6  30.1  31.3    RDW 15.9  15.8  13.7    Platelets 232  228  246      CBC w/diff          1/13/2025    04:28 1/17/2025    23:14 3/5/2025    09:37   CBC w/Diff   WBC 12.08  10.38  9.98    RBC 4.19  4.22  4.33    Hemoglobin 11.6  11.6  12.1    Hematocrit 37.9  38.6  38.6    MCV 90.5  91.5  89.1    MCH 27.7  27.5  27.9    MCHC 30.6  30.1  31.3    RDW 15.9  15.8  13.7    Platelets 232   228  246    Neutrophil Rel %  49.1  51.4    Immature Granulocyte Rel %  1.7  0.2    Lymphocyte Rel %  33.7  33.0    Monocyte Rel %  10.7  10.8    Eosinophil Rel %  3.8  3.2    Basophil Rel %  1.0  1.4      Data reviewed : Radiologic studies Previous imaging reviewed.     Results  Laboratory Studies  Eosinophil level is 320.    Imaging  CT scan shows clots on both sides of the lungs, with some causing likely pulmonary infarct.             Assessment and Plan    Diagnoses and all orders for this visit:    1. Seasonal allergies (Primary)  -     Complete PFT - Pre & Post Bronchodilator; Future  -     IgE Level; Future    2. Allergic rhinitis, unspecified seasonality, unspecified trigger  -     Adult Transthoracic Echo Complete W/ Cont if Necessary Per Protocol; Future  -     PAP Therapy  -     Complete PFT - Pre & Post Bronchodilator; Future  -     IgE Level; Future    3. Diastolic dysfunction  -     Adult Transthoracic Echo Complete W/ Cont if Necessary Per Protocol; Future  -     PAP Therapy    4. Other acute pulmonary embolism with acute cor pulmonale  -     Adult Transthoracic Echo Complete W/ Cont if Necessary Per Protocol; Future  -     PAP Therapy  -     Complete PFT - Pre & Post Bronchodilator; Future  -     IgE Level; Future    5. Acute pulmonary embolism, unspecified pulmonary embolism type, unspecified whether acute cor pulmonale present  -     Adult Transthoracic Echo Complete W/ Cont if Necessary Per Protocol; Future  -     PAP Therapy  -     Complete PFT - Pre & Post Bronchodilator; Future  -     IgE Level; Future  -     apixaban (ELIQUIS) 5 MG tablet tablet; Take 1 tablet by mouth 2 (Two) Times a Day.  Dispense: 180 tablet; Refill: 1    6. Morbid (severe) obesity due to excess calories    7. Bronchitis, mucopurulent recurrent  -     Adult Transthoracic Echo Complete W/ Cont if Necessary Per Protocol; Future  -     PAP Therapy  -     Complete PFT - Pre & Post Bronchodilator; Future  -     IgE Level;  Future    8. Chronic obstructive pulmonary disease, unspecified COPD type  -     Adult Transthoracic Echo Complete W/ Cont if Necessary Per Protocol; Future  -     PAP Therapy  -     Complete PFT - Pre & Post Bronchodilator; Future  -     IgE Level; Future    9. Chronic obstructive pulmonary disease with acute exacerbation  -     Adult Transthoracic Echo Complete W/ Cont if Necessary Per Protocol; Future  -     PAP Therapy  -     Complete PFT - Pre & Post Bronchodilator; Future  -     IgE Level; Future    10. Hemoptysis    11. Moderate persistent asthma, unspecified whether complicated  -     Adult Transthoracic Echo Complete W/ Cont if Necessary Per Protocol; Future  -     PAP Therapy  -     Complete PFT - Pre & Post Bronchodilator; Future  -     IgE Level; Future    12. Pulmonary nodule  -     Adult Transthoracic Echo Complete W/ Cont if Necessary Per Protocol; Future  -     PAP Therapy  -     Complete PFT - Pre & Post Bronchodilator; Future  -     IgE Level; Future    13. Tobacco abuse, in remission    14. CARMEN (obstructive sleep apnea)  -     Adult Transthoracic Echo Complete W/ Cont if Necessary Per Protocol; Future  -     PAP Therapy    15. Oral thrush      Assessment & Plan  1. Pulmonary Embolism.  She experienced a submassive pulmonary embolism in January 2025, requiring hospitalization. She is currently on Eliquis twice daily and has not missed any doses. A CT scan is scheduled for April 2025 to assess the clots. An echocardiogram will be ordered for June 2025 to evaluate overall improvement. The duration of anticoagulation therapy will be determined based on the CT scan results. A refill for Eliquis has been provided.    2. Chronic Obstructive Pulmonary Disease.  She reports very shortness of breath with activities and occasional use of her rescue inhaler. She is currently using Breztri inhaler twice daily and albuterol as needed. A lung function test will be scheduled to assess the need for biologic  therapy. She is advised to continue using her rescue inhaler as needed but to avoid overuse.    3. Obstructive Sleep Apnea.  She reports difficulty breathing after removing her CPAP mask, leading to increased use of her rescue inhaler. A new order for AutoPap has been placed, with settings between 4-10 cm H2O. She is advised to use oxygen with the CPAP at 1-2 L/min. If insurance requires a new sleep study, it may need to be conducted.    4. Sinus Polyps.  She has been diagnosed with sinus polyps and has an upcoming appointment with an ENT specialist. Blood work, including IgE levels, has been ordered to assess eligibility for Dupixent, which could help with both her sinus polyps and COPD.    5. Pneumonia.  She had pneumonia in December 2024.    Follow Up   Return in about 4 months (around 7/24/2025).  Patient was given instructions and counseling regarding her condition or for health maintenance advice. Please see specific information pulled into the AVS if appropriate.     Patient or patient representative verbalized consent for the use of Ambient Listening during the visit with  Santino Baez MD for chart documentation. 3/24/2025  10:27 EDT    Electronically signed by Santino Baez MD, 3/24/2025, 10:27 EDT.

## 2025-03-26 ENCOUNTER — OFFICE VISIT (OUTPATIENT)
Dept: OTOLARYNGOLOGY | Facility: CLINIC | Age: 80
End: 2025-03-26
Payer: MEDICARE

## 2025-03-26 VITALS
TEMPERATURE: 97.1 F | DIASTOLIC BLOOD PRESSURE: 73 MMHG | HEART RATE: 72 BPM | SYSTOLIC BLOOD PRESSURE: 124 MMHG | OXYGEN SATURATION: 92 %

## 2025-03-26 DIAGNOSIS — G50.1 ATYPICAL FACIAL PAIN: Primary | ICD-10-CM

## 2025-03-26 DIAGNOSIS — J34.1 MUCOUS RETENTION CYST OF MAXILLARY SINUS: ICD-10-CM

## 2025-03-26 PROCEDURE — 99204 OFFICE O/P NEW MOD 45 MIN: CPT | Performed by: OTOLARYNGOLOGY

## 2025-03-26 PROCEDURE — 3078F DIAST BP <80 MM HG: CPT | Performed by: OTOLARYNGOLOGY

## 2025-03-26 PROCEDURE — 3074F SYST BP LT 130 MM HG: CPT | Performed by: OTOLARYNGOLOGY

## 2025-03-26 NOTE — PROGRESS NOTES
Patient Name: Julia Rios   Visit Date: 03/26/2025   Patient ID: [id number]  Provider: Samir Dwyer MD    Sex: female  Location: [facility]   YOB: 1945  Location Address: [address] [place] [address], [address] [place] [address],?[place]?[id number]    Primary Care Provider Nick Patel DO  Location Phone: [phone]    Referring Provider: Nick Patel,*        Chief Complaint  Abnormal CT    History of Present Illness  Julia Rios is a 80 y.o. female with past medical history significant for COPD, CHF, hypertension, and chronic kidney disease who presents to [facility] today as a consult from Nick Patel,* for evaluation of sinuses.  She tells me that over approximately the last year she has been experiencing intermittent left maxillary dental pain which seems to originate around tooth 12 or 13.  She cannot recall any inciting illness or incident.  She has seen her dentist and undergone multiple x-rays without an obvious source to her pain.  She does report some cold sensitivity with her teeth.  She has also noticed occasional pain with chewing.  Her pain does not radiate.  She does report rhinorrhea which is occasionally discolored.  She denies any nasal congestion or hyposmia.  She does report occasional left-sided epistaxis and is currently taking Eliquis for pulmonary emboli.  She is also using azelastine, fexofenadine, and montelukast without improvement.  She does not use saline irrigations. CT scan of the sinuses without contrast on 1/8/2025 demonstrated a 1 cm left anterior inferior maxillary sinus mucous retention cyst and possibly a tiny periapical lucency involving tooth #13 which is only visible on sagittal images.  She does mention a previous workup of her left submandibular gland.      Past Medical History:   Diagnosis Date    Acute right-sided low back pain with right-sided sciatica 01/15/2018    Allergic      Allergic rhinitis     Asthma     Asthma, extrinsic     Asthma, intrinsic     CHF (congestive heart failure)     Chronic heart failure with preserved ejection fraction     11/16/20 echocardiogram: 1.  Normal ejection fraction of 55%. 2.  Mild left ventricular hypertrophy. 3.  No significant valvular heart issues.     CKD stage 3 10/29/2019    COPD (chronic obstructive pulmonary disease)     Diverticulitis     Emphysema of lung     Essential hypertension     GERD (gastroesophageal reflux disease)     Headache     Hemoptysis     non cancerous    History of fungal pneumonia 12/02/2021    Hyperlipidemia     Idiopathic chronic gout of multiple sites without tophus 05/20/2016    Lumbosacral disc disease     Obesity     Pneumonia     Primary osteoarthritis of right knee 01/16/2017    Pulmonary embolism     Shortness of breath     Sleep apnea     CPAP    Sleep apnea, obstructive     Vitamin D deficiency 01/13/2016       Past Surgical History:   Procedure Laterality Date    BRONCHOSCOPY N/A 04/30/2024    Procedure: BRONCHOSCOPY WITH BRONCHOALVEOLAR LAVAGE, WASHING, AIRWAY INSPECTION;  Surgeon: Santino Baez MD;  Location: [facility];  Service: Pulmonary;  Laterality: N/A;  MUCOUS PLUGGING, BRONCHITIS    CATARACT EXTRACTION Right     COLONOSCOPY  2014    COLONOSCOPY N/A 10/12/2021    Procedure: COLONOSCOPY;  Surgeon: Jorge Diane MD;  Location: [facility];  Service: Gastroenterology;  Laterality: N/A;  DIVERTICULOSIS    ENDOSCOPY  2015    EYE SURGERY      cataract right eye    JOINT REPLACEMENT  10-31-23    OTHER SURGICAL HISTORY      Fatty tumor removed off back    RETINAL DETACHMENT REPAIR Right     hole in retina repair    TOTAL KNEE ARTHROPLASTY Right 10/31/2023    Procedure: TOTAL KNEE ARTHROPLASTY;  Surgeon: Celso Prakash MD;  [place] [facility]  Service: Orthopedics;  Laterality: Right;         Current Outpatient Medications:     albuterol (PROVENTIL) (2.5 MG/3ML) 0.083% nebulizer  solution, Take 2.5 mg by nebulization 4 (Four) Times a Day As Needed for Wheezing., Disp: 360 mL, Rfl: 3    albuterol sulfate  (90 Base) MCG/ACT inhaler, Inhale 2 puffs Every 4 (Four) Hours As Needed for Wheezing., Disp: , Rfl:     apixaban (ELIQUIS) 5 MG tablet tablet, Take 1 tablet by mouth 2 (Two) Times a Day., Disp: 180 tablet, Rfl: 1    azelastine (ASTELIN) 0.1 % nasal spray, USE 2 SPRAYS INTO THE NOSTRILS AS DIRECTED TWICE DAILY., Disp: 30 mL, Rfl: 6    Budeson-Glycopyrrol-Formoterol (BREZTRI) 160-9-4.8 MCG/ACT aerosol inhaler, Inhale 2 puffs 2 (Two) Times a Day., Disp: 10.7 g, Rfl: 11    D3-1000 25 MCG (1000 UT) capsule, TAKE 1 CAPSULE BY MOUTH EVERY DAY, Disp: 90 capsule, Rfl: 3    famotidine (PEPCID) 20 MG tablet, Take 1 tablet by mouth every night at bedtime., Disp: 30 tablet, Rfl: 11    fexofenadine (ALLEGRA) 180 MG tablet, Take 1 tablet by mouth Daily., Disp: 90 tablet, Rfl: 3    gabapentin (NEURONTIN) 300 MG capsule, Take 1 capsule by mouth Every Morning. 1 capsule every morning and 2 capsule every evening., Disp: , Rfl:     gabapentin (NEURONTIN) 300 MG capsule, Take 2 capsules by mouth Every Evening. 1 capsule every morning and 2 capsule every evening., Disp: , Rfl:     losartan (COZAAR) 100 MG tablet, Take 1 tablet by mouth Daily., Disp: 90 tablet, Rfl: 3    metoprolol succinate XL (TOPROL-XL) 100 MG 24 hr tablet, Take 1 tablet by mouth Daily. Hold if heartrate is <60., Disp: 90 tablet, Rfl: 3    montelukast (SINGULAIR) 10 MG tablet, Take 1 tablet by mouth Every Night. (Patient taking differently: Take 1 tablet by mouth Every Morning.), Disp: 90 tablet, Rfl: 3    spironolactone (ALDACTONE) 25 MG tablet, Take 1 tablet by mouth 3 (Three) Times a Week. Monday,Wednesday,Friday (Patient taking differently: Take 1 tablet by mouth Daily. Monday,Wednesday,Friday), Disp: 36 tablet, Rfl: 3     Allergies   Allergen Reactions    Ibuprofen Hives    Penicillins Other (See Comments) and Seizure     1. When  was your reaction? < 10 years ago  2. Did your reaction happen after the first dose or after several doses? Do not know  3. Did your reaction require ED or hospital care to manage your reaction? Do not know  4. Did your reaction require treatment with epinephrine? Do not know  5. Have you taken amoxicillin (Amoxil) or amoxicillin-clavulanate (Augmentin) without issue since? No  6. Have you taken cephalexin (Keflex) without issue since?  No     Nsaids Other (See Comments)     CKD    Cephalosporins Rash    Sulfa Antibiotics Rash       Social History     Tobacco Use    Smoking status: Former     Current packs/day: 0.00     Average packs/day: 0.5 packs/day for 32.0 years (16.0 ttl pk-yrs)     Types: Cigarettes     Start date: 1963     Quit date: 1995     Years since quittin.2     Passive exposure: Never    Smokeless tobacco: Never   Vaping Use    Vaping status: Never Used   Substance Use Topics    Alcohol use: Never    Drug use: Never        Objective     Vital Signs:   /73   Pulse 72   Temp 97.1 °F (36.2 °C)   SpO2 92%       Physical Exam    General: Well developed, well nourished patient of stated age in no acute distress. Voice is strong and clear.   Head: Normocephalic and atraumatic.  Face: No lesions.  Bilateral parotid and submandibular glands are unremarkable aside from the left submandibular gland being more firm to palpation.  Stensen's and Warthin's ducts are productive of clear saliva bilaterally.  House-Brackmann I/VI     bilaterally.  Left masseter insertion tenderness to palpation.  No TMJ crepitus.  Eyes: PERRLA, sclerae anicteric, no conjunctival injection. Extraocular movements are intact and full. No nystagmus.   Ears: Auricles are normal in appearance. Bilateral external auditory canals are unremarkable. Bilateral tympanic membranes are clear and without effusion. Hearing normal to conversational voice.   Nose: External nose is normal in appearance. Bilateral nares are patent  with normal appearing mucosa. Septum deviated to the right. Turbinates are unremarkable. No lesions.   Oral Cavity: Lips are normal in appearance. Oral mucosa is unremarkable. Gingiva is unremarkable.  Partial dentition for age. Tongue is unremarkable with good movement. Hard palate is unremarkable.   Oropharynx: Soft palate is unremarkable with full movement. Uvula is unremarkable. Bilateral tonsils are unremarkable. Posterior oropharynx is unremarkable.    Larynx and hypopharynx: Deferred secondary to gag reflex.  Neck: Supple.  No mass.  Nontender to palpation.  Trachea midline. Thyroid normal size and without nodules to palpation.   Lymphatic: No lymphadenopathy upon palpation.  Respiratory: Clear to auscultation bilaterally, nonlabored respirations    Cardiovascular: RRR, no murmurs, rubs, or gallops,   Psychiatric: Appropriate affect, cooperative   Neurologic: Oriented x 3, strength symmetric in all extremities, Cranial Nerves II-XII are grossly intact to confrontation   Skin: Warm and dry. No rashes.    Procedures           Result Review :               Assessment and Plan    Diagnoses and all orders for this visit:    1. Atypical facial pain (Primary)    2. Mucous retention cyst of maxillary sinus    Impressions and findings were discussed at great length.  Currently, she is seen today for evaluation of intermittent left maxillary pain in the area of teeth 12 and 13.  Examination today reveals left masseter insertion tenderness to palpation and her left submandibular gland is mildly firm to palpation when compared to her right.  She tells me that she has previously undergone workup of her left submandibular gland which was unremarkable.  We reviewed and discussed the images from her CT scan of the sinuses without contrast on 1/8/2025 which demonstrated a 1 cm left anterior inferior maxillary sinus mucous retention cyst and possibly a tiny periapical lucency involving tooth #13 which is only visible on  sagittal images.  We discussed that I am doubtful that her mucous retention cyst is responsible for her symptoms but did discuss the differential including myofascial muscle pain versus an early periapical abscess involving tooth #13.  We discussed the possibility of fluticasone and saline irrigations for her sinuses.  We also discussed the possibility of further workup of her submandibular gland but discussed that this would not be causing her pain.  After thorough discussion she would like to continue with observation.  She was given ample time to ask questions, all of which were answered to her satisfaction.  \    Follow Up   No follow-ups on file.  Patient was given instructions and counseling regarding her condition or for health maintenance advice. Please see specific information pulled into the AVS if appropriate.

## 2025-03-27 ENCOUNTER — PATIENT ROUNDING (BHMG ONLY) (OUTPATIENT)
Dept: OTOLARYNGOLOGY | Facility: CLINIC | Age: 80
End: 2025-03-27
Payer: MEDICARE

## 2025-03-27 RX ORDER — DUPILUMAB 300 MG/2ML
300 INJECTION, SOLUTION SUBCUTANEOUS
Qty: 4 ML | Refills: 10 | Status: SHIPPED | OUTPATIENT
Start: 2025-04-09

## 2025-03-27 RX ORDER — DUPILUMAB 300 MG/2ML
600 INJECTION, SOLUTION SUBCUTANEOUS ONCE
Qty: 4 ML | Refills: 0 | Status: SHIPPED | OUTPATIENT
Start: 2025-03-27 | End: 2025-03-29

## 2025-03-27 NOTE — PROGRESS NOTES
A [employer] message has been send to the patient for PATIENT ROUNDING with [facility].

## 2025-04-07 ENCOUNTER — HOSPITAL ENCOUNTER (OUTPATIENT)
Dept: CT IMAGING | Facility: HOSPITAL | Age: 80
Discharge: HOME | End: 2025-04-07
Admitting: NURSE PRACTITIONER
Payer: MEDICARE

## 2025-04-07 DIAGNOSIS — R91.1 LUNG NODULE: ICD-10-CM

## 2025-04-07 DIAGNOSIS — I26.99 ACUTE PULMONARY EMBOLISM, UNSPECIFIED PULMONARY EMBOLISM TYPE, UNSPECIFIED WHETHER ACUTE COR PULMONALE PRESENT: ICD-10-CM

## 2025-04-07 LAB
CREAT BLDA-MCNC: 1.2 MG/DL (ref 0.6–1.3)
EGFRCR SERPLBLD CKD-EPI 2021: 45.9 ML/MIN/1.73

## 2025-04-07 PROCEDURE — 82565 ASSAY OF CREATININE: CPT

## 2025-04-07 PROCEDURE — 71275 CT ANGIOGRAPHY CHEST: CPT

## 2025-04-07 PROCEDURE — 25510000001 IOPAMIDOL PER 1 ML: Performed by: NURSE PRACTITIONER

## 2025-04-07 RX ORDER — IOPAMIDOL 755 MG/ML
100 INJECTION, SOLUTION INTRAVASCULAR
Status: COMPLETED | OUTPATIENT
Start: 2025-04-07 | End: 2025-04-07

## 2025-04-07 RX ADMIN — IOPAMIDOL 100 ML: 755 INJECTION, SOLUTION INTRAVENOUS at 09:22

## 2025-04-10 NOTE — PROGRESS NOTES
Primary Care Provider  Nick Patel DO   Referring Provider  No ref. provider found    Patient Complaint  Follow-up, Results (CT), and Shortness of Breath (On exertion )    Patient or patient representative verbalized consent for the use of Ambient Listening during the visit with  KALEB Cary for chart documentation. 4/11/2025  10:57 EDT      Subjective       History of Presenting Illness  Julia Rios is a pleasant 80 y.o. female  of  Dr. Baez who presents to [facility] with history of recurrent bronchitis, obstructive sleep apnea on CPAP, allergic rhinitis, history of PE, and tobacco abuse  here for followup appointment.  Patient continues on Eliquis since January after submassive pulmonary embolism.  Echocardiogram is scheduled in June to evaluate overall improvement.    CT angiogram on 4/7/2025 reveals interval resolution of previously seen pulmonary embolism and right heart strain.  No suspicious pulmonary nodules.    Pulmonary function test 3/24/2025 revealed normal spirometry with normal lung volumes and mild decrease in diffusion capacity.    Echocardiogram has been scheduled for 6/4/2025 at 10 AM    6-minute walk conducted with patient passing with lowest saturation of 90 on room air.    IgE level: Not done  History of Present Illness  The patient presents for evaluation of pulmonary embolism, blood clot, and sleep apnea.    She reports a general sense of well-being, having resumed her work as a  at [employer] approximately 3 weeks post-hospitalization. Initially, she required oxygen support throughout the day but has since discontinued its use, although she keeps it on hand. She continues her medication regimen of Breztri and albuterol. An IgE blood test was ordered by Dr. Corado, which she has not yet completed. She is currently negotiating with her insurance company to reduce her co-pay for Dupixent from $  700 to $ 300. She plans to complete the blood test next week. She reports no current respiratory distress but experienced shortness of breath upon arrival at the clinic. She maintains an active lifestyle, walking nearly 2 miles daily with her dog, Silvia. She expresses concern about the progression of her lung disease. She experiences shortness of breath after eating and believes that exertion exacerbates her symptoms. She recalls a previous hospitalization for pneumonia in December, followed by another admission a month later due to a blood clot.    She is currently on Eliquis for the management of a blood clot, as prescribed by Dr. Corado. She recalls a trip to [place] in January to  a puppy, during which she experienced significant leg swelling and pain, leading to the diagnosis of the blood clot. She has been advised that her Eliquis treatment may be extended for an additional 6 months.    She has discontinued the use of CPAP due to difficulty breathing upon removal but continues to use supplemental oxygen at night as per Dr. Corado's instructions.    SOCIAL HISTORY  The patient is working as a  at [employer].    MEDICATIONS  Breztri, albuterol, Eliquis         At present time patient denies dyspnea, coughing, wheezing, headaches, chest pain, weight loss or hemoptysis. Patient denies fevers, chills and night sweats. Julia Rios is able to perform ADLs.      I have personally reviewed the review of systems, past family, social, medical and surgical histories; and agree with their findings.      Review of Systems   Constitutional: Negative.    HENT: Negative.     Respiratory: Negative.     Cardiovascular: Negative.    Musculoskeletal: Negative.    Neurological: Negative.    Psychiatric/Behavioral: Negative.           Family History   Problem Relation Age of Onset    Colon cancer Mother 61    Cancer Mother     Heart disease Mother     Thyroid disease Mother     Migraines Mother     Heart  failure Father     Asthma Father     Heart attack Father     Hyperlipidemia Father     Emphysema Father     COPD Father         Black Lung    Diabetes Brother     Emphysema Brother     COPD Brother     Breast cancer Maternal Grandmother     Stroke Paternal Grandfather     COPD Sister         lower lobectomy    Thyroid disease Sister     Malig Hyperthermia Neg Hx         Social History     Socioeconomic History    Marital status:    Tobacco Use    Smoking status: Former     Current packs/day: 0.00     Average packs/day: 0.5 packs/day for 32.0 years (16.0 ttl pk-yrs)     Types: Cigarettes     Start date: 1963     Quit date: 1995     Years since quittin.2     Passive exposure: Never    Smokeless tobacco: Never   Vaping Use    Vaping status: Never Used   Substance and Sexual Activity    Alcohol use: Never    Drug use: Never    Sexual activity: Defer        Past Medical History:   Diagnosis Date    Acute right-sided low back pain with right-sided sciatica 01/15/2018    Allergic     Allergic rhinitis     Asthma     Asthma, extrinsic     Asthma, intrinsic     CHF (congestive heart failure)     Chronic heart failure with preserved ejection fraction     20 echocardiogram: 1.  Normal ejection fraction of 55%. 2.  Mild left ventricular hypertrophy. 3.  No significant valvular heart issues.     CKD stage 3 10/29/2019    COPD (chronic obstructive pulmonary disease)     Diverticulitis     Emphysema of lung     Essential hypertension     GERD (gastroesophageal reflux disease)     Headache     Hemoptysis     non cancerous    History of fungal pneumonia 2021    Hyperlipidemia     Idiopathic chronic gout of multiple sites without tophus 2016    Lumbosacral disc disease     Obesity     Pneumonia     Primary osteoarthritis of right knee 2017    Pulmonary embolism     Shortness of breath     Sleep apnea     CPAP    Sleep apnea, obstructive     Vitamin D deficiency 2016         Immunization History   Administered Date(s) Administered    COVID-19 (MODERNA) 1st,2nd,3rd Dose Monovalent 01/28/2021, 01/28/2021, 02/23/2021, 02/23/2021    Fluzone High-Dose 65+yrs 12/02/2021, 12/28/2022, 01/03/2024    Fluzone Quad >6mos (Multi-dose) 10/01/2020    Pneumococcal Conjugate 13-Valent (PCV13) 02/11/2015    Pneumococcal Conjugate 20-Valent (PCV20) 07/20/2023    Pneumococcal Polysaccharide (PPSV23) 05/01/2010       Allergies   Allergen Reactions    Ibuprofen Hives    Penicillins Other (See Comments) and Seizure     1. When was your reaction? < 10 years ago  2. Did your reaction happen after the first dose or after several doses? Do not know  3. Did your reaction require ED or hospital care to manage your reaction? Do not know  4. Did your reaction require treatment with epinephrine? Do not know  5. Have you taken amoxicillin (Amoxil) or amoxicillin-clavulanate (Augmentin) without issue since? No  6. Have you taken cephalexin (Keflex) without issue since?  No     Nsaids Other (See Comments)     CKD    Cephalosporins Rash    Sulfa Antibiotics Rash          Current Outpatient Medications:     albuterol (PROVENTIL) (2.5 MG/3ML) 0.083% nebulizer solution, Take 2.5 mg by nebulization 4 (Four) Times a Day As Needed for Wheezing., Disp: 360 mL, Rfl: 3    albuterol sulfate  (90 Base) MCG/ACT inhaler, Inhale 2 puffs Every 4 (Four) Hours As Needed for Wheezing., Disp: , Rfl:     apixaban (ELIQUIS) 5 MG tablet tablet, Take 1 tablet by mouth 2 (Two) Times a Day., Disp: 180 tablet, Rfl: 1    azelastine (ASTELIN) 0.1 % nasal spray, USE 2 SPRAYS INTO THE NOSTRILS AS DIRECTED TWICE DAILY., Disp: 30 mL, Rfl: 6    Budeson-Glycopyrrol-Formoterol (BREZTRI) 160-9-4.8 MCG/ACT aerosol inhaler, Inhale 2 puffs 2 (Two) Times a Day., Disp: 10.7 g, Rfl: 11    D3-1000 25 MCG (1000 UT) capsule, TAKE 1 CAPSULE BY MOUTH EVERY DAY, Disp: 90 capsule, Rfl: 3    Dupilumab (Dupixent) 300 MG/2ML solution auto-injector injection,  "Inject 2 mL under the skin into the appropriate area as directed Every 14 (Fourteen) Days. Indications: Asthma, Chronic Obstructive Lung Disease, Disp: 4 mL, Rfl: 10    famotidine (PEPCID) 20 MG tablet, Take 1 tablet by mouth every night at bedtime., Disp: 30 tablet, Rfl: 11    fexofenadine (ALLEGRA) 180 MG tablet, Take 1 tablet by mouth Daily., Disp: 90 tablet, Rfl: 3    gabapentin (NEURONTIN) 300 MG capsule, Take 1 capsule by mouth Every Morning. 1 capsule every morning and 2 capsule every evening., Disp: , Rfl:     gabapentin (NEURONTIN) 300 MG capsule, Take 2 capsules by mouth Every Evening. 1 capsule every morning and 2 capsule every evening., Disp: , Rfl:     losartan (COZAAR) 100 MG tablet, Take 1 tablet by mouth Daily., Disp: 90 tablet, Rfl: 3    metoprolol succinate XL (TOPROL-XL) 100 MG 24 hr tablet, Take 1 tablet by mouth Daily. Hold if heartrate is <60., Disp: 90 tablet, Rfl: 3    montelukast (SINGULAIR) 10 MG tablet, Take 1 tablet by mouth Every Night. (Patient taking differently: Take 1 tablet by mouth Every Morning.), Disp: 90 tablet, Rfl: 3    spironolactone (ALDACTONE) 25 MG tablet, Take 1 tablet by mouth 3 (Three) Times a Week. Monday,Wednesday,Friday (Patient taking differently: Take 1 tablet by mouth Daily. Monday,Wednesday,Friday), Disp: 36 tablet, Rfl: 3         Vital Signs   /82 (BP Location: Right arm, Patient Position: Sitting, Cuff Size: Adult)   Pulse 58   Temp 97.8 °F (36.6 °C)   Resp 16   Ht 154.9 cm (60.98\")   Wt 98.9 kg (218 lb)   SpO2 96%   BMI 41.22 kg/m²       Objective     Physical Exam  Vitals reviewed.   Constitutional:       Appearance: Normal appearance.   HENT:      Head: Normocephalic and atraumatic.      Nose: Nose normal.      Mouth/Throat:      Mouth: Mucous membranes are moist.      Pharynx: Oropharynx is clear.   Eyes:      Extraocular Movements: Extraocular movements intact.      Conjunctiva/sclera: Conjunctivae normal.      Pupils: Pupils are equal, round, " and reactive to light.   Cardiovascular:      Rate and Rhythm: Normal rate and regular rhythm.      Pulses: Normal pulses.      Heart sounds: Normal heart sounds.   Pulmonary:      Effort: Pulmonary effort is normal.      Breath sounds: Normal breath sounds.   Abdominal:      General: Bowel sounds are normal.   Musculoskeletal:         General: Normal range of motion.      Cervical back: Normal range of motion and neck supple.   Skin:     General: Skin is warm and dry.   Neurological:      Mental Status: She is alert and oriented to person, place, and time.   Psychiatric:         Behavior: Behavior normal.         Physical Exam  Lungs are clear.  Heart is regular.         Results Review  I have personally reviewed the prior office notes, hospital records, labs, and diagnostics.  CT Angiogram Chest Pulmonary Embolism [[id number]] (Order [id number])  Order  Status: Final result     Study Notes     Alyssa Lynch on 4/7/2025  9:19 AM EDT   F/U PE IN JAN, NO COMPLAINTS  100 CC   CREAT 1.2 4/7/25, GFR 46  PREV ONLINE     Appointment Information    PACS Images     Radiology Images  Study Result    Narrative & Impression   CT ANGIOGRAM CHEST PULMONARY EMBOLISM     Date of Exam: 4/7/2025 9:17 AM EDT     Indication: pulmonary embolism and lung nodule.     Comparison: CTA chest dated 1/8/2025     Technique: Axial CT images were obtained of the chest after the uneventful intravenous administration of iodinated contrast utilizing pulmonary embolism protocol.  Reconstructed coronal and sagittal images were also obtained. Automated exposure control   and iterative construction methods were used.        Findings:  There is mucus in the trachea and right mainstem bronchus. The heart is borderline in size. There is no pericardial effusion. There is interval resolution of right heart strain. The pulmonary arteries are patent with interval resolution of partially   occlusive pulmonary embolism seen on the previous exam. Pulmonary  arteries are within normal limits in size. No pathology lymphadenopathy by size criteria. There is a tiny hiatal hernia. No pleural effusion or pneumothorax. The thoracic aorta shows no   aneurysm or dissection. There is diffuse atherosclerosis of the aorta.  Mild basilar reticular lung thickening and linear atelectasis or scarring. The lungs are otherwise clear. There are no suspicious pulmonary nodules. No acute fractures or destructive bone lesions. No acute abnormalities in the upper abdomen. Incidentally   noticed omental fat umbilical hernia and cholelithiasis           IMPRESSION:  Impression:  1.Interval resolution of previously seen pulmonary embolism and right heart strain.  2.No suspicious pulmonary nodules.  3.Mild bibasilar reticular lung disease with discoid atelectasis or scarring. Otherwise clear lungs  4.Incidental findings are described above.           Electronically Signed: Tim Rodriguez MD    4/7/2025 9:41 AM EDT    Workstation ID: [id number]   Results  Complete PFT - Pre & Post Bronchodilator (Order [id number])  Order-Level Documents:    Scan on 3/24/2025 1141 by Santino Baez MD: PULMONARY FUNCTION TEST, [facility], 03/24/2025         Author: -- Service: -- Author Type: --   Filed: Date of Service: Creation Time:   Status: (Other)     PFT interpretation     Spirometry is normal.  FEV1/FVC is 66.   FEV1 is 1.49 liters, 86 percent of predicted.  FVC is 2.26 liters, 100 percent of predicted.     There is no significant response to bronchodilator administration.     Lung volumes:  Lung volumes are normal.   Total lung capacity is 3.97 liters, 91 percent of predicted.  Residual volume is 1.67 liters, 85 percent of predicted.        Diffusion capacity is 10.70 mL/min/mmHg, 63 percent of predicted.      Flow volume loop is normal     Comparision:  No previous study for comparison.     Conclusion:  Normal spirometry with normal lung volumes and mild decrease in diffusion capacity. It could suggest early  "obstructive, or early restrictive airway disease, pulmonary vascular disease or anemia. Please correlate clinically.                File Link    Scan on 3/24/2025 1141 by Santino Baez MD: PULMONARY FUNCTION TEST, [facility], 03/24/2025        Key Information    Document ID File Type Document Type Description   [id number] Image PULMONARY FUNCTION TEST - SCAN PULMONARY FUNCTION TEST, [facility], 03/24/2025     Import Information    Attached At Date Time User Dept   Order Level 3/24/2025 11:41 AM Santino Baez MD      Order    Pulmonary Function Test [[id number]]     Encounter    Hospital Encounter on 3/24/25 with  [facility]   Exercise Oximetry     Patient Name:Julia Rios   MRN: [id number]   Date: 02/17/25                                                                                                     ROOM AIR BASELINE   SpO2% 95   Heart Rate 61   Blood Pressure 116/76      EXERCISE ON ROOM AIR SpO2% EXERCISE ON O2 @  LPM SpO2%   1 MINUTE 91 1 MINUTE     2 MINUTES 90 2 MINUTES     3 MINUTES 93 3 MINUTES     4 MINUTES 90 4 MINUTES     5 MINUTES 91 5 MINUTES     6 MINUTES 91 6 MINUTES                                                                                                                   Distance Walked  660' Distance Walked   Dyspnea (Gurdeep Scale)  Pre-0 Post-1 Dyspnea (Gurdeep Scale)   Fatigue (Gurdeep Scale)  1 Fatigue (Gurdeep Scale)   SpO2% Post Exercise  96 SpO2% Post Exercise   HR Post Exercise  75 HR Post Exercise   Time to Recovery  1'39\" Time to Recovery   Contains abnormal data CBC Auto Differential  Order: [id number] - Part of Panel Order [id number]   Status: Final result       Dx: Essential hypertension, malignant; Di...    Test Result Released: Yes (not seen)    Specimen Information: Blood   0 Result Notes            Component  Ref Range & Units (hover) 1 mo ago  (3/5/25) 2 mo ago  (1/17/25) 2 mo ago  (1/13/25) 2 mo ago  (1/12/25) 3 mo ago  (1/9/25) 3 mo ago  (1/8/25) 3 mo ago  (12/26/24)   WBC " 9.98 10.38 12.08 High  12.27 High  10.29 9.42 15.41 High    RBC 4.33 4.22 4.19 4.50 4.30 4.36 4.38   Hemoglobin 12.1 11.6 Low  11.6 Low  12.5 11.8 Low  12.0 12.7   Hematocrit 38.6 38.6 37.9 40.9 37.6 39.1 38.8   MCV 89.1 91.5 90.5 90.9 87.4 89.7 88.6   MCH 27.9 27.5 27.7 27.8 27.4 27.5 29.0   MCHC 31.3 Low  30.1 Low  30.6 Low  30.6 Low  31.4 Low  30.7 Low  32.7   RDW 13.7 15.8 High  15.9 High  15.9 High  15.4 15.8 High  14.5   RDW-SD 44.6 52.7 52.9 53.0 49.1 51.9 46.8   MPV 13.0 High  13.0 High  12.8 High  12.9 High  12.1 High  12.3 High  12.0   Platelets 246 228 232 253 226 217 180   Neutrophil % 51.4 49.1  46.5 83.5 High  65.7    Lymphocyte % 33.0 33.7  36.1 12.2 Low  19.9    Monocyte % 10.8 10.7  11.7 3.1 Low  11.0    Eosinophil % 3.2 3.8  2.4 0.0 Low  2.0    Basophil % 1.4 1.0  1.2 0.3 0.6    Immature Grans % 0.2 1.7 High   2.1 High  0.9 High  0.8 High     Neutrophils, Absolute 5.13 5.10  5.70 8.59 High  6.18    Lymphocytes, Absolute 3.29 High  3.50 High   4.43 High  1.26 1.87    Monocytes, Absolute 1.08 High  1.11 High   1.43 High  0.32 1.04 High     Eosinophils, Absolute 0.32 0.39  0.30 0.00 0.19    Basophils, Absolute 0.14 0.10  0.15 0.03 0.06    Immature Grans, Absolute 0.02 0.18 High   0.26 High  0.09 High  0.08 High     nRBC 0.0 0.0  0.0 0.0 0.0    Resulting Agency [facility]  [facility]  [facility]  [facility]             Specimen Collected: 03/05/25 09:37 EST Last Resulted: 03/05/25 17:25 EST     Results  Imaging  CT angiogram revealed previously seen pulmonary embolism is resolved and the right heart strain is resolved. No suspicious findings.    Testing  Pulmonary function test in March revealed normal spirometry, normal lung volumes, but a mild decrease in diffusion capacity. Lowest saturation during 6-minute walk test was 90%.          Assessment         Patient Active Problem List   Diagnosis    Family history of colon cancer    Chronic obstructive pulmonary disease     Essential hypertension    Hyperlipidemia    CKD (chronic kidney disease) stage 3, GFR 30-59 ml/min    Chronic heart failure with preserved ejection fraction    CARMEN (obstructive sleep apnea)    Seasonal allergies    Gastroesophageal reflux disease    Bronchitis, mucopurulent recurrent    Tobacco abuse, in remission    Pulmonary nodule    PND (post-nasal drip)    Status post fall    Allergic rhinitis    Asthma    Closed fracture of metatarsal bone    Hemoptysis    Idiopathic chronic gout of multiple sites without tophus    Primary localized osteoarthritis    Vitamin D deficiency    Morbid (severe) obesity due to excess calories    Umbilical hernia without obstruction and without gangrene    Oral thrush    Arthritis of knee    Pre-diabetes    COPD (chronic obstructive pulmonary disease)    Pneumonia    Chronic maxillary sinusitis    Acute pulmonary embolism with acute cor pulmonale    Acute pulmonary embolism    Acute deep vein thrombosis (DVT) of right lower extremity    Diastolic dysfunction        Plan     Diagnoses and all orders for this visit:    1. Other acute pulmonary embolism with acute cor pulmonale (Primary)    2. Chronic obstructive pulmonary disease, unspecified COPD type    3. Shortness of breath    4. Seasonal allergies         Assessment & Plan  1. Pulmonary Embolism.  The recent CT angiogram showed resolution of the previously seen pulmonary embolism and right heart strain. The echocardiogram scheduled for June will help ensure there are no valvular issues. She will continue Eliquis until at least June, with a potential extension of 6 months based on reevaluation.    2. Blood Clot.  She is currently on Eliquis for the blood clot. The CT angiogram showed resolution of the pulmonary embolism. She will continue Eliquis until at least June, with a potential extension of 6 months based on reevaluation.    3. Chronic Obstructive Pulmonary Disease (COPD).  The pulmonary function test in March revealed normal  spirometry and lung volumes but a mild decrease in diffusion capacity, likely related to her smoking history. She passed the 6-minute walk test with the lowest saturation at 90%. She is advised to have oxygen available if needed. She will continue using Breztri and albuterol. An IgE blood test has been ordered to assess the need for Dupixent, which she will complete next week.    4. Sleep Apnea.  She is not using CPAP due to difficulty breathing when taking it off. She will continue using supplemental oxygen at night as advised by Dr. Corado.      Smoking status:  reports that she quit smoking about 30 years ago. Her smoking use included cigarettes. She started smoking about 62 years ago. She has a 16 pack-year smoking history. She has never been exposed to tobacco smoke. She has never used smokeless tobacco.    Vaccination status: Reviewed  Immunization History   Administered Date(s) Administered    COVID-19 (MODERNA) 1st,2nd,3rd Dose Monovalent 01/28/2021, 01/28/2021, 02/23/2021, 02/23/2021    Fluzone High-Dose 65+yrs 12/02/2021, 12/28/2022, 01/03/2024    Fluzone Quad >6mos (Multi-dose) 10/01/2020    Pneumococcal Conjugate 13-Valent (PCV13) 02/11/2015    Pneumococcal Conjugate 20-Valent (PCV20) 07/20/2023    Pneumococcal Polysaccharide (PPSV23) 05/01/2010        Medications personally reviewed    Follow Up  Return for Next scheduled follow up.    Patient was given instructions and counseling regarding her condition or for health maintenance advice. Please see specific information pulled into the AVS if appropriate.     I spent 19 minutes caring for Julia Rios on this date of service. This time includes time spent by me in the following activities:preparing for the visit, reviewing tests, obtaining and/or reviewing a separately obtained history, performing a medically appropriate examination and/or evaluation, counseling and educating the patient/family/caregiver, ordering medications, tests, or procedures,  documenting information in the medical record, independently interpreting results and communicating that information with the patient/family/caregiver and answered questions family members, discuss medications.

## 2025-04-11 ENCOUNTER — OFFICE VISIT (OUTPATIENT)
Dept: PULMONOLOGY | Facility: CLINIC | Age: 80
End: 2025-04-11
Payer: MEDICARE

## 2025-04-11 VITALS
OXYGEN SATURATION: 96 % | BODY MASS INDEX: 41.16 KG/M2 | HEART RATE: 58 BPM | HEIGHT: 61 IN | TEMPERATURE: 97.8 F | WEIGHT: 218 LBS | DIASTOLIC BLOOD PRESSURE: 82 MMHG | SYSTOLIC BLOOD PRESSURE: 155 MMHG | RESPIRATION RATE: 16 BRPM

## 2025-04-11 DIAGNOSIS — J30.2 SEASONAL ALLERGIES: ICD-10-CM

## 2025-04-11 DIAGNOSIS — J44.9 CHRONIC OBSTRUCTIVE PULMONARY DISEASE, UNSPECIFIED COPD TYPE: ICD-10-CM

## 2025-04-11 DIAGNOSIS — R06.02 SHORTNESS OF BREATH: ICD-10-CM

## 2025-04-11 DIAGNOSIS — I26.09 OTHER ACUTE PULMONARY EMBOLISM WITH ACUTE COR PULMONALE: Primary | ICD-10-CM

## 2025-04-14 RX ORDER — SPIRONOLACTONE 25 MG/1
25 TABLET ORAL 3 TIMES WEEKLY
Qty: 36 TABLET | Refills: 3 | Status: SHIPPED | OUTPATIENT
Start: 2025-04-14

## 2025-04-21 ENCOUNTER — OFFICE VISIT (OUTPATIENT)
Dept: FAMILY MEDICINE CLINIC | Facility: CLINIC | Age: 80
End: 2025-04-21
Payer: MEDICARE

## 2025-04-21 VITALS
WEIGHT: 218 LBS | DIASTOLIC BLOOD PRESSURE: 79 MMHG | HEIGHT: 61 IN | OXYGEN SATURATION: 96 % | HEART RATE: 66 BPM | TEMPERATURE: 96 F | BODY MASS INDEX: 41.16 KG/M2 | SYSTOLIC BLOOD PRESSURE: 130 MMHG

## 2025-04-21 DIAGNOSIS — I10 ESSENTIAL HYPERTENSION: ICD-10-CM

## 2025-04-21 DIAGNOSIS — J30.2 SEASONAL ALLERGIES: ICD-10-CM

## 2025-04-21 DIAGNOSIS — E78.2 MIXED HYPERLIPIDEMIA: Primary | ICD-10-CM

## 2025-04-21 DIAGNOSIS — R73.03 PRE-DIABETES: ICD-10-CM

## 2025-04-21 PROCEDURE — 99214 OFFICE O/P EST MOD 30 MIN: CPT | Performed by: FAMILY MEDICINE

## 2025-04-21 PROCEDURE — 3075F SYST BP GE 130 - 139MM HG: CPT | Performed by: FAMILY MEDICINE

## 2025-04-21 PROCEDURE — 1126F AMNT PAIN NOTED NONE PRSNT: CPT | Performed by: FAMILY MEDICINE

## 2025-04-21 PROCEDURE — 3078F DIAST BP <80 MM HG: CPT | Performed by: FAMILY MEDICINE

## 2025-04-21 NOTE — PROGRESS NOTES
Chief Complaint   Patient presents with    Follow-up     6 month     Hyperlipidemia    Hypertension        Subjective     Julia Rios  has a past medical history of Acute right-sided low back pain with right-sided sciatica (01/15/2018), Allergic, Allergic rhinitis, Asthma, Asthma, extrinsic, Asthma, intrinsic, CHF (congestive heart failure), Chronic heart failure with preserved ejection fraction, CKD stage 3 (10/29/2019), COPD (chronic obstructive pulmonary disease), Diverticulitis, Emphysema of lung, Essential hypertension, GERD (gastroesophageal reflux disease), Headache, Hemoptysis, History of fungal pneumonia (12/02/2021), Hyperlipidemia, Idiopathic chronic gout of multiple sites without tophus (05/20/2016), Lumbosacral disc disease, Obesity, Pneumonia, Primary osteoarthritis of right knee (01/16/2017), Pulmonary embolism, Shortness of breath, Sleep apnea, Sleep apnea, obstructive, and Vitamin D deficiency (01/13/2016).    Ear pressure-she has noticed some pressure and fullness in both ears.  She has not had any pain or discomfort.  She has had some postnasal congestion runny nose and a cough.  She does take her Astelin nasal spray her Allegra and Singulair on a daily basis.  She denies any fevers or chills.      follow up CT lungs  Symptoms are: chronic.   Symptoms include: nasal congestion and cough.   Pertinent negative symptoms include no abdominal pain, no anorexia, no joint pain, no change in stool, no chest pain, no chills, no diaphoresis, no fatigue, no fever, no headaches, no joint swelling, no myalgias, no nausea, no neck pain, no numbness, no rash, no sore throat, no swollen glands, no dysuria, no vertigo, no visual change, no vomiting and no weakness.   Aggravating factors include exertion.   Improvement on treatment was moderate.   Hyperlipidemia  This is a chronic problem. The current episode started more than 1 year ago. The problem is controlled. Recent lipid tests were reviewed and are  normal. Associated symptoms include shortness of breath. Pertinent negatives include no chest pain or myalgias. Current antihyperlipidemic treatment includes diet change. The current treatment provides moderate improvement of lipids. There are no compliance problems.  Risk factors for coronary artery disease include diabetes mellitus, dyslipidemia, hypertension, obesity, post-menopausal and a sedentary lifestyle.   Hypertension  This is a chronic problem. The problem has been stable since onset. The problem is controlled. Associated symptoms include shortness of breath. Pertinent negatives include no blurred vision, chest pain, headaches or neck pain. Current antihypertension treatment includes angiotensin blockers, beta blockers and diuretics. The current treatment provides significant improvement. There are no compliance problems.        PHQ-2 Depression Screening  Little interest or pleasure in doing things?     Feeling down, depressed, or hopeless?     PHQ-2 Total Score     PHQ-9 Depression Screening  Little interest or pleasure in doing things?     Feeling down, depressed, or hopeless?     Trouble falling or staying asleep, or sleeping too much?     Feeling tired or having little energy?     Poor appetite or overeating?     Feeling bad about yourself - or that you are a failure or have let yourself or your family down?     Trouble concentrating on things, such as reading the newspaper or watching television?     Moving or speaking so slowly that other people could have noticed? Or the opposite - being so fidgety or restless that you have been moving around a lot more than usual?     Thoughts that you would be better off dead, or of hurting yourself in some way?     PHQ-9 Total Score     If you checked off any problems, how difficult have these problems made it for you to do your work, take care of things at home, or get along with other people?       Allergies   Allergen Reactions    Ibuprofen Hives     Penicillins Other (See Comments) and Seizure     1. When was your reaction? < 10 years ago  2. Did your reaction happen after the first dose or after several doses? Do not know  3. Did your reaction require ED or hospital care to manage your reaction? Do not know  4. Did your reaction require treatment with epinephrine? Do not know  5. Have you taken amoxicillin (Amoxil) or amoxicillin-clavulanate (Augmentin) without issue since? No  6. Have you taken cephalexin (Keflex) without issue since?  No     Nsaids Other (See Comments)     CKD    Cephalosporins Rash    Sulfa Antibiotics Rash       Prior to Admission medications    Medication Sig Start Date End Date Taking? Authorizing Provider   albuterol (PROVENTIL) (2.5 MG/3ML) 0.083% nebulizer solution Take 2.5 mg by nebulization 4 (Four) Times a Day As Needed for Wheezing. 1/23/25  Yes Shanell Murray APRN   albuterol sulfate  (90 Base) MCG/ACT inhaler Inhale 2 puffs Every 4 (Four) Hours As Needed for Wheezing.   Yes Provider, MD Rene   apixaban (ELIQUIS) 5 MG tablet tablet Take 1 tablet by mouth 2 (Two) Times a Day. 3/24/25  Yes Santino Baez MD   azelastine (ASTELIN) 0.1 % nasal spray USE 2 SPRAYS INTO THE NOSTRILS AS DIRECTED TWICE DAILY. 3/20/25  Yes Shanell Murray APRN   Budeson-Glycopyrrol-Formoterol (BREZTRI) 160-9-4.8 MCG/ACT aerosol inhaler Inhale 2 puffs 2 (Two) Times a Day. 5/9/24  Yes Shanell Murray APRN   D3-1000 25 MCG (1000 UT) capsule TAKE 1 CAPSULE BY MOUTH EVERY DAY 10/20/22  Yes Nick Patel,    Dupilumab (Dupixent) 300 MG/2ML solution auto-injector injection Inject 2 mL under the skin into the appropriate area as directed Every 14 (Fourteen) Days. Indications: Asthma, Chronic Obstructive Lung Disease 4/9/25  Yes Santino Baez MD   famotidine (PEPCID) 20 MG tablet Take 1 tablet by mouth every night at bedtime. 5/8/24  Yes Shanell Murray APRN   fexofenadine (ALLEGRA) 180 MG tablet Take 1 tablet by mouth Daily. 9/23/24  Yes  Santino Baez MD   gabapentin (NEURONTIN) 300 MG capsule Take 1 capsule by mouth Every Morning. 1 capsule every morning and 2 capsule every evening.   Yes ProviderRene MD   gabapentin (NEURONTIN) 300 MG capsule Take 2 capsules by mouth Every Evening. 1 capsule every morning and 2 capsule every evening.   Yes ProviderRene MD   losartan (COZAAR) 100 MG tablet Take 1 tablet by mouth Daily. 5/8/24  Yes Nick Patel DO   metoprolol succinate XL (TOPROL-XL) 100 MG 24 hr tablet Take 1 tablet by mouth Daily. Hold if heartrate is <60. 5/22/24  Yes Nick Patel DO   montelukast (SINGULAIR) 10 MG tablet Take 1 tablet by mouth Every Night.  Patient taking differently: Take 1 tablet by mouth Every Morning. 9/23/24  Yes Santino Baez MD   spironolactone (ALDACTONE) 25 MG tablet Take 1 tablet by mouth 3 (Three) Times a Week. Monday,Wednesday,Friday 4/14/25  Yes Nick Patel DO        Patient Active Problem List   Diagnosis    Family history of colon cancer    Chronic obstructive pulmonary disease    Essential hypertension    Hyperlipidemia    CKD (chronic kidney disease) stage 3, GFR 30-59 ml/min    Chronic heart failure with preserved ejection fraction    CARMEN (obstructive sleep apnea)    Seasonal allergies    Gastroesophageal reflux disease    Bronchitis, mucopurulent recurrent    Tobacco abuse, in remission    Pulmonary nodule    PND (post-nasal drip)    Status post fall    Allergic rhinitis    Asthma    Closed fracture of metatarsal bone    Hemoptysis    Idiopathic chronic gout of multiple sites without tophus    Primary localized osteoarthritis    Vitamin D deficiency    Morbid (severe) obesity due to excess calories    Umbilical hernia without obstruction and without gangrene    Oral thrush    Arthritis of knee    Pre-diabetes    COPD (chronic obstructive pulmonary disease)    Pneumonia    Chronic maxillary sinusitis    Acute pulmonary embolism with acute cor pulmonale     Acute pulmonary embolism    Acute deep vein thrombosis (DVT) of right lower extremity    Diastolic dysfunction        Past Surgical History:   Procedure Laterality Date    BRONCHOSCOPY N/A 2024    Procedure: BRONCHOSCOPY WITH BRONCHOALVEOLAR LAVAGE, WASHING, AIRWAY INSPECTION;  Surgeon: Santino Baez MD;  Location: [facility] ENDOSCOPY;  Service: Pulmonary;  Laterality: N/A;  MUCOUS PLUGGING, BRONCHITIS    CATARACT EXTRACTION Right     COLONOSCOPY      COLONOSCOPY N/A 10/12/2021    Procedure: COLONOSCOPY;  Surgeon: Jorge Diane MD;  Location: [facility] ENDOSCOPY;  Service: Gastroenterology;  Laterality: N/A;  DIVERTICULOSIS    ENDOSCOPY      EYE SURGERY      cataract right eye    JOINT REPLACEMENT  10-31-23    OTHER SURGICAL HISTORY      Fatty tumor removed off back    RETINAL DETACHMENT REPAIR Right     hole in retina repair    TOTAL KNEE ARTHROPLASTY Right 10/31/2023    Procedure: TOTAL KNEE ARTHROPLASTY;  Surgeon: Celso Prakash MD;  Location: [facility] [place];  Service: Orthopedics;  Laterality: Right;       Social History     Socioeconomic History    Marital status:    Tobacco Use    Smoking status: Former     Current packs/day: 0.00     Average packs/day: 0.5 packs/day for 32.0 years (16.0 ttl pk-yrs)     Types: Cigarettes     Start date: 1963     Quit date: 1995     Years since quittin.3     Passive exposure: Never    Smokeless tobacco: Never   Vaping Use    Vaping status: Never Used   Substance and Sexual Activity    Alcohol use: Never    Drug use: Never    Sexual activity: Defer       Family History   Problem Relation Age of Onset    Colon cancer Mother 61    Cancer Mother     Heart disease Mother     Thyroid disease Mother     Migraines Mother     Heart failure Father     Asthma Father     Heart attack Father     Hyperlipidemia Father     Emphysema Father     COPD Father         Black Lung    Diabetes Brother     Emphysema Brother     COPD Brother     Breast cancer  "Maternal Grandmother     Stroke Paternal Grandfather     COPD Sister         lower lobectomy    Thyroid disease Sister     Malig Hyperthermia Neg Hx        Family history, surgical history, past medical history, Allergies and meds reviewed with patient today and updated in [facility] EMR.     ROS:  Review of Systems   Constitutional:  Negative for chills, diaphoresis, fatigue and fever.   HENT:  Positive for congestion, postnasal drip and rhinorrhea. Negative for sore throat and swollen glands.    Eyes:  Negative for blurred vision and visual disturbance.   Respiratory:  Positive for cough and shortness of breath. Negative for wheezing.    Cardiovascular:  Negative for chest pain.   Gastrointestinal:  Negative for abdominal pain, anorexia, nausea and vomiting.   Endocrine: Negative for polydipsia and polyuria.   Genitourinary:  Negative for dysuria.   Musculoskeletal:  Negative for joint pain, myalgias and neck pain.   Skin:  Negative for rash.   Neurological:  Negative for vertigo, weakness and numbness.       OBJECTIVE:  Vitals:    04/21/25 0941   BP: 130/79   BP Location: Left arm   Patient Position: Sitting   Pulse: 66   Temp: 96 °F (35.6 °C)   SpO2: 96%   Weight: 98.9 kg (218 lb)   Height: 154.9 cm (60.98\")     No results found.   Body mass index is 41.22 kg/m².  No LMP recorded. Patient is postmenopausal.    The ASCVD Risk score (Canton DK, et al., 2019) failed to calculate for the following reasons:    The 2019 ASCVD risk score is only valid for ages 40 to 79     Physical Exam  Vitals and nursing note reviewed.   Constitutional:       General: She is not in acute distress.     Appearance: Normal appearance. She is obese.   HENT:      Head: Normocephalic.      Right Ear: Tympanic membrane, ear canal and external ear normal.      Left Ear: Tympanic membrane, ear canal and external ear normal.      Nose: Nose normal.      Mouth/Throat:      Mouth: Mucous membranes are moist.      Pharynx: Oropharynx is clear. " Postnasal drip present.   Eyes:      General: No scleral icterus.     Conjunctiva/sclera: Conjunctivae normal.      Pupils: Pupils are equal, round, and reactive to light.   Cardiovascular:      Rate and Rhythm: Normal rate and regular rhythm.      Pulses: Normal pulses.      Heart sounds: Normal heart sounds. No murmur heard.  Pulmonary:      Effort: Pulmonary effort is normal.      Breath sounds: Normal breath sounds. No wheezing, rhonchi or rales.   Musculoskeletal:      Cervical back: Neck supple. No rigidity or tenderness.   Lymphadenopathy:      Cervical: No cervical adenopathy.   Skin:     General: Skin is warm and dry.      Coloration: Skin is not jaundiced.      Findings: No rash.   Neurological:      General: No focal deficit present.      Mental Status: She is alert and oriented to person, place, and time.   Psychiatric:         Mood and Affect: Mood normal.         Thought Content: Thought content normal.         Judgment: Judgment normal.         Procedures    Hospital Outpatient Visit on 04/07/2025   Component Date Value Ref Range Status    Creatinine 04/07/2025 1.20  0.60 - 1.30 mg/dL Final    Serial Number: [id number]:  [id number]    eGFR 04/07/2025 45.9 (L)  >60.0 mL/min/1.73 Final       ASSESSMENT/ PLAN:    Diagnoses and all orders for this visit:    1. Mixed hyperlipidemia (Primary)  Assessment & Plan:   It has been a while since her last lipid profile.  She is having blood work done later this week she will get it done at the same time    Orders:  -     Lipid Panel    2. Essential hypertension  Assessment & Plan:  Her blood pressure is good here today.  Will continue her current meds without change    Orders:  -     Lipid Panel    3. Pre-diabetes  Assessment & Plan:  Her A1c remains lower than the low fives.  She will continue to moderate her carbohydrate intake and be as active as her lung disease allows her.    Orders:  -     Lipid Panel    4. Seasonal allergies  Assessment & Plan:  Her  symptoms right now with ear pressure fullness congestion and drainage but is most likely allergies.  Will continue on her current dose of allergy medicine with the Astelin Allegra and Singulair.  She could always add on a steroid nasal spray such as Flonase along with her Astelin.                 Orders Placed Today:     No orders of the defined types were placed in this encounter.       Management Plan:     An After Visit Summary was printed and given to the patient at discharge.    Follow-up: Return in about 6 months (around 10/21/2025) for Recheck.    Nick Patel DO 4/21/2025 10:08 EDT  This note was electronically signed.

## 2025-04-21 NOTE — ASSESSMENT & PLAN NOTE
Her symptoms right now with ear pressure fullness congestion and drainage but is most likely allergies.  Will continue on her current dose of allergy medicine with the Astelin Allegra and Singulair.  She could always add on a steroid nasal spray such as Flonase along with her Astelin.

## 2025-04-21 NOTE — ASSESSMENT & PLAN NOTE
Her A1c remains lower than the low fives.  She will continue to moderate her carbohydrate intake and be as active as her lung disease allows her.

## 2025-04-21 NOTE — ASSESSMENT & PLAN NOTE
It has been a while since her last lipid profile.  She is having blood work done later this week she will get it done at the same time

## 2025-04-22 ENCOUNTER — LAB (OUTPATIENT)
Dept: LAB | Facility: HOSPITAL | Age: 80
End: 2025-04-22
Payer: MEDICARE

## 2025-04-22 DIAGNOSIS — J30.9 ALLERGIC RHINITIS, UNSPECIFIED SEASONALITY, UNSPECIFIED TRIGGER: ICD-10-CM

## 2025-04-22 DIAGNOSIS — R91.1 PULMONARY NODULE: ICD-10-CM

## 2025-04-22 DIAGNOSIS — J41.1 BRONCHITIS, MUCOPURULENT RECURRENT: ICD-10-CM

## 2025-04-22 DIAGNOSIS — J30.2 SEASONAL ALLERGIES: ICD-10-CM

## 2025-04-22 DIAGNOSIS — J44.1 CHRONIC OBSTRUCTIVE PULMONARY DISEASE WITH ACUTE EXACERBATION: ICD-10-CM

## 2025-04-22 DIAGNOSIS — J44.9 CHRONIC OBSTRUCTIVE PULMONARY DISEASE, UNSPECIFIED COPD TYPE: ICD-10-CM

## 2025-04-22 DIAGNOSIS — I26.09 OTHER ACUTE PULMONARY EMBOLISM WITH ACUTE COR PULMONALE: ICD-10-CM

## 2025-04-22 DIAGNOSIS — J45.40 MODERATE PERSISTENT ASTHMA, UNSPECIFIED WHETHER COMPLICATED: ICD-10-CM

## 2025-04-22 DIAGNOSIS — I26.99 ACUTE PULMONARY EMBOLISM, UNSPECIFIED PULMONARY EMBOLISM TYPE, UNSPECIFIED WHETHER ACUTE COR PULMONALE PRESENT: ICD-10-CM

## 2025-04-22 LAB
CHOLEST SERPL-MCNC: 203 MG/DL (ref 0–200)
HDLC SERPL-MCNC: 56 MG/DL (ref 40–60)
LDLC SERPL CALC-MCNC: 131 MG/DL (ref 0–100)
LDLC/HDLC SERPL: 2.31 {RATIO}
TRIGL SERPL-MCNC: 89 MG/DL (ref 0–150)
VLDLC SERPL-MCNC: 16 MG/DL (ref 5–40)

## 2025-04-22 PROCEDURE — 80061 LIPID PANEL: CPT | Performed by: FAMILY MEDICINE

## 2025-04-22 PROCEDURE — 36415 COLL VENOUS BLD VENIPUNCTURE: CPT

## 2025-04-22 PROCEDURE — 82785 ASSAY OF IGE: CPT

## 2025-04-24 ENCOUNTER — HOSPITAL ENCOUNTER (EMERGENCY)
Facility: HOSPITAL | Age: 80
Discharge: HOME | End: 2025-04-24
Attending: EMERGENCY MEDICINE
Payer: MEDICARE

## 2025-04-24 ENCOUNTER — APPOINTMENT (OUTPATIENT)
Dept: GENERAL RADIOLOGY | Facility: HOSPITAL | Age: 80
End: 2025-04-24
Payer: MEDICARE

## 2025-04-24 VITALS
SYSTOLIC BLOOD PRESSURE: 153 MMHG | DIASTOLIC BLOOD PRESSURE: 93 MMHG | HEART RATE: 65 BPM | BODY MASS INDEX: 40.59 KG/M2 | TEMPERATURE: 97.7 F | HEIGHT: 61 IN | RESPIRATION RATE: 18 BRPM | OXYGEN SATURATION: 97 % | WEIGHT: 215 LBS

## 2025-04-24 DIAGNOSIS — M54.30 SCIATICA, UNSPECIFIED LATERALITY: Primary | ICD-10-CM

## 2025-04-24 PROCEDURE — 96372 THER/PROPH/DIAG INJ SC/IM: CPT

## 2025-04-24 PROCEDURE — 72100 X-RAY EXAM L-S SPINE 2/3 VWS: CPT

## 2025-04-24 PROCEDURE — 25010000002 MORPHINE PER 10 MG: Performed by: EMERGENCY MEDICINE

## 2025-04-24 PROCEDURE — 63710000001 ONDANSETRON ODT 4 MG TABLET DISPERSIBLE: Performed by: EMERGENCY MEDICINE

## 2025-04-24 PROCEDURE — 25010000002 DEXAMETHASONE SODIUM PHOSPHATE 10 MG/ML SOLUTION

## 2025-04-24 PROCEDURE — 25010000002 ORPHENADRINE CITRATE PER 60 MG

## 2025-04-24 PROCEDURE — 99283 EMERGENCY DEPT VISIT LOW MDM: CPT

## 2025-04-24 RX ORDER — ORPHENADRINE CITRATE 30 MG/ML
60 INJECTION INTRAMUSCULAR; INTRAVENOUS ONCE
Status: COMPLETED | OUTPATIENT
Start: 2025-04-24 | End: 2025-04-24

## 2025-04-24 RX ORDER — DEXAMETHASONE SODIUM PHOSPHATE 10 MG/ML
10 INJECTION, SOLUTION INTRAMUSCULAR; INTRAVENOUS ONCE
Status: COMPLETED | OUTPATIENT
Start: 2025-04-24 | End: 2025-04-24

## 2025-04-24 RX ORDER — ONDANSETRON 4 MG/1
4 TABLET, ORALLY DISINTEGRATING ORAL ONCE
Status: COMPLETED | OUTPATIENT
Start: 2025-04-24 | End: 2025-04-24

## 2025-04-24 RX ORDER — CYCLOBENZAPRINE HCL 10 MG
10 TABLET ORAL 2 TIMES DAILY PRN
Qty: 10 TABLET | Refills: 0 | Status: SHIPPED | OUTPATIENT
Start: 2025-04-24 | End: 2025-04-29

## 2025-04-24 RX ADMIN — DEXAMETHASONE SODIUM PHOSPHATE 10 MG: 10 INJECTION INTRAMUSCULAR; INTRAVENOUS at 10:10

## 2025-04-24 RX ADMIN — ONDANSETRON 4 MG: 4 TABLET, ORALLY DISINTEGRATING ORAL at 10:40

## 2025-04-24 RX ADMIN — ORPHENADRINE CITRATE 60 MG: 60 INJECTION INTRAMUSCULAR; INTRAVENOUS at 10:10

## 2025-04-24 RX ADMIN — MORPHINE SULFATE 4 MG: 4 INJECTION, SOLUTION INTRAMUSCULAR; INTRAVENOUS at 10:40

## 2025-04-24 NOTE — ED PROVIDER NOTES
Time: 10:32 AM EDT  Date of encounter:  4/24/2025  Independent Historian/Clinical History and Information was obtained by:   Patient    History is limited by: N/A    Chief Complaint: low back pain       History of Present Illness:  Patient is a 80 y.o. year old female who presents to the emergency department for evaluation of low back pain that radiates down her left leg that began last night.  Patient states she has a history of sciatica.  Patient denies injury/trauma.  Patient has urinary/bowel incontinence.      Patient Care Team  Primary Care Provider: Nick Patel,     Past Medical History:     Allergies   Allergen Reactions    Ibuprofen Hives    Penicillins Other (See Comments) and Seizure     1. When was your reaction? < 10 years ago  2. Did your reaction happen after the first dose or after several doses? Do not know  3. Did your reaction require ED or hospital care to manage your reaction? Do not know  4. Did your reaction require treatment with epinephrine? Do not know  5. Have you taken amoxicillin (Amoxil) or amoxicillin-clavulanate (Augmentin) without issue since? No  6. Have you taken cephalexin (Keflex) without issue since?  No     Nsaids Other (See Comments)     CKD    Cephalosporins Rash    Sulfa Antibiotics Rash     Past Medical History:   Diagnosis Date    Acute right-sided low back pain with right-sided sciatica 01/15/2018    Allergic     Allergic rhinitis     Asthma     Asthma, extrinsic     Asthma, intrinsic     CHF (congestive heart failure)     Chronic heart failure with preserved ejection fraction     11/16/20 echocardiogram: 1.  Normal ejection fraction of 55%. 2.  Mild left ventricular hypertrophy. 3.  No significant valvular heart issues.     CKD stage 3 10/29/2019    COPD (chronic obstructive pulmonary disease)     Diverticulitis     Emphysema of lung     Essential hypertension     GERD (gastroesophageal reflux disease)     Headache     Hemoptysis     non cancerous     History of fungal pneumonia 12/02/2021    Hyperlipidemia     Idiopathic chronic gout of multiple sites without tophus 05/20/2016    Lumbosacral disc disease     Obesity     Pneumonia     Primary osteoarthritis of right knee 01/16/2017    Pulmonary embolism     Shortness of breath     Sleep apnea     CPAP    Sleep apnea, obstructive     Vitamin D deficiency 01/13/2016     Past Surgical History:   Procedure Laterality Date    BRONCHOSCOPY N/A 04/30/2024    Procedure: BRONCHOSCOPY WITH BRONCHOALVEOLAR LAVAGE, WASHING, AIRWAY INSPECTION;  Surgeon: Santino Baez MD;  Location: [facility] ENDOSCOPY;  Service: Pulmonary;  Laterality: N/A;  MUCOUS PLUGGING, BRONCHITIS    CATARACT EXTRACTION Right     COLONOSCOPY  2014    COLONOSCOPY N/A 10/12/2021    Procedure: COLONOSCOPY;  Surgeon: Jorge Diane MD;  Location: [facility] ENDOSCOPY;  Service: Gastroenterology;  Laterality: N/A;  DIVERTICULOSIS    ENDOSCOPY  2015    EYE SURGERY      cataract right eye    JOINT REPLACEMENT  10-31-23    OTHER SURGICAL HISTORY      Fatty tumor removed off back    RETINAL DETACHMENT REPAIR Right     hole in retina repair    TOTAL KNEE ARTHROPLASTY Right 10/31/2023    Procedure: TOTAL KNEE ARTHROPLASTY;  Surgeon: Celso Prakash MD;  Location[place] [facility] [place] [facility] [place];  Service: Orthopedics;  Laterality: Right;     Family History   Problem Relation Age of Onset    Colon cancer Mother 61    Cancer Mother     Heart disease Mother     Thyroid disease Mother     Migraines Mother     Heart failure Father     Asthma Father     Heart attack Father     Hyperlipidemia Father     Emphysema Father     COPD Father         Black Lung    Diabetes Brother     Emphysema Brother     COPD Brother     Breast cancer Maternal Grandmother     Stroke Paternal Grandfather     COPD Sister         lower lobectomy    Thyroid disease Sister     Malig Hyperthermia Neg Hx        Home Medications:  Prior to Admission medications    Medication Sig Start Date End Date Taking?  Authorizing Provider   albuterol (PROVENTIL) (2.5 MG/3ML) 0.083% nebulizer solution Take 2.5 mg by nebulization 4 (Four) Times a Day As Needed for Wheezing. 1/23/25   Shanell Murray APRN   albuterol sulfate  (90 Base) MCG/ACT inhaler Inhale 2 puffs Every 4 (Four) Hours As Needed for Wheezing.    ProviderRene MD   apixaban (ELIQUIS) 5 MG tablet tablet Take 1 tablet by mouth 2 (Two) Times a Day. 3/24/25   Santino Baez MD   azelastine (ASTELIN) 0.1 % nasal spray USE 2 SPRAYS INTO THE NOSTRILS AS DIRECTED TWICE DAILY. 3/20/25   Shanell Murray APRN   Budeson-Glycopyrrol-Formoterol (BREZTRI) 160-9-4.8 MCG/ACT aerosol inhaler Inhale 2 puffs 2 (Two) Times a Day. 5/9/24   Shanell Murray APRN   D3-1000 25 MCG (1000 UT) capsule TAKE 1 CAPSULE BY MOUTH EVERY DAY 10/20/22   Nick Patel,    Dupilumab (Dupixent) 300 MG/2ML solution auto-injector injection Inject 2 mL under the skin into the appropriate area as directed Every 14 (Fourteen) Days. Indications: Asthma, Chronic Obstructive Lung Disease 4/9/25   Santino Baez MD   famotidine (PEPCID) 20 MG tablet Take 1 tablet by mouth every night at bedtime. 5/8/24   Shanell Murray APRN   fexofenadine (ALLEGRA) 180 MG tablet Take 1 tablet by mouth Daily. 9/23/24   Santino Baez MD   gabapentin (NEURONTIN) 300 MG capsule Take 1 capsule by mouth Every Morning. 1 capsule every morning and 2 capsule every evening.    ProviderRene MD   gabapentin (NEURONTIN) 300 MG capsule Take 2 capsules by mouth Every Evening. 1 capsule every morning and 2 capsule every evening.    ProviderRene MD   losartan (COZAAR) 100 MG tablet Take 1 tablet by mouth Daily. 5/8/24   Nick Patel,    metoprolol succinate XL (TOPROL-XL) 100 MG 24 hr tablet Take 1 tablet by mouth Daily. Hold if heartrate is <60. 5/22/24   Nick Patel,    montelukast (SINGULAIR) 10 MG tablet Take 1 tablet by mouth Every Night.  Patient taking differently:  "Take 1 tablet by mouth Every Morning. 24   Santino Baez MD   spironolactone (ALDACTONE) 25 MG tablet Take 1 tablet by mouth 3 (Three) Times a Week. Monday,Wednesday,25   Nick Patel DO        Social History:   Social History     Tobacco Use    Smoking status: Former     Current packs/day: 0.00     Average packs/day: 0.5 packs/day for 32.0 years (16.0 ttl pk-yrs)     Types: Cigarettes     Start date: 1963     Quit date: 1995     Years since quittin.3     Passive exposure: Never    Smokeless tobacco: Never   Vaping Use    Vaping status: Never Used   Substance Use Topics    Alcohol use: Never    Drug use: Never         Review of Systems:  Review of Systems   Constitutional:  Negative for chills and fever.   HENT:  Negative for congestion, rhinorrhea and sore throat.    Eyes:  Negative for pain and visual disturbance.   Respiratory:  Negative for apnea, cough, chest tightness and shortness of breath.    Cardiovascular:  Negative for chest pain and palpitations.   Gastrointestinal:  Negative for abdominal pain, diarrhea, nausea and vomiting.   Genitourinary:  Negative for difficulty urinating and dysuria.   Musculoskeletal:  Positive for back pain. Negative for joint swelling and myalgias.   Skin:  Negative for color change.   Neurological:  Negative for seizures and headaches.   Psychiatric/Behavioral: Negative.     All other systems reviewed and are negative.       Physical Exam:  /54 (BP Location: Left arm, Patient Position: Sitting)   Pulse 63   Temp 97.7 °F (36.5 °C) (Oral)   Resp 18   Ht 154.9 cm (61\")   Wt 97.5 kg (215 lb)   SpO2 95%   BMI 40.62 kg/m²     Physical Exam  Vitals and nursing note reviewed.   Constitutional:       General: She is not in acute distress.     Appearance: Normal appearance. She is not toxic-appearing.   HENT:      Head: Normocephalic and atraumatic.      Jaw: There is normal jaw occlusion.   Eyes:      General: Lids are normal.    "   Extraocular Movements: Extraocular movements intact.      Conjunctiva/sclera: Conjunctivae normal.      Pupils: Pupils are equal, round, and reactive to light.   Cardiovascular:      Rate and Rhythm: Normal rate and regular rhythm.      Pulses: Normal pulses.      Heart sounds: Normal heart sounds.   Pulmonary:      Effort: Pulmonary effort is normal. No respiratory distress.      Breath sounds: Normal breath sounds. No wheezing or rhonchi.   Abdominal:      General: Abdomen is flat.      Palpations: Abdomen is soft.      Tenderness: There is no abdominal tenderness. There is no guarding or rebound.   Musculoskeletal:         General: Tenderness (Mild lumbar paraspinal tenderness appreciated upon palpation) present.      Cervical back: Normal range of motion and neck supple.      Right lower leg: No edema.      Left lower leg: No edema.   Skin:     General: Skin is warm and dry.   Neurological:      Mental Status: She is alert and oriented to person, place, and time. Mental status is at baseline.   Psychiatric:         Mood and Affect: Mood normal.                    Medical Decision Making:      Comorbidities that affect care:    Asthma, COPD, hyperlipidemia    External Notes reviewed:          The following orders were placed and all results were independently analyzed by me:  Orders Placed This Encounter   Procedures    XR Spine Lumbar 2 or 3 View    Ambulatory Referral to Neurosurgery       Medications Given in the Emergency Department:  Medications   morphine injection 4 mg (has no administration in time range)   ondansetron ODT (ZOFRAN-ODT) disintegrating tablet 4 mg (has no administration in time range)   dexAMETHasone sodium phosphate injection 10 mg (10 mg Intramuscular Given 4/24/25 1010)   orphenadrine (NORFLEX) injection 60 mg (60 mg Intramuscular Given 4/24/25 1010)        ED Course:         Labs:    Lab Results (last 24 hours)       ** No results found for the last 24 hours. **              Imaging:    XR Spine Lumbar 2 or 3 View  Result Date: 4/24/2025  XR SPINE LUMBAR 2 OR 3 VW Date of Exam: 4/24/2025 10:04 AM EDT Indication: pain Comparison: 10/15/2017 Findings: There is no acute fracture or malalignment. There is moderate to severe degenerative disc space narrowing throughout the lumbar spine slightly worsened from prior study. Bony sclerosis is noted of the facet joints throughout the lumbar spine also similar  to prior exam. There is sacralization of the right L5 transverse process, unchanged.     Impression: 1. Degenerative disc disease and degenerative facet change throughout the lumbar spine minimally worsened from prior exam. No acute fracture or malalignment. Electronically Signed: Celso Clifton MD  4/24/2025 10:09 AM EDT  Workstation ID: [id number]        Differential Diagnosis and Discussion:    Back Pain: The patient presents with back pain. My differential diagnosis includes but is not limited to acute spinal epidural abscess, acute spinal epidural bleed, cauda equina syndrome, abdominal aortic aneurysm, aortic dissection, kidney stone, pyelonephritis, musculoskeletal back pain, spinal fracture, and osteoarthritis.     PROCEDURES:    X-ray were performed in the emergency department and all X-ray impressions were independently interpreted by me.    No orders to display       Procedures    MDM     Amount and/or Complexity of Data Reviewed  Tests in the radiology section of CPT®: reviewed         X-ray lumbar spine shows Degenerative disc disease and degenerative facet change throughout the lumbar spine minimally worsened from prior exam. No acute fracture or malalignment.  Oxygen saturation 95% on room air.  Patient has sciatica.  I will send patient home with muscle relaxers and provided ambulatory referral to neurosurgery for follow-up.  I instructed patient to return to ED if she develops any new or worsening symptoms.  Patient states she understands and agrees with plan of  care.              Patient Care Considerations:          Consultants/Shared Management Plan:    None    Social Determinants of Health:    Patient is independent, reliable, and has access to care.       Disposition and Care Coordination:    Discharged: The patient is suitable and stable for discharge with no need for consideration of admission.    I have explained the patient´s condition, diagnoses and treatment plan based on the information available to me at this time. I have answered questions and addressed any concerns. The patient has a good  understanding of the patient´s diagnosis, condition, and treatment plan as can be expected at this point. The vital signs have been stable. The patient´s condition is stable and appropriate for discharge from the emergency department.      The patient will pursue further outpatient evaluation with the primary care physician or other designated or consulting physician as outlined in the discharge instructions. They are agreeable to this plan of care and follow-up instructions have been explained in detail. The patient has received these instructions in written format and has expressed an understanding of the discharge instructions. The patient is aware that any significant change in condition or worsening of symptoms should prompt an immediate return to this or the closest emergency department or call to 911.  I have explained discharge medications and the need for follow up with the patient/caretakers. This was also printed in the discharge instructions. Patient was discharged with the following medications and follow up:      Medication List        New Prescriptions      cyclobenzaprine 10 MG tablet  Commonly known as: FLEXERIL  Take 1 tablet by mouth 2 (Two) Times a Day As Needed for Muscle Spasms for up to 5 days.            Changed      montelukast 10 MG tablet  Commonly known as: SINGULAIR  Take 1 tablet by mouth Every Night.  What changed: when to take this                Where to Get Your Medications        These medications were sent to [id number] - [place], [place] - [address] AT [facility] - [phone]  - [phone] FX  [address], [place] [address]      Phone: [phone]   cyclobenzaprine 10 MG tablet      Milton James MD  [address]   [place] [id number] [phone]    Call in 1 day  To schedule follow-up       Final diagnoses:   Sciatica, unspecified laterality        ED Disposition       ED Disposition   Discharge    Condition   Stable    Comment   --               This medical record created using voice recognition software.             Celso Newman PA-C  04/24/25 1035

## 2025-04-28 RX ORDER — FAMOTIDINE 20 MG/1
20 TABLET, FILM COATED ORAL
Qty: 30 TABLET | Refills: 11 | Status: SHIPPED | OUTPATIENT
Start: 2025-04-28

## 2025-04-29 LAB — IGE SERPL-ACNC: 113 IU/ML (ref 6–495)

## 2025-05-06 ENCOUNTER — SPECIALTY PHARMACY (OUTPATIENT)
Dept: OTHER | Facility: HOSPITAL | Age: 80
End: 2025-05-06
Payer: MEDICARE

## 2025-05-06 NOTE — PROGRESS NOTES
Specialty Pharmacy Patient Management Program  Pulmonology Initial Assessment     Julia Rios is a 80 y.o. female with Asthma/COPD seen by a Pulmonology provider and enrolled in the Pulmonology Patient Management program offered by [facility].  An initial outreach was conducted, including assessment of therapy appropriateness and specialty medication education for Dupixent. The patient was introduced to services offered by [facility], including: regular assessments, refill coordination, curbside pick-up or mail order delivery options, prior authorization maintenance, and financial assistance programs as applicable. The patient was also provided with contact information for the pharmacy team.     Insurance Coverage & Financial Support  PA obtained, $497 for first dose. MPPP and Max OOP explained.    Relevant Past Medical History and Comorbidities  Relevant medical history and concomitant health conditions were discussed with the patient. The patient's chart has been reviewed for relevant past medical history and comorbid health conditions and updated as necessary.   Past Medical History:   Diagnosis Date    Acute right-sided low back pain with right-sided sciatica 01/15/2018    Allergic     Allergic rhinitis     Asthma     Asthma, extrinsic     Asthma, intrinsic     CHF (congestive heart failure)     Chronic heart failure with preserved ejection fraction     11/16/20 echocardiogram: 1.  Normal ejection fraction of 55%. 2.  Mild left ventricular hypertrophy. 3.  No significant valvular heart issues.     CKD stage 3 10/29/2019    COPD (chronic obstructive pulmonary disease)     Diverticulitis     Emphysema of lung     Essential hypertension     GERD (gastroesophageal reflux disease)     Headache     Hemoptysis     non cancerous    History of fungal pneumonia 12/02/2021    Hyperlipidemia     Idiopathic chronic gout of multiple sites without tophus 05/20/2016     Lumbosacral disc disease     Obesity     Pneumonia     Primary osteoarthritis of right knee 2017    Pulmonary embolism     Shortness of breath     Sleep apnea     CPAP    Sleep apnea, obstructive     Vitamin D deficiency 2016     Social History     Socioeconomic History    Marital status:    Tobacco Use    Smoking status: Former     Current packs/day: 0.00     Average packs/day: 0.5 packs/day for 32.0 years (16.0 ttl pk-yrs)     Types: Cigarettes     Start date: 1963     Quit date: 1995     Years since quittin.3     Passive exposure: Never    Smokeless tobacco: Never   Vaping Use    Vaping status: Never Used   Substance and Sexual Activity    Alcohol use: Never    Drug use: Never    Sexual activity: Defer     Problem list reviewed by Jessica Grady RPH on 2025 at  9:52 AM    Allergies  Known allergies and reactions were discussed with the patient. The patient's chart has been reviewed for  allergy information and updated as necessary.   Allergies   Allergen Reactions    Ibuprofen Hives    Penicillins Other (See Comments) and Seizure     1. When was your reaction? < 10 years ago  2. Did your reaction happen after the first dose or after several doses? Do not know  3. Did your reaction require ED or hospital care to manage your reaction? Do not know  4. Did your reaction require treatment with epinephrine? Do not know  5. Have you taken amoxicillin (Amoxil) or amoxicillin-clavulanate (Augmentin) without issue since? No  6. Have you taken cephalexin (Keflex) without issue since?  No     Nsaids Other (See Comments)     CKD    Cephalosporins Rash    Sulfa Antibiotics Rash     Allergies reviewed by Jessica Grady RPH on 2025 at  9:52 AM    Relevant Laboratory Values  Common labs          3/5/2025    09:37 2025    08:59 2025    08:49   Common Labs   Glucose 90      BUN 14      Creatinine 1.25  1.20     Sodium 143      Potassium 4.3      Chloride 108      Calcium 9.7       Albumin 3.8      Total Bilirubin 0.6      Alkaline Phosphatase 76      AST (SGOT) 16      ALT (SGPT) 7      WBC 9.98      Hemoglobin 12.1      Hematocrit 38.6      Platelets 246      Total Cholesterol   203    Triglycerides   89    HDL Cholesterol   56    LDL Cholesterol    131    Hemoglobin A1C 5.80          Lab Assessment  The above labs have been reviewed. No dose adjustments are needed for the specialty medication(s) based on the labs.     Current Medication List  This medication list has been reviewed with the patient and evaluated for any interactions or necessary modifications/recommendations, and updated to include all prescription medications, OTC medications, and supplements the patient is currently taking.  This list reflects what is contained in the patient's profile, which has also been marked as reviewed to communicate to other providers it is the most up to date version of the patient's current medication therapy.     Current Outpatient Medications:     albuterol (PROVENTIL) (2.5 MG/3ML) 0.083% nebulizer solution, Take 2.5 mg by nebulization 4 (Four) Times a Day As Needed for Wheezing., Disp: 360 mL, Rfl: 3    albuterol sulfate  (90 Base) MCG/ACT inhaler, Inhale 2 puffs Every 4 (Four) Hours As Needed for Wheezing., Disp: , Rfl:     apixaban (ELIQUIS) 5 MG tablet tablet, Take 1 tablet by mouth 2 (Two) Times a Day., Disp: 180 tablet, Rfl: 1    azelastine (ASTELIN) 0.1 % nasal spray, USE 2 SPRAYS INTO THE NOSTRILS AS DIRECTED TWICE DAILY., Disp: 30 mL, Rfl: 6    Budeson-Glycopyrrol-Formoterol (BREZTRI) 160-9-4.8 MCG/ACT aerosol inhaler, Inhale 2 puffs 2 (Two) Times a Day., Disp: 10.7 g, Rfl: 11    D3-1000 25 MCG (1000 UT) capsule, TAKE 1 CAPSULE BY MOUTH EVERY DAY, Disp: 90 capsule, Rfl: 3    Dupilumab (Dupixent) 300 MG/2ML solution auto-injector injection, Inject 2 mL under the skin into the appropriate area as directed Every 14 (Fourteen) Days. Indications: Asthma, Chronic Obstructive Lung  Disease, Disp: 4 mL, Rfl: 10    famotidine (PEPCID) 20 MG tablet, TAKE 1 TABLET BY MOUTH EVERY NIGHT AT BEDTIME, Disp: 30 tablet, Rfl: 11    fexofenadine (ALLEGRA) 180 MG tablet, Take 1 tablet by mouth Daily., Disp: 90 tablet, Rfl: 3    gabapentin (NEURONTIN) 300 MG capsule, Take 1 capsule by mouth Every Morning. 1 capsule every morning and 2 capsule every evening., Disp: , Rfl:     gabapentin (NEURONTIN) 300 MG capsule, Take 2 capsules by mouth Every Evening. 1 capsule every morning and 2 capsule every evening., Disp: , Rfl:     losartan (COZAAR) 100 MG tablet, Take 1 tablet by mouth Daily., Disp: 90 tablet, Rfl: 3    metoprolol succinate XL (TOPROL-XL) 100 MG 24 hr tablet, Take 1 tablet by mouth Daily. Hold if heartrate is <60., Disp: 90 tablet, Rfl: 3    montelukast (SINGULAIR) 10 MG tablet, Take 1 tablet by mouth Every Night. (Patient taking differently: Take 1 tablet by mouth Every Morning.), Disp: 90 tablet, Rfl: 3    spironolactone (ALDACTONE) 25 MG tablet, Take 1 tablet by mouth 3 (Three) Times a Week. Monday,Wednesday,Friday, Disp: 36 tablet, Rfl: 3  No current facility-administered medications for this visit.    Medicines reviewed by Jessica Grady RPH on 5/6/2025 at  9:52 AM    Drug Interactions  None identified     Initial Education Provided for Specialty Medication  The patient has been provided with the following education and any applicable administration techniques (i.e. self-injection) have been demonstrated for the therapies indicated. All questions and concerns have been addressed prior to the patient receiving the medication, and the patient has verbalized understanding of the education and any materials provided.  Additional patient education shall be provided and documented upon request by the patient, provider or payer.         Dupixent (dupilumab)         Medication Expectations   Why am I taking this medication? You are taking this medication for eosinophillic esophagitis, asthma, atopic  dermatitis, or chronic rhinosinusitis with nasal polyps.   What should I expect while on this medication? You should expect a decrease in the frequency and severity of symptoms.   How does the medication work? Dupilumab is a monoclonal antibody that inhibits interleukin-4 and interleukin-13 binding. This decreases the release of proinflammatory cytokines, chemokines, nitric oxide, and lowers IgE levels in the blood.   How long will I be on this medication for? The amount of time you will be on this medication will be determined by your doctor and your response to the medication.    How do I take this medication? Take as directed on your prescription label. Allow medication to reach room temperature for 30-45 minutes prior to injection. This medication is a subcutaneous injection given in the fatty part of the skin on the top of the thigh or stomach area. May be given in upper arm by provider or caregiver. Separate doses >/= 400 mg into two sites.   What are some possible side effects? Injection site reactions and hypersensitivity reactions, conjunctivitis, signs of a common cold, or gastritis.   What happens if I miss a dose? If you miss a dose, take it as soon as you remember. If it is close to the time for your next dose, skip the missed dose and go back to your normal time.             Medication Safety   What are things I should warn my doctor immediately about? Allergic reaction such has hives or trouble breathing. If you develop symptoms of infection such as a fever or cough that does not go away, chest pain, joint pain, dizziness, swollen glands, or numbness or tingling that is not normal.    What are things that I should be cautious of? Injection site reaction or conjunctivitis. You may have more chance of getting an infection.  Wash your hands often and stay away from people with infections, colds, or flu.   What are some medications that can interact with this one? Immunosuppressants and vaccines.             Medication Storage/Handling   How should I handle this medication? Keep this medication our of reach of pets/children in original container.  Store upright in the original container to protect from light. Do not freeze or shake. Do not inject into skin that is tender, damaged, bruised, or scarred.  Rotate injection sites.   How does this medication need to be stored? Store in refrigerator and keep dry. If needed, you may store at room temperature for up to 14 days.   How should I dispose of this medication? You can dispose of the empty syringe in a sharps container, and if this is not available you may use an empty hard plastic container such as a milk jug or tide container. Discard any unused portion or if stored outside of refrigerator > 14 days.            Resources/Support   How can I remind myself to take this medication? You can download a reminder sukhjinder on your phone or use a calandar  to help with your injection.   Is financial support available?  Yes, Dupixent [employer] can provide co-pay assistance if you have commercial insurance or patient assistance if you have Medicare or no insurance.    Which vaccines are recommended for me? Talk to your doctor about these vaccines: Flu, Coronavirus (COVID-19), Pneumococcal (pneumonia), Tdap, Hepatitis B, Zoster (shingles). Avoid use of live vaccines while on Dupixent                Adherence and Self-Administration  Adherence related to the patient's specialty therapy was discussed with the patient. The Adherence segment of this outreach has been reviewed and updated.   Is there a concern with patient's ability to self administer the medication correctly and without issue?: No  Were any potential barriers to adherence identified during the initial assessment or patient education?: No  Are there any concerns regarding the patient's understanding of the importance of medication adherence?: No  Methods for Supporting Patient Adherence and/or Self-Administration: will  administer 1st dose in person    Goals of Therapy  Goals related to the patient's specialty therapy were discussed with the patient. The Patient Goals segment of this outreach has been reviewed and updated.   Goals Addressed Today    None          Reassessment Plan & Follow-Up  Medication Therapy Changes: Start Dupixent  Related Plans, Therapy Recommendations, or Therapy Problems to Be Addressed: Patient will come to office for 1st dose training  Pharmacist to perform regular reassessments no more than (6) months from the previous assessment.  Care Coordinator to set up future refill outreaches, coordinate prescription delivery, and escalate clinical questions to pharmacist.   Welcome information and patient satisfaction survey to be sent by specialty pharmacy team with patient's initial fill.    Attestation  Therapeutic appropriateness: Appropriate   I attest the patient was actively involved in and has agreed to the above plan of care. If the prescribed therapy is at any point deemed not appropriate based on the current or future assessments, a consultation will be initiated with the patient's specialty care provider to determine the best course of action. The revised plan of therapy will be documented along with any additional patient education provided. Discussed aforementioned material with patient via telemedicine.    Jessica Grady, PharmD  Clinic Specialty Pharmacist, Pulmonology  5/6/2025  09:52 EDT

## 2025-05-08 ENCOUNTER — TELEPHONE (OUTPATIENT)
Dept: OTHER | Facility: HOSPITAL | Age: 80
End: 2025-05-08
Payer: MEDICARE

## 2025-05-08 NOTE — TELEPHONE ENCOUNTER
Patient received first dose of Dupixent (600mg) in office 5/8/25. Patient tolerated well and all questions answered.     Jessica Grady, PharmD  Specialty Pharmacist  Pulmonology/Gastroenterology    [phone]

## 2025-05-12 RX ORDER — LOSARTAN POTASSIUM 100 MG/1
100 TABLET ORAL DAILY
Qty: 90 TABLET | Refills: 3 | Status: SHIPPED | OUTPATIENT
Start: 2025-05-12

## 2025-05-15 ENCOUNTER — SPECIALTY PHARMACY (OUTPATIENT)
Dept: PULMONOLOGY | Facility: CLINIC | Age: 80
End: 2025-05-15
Payer: MEDICARE

## 2025-05-15 ENCOUNTER — PATIENT MESSAGE (OUTPATIENT)
Dept: ORTHOPEDIC SURGERY | Facility: CLINIC | Age: 80
End: 2025-05-15
Payer: MEDICARE

## 2025-05-15 DIAGNOSIS — Z96.651 S/P TKR (TOTAL KNEE REPLACEMENT), RIGHT: Primary | ICD-10-CM

## 2025-05-15 NOTE — PROGRESS NOTES
"   Specialty Pharmacy Patient Management Program  Refill Outreach     Julia \"RHETT\" was contacted today regarding refills of their medication(s). Dupixent. Patient stated that she had experienced some back pain after her loading dose. Patient reminded that this next shipment will include 2 pens with the directions to inject 1 pen every 14 days. Patient instructed to contact [facility] team with questions or concerns if back pain continues. [phone].    Refill Questions      Flowsheet Row Most Recent Value   Changes to allergies? No   Changes to medications? No   New conditions or infections since last clinic visit No   Unplanned office visit, urgent care, ED, or hospital admission in the last 4 weeks  No   How does patient/caregiver feel medication is working? Good   Financial problems or insurance changes  No   Since the previous refill, were any specialty medication doses or scheduled injections missed or delayed?  No   Does this patient require a clinical escalation to a pharmacist? No            Delivery Questions      Flowsheet Row Most Recent Value   Delivery method UPS   Delivery address verified with patient/caregiver? Yes   Delivery address Home   Number of medications in delivery 1   Medication(s) being filled and delivered Dupilumab (Dupixent)   Doses left of specialty medications 1 week   Copay verified? Yes   Copay amount $0.00   Copay form of payment No copayment ($0)   Delivery Date Selection 05/16/25   Signature Required No                 Follow-up: 28  day(s)     Millie Johnson, Pharmacy Technician  5/15/2025  10:30 EDT    "

## 2025-05-16 RX ORDER — CLINDAMYCIN HYDROCHLORIDE 300 MG/1
300 CAPSULE ORAL 3 TIMES DAILY
Qty: 2 CAPSULE | Refills: 0 | Status: SHIPPED | OUTPATIENT
Start: 2025-05-16

## 2025-05-27 DIAGNOSIS — J44.1 CHRONIC OBSTRUCTIVE PULMONARY DISEASE WITH ACUTE EXACERBATION: ICD-10-CM

## 2025-05-27 DIAGNOSIS — T17.500A MUCUS PLUGGING OF BRONCHI: ICD-10-CM

## 2025-05-27 DIAGNOSIS — J12.2 PARAINFLUENZA VIRUS PNEUMONIA: ICD-10-CM

## 2025-05-27 DIAGNOSIS — J98.11 ATELECTASIS: ICD-10-CM

## 2025-05-27 DIAGNOSIS — G47.33 OSA (OBSTRUCTIVE SLEEP APNEA): ICD-10-CM

## 2025-05-27 RX ORDER — BUDESONIDE, GLYCOPYRROLATE, AND FORMOTEROL FUMARATE 160; 9; 4.8 UG/1; UG/1; UG/1
2 AEROSOL, METERED RESPIRATORY (INHALATION) 2 TIMES DAILY
Qty: 10.7 G | Refills: 11 | Status: SHIPPED | OUTPATIENT
Start: 2025-05-27

## 2025-05-29 ENCOUNTER — OFFICE VISIT (OUTPATIENT)
Dept: NEUROSURGERY | Facility: CLINIC | Age: 80
End: 2025-05-29
Payer: MEDICARE

## 2025-05-29 VITALS
WEIGHT: 212 LBS | BODY MASS INDEX: 40.02 KG/M2 | HEIGHT: 61 IN | DIASTOLIC BLOOD PRESSURE: 80 MMHG | OXYGEN SATURATION: 96 % | HEART RATE: 65 BPM | SYSTOLIC BLOOD PRESSURE: 120 MMHG

## 2025-05-29 DIAGNOSIS — M47.816 SPONDYLOSIS OF LUMBAR REGION WITHOUT MYELOPATHY OR RADICULOPATHY: Primary | ICD-10-CM

## 2025-05-29 DIAGNOSIS — M43.16 SPONDYLOLISTHESIS, LUMBAR REGION: ICD-10-CM

## 2025-05-29 NOTE — PROGRESS NOTES
"Chief Complaint  Leg Pain (Bilateral knees)    Subjective          Julia Rios who is a 80 y.o. year old female who presents to [facility] for Evaluation of the Spine.     She denies pain in the back or legs today. She reports an episode of \"sciatica\" after sitting in a \"bad chair at work\". This occurred on or around 4/24/25 and she went to the ER.    She reports her sciatica includes very intense pain in the back, right hip and right leg all the way down to the top of the foot. She reports having numbness and tingling with the pain episode. She always has numbness in both legs which she related to having shingles previously. She reports at times feeling weakness in the legs. She denies loss of bowel or bladder control.    She reports no specific treatments outside of what the ER provided: morphine, dexamethasone, and norflex injections - which were helpful. After this, her pain resolved.    History of Previous Spinal Surgery?: No     reports that she quit smoking about 30 years ago. Her smoking use included cigarettes. She started smoking about 62 years ago. She has a 16 pack-year smoking history. She has never been exposed to tobacco smoke. She has never used smokeless tobacco.    Review of Systems   Musculoskeletal:  Negative for back pain.   Neurological:  Positive for weakness and numbness.        Objective   Vital Signs:   /80   Pulse 65   Ht 154.9 cm (61\")   Wt 96.2 kg (212 lb)   SpO2 96%   BMI 40.06 kg/m²       Physical Exam  Constitutional:       Appearance: Normal appearance. She is obese.   Pulmonary:      Effort: Pulmonary effort is normal.   Musculoskeletal:         General: Swelling (left knee swelling and bruising) present. No tenderness.      Comments: SLR negative bilaterally   Neurological:      General: No focal deficit present.      Mental Status: She is alert and oriented to person, place, and time.      Sensory: No sensory deficit.     " " Motor: No weakness.      Deep Tendon Reflexes: Reflexes normal.   Psychiatric:         Mood and Affect: Mood normal.         Behavior: Behavior normal.        Neurological Exam  Mental Status  Alert. Oriented to person, place, and time.      Result Review     I have independently interpreted the x-ray of the lumbar spine from 4/24/2025 which shows mild scoliosis of the lumbar spine.  There is multilevel degenerative disc disease with disc space narrowing.  There appears to be anterolisthesis of L5 on S1, retrolisthesis of L4 and L5, anterolisthesis of L3 on L4, retrolisthesis of L2 on L3.  There is foraminal stenosis notable at L2-L3, L4-L5 and L5-S1.  There is facet arthritis worse at L3-L4, L4-L5 and L5-S1.     Assessment and Plan    Diagnoses and all orders for this visit:    1. Spondylosis of lumbar region without myelopathy or radiculopathy (Primary)  -     Ambulatory Referral to Physical Therapy for Evaluation & Treatment    2. Spondylolisthesis, lumbar region  -     Ambulatory Referral to Physical Therapy for Evaluation & Treatment    She reports history of \"sciatica\" recently in the back and right leg to the top of the foot, resolved today.    She reports pain in the bilateral knees today, which I do not expect is related to the lumbar spine changes. She has bruising of the left knee after recent fall.    She does have on x-ray multilevel spondylosis, including facet arthritis. There is multilevel foraminal stenosis appreciated on the x-ray. There is spondylolisthesis at multiple levels.    If she has recurrent leg pain, I would recommend conservative treatment with physical therapy, as historically her leg pain episodes have been intermittent and resolved quickly. Otherwise, having a regular practice of core and lumbar strengthening may help reduce or prevent episodes of sciatica.    If she saw no benefit from physical therapy for recurrent episodes, then I would consider an MRI of the lumbar spine to " assess further for stenosis possibly contributing to radiculopathy.    I would also consider flexion and extension x-rays of the lumbar spine to assess for instability with spondylolisthesis.    She is agreeable to PT today and I will refer her for PT targeting the lumbar spine and falls prevention.    The patient was counseled on basic recommendations for the reduction and prevention of back, neck, or spine pain in association with spinal disorders, including: cessation/avoidance of nicotine use, maintenance of a healthy BMI and weight, focusing on building/maintaining core strength through core exercise, and avoidance of activities which worsen the pain. The patient will monitor for changes in symptoms and notify our clinic of these changes as needed.    Follow Up   Return if symptoms worsen or fail to improve.  Patient was given instructions and counseling regarding her condition or for health maintenance advice. Please see specific information pulled into the AVS if appropriate.

## 2025-05-30 RX ORDER — METOPROLOL SUCCINATE 100 MG/1
100 TABLET, EXTENDED RELEASE ORAL DAILY
Qty: 90 TABLET | Refills: 3 | Status: SHIPPED | OUTPATIENT
Start: 2025-05-30

## 2025-05-31 DIAGNOSIS — J41.1 BRONCHITIS, MUCOPURULENT RECURRENT: ICD-10-CM

## 2025-05-31 DIAGNOSIS — J44.1 CHRONIC OBSTRUCTIVE PULMONARY DISEASE WITH ACUTE EXACERBATION: ICD-10-CM

## 2025-05-31 DIAGNOSIS — K21.00 GASTROESOPHAGEAL REFLUX DISEASE WITH ESOPHAGITIS WITHOUT HEMORRHAGE: ICD-10-CM

## 2025-05-31 DIAGNOSIS — J30.2 SEASONAL ALLERGIES: ICD-10-CM

## 2025-05-31 DIAGNOSIS — J44.9 CHRONIC OBSTRUCTIVE PULMONARY DISEASE, UNSPECIFIED COPD TYPE: ICD-10-CM

## 2025-05-31 DIAGNOSIS — R09.82 PND (POST-NASAL DRIP): ICD-10-CM

## 2025-05-31 DIAGNOSIS — J30.9 ALLERGIC RHINITIS, UNSPECIFIED SEASONALITY, UNSPECIFIED TRIGGER: ICD-10-CM

## 2025-05-31 DIAGNOSIS — J45.40 MODERATE PERSISTENT ASTHMA, UNSPECIFIED WHETHER COMPLICATED: ICD-10-CM

## 2025-06-02 RX ORDER — MONTELUKAST SODIUM 10 MG/1
10 TABLET ORAL NIGHTLY
Qty: 90 TABLET | Refills: 3 | Status: SHIPPED | OUTPATIENT
Start: 2025-06-02

## 2025-06-03 RX ORDER — GABAPENTIN 300 MG/1
600 CAPSULE ORAL EVERY EVENING
OUTPATIENT
Start: 2025-06-03

## 2025-06-03 RX ORDER — GABAPENTIN 300 MG/1
CAPSULE ORAL
Qty: 270 CAPSULE | Refills: 1 | Status: SHIPPED | OUTPATIENT
Start: 2025-06-03

## 2025-06-04 NOTE — ASSESSMENT & PLAN NOTE
Addended by: ÓSCAR CELESTIN on: 6/4/2025 04:07 PM     Modules accepted: Orders     She is improved at this time since her most recent hospitalization.  Her breathing is about back to her baseline although her energy level still is not.

## 2025-06-06 ENCOUNTER — SPECIALTY PHARMACY (OUTPATIENT)
Dept: PULMONOLOGY | Facility: CLINIC | Age: 80
End: 2025-06-06
Payer: MEDICARE

## 2025-06-06 NOTE — PROGRESS NOTES
"   Specialty Pharmacy Patient Management Program  MyChart Refill Outreach       Julia \"RHETT\" was contacted today regarding refills of their medication(s). Dupixent. Delivery updated to next available 06/17/25.    [facility]icke[facility] Questionnaire Responses      Flowsheet Row Questionnaire Series Submission from 6/6/2025 in Initial Department with Rolanda, Generic Provider   Changes to allergies? No    Changes to medications? No    New conditions or infections since last clinic visit No    Unplanned office visit, urgent care, ED, or hospital admission in the last 4 weeks  No    How does patient/caregiver feel medication is working? Good    Financial problems or insurance changes  No    Since the previous refill, were any specialty medication doses or scheduled injections missed or delayed?  No    Delivery address Home    Doses left of specialty medications none left    Copay verified? Yes    Delivery Date Selection 06/16/25                 Delivery Questions      Flowsheet Row Most Recent Value   Delivery method UPS   Delivery address verified with patient/caregiver? Yes   Delivery address Home   Number of medications in delivery 1   Medication(s) being filled and delivered Dupilumab (Dupixent)   Doses left of specialty medications 2 weeks   Copay verified? Yes   Copay amount $0.00   Copay form of payment No copayment ($0)   Delivery Date Selection 06/17/25   Signature Required No                   Follow-up: 28  day(s)     Millie Johnson, Pharmacy Technician  6/6/2025  12:48 EDT       "

## 2025-06-09 ENCOUNTER — HOSPITAL ENCOUNTER (OUTPATIENT)
Facility: HOSPITAL | Age: 80
Discharge: HOME | End: 2025-06-09
Admitting: INTERNAL MEDICINE
Payer: MEDICARE

## 2025-06-09 DIAGNOSIS — J44.9 CHRONIC OBSTRUCTIVE PULMONARY DISEASE, UNSPECIFIED COPD TYPE: ICD-10-CM

## 2025-06-09 DIAGNOSIS — G47.33 OSA (OBSTRUCTIVE SLEEP APNEA): ICD-10-CM

## 2025-06-09 DIAGNOSIS — J45.40 MODERATE PERSISTENT ASTHMA, UNSPECIFIED WHETHER COMPLICATED: ICD-10-CM

## 2025-06-09 DIAGNOSIS — J41.1 BRONCHITIS, MUCOPURULENT RECURRENT: ICD-10-CM

## 2025-06-09 DIAGNOSIS — J30.9 ALLERGIC RHINITIS, UNSPECIFIED SEASONALITY, UNSPECIFIED TRIGGER: ICD-10-CM

## 2025-06-09 DIAGNOSIS — I26.09 OTHER ACUTE PULMONARY EMBOLISM WITH ACUTE COR PULMONALE: ICD-10-CM

## 2025-06-09 DIAGNOSIS — I26.99 ACUTE PULMONARY EMBOLISM, UNSPECIFIED PULMONARY EMBOLISM TYPE, UNSPECIFIED WHETHER ACUTE COR PULMONALE PRESENT: ICD-10-CM

## 2025-06-09 DIAGNOSIS — J44.1 CHRONIC OBSTRUCTIVE PULMONARY DISEASE WITH ACUTE EXACERBATION: ICD-10-CM

## 2025-06-09 DIAGNOSIS — R91.1 PULMONARY NODULE: ICD-10-CM

## 2025-06-09 DIAGNOSIS — I51.89 DIASTOLIC DYSFUNCTION: ICD-10-CM

## 2025-06-09 PROCEDURE — 93306 TTE W/DOPPLER COMPLETE: CPT

## 2025-06-11 ENCOUNTER — OFFICE VISIT (OUTPATIENT)
Dept: CARDIOLOGY | Facility: CLINIC | Age: 80
End: 2025-06-11
Payer: MEDICARE

## 2025-06-11 VITALS
HEART RATE: 54 BPM | BODY MASS INDEX: 39.53 KG/M2 | WEIGHT: 209.4 LBS | DIASTOLIC BLOOD PRESSURE: 82 MMHG | HEIGHT: 61 IN | SYSTOLIC BLOOD PRESSURE: 132 MMHG

## 2025-06-11 DIAGNOSIS — I10 ESSENTIAL HYPERTENSION: ICD-10-CM

## 2025-06-11 DIAGNOSIS — I50.32 CHRONIC HEART FAILURE WITH PRESERVED EJECTION FRACTION: Primary | ICD-10-CM

## 2025-06-11 PROBLEM — I26.99 ACUTE PULMONARY EMBOLISM: Status: RESOLVED | Noted: 2025-01-08 | Resolved: 2025-06-11

## 2025-06-11 PROBLEM — J18.9 PNEUMONIA: Status: RESOLVED | Noted: 2024-12-08 | Resolved: 2025-06-11

## 2025-06-11 NOTE — PROGRESS NOTES
Chief Complaint  Follow-up, Hypertension, Hyperlipidemia, and chroni hear failure with preserved ejection fraction     Subjective            History of Present Illness  Julia Rios is a 80-year-old female patient who presents to the office today for follow-up.    History of Present Illness  The patient presents for a follow-up of heart failure and blood pressure management.    She reports satisfactory tolerance to her current medication regimen, which includes losartan 100 mg daily, metoprolol 100 mg daily, and spironolactone 25 mg three times a week. She recalls a period of daily spironolactone intake for approximately one month before reverting to the three-day schedule. She underwent an echocardiogram two days prior and is awaiting the results. She reports no new or exacerbated cardiac symptoms.    She is currently on Eliquis for anticoagulation therapy, initially prescribed for a duration of six months but subsequently extended to a year. She utilizes oxygen therapy at night following the diagnosis of pulmonary embolism. She also mentions that her sleep study indicated episodes of apnea during the night. She experienced difficulty in breathing upon waking up after using CPAP, necessitating immediate administration of a breathing treatment.    MEDICATIONS  Losartan, metoprolol, spironolactone, Eliquis, Dupixent        [facility]  Past Medical History:   Diagnosis Date    Acute right-sided low back pain with right-sided sciatica 01/15/2018    Allergic     Allergic rhinitis     Asthma     Asthma, extrinsic     Asthma, intrinsic     Cervical disc disorder     CHF (congestive heart failure)     Chronic heart failure with preserved ejection fraction     11/16/20 echocardiogram: 1.  Normal ejection fraction of 55%. 2.  Mild left ventricular hypertrophy. 3.  No significant valvular heart issues.     CKD stage 3 10/29/2019    COPD (chronic obstructive pulmonary disease)     Deep vein thrombosis     Diverticulitis      Emphysema of lung     Essential hypertension     GERD (gastroesophageal reflux disease)     Headache     Hemoptysis     non cancerous    History of fungal pneumonia 12/02/2021    HL (hearing loss)     Hyperlipidemia     Idiopathic chronic gout of multiple sites without tophus 05/20/2016    Lumbosacral disc disease     Obesity     Pneumonia     Primary osteoarthritis of right knee 01/16/2017    Pulmonary embolism     Shortness of breath     Sleep apnea     CPAP    Sleep apnea, obstructive     Vitamin D deficiency 01/13/2016         ALLERGY  Allergies   Allergen Reactions    Ibuprofen Hives    Penicillins Other (See Comments) and Seizure     1. When was your reaction? < 10 years ago  2. Did your reaction happen after the first dose or after several doses? Do not know  3. Did your reaction require ED or hospital care to manage your reaction? Do not know  4. Did your reaction require treatment with epinephrine? Do not know  5. Have you taken amoxicillin (Amoxil) or amoxicillin-clavulanate (Augmentin) without issue since? No  6. Have you taken cephalexin (Keflex) without issue since?  No     Nsaids Other (See Comments)     CKD    Cephalosporins Rash    Sulfa Antibiotics Rash          SURGICALHX  Past Surgical History:   Procedure Laterality Date    BRONCHOSCOPY N/A 04/30/2024    Procedure: BRONCHOSCOPY WITH BRONCHOALVEOLAR LAVAGE, WASHING, AIRWAY INSPECTION;  Surgeon: Santino Baez MD;  Location: [facility] ENDOSCOPY;  Service: Pulmonary;  Laterality: N/A;  MUCOUS PLUGGING, BRONCHITIS    CATARACT EXTRACTION Right     COLONOSCOPY  2014    COLONOSCOPY N/A 10/12/2021    Procedure: COLONOSCOPY;  Surgeon: Jorge Diane MD;  Location: [facility] ENDOSCOPY;  Service: Gastroenterology;  Laterality: N/A;  DIVERTICULOSIS    ENDOSCOPY  2015    EYE SURGERY      cataract right eye    JOINT REPLACEMENT  10-31-23    OTHER SURGICAL HISTORY      Fatty tumor removed off back    RETINAL DETACHMENT REPAIR Right     hole in retina repair     TOTAL KNEE ARTHROPLASTY Right 10/31/2023    Procedure: TOTAL KNEE ARTHROPLASTY;  Surgeon: Celso Prakash MD;  [place] [facility] [place] [facility]  Service: Orthopedics;  Laterality: Right;          SOC  Social History     Socioeconomic History    Marital status:    Tobacco Use    Smoking status: Former     Current packs/day: 0.00     Average packs/day: 0.5 packs/day for 32.0 years (16.0 ttl pk-yrs)     Types: Cigarettes     Start date: 1963     Quit date: 1995     Years since quittin.4     Passive exposure: Never    Smokeless tobacco: Never   Vaping Use    Vaping status: Never Used   Substance and Sexual Activity    Alcohol use: Never    Drug use: Never    Sexual activity: Defer         FAMHX  Family History   Problem Relation Age of Onset    Colon cancer Mother 61    Cancer Mother     Heart disease Mother     Thyroid disease Mother     Migraines Mother     Heart failure Father     Asthma Father     Heart attack Father     Hyperlipidemia Father     Emphysema Father     COPD Father         Black Lung    Diabetes Brother     Emphysema Brother     COPD Brother     Breast cancer Maternal Grandmother     Stroke Paternal Grandfather     COPD Sister         lower lobectomy    Thyroid disease Sister     Malig Hyperthermia Neg Hx           MEDSIGONLY  Current Outpatient Medications on File Prior to Visit   Medication Sig    albuterol (PROVENTIL) (2.5 MG/3ML) 0.083% nebulizer solution Take 2.5 mg by nebulization 4 (Four) Times a Day As Needed for Wheezing.    albuterol sulfate  (90 Base) MCG/ACT inhaler Inhale 2 puffs Every 4 (Four) Hours As Needed for Wheezing.    apixaban (ELIQUIS) 5 MG tablet tablet Take 1 tablet by mouth 2 (Two) Times a Day.    azelastine (ASTELIN) 0.1 % nasal spray USE 2 SPRAYS INTO THE NOSTRILS AS DIRECTED TWICE DAILY.    Breztri Aerosphere 160-9-4.8 MCG/ACT aerosol inhaler INHALE 2 PUFFS BY MOUTH TWICE DAILY    D3-1000 25 MCG (1000 UT) capsule TAKE 1 CAPSULE BY MOUTH EVERY DAY     "Dupilumab (Dupixent) 300 MG/2ML solution auto-injector injection Inject 2 mL under the skin into the appropriate area as directed Every 14 (Fourteen) Days. Indications: Asthma, Chronic Obstructive Lung Disease    famotidine (PEPCID) 20 MG tablet TAKE 1 TABLET BY MOUTH EVERY NIGHT AT BEDTIME    fexofenadine (ALLEGRA) 180 MG tablet Take 1 tablet by mouth Daily.    gabapentin (NEURONTIN) 300 MG capsule 1 capsule every morning and 2 capsule every evening.    losartan (COZAAR) 100 MG tablet Take 1 tablet by mouth Daily.    metoprolol succinate XL (TOPROL-XL) 100 MG 24 hr tablet Take 1 tablet by mouth Daily. Hold if heartrate is <60.    montelukast (SINGULAIR) 10 MG tablet Take 1 tablet by mouth Every Night.    spironolactone (ALDACTONE) 25 MG tablet Take 1 tablet by mouth 3 (Three) Times a Week. Monday,Wednesday,Friday     No current facility-administered medications on file prior to visit.         Objective   /82   Pulse 54   Ht 154.9 cm (60.98\")   Wt 95 kg (209 lb 6.4 oz)   BMI 39.59 kg/m²       Physical Exam  Constitutional:       Appearance: She is obese.   HENT:      Head: Normocephalic.   Neck:      Vascular: No carotid bruit.   Cardiovascular:      Rate and Rhythm: Normal rate and regular rhythm.      Pulses: Normal pulses.      Heart sounds: Normal heart sounds. No murmur heard.  Pulmonary:      Effort: Pulmonary effort is normal.      Breath sounds: Normal breath sounds.   Musculoskeletal:      Cervical back: Neck supple.      Right lower leg: No edema.      Left lower leg: No edema.   Skin:     General: Skin is dry.   Neurological:      Mental Status: She is alert and oriented to person, place, and time.   Psychiatric:         Behavior: Behavior normal.       ECG 12 Lead    Date/Time: 6/11/2025 10:18 AM  Performed by: Marielos Dover APRN    Authorized by: Marielos Dover APRN  Comparison: compared with previous ECG from 1/17/2025  Similar to previous ECG  Rhythm: sinus rhythm  Rate: " "bradycardic  BPM: 54  Conduction: conduction normal  ST Segments: ST segments normal  T Waves: T waves normal  QRS axis: normal  Other findings: low voltage    Clinical impression: normal ECG          Result Review :   The following data was reviewed by: KALEB Shah on 06/11/2025:  proBNP   Date Value Ref Range Status   01/17/2025 422.1 0.0 - 1,800.0 pg/mL Final     CMP          1/17/2025    23:14 3/5/2025    09:37 4/7/2025    08:59   CMP   Glucose 98  90     BUN 12  14     Creatinine 1.38  1.25  1.20    EGFR 39.0  43.7  45.9    Sodium 142  143     Potassium 3.8  4.3     Chloride 109  108     Calcium 9.2  9.7     Total Protein 6.1  6.7     Albumin 3.4  3.8     Globulin 2.7  2.9     Total Bilirubin 0.3  0.6     Alkaline Phosphatase 94  76     AST (SGOT) 15  16     ALT (SGPT) 10  7     Albumin/Globulin Ratio 1.3  1.3     BUN/Creatinine Ratio 8.7  11.2     Anion Gap 10.3  9.9       CBC w/diff          1/13/2025    04:28 1/17/2025    23:14 3/5/2025    09:37   CBC w/Diff   WBC 12.08  10.38  9.98    RBC 4.19  4.22  4.33    Hemoglobin 11.6  11.6  12.1    Hematocrit 37.9  38.6  38.6    MCV 90.5  91.5  89.1    MCH 27.7  27.5  27.9    MCHC 30.6  30.1  31.3    RDW 15.9  15.8  13.7    Platelets 232  228  246    Neutrophil Rel %  49.1  51.4    Immature Granulocyte Rel %  1.7  0.2    Lymphocyte Rel %  33.7  33.0    Monocyte Rel %  10.7  10.8    Eosinophil Rel %  3.8  3.2    Basophil Rel %  1.0  1.4       Lab Results   Component Value Date    TSH 2.300 10/29/2019      Lab Results   Component Value Date    FREET4 1.2 10/29/2019      No results found for: \"DDIMERQUANT\"  Magnesium   Date Value Ref Range Status   01/13/2025 2.0 1.6 - 2.4 mg/dL Final      No results found for: \"DIGOXIN\"   Lab Results   Component Value Date    TROPONINT 30 (H) 01/17/2025           Results for orders placed during the hospital encounter of 01/08/25    Adult Transthoracic Echo Complete w/ Color, Spectral and Contrast if necessary per " protocol    Interpretation Summary    Left ventricular systolic function is normal. Left ventricular ejection fraction appears to be 56 - 60%.    Left ventricular diastolic function is consistent with (grade I) impaired relaxation.    No evidence of cardioembolic source by transthoracic echo           Assessment and Plan    Diagnoses and all orders for this visit:    1. Chronic heart failure with preserved ejection fraction (Primary)    2. Essential hypertension      Assessment & Plan  1. Heart failure.  Her kidney function has shown improvement since January 2025, with an increase in eGFR from 39 to 45. Blood counts have remained stable as of March 2025. The echocardiogram conducted in January 2025 revealed a stable cardiac function. The most recent echocardiogram, performed on 06/09/2025, is yet to be interpreted. Today's EKG indicates a normal sinus rhythm with a heart rate of 54, consistent with previous readings. She is advised to continue her current medication regimen, including losartan 100 mg daily, metoprolol 100 mg daily, and spironolactone 25 mg three times a week. The results of the latest echocardiogram will be communicated once available.    2. Blood pressure management.  She is currently taking losartan 100 mg daily and metoprolol 100 mg daily for blood pressure management. She reports tolerating these medications well. She is advised to continue her current medication regimen.    3. Pulmonary embolism.  She is currently on Eliquis for anticoagulation therapy, initially prescribed for six months but now extended to a year. She is advised to continue this medication for the full year to prevent recurrence of clots. She is also advised to continue oxygen therapy at night for at least a year, gradually reducing the setting to the lowest level that maintains adequate oxygen saturation. A follow-up appointment with Dr. Cloud is scheduled for six months from now.    Follow-up  The patient will follow up in  6 months.      Follow Up   Return in about 6 months (around 12/11/2025) for Follow up with Dr Cloud.    Patient was given instructions and counseling regarding her condition or for health maintenance advice. Please see specific information pulled into the AVS if appropriate.     Julia Rios  reports that she quit smoking about 30 years ago. Her smoking use included cigarettes. She started smoking about 62 years ago. She has a 16 pack-year smoking history. She has never been exposed to tobacco smoke. She has never used smokeless tobacco.            Patient or patient representative verbalized consent for the use of Ambient Listening during the visit with  KALEB Shah for chart documentation. 6/17/2025  19:00 EDT    KALEB Shah  06/11/25  10:18 EDT    Dictated Utilizing Dragon Dictation

## 2025-06-12 LAB
AORTIC DIMENSIONLESS INDEX: 0.69 (DI)
AV MEAN PRESS GRAD SYS DOP V1V2: 8.3 MMHG
AV VMAX SYS DOP: 200.9 CM/SEC
BH CV ECHO MEAS - AO MAX PG: 16.1 MMHG
BH CV ECHO MEAS - AO ROOT DIAM: 3.4 CM
BH CV ECHO MEAS - AO V2 VTI: 45.2 CM
BH CV ECHO MEAS - AVA(I,D): 2.06 CM2
BH CV ECHO MEAS - EDV(CUBED): 92.3 ML
BH CV ECHO MEAS - EDV(MOD-SP2): 78.2 ML
BH CV ECHO MEAS - EDV(MOD-SP4): 85 ML
BH CV ECHO MEAS - EF(MOD-SP2): 64.8 %
BH CV ECHO MEAS - EF(MOD-SP4): 67.5 %
BH CV ECHO MEAS - ESV(CUBED): 22.2 ML
BH CV ECHO MEAS - ESV(MOD-SP2): 27.5 ML
BH CV ECHO MEAS - ESV(MOD-SP4): 27.6 ML
BH CV ECHO MEAS - FS: 37.8 %
BH CV ECHO MEAS - IVS/LVPW: 0.83 CM
BH CV ECHO MEAS - IVSD: 0.79 CM
BH CV ECHO MEAS - LA DIMENSION: 3.2 CM
BH CV ECHO MEAS - LAT PEAK E' VEL: 7.5 CM/SEC
BH CV ECHO MEAS - LV DIASTOLIC VOL/BSA (35-75): 43.9 CM2
BH CV ECHO MEAS - LV MASS(C)D: 127.3 GRAMS
BH CV ECHO MEAS - LV MAX PG: 6.5 MMHG
BH CV ECHO MEAS - LV MEAN PG: 3.9 MMHG
BH CV ECHO MEAS - LV SYSTOLIC VOL/BSA (12-30): 14.3 CM2
BH CV ECHO MEAS - LV V1 MAX: 127.1 CM/SEC
BH CV ECHO MEAS - LV V1 VTI: 31.1 CM
BH CV ECHO MEAS - LVIDD: 4.5 CM
BH CV ECHO MEAS - LVIDS: 2.8 CM
BH CV ECHO MEAS - LVOT AREA: 3 CM2
BH CV ECHO MEAS - LVOT DIAM: 1.95 CM
BH CV ECHO MEAS - LVPWD: 0.95 CM
BH CV ECHO MEAS - MED PEAK E' VEL: 7.7 CM/SEC
BH CV ECHO MEAS - MV A MAX VEL: 88.3 CM/SEC
BH CV ECHO MEAS - MV DEC SLOPE: 273.5 CM/SEC2
BH CV ECHO MEAS - MV DEC TIME: 0.28 SEC
BH CV ECHO MEAS - MV E MAX VEL: 75 CM/SEC
BH CV ECHO MEAS - MV E/A: 0.85
BH CV ECHO MEAS - SV(LVOT): 93 ML
BH CV ECHO MEAS - SV(MOD-SP2): 50.7 ML
BH CV ECHO MEAS - SV(MOD-SP4): 57.4 ML
BH CV ECHO MEAS - SVI(LVOT): 48 ML/M2
BH CV ECHO MEAS - SVI(MOD-SP2): 26.2 ML/M2
BH CV ECHO MEAS - SVI(MOD-SP4): 29.6 ML/M2
BH CV ECHO MEASUREMENTS AVERAGE E/E' RATIO: 9.87
BH CV XLRA - RV BASE: 3.6 CM
BH CV XLRA - TDI S': 11.4 CM/SEC
LEFT ATRIUM VOLUME INDEX: 17 ML/M2
LV EF BIPLANE MOD: 66.5 %
SINUS: 2.5 CM

## 2025-06-16 RX ORDER — ALBUTEROL SULFATE 90 UG/1
2 INHALANT RESPIRATORY (INHALATION) EVERY 4 HOURS PRN
Qty: 18 G | Refills: 1 | Status: SHIPPED | OUTPATIENT
Start: 2025-06-16

## 2025-07-08 ENCOUNTER — SPECIALTY PHARMACY (OUTPATIENT)
Dept: PULMONOLOGY | Facility: CLINIC | Age: 80
End: 2025-07-08
Payer: MEDICARE

## 2025-07-08 NOTE — PROGRESS NOTES
"   Specialty Pharmacy Patient Management Program  MyChart Refill Outreach       Julia \"RHETT\" was contacted today regarding refills of their medication(s). Dupixent     MyChart Questionnaire Responses      Flowsheet Row Questionnaire Series Submission from 7/8/2025 in Initial Department with Rolanda, Generic Provider   Changes to allergies? No    Changes to medications? No    New conditions or infections since last clinic visit No    Unplanned office visit, urgent care, ED, or hospital admission in the last 4 weeks  No    How does patient/caregiver feel medication is working? Good    Financial problems or insurance changes  No    Since the previous refill, were any specialty medication doses or scheduled injections missed or delayed?  No    Delivery address Home    Doses left of specialty medications none    Copay verified? No    Delivery Date Selection 07/15/25                 Delivery Questions      Flowsheet Row Most Recent Value   Delivery method [employer]   Delivery address verified with patient/caregiver? Yes   Delivery address Home   Number of medications in delivery 1   Medication(s) being filled and delivered Dupilumab (Dupixent)   Doses left of specialty medications none   Copay verified? Yes   Copay amount $0.00   Copay form of payment No copayment ($0)   Delivery Date Selection 07/15/25   Signature Required No                   Follow-up: 28  day(s)     Millie Johnson, Pharmacy Technician  7/8/2025  14:54 EDT       "

## 2025-07-09 ENCOUNTER — OFFICE VISIT (OUTPATIENT)
Dept: PULMONOLOGY | Facility: CLINIC | Age: 80
End: 2025-07-09
Payer: MEDICARE

## 2025-07-09 VITALS
HEIGHT: 61 IN | OXYGEN SATURATION: 95 % | DIASTOLIC BLOOD PRESSURE: 75 MMHG | BODY MASS INDEX: 40.17 KG/M2 | TEMPERATURE: 97.6 F | RESPIRATION RATE: 16 BRPM | HEART RATE: 58 BPM | WEIGHT: 212.8 LBS | SYSTOLIC BLOOD PRESSURE: 133 MMHG

## 2025-07-09 DIAGNOSIS — G47.33 OSA (OBSTRUCTIVE SLEEP APNEA): ICD-10-CM

## 2025-07-09 DIAGNOSIS — J44.1 CHRONIC OBSTRUCTIVE PULMONARY DISEASE WITH ACUTE EXACERBATION: ICD-10-CM

## 2025-07-09 DIAGNOSIS — I82.4Y1 ACUTE DEEP VEIN THROMBOSIS (DVT) OF PROXIMAL VEIN OF RIGHT LOWER EXTREMITY: ICD-10-CM

## 2025-07-09 DIAGNOSIS — J30.2 SEASONAL ALLERGIES: Primary | ICD-10-CM

## 2025-07-09 DIAGNOSIS — R09.82 PND (POST-NASAL DRIP): ICD-10-CM

## 2025-07-09 DIAGNOSIS — J41.1 BRONCHITIS, MUCOPURULENT RECURRENT: ICD-10-CM

## 2025-07-09 DIAGNOSIS — E66.01 MORBID (SEVERE) OBESITY DUE TO EXCESS CALORIES: ICD-10-CM

## 2025-07-09 DIAGNOSIS — J44.9 CHRONIC OBSTRUCTIVE PULMONARY DISEASE, UNSPECIFIED COPD TYPE: ICD-10-CM

## 2025-07-09 DIAGNOSIS — T17.500A MUCUS PLUGGING OF BRONCHI: ICD-10-CM

## 2025-07-09 DIAGNOSIS — J98.11 ATELECTASIS: ICD-10-CM

## 2025-07-09 DIAGNOSIS — R04.2 HEMOPTYSIS: ICD-10-CM

## 2025-07-09 DIAGNOSIS — J30.9 ALLERGIC RHINITIS, UNSPECIFIED SEASONALITY, UNSPECIFIED TRIGGER: ICD-10-CM

## 2025-07-09 DIAGNOSIS — J12.2 PARAINFLUENZA VIRUS PNEUMONIA: ICD-10-CM

## 2025-07-09 DIAGNOSIS — I26.09 OTHER ACUTE PULMONARY EMBOLISM WITH ACUTE COR PULMONALE: ICD-10-CM

## 2025-07-09 DIAGNOSIS — R91.1 PULMONARY NODULE: ICD-10-CM

## 2025-07-09 DIAGNOSIS — J45.40 MODERATE PERSISTENT ASTHMA, UNSPECIFIED WHETHER COMPLICATED: ICD-10-CM

## 2025-07-09 DIAGNOSIS — F17.201 TOBACCO ABUSE, IN REMISSION: ICD-10-CM

## 2025-07-09 DIAGNOSIS — K21.00 GASTROESOPHAGEAL REFLUX DISEASE WITH ESOPHAGITIS WITHOUT HEMORRHAGE: ICD-10-CM

## 2025-07-09 RX ORDER — ALBUTEROL SULFATE 90 UG/1
2 INHALANT RESPIRATORY (INHALATION) EVERY 4 HOURS PRN
Qty: 18 G | Refills: 1 | Status: SHIPPED | OUTPATIENT
Start: 2025-07-09

## 2025-07-09 RX ORDER — TIRZEPATIDE 2.5 MG/.5ML
2.5 INJECTION, SOLUTION SUBCUTANEOUS WEEKLY
Qty: 2.5 ML | Refills: 4 | Status: SHIPPED | OUTPATIENT
Start: 2025-07-09

## 2025-07-09 RX ORDER — BUDESONIDE, GLYCOPYRROLATE, AND FORMOTEROL FUMARATE 160; 9; 4.8 UG/1; UG/1; UG/1
2 AEROSOL, METERED RESPIRATORY (INHALATION) 2 TIMES DAILY
Qty: 10.7 G | Refills: 11 | Status: SHIPPED | OUTPATIENT
Start: 2025-07-09

## 2025-07-09 RX ORDER — MONTELUKAST SODIUM 10 MG/1
10 TABLET ORAL NIGHTLY
Qty: 90 TABLET | Refills: 3 | Status: SHIPPED | OUTPATIENT
Start: 2025-07-09

## 2025-07-09 NOTE — PROGRESS NOTES
Primary Care Provider  Nick Patel DO     Referring Provider  No ref. provider found     Chief Complaint  Follow-up, COPD, Sleep Apnea (PULMONARY EMBOLISM/), Shortness of Breath, and Cough    Subjective          Julia Rios presents to [facility]MON[facility]INE  History of Present Illness  Julia Rios is a 80 y.o. female patient   History of Present Illness  The patient is an 80-year-old female with a history of recurrent bronchitis, obstructive sleep apnea on CPAP, allergic rhinitis, history of pulmonary embolism, and tobacco use. She was diagnosed with submassive pulmonary embolism in 01/2025 and has been on Eliquis. She had a repeat CT scan of the chest done on 04/07/2025, and pulmonary function test and echocardiogram were done recently. She is here for a follow-up.    She reports no new health issues. She is currently on Eliquis and has not experienced any bleeding complications. She has not required antibiotics or steroids since her last visit. She has not had bronchitis since her hospitalization and did not experience her usual June episode. She feels her condition has improved as she has not had bronchitis or asthma symptoms. She avoids planning activities for the hot mid-afternoon period and has been managing well. She worked from January to June after her hospital discharge, which she believes helped her recover from pneumonia and blood clots as she was not sedentary. She continues to walk 2 miles daily with the aid of a cane.    She has difficulty using the CPAP machine due to discomfort when removing it, describing a sensation of breathlessness. Attempts to use a nasal pillow were unsuccessful. She uses oxygen at a setting of 2.5 during sleep, which she finds beneficial. During a sleep study, she used a machine all night and oxygen was added at one point. She is unsure if CPAP was tried during the study.    She is on Dupixent but is uncertain  of its effectiveness. She finds heat challenging and uses Breztri. She uses a nebulizer twice daily and an albuterol inhaler once or twice a week. She also takes Singulair daily.    She experiences occasional swelling and was advised by Dr. Cloud to increase her spironolactone dosage.    She has had 4 falls since 12/2024 and has an appointment with her knee doctor in 08/2025. Due to her back and knee conditions, she uses a cane for stability and finds it helpful.    SOCIAL HISTORY  Occupations: Help run a store  Exercise: Walks 2 miles every day  Tobacco: History of tobacco use    Review of Systems     General:  No Fatigue, No Fever No weight loss or loss of appetite  HEENT: No dysphagia, No Visual Changes, no rhinorrhea  Respiratory:  + cough,+Dyspnea, scant phlegm, No Pleuritic Pain, no wheezing, no hemoptysis.  Cardiovascular: Denies chest pain, denies palpitations,+ECHEVERRIA, No Chest Pressure  Gastrointestinal:  No Abdominal Pain, No Nausea, No Vomiting, No Diarrhea  Genitourinary:  No Dysuria, No Frequency, No Hesitancy  Musculoskeletal: No muscle pain or swelling  Endocrine:  No Heat Intolerance, No Cold Intolerance  Hematologic:  No Bleeding, No Bruising  Psychiatric:  No Anxiety, No Depression  Neurologic:  No Confusion, no Dysarthria, No Headaches  Skin:  No Rash, No Open Wounds    Family History   Problem Relation Age of Onset    Colon cancer Mother 61    Cancer Mother     Heart disease Mother     Thyroid disease Mother     Migraines Mother     Heart failure Father     Asthma Father     Heart attack Father     Hyperlipidemia Father     Emphysema Father     COPD Father         Black Lung    Diabetes Brother     Emphysema Brother     COPD Brother     Breast cancer Maternal Grandmother     Stroke Paternal Grandfather     COPD Sister         lower lobectomy    Thyroid disease Sister     Malig Hyperthermia Neg Hx         Social History     Socioeconomic History    Marital status:    Tobacco Use    Smoking  status: Former     Current packs/day: 0.00     Average packs/day: 0.5 packs/day for 32.0 years (16.0 ttl pk-yrs)     Types: Cigarettes     Start date: 1963     Quit date: 1995     Years since quittin.5     Passive exposure: Never    Smokeless tobacco: Never   Vaping Use    Vaping status: Never Used   Substance and Sexual Activity    Alcohol use: Never    Drug use: Never    Sexual activity: Defer        Past Medical History:   Diagnosis Date    Acute right-sided low back pain with right-sided sciatica 01/15/2018    Allergic     Allergic rhinitis     Asthma     Asthma, extrinsic     Asthma, intrinsic     Cataract     Cervical disc disorder     CHF (congestive heart failure)     Chronic heart failure with preserved ejection fraction     20 echocardiogram: 1.  Normal ejection fraction of 55%. 2.  Mild left ventricular hypertrophy. 3.  No significant valvular heart issues.     CKD stage 3 10/29/2019    COPD (chronic obstructive pulmonary disease)     Deep vein thrombosis     Diverticulitis     Emphysema of lung     Essential hypertension     GERD (gastroesophageal reflux disease)     Headache     Hemoptysis     non cancerous    History of fungal pneumonia 2021    HL (hearing loss)     Hyperlipidemia     Idiopathic chronic gout of multiple sites without tophus 2016    Lumbosacral disc disease     Obesity     Pneumonia     Primary osteoarthritis of right knee 2017    Pulmonary embolism     Shortness of breath     Sleep apnea     CPAP    Sleep apnea, obstructive     Vitamin D deficiency 2016        Immunization History   Administered Date(s) Administered    COVID-19 (MODERNA) 1st,2nd,3rd Dose Monovalent 2021, 2021, 2021, 2021    Fluzone High-Dose 65+yrs 2021, 2022, 2024    Fluzone Quad >6mos (Multi-dose) 10/01/2020    Pneumococcal Conjugate 13-Valent (PCV13) 2015    Pneumococcal Conjugate 20-Valent (PCV20) 2023    Pneumococcal  Polysaccharide (PPSV23) 05/01/2010         Allergies   Allergen Reactions    Ibuprofen Hives    Penicillins Other (See Comments) and Seizure     1. When was your reaction? < 10 years ago  2. Did your reaction happen after the first dose or after several doses? Do not know  3. Did your reaction require ED or hospital care to manage your reaction? Do not know  4. Did your reaction require treatment with epinephrine? Do not know  5. Have you taken amoxicillin (Amoxil) or amoxicillin-clavulanate (Augmentin) without issue since? No  6. Have you taken cephalexin (Keflex) without issue since?  No     Nsaids Other (See Comments)     CKD    Cephalosporins Rash    Sulfa Antibiotics Rash          Current Outpatient Medications:     albuterol (PROVENTIL) (2.5 MG/3ML) 0.083% nebulizer solution, Take 2.5 mg by nebulization 4 (Four) Times a Day As Needed for Wheezing., Disp: 360 mL, Rfl: 3    albuterol sulfate  (90 Base) MCG/ACT inhaler, Inhale 2 puffs Every 4 (Four) Hours As Needed for Wheezing., Disp: 18 g, Rfl: 1    apixaban (ELIQUIS) 5 MG tablet tablet, Take 1 tablet by mouth 2 (Two) Times a Day., Disp: 180 tablet, Rfl: 1    azelastine (ASTELIN) 0.1 % nasal spray, USE 2 SPRAYS INTO THE NOSTRILS AS DIRECTED TWICE DAILY., Disp: 30 mL, Rfl: 6    Budeson-Glycopyrrol-Formoterol (Breztri Aerosphere) 160-9-4.8 MCG/ACT aerosol inhaler, Inhale 2 puffs 2 (Two) Times a Day., Disp: 10.7 g, Rfl: 11    D3-1000 25 MCG (1000 UT) capsule, TAKE 1 CAPSULE BY MOUTH EVERY DAY, Disp: 90 capsule, Rfl: 3    Dupilumab (Dupixent) 300 MG/2ML solution auto-injector injection, Inject 2 mL under the skin into the appropriate area as directed Every 14 (Fourteen) Days. Indications: Asthma, Chronic Obstructive Lung Disease, Disp: 4 mL, Rfl: 10    famotidine (PEPCID) 20 MG tablet, TAKE 1 TABLET BY MOUTH EVERY NIGHT AT BEDTIME, Disp: 30 tablet, Rfl: 11    fexofenadine (ALLEGRA) 180 MG tablet, Take 1 tablet by mouth Daily., Disp: 90 tablet, Rfl: 3     "gabapentin (NEURONTIN) 300 MG capsule, 1 capsule every morning and 2 capsule every evening., Disp: 270 capsule, Rfl: 1    losartan (COZAAR) 100 MG tablet, Take 1 tablet by mouth Daily., Disp: 90 tablet, Rfl: 3    metoprolol succinate XL (TOPROL-XL) 100 MG 24 hr tablet, Take 1 tablet by mouth Daily. Hold if heartrate is <60., Disp: 90 tablet, Rfl: 3    montelukast (SINGULAIR) 10 MG tablet, Take 1 tablet by mouth Every Night., Disp: 90 tablet, Rfl: 3    spironolactone (ALDACTONE) 25 MG tablet, Take 1 tablet by mouth 3 (Three) Times a Week. Monday,Wednesday,Friday, Disp: 36 tablet, Rfl: 3    Tirzepatide-Weight Management (Zepbound) 2.5 MG/0.5ML solution auto-injector, Inject 0.5 mL under the skin into the appropriate area as directed 1 (One) Time Per Week., Disp: 2.5 mL, Rfl: 4     Objective   Physical Exam  Physical Exam      Vital Signs:   /75 (BP Location: Left arm, Patient Position: Sitting, Cuff Size: Adult)   Pulse 58   Temp 97.6 °F (36.4 °C) (Tympanic)   Resp 16   Ht 154.9 cm (60.98\")   Wt 96.5 kg (212 lb 12.8 oz)   SpO2 95% Comment: ROOM AIR  BMI 40.23 kg/m²       Vital Signs Reviewed  Morbidly obese, pleasant, Alert, in no acute distress   HEENT:  PERRL, EOMI.  OP, nares clear, no sinus tenderness  Mallampatti classification : 1  Neck:  Supple, no JVD, no thyromegaly  Lymph: no axillary, cervical, supraclavicular lymphadenopathy noted bilaterally  Chest:  good aeration, bilateral diminished breath sounds, no wheezing, crackles or rhonchi, resonant to percussion b/l  CV: RRR, no MGR, pulses 2+, equal.  Abd:  Soft, NT, ND, + BS, no HSM  EXT:  no clubbing, no cyanosis, trace BLE edema  Neuro:  A&Ox3, CN grossly intact, no focal deficits.  Skin: No rashes or lesions noted     Result Review :   The following data was reviewed by: Santino Baez MD on 07/09/2025:  Common labs          3/5/2025    09:37 4/7/2025    08:59 4/22/2025    08:49   Common Labs   Glucose 90      BUN 14      Creatinine 1.25  " 1.20     Sodium 143      Potassium 4.3      Chloride 108      Calcium 9.7      Albumin 3.8      Total Bilirubin 0.6      Alkaline Phosphatase 76      AST (SGOT) 16      ALT (SGPT) 7      WBC 9.98      Hemoglobin 12.1      Hematocrit 38.6      Platelets 246      Total Cholesterol   203    Triglycerides   89    HDL Cholesterol   56    LDL Cholesterol    131    Hemoglobin A1C 5.80        CMP          1/17/2025    23:14 3/5/2025    09:37 4/7/2025    08:59   CMP   Glucose 98  90     BUN 12  14     Creatinine 1.38  1.25  1.20    EGFR 39.0  43.7  45.9    Sodium 142  143     Potassium 3.8  4.3     Chloride 109  108     Calcium 9.2  9.7     Total Protein 6.1  6.7     Albumin 3.4  3.8     Globulin 2.7  2.9     Total Bilirubin 0.3  0.6     Alkaline Phosphatase 94  76     AST (SGOT) 15  16     ALT (SGPT) 10  7     Albumin/Globulin Ratio 1.3  1.3     BUN/Creatinine Ratio 8.7  11.2     Anion Gap 10.3  9.9       CBC          1/13/2025    04:28 1/17/2025    23:14 3/5/2025    09:37   CBC   WBC 12.08  10.38  9.98    RBC 4.19  4.22  4.33    Hemoglobin 11.6  11.6  12.1    Hematocrit 37.9  38.6  38.6    MCV 90.5  91.5  89.1    MCH 27.7  27.5  27.9    MCHC 30.6  30.1  31.3    RDW 15.9  15.8  13.7    Platelets 232  228  246      CBC w/diff          1/13/2025    04:28 1/17/2025    23:14 3/5/2025    09:37   CBC w/Diff   WBC 12.08  10.38  9.98    RBC 4.19  4.22  4.33    Hemoglobin 11.6  11.6  12.1    Hematocrit 37.9  38.6  38.6    MCV 90.5  91.5  89.1    MCH 27.7  27.5  27.9    MCHC 30.6  30.1  31.3    RDW 15.9  15.8  13.7    Platelets 232  228  246    Neutrophil Rel %  49.1  51.4    Immature Granulocyte Rel %  1.7  0.2    Lymphocyte Rel %  33.7  33.0    Monocyte Rel %  10.7  10.8    Eosinophil Rel %  3.8  3.2    Basophil Rel %  1.0  1.4      Data reviewed : Radiologic studies Previous imaging reviewed.      Site   Date Value Ref Range Status   01/08/2025 Right Brachial  Final     Max's Test   Date Value Ref Range Status   01/08/2025 N/A   Final     pH, Arterial   Date Value Ref Range Status   01/08/2025 7.425 7.350 - 7.450 pH units Final     pCO2, Arterial   Date Value Ref Range Status   01/08/2025 31.3 (L) 35.0 - 45.0 mm Hg Final     pO2, Arterial   Date Value Ref Range Status   01/08/2025 42.6 (C) 80.0 - 100.0 mm Hg Final     HCO3, Arterial   Date Value Ref Range Status   01/08/2025 20.6 (L) 22.0 - 26.0 mmol/L Final     Base Excess, Arterial   Date Value Ref Range Status   01/08/2025 -2.9 (L) -2.0 - 2.0 mmol/L Final     Comment:     Serial Number: [id number]:  [id number]     O2 Saturation, Arterial   Date Value Ref Range Status   01/08/2025 80.1 (L) 95.0 - 99.0 % Final     Hemoglobin, Blood Gas   Date Value Ref Range Status   01/08/2025 12.9 12 - 18 g/dL Final     Hematocrit, Blood Gas   Date Value Ref Range Status   01/08/2025 38.0 38.0 - 51.0 % Final     Barometric Pressure for Blood Gas   Date Value Ref Range Status   01/08/2025 753.5000 mmHg Final     Modality   Date Value Ref Range Status   01/08/2025 Room Air  Final     FIO2   Date Value Ref Range Status   01/08/2025 21 % Final      Results for orders placed during the hospital encounter of 06/09/25    Adult Transthoracic Echo Complete W/ Cont if Necessary Per Protocol    Interpretation Summary    Left ventricular systolic function is normal. Calculated left ventricular EF = 66.5%    Left ventricular diastolic function was normal.              Results  Imaging   - CT scan of the chest: 04/07/2025, No clots.    Diagnostic Testing   - Echocardiogram: No significant right heart strain.   - Pulmonary function test: Mild decrease in diffusion capacity.   - Sleep study: Apnea-hypopnea index of 0.6.             Assessment and Plan    Diagnoses and all orders for this visit:    1. Seasonal allergies (Primary)  -     Budeson-Glycopyrrol-Formoterol ([facility]) 160-9-4.8 MCG/ACT aerosol inhaler; Inhale 2 puffs 2 (Two) Times a Day.  Dispense: 10.7 g; Refill: 11  -     albuterol sulfate   (90 Base) MCG/ACT inhaler; Inhale 2 puffs Every 4 (Four) Hours As Needed for Wheezing.  Dispense: 18 g; Refill: 1  -     montelukast (SINGULAIR) 10 MG tablet; Take 1 tablet by mouth Every Night.  Dispense: 90 tablet; Refill: 3    2. Other acute pulmonary embolism with acute cor pulmonale  -     Budeson-Glycopyrrol-Formoterol (Breztri Aerosphere) 160-9-4.8 MCG/ACT aerosol inhaler; Inhale 2 puffs 2 (Two) Times a Day.  Dispense: 10.7 g; Refill: 11  -     albuterol sulfate  (90 Base) MCG/ACT inhaler; Inhale 2 puffs Every 4 (Four) Hours As Needed for Wheezing.  Dispense: 18 g; Refill: 1  -     montelukast (SINGULAIR) 10 MG tablet; Take 1 tablet by mouth Every Night.  Dispense: 90 tablet; Refill: 3    3. Acute deep vein thrombosis (DVT) of proximal vein of right lower extremity  -     Budeson-Glycopyrrol-Formoterol (Breztri Aerosphere) 160-9-4.8 MCG/ACT aerosol inhaler; Inhale 2 puffs 2 (Two) Times a Day.  Dispense: 10.7 g; Refill: 11  -     albuterol sulfate  (90 Base) MCG/ACT inhaler; Inhale 2 puffs Every 4 (Four) Hours As Needed for Wheezing.  Dispense: 18 g; Refill: 1  -     montelukast (SINGULAIR) 10 MG tablet; Take 1 tablet by mouth Every Night.  Dispense: 90 tablet; Refill: 3    4. PND (post-nasal drip)  -     Budeson-Glycopyrrol-Formoterol (Breztri Aerosphere) 160-9-4.8 MCG/ACT aerosol inhaler; Inhale 2 puffs 2 (Two) Times a Day.  Dispense: 10.7 g; Refill: 11  -     albuterol sulfate  (90 Base) MCG/ACT inhaler; Inhale 2 puffs Every 4 (Four) Hours As Needed for Wheezing.  Dispense: 18 g; Refill: 1  -     montelukast (SINGULAIR) 10 MG tablet; Take 1 tablet by mouth Every Night.  Dispense: 90 tablet; Refill: 3    5. Pulmonary nodule  -     Budeson-Glycopyrrol-Formoterol (Breztri Aerosphere) 160-9-4.8 MCG/ACT aerosol inhaler; Inhale 2 puffs 2 (Two) Times a Day.  Dispense: 10.7 g; Refill: 11  -     albuterol sulfate  (90 Base) MCG/ACT inhaler; Inhale 2 puffs Every 4 (Four) Hours As Needed  for Wheezing.  Dispense: 18 g; Refill: 1  -     montelukast (SINGULAIR) 10 MG tablet; Take 1 tablet by mouth Every Night.  Dispense: 90 tablet; Refill: 3    6. Bronchitis, mucopurulent recurrent  -     Budeson-Glycopyrrol-Formoterol (Breztri Aerosphere) 160-9-4.8 MCG/ACT aerosol inhaler; Inhale 2 puffs 2 (Two) Times a Day.  Dispense: 10.7 g; Refill: 11  -     albuterol sulfate  (90 Base) MCG/ACT inhaler; Inhale 2 puffs Every 4 (Four) Hours As Needed for Wheezing.  Dispense: 18 g; Refill: 1  -     montelukast (SINGULAIR) 10 MG tablet; Take 1 tablet by mouth Every Night.  Dispense: 90 tablet; Refill: 3    7. Chronic obstructive pulmonary disease, unspecified COPD type  -     Budeson-Glycopyrrol-Formoterol (Breztri Aerosphere) 160-9-4.8 MCG/ACT aerosol inhaler; Inhale 2 puffs 2 (Two) Times a Day.  Dispense: 10.7 g; Refill: 11  -     albuterol sulfate  (90 Base) MCG/ACT inhaler; Inhale 2 puffs Every 4 (Four) Hours As Needed for Wheezing.  Dispense: 18 g; Refill: 1  -     montelukast (SINGULAIR) 10 MG tablet; Take 1 tablet by mouth Every Night.  Dispense: 90 tablet; Refill: 3    8. Hemoptysis  -     Budeson-Glycopyrrol-Formoterol (Breztri Aerosphere) 160-9-4.8 MCG/ACT aerosol inhaler; Inhale 2 puffs 2 (Two) Times a Day.  Dispense: 10.7 g; Refill: 11  -     albuterol sulfate  (90 Base) MCG/ACT inhaler; Inhale 2 puffs Every 4 (Four) Hours As Needed for Wheezing.  Dispense: 18 g; Refill: 1  -     montelukast (SINGULAIR) 10 MG tablet; Take 1 tablet by mouth Every Night.  Dispense: 90 tablet; Refill: 3    9. Moderate persistent asthma, unspecified whether complicated  -     Budeson-Glycopyrrol-Formoterol (Breztri Aerosphere) 160-9-4.8 MCG/ACT aerosol inhaler; Inhale 2 puffs 2 (Two) Times a Day.  Dispense: 10.7 g; Refill: 11  -     albuterol sulfate  (90 Base) MCG/ACT inhaler; Inhale 2 puffs Every 4 (Four) Hours As Needed for Wheezing.  Dispense: 18 g; Refill: 1  -     montelukast (SINGULAIR) 10 MG  tablet; Take 1 tablet by mouth Every Night.  Dispense: 90 tablet; Refill: 3    10. Chronic obstructive pulmonary disease with acute exacerbation  -     Budeson-Glycopyrrol-Formoterol (Breztri Aerosphere) 160-9-4.8 MCG/ACT aerosol inhaler; Inhale 2 puffs 2 (Two) Times a Day.  Dispense: 10.7 g; Refill: 11  -     albuterol sulfate  (90 Base) MCG/ACT inhaler; Inhale 2 puffs Every 4 (Four) Hours As Needed for Wheezing.  Dispense: 18 g; Refill: 1  -     montelukast (SINGULAIR) 10 MG tablet; Take 1 tablet by mouth Every Night.  Dispense: 90 tablet; Refill: 3    11. CARMEN (obstructive sleep apnea)  -     Tirzepatide-Weight Management (Zepbound) 2.5 MG/0.5ML solution auto-injector; Inject 0.5 mL under the skin into the appropriate area as directed 1 (One) Time Per Week.  Dispense: 2.5 mL; Refill: 4  -     Budeson-Glycopyrrol-Formoterol (Breztri Aerosphere) 160-9-4.8 MCG/ACT aerosol inhaler; Inhale 2 puffs 2 (Two) Times a Day.  Dispense: 10.7 g; Refill: 11  -     albuterol sulfate  (90 Base) MCG/ACT inhaler; Inhale 2 puffs Every 4 (Four) Hours As Needed for Wheezing.  Dispense: 18 g; Refill: 1  -     montelukast (SINGULAIR) 10 MG tablet; Take 1 tablet by mouth Every Night.  Dispense: 90 tablet; Refill: 3    12. Tobacco abuse, in remission  -     Budeson-Glycopyrrol-Formoterol (Breztri Aerosphere) 160-9-4.8 MCG/ACT aerosol inhaler; Inhale 2 puffs 2 (Two) Times a Day.  Dispense: 10.7 g; Refill: 11  -     albuterol sulfate  (90 Base) MCG/ACT inhaler; Inhale 2 puffs Every 4 (Four) Hours As Needed for Wheezing.  Dispense: 18 g; Refill: 1  -     montelukast (SINGULAIR) 10 MG tablet; Take 1 tablet by mouth Every Night.  Dispense: 90 tablet; Refill: 3    13. Morbid (severe) obesity due to excess calories  -     Tirzepatide-Weight Management (Zepbound) 2.5 MG/0.5ML solution auto-injector; Inject 0.5 mL under the skin into the appropriate area as directed 1 (One) Time Per Week.  Dispense: 2.5 mL; Refill: 4  -      Budeson-Glycopyrrol-Formoterol (Breztri Aerosphere) 160-9-4.8 MCG/ACT aerosol inhaler; Inhale 2 puffs 2 (Two) Times a Day.  Dispense: 10.7 g; Refill: 11  -     albuterol sulfate  (90 Base) MCG/ACT inhaler; Inhale 2 puffs Every 4 (Four) Hours As Needed for Wheezing.  Dispense: 18 g; Refill: 1  -     montelukast (SINGULAIR) 10 MG tablet; Take 1 tablet by mouth Every Night.  Dispense: 90 tablet; Refill: 3    14. Parainfluenza virus pneumonia  -     Budeson-Glycopyrrol-Formoterol (Breztri Aerosphere) 160-9-4.8 MCG/ACT aerosol inhaler; Inhale 2 puffs 2 (Two) Times a Day.  Dispense: 10.7 g; Refill: 11  -     albuterol sulfate  (90 Base) MCG/ACT inhaler; Inhale 2 puffs Every 4 (Four) Hours As Needed for Wheezing.  Dispense: 18 g; Refill: 1  -     montelukast (SINGULAIR) 10 MG tablet; Take 1 tablet by mouth Every Night.  Dispense: 90 tablet; Refill: 3    15. Atelectasis  -     Budeson-Glycopyrrol-Formoterol (Breztri Aerosphere) 160-9-4.8 MCG/ACT aerosol inhaler; Inhale 2 puffs 2 (Two) Times a Day.  Dispense: 10.7 g; Refill: 11  -     albuterol sulfate  (90 Base) MCG/ACT inhaler; Inhale 2 puffs Every 4 (Four) Hours As Needed for Wheezing.  Dispense: 18 g; Refill: 1  -     montelukast (SINGULAIR) 10 MG tablet; Take 1 tablet by mouth Every Night.  Dispense: 90 tablet; Refill: 3    16. Mucus plugging of bronchi  -     Budeson-Glycopyrrol-Formoterol (Breztri Aerosphere) 160-9-4.8 MCG/ACT aerosol inhaler; Inhale 2 puffs 2 (Two) Times a Day.  Dispense: 10.7 g; Refill: 11  -     albuterol sulfate  (90 Base) MCG/ACT inhaler; Inhale 2 puffs Every 4 (Four) Hours As Needed for Wheezing.  Dispense: 18 g; Refill: 1  -     montelukast (SINGULAIR) 10 MG tablet; Take 1 tablet by mouth Every Night.  Dispense: 90 tablet; Refill: 3    17. Allergic rhinitis, unspecified seasonality, unspecified trigger  -     Budeson-Glycopyrrol-Formoterol (Breztri Aerosphere) 160-9-4.8 MCG/ACT aerosol inhaler; Inhale 2 puffs 2 (Two)  Times a Day.  Dispense: 10.7 g; Refill: 11  -     albuterol sulfate  (90 Base) MCG/ACT inhaler; Inhale 2 puffs Every 4 (Four) Hours As Needed for Wheezing.  Dispense: 18 g; Refill: 1  -     montelukast (SINGULAIR) 10 MG tablet; Take 1 tablet by mouth Every Night.  Dispense: 90 tablet; Refill: 3    18. Gastroesophageal reflux disease with esophagitis without hemorrhage  -     Budeson-Glycopyrrol-Formoterol (Breztri Aerosphere) 160-9-4.8 MCG/ACT aerosol inhaler; Inhale 2 puffs 2 (Two) Times a Day.  Dispense: 10.7 g; Refill: 11  -     albuterol sulfate  (90 Base) MCG/ACT inhaler; Inhale 2 puffs Every 4 (Four) Hours As Needed for Wheezing.  Dispense: 18 g; Refill: 1  -     montelukast (SINGULAIR) 10 MG tablet; Take 1 tablet by mouth Every Night.  Dispense: 90 tablet; Refill: 3      Assessment & Plan  1. Submassive pulmonary embolism.  Diagnosed with submassive pulmonary embolism in 01/2025 and has been on Eliquis since then. The recent CT scan from 04/2025 showed no clots, indicating resolution. The echocardiogram showed no significant right heart strain. Continuation of Eliquis for another 6 months is recommended.    2. Obstructive sleep apnea.  Unable to use the CPAP machine due to discomfort and fear of not being able to breathe when removing it. The sleep study showed an apnea-hypopnea index of 0.6. Currently using oxygen at night, which is helping. A trial of Zepbound (tirzepatide) once a week injection will be initiated to help with weight loss and potentially improve sleep apnea. Potential side effects, including nausea, vomiting, and GI upset, were discussed. Insurance coverage will be checked.    3. Recurrent bronchitis.  Lung function test shows a mild decrease in diffusion capacity, likely related to recurrent bronchitis. Has not needed antibiotics or steroids since the last visit. Continuation of current medications, including Breztri, albuterol inhaler, and Singulair once a day, is  recommended. Should continue using the nebulizer in the morning and at night.    4. Allergic rhinitis.  Currently taking Dupixent injections but does not notice a significant difference. Continuation of Dupixent is recommended for now.    5. Swelling.  Experiences occasional swelling and is taking spironolactone as needed. The swelling is not severe today.    Follow Up   Return in about 6 months (around 1/9/2026).  Patient was given instructions and counseling regarding her condition or for health maintenance advice. Please see specific information pulled into the AVS if appropriate.     Patient or patient representative verbalized consent for the use of Ambient Listening during the visit with  Santino Baez MD for chart documentation. 7/9/2025  10:38 EDT    Electronically signed by Santino Baez MD, 7/9/2025, 10:38 EDT.

## 2025-07-10 ENCOUNTER — TRANSCRIBE ORDERS (OUTPATIENT)
Dept: LAB | Facility: HOSPITAL | Age: 80
End: 2025-07-10
Payer: MEDICARE

## 2025-07-10 ENCOUNTER — LAB (OUTPATIENT)
Dept: LAB | Facility: HOSPITAL | Age: 80
End: 2025-07-10
Payer: MEDICARE

## 2025-07-10 DIAGNOSIS — D64.9 ANEMIA, UNSPECIFIED TYPE: Primary | ICD-10-CM

## 2025-07-10 DIAGNOSIS — E11.9 DIABETES MELLITUS WITHOUT COMPLICATION: ICD-10-CM

## 2025-07-10 DIAGNOSIS — D64.9 ANEMIA, UNSPECIFIED TYPE: ICD-10-CM

## 2025-07-10 DIAGNOSIS — I10 HYPERTENSION, ESSENTIAL: ICD-10-CM

## 2025-07-10 LAB
ALBUMIN SERPL-MCNC: 3.9 G/DL (ref 3.5–5.2)
ALBUMIN/GLOB SERPL: 1.3 G/DL
ALP SERPL-CCNC: 79 U/L (ref 39–117)
ALT SERPL W P-5'-P-CCNC: 5 U/L (ref 1–33)
ANION GAP SERPL CALCULATED.3IONS-SCNC: 8 MMOL/L (ref 5–15)
AST SERPL-CCNC: 16 U/L (ref 1–32)
BASOPHILS # BLD AUTO: 0.15 10*3/MM3 (ref 0–0.2)
BASOPHILS NFR BLD AUTO: 1.6 % (ref 0–1.5)
BILIRUB SERPL-MCNC: 0.5 MG/DL (ref 0–1.2)
BUN SERPL-MCNC: 15 MG/DL (ref 8–23)
BUN/CREAT SERPL: 12.9 (ref 7–25)
CALCIUM SPEC-SCNC: 9.4 MG/DL (ref 8.6–10.5)
CHLORIDE SERPL-SCNC: 108 MMOL/L (ref 98–107)
CO2 SERPL-SCNC: 23 MMOL/L (ref 22–29)
CREAT SERPL-MCNC: 1.16 MG/DL (ref 0.57–1)
DEPRECATED RDW RBC AUTO: 45.4 FL (ref 37–54)
EGFRCR SERPLBLD CKD-EPI 2021: 47.8 ML/MIN/1.73
EOSINOPHIL # BLD AUTO: 0.36 10*3/MM3 (ref 0–0.4)
EOSINOPHIL NFR BLD AUTO: 3.8 % (ref 0.3–6.2)
ERYTHROCYTE [DISTWIDTH] IN BLOOD BY AUTOMATED COUNT: 14.5 % (ref 12.3–15.4)
GLOBULIN UR ELPH-MCNC: 2.9 GM/DL
GLUCOSE SERPL-MCNC: 90 MG/DL (ref 65–99)
HCT VFR BLD AUTO: 38.6 % (ref 34–46.6)
HGB BLD-MCNC: 12.3 G/DL (ref 12–15.9)
IMM GRANULOCYTES # BLD AUTO: 0.03 10*3/MM3 (ref 0–0.05)
IMM GRANULOCYTES NFR BLD AUTO: 0.3 % (ref 0–0.5)
IRON 24H UR-MRATE: 64 MCG/DL (ref 37–145)
IRON SATN MFR SERPL: 17 % (ref 20–50)
LYMPHOCYTES # BLD AUTO: 3.07 10*3/MM3 (ref 0.7–3.1)
LYMPHOCYTES NFR BLD AUTO: 32.6 % (ref 19.6–45.3)
MCH RBC QN AUTO: 27.6 PG (ref 26.6–33)
MCHC RBC AUTO-ENTMCNC: 31.9 G/DL (ref 31.5–35.7)
MCV RBC AUTO: 86.5 FL (ref 79–97)
MONOCYTES # BLD AUTO: 0.9 10*3/MM3 (ref 0.1–0.9)
MONOCYTES NFR BLD AUTO: 9.6 % (ref 5–12)
NEUTROPHILS NFR BLD AUTO: 4.9 10*3/MM3 (ref 1.7–7)
NEUTROPHILS NFR BLD AUTO: 52.1 % (ref 42.7–76)
NRBC BLD AUTO-RTO: 0 /100 WBC (ref 0–0.2)
PLATELET # BLD AUTO: 252 10*3/MM3 (ref 140–450)
PMV BLD AUTO: 12.6 FL (ref 6–12)
POTASSIUM SERPL-SCNC: 4.1 MMOL/L (ref 3.5–5.2)
PROT SERPL-MCNC: 6.8 G/DL (ref 6–8.5)
RBC # BLD AUTO: 4.46 10*6/MM3 (ref 3.77–5.28)
SODIUM SERPL-SCNC: 139 MMOL/L (ref 136–145)
TIBC SERPL-MCNC: 384 MCG/DL (ref 298–536)
TRANSFERRIN SERPL-MCNC: 258 MG/DL (ref 200–360)
WBC NRBC COR # BLD AUTO: 9.41 10*3/MM3 (ref 3.4–10.8)

## 2025-07-10 PROCEDURE — 80053 COMPREHEN METABOLIC PANEL: CPT

## 2025-07-10 PROCEDURE — 84466 ASSAY OF TRANSFERRIN: CPT

## 2025-07-10 PROCEDURE — 85025 COMPLETE CBC W/AUTO DIFF WBC: CPT

## 2025-07-10 PROCEDURE — 36415 COLL VENOUS BLD VENIPUNCTURE: CPT

## 2025-07-10 PROCEDURE — 83540 ASSAY OF IRON: CPT

## 2025-07-25 ENCOUNTER — TELEPHONE (OUTPATIENT)
Dept: PULMONOLOGY | Facility: CLINIC | Age: 80
End: 2025-07-25
Payer: MEDICARE

## 2025-07-25 DIAGNOSIS — I82.4Y1 ACUTE DEEP VEIN THROMBOSIS (DVT) OF PROXIMAL VEIN OF RIGHT LOWER EXTREMITY: ICD-10-CM

## 2025-07-25 DIAGNOSIS — J41.1 BRONCHITIS, MUCOPURULENT RECURRENT: ICD-10-CM

## 2025-07-25 DIAGNOSIS — R91.1 PULMONARY NODULE: ICD-10-CM

## 2025-07-25 DIAGNOSIS — J98.11 ATELECTASIS: ICD-10-CM

## 2025-07-25 DIAGNOSIS — J12.2 PARAINFLUENZA VIRUS PNEUMONIA: ICD-10-CM

## 2025-07-25 DIAGNOSIS — T17.500A MUCUS PLUGGING OF BRONCHI: ICD-10-CM

## 2025-07-25 DIAGNOSIS — K21.00 GASTROESOPHAGEAL REFLUX DISEASE WITH ESOPHAGITIS WITHOUT HEMORRHAGE: ICD-10-CM

## 2025-07-25 DIAGNOSIS — J44.9 CHRONIC OBSTRUCTIVE PULMONARY DISEASE, UNSPECIFIED COPD TYPE: ICD-10-CM

## 2025-07-25 DIAGNOSIS — E66.01 MORBID (SEVERE) OBESITY DUE TO EXCESS CALORIES: ICD-10-CM

## 2025-07-25 DIAGNOSIS — J45.40 MODERATE PERSISTENT ASTHMA, UNSPECIFIED WHETHER COMPLICATED: ICD-10-CM

## 2025-07-25 DIAGNOSIS — J30.9 ALLERGIC RHINITIS, UNSPECIFIED SEASONALITY, UNSPECIFIED TRIGGER: ICD-10-CM

## 2025-07-25 DIAGNOSIS — R04.2 HEMOPTYSIS: ICD-10-CM

## 2025-07-25 DIAGNOSIS — F17.201 TOBACCO ABUSE, IN REMISSION: ICD-10-CM

## 2025-07-25 DIAGNOSIS — G47.33 OSA (OBSTRUCTIVE SLEEP APNEA): ICD-10-CM

## 2025-07-25 DIAGNOSIS — R09.82 PND (POST-NASAL DRIP): ICD-10-CM

## 2025-07-25 DIAGNOSIS — J30.2 SEASONAL ALLERGIES: ICD-10-CM

## 2025-07-25 DIAGNOSIS — I26.09 OTHER ACUTE PULMONARY EMBOLISM WITH ACUTE COR PULMONALE: ICD-10-CM

## 2025-07-25 DIAGNOSIS — J44.1 CHRONIC OBSTRUCTIVE PULMONARY DISEASE WITH ACUTE EXACERBATION: ICD-10-CM

## 2025-07-25 RX ORDER — ALBUTEROL SULFATE 90 UG/1
2 INHALANT RESPIRATORY (INHALATION) EVERY 4 HOURS PRN
Qty: 18 G | Refills: 1 | Status: SHIPPED | OUTPATIENT
Start: 2025-07-25

## 2025-07-25 RX ORDER — TIRZEPATIDE 2.5 MG/.5ML
2.5 INJECTION, SOLUTION SUBCUTANEOUS WEEKLY
Qty: 2.5 ML | Refills: 4 | Status: SHIPPED | OUTPATIENT
Start: 2025-07-25

## 2025-07-25 NOTE — TELEPHONE ENCOUNTER
Spoke with patient. Patient has not picked up her Zepbound yet. Advised patient there should be directions with the prescription. She can also reach out to the pharmacy. Patient verbalized understanding

## 2025-07-25 NOTE — TELEPHONE ENCOUNTER
Patient was told to reach out to the office if her Zepbound was approved that  sent in. She does not know how to use and needs to know if we will do demonstration. Please advise, thank you.

## 2025-08-01 ENCOUNTER — OFFICE VISIT (OUTPATIENT)
Dept: ORTHOPEDIC SURGERY | Facility: CLINIC | Age: 80
End: 2025-08-01
Payer: MEDICARE

## 2025-08-01 VITALS — BODY MASS INDEX: 39.91 KG/M2 | WEIGHT: 211.4 LBS | HEIGHT: 61 IN | TEMPERATURE: 97.6 F

## 2025-08-01 DIAGNOSIS — M54.40 BILATERAL LOW BACK PAIN WITH SCIATICA, SCIATICA LATERALITY UNSPECIFIED, UNSPECIFIED CHRONICITY: Primary | ICD-10-CM

## 2025-08-01 PROCEDURE — 99213 OFFICE O/P EST LOW 20 MIN: CPT | Performed by: ORTHOPAEDIC SURGERY

## 2025-08-01 PROCEDURE — 1159F MED LIST DOCD IN RCRD: CPT | Performed by: ORTHOPAEDIC SURGERY

## 2025-08-01 PROCEDURE — 1160F RVW MEDS BY RX/DR IN RCRD: CPT | Performed by: ORTHOPAEDIC SURGERY

## 2025-08-01 NOTE — PROGRESS NOTES
Chief Complaint:  Bilateral leg pain, numbness, and instability    HPI:  Ms. Rios is seen today for new complaint of bilateral leg pain, numbness and the feeling of instability.  She says that she feels like the legs want to give out from time to time.  She is fearful this will cause her to fall and she has started to ambulate with a cane.  She reports chronic and severe back pain as well.  This is worse in the mornings.  She says that it typically takes her an hour or 2 to get the back loosened up first thing in the morning.  She does report some radiating pain down the legs.  Denies bowel or bladder dysfunction.  Denies any weakness in her legs.    Exam: Both legs are examined.  Skin is benign.  No tenderness the right knee and she has mild medial joint line tenderness on the left.  She has good knee motion on the right.  Left knee motion is actually pretty good as well but she cannot quite fully flex.  Straight leg raise and cross straight leg raise test are negative.  She can extend her knees, plantarflex and dorsiflex her ankle and toes with good strength.  She does not have any clonus.  She has intact sensation in both feet.    Imaging: Her previous x-rays of the lumbar spine are reviewed.  She appears to have multilevel degenerative disc disease and some low-grade spondylolisthesis in the lower lumbar spine.  No other significant findings are noted.    Assessment: Bilateral leg pain and perceived instability, suspected lumbar spinal stenosis    Plan: I told her that I think her symptoms are most likely coming from the back.  I recommend an MRI with an eye towards possible epidural injections.  I did explain that if epidurals are an option for her, she would need to come off of her anticoagulation.  She understands.  I told her I will call her once to see the results of the MRI and we will go from there.    Celso Prakash MD  08/01/2025

## 2025-08-02 DIAGNOSIS — I26.99 ACUTE PULMONARY EMBOLISM, UNSPECIFIED PULMONARY EMBOLISM TYPE, UNSPECIFIED WHETHER ACUTE COR PULMONALE PRESENT: ICD-10-CM

## 2025-08-04 RX ORDER — APIXABAN 5 MG/1
5 TABLET, FILM COATED ORAL 2 TIMES DAILY
Qty: 180 TABLET | Refills: 1 | Status: SHIPPED | OUTPATIENT
Start: 2025-08-04

## 2025-08-06 ENCOUNTER — SPECIALTY PHARMACY (OUTPATIENT)
Dept: PULMONOLOGY | Facility: CLINIC | Age: 80
End: 2025-08-06
Payer: MEDICARE

## 2025-08-08 ENCOUNTER — TELEPHONE (OUTPATIENT)
Dept: ORTHOPEDIC SURGERY | Facility: CLINIC | Age: 80
End: 2025-08-08
Payer: MEDICARE

## (undated) DEVICE — GLV SURG SENSICARE PI MIC PF SZ7 LF STRL

## (undated) DEVICE — CONTAINER,SPEC,PNEUM TUBE,4OZ,STRL PATH: Brand: MEDLINE

## (undated) DEVICE — PK KN TOTL 40

## (undated) DEVICE — SINGLE USE BIOPSY VALVE MAJ-210: Brand: SINGLE USE BIOPSY VALVE (STERILE)

## (undated) DEVICE — CEMENT MIXING SYSTEM WITH FEMORAL BREAKWAY NOZZLE: Brand: REVOLUTION

## (undated) DEVICE — COLON KIT: Brand: MEDLINE INDUSTRIES, INC.

## (undated) DEVICE — TBG PENCL TELESCP MEGADYNE SMOKE EVAC 10FT

## (undated) DEVICE — BLCK/BITE BLOX WO/DENTL/RIM W/STRAP 54F

## (undated) DEVICE — PREP SOL POVIDONE/IODINE BT 4OZ

## (undated) DEVICE — SUT VIC 2/0 CT2 27IN J269H

## (undated) DEVICE — DISPOSABLE TOURNIQUET CUFF SINGLE BLADDER, SINGLE PORT AND QUICK CONNECT CONNECTOR: Brand: COLOR CUFF

## (undated) DEVICE — LINER SURG CANSTR SXN S/RIGD 1500CC

## (undated) DEVICE — TRAP FLD MINIVAC MEGADYNE 100ML

## (undated) DEVICE — GLV SURG BIOGEL LTX PF 8 1/2

## (undated) DEVICE — ZIP 24 SURGICAL SKIN CLOSURE DEVICE, PSA: Brand: ZIP 24 SURGICAL SKIN CLOSURE DEVICE

## (undated) DEVICE — DECANTER BAG 9": Brand: MEDLINE INDUSTRIES, INC.

## (undated) DEVICE — SINGLE USE SUCTION VALVE MAJ-209: Brand: SINGLE USE SUCTION VALVE (STERILE)

## (undated) DEVICE — UNDERCAST PADDING: Brand: DEROYAL

## (undated) DEVICE — STERILE PATIENT PROTECTIVE PAD FOR IMP® KNEE POSITIONERS & COHESIVE WRAP (10 / CASE): Brand: DE MAYO KNEE POSITIONER®

## (undated) DEVICE — SOL ISO/ALC 70PCT 4OZ

## (undated) DEVICE — NEEDLE, QUINCKE, 20GX3.5": Brand: MEDLINE

## (undated) DEVICE — GLV SURG SENSICARE POLYISPRN W/ALOE PF LF 6.5 GRN STRL

## (undated) DEVICE — SOLIDIFIER LIQLOC PLS 1500CC BT

## (undated) DEVICE — SOL IRRG H2O PL/BG 1000ML STRL

## (undated) DEVICE — DEV ATOMIZATION MUCOSAL/NASALTRACH

## (undated) DEVICE — ANTIBACTERIAL UNDYED BRAIDED (POLYGLACTIN 910), SYNTHETIC ABSORBABLE SUTURE: Brand: COATED VICRYL

## (undated) DEVICE — SKIN PREP TRAY W/CHG: Brand: MEDLINE INDUSTRIES, INC.

## (undated) DEVICE — PATIENT RETURN ELECTRODE, SINGLE-USE, CONTACT QUALITY MONITORING, ADULT, WITH 9FT CORD, FOR PATIENTS WEIGING OVER 33LBS. (15KG): Brand: MEGADYNE

## (undated) DEVICE — Device

## (undated) DEVICE — BNDG ELAS ELITE V/CLOSE 6IN 5YD LF STRL

## (undated) DEVICE — GLV SURG BIOGEL LTX PF 7

## (undated) DEVICE — GLV SURG SIGNATURE ESSENTIAL PF LTX SZ8.5

## (undated) DEVICE — APPL CHLORAPREP HI/LITE 26ML ORNG

## (undated) DEVICE — GLV SURG BIOGEL LTX PF 6 1/2

## (undated) DEVICE — SOL IRR NACL 0.9PCT 3000ML

## (undated) DEVICE — Device: Brand: DEFENDO AIR/WATER/SUCTION AND BIOPSY VALVE

## (undated) DEVICE — DUAL CUT SAGITTAL BLADE